# Patient Record
Sex: MALE | Race: ASIAN | NOT HISPANIC OR LATINO
[De-identification: names, ages, dates, MRNs, and addresses within clinical notes are randomized per-mention and may not be internally consistent; named-entity substitution may affect disease eponyms.]

---

## 2017-01-04 ENCOUNTER — APPOINTMENT (OUTPATIENT)
Dept: OTOLARYNGOLOGY | Facility: CLINIC | Age: 72
End: 2017-01-04
Payer: MEDICARE

## 2017-01-04 VITALS
HEART RATE: 64 BPM | WEIGHT: 184 LBS | BODY MASS INDEX: 29.57 KG/M2 | HEIGHT: 66 IN | DIASTOLIC BLOOD PRESSURE: 70 MMHG | SYSTOLIC BLOOD PRESSURE: 119 MMHG

## 2017-01-04 PROCEDURE — 69220 CLEAN OUT MASTOID CAVITY: CPT | Mod: LT

## 2017-01-04 PROCEDURE — 99213 OFFICE O/P EST LOW 20 MIN: CPT | Mod: 25

## 2017-01-13 LAB — BACTERIA FLD CULT: ABNORMAL

## 2017-01-19 ENCOUNTER — RESULT REVIEW (OUTPATIENT)
Age: 72
End: 2017-01-19

## 2017-01-19 RX ORDER — CIPROFLOXACIN AND DEXAMETHASONE 3; 1 MG/ML; MG/ML
0.3-0.1 SUSPENSION/ DROPS AURICULAR (OTIC)
Qty: 10 | Refills: 2 | Status: COMPLETED | COMMUNITY
Start: 2017-01-19 | End: 2017-02-18

## 2017-02-15 ENCOUNTER — APPOINTMENT (OUTPATIENT)
Dept: OTOLARYNGOLOGY | Facility: CLINIC | Age: 72
End: 2017-02-15
Payer: MEDICARE

## 2017-02-15 VITALS
BODY MASS INDEX: 29.57 KG/M2 | DIASTOLIC BLOOD PRESSURE: 76 MMHG | WEIGHT: 184 LBS | HEIGHT: 66 IN | SYSTOLIC BLOOD PRESSURE: 123 MMHG | HEART RATE: 62 BPM

## 2017-02-15 PROCEDURE — 99213 OFFICE O/P EST LOW 20 MIN: CPT | Mod: 25

## 2017-02-15 PROCEDURE — 69220 CLEAN OUT MASTOID CAVITY: CPT | Mod: LT

## 2017-05-22 ENCOUNTER — APPOINTMENT (OUTPATIENT)
Dept: OTOLARYNGOLOGY | Facility: CLINIC | Age: 72
End: 2017-05-22
Payer: MEDICARE

## 2017-05-22 VITALS
HEIGHT: 66 IN | WEIGHT: 184 LBS | SYSTOLIC BLOOD PRESSURE: 132 MMHG | DIASTOLIC BLOOD PRESSURE: 76 MMHG | BODY MASS INDEX: 29.57 KG/M2 | HEART RATE: 62 BPM

## 2017-05-22 DIAGNOSIS — H60.392 OTHER INFECTIVE OTITIS EXTERNA, LEFT EAR: ICD-10-CM

## 2017-05-22 PROCEDURE — 99213 OFFICE O/P EST LOW 20 MIN: CPT | Mod: 25

## 2017-05-22 PROCEDURE — 69220 CLEAN OUT MASTOID CAVITY: CPT | Mod: LT

## 2017-06-13 ENCOUNTER — OUTPATIENT (OUTPATIENT)
Dept: OUTPATIENT SERVICES | Facility: HOSPITAL | Age: 72
LOS: 1 days | End: 2017-06-13
Payer: COMMERCIAL

## 2017-06-13 DIAGNOSIS — R06.09 OTHER FORMS OF DYSPNEA: ICD-10-CM

## 2017-06-13 PROCEDURE — 78452 HT MUSCLE IMAGE SPECT MULT: CPT

## 2017-06-13 PROCEDURE — 93017 CV STRESS TEST TRACING ONLY: CPT

## 2017-06-13 PROCEDURE — A9502: CPT

## 2017-07-12 ENCOUNTER — APPOINTMENT (OUTPATIENT)
Dept: CV DIAGNOSITCS | Facility: HOSPITAL | Age: 72
End: 2017-07-12

## 2017-07-12 ENCOUNTER — OUTPATIENT (OUTPATIENT)
Dept: OUTPATIENT SERVICES | Facility: HOSPITAL | Age: 72
LOS: 1 days | End: 2017-07-12
Payer: COMMERCIAL

## 2017-07-12 VITALS
WEIGHT: 184.97 LBS | HEIGHT: 66 IN | HEART RATE: 59 BPM | DIASTOLIC BLOOD PRESSURE: 66 MMHG | RESPIRATION RATE: 18 BRPM | OXYGEN SATURATION: 98 % | TEMPERATURE: 98 F | SYSTOLIC BLOOD PRESSURE: 123 MMHG

## 2017-07-12 DIAGNOSIS — R93.1 ABNORMAL FINDINGS ON DIAGNOSTIC IMAGING OF HEART AND CORONARY CIRCULATION: ICD-10-CM

## 2017-07-12 LAB
ALBUMIN SERPL ELPH-MCNC: 4.1 G/DL — SIGNIFICANT CHANGE UP (ref 3.3–5)
ALP SERPL-CCNC: 77 U/L — SIGNIFICANT CHANGE UP (ref 40–120)
ALT FLD-CCNC: 26 U/L RC — SIGNIFICANT CHANGE UP (ref 10–45)
ANION GAP SERPL CALC-SCNC: 12 MMOL/L — SIGNIFICANT CHANGE UP (ref 5–17)
AST SERPL-CCNC: 40 U/L — SIGNIFICANT CHANGE UP (ref 10–40)
BILIRUB SERPL-MCNC: 0.7 MG/DL — SIGNIFICANT CHANGE UP (ref 0.2–1.2)
BUN SERPL-MCNC: 29 MG/DL — HIGH (ref 7–23)
CALCIUM SERPL-MCNC: 8.9 MG/DL — SIGNIFICANT CHANGE UP (ref 8.4–10.5)
CHLORIDE SERPL-SCNC: 106 MMOL/L — SIGNIFICANT CHANGE UP (ref 96–108)
CO2 SERPL-SCNC: 24 MMOL/L — SIGNIFICANT CHANGE UP (ref 22–31)
CREAT SERPL-MCNC: 1.45 MG/DL — HIGH (ref 0.5–1.3)
GLUCOSE SERPL-MCNC: 123 MG/DL — HIGH (ref 70–99)
HCT VFR BLD CALC: 38.1 % — LOW (ref 39–50)
HGB BLD-MCNC: 13 G/DL — SIGNIFICANT CHANGE UP (ref 13–17)
MCHC RBC-ENTMCNC: 30.2 PG — SIGNIFICANT CHANGE UP (ref 27–34)
MCHC RBC-ENTMCNC: 34.1 GM/DL — SIGNIFICANT CHANGE UP (ref 32–36)
MCV RBC AUTO: 88.4 FL — SIGNIFICANT CHANGE UP (ref 80–100)
PLATELET # BLD AUTO: 202 K/UL — SIGNIFICANT CHANGE UP (ref 150–400)
POTASSIUM SERPL-MCNC: 3.8 MMOL/L — SIGNIFICANT CHANGE UP (ref 3.5–5.3)
POTASSIUM SERPL-SCNC: 3.8 MMOL/L — SIGNIFICANT CHANGE UP (ref 3.5–5.3)
PROT SERPL-MCNC: 7.4 G/DL — SIGNIFICANT CHANGE UP (ref 6–8.3)
RBC # BLD: 4.31 M/UL — SIGNIFICANT CHANGE UP (ref 4.2–5.8)
RBC # FLD: 12 % — SIGNIFICANT CHANGE UP (ref 10.3–14.5)
SODIUM SERPL-SCNC: 142 MMOL/L — SIGNIFICANT CHANGE UP (ref 135–145)
WBC # BLD: 7.9 K/UL — SIGNIFICANT CHANGE UP (ref 3.8–10.5)
WBC # FLD AUTO: 7.9 K/UL — SIGNIFICANT CHANGE UP (ref 3.8–10.5)

## 2017-07-12 PROCEDURE — 93005 ELECTROCARDIOGRAM TRACING: CPT

## 2017-07-12 PROCEDURE — 93010 ELECTROCARDIOGRAM REPORT: CPT | Mod: 59

## 2017-07-12 PROCEDURE — 93312 ECHO TRANSESOPHAGEAL: CPT | Mod: 26

## 2017-07-12 PROCEDURE — 80053 COMPREHEN METABOLIC PANEL: CPT

## 2017-07-12 PROCEDURE — 76376 3D RENDER W/INTRP POSTPROCES: CPT | Mod: 26

## 2017-07-12 PROCEDURE — 85027 COMPLETE CBC AUTOMATED: CPT

## 2017-07-12 PROCEDURE — C1769: CPT

## 2017-07-12 PROCEDURE — C8929: CPT

## 2017-07-12 PROCEDURE — 76376 3D RENDER W/INTRP POSTPROCES: CPT

## 2017-07-12 PROCEDURE — C1894: CPT

## 2017-07-12 PROCEDURE — 93454 CORONARY ARTERY ANGIO S&I: CPT

## 2017-07-12 PROCEDURE — C8925: CPT

## 2017-07-12 PROCEDURE — 93454 CORONARY ARTERY ANGIO S&I: CPT | Mod: 26,GC

## 2017-07-12 PROCEDURE — 93306 TTE W/DOPPLER COMPLETE: CPT | Mod: 26

## 2017-07-12 PROCEDURE — C1887: CPT

## 2017-07-12 RX ORDER — SODIUM CHLORIDE 9 MG/ML
1000 INJECTION INTRAMUSCULAR; INTRAVENOUS; SUBCUTANEOUS
Qty: 0 | Refills: 0 | Status: DISCONTINUED | OUTPATIENT
Start: 2017-07-12 | End: 2017-07-27

## 2017-07-12 NOTE — H&P CARDIOLOGY - PMH
Aortic valve disease    BPH (benign prostatic hypertrophy)    Chronic mastoiditis of left side    Diabetes mellitus  Type II  Gall stones    GERD (gastroesophageal reflux disease)    HTN (hypertension)    Hyperlipidemia

## 2017-07-12 NOTE — H&P CARDIOLOGY - HISTORY OF PRESENT ILLNESS
This is a 72 yo male PMH DM type 2 A1C 7.8 with neuropathy managed by PMD Dr. Garay, HTN, HLD, AS, s/p bioprosthetic AVR at [facility] 2009, presented to cardiology, Dr. Strange for followup examination with complaints of progressing fatigue and dyspnea with minimal exertion, mild dizziness, and lower extremity LE edema, 2 pillow orthopnea over the past 2 months. Pt. denies chest pain, pressure, lightheadedness, palpitations, diaphoresis, nausea, vomiting, weight gain, or syncope.  Pts. Metoprolol dose has been changed from 200mg to 100mg now pt is back on 200mg to manage his HTN. Pt. presents today asymptomatic for further evaluation BALJINDER/cardiac cath.    Echocardiogram 6/7/17 Normal LV size and grossly normal systolic function.  Evidence of diastolic dysfunction.  LA enlargement, bioprosthetic valve with mild to moderate regurgitation, possibly of paravalvular origin, mild DC, unable to estimate PA systolic pressure secondary to insufficient tricuspid regurgitant jet. PAEDP 12.    NST 6/13/17 small size mild ischemia during Regadenoson stress test.   CXR 6/7/17 cardiomegaly without decompensation.

## 2017-09-18 ENCOUNTER — APPOINTMENT (OUTPATIENT)
Dept: OTOLARYNGOLOGY | Facility: CLINIC | Age: 72
End: 2017-09-18
Payer: MEDICARE

## 2017-09-18 VITALS
HEIGHT: 66 IN | BODY MASS INDEX: 29.57 KG/M2 | SYSTOLIC BLOOD PRESSURE: 125 MMHG | HEART RATE: 71 BPM | WEIGHT: 184 LBS | DIASTOLIC BLOOD PRESSURE: 66 MMHG

## 2017-09-18 DIAGNOSIS — R42 DIZZINESS AND GIDDINESS: ICD-10-CM

## 2017-09-18 PROCEDURE — 92557 COMPREHENSIVE HEARING TEST: CPT

## 2017-09-18 PROCEDURE — 99213 OFFICE O/P EST LOW 20 MIN: CPT | Mod: 25

## 2017-09-18 PROCEDURE — 92567 TYMPANOMETRY: CPT

## 2017-10-09 ENCOUNTER — APPOINTMENT (OUTPATIENT)
Dept: PHARMACY | Facility: CLINIC | Age: 72
End: 2017-10-09

## 2017-10-12 ENCOUNTER — APPOINTMENT (OUTPATIENT)
Dept: OTOLARYNGOLOGY | Facility: CLINIC | Age: 72
End: 2017-10-12
Payer: MEDICARE

## 2017-10-12 PROCEDURE — 92537 CALORIC VSTBLR TEST W/REC: CPT

## 2017-10-12 PROCEDURE — 92547 SUPPLEMENTAL ELECTRICAL TEST: CPT

## 2017-10-12 PROCEDURE — 92567 TYMPANOMETRY: CPT

## 2017-10-12 PROCEDURE — 92584 ELECTROCOCHLEOGRAPHY: CPT

## 2017-10-12 PROCEDURE — 92540 BASIC VESTIBULAR EVALUATION: CPT

## 2017-12-20 ENCOUNTER — APPOINTMENT (OUTPATIENT)
Dept: OTOLARYNGOLOGY | Facility: CLINIC | Age: 72
End: 2017-12-20

## 2018-03-12 ENCOUNTER — APPOINTMENT (OUTPATIENT)
Dept: OTOLARYNGOLOGY | Facility: CLINIC | Age: 73
End: 2018-03-12
Payer: MEDICARE

## 2018-03-12 PROCEDURE — 31575 DIAGNOSTIC LARYNGOSCOPY: CPT

## 2018-03-12 PROCEDURE — 99213 OFFICE O/P EST LOW 20 MIN: CPT | Mod: 25

## 2018-03-19 ENCOUNTER — MESSAGE (OUTPATIENT)
Age: 73
End: 2018-03-19

## 2018-03-23 ENCOUNTER — MESSAGE (OUTPATIENT)
Age: 73
End: 2018-03-23

## 2018-03-23 ENCOUNTER — EMERGENCY (EMERGENCY)
Facility: HOSPITAL | Age: 73
LOS: 1 days | Discharge: HOME | End: 2018-03-23
Attending: EMERGENCY MEDICINE | Admitting: EMERGENCY MEDICINE
Payer: MEDICARE

## 2018-03-23 VITALS
DIASTOLIC BLOOD PRESSURE: 64 MMHG | TEMPERATURE: 99 F | RESPIRATION RATE: 20 BRPM | SYSTOLIC BLOOD PRESSURE: 125 MMHG | HEART RATE: 75 BPM | OXYGEN SATURATION: 100 %

## 2018-03-23 VITALS
DIASTOLIC BLOOD PRESSURE: 65 MMHG | OXYGEN SATURATION: 99 % | SYSTOLIC BLOOD PRESSURE: 132 MMHG | HEART RATE: 79 BPM | RESPIRATION RATE: 18 BRPM | TEMPERATURE: 98 F

## 2018-03-23 LAB
ALBUMIN SERPL ELPH-MCNC: 4.4 G/DL — SIGNIFICANT CHANGE UP (ref 3.3–5)
ALP SERPL-CCNC: 89 U/L — SIGNIFICANT CHANGE UP (ref 40–120)
ALT FLD-CCNC: 27 U/L — SIGNIFICANT CHANGE UP (ref 4–41)
AST SERPL-CCNC: 40 U/L — SIGNIFICANT CHANGE UP (ref 4–40)
BASOPHILS # BLD AUTO: 0.02 K/UL — SIGNIFICANT CHANGE UP (ref 0–0.2)
BASOPHILS NFR BLD AUTO: 0.2 % — SIGNIFICANT CHANGE UP (ref 0–2)
BILIRUB SERPL-MCNC: 0.9 MG/DL — SIGNIFICANT CHANGE UP (ref 0.2–1.2)
BUN SERPL-MCNC: 20 MG/DL — SIGNIFICANT CHANGE UP (ref 7–23)
CALCIUM SERPL-MCNC: 9.1 MG/DL — SIGNIFICANT CHANGE UP (ref 8.4–10.5)
CHLORIDE SERPL-SCNC: 96 MMOL/L — LOW (ref 98–107)
CO2 SERPL-SCNC: 27 MMOL/L — SIGNIFICANT CHANGE UP (ref 22–31)
CREAT SERPL-MCNC: 1.58 MG/DL — HIGH (ref 0.5–1.3)
EOSINOPHIL # BLD AUTO: 0.05 K/UL — SIGNIFICANT CHANGE UP (ref 0–0.5)
EOSINOPHIL NFR BLD AUTO: 0.5 % — SIGNIFICANT CHANGE UP (ref 0–6)
GLUCOSE SERPL-MCNC: 92 MG/DL — SIGNIFICANT CHANGE UP (ref 70–99)
HCT VFR BLD CALC: 41.1 % — SIGNIFICANT CHANGE UP (ref 39–50)
HGB BLD-MCNC: 13.5 G/DL — SIGNIFICANT CHANGE UP (ref 13–17)
IMM GRANULOCYTES # BLD AUTO: 0.03 # — SIGNIFICANT CHANGE UP
IMM GRANULOCYTES NFR BLD AUTO: 0.3 % — SIGNIFICANT CHANGE UP (ref 0–1.5)
LYMPHOCYTES # BLD AUTO: 1.53 K/UL — SIGNIFICANT CHANGE UP (ref 1–3.3)
LYMPHOCYTES # BLD AUTO: 15.1 % — SIGNIFICANT CHANGE UP (ref 13–44)
MCHC RBC-ENTMCNC: 28.2 PG — SIGNIFICANT CHANGE UP (ref 27–34)
MCHC RBC-ENTMCNC: 32.8 % — SIGNIFICANT CHANGE UP (ref 32–36)
MCV RBC AUTO: 86 FL — SIGNIFICANT CHANGE UP (ref 80–100)
MONOCYTES # BLD AUTO: 1.17 K/UL — HIGH (ref 0–0.9)
MONOCYTES NFR BLD AUTO: 11.5 % — SIGNIFICANT CHANGE UP (ref 2–14)
NEUTROPHILS # BLD AUTO: 7.34 K/UL — SIGNIFICANT CHANGE UP (ref 1.8–7.4)
NEUTROPHILS NFR BLD AUTO: 72.4 % — SIGNIFICANT CHANGE UP (ref 43–77)
NRBC # FLD: 0 — SIGNIFICANT CHANGE UP
PLATELET # BLD AUTO: 201 K/UL — SIGNIFICANT CHANGE UP (ref 150–400)
PMV BLD: 8.8 FL — SIGNIFICANT CHANGE UP (ref 7–13)
POTASSIUM SERPL-MCNC: 3.8 MMOL/L — SIGNIFICANT CHANGE UP (ref 3.5–5.3)
POTASSIUM SERPL-SCNC: 3.8 MMOL/L — SIGNIFICANT CHANGE UP (ref 3.5–5.3)
PROT SERPL-MCNC: 8.3 G/DL — SIGNIFICANT CHANGE UP (ref 6–8.3)
RBC # BLD: 4.78 M/UL — SIGNIFICANT CHANGE UP (ref 4.2–5.8)
RBC # FLD: 12.6 % — SIGNIFICANT CHANGE UP (ref 10.3–14.5)
SODIUM SERPL-SCNC: 138 MMOL/L — SIGNIFICANT CHANGE UP (ref 135–145)
WBC # BLD: 10.14 K/UL — SIGNIFICANT CHANGE UP (ref 3.8–10.5)
WBC # FLD AUTO: 10.14 K/UL — SIGNIFICANT CHANGE UP (ref 3.8–10.5)

## 2018-03-23 PROCEDURE — 99285 EMERGENCY DEPT VISIT HI MDM: CPT

## 2018-03-23 PROCEDURE — 70487 CT MAXILLOFACIAL W/DYE: CPT | Mod: 26

## 2018-03-23 RX ORDER — OXYCODONE HYDROCHLORIDE 5 MG/1
1 TABLET ORAL
Qty: 4 | Refills: 0
Start: 2018-03-23 | End: 2018-03-26

## 2018-03-23 RX ORDER — CIPROFLOXACIN LACTATE 400MG/40ML
1 VIAL (ML) INTRAVENOUS
Qty: 20 | Refills: 0
Start: 2018-03-23 | End: 2018-04-01

## 2018-03-23 RX ORDER — ACETAMINOPHEN 500 MG
1000 TABLET ORAL ONCE
Qty: 0 | Refills: 0 | Status: COMPLETED | OUTPATIENT
Start: 2018-03-23 | End: 2018-03-23

## 2018-03-23 RX ORDER — MUPIROCIN 20 MG/G
1 OINTMENT TOPICAL
Qty: 1 | Refills: 0
Start: 2018-03-23 | End: 2018-03-27

## 2018-03-23 RX ORDER — PIPERACILLIN AND TAZOBACTAM 4; .5 G/20ML; G/20ML
3.38 INJECTION, POWDER, LYOPHILIZED, FOR SOLUTION INTRAVENOUS ONCE
Qty: 0 | Refills: 0 | Status: COMPLETED | OUTPATIENT
Start: 2018-03-23 | End: 2018-03-23

## 2018-03-23 RX ORDER — SODIUM CHLORIDE 9 MG/ML
500 INJECTION INTRAMUSCULAR; INTRAVENOUS; SUBCUTANEOUS ONCE
Qty: 0 | Refills: 0 | Status: COMPLETED | OUTPATIENT
Start: 2018-03-23 | End: 2018-03-23

## 2018-03-23 RX ADMIN — Medication 400 MILLIGRAM(S): at 19:10

## 2018-03-23 RX ADMIN — SODIUM CHLORIDE 500 MILLILITER(S): 9 INJECTION INTRAMUSCULAR; INTRAVENOUS; SUBCUTANEOUS at 19:10

## 2018-03-23 RX ADMIN — PIPERACILLIN AND TAZOBACTAM 200 GRAM(S): 4; .5 INJECTION, POWDER, LYOPHILIZED, FOR SOLUTION INTRAVENOUS at 21:22

## 2018-03-23 NOTE — ED ADULT NURSE NOTE - OBJECTIVE STATEMENT
72M, PMH of AVR, chronic ear infections requiring sx of left ear, presents with recurrent discharge from both ears and subjective fever. Pt started on Tobramycin by ENT 3 days ago and then started on Augmentin yesterday. Pt denies improvement, reports pain down left side of jaw. Pt denies tinnitus, dizziness, n/v.

## 2018-03-23 NOTE — ED PROVIDER NOTE - CARE PLAN
Principal Discharge DX:	Otitis media of both ears  Assessment and plan of treatment:	- continue tobradex drops, use bilaterally (3 drops TID for 7 more days)  - Discontinue amoxicillin  - START ciprofloxacin 500mg twice daily x 10 days for ear infection  - START clindamycin 300mg every 6 hours x 7 days for cellulitis of the chin  - mupirocin ointment twice daily for cellulitis of the chin  - Return to ED for worsening pain/facial swelling/difficulty swallowing  - Follow up with Dr. Mckeon in 1 week.  [phone]  Secondary Diagnosis:	Cellulitis

## 2018-03-23 NOTE — ED ADULT TRIAGE NOTE - CHIEF COMPLAINT QUOTE
Patient c/o left ear pain that radiating downwards to tooth, jaw, face and chin. He states he has some discomfort to right side ear also but not as much as other side.

## 2018-03-23 NOTE — CONSULT NOTE ADULT - SUBJECTIVE AND OBJECTIVE BOX
72M DM, Htn, followed as outpatient by neuro-otology present to ED for ear pain.  PT reports seeing Dr. Mckeon last week where she examined ears and sent culture which grew pseudomonas.  Pt was written for tobradex drops.  He reports starting those drops 1-2 days ago.  He also saw PMH who prescribed amox/vs augmentin?  He presents to ED today with ear pain which he reports as unchanged in the past few days.  He also has new onset tenderness over the chin.  No history of chin trauma.  He denies acute change in hearing, no dizziness.  He reports L sided otorrhea.  He has a history of mastoidectomy on the left as well.    PMH/PSH above  All: NKDA      Vital Signs Last 24 Hrs  T(C): 37.2 (23 Mar 2018 19:15), Max: 37.2 (23 Mar 2018 19:15)  T(F): 99 (23 Mar 2018 19:15), Max: 99 (23 Mar 2018 19:15)  HR: 75 (23 Mar 2018 19:15) (75 - 79)  BP: 125/64 (23 Mar 2018 19:15) (125/64 - 132/65)  BP(mean): --  RR: 20 (23 Mar 2018 19:15) (18 - 20)  SpO2: 100% (23 Mar 2018 19:15) (99% - 100%)    PHYSICAL EXAM:  Constitutional Normal: awake, alert,  well developed, no acute distress  Face symmetric, CNs grossly intact, small 2x2cm area of erythema and induration over left chin, no fluctuance, no drainage  AS: No mastoid tenderness, Normal external ear, EAC with some debris overlying TM, minimal canal edema  ADS: Normal external ear, EAC with cerumen and debris, unable to see TM	  External Nose:  Normal, no structural deformities	  Anterior Nasal Cavity: patent bilaterally, pink moist mucosa			  Oral Cavity:  pink moist mucosa, tongue midline  Neck: Soft, flat, No palpable masses                          13.5   10.14 )-----------( 201      ( 23 Mar 2018 16:45 )             41.1     03-23    138  |  96<L>  |  20  ----------------------------<  92  3.8   |  27  |  1.58<H>    Ca    9.1      23 Mar 2018 16:45    TPro  8.3  /  Alb  4.4  /  TBili  0.9  /  DBili  x   /  AST  40  /  ALT  27  /  AlkPhos  89  03-23      CT: Left tympanic membrane   retraction, ossicular erosion and opacification of the mastoid air cells is   seen. Please correlate with clinical exam findings for the possibility of a   cholesteatoma. No abscess

## 2018-03-23 NOTE — ED PROVIDER NOTE - PHYSICAL EXAMINATION
Attending/Adriel: NAD, PERRL/EOMI, TM-+pus, no mastoid PT, O/P-clear, supple, RRR, +SM, CTAB, ABds-fot, NT/ND, no LE edema, +2 DP/PT, A&Ox3, nonfocal; Left facial swelling

## 2018-03-23 NOTE — CONSULT NOTE ADULT - ASSESSMENT
72M with otitis externa, Culture confirmed to be pseudomonas.  No extending to surrounding soft tissue.  No concerning finding on CT.  Unrelated minor cellulitis overlying skin of chin likely from folliculitis, no abscess.  No WBC elevation, no good glucose control  - no acute ENT needs  - continue tobradex drops, use bilaterally (3 drops TID for 7 more days)  - Discontinue amox vs augmentin  - consider bactrim vs clinda for chin cellulitis  - mupirocin ointment BID for chin cellulitis BID  - follow up with Dr. Mckeon in 1 week.  [phone] 72M with otitis externa, Culture confirmed to be pseudomonas sensitive to tobradex.  No extending to surrounding soft tissue.  No concerning finding on CT.  Unrelated minor cellulitis overlying skin of chin likely from folliculitis, no abscess.  No WBC elevation, no good glucose control  - no acute ENT needs  - continue tobradex drops, use bilaterally (3 drops TID for 7 more days)  - Discontinue amox vs augmentin  - oral ciprofloxacin for improved psuedomonas coverage  - consider bactrim vs clinda for chin cellulitis  - mupirocin ointment BID for chin cellulitis BID  - follow up with Dr. Mckeon in 1 week.  [phone]

## 2018-03-23 NOTE — ED PROVIDER NOTE - PLAN OF CARE
- continue tobradex drops, use bilaterally (3 drops TID for 7 more days)  - Discontinue amoxicillin  - START ciprofloxacin 500mg twice daily x 10 days for ear infection  - START clindamycin 300mg every 6 hours x 7 days for cellulitis of the chin  - mupirocin ointment twice daily for cellulitis of the chin  - Return to ED for worsening pain/facial swelling/difficulty swallowing  - Follow up with Dr. Mckeon in 1 week.  [phone]

## 2018-03-23 NOTE — ED PROVIDER NOTE - OBJECTIVE STATEMENT
71 y/o M PMH DM, HTN, HLD, h/o AVR on ASA c/o, h/o recurrent ear infections c/o worsening left facial pain and swelling x 5 days. 71 y/o M PMH DM, HTN, HLD, h/o AVR on ASA c/o, h/o recurrent ear infections c/o worsening left facial pain and swelling x 5 days.    Attending/Adriel: 73 yo M as described above including h/o AVR (tissue), h/o chronic ear infections including requiring surgery of left ear (?type) 2 years ago p/w recurrent discharge from both ears, associated with subjective fver. Seen and eval by her ENT this past Tuesday, cultured taken and started on Tobramycin. Patient not improving, see and eval by PMD yesterdya and started on oral Augmentin. Pt with intermittent ear pain somewhat improve with Acetaminophen now radiating down left side of jaw, with painful mouth opening. Denies visual changes, dysphagioa or odyophagia. 73 y/o M PMH DM, HTN, HLD, h/o AVR on ASA c/o, h/o recurrent ear infections c/o worsening left facial pain and swelling x 5 days. Pt was evaluated by ENT 4 days ago, started on tobracycin drops for b/l ear infection. Pt notes worsening pain and swelling to left face, saw on call doc at PMD office yesterday and was started on augmentin. Since being on abx, pt states ear pain mildly improved and most of his pain now localized to chin with radiation from left ear. Pt presents to ED d/t worsening pain and difficulty opening jaw, notes only being able to tolerate liquid diet x 5 days. Endorses subjective fever for past few days. Denies CP, SOB, dysphagia, abdominal pain, N/V, change in vision/pain with eye movement.    Attending/Adriel: 73 yo M as described above including h/o AVR (tissue), h/o chronic ear infections including requiring surgery of left ear (?type) 2 years ago p/w recurrent discharge from both ears, associated with subjective fver. Seen and eval by her ENT this past Tuesday, cultured taken and started on Tobramycin. Patient not improving, see and eval by PMD yesterdya and started on oral Augmentin. Pt with intermittent ear pain somewhat improve with Acetaminophen now radiating down left side of jaw, with painful mouth opening. Denies visual changes, dysphagioa or odyophagia.

## 2018-03-23 NOTE — ED PROVIDER NOTE - HEAD SHAPE
left facial swelling over left lower jaw extending towards chin. Chin erythematous and indurated, non fluctuance, no crepitus noted, no dental ttp of signs of dental abscess/ATRAUMATIC

## 2018-03-23 NOTE — ED PROVIDER NOTE - MEDICAL DECISION MAKING DETAILS
A/P 73 yo M with mult med problmes with acute on chrnic ear infection with possible extension  -labs, CT, Abx, analgesia, ENT, reassess

## 2018-03-23 NOTE — ED PROVIDER NOTE - PROGRESS NOTE DETAILS
ENT consulted, pt to switch abx to cover pseudomonas for aom, chin cellulitis to be tx'd with clinda and mupirocin and f/u with his ent in 1 week. Will send home with pain med regimen

## 2018-03-26 ENCOUNTER — APPOINTMENT (OUTPATIENT)
Dept: PULMONOLOGY | Facility: CLINIC | Age: 73
End: 2018-03-26
Payer: MEDICARE

## 2018-03-26 VITALS
RESPIRATION RATE: 15 BRPM | DIASTOLIC BLOOD PRESSURE: 72 MMHG | BODY MASS INDEX: 30.7 KG/M2 | TEMPERATURE: 98.1 F | WEIGHT: 191 LBS | HEIGHT: 66 IN | SYSTOLIC BLOOD PRESSURE: 118 MMHG | HEART RATE: 69 BPM | OXYGEN SATURATION: 98 %

## 2018-03-26 PROCEDURE — 94060 EVALUATION OF WHEEZING: CPT

## 2018-03-26 PROCEDURE — 94729 DIFFUSING CAPACITY: CPT

## 2018-03-26 PROCEDURE — 99204 OFFICE O/P NEW MOD 45 MIN: CPT | Mod: 25

## 2018-03-26 PROCEDURE — 94726 PLETHYSMOGRAPHY LUNG VOLUMES: CPT

## 2018-03-26 PROCEDURE — ZZZZZ: CPT

## 2018-03-28 LAB
ANION GAP SERPL CALC-SCNC: 14 MMOL/L
BASOPHILS # BLD AUTO: 0.02 K/UL
BASOPHILS NFR BLD AUTO: 0.3 %
BUN SERPL-MCNC: 23 MG/DL
CALCIUM SERPL-MCNC: 9.2 MG/DL
CHLORIDE SERPL-SCNC: 98 MMOL/L
CO2 SERPL-SCNC: 24 MMOL/L
CREAT SERPL-MCNC: 1.73 MG/DL
EOSINOPHIL # BLD AUTO: 0.18 K/UL
EOSINOPHIL NFR BLD AUTO: 2.7 %
GLUCOSE SERPL-MCNC: 170 MG/DL
HCT VFR BLD CALC: 38.4 %
HGB BLD-MCNC: 12.3 G/DL
IMM GRANULOCYTES NFR BLD AUTO: 0.1 %
LYMPHOCYTES # BLD AUTO: 1.83 K/UL
LYMPHOCYTES NFR BLD AUTO: 27.2 %
MAN DIFF?: NORMAL
MCHC RBC-ENTMCNC: 28.7 PG
MCHC RBC-ENTMCNC: 32 GM/DL
MCV RBC AUTO: 89.5 FL
MONOCYTES # BLD AUTO: 0.64 K/UL
MONOCYTES NFR BLD AUTO: 9.5 %
NEUTROPHILS # BLD AUTO: 4.04 K/UL
NEUTROPHILS NFR BLD AUTO: 60.2 %
PLATELET # BLD AUTO: 229 K/UL
POTASSIUM SERPL-SCNC: 4 MMOL/L
RBC # BLD: 4.29 M/UL
RBC # FLD: 13.1 %
SODIUM SERPL-SCNC: 136 MMOL/L
WBC # FLD AUTO: 6.72 K/UL

## 2018-03-30 ENCOUNTER — APPOINTMENT (OUTPATIENT)
Dept: OTOLARYNGOLOGY | Facility: CLINIC | Age: 73
End: 2018-03-30
Payer: MEDICARE

## 2018-03-30 VITALS
WEIGHT: 190 LBS | HEART RATE: 67 BPM | DIASTOLIC BLOOD PRESSURE: 66 MMHG | HEIGHT: 67 IN | SYSTOLIC BLOOD PRESSURE: 111 MMHG | BODY MASS INDEX: 29.82 KG/M2

## 2018-03-30 PROCEDURE — 99213 OFFICE O/P EST LOW 20 MIN: CPT

## 2018-03-30 RX ORDER — MUPIROCIN 20 MG/G
2 OINTMENT TOPICAL
Qty: 22 | Refills: 0 | Status: DISCONTINUED | COMMUNITY
Start: 2018-03-23

## 2018-05-01 ENCOUNTER — OUTPATIENT (OUTPATIENT)
Dept: OUTPATIENT SERVICES | Facility: HOSPITAL | Age: 73
LOS: 1 days | End: 2018-05-01
Payer: MEDICAID

## 2018-05-01 PROCEDURE — G9001: CPT

## 2018-05-02 ENCOUNTER — INPATIENT (INPATIENT)
Facility: HOSPITAL | Age: 73
LOS: 1 days | Discharge: HOME | End: 2018-05-04
Attending: INTERNAL MEDICINE | Admitting: INTERNAL MEDICINE
Payer: MEDICARE

## 2018-05-02 VITALS
SYSTOLIC BLOOD PRESSURE: 119 MMHG | HEART RATE: 66 BPM | DIASTOLIC BLOOD PRESSURE: 60 MMHG | TEMPERATURE: 98 F | RESPIRATION RATE: 116 BRPM | OXYGEN SATURATION: 98 %

## 2018-05-02 DIAGNOSIS — Z29.9 ENCOUNTER FOR PROPHYLACTIC MEASURES, UNSPECIFIED: ICD-10-CM

## 2018-05-02 DIAGNOSIS — E11.9 TYPE 2 DIABETES MELLITUS WITHOUT COMPLICATIONS: ICD-10-CM

## 2018-05-02 DIAGNOSIS — Z90.49 ACQUIRED ABSENCE OF OTHER SPECIFIED PARTS OF DIGESTIVE TRACT: Chronic | ICD-10-CM

## 2018-05-02 DIAGNOSIS — R07.9 CHEST PAIN, UNSPECIFIED: ICD-10-CM

## 2018-05-02 DIAGNOSIS — Z98.890 OTHER SPECIFIED POSTPROCEDURAL STATES: Chronic | ICD-10-CM

## 2018-05-02 DIAGNOSIS — I10 ESSENTIAL (PRIMARY) HYPERTENSION: ICD-10-CM

## 2018-05-02 DIAGNOSIS — Z95.2 PRESENCE OF PROSTHETIC HEART VALVE: Chronic | ICD-10-CM

## 2018-05-02 DIAGNOSIS — N17.9 ACUTE KIDNEY FAILURE, UNSPECIFIED: ICD-10-CM

## 2018-05-02 DIAGNOSIS — E78.00 PURE HYPERCHOLESTEROLEMIA, UNSPECIFIED: ICD-10-CM

## 2018-05-02 LAB
ALBUMIN SERPL ELPH-MCNC: 4.3 G/DL — SIGNIFICANT CHANGE UP (ref 3.3–5)
ALP SERPL-CCNC: 71 U/L — SIGNIFICANT CHANGE UP (ref 40–120)
ALT FLD-CCNC: 27 U/L — SIGNIFICANT CHANGE UP (ref 4–41)
APTT BLD: 30.2 SEC — SIGNIFICANT CHANGE UP (ref 27.5–37.4)
AST SERPL-CCNC: 41 U/L — HIGH (ref 4–40)
BASE EXCESS BLDV CALC-SCNC: 2.3 MMOL/L — SIGNIFICANT CHANGE UP
BASOPHILS # BLD AUTO: 0.04 K/UL — SIGNIFICANT CHANGE UP (ref 0–0.2)
BASOPHILS NFR BLD AUTO: 0.5 % — SIGNIFICANT CHANGE UP (ref 0–2)
BILIRUB SERPL-MCNC: 0.6 MG/DL — SIGNIFICANT CHANGE UP (ref 0.2–1.2)
BLOOD GAS VENOUS - CREATININE: 1.92 MG/DL — HIGH (ref 0.5–1.3)
BUN SERPL-MCNC: 39 MG/DL — HIGH (ref 7–23)
CALCIUM SERPL-MCNC: 9.3 MG/DL — SIGNIFICANT CHANGE UP (ref 8.4–10.5)
CHLORIDE BLDV-SCNC: 109 MMOL/L — HIGH (ref 96–108)
CHLORIDE SERPL-SCNC: 101 MMOL/L — SIGNIFICANT CHANGE UP (ref 98–107)
CK MB BLD-MCNC: 2.5 — SIGNIFICANT CHANGE UP (ref 0–2.5)
CK MB BLD-MCNC: 4.78 NG/ML — SIGNIFICANT CHANGE UP (ref 1–6.6)
CK SERPL-CCNC: 190 U/L — SIGNIFICANT CHANGE UP (ref 30–200)
CO2 SERPL-SCNC: 26 MMOL/L — SIGNIFICANT CHANGE UP (ref 22–31)
CREAT SERPL-MCNC: 1.93 MG/DL — HIGH (ref 0.5–1.3)
EOSINOPHIL # BLD AUTO: 0.17 K/UL — SIGNIFICANT CHANGE UP (ref 0–0.5)
EOSINOPHIL NFR BLD AUTO: 2.3 % — SIGNIFICANT CHANGE UP (ref 0–6)
GAS PNL BLDV: 135 MMOL/L — LOW (ref 136–146)
GLUCOSE BLDC GLUCOMTR-MCNC: 130 MG/DL — HIGH (ref 70–99)
GLUCOSE BLDC GLUCOMTR-MCNC: 220 MG/DL — HIGH (ref 70–99)
GLUCOSE BLDV-MCNC: 120 — HIGH (ref 70–99)
GLUCOSE SERPL-MCNC: 128 MG/DL — HIGH (ref 70–99)
HBA1C BLD-MCNC: 7 % — HIGH (ref 4–5.6)
HCO3 BLDV-SCNC: 25 MMOL/L — SIGNIFICANT CHANGE UP (ref 20–27)
HCT VFR BLD CALC: 34.4 % — LOW (ref 39–50)
HCT VFR BLDV CALC: 36.4 % — LOW (ref 39–51)
HGB BLD-MCNC: 11.4 G/DL — LOW (ref 13–17)
HGB BLDV-MCNC: 11.8 G/DL — LOW (ref 13–17)
IMM GRANULOCYTES # BLD AUTO: 0.02 # — SIGNIFICANT CHANGE UP
IMM GRANULOCYTES NFR BLD AUTO: 0.3 % — SIGNIFICANT CHANGE UP (ref 0–1.5)
INR BLD: 1.06 — SIGNIFICANT CHANGE UP (ref 0.88–1.17)
LACTATE BLDV-MCNC: 1.3 MMOL/L — SIGNIFICANT CHANGE UP (ref 0.5–2)
LYMPHOCYTES # BLD AUTO: 1.96 K/UL — SIGNIFICANT CHANGE UP (ref 1–3.3)
LYMPHOCYTES # BLD AUTO: 26.8 % — SIGNIFICANT CHANGE UP (ref 13–44)
MAGNESIUM SERPL-MCNC: 2.1 MG/DL — SIGNIFICANT CHANGE UP (ref 1.6–2.6)
MCHC RBC-ENTMCNC: 28.8 PG — SIGNIFICANT CHANGE UP (ref 27–34)
MCHC RBC-ENTMCNC: 33.1 % — SIGNIFICANT CHANGE UP (ref 32–36)
MCV RBC AUTO: 86.9 FL — SIGNIFICANT CHANGE UP (ref 80–100)
MONOCYTES # BLD AUTO: 0.86 K/UL — SIGNIFICANT CHANGE UP (ref 0–0.9)
MONOCYTES NFR BLD AUTO: 11.7 % — SIGNIFICANT CHANGE UP (ref 2–14)
NEUTROPHILS # BLD AUTO: 4.27 K/UL — SIGNIFICANT CHANGE UP (ref 1.8–7.4)
NEUTROPHILS NFR BLD AUTO: 58.4 % — SIGNIFICANT CHANGE UP (ref 43–77)
NRBC # FLD: 0 — SIGNIFICANT CHANGE UP
PCO2 BLDV: 48 MMHG — SIGNIFICANT CHANGE UP (ref 41–51)
PH BLDV: 7.37 PH — SIGNIFICANT CHANGE UP (ref 7.32–7.43)
PHOSPHATE SERPL-MCNC: 3.8 MG/DL — SIGNIFICANT CHANGE UP (ref 2.5–4.5)
PLATELET # BLD AUTO: 203 K/UL — SIGNIFICANT CHANGE UP (ref 150–400)
PMV BLD: 8.9 FL — SIGNIFICANT CHANGE UP (ref 7–13)
PO2 BLDV: 33 MMHG — LOW (ref 35–40)
POTASSIUM BLDV-SCNC: 3.6 MMOL/L — SIGNIFICANT CHANGE UP (ref 3.4–4.5)
POTASSIUM SERPL-MCNC: 3.8 MMOL/L — SIGNIFICANT CHANGE UP (ref 3.5–5.3)
POTASSIUM SERPL-SCNC: 3.8 MMOL/L — SIGNIFICANT CHANGE UP (ref 3.5–5.3)
PROT SERPL-MCNC: 7.3 G/DL — SIGNIFICANT CHANGE UP (ref 6–8.3)
PROTHROM AB SERPL-ACNC: 12.2 SEC — SIGNIFICANT CHANGE UP (ref 9.8–13.1)
RBC # BLD: 3.96 M/UL — LOW (ref 4.2–5.8)
RBC # FLD: 13.2 % — SIGNIFICANT CHANGE UP (ref 10.3–14.5)
SAO2 % BLDV: 57.1 % — LOW (ref 60–85)
SODIUM SERPL-SCNC: 140 MMOL/L — SIGNIFICANT CHANGE UP (ref 135–145)
TROPONIN T SERPL-MCNC: < 0.06 NG/ML — SIGNIFICANT CHANGE UP (ref 0–0.06)
TROPONIN T SERPL-MCNC: < 0.06 NG/ML — SIGNIFICANT CHANGE UP (ref 0–0.06)
TSH SERPL-MCNC: 1.62 UIU/ML — SIGNIFICANT CHANGE UP (ref 0.27–4.2)
WBC # BLD: 7.32 K/UL — SIGNIFICANT CHANGE UP (ref 3.8–10.5)
WBC # FLD AUTO: 7.32 K/UL — SIGNIFICANT CHANGE UP (ref 3.8–10.5)

## 2018-05-02 PROCEDURE — 71046 X-RAY EXAM CHEST 2 VIEWS: CPT | Mod: 26

## 2018-05-02 RX ORDER — ASPIRIN/CALCIUM CARB/MAGNESIUM 324 MG
81 TABLET ORAL DAILY
Qty: 0 | Refills: 0 | Status: DISCONTINUED | OUTPATIENT
Start: 2018-05-02 | End: 2018-05-04

## 2018-05-02 RX ORDER — ACETAMINOPHEN 500 MG
650 TABLET ORAL EVERY 6 HOURS
Qty: 0 | Refills: 0 | Status: DISCONTINUED | OUTPATIENT
Start: 2018-05-02 | End: 2018-05-04

## 2018-05-02 RX ORDER — GLUCAGON INJECTION, SOLUTION 0.5 MG/.1ML
1 INJECTION, SOLUTION SUBCUTANEOUS ONCE
Qty: 0 | Refills: 0 | Status: DISCONTINUED | OUTPATIENT
Start: 2018-05-02 | End: 2018-05-04

## 2018-05-02 RX ORDER — DEXTROSE 50 % IN WATER 50 %
25 SYRINGE (ML) INTRAVENOUS ONCE
Qty: 0 | Refills: 0 | Status: DISCONTINUED | OUTPATIENT
Start: 2018-05-02 | End: 2018-05-04

## 2018-05-02 RX ORDER — AMLODIPINE BESYLATE 2.5 MG/1
10 TABLET ORAL DAILY
Qty: 0 | Refills: 0 | Status: DISCONTINUED | OUTPATIENT
Start: 2018-05-02 | End: 2018-05-04

## 2018-05-02 RX ORDER — INSULIN LISPRO 100/ML
VIAL (ML) SUBCUTANEOUS AT BEDTIME
Qty: 0 | Refills: 0 | Status: DISCONTINUED | OUTPATIENT
Start: 2018-05-02 | End: 2018-05-04

## 2018-05-02 RX ORDER — PANTOPRAZOLE SODIUM 20 MG/1
40 TABLET, DELAYED RELEASE ORAL
Qty: 0 | Refills: 0 | Status: DISCONTINUED | OUTPATIENT
Start: 2018-05-02 | End: 2018-05-04

## 2018-05-02 RX ORDER — TRIAMTERENE/HYDROCHLOROTHIAZID 75 MG-50MG
1 TABLET ORAL DAILY
Qty: 0 | Refills: 0 | Status: DISCONTINUED | OUTPATIENT
Start: 2018-05-02 | End: 2018-05-04

## 2018-05-02 RX ORDER — RANOLAZINE 500 MG/1
500 TABLET, FILM COATED, EXTENDED RELEASE ORAL
Qty: 0 | Refills: 0 | Status: DISCONTINUED | OUTPATIENT
Start: 2018-05-02 | End: 2018-05-04

## 2018-05-02 RX ORDER — METOPROLOL TARTRATE 50 MG
200 TABLET ORAL DAILY
Qty: 0 | Refills: 0 | Status: DISCONTINUED | OUTPATIENT
Start: 2018-05-02 | End: 2018-05-04

## 2018-05-02 RX ORDER — SODIUM CHLORIDE 9 MG/ML
1000 INJECTION, SOLUTION INTRAVENOUS
Qty: 0 | Refills: 0 | Status: DISCONTINUED | OUTPATIENT
Start: 2018-05-02 | End: 2018-05-04

## 2018-05-02 RX ORDER — INSULIN LISPRO 100/ML
VIAL (ML) SUBCUTANEOUS
Qty: 0 | Refills: 0 | Status: DISCONTINUED | OUTPATIENT
Start: 2018-05-02 | End: 2018-05-04

## 2018-05-02 RX ORDER — POLYETHYLENE GLYCOL 3350 17 G/17G
17 POWDER, FOR SOLUTION ORAL DAILY
Qty: 0 | Refills: 0 | Status: DISCONTINUED | OUTPATIENT
Start: 2018-05-02 | End: 2018-05-04

## 2018-05-02 RX ORDER — ATORVASTATIN CALCIUM 80 MG/1
40 TABLET, FILM COATED ORAL AT BEDTIME
Qty: 0 | Refills: 0 | Status: DISCONTINUED | OUTPATIENT
Start: 2018-05-02 | End: 2018-05-04

## 2018-05-02 RX ORDER — ISOSORBIDE DINITRATE 5 MG/1
30 TABLET ORAL
Qty: 0 | Refills: 0 | Status: DISCONTINUED | OUTPATIENT
Start: 2018-05-02 | End: 2018-05-04

## 2018-05-02 RX ORDER — HEPARIN SODIUM 5000 [USP'U]/ML
5000 INJECTION INTRAVENOUS; SUBCUTANEOUS EVERY 8 HOURS
Qty: 0 | Refills: 0 | Status: DISCONTINUED | OUTPATIENT
Start: 2018-05-02 | End: 2018-05-04

## 2018-05-02 RX ORDER — ISOSORBIDE MONONITRATE 60 MG/1
30 TABLET, EXTENDED RELEASE ORAL
Qty: 0 | Refills: 0 | Status: DISCONTINUED | OUTPATIENT
Start: 2018-05-02 | End: 2018-05-02

## 2018-05-02 RX ORDER — LORATADINE 10 MG/1
10 TABLET ORAL DAILY
Qty: 0 | Refills: 0 | Status: DISCONTINUED | OUTPATIENT
Start: 2018-05-02 | End: 2018-05-04

## 2018-05-02 RX ORDER — DEXTROSE 50 % IN WATER 50 %
1 SYRINGE (ML) INTRAVENOUS ONCE
Qty: 0 | Refills: 0 | Status: DISCONTINUED | OUTPATIENT
Start: 2018-05-02 | End: 2018-05-04

## 2018-05-02 RX ORDER — GABAPENTIN 400 MG/1
100 CAPSULE ORAL
Qty: 0 | Refills: 0 | Status: DISCONTINUED | OUTPATIENT
Start: 2018-05-02 | End: 2018-05-04

## 2018-05-02 RX ORDER — DEXTROSE 50 % IN WATER 50 %
12.5 SYRINGE (ML) INTRAVENOUS ONCE
Qty: 0 | Refills: 0 | Status: DISCONTINUED | OUTPATIENT
Start: 2018-05-02 | End: 2018-05-04

## 2018-05-02 RX ADMIN — LORATADINE 10 MILLIGRAM(S): 10 TABLET ORAL at 18:39

## 2018-05-02 RX ADMIN — RANOLAZINE 500 MILLIGRAM(S): 500 TABLET, FILM COATED, EXTENDED RELEASE ORAL at 18:40

## 2018-05-02 RX ADMIN — ISOSORBIDE DINITRATE 30 MILLIGRAM(S): 5 TABLET ORAL at 18:39

## 2018-05-02 RX ADMIN — Medication 200 MILLIGRAM(S): at 18:40

## 2018-05-02 RX ADMIN — ATORVASTATIN CALCIUM 40 MILLIGRAM(S): 80 TABLET, FILM COATED ORAL at 22:35

## 2018-05-02 RX ADMIN — HEPARIN SODIUM 5000 UNIT(S): 5000 INJECTION INTRAVENOUS; SUBCUTANEOUS at 22:33

## 2018-05-02 NOTE — CONSULT NOTE ADULT - SUBJECTIVE AND OBJECTIVE BOX
REASON FOR ADMISSION: Patient is a 72y old  Male who presents with a chief complaint of Chest Pain (02 May 2018 14:45)    HISTORY OF PRESENT ILLNESS: 71 y/o male, with a PmHx of HTN, HLD, DM, AVR (bovine - 2009 @ [place]), presented to [facility] ED c/o intermittent chest pain for the past week; intermittent, left sided, non-radiating, 2-3/10, aching chest discomfort with associated symptoms of dizziness, sob, HA and blurred vision. He states the chest pain wasn't getting any better over this past week so he had gone to his PCP's office today, had an EKG done and was told he needed to go to the hospital because he had an abnormal ekg. He denies any fever, chills, n/v, abd pain, recent travel, sick contacts. He states he feels much better now. He appears comfortable at this time. He is being admitted to telemetry for r/o acs.   patient with abnormal kidney function hence nephrology was consulted. Patient denies being told he has kidney issues. Outpatient records show a Cr of 1.7 recently so seems patient has some baseline CKD. Mild increase in creatinine is likely prerenal.       REVIEW OF SYSTEMS: 12 review of systems negative except what is stated in HPI    PAST MEDICAL & SURGICAL HISTORY:  High cholesterol  DM (diabetes mellitus)  HTN (hypertension)  History of ear surgery: ? surgery on left ear  History of cholecystectomy  S/P AVR (aortic valve replacement): 2009 @ [place] (bovine)      SOCIAL HISTORY: no smoking, drinking or drugs    FAMILY HISTORY: FAMILY HISTORY:  No pertinent family history in first degree relatives      ALLERGIES: Allergies    No Known Allergies    Intolerances        Home Medications:  amLODIPine 10 mg oral tablet: 1 tab(s) orally once a day (02 May 2018 17:12)  Aspirin Enteric Coated 325 mg oral delayed release tablet: 1 tab(s) orally once a day (02 May 2018 17:12)  atorvastatin 40 mg oral tablet: 1 tab(s) orally once a day (02 May 2018 17:12)  gabapentin 100 mg oral tablet: 1 tab(s) orally 2 times a day (02 May 2018 17:12)  glimepiride 4 mg oral tablet: 1 tab(s) orally once a day (02 May 2018 17:12)  isosorbide dinitrate 30 mg oral tablet: 1 tab(s) orally 2 times a day (02 May 2018 17:12)  Januvia 50 mg oral tablet: 1 tab(s) orally once a day (02 May 2018 17:12)  loratadine 10 mg oral tablet: 1 tab(s) orally once a day (02 May 2018 17:12)  metFORMIN 500 mg oral tablet: 1 tab(s) orally 2 times a day (02 May 2018 17:12)  Metoprolol Succinate  mg oral tablet, extended release: 1 tab(s) orally once a day (02 May 2018 17:12)  omeprazole 20 mg oral delayed release capsule: 1 cap(s) orally once a day (02 May 2018 17:12)  polyethylene glycol 3350 oral powder for reconstitution: 17 gram(s) orally once a day (02 May 2018 17:12)  Ranexa 500 mg oral tablet, extended release: 1 tab(s) orally 2 times a day (02 May 2018 17:12)  tobramycin-dexamethasone 0.3%-0.1% ophthalmic suspension: 2 drop(s) to each affected eye 4 times a day (02 May 2018 17:12)  triamterene-hydrochlorothiazide 37.5 mg-25 mg oral tablet: 1 tab(s) orally once a day (02 May 2018 17:12)      MEDICATIONS  (STANDING):  amLODIPine   Tablet 10 milliGRAM(s) Oral daily  aspirin enteric coated 81 milliGRAM(s) Oral daily  atorvastatin 40 milliGRAM(s) Oral at bedtime  dextrose 5%. 1000 milliLiter(s) (50 mL/Hr) IV Continuous <Continuous>  dextrose 50% Injectable 12.5 Gram(s) IV Push once  dextrose 50% Injectable 25 Gram(s) IV Push once  dextrose 50% Injectable 25 Gram(s) IV Push once  gabapentin 100 milliGRAM(s) Oral two times a day  heparin  Injectable 5000 Unit(s) SubCutaneous every 8 hours  insulin lispro (HumaLOG) corrective regimen sliding scale   SubCutaneous three times a day before meals  insulin lispro (HumaLOG) corrective regimen sliding scale   SubCutaneous at bedtime  isosorbide   dinitrate Tablet (ISORDIL) 30 milliGRAM(s) Oral two times a day  loratadine 10 milliGRAM(s) Oral daily  metoprolol succinate  milliGRAM(s) Oral daily  pantoprazole    Tablet 40 milliGRAM(s) Oral before breakfast  polyethylene glycol 3350 17 Gram(s) Oral daily  ranolazine 500 milliGRAM(s) Oral two times a day  triamterene 37.5 mG/hydrochlorothiazide 25 mG Tablet 1 Tablet(s) Oral daily    MEDICATIONS  (PRN):  acetaminophen   Tablet. 650 milliGRAM(s) Oral every 6 hours PRN mild, moderate pain  dextrose Gel 1 Dose(s) Oral once PRN Blood Glucose LESS THAN 70 milliGRAM(s)/deciliter  glucagon  Injectable 1 milliGRAM(s) IntraMuscular once PRN Glucose LESS THAN 70 milligrams/deciliter      PHYSICAL EXAMINATION  VITAL SIGNS:  Vital Signs Last 24 Hrs  T(C): 36.4 (02 May 2018 16:00), Max: 36.6 (02 May 2018 11:45)  T(F): 97.6 (02 May 2018 16:00), Max: 97.8 (02 May 2018 11:45)  HR: 67 (02 May 2018 16:00) (65 - 70)  BP: 125/63 (02 May 2018 16:00) (119/60 - 134/65)  BP(mean): --  RR: 16 (02 May 2018 16:00) (16 - 116)  SpO2: 99% (02 May 2018 16:00) (98% - 100%)  I&O's Summary    GA: awake and alert, no distress  EYES: EOMI, clear conj, anicteric sclera  ENT: MMM, clear OP, neck supple  LUNGS: CTABL, no wrc, normal effort  HEART; RS1S2 normal, no mrg, no peripheral edema  ABD; Soft, NT/ND/BS+, no peritoneal signs  EXT; well perfused, no edema or cyanosis  ; no velasco  NEURO: AAO x 3, sensation and motor intact  SKIN: warm and dry, no rash on visible skin    LABORATORY DATA:                        11.4   7.32  )-----------( 203      ( 02 May 2018 12:30 )             34.4     05-02    140  |  101  |  39<H>  ----------------------------<  128<H>  3.8   |  26  |  1.93<H>    Ca    9.3      02 May 2018 12:30    TPro  7.3  /  Alb  4.3  /  TBili  0.6  /  DBili  x   /  AST  41<H>  /  ALT  27  /  AlkPhos  71  05-02    LIVER FUNCTIONS - ( 02 May 2018 12:30 )  Alb: 4.3 g/dL / Pro: 7.3 g/dL / ALK PHOS: 71 u/L / ALT: 27 u/L / AST: 41 u/L / GGT: x             IMAGING: Reviewed and as per records: pertinent positives:       ASSESSMENT: This is a 71 y/o male, with a PmHx of HTN, HLD, DM, AVR (bovine - 2009 @ [place]), admitted for workup of chest pain. patient with abnormal kidney function for which nephrology was consulted.   1. CKD 3 B with mild increase in creatinine likely prerenal; CKD of unknown cause and patient is not following with a nephrologist as outpatient.    2. Anemia likely multifactorial  3. Chest pain- management per primary team      RECOMMENDATIONS:  - will send urine electrolytes, urine creatinine, urine prot/creat ratio,   - check anemia panel, BMD indices  - check renal US for a baseline  - further workup based on above results  - monitor and support hemodynamics, keep MAP>70  - avoid nephrotoxins including NSAIDs, ACEIs/ARBs, Iodinated contrast, Aminoglycoside etc as much as possible,   - renally dose all medications for a GFR ~ 35 mL/min/1.73 square meters       Thank you for allowing me to participate on this patient's care. Please do not hesitate to call with questions.    I will follow with you.    Daya Pérez MD   [facility]  [phone]

## 2018-05-02 NOTE — CONSULT NOTE ADULT - SUBJECTIVE AND OBJECTIVE BOX
Patient is a 72y old  Male who presents with a chief complaint of Chest Pain       HPI:  71 y/o male, with a PmHx of HTn, HLD, DM, AVR (bovine - 2009 @ [place]), presented to [facility] ED c/o intermittent chest pain for the past week. Pt states for the past week he has been having intermittent, left sided, non-radiating, 2-3/10, aching chest discomfort with associated symptoms of dizziness, sob, HA and blurred vision. He states the chest pain wasn't getting any better over this past week so he had gone to his PCP's office today, had an EKG done and was told he needed to go to the hospital because he had an abnormal ekg. He denies any fever, chills, n/v, abd pain, recent travel, sick contacts. He states he feels much better now. He appears comfortable at this time. He is being admitted to telemetry for r/o acs.    PAST MEDICAL & SURGICAL HISTORY:  High cholesterol  DM (diabetes mellitus)  HTN (hypertension)  History of ear surgery: ? surgery on left ear  History of cholecystectomy  S/P AVR (aortic valve replacement): 2009 @ [place] (bovine)      Social History: Denies smoking ,Alcohol or drug abuse .     FAMILY HISTORY:  No pertinent family history in first degree relatives      Allergies    No Known Allergies    Intolerances        REVIEW OF SYSTEMS:    CONSTITUTIONAL: No fever, weight loss, or fatigue  EYES: No eye pain, visual disturbances, or discharge  RESPIRATORY: No cough, wheezing, chills or hemoptysis; No shortness of breath  CARDIOVASCULAR:  chest pain, but no palpitations, dizziness, or leg swelling  GASTROINTESTINAL: No abdominal or epigastric pain. No nausea, vomiting, or hematemesis; No diarrhea or constipation. No melena or hematochezia.  GENITOURINARY: No dysuria, frequency, hematuria, or incontinence  NEUROLOGICAL: No headaches, memory loss, loss of strength, numbness, or tremors  SKIN: No itching, burning, rashes, or lesions   ENDOCRINE: No heat or cold intolerance; No hair loss  MUSCULOSKELETAL: No joint pain or swelling; No muscle, back, or extremity pain  PSYCHIATRIC: No depression, anxiety, mood swings, or difficulty sleeping      MEDICATIONS  (STANDING):  amLODIPine   Tablet 10 milliGRAM(s) Oral daily  aspirin enteric coated 81 milliGRAM(s) Oral daily  atorvastatin 40 milliGRAM(s) Oral at bedtime  dextrose 5%. 1000 milliLiter(s) (50 mL/Hr) IV Continuous <Continuous>  dextrose 50% Injectable 12.5 Gram(s) IV Push once  dextrose 50% Injectable 25 Gram(s) IV Push once  dextrose 50% Injectable 25 Gram(s) IV Push once  gabapentin 100 milliGRAM(s) Oral two times a day  heparin  Injectable 5000 Unit(s) SubCutaneous every 8 hours  insulin lispro (HumaLOG) corrective regimen sliding scale   SubCutaneous three times a day before meals  insulin lispro (HumaLOG) corrective regimen sliding scale   SubCutaneous at bedtime  isosorbide   dinitrate Tablet (ISORDIL) 30 milliGRAM(s) Oral two times a day  loratadine 10 milliGRAM(s) Oral daily  metoprolol succinate  milliGRAM(s) Oral daily  pantoprazole    Tablet 40 milliGRAM(s) Oral before breakfast  polyethylene glycol 3350 17 Gram(s) Oral daily  ranolazine 500 milliGRAM(s) Oral two times a day  triamterene 37.5 mG/hydrochlorothiazide 25 mG Tablet 1 Tablet(s) Oral daily    MEDICATIONS  (PRN):  acetaminophen   Tablet. 650 milliGRAM(s) Oral every 6 hours PRN mild, moderate pain  dextrose Gel 1 Dose(s) Oral once PRN Blood Glucose LESS THAN 70 milliGRAM(s)/deciliter  glucagon  Injectable 1 milliGRAM(s) IntraMuscular once PRN Glucose LESS THAN 70 milligrams/deciliter      Vital Signs Last 24 Hrs  T(C): 36.6 (02 May 2018 18:36), Max: 36.6 (02 May 2018 11:45)  T(F): 97.8 (02 May 2018 18:36), Max: 97.8 (02 May 2018 11:45)  HR: 65 (02 May 2018 18:36) (65 - 70)  BP: 139/56 (02 May 2018 18:36) (119/60 - 139/56)  BP(mean): --  RR: 17 (02 May 2018 18:36) (16 - 116)  SpO2: 99% (02 May 2018 18:36) (98% - 100%)    PHYSICAL EXAM:    GENERAL: NAD, well-groomed, well-developed  HEAD:  Atraumatic, Normocephalic  EYES: EOMI, PERRLA, conjunctiva and sclera clear  ENMT: No tonsillar erythema, exudates, or enlargement; Moist mucous membranes, Good dentition, No lesions  NECK: Supple, No JVD, Normal thyroid  NERVOUS SYSTEM:  Alert & Oriented X3, Good concentration; Motor Strength 5/5 B/L upper and lower extremities; DTRs 2+ intact and symmetric  CHEST/LUNG: Clear to percussion bilaterally; No rales, rhonchi, wheezing, or rubs  HEART: Regular rate and rhythm; No murmurs, rubs, or gallops  ABDOMEN: Soft, Nontender, Nondistended; Bowel sounds present  EXTREMITIES:  2+ Peripheral Pulses, No clubbing, cyanosis, or edema  LYMPH: No lymphadenopathy noted  SKIN: No rashes or lesions    LABS:                        11.4   7.32  )-----------( 203      ( 02 May 2018 12:30 )             34.4     05-02    140  |  101  |  39<H>  ----------------------------<  128<H>  3.8   |  26  |  1.93<H>    Ca    9.3      02 May 2018 12:30  Phos  3.8     05-02  Mg     2.1     05-02    TPro  7.3  /  Alb  4.3  /  TBili  0.6  /  DBili  x   /  AST  41<H>  /  ALT  27  /  AlkPhos  71  05-02    PT/INR - ( 02 May 2018 12:30 )   PT: 12.2 SEC;   INR: 1.06          PTT - ( 02 May 2018 12:30 )  PTT:30.2 SEC        RADIOLOGY & ADDITIONAL STUDIES:

## 2018-05-02 NOTE — ED PROVIDER NOTE - PROGRESS NOTE DETAILS
Signed out to Dr Townsend, Paged tele doc, Dr Evans currently seeing pt, can admit to Dr Evans now.    Labs and cxr pending  at current time . Notified Resident Dr Townsend to let cardio know if trop positive .  Att Mary aware of admission/plain

## 2018-05-02 NOTE — H&P ADULT - HISTORY OF PRESENT ILLNESS
71 y/o male, with a PmHx of HTn, HLD, DM, AVR (bovine - 2009 @ [place]), presented to [facility] ED c/o intermittent chest pain for the past week. Pt states for the past week he has been having intermittent, left sided, non-radiating, 2-3/10, aching chest discomfort with associated symptoms of dizziness, sob, HA and blurred vision. He states the chest pain wasn't getting any better over this past week so he had gone to his PCP's office today, had an EKG done and was told he needed to go to the hospital because he had an abnormal ekg. He denies any fever, chills, n/v, abd pain, recent travel, sick contacts. He states he feels much better now. He appears comfortable at this time. He is being admitted to telemetry for r/o acs.

## 2018-05-02 NOTE — ED ADULT NURSE NOTE - OBJECTIVE STATEMENT
Pt received A&ox3, c/o left sided intermittent non radiating left sided chest pain x 1 week with associated dyspnea on exertion, pt denies cp/sob at present time, sating 100% on room air, breathing even and unlabored ,pt on Lasix, denies recent swelling, no swelling noted to BLE, pt appears comfortable, vss as reported, PA at bedside, pt placed on monitor, 20 gauge IV placed in right ac, labs drawn and sent, will continue to monitor.

## 2018-05-02 NOTE — ED PROVIDER NOTE - CARE PLAN
Principal Discharge DX:	Chest pain Principal Discharge DX:	Chest pain  Secondary Diagnosis:	Abnormal EKG

## 2018-05-02 NOTE — ED PROVIDER NOTE - OBJECTIVE STATEMENT
73 yo male, pmh htn, diabetes, high cholesterol, AVR 9 years ago presents to the ed with abn ekg from PCP dr morejon. office. Pt went for routine check up when mentioned he has been having intermittent chest pain for 1 week no. Episodes are random lasting about 1/2 hour and resolve on their own. No aggravating or relieving factors. Denies any change in his sob, currently seeing a pulmonologist who is working that up. Denies any HA, dizziness (contrary to triage note), fevers, chills, recent travel, leg pains, diaphoresis, nausea, vomiting or any other complaints. At current time c pain is not present.  Last episode this morning while at pcp office.  Cant recall last stress and echo. Sees cardiologist [facility] Dr Hernandez.  (Took 325 asa today)

## 2018-05-02 NOTE — CONSULT NOTE ADULT - ASSESSMENT
73 y/o male, with a PmHx of HTn, HLD, DM, AVR (bovine - 2009 @ [place]), presented to [facility] ED c/o intermittent chest pain for the past week. He is being admitted to telemetry for r/o acs and SAMARIA.       Problem/Plan - 1:  ·  Problem: Chest pain.  Plan:No more CP .Tropx 2 neg  . Ischemic Work up per Cardiology.     Problem/Plan - 2:  ·  Problem: SAMARIA (acute kidney injury) with CKD stage 3 .  Plan: Renal consulted. Avoid Nephrotoxic drugs .      Problem/Plan - 3:  ·  Problem: DM (diabetes mellitus).  Plan: HgbAic7  monitor fs, insulin ssc, hgba1-c, hold po meds for now.      Problem/Plan - 4:  ·  Problem: High cholesterol.  Plan: fasting lipid profile, con't statin.      Problem/Plan - 5:  ·  Problem: HTN (hypertension).  Plan: monitor bp, con't meds, adjust as needed.      Problem/Plan - 6:  Problem: Need for prophylactic measure. Plan: heparin 5,000u sc tid.

## 2018-05-02 NOTE — SBIRT NOTE. - NSSBIRTSERVICES_GEN_A_ED_FT
Provided SBIRT services: Full screen Negative. Positive reinforcement provided given patient currently within healthy guidelines. Education materials reviewed and given to patient.  Audit Score: 3  DAST Score: 0  Duration = 5 Minutes

## 2018-05-02 NOTE — H&P ADULT - ATTENDING COMMENTS
CARDIOLOGY ATTENDING    Patient seen and examined. Agree with above. 71 y/o male PMH HTN, diabetes, high cholesterol, AVR 9 years ago presents to the ed with chest pain. EKG and enzymes unremarkable.    -admit to tele  -medicine consult Dr. Villanueva  -renal consult Dr. Drummond  -check CPK, troponin q8h  -get echo and NST

## 2018-05-02 NOTE — ED PROVIDER NOTE - ATTENDING CONTRIBUTION TO CARE
Mary: 71 yo male with a h/o htn, diabetes, high cholesterol, AVR 9 years ago sent in by his PMD Mary: 71 yo male with a h/o htn, diabetes, high cholesterol, AVR 9 years ago sent in by his PMD for evaluation of chest pain in the setting of an abnormal EKG (new changes. Pt endorses 1 week of intermittent chest pain with associated ROMERO that has been occurring for a month. + intermittent b/l LE edema which is now gone. No recent illness, fevers or chills. Exam: well appearing, NAD, MMM +S1/S2, lungs CTA b/l, abdomen is soft and nontender, No LE edema or calf TTP. 2+ distal pulses x 4 extremities. A/P- 71 yo with multiple comorbidities with chest pain and ekg changes, will obtain labs, cxr, and admit for cards workup.

## 2018-05-02 NOTE — ED ADULT TRIAGE NOTE - CHIEF COMPLAINT QUOTE
Pt with hx of valve replacement 9 yrs ago ,sent by PCP with abnormal EKG. Pt c/o intermittent chest pain, son and dizziness  x 1 week.  Pt took asp 325mg today

## 2018-05-02 NOTE — H&P ADULT - NSHPSOCIALHISTORY_GEN_ALL_CORE
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

## 2018-05-02 NOTE — H&P ADULT - ASSESSMENT
73 y/o male, with a PmHx of HTn, HLD, DM, AVR (bovine - 2009 @ [place]), presented to [facility] ED c/o intermittent chest pain for the past week. He is being admitted to telemetry for r/o acs and SAMARIA.

## 2018-05-02 NOTE — H&P ADULT - RS GEN PE MLT RESP DETAILS PC
respirations non-labored/breath sounds equal/good air movement/no chest wall tenderness/clear to auscultation bilaterally/airway patent

## 2018-05-02 NOTE — H&P ADULT - NSHPPHYSICALEXAM_GEN_ALL_CORE
Vital Signs Last 24 Hrs  T(C): 36.4 (02 May 2018 16:00), Max: 36.6 (02 May 2018 11:45)  T(F): 97.6 (02 May 2018 16:00), Max: 97.8 (02 May 2018 11:45)  HR: 67 (02 May 2018 16:00) (65 - 70)  BP: 125/63 (02 May 2018 16:00) (119/60 - 134/65)  BP(mean): --  RR: 16 (02 May 2018 16:00) (16 - 116)  SpO2: 99% (02 May 2018 16:00) (98% - 100%)    EKG: Sinus gloria @ 67, 1st deg avb, T inv AVl

## 2018-05-02 NOTE — H&P ADULT - PSH
History of cholecystectomy    History of ear surgery  ? surgery on left ear  S/P AVR (aortic valve replacement)  2009 @ [place] (bovine)

## 2018-05-03 DIAGNOSIS — R69 ILLNESS, UNSPECIFIED: ICD-10-CM

## 2018-05-03 LAB
24R-OH-CALCIDIOL SERPL-MCNC: 42.5 NG/ML — SIGNIFICANT CHANGE UP (ref 30–80)
APPEARANCE UR: CLEAR — SIGNIFICANT CHANGE UP
BILIRUB UR-MCNC: NEGATIVE — SIGNIFICANT CHANGE UP
BLOOD UR QL VISUAL: NEGATIVE — SIGNIFICANT CHANGE UP
BUN SERPL-MCNC: 39 MG/DL — HIGH (ref 7–23)
CALCIUM SERPL-MCNC: 9.4 MG/DL — SIGNIFICANT CHANGE UP (ref 8.4–10.5)
CHLORIDE SERPL-SCNC: 99 MMOL/L — SIGNIFICANT CHANGE UP (ref 98–107)
CHLORIDE UR-SCNC: 105 MMOL/L — SIGNIFICANT CHANGE UP
CHOLEST SERPL-MCNC: 128 MG/DL — SIGNIFICANT CHANGE UP (ref 120–199)
CO2 SERPL-SCNC: 24 MMOL/L — SIGNIFICANT CHANGE UP (ref 22–31)
COLOR SPEC: YELLOW — SIGNIFICANT CHANGE UP
CREAT ?TM UR-MCNC: 108.08 MG/DL — SIGNIFICANT CHANGE UP
CREAT SERPL-MCNC: 1.64 MG/DL — HIGH (ref 0.5–1.3)
FERRITIN SERPL-MCNC: 137.2 NG/ML — SIGNIFICANT CHANGE UP (ref 30–400)
FOLATE SERPL-MCNC: 17.5 NG/ML — SIGNIFICANT CHANGE UP (ref 4.7–20)
GLUCOSE BLDC GLUCOMTR-MCNC: 139 MG/DL — HIGH (ref 70–99)
GLUCOSE BLDC GLUCOMTR-MCNC: 216 MG/DL — HIGH (ref 70–99)
GLUCOSE BLDC GLUCOMTR-MCNC: 236 MG/DL — HIGH (ref 70–99)
GLUCOSE SERPL-MCNC: 76 MG/DL — SIGNIFICANT CHANGE UP (ref 70–99)
GLUCOSE UR-MCNC: NEGATIVE — SIGNIFICANT CHANGE UP
HBA1C BLD-MCNC: 7.1 % — HIGH (ref 4–5.6)
HCT VFR BLD CALC: 36.9 % — LOW (ref 39–50)
HDLC SERPL-MCNC: 33 MG/DL — LOW (ref 35–55)
HGB BLD-MCNC: 12.1 G/DL — LOW (ref 13–17)
HYALINE CASTS # UR AUTO: SIGNIFICANT CHANGE UP (ref 0–?)
IRON SATN MFR SERPL: 274 UG/DL — SIGNIFICANT CHANGE UP (ref 155–535)
IRON SATN MFR SERPL: 86 UG/DL — SIGNIFICANT CHANGE UP (ref 45–165)
KETONES UR-MCNC: NEGATIVE — SIGNIFICANT CHANGE UP
LEUKOCYTE ESTERASE UR-ACNC: NEGATIVE — SIGNIFICANT CHANGE UP
LIPID PNL WITH DIRECT LDL SERPL: 76 MG/DL — SIGNIFICANT CHANGE UP
MAGNESIUM SERPL-MCNC: 2 MG/DL — SIGNIFICANT CHANGE UP (ref 1.6–2.6)
MCHC RBC-ENTMCNC: 28 PG — SIGNIFICANT CHANGE UP (ref 27–34)
MCHC RBC-ENTMCNC: 32.8 % — SIGNIFICANT CHANGE UP (ref 32–36)
MCV RBC AUTO: 85.4 FL — SIGNIFICANT CHANGE UP (ref 80–100)
MUCOUS THREADS # UR AUTO: SIGNIFICANT CHANGE UP
NITRITE UR-MCNC: NEGATIVE — SIGNIFICANT CHANGE UP
NRBC # FLD: 0 — SIGNIFICANT CHANGE UP
PH UR: 6.5 — SIGNIFICANT CHANGE UP (ref 4.6–8)
PHOSPHATE SERPL-MCNC: 3.9 MG/DL — SIGNIFICANT CHANGE UP (ref 2.5–4.5)
PLATELET # BLD AUTO: 201 K/UL — SIGNIFICANT CHANGE UP (ref 150–400)
PMV BLD: 9 FL — SIGNIFICANT CHANGE UP (ref 7–13)
POTASSIUM SERPL-MCNC: 3.8 MMOL/L — SIGNIFICANT CHANGE UP (ref 3.5–5.3)
POTASSIUM SERPL-SCNC: 3.8 MMOL/L — SIGNIFICANT CHANGE UP (ref 3.5–5.3)
POTASSIUM UR-SCNC: 49.9 MMOL/L — SIGNIFICANT CHANGE UP
PROT UR-MCNC: 10 MG/DL — SIGNIFICANT CHANGE UP
PROT UR-MCNC: 16.8 MG/DL — SIGNIFICANT CHANGE UP
PTH-INTACT SERPL-MCNC: 29.45 PG/ML — SIGNIFICANT CHANGE UP (ref 15–65)
RBC # BLD: 4.32 M/UL — SIGNIFICANT CHANGE UP (ref 4.2–5.8)
RBC # FLD: 13.1 % — SIGNIFICANT CHANGE UP (ref 10.3–14.5)
RBC CASTS # UR COMP ASSIST: SIGNIFICANT CHANGE UP (ref 0–?)
SODIUM SERPL-SCNC: 140 MMOL/L — SIGNIFICANT CHANGE UP (ref 135–145)
SODIUM UR-SCNC: 123 MMOL/L — SIGNIFICANT CHANGE UP
SP GR SPEC: 1.02 — SIGNIFICANT CHANGE UP (ref 1–1.04)
SQUAMOUS # UR AUTO: SIGNIFICANT CHANGE UP
TRANSFERRIN SERPL-MCNC: 232 MG/DL — SIGNIFICANT CHANGE UP (ref 200–360)
TRIGL SERPL-MCNC: 160 MG/DL — HIGH (ref 10–149)
UIBC SERPL-MCNC: 187.5 UG/DL — SIGNIFICANT CHANGE UP (ref 110–370)
UROBILINOGEN FLD QL: NORMAL MG/DL — SIGNIFICANT CHANGE UP
UUN UR-MCNC: 705.8 MG/DL — SIGNIFICANT CHANGE UP
VIT B12 SERPL-MCNC: 778 PG/ML — SIGNIFICANT CHANGE UP (ref 200–900)
WBC # BLD: 6.17 K/UL — SIGNIFICANT CHANGE UP (ref 3.8–10.5)
WBC # FLD AUTO: 6.17 K/UL — SIGNIFICANT CHANGE UP (ref 3.8–10.5)
WBC UR QL: SIGNIFICANT CHANGE UP (ref 0–?)

## 2018-05-03 PROCEDURE — 76775 US EXAM ABDO BACK WALL LIM: CPT | Mod: 26

## 2018-05-03 PROCEDURE — 93306 TTE W/DOPPLER COMPLETE: CPT | Mod: 26

## 2018-05-03 RX ORDER — INSULIN GLARGINE 100 [IU]/ML
5 INJECTION, SOLUTION SUBCUTANEOUS AT BEDTIME
Qty: 0 | Refills: 0 | Status: DISCONTINUED | OUTPATIENT
Start: 2018-05-03 | End: 2018-05-04

## 2018-05-03 RX ADMIN — Medication 4: at 13:29

## 2018-05-03 RX ADMIN — PANTOPRAZOLE SODIUM 40 MILLIGRAM(S): 20 TABLET, DELAYED RELEASE ORAL at 06:37

## 2018-05-03 RX ADMIN — ATORVASTATIN CALCIUM 40 MILLIGRAM(S): 80 TABLET, FILM COATED ORAL at 21:36

## 2018-05-03 RX ADMIN — LORATADINE 10 MILLIGRAM(S): 10 TABLET ORAL at 13:33

## 2018-05-03 RX ADMIN — ISOSORBIDE DINITRATE 30 MILLIGRAM(S): 5 TABLET ORAL at 06:39

## 2018-05-03 RX ADMIN — POLYETHYLENE GLYCOL 3350 17 GRAM(S): 17 POWDER, FOR SOLUTION ORAL at 13:29

## 2018-05-03 RX ADMIN — HEPARIN SODIUM 5000 UNIT(S): 5000 INJECTION INTRAVENOUS; SUBCUTANEOUS at 13:30

## 2018-05-03 RX ADMIN — Medication 1 TABLET(S): at 07:21

## 2018-05-03 RX ADMIN — HEPARIN SODIUM 5000 UNIT(S): 5000 INJECTION INTRAVENOUS; SUBCUTANEOUS at 06:36

## 2018-05-03 RX ADMIN — RANOLAZINE 500 MILLIGRAM(S): 500 TABLET, FILM COATED, EXTENDED RELEASE ORAL at 18:13

## 2018-05-03 RX ADMIN — GABAPENTIN 100 MILLIGRAM(S): 400 CAPSULE ORAL at 06:39

## 2018-05-03 RX ADMIN — GABAPENTIN 100 MILLIGRAM(S): 400 CAPSULE ORAL at 17:23

## 2018-05-03 RX ADMIN — HEPARIN SODIUM 5000 UNIT(S): 5000 INJECTION INTRAVENOUS; SUBCUTANEOUS at 21:36

## 2018-05-03 RX ADMIN — Medication 200 MILLIGRAM(S): at 07:20

## 2018-05-03 RX ADMIN — Medication 81 MILLIGRAM(S): at 13:29

## 2018-05-03 RX ADMIN — ISOSORBIDE DINITRATE 30 MILLIGRAM(S): 5 TABLET ORAL at 17:23

## 2018-05-03 RX ADMIN — RANOLAZINE 500 MILLIGRAM(S): 500 TABLET, FILM COATED, EXTENDED RELEASE ORAL at 06:39

## 2018-05-03 RX ADMIN — AMLODIPINE BESYLATE 10 MILLIGRAM(S): 2.5 TABLET ORAL at 06:37

## 2018-05-03 NOTE — PROGRESS NOTE ADULT - ASSESSMENT
71 y/o male, with a PmHx of HTn, HLD, DM, AVR (bovine - 2009 @ [place]), presented to [facility] ED c/o intermittent chest pain for the past week. He is being admitted to telemetry for r/o acs and SAMARIA.       Problem/Plan - 1:  ·  Problem: Chest pain.  Plan:No more CP .Tropx 2 neg  . TTE noted. Ischemic Work up per Cardiology.     Problem/Plan - 2:  ·  Problem: SAMARIA (acute kidney injury) with CKD stage 3 .  Plan: Renal consulted. Avoid Nephrotoxic drugs .      Problem/Plan - 3:  ·  Problem: DM (diabetes mellitus).  Plan: HgbAic7  . Sugars little high so add Lantus 5 Units bed time and continue SSI.      Problem/Plan - 4:  ·  Problem: High cholesterol.  Plan: fasting lipid profile, con't statin.      Problem/Plan - 5:  ·  Problem: HTN (hypertension).  Plan: monitor bp, con't meds, adjust as needed.      Problem/Plan - 6:  Problem: Need for prophylactic measure. Plan: heparin 5,000u sc tid.

## 2018-05-04 ENCOUNTER — TRANSCRIPTION ENCOUNTER (OUTPATIENT)
Age: 73
End: 2018-05-04

## 2018-05-04 VITALS
OXYGEN SATURATION: 99 % | HEART RATE: 61 BPM | DIASTOLIC BLOOD PRESSURE: 64 MMHG | RESPIRATION RATE: 18 BRPM | TEMPERATURE: 98 F | SYSTOLIC BLOOD PRESSURE: 120 MMHG

## 2018-05-04 LAB
BASOPHILS # BLD AUTO: 0.02 K/UL — SIGNIFICANT CHANGE UP (ref 0–0.2)
BASOPHILS NFR BLD AUTO: 0.3 % — SIGNIFICANT CHANGE UP (ref 0–2)
BUN SERPL-MCNC: 29 MG/DL — HIGH (ref 7–23)
CALCIUM SERPL-MCNC: 9.1 MG/DL — SIGNIFICANT CHANGE UP (ref 8.4–10.5)
CHLORIDE SERPL-SCNC: 99 MMOL/L — SIGNIFICANT CHANGE UP (ref 98–107)
CO2 SERPL-SCNC: 26 MMOL/L — SIGNIFICANT CHANGE UP (ref 22–31)
CREAT SERPL-MCNC: 1.64 MG/DL — HIGH (ref 0.5–1.3)
EOSINOPHIL # BLD AUTO: 0.15 K/UL — SIGNIFICANT CHANGE UP (ref 0–0.5)
EOSINOPHIL NFR BLD AUTO: 2.5 % — SIGNIFICANT CHANGE UP (ref 0–6)
GLUCOSE BLDC GLUCOMTR-MCNC: 128 MG/DL — HIGH (ref 70–99)
GLUCOSE BLDC GLUCOMTR-MCNC: 193 MG/DL — HIGH (ref 70–99)
GLUCOSE BLDC GLUCOMTR-MCNC: 298 MG/DL — HIGH (ref 70–99)
GLUCOSE SERPL-MCNC: 123 MG/DL — HIGH (ref 70–99)
HCT VFR BLD CALC: 35.7 % — LOW (ref 39–50)
HGB BLD-MCNC: 11.9 G/DL — LOW (ref 13–17)
IMM GRANULOCYTES # BLD AUTO: 0.01 # — SIGNIFICANT CHANGE UP
IMM GRANULOCYTES NFR BLD AUTO: 0.2 % — SIGNIFICANT CHANGE UP (ref 0–1.5)
LYMPHOCYTES # BLD AUTO: 1.74 K/UL — SIGNIFICANT CHANGE UP (ref 1–3.3)
LYMPHOCYTES # BLD AUTO: 29.3 % — SIGNIFICANT CHANGE UP (ref 13–44)
MAGNESIUM SERPL-MCNC: 2 MG/DL — SIGNIFICANT CHANGE UP (ref 1.6–2.6)
MCHC RBC-ENTMCNC: 28.4 PG — SIGNIFICANT CHANGE UP (ref 27–34)
MCHC RBC-ENTMCNC: 33.3 % — SIGNIFICANT CHANGE UP (ref 32–36)
MCV RBC AUTO: 85.2 FL — SIGNIFICANT CHANGE UP (ref 80–100)
MONOCYTES # BLD AUTO: 0.67 K/UL — SIGNIFICANT CHANGE UP (ref 0–0.9)
MONOCYTES NFR BLD AUTO: 11.3 % — SIGNIFICANT CHANGE UP (ref 2–14)
NEUTROPHILS # BLD AUTO: 3.35 K/UL — SIGNIFICANT CHANGE UP (ref 1.8–7.4)
NEUTROPHILS NFR BLD AUTO: 56.4 % — SIGNIFICANT CHANGE UP (ref 43–77)
NRBC # FLD: 0 — SIGNIFICANT CHANGE UP
PLATELET # BLD AUTO: 215 K/UL — SIGNIFICANT CHANGE UP (ref 150–400)
PMV BLD: 9.2 FL — SIGNIFICANT CHANGE UP (ref 7–13)
POTASSIUM SERPL-MCNC: 4.1 MMOL/L — SIGNIFICANT CHANGE UP (ref 3.5–5.3)
POTASSIUM SERPL-SCNC: 4.1 MMOL/L — SIGNIFICANT CHANGE UP (ref 3.5–5.3)
RBC # BLD: 4.19 M/UL — LOW (ref 4.2–5.8)
RBC # FLD: 13.2 % — SIGNIFICANT CHANGE UP (ref 10.3–14.5)
SODIUM SERPL-SCNC: 137 MMOL/L — SIGNIFICANT CHANGE UP (ref 135–145)
WBC # BLD: 5.94 K/UL — SIGNIFICANT CHANGE UP (ref 3.8–10.5)
WBC # FLD AUTO: 5.94 K/UL — SIGNIFICANT CHANGE UP (ref 3.8–10.5)

## 2018-05-04 PROCEDURE — 78452 HT MUSCLE IMAGE SPECT MULT: CPT | Mod: 26

## 2018-05-04 PROCEDURE — 93018 CV STRESS TEST I&R ONLY: CPT | Mod: GC

## 2018-05-04 PROCEDURE — 93016 CV STRESS TEST SUPVJ ONLY: CPT | Mod: GC

## 2018-05-04 RX ORDER — ACETAMINOPHEN 500 MG
2 TABLET ORAL
Qty: 0 | Refills: 0 | DISCHARGE
Start: 2018-05-04

## 2018-05-04 RX ORDER — ASPIRIN/CALCIUM CARB/MAGNESIUM 324 MG
1 TABLET ORAL
Qty: 0 | Refills: 0 | DISCHARGE
Start: 2018-05-04

## 2018-05-04 RX ADMIN — Medication 2: at 09:44

## 2018-05-04 RX ADMIN — HEPARIN SODIUM 5000 UNIT(S): 5000 INJECTION INTRAVENOUS; SUBCUTANEOUS at 13:11

## 2018-05-04 RX ADMIN — GABAPENTIN 100 MILLIGRAM(S): 400 CAPSULE ORAL at 05:58

## 2018-05-04 RX ADMIN — LORATADINE 10 MILLIGRAM(S): 10 TABLET ORAL at 13:11

## 2018-05-04 RX ADMIN — AMLODIPINE BESYLATE 10 MILLIGRAM(S): 2.5 TABLET ORAL at 05:58

## 2018-05-04 RX ADMIN — INSULIN GLARGINE 5 UNIT(S): 100 INJECTION, SOLUTION SUBCUTANEOUS at 00:11

## 2018-05-04 RX ADMIN — ISOSORBIDE DINITRATE 30 MILLIGRAM(S): 5 TABLET ORAL at 06:00

## 2018-05-04 RX ADMIN — Medication 81 MILLIGRAM(S): at 13:10

## 2018-05-04 RX ADMIN — Medication 1 TABLET(S): at 06:00

## 2018-05-04 RX ADMIN — Medication 200 MILLIGRAM(S): at 13:10

## 2018-05-04 RX ADMIN — Medication 6: at 13:11

## 2018-05-04 RX ADMIN — HEPARIN SODIUM 5000 UNIT(S): 5000 INJECTION INTRAVENOUS; SUBCUTANEOUS at 05:58

## 2018-05-04 RX ADMIN — RANOLAZINE 500 MILLIGRAM(S): 500 TABLET, FILM COATED, EXTENDED RELEASE ORAL at 06:00

## 2018-05-04 RX ADMIN — PANTOPRAZOLE SODIUM 40 MILLIGRAM(S): 20 TABLET, DELAYED RELEASE ORAL at 05:59

## 2018-05-04 NOTE — DISCHARGE NOTE ADULT - CARE PROVIDERS DIRECT ADDRESSES
,DirectAddress_Unknown,[email or web address],DirectAddress_Unknown,DirectAddress_Unknown ,[email or web address],DirectAddress_Unknown,DirectAddress_Unknown,DirectAddress_Unknown

## 2018-05-04 NOTE — DISCHARGE NOTE ADULT - HOSPITAL COURSE
HPI:  73 y/o male, with a PmHx of HTn, HLD, DM, AVR (bovine - 2009 @ [place]), presented to [facility] ED c/o intermittent chest pain for the past week. Pt states for the past week he has been having intermittent, left sided, non-radiating, 2-3/10, aching chest discomfort with associated symptoms of dizziness, sob, HA and blurred vision. He states the chest pain wasn't getting any better over this past week so he had gone to his PCP's office today, had an EKG done and was told he needed to go to the hospital because he had an abnormal ekg. He denies any fever, chills, n/v, abd pain, recent travel, sick contacts. He states he feels much better now. He appears comfortable at this time. He is being admitted to telemetry for r/o acs. (02 May 2018 14:45)    On admission:  EKG: Sinus gloria @ 67, 1st deg avb, T inv AVl  CE x 2 neg               5/3 Renal US: No hydronephrosis. Evidence of chronic intrinsic renal disease.  5/3 TTE: EF 65% 1. Mitral annular calcification, otherwise normal mitral valve. Minimal mitral regurgitation. 2. Bioprosthetic aortic valve replacement. Peak transaortic valve gradient equals 33 mm Hg, mean transaortic valve gradient equals 16 mm Hg, which is probably normal in the presence of a prosthetic valve. Grossly moderate aortic regurgitation, which may have been underestimated. 3. Normal aortic root.  Some views are suggestive of a synthetic graft in the proximal ascending aorta. 4. Moderately dilated left atrium.  LA volume index = 48 cc/m2.  5. Normal left ventricular internal dimensions and wall thicknesses. 6. Endocardium not well visualized; grossly normal left ventricular systolic function.  Endocardial visualization enhanced with intravenous injection of echo contrast (Definity). 7. The right ventricle is not well visualized; grossly normal right ventricular systolic function. Consider BALJINDER for further evaluation of the prosthetic aortic valve, if clinically indicated.    CXR - clear lungs, cardiomegaly.    NST _____    Evaluated by renal:  This is a 73 y/o male, with a PmHx of HTN, HLD, DM, AVR (bovine - 2009 @ [place]), admitted for workup of chest pain. Patient with abnormal kidney function for which nephrology was consulted.  1. CKD 3 B with mild increase in creatinine likely prerenal; CKD of unknown cause and patient is not following with a nephrologist as outpatient.  UA bland, FeNa>1, renal US unremarkable 2. Mild Anemia of unknown cause; iron stores/B12/folate level adequate;  - monitor and support hemodynamics, keep MAP>70 - avoid nephrotoxins including NSAIDs, ACEIs/ARBs, Iodinated contrast, Aminoglycoside etc as much as possible,  - renally dose all medications for a GFR ~ 35 mL/min/1.73 square meters     INCOMPLETE HPI:  73 y/o male, with a PmHx of HTn, HLD, DM, AVR (bovine - 2009 @ [place]), presented to [facility] ED c/o intermittent chest pain for the past week. Pt states for the past week he has been having intermittent, left sided, non-radiating, 2-3/10, aching chest discomfort with associated symptoms of dizziness, sob, HA and blurred vision. He states the chest pain wasn't getting any better over this past week so he had gone to his PCP's office today, had an EKG done and was told he needed to go to the hospital because he had an abnormal ekg. He denies any fever, chills, n/v, abd pain, recent travel, sick contacts. He states he feels much better now. He appears comfortable at this time. He is being admitted to telemetry for r/o acs. (02 May 2018 14:45)    On admission:  EKG: Sinus gloria @ 67, 1st deg avb, T inv AVl  CE x 2 neg               5/3 Renal US: No hydronephrosis. Evidence of chronic intrinsic renal disease.  5/3 TTE: EF 65% 1. Mitral annular calcification, otherwise normal mitral valve. Minimal mitral regurgitation. 2. Bioprosthetic aortic valve replacement. Peak transaortic valve gradient equals 33 mm Hg, mean transaortic valve gradient equals 16 mm Hg, which is probably normal in the presence of a prosthetic valve. Grossly moderate aortic regurgitation, which may have been underestimated. 3. Normal aortic root.  Some views are suggestive of a synthetic graft in the proximal ascending aorta. 4. Moderately dilated left atrium.  LA volume index = 48 cc/m2.  5. Normal left ventricular internal dimensions and wall thicknesses. 6. Endocardium not well visualized; grossly normal left ventricular systolic function.  Endocardial visualization enhanced with intravenous injection of echo contrast (Definity). 7. The right ventricle is not well visualized; grossly normal right ventricular systolic function. Consider BALJINDER for further evaluation of the prosthetic aortic valve, if clinically indicated.    CXR - clear lungs, cardiomegaly.    5/4 NST: IMPRESSIONS:Normal Study  * Myocardial Perfusion SPECT results are normal at 56 % of  MPHR.  * Review of raw data shows: The study is of good technical  quality.  * The left ventricle was normal in size. Normal myocardial  perfusion scan,with no evidence of infarction or inducible  ischemia.  * Post-stress gated wall motion analysis was performed  (LVEF = 58 %;LVEDV = 106 ml.), revealing normal LV  function. RV function appeared normal    Evaluated by renal:  This is a 73 y/o male, with a PmHx of HTN, HLD, DM, AVR (bovine - 2009 @ [place]), admitted for workup of chest pain. Patient with abnormal kidney function for which nephrology was consulted.  1. CKD 3 B with mild increase in creatinine likely prerenal; CKD of unknown cause and patient is not following with a nephrologist as outpatient.  UA bland, FeNa>1, renal US unremarkable 2. Mild Anemia of unknown cause; iron stores/B12/folate level adequate;  - monitor and support hemodynamics, keep MAP>70 - avoid nephrotoxins including NSAIDs, ACEIs/ARBs, Iodinated contrast, Aminoglycoside etc as much as possible,  - renally dose all medications for a GFR ~ 35 mL/min/1.73 square meters     Evaluated by cardiology: Admitted with atypical chest pain and NST without ischemia nor infarction. However TTE showed his bio-AVR may have mod-severe AI. He has no clinical heart failure, his LVEF is 65%, and his LV is not dilated. Therefore even IF he has severe AI I would not recommend reop at this time. Therefore risks of BALJINDER outweigh the non-clinical benefit. D/C home, and f/u with his cardiologist Dr. Strange after discharge. Dr. Evans will convey these reccs to Dr. Strange.      Patient cleared for discharge by cardiology and renal.  Outpatient follow up with PCP, cardiology and renal. HPI:  71 y/o male, with a PmHx of HTn, HLD, DM, AVR (bovine - 2009 @ [place]), presented to [facility] ED c/o intermittent chest pain for the past week. Pt states for the past week he has been having intermittent, left sided, non-radiating, 2-3/10, aching chest discomfort with associated symptoms of dizziness, sob, HA and blurred vision. He states the chest pain wasn't getting any better over this past week so he had gone to his PCP's office today, had an EKG done and was told he needed to go to the hospital because he had an abnormal ekg. He denies any fever, chills, n/v, abd pain, recent travel, sick contacts. He states he feels much better now. He appears comfortable at this time. He is being admitted to telemetry for r/o acs. (02 May 2018 14:45)    On admission:  EKG: Sinus gloria @ 67, 1st deg avb, T inv AVl  CE x 2 neg               5/3 Renal US: No hydronephrosis. Evidence of chronic intrinsic renal disease.  5/3 TTE: EF 65% 1. Mitral annular calcification, otherwise normal mitral valve. Minimal mitral regurgitation. 2. Bioprosthetic aortic valve replacement. Peak transaortic valve gradient equals 33 mm Hg, mean transaortic valve gradient equals 16 mm Hg, which is probably normal in the presence of a prosthetic valve. Grossly moderate aortic regurgitation, which may have been underestimated. 3. Normal aortic root.  Some views are suggestive of a synthetic graft in the proximal ascending aorta. 4. Moderately dilated left atrium.  LA volume index = 48 cc/m2.  5. Normal left ventricular internal dimensions and wall thicknesses. 6. Endocardium not well visualized; grossly normal left ventricular systolic function.  Endocardial visualization enhanced with intravenous injection of echo contrast (Definity). 7. The right ventricle is not well visualized; grossly normal right ventricular systolic function. Consider BALJINDER for further evaluation of the prosthetic aortic valve, if clinically indicated.    CXR - clear lungs, cardiomegaly.    5/4 NST: IMPRESSIONS:Normal Study  * Myocardial Perfusion SPECT results are normal at 56 % of  MPHR.  * Review of raw data shows: The study is of good technical  quality.  * The left ventricle was normal in size. Normal myocardial  perfusion scan,with no evidence of infarction or inducible  ischemia.  * Post-stress gated wall motion analysis was performed  (LVEF = 58 %;LVEDV = 106 ml.), revealing normal LV  function. RV function appeared normal    Evaluated by renal:  This is a 71 y/o male, with a PmHx of HTN, HLD, DM, AVR (bovine - 2009 @ [place]), admitted for workup of chest pain. Patient with abnormal kidney function for which nephrology was consulted.  1. CKD 3 B with mild increase in creatinine likely prerenal; CKD of unknown cause and patient is not following with a nephrologist as outpatient.  UA bland, FeNa>1, renal US unremarkable 2. Mild Anemia of unknown cause; iron stores/B12/folate level adequate;  - monitor and support hemodynamics, keep MAP>70 - avoid nephrotoxins including NSAIDs, ACEIs/ARBs, Iodinated contrast, Aminoglycoside etc as much as possible,  - renally dose all medications for a GFR ~ 35 mL/min/1.73 square meters     Evaluated by cardiology: Admitted with atypical chest pain and NST without ischemia nor infarction. However TTE showed his bio-AVR may have mod-severe AI. He has no clinical heart failure, his LVEF is 65%, and his LV is not dilated. Therefore even IF he has severe AI I would not recommend reop at this time. Therefore risks of BALJINDER outweigh the non-clinical benefit. D/C home, and f/u with his cardiologist Dr. Strange after discharge. Dr. Evans will convey these reccs to Dr. Strange.      Patient on metformin 500mg po bid at home for DM2.  Also on januvia and glimepiride.  D/W Dr. Pérez (renal): stop metformin on discharge given CKD, but continue januvia and glimepiride.  Patient to follow up with endocrine within 1-2 weeks.     Patient cleared for discharge by cardiology and renal.  Outpatient follow up with endo, PCP, cardiology and renal.

## 2018-05-04 NOTE — DISCHARGE NOTE ADULT - CARE PLAN
Principal Discharge DX:	Atypical chest pain  Goal:	Resolution of symptoms.  Assessment and plan of treatment:	During your hospitalization, your stress test was normal.  Continue medications as prescribed.  Continue diet modification. Avoid complex carbohydrates such as bread, pasta, cereal, white rice, white potatoes, etc. Avoid concentrated sugar as found in desserts, candy, soda, juice, etc. Consume a diet based on lean protein (chicken, fish) and vegetables. Low salt, low fat diet.  Renal diet: no concentrated phosphorus or potassium; no protein restriction.  Exercise regularly as tolerated.  Follow up with your cardiologist within 1-2 weeks; call for appointment.  Secondary Diagnosis:	HTN (hypertension)  Goal:	To maintain a normal blood pressure to prevent heart attack, stroke and renal failure.  Assessment and plan of treatment:	Low sodium and fat diet, continue anti-hypertensive medications, and follow up with primary care physician.  Secondary Diagnosis:	High cholesterol  Goal:	To maintain normal cholesterol levels to prevent stroke, coronary artery disease, peripheral vascular disease and heart attacks.  Assessment and plan of treatment:	Low fat diet, exercise daily and continue current medications. Follow up with primary care physician and cardiologist for management.  Secondary Diagnosis:	DM (diabetes mellitus)  Goal:	To maintain a normal blood glucose level and HgA1C level < 5.7 and to prevent diabetic complications such as uncontrolled diabetes, diabetic coma, blindness, renal failure, and amputations.  Assessment and plan of treatment:	Monitor finger sticks pre-meal and bedtime, low salt, fat and carbohydrate diet, minimize glucose intake.  Exercise daily for at least 30 minutes and weight loss.  Follow up with primary care physician and endocrinologist for routine Hemoglobin A1C checks and management.  Follow up with your ophthalmologist for routine yearly vision exams.  Secondary Diagnosis:	CKD (chronic kidney disease)  Goal:	To prevent shortness of breath, fluid overload, and electrolytes imbalance, and to slow down worsening kidney disease.  Assessment and plan of treatment:	Continue blood pressure, cholesterol and diabetic medications. Goal of hemoglobin A1C (HgbA1C) < 7%.  Avoid nephrotoxic drugs such as nonsteroidal anti-inflammatory agents (NSAIDs).   Please follow up with your nephrologist to monitor your kidney function, continue with low protein and potassium diet.   Follow up with nephrology within 1-2 weeks for repeat blood work to monitor your kidney function and electrolytes; call for appointment. Principal Discharge DX:	Atypical chest pain  Goal:	Resolution of symptoms.  Assessment and plan of treatment:	During your hospitalization, your stress test was normal.  Continue medications as prescribed.  Continue diet modification. Avoid complex carbohydrates such as bread, pasta, cereal, white rice, white potatoes, etc. Avoid concentrated sugar as found in desserts, candy, soda, juice, etc. Consume a diet based on lean protein (chicken, fish) and vegetables. Low salt, low fat diet.  Renal diet: no concentrated phosphorus or potassium; no protein restriction.  Exercise regularly as tolerated.  Follow up with your cardiologist within 1-2 weeks; call for appointment.  Secondary Diagnosis:	HTN (hypertension)  Goal:	To maintain a normal blood pressure to prevent heart attack, stroke and renal failure.  Assessment and plan of treatment:	Low sodium and fat diet, continue anti-hypertensive medications, and follow up with primary care physician.  Secondary Diagnosis:	High cholesterol  Goal:	To maintain normal cholesterol levels to prevent stroke, coronary artery disease, peripheral vascular disease and heart attacks.  Assessment and plan of treatment:	Low fat diet, exercise daily and continue current medications. Follow up with primary care physician and cardiologist for management.  Secondary Diagnosis:	DM (diabetes mellitus)  Goal:	To maintain a normal blood glucose level and HgA1C level < 5.7 and to prevent diabetic complications such as uncontrolled diabetes, diabetic coma, blindness, renal failure, and amputations.  Assessment and plan of treatment:	Monitor finger sticks pre-meal and bedtime, low salt, fat and carbohydrate diet, minimize glucose intake.  Exercise daily for at least 30 minutes and weight loss.  Follow up with primary care physician and endocrinologist for routine Hemoglobin A1C checks and management.  Follow up with your ophthalmologist for routine yearly vision exams.  Stop taking metformin given your kidney disease.  Continue taking your glimepiride and januvia as directed.  Follow up with endocrinology within 1-2 weeks; call for appointment.  If you do not have an endocrinologist, you may follow up in the endocrinology clinic at [facility] by calling for an appointment.  Secondary Diagnosis:	CKD (chronic kidney disease)  Goal:	To prevent shortness of breath, fluid overload, and electrolytes imbalance, and to slow down worsening kidney disease.  Assessment and plan of treatment:	Continue blood pressure, cholesterol and diabetic medications. Goal of hemoglobin A1C (HgbA1C) < 7%.  Avoid nephrotoxic drugs such as nonsteroidal anti-inflammatory agents (NSAIDs).   Please follow up with your nephrologist to monitor your kidney function, continue with low protein and potassium diet.   Follow up with nephrology within 1-2 weeks for repeat blood work to monitor your kidney function and electrolytes; call for appointment. Principal Discharge DX:	Atypical chest pain  Goal:	Resolution of symptoms.  Assessment and plan of treatment:	During your hospitalization, your stress test was normal.  Continue medications as prescribed.  Continue diet modification. Avoid complex carbohydrates such as bread, pasta, cereal, white rice, white potatoes, etc. Avoid concentrated sugar as found in desserts, candy, soda, juice, etc. Consume a diet based on lean protein (chicken, fish) and vegetables. Low salt, low fat diet.  Renal diet: no concentrated phosphorus or potassium; no protein restriction.  Exercise regularly as tolerated.  Follow up with your cardiologist within 1-2 weeks; call for appointment.  Secondary Diagnosis:	HTN (hypertension)  Goal:	To maintain a normal blood pressure to prevent heart attack, stroke and renal failure.  Assessment and plan of treatment:	Low sodium and fat diet, continue anti-hypertensive medications, and follow up with primary care physician.  Secondary Diagnosis:	High cholesterol  Goal:	To maintain normal cholesterol levels to prevent stroke, coronary artery disease, peripheral vascular disease and heart attacks.  Assessment and plan of treatment:	Low fat diet, exercise daily and continue current medications. Follow up with primary care physician and cardiologist for management.  Secondary Diagnosis:	DM (diabetes mellitus)  Goal:	To maintain a normal blood glucose level and HgA1C level < 5.7 and to prevent diabetic complications such as uncontrolled diabetes, diabetic coma, blindness, renal failure, and amputations.  Assessment and plan of treatment:	Monitor finger sticks pre-meal and bedtime, low salt, fat and carbohydrate diet, minimize glucose intake.  Exercise daily for at least 30 minutes and weight loss.  Follow up with primary care physician and endocrinologist for routine Hemoglobin A1C checks and management.  Follow up with your ophthalmologist for routine yearly vision exams.  Stop taking metformin given your kidney disease.  Continue taking your glimepiride and januvia as directed.  Follow up with endocrinology within 1-2 weeks; call for appointment.  If you do not have an endocrinologist, you may follow up in the endocrinology clinic at [facility] by calling [phone] for an appointment.  Secondary Diagnosis:	CKD (chronic kidney disease)  Goal:	To prevent shortness of breath, fluid overload, and electrolytes imbalance, and to slow down worsening kidney disease.  Assessment and plan of treatment:	Continue blood pressure, cholesterol and diabetic medications. Goal of hemoglobin A1C (HgbA1C) < 7%.  Avoid nephrotoxic drugs such as nonsteroidal anti-inflammatory agents (NSAIDs).   Please follow up with your nephrologist to monitor your kidney function, continue with low protein and potassium diet.   Follow up with nephrology within 1-2 weeks for repeat blood work to monitor your kidney function and electrolytes; call for appointment.

## 2018-05-04 NOTE — DISCHARGE NOTE ADULT - ADDITIONAL INSTRUCTIONS
Follow up with your PCP and cardiology within 1-2 weeks; call for appointments. Follow up with your PCP, Nephrology and cardiology within 1-2 weeks; call for appointments.  You will need repeat blood work with either nephrology or your PCP within 1-2 weeks to monitor your kidney function. Follow up with your PCP, Nephrology and cardiology within 1-2 weeks; call for appointments.  You will need repeat blood work with either nephrology or your PCP within 1-2 weeks to monitor your kidney function.  Follow up with endocrinology within 1-2 weeks; call for appointment.  If you do not have an endocrinologist, you may follow up in the endocrinology clinic at [facility] by calling for an appointment. Follow up with your PCP, Nephrology and cardiology within 1-2 weeks; call for appointments.  You will need repeat blood work with either nephrology or your PCP within 1-2 weeks to monitor your kidney function.  Follow up with endocrinology within 1-2 weeks; call for appointment.  If you do not have an endocrinologist, you may follow up in the endocrinology clinic at [facility] by calling [phone] for an appointment.

## 2018-05-04 NOTE — DISCHARGE NOTE ADULT - PATIENT PORTAL LINK FT
You can access the [facility] Patient Portal, offered by [facility], by registering with the following website: [email or web address]

## 2018-05-04 NOTE — PROGRESS NOTE ADULT - SUBJECTIVE AND OBJECTIVE BOX
SUBJECTIVE: No CP or SOB      MEDICATIONS  (STANDING):  amLODIPine   Tablet 10 milliGRAM(s) Oral daily  aspirin enteric coated 81 milliGRAM(s) Oral daily  atorvastatin 40 milliGRAM(s) Oral at bedtime  dextrose 5%. 1000 milliLiter(s) (50 mL/Hr) IV Continuous <Continuous>  dextrose 50% Injectable 12.5 Gram(s) IV Push once  dextrose 50% Injectable 25 Gram(s) IV Push once  dextrose 50% Injectable 25 Gram(s) IV Push once  gabapentin 100 milliGRAM(s) Oral two times a day  heparin  Injectable 5000 Unit(s) SubCutaneous every 8 hours  insulin glargine Injectable (LANTUS) 5 Unit(s) SubCutaneous at bedtime  insulin lispro (HumaLOG) corrective regimen sliding scale   SubCutaneous three times a day before meals  insulin lispro (HumaLOG) corrective regimen sliding scale   SubCutaneous at bedtime  isosorbide   dinitrate Tablet (ISORDIL) 30 milliGRAM(s) Oral two times a day  loratadine 10 milliGRAM(s) Oral daily  metoprolol succinate  milliGRAM(s) Oral daily  pantoprazole    Tablet 40 milliGRAM(s) Oral before breakfast  polyethylene glycol 3350 17 Gram(s) Oral daily  ranolazine 500 milliGRAM(s) Oral two times a day  triamterene 37.5 mG/hydrochlorothiazide 25 mG Tablet 1 Tablet(s) Oral daily    MEDICATIONS  (PRN):  acetaminophen   Tablet. 650 milliGRAM(s) Oral every 6 hours PRN mild, moderate pain  dextrose Gel 1 Dose(s) Oral once PRN Blood Glucose LESS THAN 70 milliGRAM(s)/deciliter  glucagon  Injectable 1 milliGRAM(s) IntraMuscular once PRN Glucose LESS THAN 70 milligrams/deciliter      LABS:                        11.9   5.94  )-----------( 215      ( 04 May 2018 06:30 )             35.7     Hemoglobin: 11.9 g/dL (05-04 @ 06:30)  Hemoglobin: 12.1 g/dL (05-03 @ 06:05)  Hemoglobin: 11.4 g/dL (05-02 @ 12:30)    05-04    137  |  99  |  29<H>  ----------------------------<  123<H>  4.1   |  26  |  1.64<H>    Ca    9.1      04 May 2018 06:30  Phos  3.9     05-03  Mg     2.0     05-04      Creatinine Trend: 1.64<--, 1.64<--, 1.93<--     CARDIAC MARKERS ( 02 May 2018 19:19 )  x     / < 0.06 ng/mL / 190 u/L / 4.78 ng/mL / x      CARDIAC MARKERS ( 02 May 2018 12:30 )  x     / < 0.06 ng/mL / x     / x     / x            PHYSICAL EXAM  Vital Signs Last 24 Hrs  T(C): 36.7 (04 May 2018 10:31), Max: 36.9 (03 May 2018 19:46)  T(F): 98 (04 May 2018 10:31), Max: 98.4 (03 May 2018 19:46)  HR: 64 (04 May 2018 10:31) (55 - 73)  BP: 127/65 (04 May 2018 10:31) (113/67 - 148/74)  BP(mean): 88 (03 May 2018 17:21) (88 - 89)  RR: 16 (04 May 2018 10:31) (12 - 16)  SpO2: 99% (04 May 2018 10:31) (96% - 100%)    Cardiovascular:  S1S2 RRR, No JVD  Respiratory: Lungs clear to auscultation, normal effort  Gastrointestinal: Abdomen soft, ND, NT, +BS  Ext: No C/C/E B/L LE    DIAGNOSTIC DATA  TELEMETRY: SR  < from: TTE with Doppler (w/Cont) (05.03.18 @ 13:29) >  CONCLUSIONS:  1. Mitral annular calcification, otherwise normal mitral  valve. Minimal mitral regurgitation.  2. Bioprosthetic aortic valve replacement. Peak transaortic  valve gradient equals 33 mm Hg, mean transaortic valve  gradient equals 16 mm Hg, which is probably normal in the  presence of a prosthetic valve. Grossly moderate aortic  regurgitation, which may have been underestimated.  3. Normal aortic root.  Some views are suggestive of a  synthetic graft in the proximal ascending aorta.  4. Moderately dilated left atrium.  LA volume index =48  cc/m2.  5. Normal left ventricular internal dimensions and wall  thicknesses.  6. Endocardium not well visualized; grossly normal left  ventricular systolic function.  Endocardial visualization  enhanced with intravenous injection of echo contrast  (Definity).  7. The right ventricle is not well visualized; grossly  normal right ventricular systolic function.  Consider BALJINDER for further evaluation of the prosthetic  aortic valve, if clinically indicated.    < end of copied text >      ASSESSMENT AND PLAN:    73 y/o male, with a PmHx of HTn, HLD, DM, AVR (bovine - 2009 @ [place]), presented to [facility] ED c/o intermittent chest pain for the past week.     --ACS ruled out with serial CE  --TTE as noted above   --NST pending  --CKD stage 3, never eval by a Nephrologist, work-up in progress
INTERVAL HPI/OVERNIGHT EVENTS:  Feeling okay.   Vital Signs Last 24 Hrs  T(C): 36.8 (04 May 2018 13:09), Max: 36.9 (03 May 2018 19:46)  T(F): 98.3 (04 May 2018 13:09), Max: 98.4 (03 May 2018 19:46)  HR: 61 (04 May 2018 13:09) (55 - 73)  BP: 120/64 (04 May 2018 13:09) (113/67 - 137/73)  BP(mean): 88 (03 May 2018 17:21) (88 - 88)  RR: 18 (04 May 2018 13:09) (12 - 18)  SpO2: 99% (04 May 2018 13:09) (96% - 100%)  I&O's Summary    03 May 2018 07:01  -  04 May 2018 07:00  --------------------------------------------------------  IN: 900 mL / OUT: 350 mL / NET: 550 mL    04 May 2018 07:01  -  04 May 2018 15:55  --------------------------------------------------------  IN: 240 mL / OUT: 350 mL / NET: -110 mL      MEDICATIONS  (STANDING):  amLODIPine   Tablet 10 milliGRAM(s) Oral daily  aspirin enteric coated 81 milliGRAM(s) Oral daily  atorvastatin 40 milliGRAM(s) Oral at bedtime  dextrose 5%. 1000 milliLiter(s) (50 mL/Hr) IV Continuous <Continuous>  dextrose 50% Injectable 12.5 Gram(s) IV Push once  dextrose 50% Injectable 25 Gram(s) IV Push once  dextrose 50% Injectable 25 Gram(s) IV Push once  gabapentin 100 milliGRAM(s) Oral two times a day  heparin  Injectable 5000 Unit(s) SubCutaneous every 8 hours  insulin glargine Injectable (LANTUS) 5 Unit(s) SubCutaneous at bedtime  insulin lispro (HumaLOG) corrective regimen sliding scale   SubCutaneous three times a day before meals  insulin lispro (HumaLOG) corrective regimen sliding scale   SubCutaneous at bedtime  isosorbide   dinitrate Tablet (ISORDIL) 30 milliGRAM(s) Oral two times a day  loratadine 10 milliGRAM(s) Oral daily  metoprolol succinate  milliGRAM(s) Oral daily  pantoprazole    Tablet 40 milliGRAM(s) Oral before breakfast  polyethylene glycol 3350 17 Gram(s) Oral daily  ranolazine 500 milliGRAM(s) Oral two times a day  triamterene 37.5 mG/hydrochlorothiazide 25 mG Tablet 1 Tablet(s) Oral daily    MEDICATIONS  (PRN):  acetaminophen   Tablet. 650 milliGRAM(s) Oral every 6 hours PRN mild, moderate pain  dextrose Gel 1 Dose(s) Oral once PRN Blood Glucose LESS THAN 70 milliGRAM(s)/deciliter  glucagon  Injectable 1 milliGRAM(s) IntraMuscular once PRN Glucose LESS THAN 70 milligrams/deciliter    LABS:                        11.9   5.94  )-----------( 215      ( 04 May 2018 06:30 )             35.7     05-04    137  |  99  |  29<H>  ----------------------------<  123<H>  4.1   |  26  |  1.64<H>    Ca    9.1      04 May 2018 06:30  Phos  3.9     05-03  Mg     2.0     05-04        Urinalysis Basic - ( 03 May 2018 00:53 )    Color: YELLOW / Appearance: CLEAR / S.018 / pH: 6.5  Gluc: NEGATIVE / Ketone: NEGATIVE  / Bili: NEGATIVE / Urobili: NORMAL mg/dL   Blood: NEGATIVE / Protein: 10 mg/dL / Nitrite: NEGATIVE   Leuk Esterase: NEGATIVE / RBC: 0-2 / WBC 0-2   Sq Epi: RARE / Non Sq Epi: x / Bacteria: x      CAPILLARY BLOOD GLUCOSE      POCT Blood Glucose.: 298 mg/dL (04 May 2018 12:35)  POCT Blood Glucose.: 193 mg/dL (04 May 2018 08:46)  POCT Blood Glucose.: 236 mg/dL (03 May 2018 22:07)  POCT Blood Glucose.: 139 mg/dL (03 May 2018 17:32)        Urinalysis Basic - ( 03 May 2018 00:53 )    Color: YELLOW / Appearance: CLEAR / S.018 / pH: 6.5  Gluc: NEGATIVE / Ketone: NEGATIVE  / Bili: NEGATIVE / Urobili: NORMAL mg/dL   Blood: NEGATIVE / Protein: 10 mg/dL / Nitrite: NEGATIVE   Leuk Esterase: NEGATIVE / RBC: 0-2 / WBC 0-2   Sq Epi: RARE / Non Sq Epi: x / Bacteria: x      REVIEW OF SYSTEMS:  CONSTITUTIONAL: No fever, weight loss, or fatigue  EYES: No eye pain, visual disturbances, or discharge  ENMT:  No difficulty hearing, tinnitus, vertigo; No sinus or throat pain  NECK: No pain or stiffness  BREASTS: No pain, masses, or nipple discharge  RESPIRATORY: No cough, wheezing, chills or hemoptysis; No shortness of breath  CARDIOVASCULAR: No chest pain, palpitations, dizziness, or leg swelling  GASTROINTESTINAL: No abdominal or epigastric pain. No nausea, vomiting, or hematemesis; No diarrhea or constipation. No melena or hematochezia.  GENITOURINARY: No dysuria, frequency, hematuria, or incontinence  NEUROLOGICAL: No headaches, memory loss, loss of strength, numbness, or tremors  SKIN: No itching, burning, rashes, or lesions   LYMPH NODES: No enlarged glands  ENDOCRINE: No heat or cold intolerance; No hair loss  MUSCULOSKELETAL: No joint pain or swelling; No muscle, back, or extremity pain    Consultant(s) Notes Reviewed:  [x ] YES  [ ] NO    PHYSICAL EXAM:  GENERAL: NAD, well-groomed, well-developed, not in any distress ,  HEAD:  Atraumatic, Normocephalic  EYES: EOMI, PERRLA, conjunctiva and sclera clear  ENMT: No tonsillar erythema, exudates, or enlargement; Moist mucous membranes, Good dentition, No lesions  NECK: Supple, No JVD, Normal thyroid  NERVOUS SYSTEM:  Alert & Oriented X3, No focal deficit   CHEST/LUNG: Good air entry bilateral with no  rales, rhonchi, wheezing, or rubs  HEART: Regular rate and rhythm; No murmurs, rubs, or gallops  ABDOMEN: Soft, Nontender, Nondistended; Bowel sounds present  EXTREMITIES:  2+ Peripheral Pulses, No clubbing, cyanosis, or edema  SKIN: No rashes or lesions    Care Discussed with Consultants/Other Providers [ x] YES  [ ] NO
INTERVAL HPI/OVERNIGHT EVENTS: No new concerns.   Vital Signs Last 24 Hrs  T(C): 36.9 (03 May 2018 19:46), Max: 37.1 (03 May 2018 06:34)  T(F): 98.4 (03 May 2018 19:46), Max: 98.8 (03 May 2018 06:34)  HR: 62 (03 May 2018 19:46) (55 - 69)  BP: 126/72 (03 May 2018 19:46) (126/72 - 148/74)  BP(mean): 88 (03 May 2018 17:21) (88 - 89)  RR: 14 (03 May 2018 19:46) (14 - 17)  SpO2: 100% (03 May 2018 19:46) (98% - 100%)  I&O's Summary    03 May 2018 07:01  -  03 May 2018 22:50  --------------------------------------------------------  IN: 240 mL / OUT: 0 mL / NET: 240 mL      MEDICATIONS  (STANDING):  amLODIPine   Tablet 10 milliGRAM(s) Oral daily  aspirin enteric coated 81 milliGRAM(s) Oral daily  atorvastatin 40 milliGRAM(s) Oral at bedtime  dextrose 5%. 1000 milliLiter(s) (50 mL/Hr) IV Continuous <Continuous>  dextrose 50% Injectable 12.5 Gram(s) IV Push once  dextrose 50% Injectable 25 Gram(s) IV Push once  dextrose 50% Injectable 25 Gram(s) IV Push once  gabapentin 100 milliGRAM(s) Oral two times a day  heparin  Injectable 5000 Unit(s) SubCutaneous every 8 hours  insulin lispro (HumaLOG) corrective regimen sliding scale   SubCutaneous three times a day before meals  insulin lispro (HumaLOG) corrective regimen sliding scale   SubCutaneous at bedtime  isosorbide   dinitrate Tablet (ISORDIL) 30 milliGRAM(s) Oral two times a day  loratadine 10 milliGRAM(s) Oral daily  metoprolol succinate  milliGRAM(s) Oral daily  pantoprazole    Tablet 40 milliGRAM(s) Oral before breakfast  polyethylene glycol 3350 17 Gram(s) Oral daily  ranolazine 500 milliGRAM(s) Oral two times a day  triamterene 37.5 mG/hydrochlorothiazide 25 mG Tablet 1 Tablet(s) Oral daily    MEDICATIONS  (PRN):  acetaminophen   Tablet. 650 milliGRAM(s) Oral every 6 hours PRN mild, moderate pain  dextrose Gel 1 Dose(s) Oral once PRN Blood Glucose LESS THAN 70 milliGRAM(s)/deciliter  glucagon  Injectable 1 milliGRAM(s) IntraMuscular once PRN Glucose LESS THAN 70 milligrams/deciliter    LABS:                        12.1   6.17  )-----------( 201      ( 03 May 2018 06:05 )             36.9     05-03    140  |  99  |  39<H>  ----------------------------<  76  3.8   |  24  |  1.64<H>    Ca    9.4      03 May 2018 06:05  Phos  3.9     05-03  Mg     2.0     05-03    TPro  7.3  /  Alb  4.3  /  TBili  0.6  /  DBili  x   /  AST  41<H>  /  ALT  27  /  AlkPhos  71  05-02    PT/INR - ( 02 May 2018 12:30 )   PT: 12.2 SEC;   INR: 1.06          PTT - ( 02 May 2018 12:30 )  PTT:30.2 SEC  Urinalysis Basic - ( 03 May 2018 00:53 )    Color: YELLOW / Appearance: CLEAR / S.018 / pH: 6.5  Gluc: NEGATIVE / Ketone: NEGATIVE  / Bili: NEGATIVE / Urobili: NORMAL mg/dL   Blood: NEGATIVE / Protein: 10 mg/dL / Nitrite: NEGATIVE   Leuk Esterase: NEGATIVE / RBC: 0-2 / WBC 0-2   Sq Epi: RARE / Non Sq Epi: x / Bacteria: x      CAPILLARY BLOOD GLUCOSE      POCT Blood Glucose.: 236 mg/dL (03 May 2018 22:07)  POCT Blood Glucose.: 139 mg/dL (03 May 2018 17:32)  POCT Blood Glucose.: 216 mg/dL (03 May 2018 13:24)        Urinalysis Basic - ( 03 May 2018 00:53 )    Color: YELLOW / Appearance: CLEAR / S.018 / pH: 6.5  Gluc: NEGATIVE / Ketone: NEGATIVE  / Bili: NEGATIVE / Urobili: NORMAL mg/dL   Blood: NEGATIVE / Protein: 10 mg/dL / Nitrite: NEGATIVE   Leuk Esterase: NEGATIVE / RBC: 0-2 / WBC 0-2   Sq Epi: RARE / Non Sq Epi: x / Bacteria: x      REVIEW OF SYSTEMS:  CONSTITUTIONAL: No fever, weight loss, or fatigue  EYES: No eye pain, visual disturbances, or discharge  ENMT:  No difficulty hearing, tinnitus, vertigo; No sinus or throat pain  NECK: No pain or stiffness  BREASTS: No pain, masses, or nipple discharge  RESPIRATORY: No cough, wheezing, chills or hemoptysis; No shortness of breath  CARDIOVASCULAR: No chest pain, palpitations, dizziness, or leg swelling  GASTROINTESTINAL: No abdominal or epigastric pain. No nausea, vomiting, or hematemesis; No diarrhea or constipation. No melena or hematochezia.  GENITOURINARY: No dysuria, frequency, hematuria, or incontinence  NEUROLOGICAL: No headaches, memory loss, loss of strength, numbness, or tremors  SKIN: No itching, burning, rashes, or lesions     Consultant(s) Notes Reviewed:  [x ] YES  [ ] NO    PHYSICAL EXAM:  GENERAL: NAD, well-groomed, well-developed, not in any distress ,  HEAD:  Atraumatic, Normocephalic  EYES: EOMI, PERRLA, conjunctiva and sclera clear  ENMT: No tonsillar erythema, exudates, or enlargement; Moist mucous membranes, Good dentition, No lesions  NECK: Supple, No JVD, Normal thyroid  NERVOUS SYSTEM:  Alert & Oriented X3, No focal deficit   CHEST/LUNG: Good air entry bilateral with no  rales, rhonchi, wheezing, or rubs  HEART: Regular rate and rhythm; No murmurs, rubs, or gallops  ABDOMEN: Soft, Nontender, Nondistended; Bowel sounds present  EXTREMITIES:  2+ Peripheral Pulses, No clubbing, cyanosis, or edema  SKIN: No rashes or lesions    Care Discussed with Consultants/Other Providers [ x] YES  [ ] NO
Nephrology Progress Note    No acute events overnight  Patient seen and evaluated this afternoon; no new complaints      PHYSICAL EXAM    Vital Signs Last 24 Hrs  T(C): 36.3 (03 May 2018 13:16), Max: 37.1 (03 May 2018 06:34)  T(F): 97.4 (03 May 2018 13:16), Max: 98.8 (03 May 2018 06:34)  HR: 59 (03 May 2018 13:16) (57 - 69)  BP: 148/74 (03 May 2018 13:16) (125/63 - 148/74)  BP(mean): 89 (03 May 2018 13:16) (89 - 89)  RR: 15 (03 May 2018 13:16) (14 - 17)  SpO2: 100% (03 May 2018 13:16) (98% - 100%)  I&O's Summary    GA: awake and alert, no distress  EYES: EOMI, clear conj, anicteric sclera  ENT: MMM, clear OP, neck supple  LUNGS: CTABL, no wrc, normal effort  HEART; RS1S2 normal, no mrg, no peripheral edema  ABD; Soft, NT/ND/BS+, no peritoneal signs  EXT; well perfused, no edema or cyanosis  ; no velasco  NEURO: AAO x 3, sensation and motor intact  SKIN: warm and dry, no rash on visible skin    LABORATORY DATA:                        12.1   6.17  )-----------( 201      ( 03 May 2018 06:05 )             36.9     05-03    140  |  99  |  39<H>  ----------------------------<  76  3.8   |  24  |  1.64<H>    Ca    9.4      03 May 2018 06:05  Phos  3.9     05-03  Mg     2.0     05-03    TPro  7.3  /  Alb  4.3  /  TBili  0.6  /  DBili  x   /  AST  41<H>  /  ALT  27  /  AlkPhos  71  05-02    LIVER FUNCTIONS - ( 02 May 2018 12:30 )  Alb: 4.3 g/dL / Pro: 7.3 g/dL / ALK PHOS: 71 u/L / ALT: 27 u/L / AST: 41 u/L / GGT: x           Urinalysis Basic - ( 03 May 2018 00:53 )    Color: YELLOW / Appearance: CLEAR / S.018 / pH: 6.5  Gluc: NEGATIVE / Ketone: NEGATIVE  / Bili: NEGATIVE / Urobili: NORMAL mg/dL   Blood: NEGATIVE / Protein: 10 mg/dL / Nitrite: NEGATIVE   Leuk Esterase: NEGATIVE / RBC: 0-2 / WBC 0-2   Sq Epi: RARE / Non Sq Epi: x / Bacteria: x      IMAGING:  < from: US Renal (18 @ 10:38) >  IMPRESSION: No hydronephrosis. Evidence of chronic intrinsic renal   disease.    < end of copied text >    ASSESSMENT: This is a 73 y/o male, with a PmHx of HTN, HLD, DM, AVR (bovine - 2009 @ [place]), admitted for workup of chest pain. Patient with abnormal kidney function for which nephrology was consulted.   1. CKD 3 B with mild increase in creatinine likely prerenal; CKD of unknown cause and patient is not following with a nephrologist as outpatient.  UA bland, FeNa>1, renal US unremarkable  2. Mild Anemia of unknown cause; iron stores/B12/folate level adequate;   3. Chest pain- management per primary team      RECOMMENDATIONS:  - BMP daily  - monitor and support hemodynamics, keep MAP>70  - avoid nephrotoxins including NSAIDs, ACEIs/ARBs, Iodinated contrast, Aminoglycoside etc as much as possible,   - renally dose all medications for a GFR ~ 35 mL/min/1.73 square meters   Rest of management per primary team    Daya Pérez MD  [facility]  [phone]
Nephrology Progress Note    No acute events overnight  Patient seen and evaluated this afternoon; no new complaints  Had stress test- which was normal    PHYSICAL EXAM    Vital Signs Last 24 Hrs  T(C): 36.8 (04 May 2018 13:09), Max: 36.9 (03 May 2018 19:46)  T(F): 98.3 (04 May 2018 13:09), Max: 98.4 (03 May 2018 19:46)  HR: 61 (04 May 2018 13:09) (55 - 73)  BP: 120/64 (04 May 2018 13:09) (113/67 - 137/73)  BP(mean): 88 (03 May 2018 17:21) (88 - 88)  RR: 18 (04 May 2018 13:09) (12 - 18)  SpO2: 99% (04 May 2018 13:09) (96% - 100%)  I&O's Summary    03 May 2018 07:01  -  04 May 2018 07:00  --------------------------------------------------------  IN: 900 mL / OUT: 350 mL / NET: 550 mL    04 May 2018 07:01  -  04 May 2018 14:01  --------------------------------------------------------  IN: 240 mL / OUT: 350 mL / NET: -110 mL    GA: awake and alert, no distress  EYES: EOMI, clear conj, anicteric sclera  ENT: MMM, clear OP, neck supple  LUNGS: CTABL, no wrc, normal effort  HEART; RS1S2 normal, no mrg, +peripheral edema  ABD; Soft, NT/ND/BS+, no peritoneal signs  EXT; well perfused, + LE edema  NEURO: AAO x 3, sensation and motor intact  SKIN: warm and dry, no rash on visible skin    LABORATORY DATA:                        11.9   5.94  )-----------( 215      ( 04 May 2018 06:30 )             35.7     05-04    137  |  99  |  29<H>  ----------------------------<  123<H>  4.1   |  26  |  1.64<H>    Ca    9.1      04 May 2018 06:30  Phos  3.9     05-03  Mg     2.0     05-04    IMAGING:  < from: US Renal (05.03.18 @ 10:38) >  IMPRESSION: No hydronephrosis. Evidence of chronic intrinsic renal   disease.    < end of copied text >    ASSESSMENT: This is a 71 y/o male, with a PmHx of HTN, HLD, DM, AVR (bovine - 2009 @ [place]), admitted for workup of chest pain. Patient with abnormal kidney function for which nephrology was consulted.     1. CKD 3 B with mild increase in creatinine likely prerenal; CKD of unknown cause and patient is not following with a nephrologist as outpatient.  UA bland, FeNa>1, renal US unremarkable; baseline cr ~ 1.6 mg/dl  2. Mild Anemia of unknown cause; iron stores/B12/folate level adequate;   3. Chest pain- management per primary team    RECOMMENDATIONS:  - BMP daily  - monitor and support hemodynamics, keep MAP>70  - avoid nephrotoxins including NSAIDs, ACEIs/ARBs, Iodinated contrast, Aminoglycoside etc as much as possible,   - renally dose all medications for a GFR ~ 35 mL/min/1.73 square meters   Rest of management per primary team  No objection to discharge from renal perspective, plan per primary team and cardiology    Daya Pérez MD  [facility]  [phone]
SUBJECTIVE: No CP or SOB    MEDICATIONS  (STANDING):  amLODIPine   Tablet 10 milliGRAM(s) Oral daily  aspirin enteric coated 81 milliGRAM(s) Oral daily  atorvastatin 40 milliGRAM(s) Oral at bedtime  gabapentin 100 milliGRAM(s) Oral two times a day  heparin  Injectable 5000 Unit(s) SubCutaneous every 8 hours  insulin lispro (HumaLOG) corrective regimen sliding scale   SubCutaneous three times a day before meals  insulin lispro (HumaLOG) corrective regimen sliding scale   SubCutaneous at bedtime  isosorbide   dinitrate Tablet (ISORDIL) 30 milliGRAM(s) Oral two times a day  loratadine 10 milliGRAM(s) Oral daily  metoprolol succinate  milliGRAM(s) Oral daily  pantoprazole    Tablet 40 milliGRAM(s) Oral before breakfast  polyethylene glycol 3350 17 Gram(s) Oral daily  ranolazine 500 milliGRAM(s) Oral two times a day  triamterene 37.5 mG/hydrochlorothiazide 25 mG Tablet 1 Tablet(s) Oral daily    MEDICATIONS  (PRN):  acetaminophen   Tablet. 650 milliGRAM(s) Oral every 6 hours PRN mild, moderate pain  dextrose Gel 1 Dose(s) Oral once PRN Blood Glucose LESS THAN 70 milliGRAM(s)/deciliter  glucagon  Injectable 1 milliGRAM(s) IntraMuscular once PRN Glucose LESS THAN 70 milligrams/deciliter      LABS:                        12.1   6.17  )-----------( 201      ( 03 May 2018 06:05 )             36.9     140  |  99  |  39<H>  ----------------------------<  76  3.8   |  24  |  1.64<H>    Ca    9.4      03 May 2018 06:05  Phos  3.9     05-03  Mg     2.0     05-03    TPro  7.3  /  Alb  4.3  /  TBili  0.6  /  DBili  x   /  AST  41<H>  /  ALT  27  /  AlkPhos  71  05-02    CARDIAC MARKERS ( 02 May 2018 19:19 )  x     / < 0.06 ng/mL / 190 u/L / 4.78 ng/mL / x      CARDIAC MARKERS ( 02 May 2018 12:30 )  x     / < 0.06 ng/mL / x     / x     / x        PHYSICAL EXAM:  Vital Signs Last 24 Hrs  T(C): 36.3 (03 May 2018 13:16), Max: 37.1 (03 May 2018 06:34)  T(F): 97.4 (03 May 2018 13:16), Max: 98.8 (03 May 2018 06:34)  HR: 59 (03 May 2018 13:16) (57 - 69)  BP: 148/74 (03 May 2018 13:16) (125/63 - 148/74)  BP(mean): 89 (03 May 2018 13:16) (89 - 89)  RR: 15 (03 May 2018 13:16) (14 - 17)  SpO2: 100% (03 May 2018 13:16) (98% - 100%)    Cardiovascular:  S1S2 RRR, No JVD  Respiratory: Lungs clear to auscultation, normal effort  Gastrointestinal: Abdomen soft, ND, NT, +BS  Skin: Warm, dry, intact. No rash.  Musculoskeletal: Normal ROM, normal strength  Ext: No C/C/E B/L LE    DIAGNOSTIC DATA  TELEMETRY: SR      ASSESSMENT AND PLAN:    71 y/o male, with a PmHx of HTn, HLD, DM, AVR (bovine - 2009 @ [place]), presented to [facility] ED c/o intermittent chest pain for the past week.     --ACS ruled out with serial CE  --TTE pending  --NST pending  --CKD stage 3, never eval by a Nephrologist, work-up in progress    Richelle Jarquin PA-C

## 2018-05-04 NOTE — DISCHARGE NOTE ADULT - PROVIDER TOKENS
FREE:[LAST:[Mateyev],PHONE:[(   )    -],FAX:[(   )    -],ADDRESS:[Cardiologist at [place]]],TOKEN:'[id number]' TOKEN:'[id number]',FREE:[LAST:[Matelaura],PHONE:[(   )    -],FAX:[(   )    -],ADDRESS:[Cardiologist at [place]]],TOKEN:'[id number]' TOKEN:'[id number]:'[id number]',FREE:[LAST:[Mateyev],PHONE:[(   )    -],FAX:[(   )    -],ADDRESS:[Cardiologist at [place]]],FREE:[LAST:[Endocrinology at [facility]],PHONE:[phone]],FAX:[(   )    -]]

## 2018-05-04 NOTE — DISCHARGE NOTE ADULT - MEDICATION SUMMARY - MEDICATIONS TO TAKE
I will START or STAY ON the medications listed below when I get home from the hospital:    acetaminophen 325 mg oral tablet  -- 2 tab(s) by mouth every 6 hours, As needed, mild, moderate pain  -- Indication: For As needed for pain    aspirin 81 mg oral delayed release tablet  -- 1 tab(s) by mouth once a day  -- Indication: For Cardioprotective    isosorbide dinitrate 30 mg oral tablet  -- 1 tab(s) by mouth 2 times a day  -- Indication: For High blood pressure    Ranexa 500 mg oral tablet, extended release  -- 1 tab(s) by mouth 2 times a day  -- Indication: For Anti-anginal    gabapentin 100 mg oral tablet  -- 1 tab(s) by mouth 2 times a day  -- Indication: For Neuropathy    Januvia 50 mg oral tablet  -- 1 tab(s) by mouth once a day  -- Indication: For DM (diabetes mellitus)    glimepiride 4 mg oral tablet  -- 1 tab(s) by mouth once a day  -- Indication: For DM (diabetes mellitus)    loratadine 10 mg oral tablet  -- 1 tab(s) by mouth once a day  -- Indication: For Allergies    atorvastatin 40 mg oral tablet  -- 1 tab(s) by mouth once a day  -- Indication: For High cholesterol    triamterene-hydrochlorothiazide 37.5 mg-25 mg oral tablet  -- 1 tab(s) by mouth once a day  -- Indication: For High blood pressure    Metoprolol Succinate  mg oral tablet, extended release  -- 1 tab(s) by mouth once a day  -- Indication: For High blood pressure    amLODIPine 10 mg oral tablet  -- 1 tab(s) by mouth once a day  -- Indication: For High blood pressure    polyethylene glycol 3350 oral powder for reconstitution  -- 17 gram(s) by mouth once a day  -- Indication: For As needed for constipation    omeprazole 20 mg oral delayed release capsule  -- 1 cap(s) by mouth once a day  -- Indication: For GERD

## 2018-05-04 NOTE — DISCHARGE NOTE ADULT - PLAN OF CARE
Resolution of symptoms. During your hospitalization, your stress test was normal.  Continue medications as prescribed.  Continue diet modification. Avoid complex carbohydrates such as bread, pasta, cereal, white rice, white potatoes, etc. Avoid concentrated sugar as found in desserts, candy, soda, juice, etc. Consume a diet based on lean protein (chicken, fish) and vegetables. Low salt, low fat diet.  Renal diet: no concentrated phosphorus or potassium; no protein restriction.  Exercise regularly as tolerated.  Follow up with your cardiologist within 1-2 weeks; call for appointment. To maintain a normal blood pressure to prevent heart attack, stroke and renal failure. Low sodium and fat diet, continue anti-hypertensive medications, and follow up with primary care physician. To maintain normal cholesterol levels to prevent stroke, coronary artery disease, peripheral vascular disease and heart attacks. Low fat diet, exercise daily and continue current medications. Follow up with primary care physician and cardiologist for management. To maintain a normal blood glucose level and HgA1C level < 5.7 and to prevent diabetic complications such as uncontrolled diabetes, diabetic coma, blindness, renal failure, and amputations. Monitor finger sticks pre-meal and bedtime, low salt, fat and carbohydrate diet, minimize glucose intake.  Exercise daily for at least 30 minutes and weight loss.  Follow up with primary care physician and endocrinologist for routine Hemoglobin A1C checks and management.  Follow up with your ophthalmologist for routine yearly vision exams. To prevent shortness of breath, fluid overload, and electrolytes imbalance, and to slow down worsening kidney disease. Continue blood pressure, cholesterol and diabetic medications. Goal of hemoglobin A1C (HgbA1C) < 7%.  Avoid nephrotoxic drugs such as nonsteroidal anti-inflammatory agents (NSAIDs).   Please follow up with your nephrologist to monitor your kidney function, continue with low protein and potassium diet.   Follow up with nephrology within 1-2 weeks for repeat blood work to monitor your kidney function and electrolytes; call for appointment. Monitor finger sticks pre-meal and bedtime, low salt, fat and carbohydrate diet, minimize glucose intake.  Exercise daily for at least 30 minutes and weight loss.  Follow up with primary care physician and endocrinologist for routine Hemoglobin A1C checks and management.  Follow up with your ophthalmologist for routine yearly vision exams.  Stop taking metformin given your kidney disease.  Continue taking your glimepiride and januvia as directed.  Follow up with endocrinology within 1-2 weeks; call for appointment.  If you do not have an endocrinologist, you may follow up in the endocrinology clinic at [facility] by calling for an appointment. Monitor finger sticks pre-meal and bedtime, low salt, fat and carbohydrate diet, minimize glucose intake.  Exercise daily for at least 30 minutes and weight loss.  Follow up with primary care physician and endocrinologist for routine Hemoglobin A1C checks and management.  Follow up with your ophthalmologist for routine yearly vision exams.  Stop taking metformin given your kidney disease.  Continue taking your glimepiride and januvia as directed.  Follow up with endocrinology within 1-2 weeks; call for appointment.  If you do not have an endocrinologist, you may follow up in the endocrinology clinic at [facility] by calling [phone] for an appointment.

## 2018-05-04 NOTE — PROGRESS NOTE ADULT - ASSESSMENT
71 y/o male, with a PmHx of HTn, HLD, DM, AVR (bovine - 2009 @ [facility]), presented to [facility] ED c/o intermittent chest pain for the past week. He is being admitted to telemetry for r/o acs and SAMARIA.       Problem/Plan - 1:  ·  Problem: Chest pain.  Plan:No more CP .Tropx 2 neg  . TTE noted. NST noted ...< from: Nuclear Stress Test-Pharmacologic (05.04.18 @ 08:10) >  IMPRESSIONS:Normal Study  * Myocardial Perfusion SPECT resultsare normal at 56 % of  MPHR.  * Review of raw data shows: The study is of good technical  quality.  * The left ventricle was normal in size. Normal myocardial  perfusion scan,with no evidence of infarction or inducible  ischemia.  * Post-stress gatedwall motion analysis was performed  (LVEF = 58 %;LVEDV = 106 ml.), revealing normal LV  function. RV function appeared normal    < end of copied text >       Problem/Plan - 2:  ·  Problem: SAMARIA (acute kidney injury) with CKD stage 3 .  Plan: Creatinine stable at 1.6 .Renal consulted. Avoid Nephrotoxic drugs .      Problem/Plan - 3:  ·  Problem: DM (diabetes mellitus).  Plan: HgbAic7  . Sugars little high so add Lantus 5 Units bed time and continue SSI.      Problem/Plan - 4:  ·  Problem: High cholesterol.  Plan: fasting lipid profile, con't statin.      Problem/Plan - 5:  ·  Problem: HTN (hypertension).  Plan: monitor bp, con't meds, adjust as needed.      Problem/Plan - 6:  Problem: Need for prophylactic measure. Plan: heparin 5,000u sc tid.

## 2018-05-07 ENCOUNTER — APPOINTMENT (OUTPATIENT)
Dept: OTOLARYNGOLOGY | Facility: CLINIC | Age: 73
End: 2018-05-07

## 2018-05-07 RX ORDER — TOBRAMYCIN AND DEXAMETHASONE 1; 3 MG/ML; MG/ML
2 SUSPENSION/ DROPS OPHTHALMIC
Qty: 0 | Refills: 0 | COMMUNITY

## 2018-05-07 RX ORDER — ASPIRIN/CALCIUM CARB/MAGNESIUM 324 MG
1 TABLET ORAL
Qty: 0 | Refills: 0 | COMMUNITY

## 2018-05-07 RX ORDER — METFORMIN HYDROCHLORIDE 850 MG/1
1 TABLET ORAL
Qty: 0 | Refills: 0 | COMMUNITY

## 2018-06-04 ENCOUNTER — APPOINTMENT (OUTPATIENT)
Dept: OTOLARYNGOLOGY | Facility: CLINIC | Age: 73
End: 2018-06-04
Payer: MEDICARE

## 2018-06-04 VITALS
SYSTOLIC BLOOD PRESSURE: 114 MMHG | WEIGHT: 190 LBS | HEIGHT: 66 IN | BODY MASS INDEX: 30.53 KG/M2 | DIASTOLIC BLOOD PRESSURE: 66 MMHG | HEART RATE: 65 BPM

## 2018-06-04 PROBLEM — E78.00 PURE HYPERCHOLESTEROLEMIA, UNSPECIFIED: Chronic | Status: ACTIVE | Noted: 2018-05-02

## 2018-06-04 PROBLEM — E11.9 TYPE 2 DIABETES MELLITUS WITHOUT COMPLICATIONS: Chronic | Status: ACTIVE | Noted: 2018-05-02

## 2018-06-04 PROBLEM — I10 ESSENTIAL (PRIMARY) HYPERTENSION: Chronic | Status: ACTIVE | Noted: 2018-05-02

## 2018-06-04 PROCEDURE — 99213 OFFICE O/P EST LOW 20 MIN: CPT | Mod: 25

## 2018-06-04 PROCEDURE — 69220 CLEAN OUT MASTOID CAVITY: CPT | Mod: RT

## 2018-06-04 RX ORDER — TRIAMTERENE AND HYDROCHLOROTHIAZIDE 25; 37.5 MG/1; MG/1
37.5-25 TABLET ORAL
Qty: 30 | Refills: 2 | Status: DISCONTINUED | COMMUNITY
Start: 2017-10-27 | End: 2018-06-04

## 2018-06-04 RX ORDER — CLINDAMYCIN HYDROCHLORIDE 300 MG/1
300 CAPSULE ORAL
Qty: 28 | Refills: 0 | Status: DISCONTINUED | COMMUNITY
Start: 2018-03-23 | End: 2018-06-04

## 2018-06-27 ENCOUNTER — APPOINTMENT (OUTPATIENT)
Dept: OTOLARYNGOLOGY | Facility: CLINIC | Age: 73
End: 2018-06-27
Payer: MEDICARE

## 2018-06-27 VITALS
SYSTOLIC BLOOD PRESSURE: 118 MMHG | BODY MASS INDEX: 30.53 KG/M2 | WEIGHT: 190 LBS | HEIGHT: 66 IN | DIASTOLIC BLOOD PRESSURE: 70 MMHG

## 2018-06-27 PROCEDURE — 99213 OFFICE O/P EST LOW 20 MIN: CPT

## 2018-07-02 ENCOUNTER — FORM ENCOUNTER (OUTPATIENT)
Age: 73
End: 2018-07-02

## 2018-07-03 ENCOUNTER — OUTPATIENT (OUTPATIENT)
Dept: OUTPATIENT SERVICES | Facility: HOSPITAL | Age: 73
LOS: 1 days | End: 2018-07-03
Payer: COMMERCIAL

## 2018-07-03 ENCOUNTER — APPOINTMENT (OUTPATIENT)
Dept: CT IMAGING | Facility: IMAGING CENTER | Age: 73
End: 2018-07-03
Payer: MEDICARE

## 2018-07-03 DIAGNOSIS — Z95.2 PRESENCE OF PROSTHETIC HEART VALVE: Chronic | ICD-10-CM

## 2018-07-03 DIAGNOSIS — Z90.49 ACQUIRED ABSENCE OF OTHER SPECIFIED PARTS OF DIGESTIVE TRACT: Chronic | ICD-10-CM

## 2018-07-03 DIAGNOSIS — H92.01 OTALGIA, RIGHT EAR: ICD-10-CM

## 2018-07-03 DIAGNOSIS — Z98.890 OTHER SPECIFIED POSTPROCEDURAL STATES: Chronic | ICD-10-CM

## 2018-07-03 PROCEDURE — 70480 CT ORBIT/EAR/FOSSA W/O DYE: CPT

## 2018-07-03 PROCEDURE — 70480 CT ORBIT/EAR/FOSSA W/O DYE: CPT | Mod: 26

## 2018-07-05 ENCOUNTER — MESSAGE (OUTPATIENT)
Age: 73
End: 2018-07-05

## 2018-07-09 RX ORDER — FLUCONAZOLE 150 MG/1
150 TABLET ORAL DAILY
Qty: 7 | Refills: 0 | Status: COMPLETED | COMMUNITY
Start: 2018-06-04 | End: 2018-07-09

## 2018-07-09 RX ORDER — BLOOD SUGAR DIAGNOSTIC
STRIP MISCELLANEOUS
Qty: 100 | Refills: 0 | Status: ACTIVE | COMMUNITY
Start: 2018-02-03

## 2018-07-09 RX ORDER — ISOPROPYL ALCOHOL 70 ML/100ML
70 SWAB TOPICAL
Qty: 100 | Refills: 0 | Status: ACTIVE | COMMUNITY
Start: 2018-04-20

## 2018-07-09 RX ORDER — BLOOD-GLUCOSE METER
KIT MISCELLANEOUS
Qty: 1 | Refills: 0 | Status: ACTIVE | COMMUNITY
Start: 2018-04-20

## 2018-07-09 RX ORDER — LANCETS 32 GAUGE
EACH MISCELLANEOUS
Qty: 100 | Refills: 0 | Status: ACTIVE | COMMUNITY
Start: 2018-04-20

## 2018-07-16 ENCOUNTER — APPOINTMENT (OUTPATIENT)
Dept: OTOLARYNGOLOGY | Facility: CLINIC | Age: 73
End: 2018-07-16
Payer: MEDICARE

## 2018-07-16 VITALS
SYSTOLIC BLOOD PRESSURE: 152 MMHG | HEIGHT: 66 IN | DIASTOLIC BLOOD PRESSURE: 74 MMHG | BODY MASS INDEX: 30.53 KG/M2 | WEIGHT: 190 LBS

## 2018-07-16 PROCEDURE — 99213 OFFICE O/P EST LOW 20 MIN: CPT

## 2018-08-27 ENCOUNTER — APPOINTMENT (OUTPATIENT)
Dept: OTOLARYNGOLOGY | Facility: CLINIC | Age: 73
End: 2018-08-27
Payer: MEDICARE

## 2018-08-27 VITALS
WEIGHT: 185 LBS | SYSTOLIC BLOOD PRESSURE: 131 MMHG | BODY MASS INDEX: 29.73 KG/M2 | HEIGHT: 66 IN | HEART RATE: 73 BPM | DIASTOLIC BLOOD PRESSURE: 67 MMHG

## 2018-08-27 DIAGNOSIS — H92.01 OTALGIA, RIGHT EAR: ICD-10-CM

## 2018-08-27 PROCEDURE — 92567 TYMPANOMETRY: CPT

## 2018-08-27 PROCEDURE — 92557 COMPREHENSIVE HEARING TEST: CPT

## 2018-08-27 PROCEDURE — 99214 OFFICE O/P EST MOD 30 MIN: CPT | Mod: 25

## 2018-08-27 PROCEDURE — 92584 ELECTROCOCHLEOGRAPHY: CPT

## 2018-08-29 PROBLEM — H92.01 OTALGIA OF RIGHT EAR: Status: ACTIVE | Noted: 2018-06-27

## 2018-10-02 ENCOUNTER — APPOINTMENT (OUTPATIENT)
Dept: OPHTHALMOLOGY | Facility: CLINIC | Age: 73
End: 2018-10-02

## 2018-10-10 ENCOUNTER — APPOINTMENT (OUTPATIENT)
Dept: OPHTHALMOLOGY | Facility: CLINIC | Age: 73
End: 2018-10-10
Payer: MEDICARE

## 2018-10-10 DIAGNOSIS — H53.2 DIPLOPIA: ICD-10-CM

## 2018-10-10 PROCEDURE — 92060 SENSORIMOTOR EXAMINATION: CPT

## 2018-10-10 PROCEDURE — 99204 OFFICE O/P NEW MOD 45 MIN: CPT

## 2018-11-05 ENCOUNTER — APPOINTMENT (OUTPATIENT)
Dept: OTOLARYNGOLOGY | Facility: CLINIC | Age: 73
End: 2018-11-05
Payer: MEDICARE

## 2018-11-05 VITALS
HEIGHT: 66 IN | DIASTOLIC BLOOD PRESSURE: 64 MMHG | WEIGHT: 185 LBS | HEART RATE: 69 BPM | SYSTOLIC BLOOD PRESSURE: 150 MMHG | BODY MASS INDEX: 29.73 KG/M2

## 2018-11-05 PROCEDURE — 92567 TYMPANOMETRY: CPT

## 2018-11-05 PROCEDURE — 92557 COMPREHENSIVE HEARING TEST: CPT

## 2018-11-05 PROCEDURE — 17250 CHEM CAUT OF GRANLTJ TISSUE: CPT

## 2018-11-05 PROCEDURE — 99213 OFFICE O/P EST LOW 20 MIN: CPT | Mod: 25

## 2018-11-05 RX ORDER — HYDROCHLOROTHIAZIDE 12.5 MG/1
12.5 TABLET ORAL
Qty: 30 | Refills: 0 | Status: DISCONTINUED | COMMUNITY
Start: 2018-06-06 | End: 2018-11-05

## 2018-11-05 RX ORDER — TRIAMTERENE AND HYDROCHLOROTHIAZIDE 37.5; 25 MG/1; MG/1
37.5-25 CAPSULE ORAL
Qty: 30 | Refills: 0 | Status: DISCONTINUED | COMMUNITY
Start: 2017-10-27 | End: 2018-11-05

## 2018-11-05 RX ORDER — AMOXICILLIN 875 MG/1
875 TABLET, FILM COATED ORAL
Qty: 20 | Refills: 0 | Status: DISCONTINUED | COMMUNITY
Start: 2018-03-22 | End: 2018-11-05

## 2018-11-05 RX ORDER — OXYCODONE 5 MG/1
5 TABLET ORAL
Qty: 4 | Refills: 0 | Status: DISCONTINUED | COMMUNITY
Start: 2018-03-23 | End: 2018-11-05

## 2018-11-05 RX ORDER — POLYETHYLENE GLYCOL 3350 17 G/17G
17 POWDER, FOR SOLUTION ORAL
Qty: 527 | Refills: 0 | Status: DISCONTINUED | COMMUNITY
Start: 2017-10-19 | End: 2018-11-05

## 2018-11-05 RX ORDER — METFORMIN HYDROCHLORIDE 500 MG/1
500 TABLET, COATED ORAL
Qty: 90 | Refills: 0 | Status: DISCONTINUED | COMMUNITY
Start: 2017-11-03 | End: 2018-11-05

## 2018-11-05 RX ORDER — ISOSORBIDE MONONITRATE 30 MG/1
30 TABLET, EXTENDED RELEASE ORAL
Qty: 90 | Refills: 0 | Status: DISCONTINUED | COMMUNITY
Start: 2018-06-07 | End: 2018-11-05

## 2018-11-05 RX ORDER — AZITHROMYCIN 250 MG/1
250 TABLET, FILM COATED ORAL
Qty: 6 | Refills: 0 | Status: DISCONTINUED | COMMUNITY
Start: 2018-02-05 | End: 2018-11-05

## 2018-11-19 ENCOUNTER — APPOINTMENT (OUTPATIENT)
Dept: OPHTHALMOLOGY | Facility: CLINIC | Age: 73
End: 2018-11-19
Payer: MEDICARE

## 2018-11-19 PROCEDURE — 92012 INTRM OPH EXAM EST PATIENT: CPT

## 2018-11-19 PROCEDURE — 92060 SENSORIMOTOR EXAMINATION: CPT

## 2019-01-28 ENCOUNTER — APPOINTMENT (OUTPATIENT)
Dept: OTOLARYNGOLOGY | Facility: CLINIC | Age: 74
End: 2019-01-28
Payer: MEDICARE

## 2019-01-28 VITALS
DIASTOLIC BLOOD PRESSURE: 71 MMHG | SYSTOLIC BLOOD PRESSURE: 145 MMHG | HEIGHT: 66 IN | WEIGHT: 190 LBS | BODY MASS INDEX: 30.53 KG/M2 | HEART RATE: 67 BPM

## 2019-01-28 PROCEDURE — 99213 OFFICE O/P EST LOW 20 MIN: CPT

## 2019-01-28 RX ORDER — LISINOPRIL 10 MG/1
10 TABLET ORAL
Qty: 30 | Refills: 0 | Status: COMPLETED | COMMUNITY
Start: 2018-09-26

## 2019-01-28 RX ORDER — FLUTICASONE PROPIONATE 50 UG/1
50 SPRAY, METERED NASAL
Qty: 16 | Refills: 0 | Status: COMPLETED | COMMUNITY
Start: 2018-10-02

## 2019-01-28 RX ORDER — LISINOPRIL 5 MG/1
5 TABLET ORAL
Qty: 30 | Refills: 0 | Status: COMPLETED | COMMUNITY
Start: 2018-09-12

## 2019-01-28 RX ORDER — LISINOPRIL 2.5 MG/1
2.5 TABLET ORAL
Qty: 30 | Refills: 0 | Status: DISCONTINUED | COMMUNITY
Start: 2018-07-18 | End: 2019-01-28

## 2019-01-28 RX ORDER — HYDRALAZINE HYDROCHLORIDE 100 MG/1
100 TABLET ORAL
Qty: 270 | Refills: 0 | Status: COMPLETED | COMMUNITY
Start: 2018-11-14

## 2019-02-05 NOTE — HISTORY OF PRESENT ILLNESS
[de-identified] : 73 year old male follow up for left ear otorrhea.  Patient states yellow/lemon-colored drainage noted from left ear every day.  Patient also reports hearing loss, wears hearing aid in right ear.  States intermittent dizziness and vertigo, takes Tylenol at times with some relief.  Patient denies otalgia, ear infections and tinnitus. Pain resolved - recently got right Ha

## 2019-02-05 NOTE — PHYSICAL EXAM
[Binocular Microscopic Exam] : Binocular microscopic exam was performed [Midline] : trachea located in midline position [Normal] : no nystagmus [FreeTextEntry8] : CWD dry - has HA in [FreeTextEntry9] : CWD has some flat granulation - filled with tobradex

## 2019-02-05 NOTE — REASON FOR VISIT
[Subsequent Evaluation] : a subsequent evaluation for [Family Member] : family member [FreeTextEntry2] : follow up for left ear otorrhea

## 2019-02-14 ENCOUNTER — APPOINTMENT (OUTPATIENT)
Dept: PULMONOLOGY | Facility: CLINIC | Age: 74
End: 2019-02-14
Payer: MEDICARE

## 2019-02-14 VITALS
TEMPERATURE: 97.7 F | HEIGHT: 65.5 IN | HEART RATE: 69 BPM | SYSTOLIC BLOOD PRESSURE: 110 MMHG | WEIGHT: 188 LBS | DIASTOLIC BLOOD PRESSURE: 67 MMHG | BODY MASS INDEX: 30.95 KG/M2 | RESPIRATION RATE: 16 BRPM

## 2019-02-14 VITALS — HEIGHT: 65.5 IN | BODY MASS INDEX: 30.78 KG/M2 | WEIGHT: 187 LBS

## 2019-02-14 PROCEDURE — 94060 EVALUATION OF WHEEZING: CPT

## 2019-02-14 PROCEDURE — 94726 PLETHYSMOGRAPHY LUNG VOLUMES: CPT

## 2019-02-14 PROCEDURE — 99214 OFFICE O/P EST MOD 30 MIN: CPT | Mod: 25

## 2019-02-14 PROCEDURE — ZZZZZ: CPT

## 2019-02-14 PROCEDURE — 94729 DIFFUSING CAPACITY: CPT

## 2019-02-14 NOTE — REASON FOR VISIT
[Follow-Up] : a follow-up visit [Cough] : cough [Shortness of Breath] : shortness of Breath [Sleep Apnea] : sleep apnea

## 2019-02-26 ENCOUNTER — OUTPATIENT (OUTPATIENT)
Dept: OUTPATIENT SERVICES | Facility: HOSPITAL | Age: 74
LOS: 1 days | End: 2019-02-26
Payer: COMMERCIAL

## 2019-02-26 DIAGNOSIS — Z98.890 OTHER SPECIFIED POSTPROCEDURAL STATES: Chronic | ICD-10-CM

## 2019-02-26 DIAGNOSIS — R07.9 CHEST PAIN, UNSPECIFIED: ICD-10-CM

## 2019-02-26 DIAGNOSIS — Z90.49 ACQUIRED ABSENCE OF OTHER SPECIFIED PARTS OF DIGESTIVE TRACT: Chronic | ICD-10-CM

## 2019-02-26 DIAGNOSIS — Z95.2 PRESENCE OF PROSTHETIC HEART VALVE: Chronic | ICD-10-CM

## 2019-02-26 PROCEDURE — 93320 DOPPLER ECHO COMPLETE: CPT

## 2019-02-26 PROCEDURE — 93312 ECHO TRANSESOPHAGEAL: CPT

## 2019-02-26 PROCEDURE — 93325 DOPPLER ECHO COLOR FLOW MAPG: CPT

## 2019-03-15 LAB
BACTERIA FLD CULT: ABNORMAL
BACTERIA FLD CULT: ABNORMAL

## 2019-03-16 ENCOUNTER — FORM ENCOUNTER (OUTPATIENT)
Age: 74
End: 2019-03-16

## 2019-03-17 ENCOUNTER — APPOINTMENT (OUTPATIENT)
Dept: CT IMAGING | Facility: IMAGING CENTER | Age: 74
End: 2019-03-17
Payer: MEDICARE

## 2019-03-17 ENCOUNTER — OUTPATIENT (OUTPATIENT)
Dept: OUTPATIENT SERVICES | Facility: HOSPITAL | Age: 74
LOS: 1 days | End: 2019-03-17
Payer: COMMERCIAL

## 2019-03-17 DIAGNOSIS — Z98.890 OTHER SPECIFIED POSTPROCEDURAL STATES: Chronic | ICD-10-CM

## 2019-03-17 DIAGNOSIS — Z90.49 ACQUIRED ABSENCE OF OTHER SPECIFIED PARTS OF DIGESTIVE TRACT: Chronic | ICD-10-CM

## 2019-03-17 DIAGNOSIS — Z95.2 PRESENCE OF PROSTHETIC HEART VALVE: Chronic | ICD-10-CM

## 2019-03-17 DIAGNOSIS — R06.09 OTHER FORMS OF DYSPNEA: ICD-10-CM

## 2019-03-17 PROCEDURE — 71250 CT THORAX DX C-: CPT

## 2019-03-17 PROCEDURE — 71250 CT THORAX DX C-: CPT | Mod: 26

## 2019-04-05 NOTE — CONSULT LETTER
[Dear  ___] : Dear  [unfilled], [Consult Letter:] : I had the pleasure of evaluating your patient, [unfilled]. [( Thank you for referring [unfilled] for consultation for _____ )] : Thank you for referring [unfilled] for consultation for [unfilled] [Please see my note below.] : Please see my note below. [Consult Closing:] : Thank you very much for allowing me to participate in the care of this patient.  If you have any questions, please do not hesitate to contact me. [Sincerely,] : Sincerely, [FreeTextEntry2] : Dr. Hooks [FreeTextEntry3] : Kristine Medina MD\par  \par Division of Pulmonary, Critical Care & Sleep Medicine\par [facility] [employer] [facility]\par \par \par

## 2019-04-05 NOTE — HISTORY OF PRESENT ILLNESS
[FreeTextEntry1] : 74 yo M presents for follow up ROMERO, chronic cough and sleep disordered breathing. Referred by his PMD Dr. Hooks.\min Was initially seen by me in March 2018 with similar complaints and with PFTs significant for moderately reduced DLCO. Was sent for CXR at that time - 5/3/18- clear lungs, cardiomegaly. \par Was also ordered for CT chest however never underwent. Also did not forward the results of his outside sleep study.\par \min Had undergone extensive cardiac work up over the past 2 years which has been unrevealing:\par TTE 7/2017 reviewed -  WNL\par Cardiac cath 7/12/17: minor luminal irregularities. Rec medical management. \par \par PMH significant for bioprosthetic AVR, DM, HTN, HLD, obesity\par \par PFTs today: normal spirometry and lung volumes, moderately reduced DLCO - unchanged from 3/2018\par Lifelong nonsmoker. Retired - occupation ink mixer/paints\par \par Dyspnea hx: Progressive for the past 2 years. Exercise tolerance approx 1 block and 1 flight of stairs. Cough for >1 yr, prior trial of antibiotics without improvement. Occasionally expectorates, no hemoptysis, states it is coming from his chest and denies postnasal drip.\par \par Sleep hx: Bed at 8 pm, no issues falling asleep, +snoring, +gasping/choking episodes with multiple wake ups, 2-3x a night, +nocturia, out of bed at 7 am. Unrefreshed upon awakening. Denies morning headache, or dry mouth. +EDS, tired "all day" and takes naps in the afternoon. Also reports weight gain of approx 7 lbs over past 1 year. \par States had recent PSG performed at an outside center. Does not have results for review.\min Was started on CPAP therapy with a nasal mask the past 6 months - states he sleeps with it most nights however often wakes up feeling as if he is suffocating and pulling it off his face.\par

## 2019-04-05 NOTE — PHYSICAL EXAM
[General Appearance - Well Developed] : well developed [General Appearance - In No Acute Distress] : no acute distress [Normal Conjunctiva] : the conjunctiva exhibited no abnormalities [Eyelids - No Xanthelasma] : the eyelids demonstrated no xanthelasmas [Low Lying Soft Palate] : low lying soft palate [Elongated Uvula] : elongated uvula [Enlarged Base of the Tongue] : enlargement of the base of the tongue [III] : III [Neck Appearance] : the appearance of the neck was normal [] : no respiratory distress [Respiration, Rhythm And Depth] : normal respiratory rhythm and effort [Auscultation Breath Sounds / Voice Sounds] : lungs were clear to auscultation bilaterally [Bowel Sounds] : normal bowel sounds [Abnormal Walk] : normal gait [Nail Clubbing] : no clubbing of the fingernails [Cyanosis, Localized] : no localized cyanosis [Skin Color & Pigmentation] : normal skin color and pigmentation [Cranial Nerves] : cranial nerves 2-12 were intact [Motor Exam] : the motor exam was normal [Oriented To Time, Place, And Person] : oriented to person, place, and time [Impaired Insight] : insight and judgment were intact

## 2019-04-05 NOTE — REVIEW OF SYSTEMS
[Cough] : cough [Sputum] : not coughing up ~M sputum [Hemoptysis] : no hemoptysis [Dyspnea] : dyspnea [As Noted in HPI] : as noted in HPI [Hypertension] : ~T hypertension [Diabetes] : diabetes mellitus [Difficulty Initiating Sleep] : no difficulty falling asleep [Difficulty Maintaining Sleep] : no difficulty maintaining sleep [Snoring] : snoring [Witnessed Apneas] : demonstrated ~M apnea [Nonrestorative Sleep] : nonrestorative sleep [Awakes With Dry Mouth] : awakes with dry mouth [Hypersomnolence] : sleeping much more than usual [Negative] : Neurologic

## 2019-04-05 NOTE — ASSESSMENT
[FreeTextEntry1] : 72 yo M presents for chronic cough, ROMERO and sleep disordered breathing. \min Was initially seen by me in March 2018 with similar complaints and with PFTs significant for moderately reduced DLCO. Was sent for CXR at that time - 5/3/18- clear lungs, cardiomegaly. \par Was also ordered for CT chest however never underwent. Also did not forward the results of his outside sleep study.\par \min Had undergone extensive cardiac work up over the past 2 years which has been unrevealing:\par TTE 7/2017 reviewed -  WNL\par Cardiac cath 7/12/17: minor luminal irregularities. Rec medical management. \par \par PMH significant for bioprosthetic AVR, DM, HTN, HLD, obesity\par \par PFTs today: normal spirometry and lung volumes, moderately reduced DLCO - unchanged from 3/2018\par Lifelong nonsmoker. Retired - occupation ink mixer/paints\par \par States had recent PSG performed at an outside center. Does not have results for review.\min Was started on CPAP therapy with a nasal mask the past 6 months - states he sleeps with it most nights however often wakes up feeling as if he is suffocating and pulling it off his face.\par \par A/P: \par 1. Chronic Cough- Do not have access to his recent echo, CXR or sleep study that he had performed in [place]. \par Will have those results sent to me.  \min Will complete a CT chest for further eval given chronic cough and low DLCO on PFTs.\par \par 2. DANIELA-  c/w CPAP and I have requested a data download from his [facility], to evaluate his therapy and compliance data. \par \par He will follow up after.

## 2019-04-22 ENCOUNTER — APPOINTMENT (OUTPATIENT)
Dept: OTOLARYNGOLOGY | Facility: CLINIC | Age: 74
End: 2019-04-22
Payer: MEDICARE

## 2019-04-22 VITALS
BODY MASS INDEX: 30.45 KG/M2 | HEART RATE: 68 BPM | DIASTOLIC BLOOD PRESSURE: 86 MMHG | HEIGHT: 65.5 IN | SYSTOLIC BLOOD PRESSURE: 131 MMHG | WEIGHT: 185 LBS

## 2019-04-22 PROCEDURE — 99213 OFFICE O/P EST LOW 20 MIN: CPT

## 2019-04-23 ENCOUNTER — APPOINTMENT (OUTPATIENT)
Dept: PULMONOLOGY | Facility: CLINIC | Age: 74
End: 2019-04-23
Payer: MEDICARE

## 2019-04-23 VITALS
SYSTOLIC BLOOD PRESSURE: 131 MMHG | RESPIRATION RATE: 18 BRPM | HEIGHT: 65.5 IN | DIASTOLIC BLOOD PRESSURE: 69 MMHG | HEART RATE: 63 BPM | OXYGEN SATURATION: 97 % | TEMPERATURE: 97.7 F

## 2019-04-23 PROCEDURE — 99214 OFFICE O/P EST MOD 30 MIN: CPT

## 2019-04-24 NOTE — ASSESSMENT
[FreeTextEntry1] : A/P: Unclear etiology of dyspnea - normal coronaries, dilated LA, PFTs sig for moderately reduced DLCO - ddx HFpEF. Cough 2/2 postnasal drip or ACE-i\par \par \par 1.Severe DANIELA - APAP titration at [facility] sleep disorders center\par \par 2.Chronic cough - speak with renal Dr. Beltran re: Lisinopril, treat for postnasal drip - flonase and Zyrtec\par \par 3.  Chronic SOB - likely cardiac with dilated LA, moderately reduced DLCO. Normal lungs and airways. Will send for CPET. ?claudication symptoms as well - states legs are "fatigued" with walking - no cramping or pain. Will hold off on vascular eval for now. \par \par follow up in 6 weeks. \par d/w patient and Atif at length

## 2019-04-24 NOTE — HISTORY OF PRESENT ILLNESS
[Stable] : are stable [Difficulty Breathing During Exertion] : stable dyspnea on exertion [Feelings Of Weakness On Exertion] : stable exercise intolerance [Cough] : stable coughing [Wheezing] : denies wheezing [Regional Soft Tissue Swelling Both Lower Extremities] : denies lower extremity edema [Chest Pain Or Discomfort] : denies chest pain [Fever] : denies fever [Oxygen] : the patient uses no supplemental oxygen [2  -  Slight] : 2, slight [Class III - Marked Limitation in Activity] : III [Date: ___] : was performed [unfilled] [de-identified] : essentially normal lungs,  linear right basilar atelectasis vs. scar [de-identified] : moderately reduced DLCO, normal spirometry and lung volumes [FreeTextEntry1] : 72 yo M presents for follow up ROMERO, chronic cough and sleep disordered breathing. Is accompanied by his granddaughter.\par sara Was initially seen by me in March 2018 with similar complaints and with PFTs significant for moderately reduced DLCO. Was sent for CXR at that time - 5/3/18- clear lungs, cardiomegaly. \par Was also ordered for CT chest however never underwent at that time. Also did not forward the results of his outside sleep study.\par sara Had undergone extensive cardiac work up over the past 2 years which has been unrevealing:\par TTE 7/2017 reviewed -  WNL\par Cardiac cath 7/12/17: minor luminal irregularities. Rec medical management. \par \par \par PFTs 2/14/19: normal spirometry and lung volumes, moderately reduced DLCO - unchanged from 3/2018\par Lifelong nonsmoker. Retired - occupation ink mixer/paints\par \par Dyspnea hx: Progressive for the past 2 years. Exercise tolerance approx 1 block and 1 flight of stairs. Associated with leg "fatigue", denies cramping, denies known h/o vascular issues.\par Cough for >1 yr, prior trial of antibiotics without improvement. Occasionally expectorates, no hemoptysis, states it is "coming from his chest", also reports postnasal drip. \par \par Sleep hx: Bed at 8 pm, no issues falling asleep, +snoring, +gasping/choking episodes with multiple wake ups, 2-3x a night, +nocturia, out of bed at 7 am. Unrefreshed upon awakening. Denies morning headache, or dry mouth. +EDS, tired "all day" and takes naps in the afternoon. Also reports weight gain of approx 7 lbs over past 1 year. \par \par Reviewd PSG performed at an outside center 6/29/18 - severe DANIELA, AHI 53/hr. On CPAP of 8 cmH2O - using the majority of nights. Data download shows residual mild DANIELA, minial mask leak. Continues to complain of nonrestorative sleep, EDS with ESS of 24. \par \par PMH significant for bioprosthetic AVR, DM, HTN, HLD, obesity, CKD\par Meds reviewed - no changes, include an ACEi for >1 year\par

## 2019-04-24 NOTE — REVIEW OF SYSTEMS
[Cough] : cough [Sputum] : not coughing up ~M sputum [Hemoptysis] : no hemoptysis [Dyspnea] : dyspnea [As Noted in HPI] : as noted in HPI [Hypertension] : ~T hypertension [Diabetes] : diabetes mellitus [Difficulty Initiating Sleep] : no difficulty falling asleep [Difficulty Maintaining Sleep] : no difficulty maintaining sleep [Witnessed Apneas] : demonstrated ~M apnea [Snoring] : snoring [Nonrestorative Sleep] : nonrestorative sleep [Awakes With Dry Mouth] : awakes with dry mouth [Hypersomnolence] : sleeping much more than usual [Negative] : Neurologic

## 2019-04-24 NOTE — PHYSICAL EXAM
[General Appearance - Well Developed] : well developed [General Appearance - In No Acute Distress] : no acute distress [Normal Appearance] : normal appearance [Normal Conjunctiva] : the conjunctiva exhibited no abnormalities [Eyelids - No Xanthelasma] : the eyelids demonstrated no xanthelasmas [Low Lying Soft Palate] : low lying soft palate [Enlarged Base of the Tongue] : enlargement of the base of the tongue [III] : III [Elongated Uvula] : elongated uvula [Neck Appearance] : the appearance of the neck was normal [] : no respiratory distress [Respiration, Rhythm And Depth] : normal respiratory rhythm and effort [Auscultation Breath Sounds / Voice Sounds] : lungs were clear to auscultation bilaterally [Bowel Sounds] : normal bowel sounds [Nail Clubbing] : no clubbing of the fingernails [Cyanosis, Localized] : no localized cyanosis [Abnormal Walk] : normal gait [Cranial Nerves] : cranial nerves 2-12 were intact [Skin Color & Pigmentation] : normal skin color and pigmentation [Motor Exam] : the motor exam was normal [Oriented To Time, Place, And Person] : oriented to person, place, and time [Impaired Insight] : insight and judgment were intact

## 2019-04-24 NOTE — CONSULT LETTER
[Dear  ___] : Dear  [unfilled], [Courtesy Letter:] : I had the pleasure of seeing your patient, [unfilled], in my office today. [Please see my note below.] : Please see my note below. [Consult Closing:] : Thank you very much for allowing me to participate in the care of this patient.  If you have any questions, please do not hesitate to contact me. [Sincerely,] : Sincerely, [FreeTextEntry2] : Dr. Hooks [FreeTextEntry3] : Kristine Medina MD\par  \par Division of Pulmonary, Critical Care & Sleep Medicine\par [facility]\par \par \par

## 2019-04-29 ENCOUNTER — APPOINTMENT (OUTPATIENT)
Dept: PULMONOLOGY | Facility: CLINIC | Age: 74
End: 2019-04-29

## 2019-04-29 ENCOUNTER — APPOINTMENT (OUTPATIENT)
Dept: PULMONOLOGY | Facility: CLINIC | Age: 74
End: 2019-04-29
Payer: MEDICARE

## 2019-04-29 DIAGNOSIS — R80.9 PROTEINURIA, UNSPECIFIED: ICD-10-CM

## 2019-04-29 PROCEDURE — 94621 CARDIOPULM EXERCISE TESTING: CPT

## 2019-04-29 PROCEDURE — 94375 RESPIRATORY FLOW VOLUME LOOP: CPT

## 2019-04-29 RX ORDER — LISINOPRIL 20 MG/1
20 TABLET ORAL
Qty: 30 | Refills: 0 | Status: DISCONTINUED | COMMUNITY
Start: 2018-10-24 | End: 2019-04-29

## 2019-05-28 NOTE — REASON FOR VISIT
[Subsequent Evaluation] : a subsequent evaluation for [Other: _____] : [unfilled] [Ear Drainage] : ear drainage [FreeTextEntry2] : f/u for left otorrhea

## 2019-05-28 NOTE — HISTORY OF PRESENT ILLNESS
[de-identified] : 72 y/o male her for f/u for left ear otorrhea- unchanged from last visit- is yellow/lemon-colored drainage noted from left ear every day. Patient reports no improvement in his hearing- wears hearing aid in right ear (lost it about 3 weeks ago- in the process of getting another now). \par \par Pt denies otalgia, tinnitus, dizziness, vertigo or headaches related to hearing.

## 2019-05-28 NOTE — PHYSICAL EXAM
[de-identified] : left debbrided and filled with H202 then boric acid [Binocular Microscopic Exam] : Binocular microscopic exam was performed [Midline] : trachea located in midline position [Normal] : no nystagmus [FreeTextEntry8] : CWD dry [FreeTextEntry9] : CWD-improved

## 2019-06-20 ENCOUNTER — APPOINTMENT (OUTPATIENT)
Dept: PULMONOLOGY | Facility: CLINIC | Age: 74
End: 2019-06-20
Payer: MEDICARE

## 2019-06-20 VITALS
DIASTOLIC BLOOD PRESSURE: 70 MMHG | WEIGHT: 187 LBS | RESPIRATION RATE: 15 BRPM | OXYGEN SATURATION: 95 % | BODY MASS INDEX: 30.65 KG/M2 | TEMPERATURE: 97.3 F | SYSTOLIC BLOOD PRESSURE: 137 MMHG | HEART RATE: 63 BPM

## 2019-06-20 PROCEDURE — 99214 OFFICE O/P EST MOD 30 MIN: CPT

## 2019-06-26 ENCOUNTER — APPOINTMENT (OUTPATIENT)
Dept: OTOLARYNGOLOGY | Facility: CLINIC | Age: 74
End: 2019-06-26
Payer: MEDICARE

## 2019-06-26 DIAGNOSIS — R26.89 OTHER ABNORMALITIES OF GAIT AND MOBILITY: ICD-10-CM

## 2019-06-26 PROCEDURE — 69220 CLEAN OUT MASTOID CAVITY: CPT | Mod: RT

## 2019-06-26 PROCEDURE — 99214 OFFICE O/P EST MOD 30 MIN: CPT | Mod: 25

## 2019-06-27 PROBLEM — R26.89 IMBALANCE: Status: ACTIVE | Noted: 2019-06-27

## 2019-06-27 NOTE — PHYSICAL EXAM
[Midline] : trachea located in midline position [Binocular Microscopic Exam] : Binocular microscopic exam was performed [Normal] : the left external ear was normal [FreeTextEntry8] : CWD filled with thick debris - cx done debridement done [FreeTextEntry9] : collapsed TM with pus - cx done [de-identified] : b/l atlectasis

## 2019-06-27 NOTE — HISTORY OF PRESENT ILLNESS
[de-identified] : 73M here for eval. for left ear pain and bloody drainage. Pt with no bloody since 2 days ago- slight pain currently, was severe initially. Pt with right HA- wears it during the day time. Pt feels "off balance"- has gotten worse since last visit. bleeding in ear since last visit stopped yesterday but then right ear developed otorrhea- \par \par

## 2019-06-27 NOTE — PROCEDURE
[Same] : same as the Pre Op Dx. [] : Debridement of Mastoid [FreeTextEntry1] : right CWD [FreeTextEntry4] : none [FreeTextEntry6] : Canal wall down mastoid debrided under microscope using suction, alligator and curette - culture done AU.  Patient tolerated procedure well. Filled with boric acid AU

## 2019-07-03 LAB
BACTERIA FLD CULT: ABNORMAL
BACTERIA FLD CULT: ABNORMAL

## 2019-07-22 ENCOUNTER — APPOINTMENT (OUTPATIENT)
Dept: OTOLARYNGOLOGY | Facility: CLINIC | Age: 74
End: 2019-07-22
Payer: MEDICARE

## 2019-07-22 VITALS
HEIGHT: 66 IN | SYSTOLIC BLOOD PRESSURE: 100 MMHG | WEIGHT: 185 LBS | DIASTOLIC BLOOD PRESSURE: 55 MMHG | HEART RATE: 68 BPM | BODY MASS INDEX: 29.73 KG/M2

## 2019-07-22 PROCEDURE — 99213 OFFICE O/P EST LOW 20 MIN: CPT | Mod: 25

## 2019-07-22 PROCEDURE — 92567 TYMPANOMETRY: CPT

## 2019-07-22 PROCEDURE — 69220 CLEAN OUT MASTOID CAVITY: CPT | Mod: RT

## 2019-07-22 PROCEDURE — 92557 COMPREHENSIVE HEARING TEST: CPT

## 2019-07-23 RX ORDER — FLUCONAZOLE 150 MG/1
150 TABLET ORAL DAILY
Qty: 7 | Refills: 0 | Status: COMPLETED | COMMUNITY
Start: 2019-07-03 | End: 2019-07-23

## 2019-07-23 NOTE — HISTORY OF PRESENT ILLNESS
[de-identified] : 73 year old male follow up left otalgia, left bloody otorrhea.   States used ear drops as prescribed.  otalgia and otorrhea has resolved.  Still having off balance feeling.

## 2019-07-23 NOTE — PROCEDURE
[Same] : same as the Pre Op Dx. [FreeTextEntry1] : right CWD [] : Debridement of Mastoid [FreeTextEntry4] : none [FreeTextEntry6] : Canal wall down mastoid debrided under microscope using alligator and curette.  Extensive dried drops and wax removed - mucosa healthy - no infection  - TMs collapsed AU.  Patient tolerated procedure well.\par

## 2019-07-23 NOTE — PHYSICAL EXAM
[Binocular Microscopic Exam] : Binocular microscopic exam was performed [FreeTextEntry8] : CWD - filled with dried drops removed under scope with alligaotr -see notes [de-identified] : b/l atlectasis [FreeTextEntry9] : collapsed TM [Midline] : trachea located in midline position [Normal] : ocular motility and gaze are normal

## 2019-07-23 NOTE — REASON FOR VISIT
[Subsequent Evaluation] : a subsequent evaluation for [Spouse] : spouse [FreeTextEntry2] : follow up left otalgia, left bloody otorrhea

## 2019-07-24 ENCOUNTER — APPOINTMENT (OUTPATIENT)
Dept: OTOLARYNGOLOGY | Facility: CLINIC | Age: 74
End: 2019-07-24

## 2019-07-26 ENCOUNTER — APPOINTMENT (OUTPATIENT)
Dept: SLEEP CENTER | Facility: CLINIC | Age: 74
End: 2019-07-26
Payer: MEDICARE

## 2019-07-26 ENCOUNTER — OUTPATIENT (OUTPATIENT)
Dept: OUTPATIENT SERVICES | Facility: HOSPITAL | Age: 74
LOS: 1 days | End: 2019-07-26
Payer: COMMERCIAL

## 2019-07-26 DIAGNOSIS — Z95.2 PRESENCE OF PROSTHETIC HEART VALVE: Chronic | ICD-10-CM

## 2019-07-26 DIAGNOSIS — Z90.49 ACQUIRED ABSENCE OF OTHER SPECIFIED PARTS OF DIGESTIVE TRACT: Chronic | ICD-10-CM

## 2019-07-26 DIAGNOSIS — Z98.890 OTHER SPECIFIED POSTPROCEDURAL STATES: Chronic | ICD-10-CM

## 2019-07-26 PROCEDURE — G0400: CPT

## 2019-07-26 PROCEDURE — G0400: CPT | Mod: 26

## 2019-07-31 DIAGNOSIS — G47.33 OBSTRUCTIVE SLEEP APNEA (ADULT) (PEDIATRIC): ICD-10-CM

## 2019-08-01 ENCOUNTER — RESULT REVIEW (OUTPATIENT)
Age: 74
End: 2019-08-01

## 2019-08-20 ENCOUNTER — RX RENEWAL (OUTPATIENT)
Age: 74
End: 2019-08-20

## 2019-08-23 ENCOUNTER — APPOINTMENT (OUTPATIENT)
Dept: OTOLARYNGOLOGY | Facility: CLINIC | Age: 74
End: 2019-08-23
Payer: MEDICARE

## 2019-08-23 VITALS
HEART RATE: 54 BPM | DIASTOLIC BLOOD PRESSURE: 73 MMHG | SYSTOLIC BLOOD PRESSURE: 161 MMHG | WEIGHT: 185 LBS | BODY MASS INDEX: 29.73 KG/M2 | HEIGHT: 66 IN

## 2019-08-23 DIAGNOSIS — J38.7 OTHER DISEASES OF LARYNX: ICD-10-CM

## 2019-08-23 PROCEDURE — 31575 DIAGNOSTIC LARYNGOSCOPY: CPT

## 2019-08-23 PROCEDURE — 99215 OFFICE O/P EST HI 40 MIN: CPT | Mod: 25

## 2019-08-23 NOTE — HISTORY OF PRESENT ILLNESS
[de-identified] : 74 year old male referred by Dr. Mckeon for throat tickle and rhinorrhea.  States has had an intermittent tickle in the back of the right side of the throat for a couple of months.  States for the past couple of months he has had a clear runny nose in the morning, occurs almost every morning.  Denies nasal congestion, sinus pain/pressure, post nasal drip.  Denies sinus infections in the past year.  States has been using Flonase daily for the past couple of months with a little relief.

## 2019-08-23 NOTE — PROCEDURE
[Flexible Endoscope] : examined with the flexible endoscope [Serial Number: ___] : Serial Number: [unfilled] [Image(s) Captured] : image(s) captured and filed [Unable to Cooperate with Mirror] : patient unable to cooperate with mirror [Globus] : globus [Topical Lidocaine] : topical lidocaine [Oxymetazoline HCl] : oxymetazoline HCl [Velopharynx ___ %] : the velopharynx was [unfilled]U% collapsed [Normal] : the false vocal folds were pink and regular, the ventricular sulcus was open, the true vocal folds were glistening white, tense and of equal length, mobility, and height [True Vocal Cords Paralysis] : no true vocal cord paralysis [True Vocal Cords Erythematous] : no true vocal cord edema [True Vocal Cords Delgado's Nodules] : no true vocal cord nodules [Glottis Arytenoid Cartilages] : no arytenoid granulomas [Glottis Arytenoid Cartilages Erythema] : no arytenoid erythema [FreeTextEntry4] : dry secretions [de-identified] : Patient was placed in the examination chair in a sitting position. The nose was decongested with oxymetazoline nasal solution. The airway was anesthetized with 4% Xylocaine.  The back of the throat was anesthetized with Cetacaine. Direct flexible/rigid video endoscopy was performed. Findings revealed:\par Patient is slightly deviated septum but more significantly large turbinates with an allergic. No mass lesion or polyp. Nasopharynx was read and allergic thick secretions in the hypopharynx larynx vocal cords are mobile no lesions seen no other abnormality. Epiglottis normal. [de-identified] : cough

## 2019-08-23 NOTE — REASON FOR VISIT
[Subsequent Evaluation] : a subsequent evaluation for [Spouse] : spouse [FreeTextEntry2] : referred by Dr. Mckeon for throat tickle and rhinorrhea

## 2019-10-01 ENCOUNTER — APPOINTMENT (OUTPATIENT)
Dept: PULMONOLOGY | Facility: CLINIC | Age: 74
End: 2019-10-01
Payer: MEDICARE

## 2019-10-01 VITALS
DIASTOLIC BLOOD PRESSURE: 67 MMHG | TEMPERATURE: 97.5 F | BODY MASS INDEX: 30.05 KG/M2 | OXYGEN SATURATION: 99 % | WEIGHT: 187 LBS | HEART RATE: 56 BPM | HEIGHT: 66 IN | RESPIRATION RATE: 17 BRPM | SYSTOLIC BLOOD PRESSURE: 151 MMHG

## 2019-10-01 PROCEDURE — 99214 OFFICE O/P EST MOD 30 MIN: CPT

## 2019-10-25 ENCOUNTER — APPOINTMENT (OUTPATIENT)
Dept: OTOLARYNGOLOGY | Facility: CLINIC | Age: 74
End: 2019-10-25
Payer: MEDICARE

## 2019-10-25 VITALS
BODY MASS INDEX: 29.73 KG/M2 | SYSTOLIC BLOOD PRESSURE: 162 MMHG | WEIGHT: 185 LBS | DIASTOLIC BLOOD PRESSURE: 68 MMHG | HEIGHT: 66 IN | HEART RATE: 58 BPM

## 2019-10-25 DIAGNOSIS — G47.33 OBSTRUCTIVE SLEEP APNEA (ADULT) (PEDIATRIC): ICD-10-CM

## 2019-10-25 DIAGNOSIS — R05 COUGH: ICD-10-CM

## 2019-10-25 DIAGNOSIS — R06.09 OTHER FORMS OF DYSPNEA: ICD-10-CM

## 2019-10-25 PROCEDURE — 99214 OFFICE O/P EST MOD 30 MIN: CPT | Mod: 25

## 2019-10-25 PROCEDURE — 31575 DIAGNOSTIC LARYNGOSCOPY: CPT

## 2019-10-25 RX ORDER — ALBUTEROL SULFATE 90 UG/1
108 (90 BASE) AEROSOL, METERED RESPIRATORY (INHALATION)
Qty: 1 | Refills: 3 | Status: DISCONTINUED | COMMUNITY
Start: 2019-02-14 | End: 2019-10-25

## 2019-10-25 RX ORDER — GUAIFENESIN 600 MG/1
TABLET, EXTENDED RELEASE ORAL
Refills: 0 | Status: DISCONTINUED | COMMUNITY
End: 2019-10-25

## 2019-10-25 RX ORDER — FLUTICASONE PROPIONATE 50 UG/1
50 SPRAY, METERED NASAL TWICE DAILY
Qty: 1 | Refills: 3 | Status: DISCONTINUED | COMMUNITY
Start: 2019-04-24 | End: 2019-10-25

## 2019-10-25 RX ORDER — GUAIFENESIN 600 MG/1
600 TABLET, EXTENDED RELEASE ORAL
Qty: 120 | Refills: 4 | Status: DISCONTINUED | COMMUNITY
Start: 2019-08-23 | End: 2019-10-25

## 2019-10-25 NOTE — HISTORY OF PRESENT ILLNESS
[de-identified] : 74 year old male follow up for throat tickle and clear rhinorrhea.  States using Azelastine nasal spray daily as prescribed.  States not taking Mucinex.  States no change in throat tickle or rhinorrhea.

## 2019-10-25 NOTE — PROCEDURE
[Flexible Endoscope] : examined with the flexible endoscope [Serial Number: ___] : Serial Number: [unfilled] [Image(s) Captured] : image(s) captured and filed [Complicated Symptoms] : complicated symptoms requiring more thorough examination than provided by mirror [Unable to Cooperate with Mirror] : patient unable to cooperate with mirror [Globus] : globus [Topical Lidocaine] : topical lidocaine [Oxymetazoline HCl] : oxymetazoline HCl [Normal] : the false vocal folds were pink and regular, the ventricular sulcus was open, the true vocal folds were glistening white, tense and of equal length, mobility, and height [de-identified] : Patient was placed in the examination chair in a sitting position. The nose was decongested with oxymetazoline nasal solution. The airway was anesthetized with 4% Xylocaine.  The back of the throat was anesthetized with Cetacaine. Direct flexible/rigid video endoscopy was performed. Findings revealed:\par Placenta and slightly deviated septum but passage open no purulent discharge no significant rhinorrhea mucosal bed and allergic in appearance nasopharynx also allergic no mass or other lesion. Larynx normal [de-identified] : allergy

## 2019-10-25 NOTE — REASON FOR VISIT
[Subsequent Evaluation] : a subsequent evaluation for [Spouse] : spouse [FreeTextEntry2] : follow up for throat tickle and clear rhinorrhea

## 2020-01-27 ENCOUNTER — APPOINTMENT (OUTPATIENT)
Dept: OTOLARYNGOLOGY | Facility: CLINIC | Age: 75
End: 2020-01-27
Payer: MEDICARE

## 2020-01-27 PROCEDURE — 92557 COMPREHENSIVE HEARING TEST: CPT

## 2020-01-27 PROCEDURE — 92567 TYMPANOMETRY: CPT

## 2020-01-27 PROCEDURE — G0268 REMOVAL OF IMPACTED WAX MD: CPT

## 2020-01-27 PROCEDURE — 99213 OFFICE O/P EST LOW 20 MIN: CPT | Mod: 25

## 2020-01-27 NOTE — REASON FOR VISIT
[Subsequent Evaluation] : a subsequent evaluation for [Spouse] : spouse [Hearing Loss] : hearing loss

## 2020-01-27 NOTE — PHYSICAL EXAM
[Normal] : mucosa is normal [Midline] : trachea located in midline position [de-identified] : right CWD wax removed dry - left total perf wet

## 2020-01-27 NOTE — HISTORY OF PRESENT ILLNESS
[de-identified] : 74M here for f/u for hearing. Pt notes his hearing is worse on the left. For past 6 months "cant hear anything AS"

## 2020-04-27 ENCOUNTER — APPOINTMENT (OUTPATIENT)
Dept: OTOLARYNGOLOGY | Facility: CLINIC | Age: 75
End: 2020-04-27

## 2020-08-18 ENCOUNTER — APPOINTMENT (OUTPATIENT)
Dept: PULMONOLOGY | Facility: CLINIC | Age: 75
End: 2020-08-18
Payer: MEDICARE

## 2020-08-18 VITALS
DIASTOLIC BLOOD PRESSURE: 65 MMHG | HEART RATE: 63 BPM | SYSTOLIC BLOOD PRESSURE: 119 MMHG | RESPIRATION RATE: 18 BRPM | OXYGEN SATURATION: 96 % | BODY MASS INDEX: 31.66 KG/M2 | TEMPERATURE: 97.9 F | WEIGHT: 197 LBS | HEIGHT: 66 IN

## 2020-08-18 PROCEDURE — 99214 OFFICE O/P EST MOD 30 MIN: CPT

## 2020-08-18 RX ORDER — RANITIDINE 75 MG/1
TABLET ORAL
Refills: 0 | Status: DISCONTINUED | COMMUNITY
End: 2020-08-18

## 2020-08-21 RX ORDER — MEMANTINE HYDROCHLORIDE 5 MG/1
5 TABLET, FILM COATED ORAL
Refills: 0 | Status: ACTIVE | COMMUNITY

## 2020-08-21 RX ORDER — LORATADINE 10 MG/1
10 TABLET ORAL DAILY
Qty: 30 | Refills: 2 | Status: ACTIVE | COMMUNITY

## 2020-08-21 RX ORDER — FLUTICASONE PROPIONATE 50 UG/1
50 SPRAY, METERED NASAL
Refills: 0 | Status: ACTIVE | COMMUNITY
Start: 2020-08-21

## 2020-08-26 ENCOUNTER — APPOINTMENT (OUTPATIENT)
Dept: OTOLARYNGOLOGY | Facility: CLINIC | Age: 75
End: 2020-08-26
Payer: MEDICARE

## 2020-08-26 DIAGNOSIS — H92.11 OTORRHEA, RIGHT EAR: ICD-10-CM

## 2020-08-26 PROCEDURE — 99213 OFFICE O/P EST LOW 20 MIN: CPT

## 2020-08-26 NOTE — REASON FOR VISIT
[Hearing Loss] : hearing loss [Subsequent Evaluation] : a subsequent evaluation for [Spouse] : spouse

## 2020-09-06 DIAGNOSIS — Z01.818 ENCOUNTER FOR OTHER PREPROCEDURAL EXAMINATION: ICD-10-CM

## 2020-09-08 ENCOUNTER — APPOINTMENT (OUTPATIENT)
Dept: DISASTER EMERGENCY | Facility: CLINIC | Age: 75
End: 2020-09-08

## 2020-09-08 PROBLEM — H92.11 OTORRHEA OF RIGHT EAR: Status: ACTIVE | Noted: 2019-06-27

## 2020-09-08 LAB — SARS-COV-2 N GENE NPH QL NAA+PROBE: NOT DETECTED

## 2020-09-08 NOTE — HISTORY OF PRESENT ILLNESS
[de-identified] : 75M here for f/u for Right CWD- noted 3 weeks ago had bleeding in the right ear- still bleeding. Pt notes headache, tinnitus and dizziness. Pt notes no new changes in medical history.

## 2020-09-08 NOTE — PHYSICAL EXAM
[Midline] : trachea located in midline position [Normal] : no nystagmus [de-identified] : right CWD wax  - flat granulation with some blood - mastoid powder applied - left total perf Consultant

## 2020-09-11 ENCOUNTER — APPOINTMENT (OUTPATIENT)
Dept: PULMONOLOGY | Facility: CLINIC | Age: 75
End: 2020-09-11
Payer: MEDICARE

## 2020-09-11 VITALS
OXYGEN SATURATION: 96 % | WEIGHT: 192 LBS | BODY MASS INDEX: 31.6 KG/M2 | HEART RATE: 65 BPM | HEIGHT: 65.5 IN | TEMPERATURE: 97.5 F

## 2020-09-11 PROCEDURE — 94729 DIFFUSING CAPACITY: CPT

## 2020-09-11 PROCEDURE — 94726 PLETHYSMOGRAPHY LUNG VOLUMES: CPT

## 2020-09-11 PROCEDURE — 94010 BREATHING CAPACITY TEST: CPT

## 2020-10-07 ENCOUNTER — APPOINTMENT (OUTPATIENT)
Dept: PULMONOLOGY | Facility: CLINIC | Age: 75
End: 2020-10-07
Payer: MEDICARE

## 2020-10-07 VITALS
HEART RATE: 68 BPM | BODY MASS INDEX: 32.92 KG/M2 | SYSTOLIC BLOOD PRESSURE: 133 MMHG | OXYGEN SATURATION: 96 % | RESPIRATION RATE: 16 BRPM | HEIGHT: 65.5 IN | WEIGHT: 200 LBS | TEMPERATURE: 97.4 F | DIASTOLIC BLOOD PRESSURE: 64 MMHG

## 2020-10-07 PROCEDURE — 99214 OFFICE O/P EST MOD 30 MIN: CPT

## 2020-10-07 NOTE — REVIEW OF SYSTEMS
[Dry Eyes] : no dryness of the eyes [Eye Irritation] : no ~T irritation of the eyes [Ear Disturbance] : no ear disturbance [Nasal Congestion] : nasal congestion [Epistaxis] : no nosebleeds [Postnasal Drip] : postnasal drip [Sinus Problems] : no sinus problems [Sore Throat] : no sore throat [Dry Mouth] : no dry mouth [Mouth Ulcers] : no mouth ulcers [Cough] : cough [Sputum] : not coughing up ~M sputum [Hemoptysis] : no hemoptysis [Dyspnea] : dyspnea [Chest Tightness] : no chest tightness [Pleuritic Pain] : no pleuritic pain [Frequent URIs] : no frequent upper respiratory infections [Wheezing] : no wheezing [As Noted in HPI] : as noted in HPI [Hypertension] : ~T hypertension [Heartburn] : no heartburn [Reflux] : reflux [Indigestion] : no indigestion [Dysphagia] : no dysphagia [Nausea] : no nausea [Vomiting] : no vomiting [Diabetes] : diabetes mellitus [Difficulty Initiating Sleep] : no difficulty falling asleep [Difficulty Maintaining Sleep] : no difficulty maintaining sleep [Snoring] : snoring [Witnessed Apneas] : demonstrated ~M apnea [Nonrestorative Sleep] : nonrestorative sleep [Awakes With Dry Mouth] : awakes with dry mouth [Hypersomnolence] : sleeping much more than usual [Negative] : Neurologic

## 2020-10-07 NOTE — PHYSICAL EXAM
[General Appearance - Well Developed] : well developed [Normal Appearance] : normal appearance [General Appearance - In No Acute Distress] : no acute distress [Normal Conjunctiva] : the conjunctiva exhibited no abnormalities [Eyelids - No Xanthelasma] : the eyelids demonstrated no xanthelasmas [Nail Clubbing] : no clubbing of the fingernails [Cyanosis, Localized] : no localized cyanosis [FreeTextEntry2] : trace bipedal edema [Skin Turgor] : normal skin turgor [Cranial Nerves] : cranial nerves 2-12 were intact [Motor Exam] : the motor exam was normal [No Focal Deficits] : no focal deficits [Oriented To Time, Place, And Person] : oriented to person, place, and time [Impaired Insight] : insight and judgment were intact [Low Lying Soft Palate] : low lying soft palate [Elongated Uvula] : elongated uvula [Enlarged Base of the Tongue] : enlargement of the base of the tongue [III] : III [Neck Appearance] : the appearance of the neck was normal [Jugular Venous Distention Increased] : there was no jugular-venous distention [Heart Rate And Rhythm] : heart rate and rhythm were normal [Edema] : no peripheral edema present [] : no respiratory distress [Respiration, Rhythm And Depth] : normal respiratory rhythm and effort [Auscultation Breath Sounds / Voice Sounds] : lungs were clear to auscultation bilaterally [Bowel Sounds] : normal bowel sounds [Abnormal Walk] : normal gait [Skin Color & Pigmentation] : normal skin color and pigmentation [No Venous Stasis] : no venous stasis [FreeTextEntry1] : no edema

## 2020-10-07 NOTE — HISTORY OF PRESENT ILLNESS
[FreeTextEntry1] : 74 yo M presents for follow up ROMERO, chronic cough and sleep disordered breathing. \par \par Initially seen by me in March 2018 with similar complaints and with PFTs significant for moderately reduced DLCO. Was sent for CXR at that time - 5/3/18- clear lungs, cardiomegaly. \par Was also ordered for CT chest however never underwent at that time. Also did not forward the results of his outside sleep study.\par \par Had undergone extensive cardiac work up over the past 2 years which has been unrevealing:\par TTE 7/2017 reviewed -  WNL\par Cardiac cath 7/12/17: minor luminal irregularities. Rec medical management. \par \par PFTs 2/14/19: normal spirometry and lung volumes, moderately reduced DLCO - unchanged from 3/2018\par Lifelong nonsmoker. Retired - occupation ink mixer/paints\par \par Dyspnea hx: Progressive for the past 2 years. Exercise tolerance approx 1 block and 1 flight of stairs. Associated with leg "fatigue", denies cramping, denies known h/o vascular issues.\par Cough for >1 yr, prior trial of antibiotics without improvement. Occasionally expectorates, no hemoptysis, states it is "coming from his chest", also reports postnasal drip. \par \par Sleep hx: Bed at 8 pm, no issues falling asleep, +snoring, +gasping/choking episodes with multiple wake ups, 2-3x a night, +nocturia, out of bed at 7 am. Unrefreshed upon awakening. Denies morning headache, or dry mouth. +EDS, tired "all day" and takes naps in the afternoon. Also reports weight gain of approx 7 lbs over past 1 year. \par \par Reviewd PSG performed at an outside center 6/29/18 - severe DANIELA, AHI 53/hr. On CPAP of 8 cmH2O - using the majority of nights. Data download shows residual mild DANIELA, minimal mask leak. Continues to complain of nonrestorative sleep, EDS with ESS of 24. \par \par PMH significant for bioprosthetic AVR, DM, HTN, HLD, obesity, CKD\par \par At April 2019 visit - switched from ACEi to ARB to evaluate if helps with cough symptoms - no change. \par CPET performed 4/29/19 - chronotropic insufficiency, deconditioning.\par CT chest 3/17/19: cardiomegaly, no pleural effusions, normal lung parenchyma and pleura\par BALJINDER 2/26/19: moderate AR\par \par Visit 6/2019 - States that his symptoms are unchanged- continues to have nonproductive cough, ROMERO. As per patient, no prior laryngostopy - follows with Dr. Mckeon for hearing loss. \par Denies chest pain\par Scheduled for APAP study in July. \par Also states he is having his AV re-evaluated by cardiology. \par \par Follow up 10/1/19: \par No wireless data available from his APAP- states that he has stopped using it recently as it gives him a "cold" after - essentially tickling in his throat and coughing with sputum production the next morning, denies fevers/chills/prolonged illness\par Very symptomatic - falling asleep in the exam room, ESS of 20\par Was recently evaluated by Dr. Her - started Astelin last month, reports no change\par SOB is much improved however - able to ambulate multiple blocks before needing to stop. Recently saw his cardiologist Dr. Strauss in [facility] and was told his AV function was "not that bad" \par \par Follow up 8/18/20:\par Continues to complain of ROMERO, can ambulate 1-2 blocks before becoming dyspneic. Develops LE edema. Has not seen cards, Dr. Strauss recently.\par Nephrologist has changed to Dr. Celestine Peck\par Chronic cough, nonproductive. +Postnasal drip - using nasal sprays. GERD controlled - remains on Zantac\par Has not been using CPAP since the winter, was having difficulty breathing with it and water would leak back into his face. \par Reviewed meds with patient - states he is compliant, though has memory difficulties. \par \par OV 10/7/20\par Restarting using his CPAP nightly, tolerating better, sleeping better and more rested, however continues to wake up with occasional episodes feeling as is he is "suffocating". \par Uses from 9-10 pm until 6 am, with 2 wakes up to urinate.\par Requesting script for new supplies. \par Unable to acces most recent data - has a Dreasmstation APAP, DME [facility]. Set at CPAP of 10. Last data available from 5/2019. \par ROMERO unchanged - approx 2 blocks, assocaited with leg fatigue. LE edema is much improved\par Has an appointment with his cardiologist Dr Strauss on 11/10\par PFTs from 9/11/20 show normal spirometry, improved DLCO from 50 to 66%.  [Stable] : are stable [Difficulty Breathing During Exertion] : stable dyspnea on exertion [Feelings Of Weakness On Exertion] : stable exercise intolerance [Cough] : stable coughing [Wheezing] : denies wheezing [Regional Soft Tissue Swelling Both Lower Extremities] : denies lower extremity edema [Chest Pain Or Discomfort] : denies chest pain [Fever] : denies fever [Oxygen] : the patient uses no supplemental oxygen [2  -  Slight] : 2, slight [Class III - Marked Limitation in Activity] : III [Date: ___] : was performed [unfilled] [de-identified] : essentially normal lungs,  linear right basilar atelectasis vs. scar [de-identified] : moderately reduced DLCO, normal spirometry and lung volumes

## 2020-10-07 NOTE — CONSULT LETTER
[Dear  ___] : Dear  [unfilled], [Courtesy Letter:] : I had the pleasure of seeing your patient, [unfilled], in my office today. [Please see my note below.] : Please see my note below. [Consult Closing:] : Thank you very much for allowing me to participate in the care of this patient.  If you have any questions, please do not hesitate to contact me. [Sincerely,] : Sincerely, [FreeTextEntry2] : Dr. Yue Ash\par  [FreeTextEntry3] : Kristine Medina MD\par  \par Division of Pulmonary, Critical Care & Sleep Medicine\par [facility]\par \par \par

## 2020-10-07 NOTE — ASSESSMENT
[FreeTextEntry1] : 74 yo M with multiple medical problems including DANIELA, bioprosthetic AVR, DM, HTN, HLD, obesity, CKD\par Restarting using his CPAP nightly, tolerating better, sleeping better and more rested, however continues to wake up with occasional episodes feeling as is he is "suffocating". \par Uses from 9-10 pm until 6 am, with 2 wakes up to urinate.\par Requesting script for new supplies. \par Unable to acces most recent data - has a Dreasmstation APAP, DME [facility]. Set at CPAP of 10. Last data available from 5/2019. \par ROMERO unchanged - approx 2 blocks, assocaited with leg fatigue. LE edema is much improved\par Has an appointment with his cardiologist Dr Strauss on 11/10\par PFTs from 9/11/20 show normal spirometry, improved DLCO from 50 to 66%. \par \par A/P:\par 1. Dyspnea on exertion - more likely related to his AV disease. \par Echo from 2/2019 reviewed. Has follow up with cardiologist and echo in Nov\par \par 2. Chronic cough - related to PND and GERD\par He will c/w nasal sprays and PPI\par \par 3. Severe DANIELA- c/w CPAP use, will an order for new supplies and request recent data\par \par Flu vaccine offered - patient already received 2 weeks ago. \par \par Above provided in writing for the patient as he has memory issues, will also reach out to d/w his granddaughter. \par Instructed to maintain precautions to prevent COVID spread
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
## 2020-11-30 ENCOUNTER — APPOINTMENT (OUTPATIENT)
Dept: OTOLARYNGOLOGY | Facility: CLINIC | Age: 75
End: 2020-11-30

## 2020-12-10 ENCOUNTER — APPOINTMENT (OUTPATIENT)
Dept: OTOLARYNGOLOGY | Facility: CLINIC | Age: 75
End: 2020-12-10

## 2020-12-21 ENCOUNTER — APPOINTMENT (OUTPATIENT)
Dept: OTOLARYNGOLOGY | Facility: CLINIC | Age: 75
End: 2020-12-21
Payer: MEDICARE

## 2020-12-21 VITALS
BODY MASS INDEX: 31.65 KG/M2 | HEART RATE: 66 BPM | HEIGHT: 65 IN | SYSTOLIC BLOOD PRESSURE: 111 MMHG | WEIGHT: 190 LBS | DIASTOLIC BLOOD PRESSURE: 64 MMHG

## 2020-12-21 PROCEDURE — 99213 OFFICE O/P EST LOW 20 MIN: CPT | Mod: 25

## 2020-12-21 PROCEDURE — 92550 TYMPANOMETRY & REFLEX THRESH: CPT

## 2020-12-21 PROCEDURE — 92557 COMPREHENSIVE HEARING TEST: CPT

## 2020-12-21 PROCEDURE — 99072 ADDL SUPL MATRL&STAF TM PHE: CPT

## 2020-12-21 RX ORDER — AZELASTINE HYDROCHLORIDE 137 UG/1
0.1 SPRAY, METERED NASAL TWICE DAILY
Qty: 1 | Refills: 6 | Status: DISCONTINUED | COMMUNITY
Start: 2019-08-23 | End: 2020-12-21

## 2020-12-28 NOTE — PHYSICAL EXAM
[Midline] : trachea located in midline position [Normal] : no nystagmus [de-identified] : right CWD- left total perf - mastoid powder applied

## 2020-12-28 NOTE — HISTORY OF PRESENT ILLNESS
[de-identified] : No otorrhea or pain - nose congested - wearing right hearing aid. Does not have left one.

## 2021-01-01 NOTE — PATIENT PROFILE ADULT. - COMFORT LEVEL, ACCEPTABLE
Breasts contour/Breast size/Breast color/Breast symmetry/Breasts without milk/Signs of inflammation or tenderness/Nipple size/Nipple shape/Axillary exam normal 3

## 2021-01-04 ENCOUNTER — APPOINTMENT (OUTPATIENT)
Dept: OTOLARYNGOLOGY | Facility: CLINIC | Age: 76
End: 2021-01-04
Payer: MEDICARE

## 2021-01-04 VITALS
HEART RATE: 64 BPM | BODY MASS INDEX: 31.65 KG/M2 | SYSTOLIC BLOOD PRESSURE: 111 MMHG | HEIGHT: 65 IN | WEIGHT: 190 LBS | DIASTOLIC BLOOD PRESSURE: 60 MMHG

## 2021-01-04 DIAGNOSIS — H61.21 IMPACTED CERUMEN, RIGHT EAR: ICD-10-CM

## 2021-01-04 PROCEDURE — 99072 ADDL SUPL MATRL&STAF TM PHE: CPT

## 2021-01-04 PROCEDURE — 69210 REMOVE IMPACTED EAR WAX UNI: CPT | Mod: RT

## 2021-01-04 PROCEDURE — 99212 OFFICE O/P EST SF 10 MIN: CPT | Mod: 25

## 2021-01-04 RX ORDER — ACETIC ACID 20 MG/ML
2 SOLUTION AURICULAR (OTIC)
Qty: 1 | Refills: 1 | Status: DISCONTINUED | COMMUNITY
Start: 2019-07-03 | End: 2021-01-04

## 2021-01-04 NOTE — PHYSICAL EXAM
[Midline] : trachea located in midline position [Normal] : no nystagmus [de-identified] : right CWD cerumen removed- left total perf - mastoid powder applied

## 2021-01-06 ENCOUNTER — APPOINTMENT (OUTPATIENT)
Dept: PULMONOLOGY | Facility: CLINIC | Age: 76
End: 2021-01-06
Payer: MEDICARE

## 2021-01-06 VITALS
BODY MASS INDEX: 32.65 KG/M2 | DIASTOLIC BLOOD PRESSURE: 68 MMHG | HEART RATE: 67 BPM | WEIGHT: 196 LBS | RESPIRATION RATE: 16 BRPM | OXYGEN SATURATION: 97 % | TEMPERATURE: 96.2 F | SYSTOLIC BLOOD PRESSURE: 112 MMHG | HEIGHT: 65 IN

## 2021-01-06 PROCEDURE — 99214 OFFICE O/P EST MOD 30 MIN: CPT

## 2021-01-06 PROCEDURE — 99072 ADDL SUPL MATRL&STAF TM PHE: CPT

## 2021-01-06 RX ORDER — LANCETS 33 GAUGE
31G X 5 MM EACH MISCELLANEOUS
Qty: 100 | Refills: 0 | Status: ACTIVE | COMMUNITY
Start: 2020-10-16

## 2021-01-06 RX ORDER — ALCOHOL ANTISEPTIC PADS
32G X 5 MM PADS, MEDICATED (EA) TOPICAL
Qty: 100 | Refills: 0 | Status: ACTIVE | COMMUNITY
Start: 2020-06-23

## 2021-01-06 RX ORDER — AMOXICILLIN 500 MG/1
500 CAPSULE ORAL
Qty: 21 | Refills: 0 | Status: COMPLETED | COMMUNITY
Start: 2020-10-26

## 2021-01-06 NOTE — ASSESSMENT
[FreeTextEntry1] : 76 yo M with multiple medical problems including DANIELA, bioprosthetic AVR, DM, HTN, HLD, obesity, CKD\par Restarting using his CPAP nightly, tolerating better, sleeping better and more rested, however continues to wake up with occasional episodes feeling as is he is "suffocating". \par Uses from 9-10 pm until 6 am, with 2 wakes up to urinate.\par Requesting script for new supplies. \par Unable to acces most recent data - has a Dreasmstation APAP, [facility]. Set at CPAP of 10. Last data available from 5/2019. \par ROMERO unchanged - approx 2 blocks, assocaited with leg fatigue. LE edema is much improved\par Has an appointment with his cardiologist Dr Strauss on 11/10\par PFTs from 9/11/20 show normal spirometry, improved DLCO from 50 to 66%. \par \par OV 1/6/21\par Doing well overall. \par Using CPAP nightly however never received new supplies from [facility] and patient did not know who to call\par Continues to have memory issues - forgetting to take medications and do things. His daughter is his home aid and his granddaughter manages his medical appointments however he is more concerned and requesting referral for evaluation. \par Dyspnea on exertion is unchanged - approx 2 blocks ET. Minimal LE edema at the end of the day. Denies cough or chest pain. Was seen by Dr. Strauss in November and had an Echo performed.\par \par A/P:\par 1. Dyspnea on exertion - unclear. etiology. \par Echo from 11/2020 and office note reviewed - minimal MR, bioprosthetic valve in food position and well functioning.\par \par 2. Chronic cough - related to PND and GERD\par He will c/w nasal sprays and PPI\par \par 3. Severe DANIELA- c/w CPAP use, I fitted the patient with a new ESON large mask and have requested the sleep team to follow up with [facility] re: new supplies (order was placed months ago) \par \par 4. Memory impairment - provided with list of providers and referral for Dr. Johnnie Garcia and Dr. Denys Enamorado at [facility]. He will make an appointment.\par \par I wrote these directions out for the patient to review with his grand daughter. \par Instructed to maintain precautions to prevent COVID infection,

## 2021-01-06 NOTE — REASON FOR VISIT
No pertinent past medical history [Follow-Up] : a follow-up visit [Shortness of Breath] : shortness of Breath [Sleep Apnea] : sleep apnea

## 2021-01-06 NOTE — REVIEW OF SYSTEMS
[Nasal Congestion] : nasal congestion [Postnasal Drip] : postnasal drip [Cough] : cough [Dyspnea] : dyspnea [As Noted in HPI] : as noted in HPI [Hypertension] : ~T hypertension [Reflux] : reflux [Diabetes] : diabetes mellitus [Snoring] : snoring [Witnessed Apneas] : demonstrated ~M apnea [Nonrestorative Sleep] : nonrestorative sleep [Awakes With Dry Mouth] : awakes with dry mouth [Hypersomnolence] : sleeping much more than usual [Negative] : Neurologic [Dry Eyes] : no dryness of the eyes [Eye Irritation] : no ~T irritation of the eyes [Ear Disturbance] : no ear disturbance [Epistaxis] : no nosebleeds [Sinus Problems] : no sinus problems [Sore Throat] : no sore throat [Dry Mouth] : no dry mouth [Mouth Ulcers] : no mouth ulcers [Sputum] : not coughing up ~M sputum [Hemoptysis] : no hemoptysis [Chest Tightness] : no chest tightness [Pleuritic Pain] : no pleuritic pain [Frequent URIs] : no frequent upper respiratory infections [Wheezing] : no wheezing [Heartburn] : no heartburn [Indigestion] : no indigestion [Dysphagia] : no dysphagia [Nausea] : no nausea [Vomiting] : no vomiting [Difficulty Initiating Sleep] : no difficulty falling asleep [Difficulty Maintaining Sleep] : no difficulty maintaining sleep

## 2021-01-06 NOTE — HISTORY OF PRESENT ILLNESS
[Stable] : are stable [Difficulty Breathing During Exertion] : stable dyspnea on exertion [Feelings Of Weakness On Exertion] : stable exercise intolerance [Cough] : stable coughing [Wheezing] : denies wheezing [Regional Soft Tissue Swelling Both Lower Extremities] : denies lower extremity edema [Chest Pain Or Discomfort] : denies chest pain [Fever] : denies fever [2  -  Slight] : 2, slight [Class III - Marked Limitation in Activity] : III [Date: ___] : was performed [unfilled] [FreeTextEntry1] : 76 yo M presents for follow up ROMERO, chronic cough and sleep disordered breathing. \par \par Initially seen by me in March 2018 with similar complaints and with PFTs significant for moderately reduced DLCO. Was sent for CXR at that time - 5/3/18- clear lungs, cardiomegaly. \par Was also ordered for CT chest however never underwent at that time. Also did not forward the results of his outside sleep study.\par \par Had undergone extensive cardiac work up over the past 2 years which has been unrevealing:\par TTE 7/2017 reviewed -  WNL\par Cardiac cath 7/12/17: minor luminal irregularities. Rec medical management. \par \par PFTs 2/14/19: normal spirometry and lung volumes, moderately reduced DLCO - unchanged from 3/2018\par Lifelong nonsmoker. Retired - occupation ink mixer/paints\par \par Dyspnea hx: Progressive for the past 2 years. Exercise tolerance approx 1 block and 1 flight of stairs. Associated with leg "fatigue", denies cramping, denies known h/o vascular issues.\par Cough for >1 yr, prior trial of antibiotics without improvement. Occasionally expectorates, no hemoptysis, states it is "coming from his chest", also reports postnasal drip. \par \par Sleep hx: Bed at 8 pm, no issues falling asleep, +snoring, +gasping/choking episodes with multiple wake ups, 2-3x a night, +nocturia, out of bed at 7 am. Unrefreshed upon awakening. Denies morning headache, or dry mouth. +EDS, tired "all day" and takes naps in the afternoon. Also reports weight gain of approx 7 lbs over past 1 year. \par \par Reviewd PSG performed at an outside center 6/29/18 - severe DANIELA, AHI 53/hr. On CPAP of 8 cmH2O - using the majority of nights. Data download shows residual mild DANIELA, minimal mask leak. Continues to complain of nonrestorative sleep, EDS with ESS of 24. \par \par PMH significant for bioprosthetic AVR, DM, HTN, HLD, obesity, CKD\par \par At April 2019 visit - switched from ACEi to ARB to evaluate if helps with cough symptoms - no change. \par CPET performed 4/29/19 - chronotropic insufficiency, deconditioning.\par CT chest 3/17/19: cardiomegaly, no pleural effusions, normal lung parenchyma and pleura\par BALJINDER 2/26/19: moderate AR\par \par Visit 6/2019 - States that his symptoms are unchanged- continues to have nonproductive cough, ROMERO. As per patient, no prior laryngostopy - follows with Dr. Mckeon for hearing loss. \par Denies chest pain\par Scheduled for APAP study in July. \par Also states he is having his AV re-evaluated by cardiology. \par \par Follow up 10/1/19: \par No wireless data available from his APAP- states that he has stopped using it recently as it gives him a "cold" after - essentially tickling in his throat and coughing with sputum production the next morning, denies fevers/chills/prolonged illness\par Very symptomatic - falling asleep in the exam room, ESS of 20\par Was recently evaluated by Dr. Her - started Astelin last month, reports no change\par SOB is much improved however - able to ambulate multiple blocks before needing to stop. Recently saw his cardiologist Dr. Strauss in [facility] and was told his AV function was "not that bad" \par \par Follow up 8/18/20:\par Continues to complain of ROMERO, can ambulate 1-2 blocks before becoming dyspneic. Develops LE edema. Has not seen cards, Dr. Strauss recently.\par Nephrologist has changed to Dr. Celestine Peck\par Chronic cough, nonproductive. +Postnasal drip - using nasal sprays. GERD controlled - remains on Zantac\par Has not been using CPAP since the winter, was having difficulty breathing with it and water would leak back into his face. \par Reviewed meds with patient - states he is compliant, though has memory difficulties. \par \par OV 10/7/20\par Restarting using his CPAP nightly, tolerating better, sleeping better and more rested, however continues to wake up with occasional episodes feeling as is he is "suffocating". \par Uses from 9-10 pm until 6 am, with 2 wakes up to urinate.\par Requesting script for new supplies. \par Unable to acces most recent data - has a Dreasmstation APAP, [facility]. Set at CPAP of 10. Last data available from 5/2019. \par ROMERO unchanged - approx 2 blocks, assocaited with leg fatigue. LE edema is much improved\par Has an appointment with his cardiologist Dr Strauss on 11/10\par PFTs from 9/11/20 show normal spirometry, improved DLCO from 50 to 66%. \par \par OV 1/6/21\par Doing well overall. \par Using CPAP nightly however never received new supplies from [facility] and patient did not know who to call\par Continues to have memory issues - forgetting to take medications and do things. His daughter is his home aid and his granddaughter manages his medical appointments however he is more concerned and requesting referral for evaluation. \par Dyspnea on exertion is unchanged - approx 2 blocks ET. Minimal LE edema at the end of the day. Denies cough or chest pain. Was seen by Dr. Strauss in November and had an Echo performed which was essentially normal, minimal MR and bioprosthetic valve in place with normal function.  [Oxygen] : the patient uses no supplemental oxygen [de-identified] : essentially normal lungs,  linear right basilar atelectasis vs. scar [de-identified] : moderately reduced DLCO, normal spirometry and lung volumes

## 2021-01-06 NOTE — CONSULT LETTER
[Dear  ___] : Dear  [unfilled], [Courtesy Letter:] : I had the pleasure of seeing your patient, [unfilled], in my office today. [Please see my note below.] : Please see my note below. [Consult Closing:] : Thank you very much for allowing me to participate in the care of this patient.  If you have any questions, please do not hesitate to contact me. [Sincerely,] : Sincerely, [FreeTextEntry2] : Dr. Yue Ash\par  [FreeTextEntry3] : Kristine Medina MD\par  \par Division of Pulmonary, Critical Care & Sleep Medicine\par [facility]\par \par \par

## 2021-01-06 NOTE — PHYSICAL EXAM
[General Appearance - Well Developed] : well developed [Normal Appearance] : normal appearance [General Appearance - In No Acute Distress] : no acute distress [Normal Conjunctiva] : the conjunctiva exhibited no abnormalities [Eyelids - No Xanthelasma] : the eyelids demonstrated no xanthelasmas [Nail Clubbing] : no clubbing of the fingernails [Cyanosis, Localized] : no localized cyanosis [Skin Turgor] : normal skin turgor [Cranial Nerves] : cranial nerves 2-12 were intact [Motor Exam] : the motor exam was normal [No Focal Deficits] : no focal deficits [Oriented To Time, Place, And Person] : oriented to person, place, and time [Impaired Insight] : insight and judgment were intact [Low Lying Soft Palate] : low lying soft palate [Elongated Uvula] : elongated uvula [Enlarged Base of the Tongue] : enlargement of the base of the tongue [III] : III [Neck Appearance] : the appearance of the neck was normal [Jugular Venous Distention Increased] : there was no jugular-venous distention [Heart Rate And Rhythm] : heart rate and rhythm were normal [Edema] : no peripheral edema present [] : no respiratory distress [Respiration, Rhythm And Depth] : normal respiratory rhythm and effort [Auscultation Breath Sounds / Voice Sounds] : lungs were clear to auscultation bilaterally [Bowel Sounds] : normal bowel sounds [Abnormal Walk] : normal gait [Skin Color & Pigmentation] : normal skin color and pigmentation [No Venous Stasis] : no venous stasis [FreeTextEntry2] : trace bipedal edema [FreeTextEntry1] : no edema

## 2021-04-07 ENCOUNTER — APPOINTMENT (OUTPATIENT)
Dept: OTOLARYNGOLOGY | Facility: CLINIC | Age: 76
End: 2021-04-07
Payer: MEDICARE

## 2021-04-07 PROCEDURE — 99072 ADDL SUPL MATRL&STAF TM PHE: CPT

## 2021-04-07 PROCEDURE — 69210 REMOVE IMPACTED EAR WAX UNI: CPT

## 2021-04-07 PROCEDURE — 99213 OFFICE O/P EST LOW 20 MIN: CPT | Mod: 25

## 2021-04-07 NOTE — PHYSICAL EXAM
[Midline] : trachea located in midline position [Normal] : no nystagmus [de-identified] : right CWD cerumen removed- left perforation - cerumen removed - ears dry

## 2021-04-07 NOTE — HISTORY OF PRESENT ILLNESS
[de-identified] : 75M f/u for right CWD and left TM perforation- using b/l HAs consistently- denies otalgia, otorrhea, ear infections- hearing feels stable.

## 2021-04-13 ENCOUNTER — APPOINTMENT (OUTPATIENT)
Dept: NEUROLOGY | Facility: CLINIC | Age: 76
End: 2021-04-13
Payer: MEDICARE

## 2021-04-13 VITALS
WEIGHT: 202 LBS | SYSTOLIC BLOOD PRESSURE: 141 MMHG | BODY MASS INDEX: 33.66 KG/M2 | DIASTOLIC BLOOD PRESSURE: 66 MMHG | HEART RATE: 63 BPM | HEIGHT: 65 IN

## 2021-04-13 VITALS — TEMPERATURE: 97.1 F

## 2021-04-13 DIAGNOSIS — R41.3 OTHER AMNESIA: ICD-10-CM

## 2021-04-13 DIAGNOSIS — G62.9 POLYNEUROPATHY, UNSPECIFIED: ICD-10-CM

## 2021-04-13 PROCEDURE — 99072 ADDL SUPL MATRL&STAF TM PHE: CPT

## 2021-04-13 PROCEDURE — 99205 OFFICE O/P NEW HI 60 MIN: CPT

## 2021-04-13 RX ORDER — GUAIFENESIN 600 MG/1
600 TABLET, EXTENDED RELEASE ORAL
Qty: 120 | Refills: 4 | Status: DISCONTINUED | COMMUNITY
Start: 2019-10-25 | End: 2021-04-13

## 2021-04-13 RX ORDER — VALSARTAN 320 MG/1
320 TABLET, COATED ORAL DAILY
Qty: 30 | Refills: 0 | Status: DISCONTINUED | COMMUNITY
Start: 2019-04-29 | End: 2021-04-13

## 2021-04-13 RX ORDER — FAMOTIDINE 20 MG/1
20 TABLET, FILM COATED ORAL
Qty: 90 | Refills: 2 | Status: DISCONTINUED | COMMUNITY
Start: 2020-08-18 | End: 2021-04-13

## 2021-04-13 RX ORDER — ATORVASTATIN CALCIUM 40 MG/1
40 TABLET, FILM COATED ORAL
Refills: 0 | Status: DISCONTINUED | COMMUNITY
End: 2021-04-13

## 2021-04-13 NOTE — ASSESSMENT
[FreeTextEntry1] : Assessment:\par 76yo Citizen of [place] man, RH, with cardiovascular risk factors, DANIELA on CPAP (recently restarted), here with c/o poor memory and unstable gait.\par Evident slow processing, with no substantial cortical element. 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, likely from DM.\par \par Diagnostic Impression:\par -vascular MCI-DANIELA\par -peripheral neuropathy-DM?\par \par Plan:\par Rule out reversible and vascular causes:\par -B vitamins, TFT, RPR\par -UC carotids and TCD\par -MRI brain w/o vick\par -basic inflammatory labs\par -Lyme serology\par -EMG for possible neuropathy\par I recommended to pursue mental and physical activity and to adhere to Mediterranean type of diet.\par \par \par \par A thorough discussion was entertained with the patient/caregiver regarding the use of psychoactive medications, their possible benefits and AE profile, including the risk of cardiovascular complications, including but not limited to applicable black box warning and teratogenicity, where appropriate.\par We discussed the benefits of being active, physically and mentally, and the need to to establish a routine in this respect.\par Driving abilities and firearms possession and use were discussed, in relation to progression of the cognitive decline, and the need to assess them periodically.\par Patient/caregiver advised to bring previous records to this office.\par All questions were answered at the time of the visit. We are certainly available for further discussion as needed.\par Patient/caregiver fully understands and agree with the plan.\par

## 2021-04-13 NOTE — HISTORY OF PRESENT ILLNESS
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 \par \par PMH: \par since 2y, he has c/o of stm loss, e.g. forgetting where he puts things, medications, appointments, some times names. \par This seems to be gradually getting worse over time. \par \par -Memory: STM\par -Speech: ok\par -Orientation: ok\par -Praxis: ok\par -Decision making/Executive fx/Multitasking: ok, slow processing.\par \par -Sleep: DANIELA on CPAP, slightly better energy in the day, but still could sleep at any time in the day, feels often tired\par \par -Appetite: ok\par \par -Motor symptoms: poor balance, and feels tired often, progressing over the last 2 years\par \par -B/B: some hesitation\par \par -Psychiatric symptoms: overall ok, no feeling of sadness nor anxiety, feels accomplished\par \par -Functional status:\par ADL: ok\par IADL: ok, but stopped driving due to poor concentration and falling asleep often\par CDR: n.a.\par \par -Professional status: former printer; 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

## 2021-04-13 NOTE — PHYSICAL EXAM
[General Appearance - Alert] : alert [General Appearance - In No Acute Distress] : in no acute distress [Oriented To Time, Place, And Person] : oriented to person, place, and time [Impaired Insight] : insight and judgment were intact [Affect] : the affect was normal [Person] : oriented to person [Place] : oriented to place [Time] : oriented to time [Concentration Intact] : normal concentrating ability [Visual Intact] : visual attention was ~T not ~L decreased [Naming Objects] : no difficulty naming common objects [Repeating Phrases] : no difficulty repeating a phrase [Writing A Sentence] : no difficulty writing a sentence [Fluency] : fluency intact [Comprehension] : comprehension intact [Reading] : reading intact [Current Events] : adequate knowledge of current events [Past History] : adequate knowledge of personal past history [Total Score ___ / 30] : the patient achieved a score of [unfilled] /30 [Date / Time ___ / 5] : date / time [unfilled] / 5 [Place ___ / 5] : place [unfilled] / 5 [Registration ___ / 3] : registration [unfilled] / 3 [Serial Sevens ___/5] : serial sevens [unfilled] / 5 [Naming 2 Objects ___ / 2] : naming two objects [unfilled] / 2 [Repeating a Sentence ___ / 1] : repeating a sentence [unfilled] / 1 [Writing a Sentence ___ / 1] : write sentence [unfilled] / 1 [3-stage Verbal Command ___ / 3] : three-stage verbal command [unfilled] / 3 [Written Command ___ / 1] : written command [unfilled] / 1 [Copy a Design ___ / 1] : copy a design [unfilled] / 1 [Recall ___ / 3] : recall [unfilled] / 3 [Cranial Nerves Optic (II)] : visual acuity intact bilaterally,  visual fields full to confrontation, pupils equal round and reactive to light [Cranial Nerves Oculomotor (III)] : extraocular motion intact [Cranial Nerves Trigeminal (V)] : facial sensation intact symmetrically [Cranial Nerves Facial (VII)] : face symmetrical [Cranial Nerves Vestibulocochlear (VIII)] : hearing was intact bilaterally [Cranial Nerves Glossopharyngeal (IX)] : tongue and palate midline [Cranial Nerves Accessory (XI - Cranial And Spinal)] : head turning and shoulder shrug symmetric [Cranial Nerves Hypoglossal (XII)] : there was no tongue deviation with protrusion [Motor Strength] : muscle strength was normal in all four extremities [Involuntary Movements] : no involuntary movements were seen [No Muscle Atrophy] : normal bulk in all four extremities [Motor Handedness Right-Handed] : the patient is right hand dominant [Sensation Tactile Decrease] : light touch was intact [Sensation Pain / Temperature Decrease] : pain and temperature was intact [Balance] : balance was intact [2+] : Brachioradialis left 2+ [1+] : Ankle jerk left 1+ [Sclera] : the sclera and conjunctiva were normal [PERRL With Normal Accommodation] : pupils were equal in size, round, reactive to light, with normal accommodation [Extraocular Movements] : extraocular movements were intact [Optic Disc Abnormality] : the optic disc were normal in size and color [No APD] : no afferent pupillary defect [No YUMIKO] : no internuclear ophthalmoplegia [Full Visual Field] : full visual field [Outer Ear] : the ears and nose were normal in appearance [Oropharynx] : the oropharynx was normal [Neck Appearance] : the appearance of the neck was normal [Neck Cervical Mass (___cm)] : no neck mass was observed [Jugular Venous Distention Increased] : there was no jugular-venous distention [Thyroid Diffuse Enlargement] : the thyroid was not enlarged [Thyroid Nodule] : there were no palpable thyroid nodules [Auscultation Breath Sounds / Voice Sounds] : lungs were clear to auscultation bilaterally [Heart Rate And Rhythm] : heart rate was normal and rhythm regular [Heart Sounds] : normal S1 and S2 [Heart Sounds Gallop] : no gallops [Murmurs] : no murmurs [Heart Sounds Pericardial Friction Rub] : no pericardial rub [Arterial Pulses Carotid] : carotid pulses were normal with no bruits [Full Pulse] : the pedal pulses are present [Edema] : there was no peripheral edema [Bowel Sounds] : normal bowel sounds [Abdomen Soft] : soft [Abdomen Tenderness] : non-tender [Abdomen Mass (___ Cm)] : no abdominal mass palpated [No CVA Tenderness] : no ~M costovertebral angle tenderness [No Spinal Tenderness] : no spinal tenderness [Nail Clubbing] : no clubbing  or cyanosis of the fingernails [Musculoskeletal - Swelling] : no joint swelling seen [Motor Tone] : muscle strength and tone were normal [Skin Color & Pigmentation] : normal skin color and pigmentation [Skin Turgor] : normal skin turgor [] : no rash [Motor Strength Upper Extremities Bilaterally] : strength was normal in both upper extremities [Motor Strength Lower Extremities Bilaterally] : strength was normal in both lower extremities [Romberg's Sign] : Romberg's sign was negtive [Dysesthesia] : no dysesthesia [Hyperesthesia] : no hyperesthesia [Past-pointing] : there was no past-pointing [Tremor] : no tremor present [Plantar Reflex Right Only] : normal on the right [Plantar Reflex Left Only] : normal on the left [FreeTextEntry4] : Mental Status Exam\par Slow processing.\par Presidents: 4/5\par Alternating Pattern: ok\par Spiral: ok\par Clock: 3/3\par Repetition: ok\par \par R/L discrimination on self and examiner: \par Cross-line commands: \par Praxis:\par -Motor:\par -Dynamic/Luria:\par -Ideomotor/Imitation: \par -Ideational/writing/closing-in:\par -Dressing:\par  [FreeTextEntry6] : slight sateliting around LUE [FreeTextEntry7] : reduced vib and pos in UE and LE [FreeTextEntry8] : cautioned gait

## 2021-04-20 ENCOUNTER — LABORATORY RESULT (OUTPATIENT)
Age: 76
End: 2021-04-20

## 2021-04-20 ENCOUNTER — APPOINTMENT (OUTPATIENT)
Dept: UROLOGY | Facility: CLINIC | Age: 76
End: 2021-04-20
Payer: MEDICARE

## 2021-04-20 VITALS
BODY MASS INDEX: 33.32 KG/M2 | TEMPERATURE: 98.5 F | WEIGHT: 200 LBS | DIASTOLIC BLOOD PRESSURE: 78 MMHG | HEIGHT: 65 IN | SYSTOLIC BLOOD PRESSURE: 140 MMHG

## 2021-04-20 PROCEDURE — 99072 ADDL SUPL MATRL&STAF TM PHE: CPT

## 2021-04-20 PROCEDURE — 99204 OFFICE O/P NEW MOD 45 MIN: CPT

## 2021-04-20 NOTE — HISTORY OF PRESENT ILLNESS
[FreeTextEntry1] : cc blood in urine \par 74 yo male had 1 episode gross hematuria \par now resolved \par no dysuria good flow \par nonsmoker \par similar episode 2015 neg cysto thickened bladder on ct

## 2021-04-20 NOTE — REVIEW OF SYSTEMS
[Negative] : Heme/Lymph [FreeTextEntry2] : hs blood in urine that he can see says it happens at night

## 2021-04-20 NOTE — ASSESSMENT
[FreeTextEntry1] : We discussed hematuria today and the multiple potential reasons for its presence. I discussed both benign and malignant etiologies that may explain hematuria. I've also reviewed the workup it is generally recommended for evaluation of  hematuria with the purposes of ruling out malignancy or other urinary tract pathology that could warrant intervention.\par I've explained that cystoscopy would be recommended and the reasons for it as well as the risks of bleeding infection and damage to urethra\par A catscan without contrast (elevated gfr)was ordered and discussed with patient\par Pt will follow up to review the at scan and for the cystoscopy\par

## 2021-04-30 ENCOUNTER — APPOINTMENT (OUTPATIENT)
Dept: NEUROLOGY | Facility: CLINIC | Age: 76
End: 2021-04-30
Payer: MEDICARE

## 2021-04-30 VITALS — TEMPERATURE: 97.5 F | TEMPERATURE: 97.2 F

## 2021-04-30 LAB
BACTERIA UR CULT: ABNORMAL
URINE CYTOLOGY: NORMAL

## 2021-04-30 PROCEDURE — 99072 ADDL SUPL MATRL&STAF TM PHE: CPT

## 2021-04-30 PROCEDURE — 93886 INTRACRANIAL COMPLETE STUDY: CPT

## 2021-04-30 PROCEDURE — 93892 TCD EMBOLI DETECT W/O INJ: CPT

## 2021-04-30 PROCEDURE — 93880 EXTRACRANIAL BILAT STUDY: CPT

## 2021-05-03 ENCOUNTER — APPOINTMENT (OUTPATIENT)
Dept: NEUROLOGY | Facility: CLINIC | Age: 76
End: 2021-05-03

## 2021-05-03 ENCOUNTER — NON-APPOINTMENT (OUTPATIENT)
Age: 76
End: 2021-05-03

## 2021-05-04 ENCOUNTER — APPOINTMENT (OUTPATIENT)
Dept: NEUROLOGY | Facility: CLINIC | Age: 76
End: 2021-05-04
Payer: MEDICARE

## 2021-05-04 PROCEDURE — 95886 MUSC TEST DONE W/N TEST COMP: CPT

## 2021-05-04 PROCEDURE — 95910 NRV CNDJ TEST 7-8 STUDIES: CPT

## 2021-05-04 PROCEDURE — 99072 ADDL SUPL MATRL&STAF TM PHE: CPT

## 2021-05-19 ENCOUNTER — APPOINTMENT (OUTPATIENT)
Dept: UROLOGY | Facility: CLINIC | Age: 76
End: 2021-05-19
Payer: MEDICARE

## 2021-05-19 DIAGNOSIS — N28.1 CYST OF KIDNEY, ACQUIRED: ICD-10-CM

## 2021-05-19 DIAGNOSIS — R31.0 GROSS HEMATURIA: ICD-10-CM

## 2021-05-19 PROCEDURE — 51736 URINE FLOW MEASUREMENT: CPT

## 2021-05-19 PROCEDURE — 52000 CYSTOURETHROSCOPY: CPT

## 2021-05-26 LAB — BACTERIA UR CULT: NORMAL

## 2021-08-31 ENCOUNTER — APPOINTMENT (OUTPATIENT)
Dept: PULMONOLOGY | Facility: CLINIC | Age: 76
End: 2021-08-31
Payer: MEDICARE

## 2021-08-31 VITALS
RESPIRATION RATE: 17 BRPM | OXYGEN SATURATION: 97 % | DIASTOLIC BLOOD PRESSURE: 59 MMHG | BODY MASS INDEX: 32.65 KG/M2 | TEMPERATURE: 97.6 F | HEIGHT: 65 IN | SYSTOLIC BLOOD PRESSURE: 123 MMHG | HEART RATE: 63 BPM | WEIGHT: 195.99 LBS

## 2021-08-31 PROCEDURE — 99214 OFFICE O/P EST MOD 30 MIN: CPT

## 2021-08-31 NOTE — HISTORY OF PRESENT ILLNESS
[Stable] : are stable [Difficulty Breathing During Exertion] : stable dyspnea on exertion [Feelings Of Weakness On Exertion] : stable exercise intolerance [Cough] : stable coughing [Wheezing] : denies wheezing [Regional Soft Tissue Swelling Both Lower Extremities] : denies lower extremity edema [Chest Pain Or Discomfort] : denies chest pain [Fever] : denies fever [2  -  Slight] : 2, slight [Class III - Marked Limitation in Activity] : III [Date: ___] : was performed [unfilled] [FreeTextEntry1] : 76 yo M presents for follow up ROMERO, chronic cough and sleep disordered breathing. \par \par Initially seen by me in March 2018 with similar complaints and with PFTs significant for moderately reduced DLCO. Was sent for CXR at that time - 5/3/18- clear lungs, cardiomegaly. \par Was also ordered for CT chest however never underwent at that time. Also did not forward the results of his outside sleep study.\par \par Had undergone extensive cardiac work up over the past 2 years which has been unrevealing:\par TTE 7/2017 reviewed -  WNL\par Cardiac cath 7/12/17: minor luminal irregularities. Rec medical management. \par \par PFTs 2/14/19: normal spirometry and lung volumes, moderately reduced DLCO - unchanged from 3/2018\par Lifelong nonsmoker. Retired - occupation ink mixer/paints\par \par Dyspnea hx: Progressive for the past 2 years. Exercise tolerance approx 1 block and 1 flight of stairs. Associated with leg "fatigue", denies cramping, denies known h/o vascular issues.\par Cough for >1 yr, prior trial of antibiotics without improvement. Occasionally expectorates, no hemoptysis, states it is "coming from his chest", also reports postnasal drip. \par \par Sleep hx: Bed at 8 pm, no issues falling asleep, +snoring, +gasping/choking episodes with multiple wake ups, 2-3x a night, +nocturia, out of bed at 7 am. Unrefreshed upon awakening. Denies morning headache, or dry mouth. +EDS, tired "all day" and takes naps in the afternoon. Also reports weight gain of approx 7 lbs over past 1 year. \par \par Reviewd PSG performed at an outside center 6/29/18 - severe DANIELA, AHI 53/hr. On CPAP of 8 cmH2O - using the majority of nights. Data download shows residual mild DANIELA, minimal mask leak. Continues to complain of nonrestorative sleep, EDS with ESS of 24. \par \par PMH significant for bioprosthetic AVR, DM, HTN, HLD, obesity, CKD\par \par At April 2019 visit - switched from ACEi to ARB to evaluate if helps with cough symptoms - no change. \par CPET performed 4/29/19 - chronotropic insufficiency, deconditioning.\par CT chest 3/17/19: cardiomegaly, no pleural effusions, normal lung parenchyma and pleura\par BALJINDER 2/26/19: moderate AR\par \par Visit 6/2019 - States that his symptoms are unchanged- continues to have nonproductive cough, ROMERO. As per patient, no prior laryngostopy - follows with Dr. Mckeon for hearing loss. \par Denies chest pain\par Scheduled for APAP study in July. \par Also states he is having his AV re-evaluated by cardiology. \par \par Follow up 10/1/19: \par No wireless data available from his APAP- states that he has stopped using it recently as it gives him a "cold" after - essentially tickling in his throat and coughing with sputum production the next morning, denies fevers/chills/prolonged illness\par Very symptomatic - falling asleep in the exam room, ESS of 20\par Was recently evaluated by Dr. Her - started Astelin last month, reports no change\par SOB is much improved however - able to ambulate multiple blocks before needing to stop. Recently saw his cardiologist Dr. Strauss in [facility] and was told his AV function was "not that bad" \par \par Follow up 8/18/20:\par Continues to complain of ROMERO, can ambulate 1-2 blocks before becoming dyspneic. Develops LE edema. Has not seen cards, Dr. Strauss recently.\par Nephrologist has changed to Dr. Celestine Peck\par Chronic cough, nonproductive. +Postnasal drip - using nasal sprays. GERD controlled - remains on Zantac\par Has not been using CPAP since the winter, was having difficulty breathing with it and water would leak back into his face. \par Reviewed meds with patient - states he is compliant, though has memory difficulties. \par \par OV 10/7/20\par Restarting using his CPAP nightly, tolerating better, sleeping better and more rested, however continues to wake up with occasional episodes feeling as is he is "suffocating". \par Uses from 9-10 pm until 6 am, with 2 wakes up to urinate.\par Requesting script for new supplies. \par Unable to acces most recent data - has a Dreasmstation APAP, DME [facility]. Set at CPAP of 10. Last data available from 5/2019. \par ROMERO unchanged - approx 2 blocks, associated with leg fatigue. LE edema is much improved\par Has an appointment with his cardiologist Dr Strauss on 11/10\par PFTs from 9/11/20 show normal spirometry, improved DLCO from 50 to 66%. \par \par OV 1/6/21\par Doing well overall. \par Using CPAP nightly however never received new supplies from [facility] and patient did not know who to call\par Continues to have memory issues - forgetting to take medications and do things. His daughter is his home aid and his granddaughter manages his medical appointments however he is more concerned and requesting referral for evaluation. \par Dyspnea on exertion is unchanged - approx 2 blocks ET. Minimal LE edema at the end of the day. Denies cough or chest pain. Was seen by Dr. Strauss in November and had an Echo performed which was essentially normal, minimal MR and bioprosthetic valve in place with normal function. \par \par Follow up OV 8/31/21:\par Overall feels about the same. Using CPAP nightly.\par Feels tired with walking. Able to ambulate up t0 3-4 blocks. Minimal dyspnea. \par Data download for compliance and therapy:\par Machine: [employer]ock AutoCPAP\par DME: [facility]\par Settings: CPAP 10\par Mask: Resmed N20 nasal mask\par Percent days with usage: 97%\par Average hours: 7 hours 27 min\par Treatment AHI: 5.3\par Large leak: no present\par  [Oxygen] : the patient uses no supplemental oxygen [de-identified] : essentially normal lungs,  linear right basilar atelectasis vs. scar [de-identified] : moderately reduced DLCO, normal spirometry and lung volumes

## 2021-08-31 NOTE — CONSULT LETTER
[Dear  ___] : Dear  [unfilled], [Courtesy Letter:] : I had the pleasure of seeing your patient, [unfilled], in my office today. [Please see my note below.] : Please see my note below. [Consult Closing:] : Thank you very much for allowing me to participate in the care of this patient.  If you have any questions, please do not hesitate to contact me. [Sincerely,] : Sincerely, [FreeTextEntry2] : Dr. Yue Ash\par  [FreeTextEntry3] : Kristine Medina MD\par  \par Division of Pulmonary, Critical Care & Sleep Medicine\par [facility]\par \par \par

## 2021-08-31 NOTE — ASSESSMENT
[FreeTextEntry1] : 74 yo M with multiple medical problems including DANIELA, bioprosthetic AVR, DM, HTN, HLD, obesity, CKD who presents for follow up. Doing well overall. \par \par A/P:\par 1. Dyspnea on exertion -  improved, multifactorial- deconditioning, obesity, h/o bioprosthetic valve.\par Ambulating regularly \par Echo from 11/2020 and office note reviewed - minimal MR, bioprosthetic valve in good position and well functioning.\par PFTs WNL other than mildly reduced DLCO\par \par 2. Chronic cough -improvede, related to PND and GERD\par He will c/w nasal sprays and PPI\par \par 3. Severe DANIELA- c/w CPAP use\par Compliant with therapy and deriving benefit.\par Discussed the recent [employer] voluntary recall for Continuous and Non-Continuous Ventilators (certain CPAP, BiLevel PAP and Ventilator Devices) due to two issues related to the polyester-based polyurethane (PE-PUR) sound abatement foam used in these devices.  The potential harm of inhalation or ingestion of the foam particles was discussed in detail with the patient.  Symptoms such as headache, upper airway irritation, cough, chest pressure and sinus infection were discussed at length with the patient. He denies any of these symptoms  \par \par Discussed various options such as halting therapy until he can have his current device repaired or fully replaced or purchase a new machine - Dreamstation 2 or Resmed Airsense 10 or 11. \par \par Patient provided with information to register his machine on the [employer] website.\par \par Given that the patient has  severe, symptomatic sleep disordered breathing, the decision was made to continue therapy after a very thorough discussion of the risks and benefits of continued use until their repaired or fully replaced\par Patient was counseled to not use ozone-related cleaning products and adhere to their device Instructions for Use for approved cleaning methods.\par \par \par 4. Memory impairment - has been following with Dr. Garcia \par \par 5. Prevention - influenza vaccine this fall

## 2021-09-10 ENCOUNTER — APPOINTMENT (OUTPATIENT)
Dept: UROLOGY | Facility: CLINIC | Age: 76
End: 2021-09-10
Payer: MEDICARE

## 2021-09-10 DIAGNOSIS — N52.9 MALE ERECTILE DYSFUNCTION, UNSPECIFIED: ICD-10-CM

## 2021-09-10 DIAGNOSIS — N39.0 URINARY TRACT INFECTION, SITE NOT SPECIFIED: ICD-10-CM

## 2021-09-10 PROCEDURE — 99214 OFFICE O/P EST MOD 30 MIN: CPT

## 2021-09-14 LAB
APPEARANCE: CLEAR
BACTERIA UR CULT: NORMAL
BACTERIA: NEGATIVE
BILIRUBIN URINE: NEGATIVE
BLOOD URINE: NEGATIVE
COLOR: YELLOW
GLUCOSE QUALITATIVE U: NEGATIVE
HYALINE CASTS: 0 /LPF
KETONES URINE: NEGATIVE
LEUKOCYTE ESTERASE URINE: NEGATIVE
MICROSCOPIC-UA: NORMAL
NITRITE URINE: NEGATIVE
PH URINE: 6.5
PROTEIN URINE: NORMAL
RED BLOOD CELLS URINE: 1 /HPF
SPECIFIC GRAVITY URINE: 1.02
SQUAMOUS EPITHELIAL CELLS: 0 /HPF
UROBILINOGEN URINE: NORMAL
WHITE BLOOD CELLS URINE: 0 /HPF

## 2021-09-14 NOTE — HISTORY OF PRESENT ILLNESS
[FreeTextEntry1] : cc f/u blood in urine \par 89yo male had 1 episode gross hematuria \par now resolved \par no dysuria good flow \par nonsmoker \par similar episode 2015 neg cysto thickened bladder on ct \par neg cystp 7/21 \par \par c/o ed \par on imdur

## 2021-10-06 ENCOUNTER — APPOINTMENT (OUTPATIENT)
Dept: OTOLARYNGOLOGY | Facility: CLINIC | Age: 76
End: 2021-10-06
Payer: MEDICARE

## 2021-10-06 PROCEDURE — 99213 OFFICE O/P EST LOW 20 MIN: CPT

## 2021-10-06 NOTE — PHYSICAL EXAM
[Midline] : trachea located in midline position [Normal] : no nystagmus [de-identified] : right CWD - left perforation - wet with granulation and debris - suctioned and sprayed with mastoid powder

## 2021-10-06 NOTE — HISTORY OF PRESENT ILLNESS
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 Hearing feels about the same- denies otalgia. Pt notes dizziness- when getting up from bed- started a couple of months ago- saw cardio - wearing Holter monitor currently.

## 2021-10-19 ENCOUNTER — APPOINTMENT (OUTPATIENT)
Dept: NEUROLOGY | Facility: CLINIC | Age: 76
End: 2021-10-19

## 2021-11-10 ENCOUNTER — APPOINTMENT (OUTPATIENT)
Dept: OTOLARYNGOLOGY | Facility: CLINIC | Age: 76
End: 2021-11-10
Payer: MEDICARE

## 2021-11-10 VITALS — DIASTOLIC BLOOD PRESSURE: 72 MMHG | SYSTOLIC BLOOD PRESSURE: 144 MMHG

## 2021-11-10 PROCEDURE — 99213 OFFICE O/P EST LOW 20 MIN: CPT

## 2021-12-02 NOTE — REASON FOR VISIT
[Subsequent Evaluation] : a subsequent evaluation for [Hearing Loss] : hearing loss [FreeTextEntry2] : ear infection

## 2021-12-02 NOTE — HISTORY OF PRESENT ILLNESS
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

## 2021-12-02 NOTE — PHYSICAL EXAM
[Midline] : trachea located in midline position [Normal] : no nystagmus [de-identified] : right CWD -no sig disease -  left perforation - sprayed with mastoid powder

## 2022-01-10 ENCOUNTER — APPOINTMENT (OUTPATIENT)
Dept: PULMONOLOGY | Facility: CLINIC | Age: 77
End: 2022-01-10
Payer: MEDICARE

## 2022-01-10 VITALS
RESPIRATION RATE: 16 BRPM | TEMPERATURE: 98 F | HEART RATE: 65 BPM | DIASTOLIC BLOOD PRESSURE: 58 MMHG | HEIGHT: 65 IN | OXYGEN SATURATION: 94 % | SYSTOLIC BLOOD PRESSURE: 112 MMHG

## 2022-01-10 PROCEDURE — 99214 OFFICE O/P EST MOD 30 MIN: CPT

## 2022-01-13 NOTE — HISTORY OF PRESENT ILLNESS
[Stable] : are stable [Difficulty Breathing During Exertion] : stable dyspnea on exertion [Feelings Of Weakness On Exertion] : stable exercise intolerance [Cough] : stable coughing [Wheezing] : denies wheezing [Regional Soft Tissue Swelling Both Lower Extremities] : denies lower extremity edema [Chest Pain Or Discomfort] : denies chest pain [Fever] : denies fever [2  -  Slight] : 2, slight [Class III - Marked Limitation in Activity] : III [Date: ___] : was performed [unfilled] [FreeTextEntry1] : 74 yo M presents for follow up ROMERO, chronic cough and sleep disordered breathing. \par \par Initially seen by me in March 2018 with similar complaints and with PFTs significant for moderately reduced DLCO. Was sent for CXR at that time - 5/3/18- clear lungs, cardiomegaly. \par Was also ordered for CT chest however never underwent at that time. Also did not forward the results of his outside sleep study.\par \par Had undergone extensive cardiac work up over the past 2 years which has been unrevealing:\par TTE 7/2017 reviewed -  WNL\par Cardiac cath 7/12/17: minor luminal irregularities. Rec medical management. \par \par PFTs 2/14/19: normal spirometry and lung volumes, moderately reduced DLCO - unchanged from 3/2018\par Lifelong nonsmoker. Retired - occupation ink mixer/paints\par \par Dyspnea hx: Progressive for the past 2 years. Exercise tolerance approx 1 block and 1 flight of stairs. Associated with leg "fatigue", denies cramping, denies known h/o vascular issues.\par Cough for >1 yr, prior trial of antibiotics without improvement. Occasionally expectorates, no hemoptysis, states it is "coming from his chest", also reports postnasal drip. \par \par Sleep hx: Bed at 8 pm, no issues falling asleep, +snoring, +gasping/choking episodes with multiple wake ups, 2-3x a night, +nocturia, out of bed at 7 am. Unrefreshed upon awakening. Denies morning headache, or dry mouth. +EDS, tired "all day" and takes naps in the afternoon. Also reports weight gain of approx 7 lbs over past 1 year. \par \par Reviewd PSG performed at an outside center 6/29/18 - severe DANIELA, AHI 53/hr. On CPAP of 8 cmH2O - using the majority of nights. Data download shows residual mild DANIELA, minimal mask leak. Continues to complain of nonrestorative sleep, EDS with ESS of 24. \par \par PMH significant for bioprosthetic AVR, DM, HTN, HLD, obesity, CKD\par \par At April 2019 visit - switched from ACEi to ARB to evaluate if helps with cough symptoms - no change. \par CPET performed 4/29/19 - chronotropic insufficiency, deconditioning.\par CT chest 3/17/19: cardiomegaly, no pleural effusions, normal lung parenchyma and pleura\par BALJINDER 2/26/19: moderate AR\par \par Visit 6/2019 - States that his symptoms are unchanged- continues to have nonproductive cough, ROMERO. As per patient, no prior laryngostopy - follows with Dr. Mckeon for hearing loss. \par Denies chest pain\par Scheduled for APAP study in July. \par Also states he is having his AV re-evaluated by cardiology. \par \par Follow up 10/1/19: \par No wireless data available from his APAP- states that he has stopped using it recently as it gives him a "cold" after - essentially tickling in his throat and coughing with sputum production the next morning, denies fevers/chills/prolonged illness\par Very symptomatic - falling asleep in the exam room, ESS of 20\par Was recently evaluated by Dr. Her - started Astelin last month, reports no change\par SOB is much improved however - able to ambulate multiple blocks before needing to stop. Recently saw his cardiologist Dr. Strauss in [facility] and was told his AV function was "not that bad" \par \par Follow up 8/18/20:\par Continues to complain of ROMERO, can ambulate 1-2 blocks before becoming dyspneic. Develops LE edema. Has not seen cards, Dr. Strauss recently.\par Nephrologist has changed to Dr. Celestine Peck\par Chronic cough, nonproductive. +Postnasal drip - using nasal sprays. GERD controlled - remains on Zantac\par Has not been using CPAP since the winter, was having difficulty breathing with it and water would leak back into his face. \par Reviewed meds with patient - states he is compliant, though has memory difficulties. \par \par OV 10/7/20\par Restarting using his CPAP nightly, tolerating better, sleeping better and more rested, however continues to wake up with occasional episodes feeling as is he is "suffocating". \par Uses from 9-10 pm until 6 am, with 2 wakes up to urinate.\par Requesting script for new supplies. \par Unable to acces most recent data - has a Dreasmstation APAP, DME [facility]. Set at CPAP of 10. Last data available from 5/2019. \par ROMERO unchanged - approx 2 blocks, associated with leg fatigue. LE edema is much improved\par Has an appointment with his cardiologist Dr Strauss on 11/10\par PFTs from 9/11/20 show normal spirometry, improved DLCO from 50 to 66%. \par \par OV 1/6/21\par Doing well overall. \par Using CPAP nightly however never received new supplies from [facility] and patient did not know who to call\par Continues to have memory issues - forgetting to take medications and do things. His daughter is his home aid and his granddaughter manages his medical appointments however he is more concerned and requesting referral for evaluation. \par Dyspnea on exertion is unchanged - approx 2 blocks ET. Minimal LE edema at the end of the day. Denies cough or chest pain. Was seen by Dr. Strauss in November and had an Echo performed which was essentially normal, minimal MR and bioprosthetic valve in place with normal function. \par \par Follow up OV 8/31/21:\par Overall feels about the same. Using CPAP nightly.\par Feels tired with walking. Able to ambulate up t0 3-4 blocks. Minimal dyspnea. \par Data download for compliance and therapy:\par Machine: Dreamstation AutoCPAP\par DME: [facility]\par Settings: CPAP 10\par Mask: Resmed N20 nasal mask\par Percent days with usage: 97%\par Average hours: 7 hours 27 min\par Treatment AHI: 5.3\par Large leak: no present\par \par Follow up OV 1/10/22:\par Received new APAP machine- Dreamstation 2. Has not started to use yet as was unable to connect hose and brings machine/supplies to today's visit. \par Continues to use Dreamstation1 and noted to have significant mask leak on data and complaining of waking with dry eyes. \par Otherwise no changes in medical history or meds - compliant with Symbicort and ALbuterol. \par \par Quality metrics:\par Tobacco use- never smoker\par ESS- 8\par \par Vaccines: \par COVID: completed booster 12/20/21\par Influenza: received 11/21\par Pneumococcal: has received in the past - unsure when\par    [Obstructive Sleep Apnea] : obstructive sleep apnea [Oxygen] : the patient uses no supplemental oxygen [de-identified] : essentially normal lungs,  linear right basilar atelectasis vs. scar [de-identified] : moderately reduced DLCO, normal spirometry and lung volumes

## 2022-01-13 NOTE — ASSESSMENT
[FreeTextEntry1] : 76 yo M with multiple medical problems including DANIELA, bioprosthetic AVR, DM, HTN, HLD, obesity, CKD who presents for follow up. Doing well overall.\par \par A/P:\par 1. Dyspnea on exertion -  improved, multifactorial- deconditioning, obesity, h/o bioprosthetic valve.\par Ambulating regularly \par Echo from 11/2020 and office note reviewed - minimal MR, bioprosthetic valve in good position and well functioning.\par PFTs WNL other than mildly reduced DLCO\par c/w Symbicort BID and albuterol prn\par \par 2. Chronic cough -improved, related to PND and GERD\par He will c/w nasal sprays and PPI\par \par 3. Severe DANIELA- c/w CPAP use\par Reviewed Dreamstation 2 machine use and connected for patient\par Mask fitting also performed today with Resmed Airfit F 30 size medium which the patient was comfortable with.\par Will initiate therapy with Dreamstation 2\par \par 4. Memory impairment - has been following with Dr. Garcia \par \par 5. HCM - up to date with vaccines\par \par Follow up in 2 months

## 2022-01-13 NOTE — CONSULT LETTER
[Dear  ___] : Dear  [unfilled], [Courtesy Letter:] : I had the pleasure of seeing your patient, [unfilled], in my office today. [Please see my note below.] : Please see my note below. [Consult Closing:] : Thank you very much for allowing me to participate in the care of this patient.  If you have any questions, please do not hesitate to contact me. [Sincerely,] : Sincerely, [FreeTextEntry2] : Dr. Yue Ash\par  [FreeTextEntry3] : Kristine Medina MD\par  \par Division of Pulmonary, Critical Care & Sleep Medicine\par [facility]\par \par \par

## 2022-01-20 NOTE — ED PROVIDER NOTE - CHIEF COMPLAINT
The patient is a 72y Male complaining of Bactrim Counseling:  I discussed with the patient the risks of sulfa antibiotics including but not limited to GI upset, allergic reaction, drug rash, diarrhea, dizziness, photosensitivity, and yeast infections.  Rarely, more serious reactions can occur including but not limited to aplastic anemia, agranulocytosis, methemoglobinemia, blood dyscrasias, liver or kidney failure, lung infiltrates or desquamative/blistering drug rashes.

## 2022-02-09 ENCOUNTER — APPOINTMENT (OUTPATIENT)
Dept: OTOLARYNGOLOGY | Facility: CLINIC | Age: 77
End: 2022-02-09
Payer: MEDICARE

## 2022-02-09 VITALS
BODY MASS INDEX: 31.82 KG/M2 | HEIGHT: 66 IN | SYSTOLIC BLOOD PRESSURE: 116 MMHG | DIASTOLIC BLOOD PRESSURE: 66 MMHG | HEART RATE: 67 BPM | WEIGHT: 198 LBS

## 2022-02-09 DIAGNOSIS — J34.89 OTHER SPECIFIED DISORDERS OF NOSE AND NASAL SINUSES: ICD-10-CM

## 2022-02-09 PROCEDURE — 99213 OFFICE O/P EST LOW 20 MIN: CPT

## 2022-02-09 RX ORDER — AMOXICILLIN 500 MG/1
500 TABLET, FILM COATED ORAL
Qty: 14 | Refills: 0 | Status: DISCONTINUED | COMMUNITY
Start: 2021-04-26 | End: 2022-02-09

## 2022-02-09 RX ORDER — AMOXICILLIN 500 MG/1
500 TABLET, FILM COATED ORAL
Qty: 14 | Refills: 0 | Status: DISCONTINUED | COMMUNITY
Start: 2021-04-30 | End: 2022-02-09

## 2022-02-19 NOTE — PHYSICAL EXAM
[Midline] : trachea located in midline position [Normal] : no nystagmus [de-identified] : right CWD -no sig disease -  left perforation - sprayed with mastoid powder

## 2022-02-19 NOTE — HISTORY OF PRESENT ILLNESS
[de-identified] : 77 yo M with history of right CWD and left TM perforation- using b/l HAs - According to patient left ear infection resolved. Completed drops. No otalgia, otorrhea, or headaches. Has intermittent dizziness - long standing hx - no change in severity or frequency. Complains of nasal congestion since end of Dec and he feels "suffocation" when lying down some nights.

## 2022-03-07 ENCOUNTER — APPOINTMENT (OUTPATIENT)
Dept: PULMONOLOGY | Facility: CLINIC | Age: 77
End: 2022-03-07
Payer: MEDICARE

## 2022-03-07 VITALS
HEART RATE: 61 BPM | DIASTOLIC BLOOD PRESSURE: 62 MMHG | RESPIRATION RATE: 16 BRPM | WEIGHT: 196 LBS | OXYGEN SATURATION: 96 % | TEMPERATURE: 97.7 F | HEIGHT: 66 IN | SYSTOLIC BLOOD PRESSURE: 121 MMHG | BODY MASS INDEX: 31.5 KG/M2

## 2022-03-07 PROCEDURE — 99214 OFFICE O/P EST MOD 30 MIN: CPT

## 2022-03-07 RX ORDER — MONTELUKAST 10 MG/1
10 TABLET, FILM COATED ORAL
Qty: 90 | Refills: 3 | Status: ACTIVE | COMMUNITY
Start: 2019-04-24 | End: 1900-01-01

## 2022-03-07 NOTE — PHYSICAL EXAM
[General Appearance - Well Developed] : well developed [Normal Appearance] : normal appearance [General Appearance - In No Acute Distress] : no acute distress [Normal Conjunctiva] : the conjunctiva exhibited no abnormalities [Eyelids - No Xanthelasma] : the eyelids demonstrated no xanthelasmas [Nail Clubbing] : no clubbing of the fingernails [Cyanosis, Localized] : no localized cyanosis [Skin Turgor] : normal skin turgor [Cranial Nerves] : cranial nerves 2-12 were intact [No Focal Deficits] : no focal deficits [Motor Exam] : the motor exam was normal [Oriented To Time, Place, And Person] : oriented to person, place, and time [Impaired Insight] : insight and judgment were intact [Low Lying Soft Palate] : low lying soft palate [Elongated Uvula] : elongated uvula [Enlarged Base of the Tongue] : enlargement of the base of the tongue [III] : III [Neck Appearance] : the appearance of the neck was normal [Jugular Venous Distention Increased] : there was no jugular-venous distention [Heart Rate And Rhythm] : heart rate and rhythm were normal [Edema] : no peripheral edema present [] : no respiratory distress [Respiration, Rhythm And Depth] : normal respiratory rhythm and effort [Auscultation Breath Sounds / Voice Sounds] : lungs were clear to auscultation bilaterally [Bowel Sounds] : normal bowel sounds [Abnormal Walk] : normal gait [Skin Color & Pigmentation] : normal skin color and pigmentation [No Venous Stasis] : no venous stasis [FreeTextEntry2] : trace bipedal edema [FreeTextEntry1] : no edema

## 2022-03-07 NOTE — HISTORY OF PRESENT ILLNESS
[Obstructive Sleep Apnea] : obstructive sleep apnea [Stable] : are stable [Difficulty Breathing During Exertion] : stable dyspnea on exertion [Feelings Of Weakness On Exertion] : stable exercise intolerance [Cough] : stable coughing [Wheezing] : denies wheezing [Regional Soft Tissue Swelling Both Lower Extremities] : denies lower extremity edema [Chest Pain Or Discomfort] : denies chest pain [Fever] : denies fever [2  -  Slight] : 2, slight [Class III - Marked Limitation in Activity] : III [Date: ___] : was performed [unfilled] [FreeTextEntry1] : 74 yo M presents for follow up ROMERO, chronic cough and sleep disordered breathing. \par \par Initially seen by me in March 2018 with similar complaints and with PFTs significant for moderately reduced DLCO. Was sent for CXR at that time - 5/3/18- clear lungs, cardiomegaly. \par Was also ordered for CT chest however never underwent at that time. Also did not forward the results of his outside sleep study.\par \par Had undergone extensive cardiac work up over the past 2 years which has been unrevealing:\par TTE 7/2017 reviewed -  WNL\par Cardiac cath 7/12/17: minor luminal irregularities. Rec medical management. \par \par PFTs 2/14/19: normal spirometry and lung volumes, moderately reduced DLCO - unchanged from 3/2018\par Lifelong nonsmoker. Retired - occupation ink mixer/paints\par \par Dyspnea hx: Progressive for the past 2 years. Exercise tolerance approx 1 block and 1 flight of stairs. Associated with leg "fatigue", denies cramping, denies known h/o vascular issues.\par Cough for >1 yr, prior trial of antibiotics without improvement. Occasionally expectorates, no hemoptysis, states it is "coming from his chest", also reports postnasal drip. \par \par Sleep hx: Bed at 8 pm, no issues falling asleep, +snoring, +gasping/choking episodes with multiple wake ups, 2-3x a night, +nocturia, out of bed at 7 am. Unrefreshed upon awakening. Denies morning headache, or dry mouth. +EDS, tired "all day" and takes naps in the afternoon. Also reports weight gain of approx 7 lbs over past 1 year. \par \par Reviewd PSG performed at an outside center 6/29/18 - severe DANIELA, AHI 53/hr. On CPAP of 8 cmH2O - using the majority of nights. Data download shows residual mild DANIELA, minimal mask leak. Continues to complain of nonrestorative sleep, EDS with ESS of 24. \par \par PMH significant for bioprosthetic AVR, DM, HTN, HLD, obesity, CKD\par \par At April 2019 visit - switched from ACEi to ARB to evaluate if helps with cough symptoms - no change. \par CPET performed 4/29/19 - chronotropic insufficiency, deconditioning.\par CT chest 3/17/19: cardiomegaly, no pleural effusions, normal lung parenchyma and pleura\par BALJINEDR 2/26/19: moderate AR\par \par Visit 6/2019 - States that his symptoms are unchanged- continues to have nonproductive cough, ROMERO. As per patient, no prior laryngostopy - follows with Dr. Mckeon for hearing loss. \par Denies chest pain\par Scheduled for APAP study in July. \par Also states he is having his AV re-evaluated by cardiology. \par \par Follow up 10/1/19: \par No wireless data available from his APAP- states that he has stopped using it recently as it gives him a "cold" after - essentially tickling in his throat and coughing with sputum production the next morning, denies fevers/chills/prolonged illness\par Very symptomatic - falling asleep in the exam room, ESS of 20\par Was recently evaluated by Dr. Her - started Astelin last month, reports no change\par SOB is much improved however - able to ambulate multiple blocks before needing to stop. Recently saw his cardiologist Dr. Strauss in [facility] and was told his AV function was "not that bad" \par \par Follow up 8/18/20:\par Continues to complain of ROMERO, can ambulate 1-2 blocks before becoming dyspneic. Develops LE edema. Has not seen cards, Dr. Strauss recently.\par Nephrologist has changed to Dr. Celestine Peck\par Chronic cough, nonproductive. +Postnasal drip - using nasal sprays. GERD controlled - remains on Zantac\par Has not been using CPAP since the winter, was having difficulty breathing with it and water would leak back into his face. \par Reviewed meds with patient - states he is compliant, though has memory difficulties. \par \par OV 10/7/20\par Restarting using his CPAP nightly, tolerating better, sleeping better and more rested, however continues to wake up with occasional episodes feeling as is he is "suffocating". \par Uses from 9-10 pm until 6 am, with 2 wakes up to urinate.\par Requesting script for new supplies. \par Unable to acces most recent data - has a Dreasmstation APAP, DME [facility]. Set at CPAP of 10. Last data available from 5/2019. \par ROMERO unchanged - approx 2 blocks, associated with leg fatigue. LE edema is much improved\par Has an appointment with his cardiologist Dr Strauss on 11/10\par PFTs from 9/11/20 show normal spirometry, improved DLCO from 50 to 66%. \par \par OV 1/6/21\par Doing well overall. \par Using CPAP nightly however never received new supplies from [facility] and patient did not know who to call\par Continues to have memory issues - forgetting to take medications and do things. His daughter is his home aid and his granddaughter manages his medical appointments however he is more concerned and requesting referral for evaluation. \par Dyspnea on exertion is unchanged - approx 2 blocks ET. Minimal LE edema at the end of the day. Denies cough or chest pain. Was seen by Dr. Strauss in November and had an Echo performed which was essentially normal, minimal MR and bioprosthetic valve in place with normal function. \par \par Follow up OV 8/31/21:\par Overall feels about the same. Using CPAP nightly.\par Feels tired with walking. Able to ambulate up t0 3-4 blocks. Minimal dyspnea. \par Data download for compliance and therapy:\par Machine: Dreamstation AutoCPAP\par DME: [facility]\par Settings: CPAP 10\par Mask: Resmed N20 nasal mask\par Percent days with usage: 97%\par Average hours: 7 hours 27 min\par Treatment AHI: 5.3\par Large leak: no present\par \par Follow up OV 1/10/22:\par Received new APAP machine- Dreamstation 2. Has not started to use yet as was unable to connect hose and brings machine/supplies to today's visit. \par Continues to use Dreamstation1 and noted to have significant mask leak on data and complaining of waking with dry eyes. \par Otherwise no changes in medical history or meds - compliant with Symbicort and ALbuterol. \par \par Quality metrics:\par Tobacco use- never smoker\par ESS- 8\par \par Vaccines: \par COVID: completed booster 12/20/21\par Influenza: received 11/21\par Pneumococcal: has received in the past - unsure when\par \par Follow up OV 3/7/22\par Using Dreamstation 2 and tolerating well. \par Remains on Symbicort and Albuterol- going walking 4-5 days a week, denies SOB, but is tired all time. \par \par Data download for compliance and therapy:\par Machine: Dreamstation 2 apap\par DME: [facility]\par Settings: CPAP of 10\par Mask: Resmed nasal\par Percent days with usage: 93%\par Average hours: 6 hours 38 min\par Treatment AHI: 3.4\par Large leak: none\par \par No changes in meds   [Oxygen] : the patient uses no supplemental oxygen [de-identified] : essentially normal lungs,  linear right basilar atelectasis vs. scar [de-identified] : moderately reduced DLCO, normal spirometry and lung volumes

## 2022-03-07 NOTE — CONSULT LETTER
[Dear  ___] : Dear  [unfilled], [Courtesy Letter:] : I had the pleasure of seeing your patient, [unfilled], in my office today. [Please see my note below.] : Please see my note below. [Consult Closing:] : Thank you very much for allowing me to participate in the care of this patient.  If you have any questions, please do not hesitate to contact me. [Sincerely,] : Sincerely, [FreeTextEntry2] : Dr. Yue Ash\par  [FreeTextEntry3] : Kristine Medina MD\par  \par Division of Pulmonary, Critical Care & Sleep Medicine\par [facility] [employer] [facility] [employer] [facility]\par \par \par

## 2022-03-07 NOTE — ASSESSMENT
[FreeTextEntry1] : 76 yo M with multiple medical problems including DANIELA, bioprosthetic AVR, DM, HTN, HLD, obesity, CKD who presents for follow up. \par \par A/P:\par 1. Dyspnea on exertion -  improved, multifactorial- deconditioning, obesity, h/o bioprosthetic valve.\par Ambulating regularly \par Echo from 11/2020 and office note reviewed - minimal MR, bioprosthetic valve in good position and well functioning.\par PFTs WNL other than mildly reduced DLCO\par c/w Symbicort BID and albuterol prn\par \par 2. Chronic cough -improved, related to PND and GERD\par He will c/w nasal sprays and PPI\par \par 3. Severe DANIELA- c/w CPAP use- now has Dreamstation 2 with nasal mask and doing well\par \par 4. Memory impairment - has been following with Dr. Garcia \par \par 5. HCM - up to date with vaccines\par \par Follow up in 6 months

## 2022-03-11 ENCOUNTER — APPOINTMENT (OUTPATIENT)
Dept: OTOLARYNGOLOGY | Facility: CLINIC | Age: 77
End: 2022-03-11
Payer: MEDICARE

## 2022-03-11 VITALS
WEIGHT: 196 LBS | DIASTOLIC BLOOD PRESSURE: 67 MMHG | BODY MASS INDEX: 32.65 KG/M2 | SYSTOLIC BLOOD PRESSURE: 133 MMHG | HEIGHT: 65 IN | HEART RATE: 61 BPM

## 2022-03-11 DIAGNOSIS — Z86.79 PERSONAL HISTORY OF OTHER DISEASES OF THE CIRCULATORY SYSTEM: ICD-10-CM

## 2022-03-11 DIAGNOSIS — R09.89 OTHER SPECIFIED SYMPTOMS AND SIGNS INVOLVING THE CIRCULATORY AND RESPIRATORY SYSTEMS: ICD-10-CM

## 2022-03-11 DIAGNOSIS — Z86.69 PERSONAL HISTORY OF OTHER DISEASES OF THE NERVOUS SYSTEM AND SENSE ORGANS: ICD-10-CM

## 2022-03-11 DIAGNOSIS — R09.81 NASAL CONGESTION: ICD-10-CM

## 2022-03-11 DIAGNOSIS — E66.9 OBESITY, UNSPECIFIED: ICD-10-CM

## 2022-03-11 DIAGNOSIS — I25.10 ATHEROSCLEROTIC HEART DISEASE OF NATIVE CORONARY ARTERY W/OUT ANGINA PECTORIS: ICD-10-CM

## 2022-03-11 DIAGNOSIS — Z82.49 FAMILY HISTORY OF ISCHEMIC HEART DISEASE AND OTHER DISEASES OF THE CIRCULATORY SYSTEM: ICD-10-CM

## 2022-03-11 DIAGNOSIS — R09.82 POSTNASAL DRIP: ICD-10-CM

## 2022-03-11 PROCEDURE — 99214 OFFICE O/P EST MOD 30 MIN: CPT | Mod: 25

## 2022-03-11 PROCEDURE — 31231 NASAL ENDOSCOPY DX: CPT

## 2022-03-11 RX ORDER — AZELASTINE HYDROCHLORIDE 137 UG/1
0.1 SPRAY, METERED NASAL TWICE DAILY
Qty: 1 | Refills: 6 | Status: COMPLETED | COMMUNITY
Start: 2019-10-25 | End: 2022-03-11

## 2022-03-11 RX ORDER — MOMETASONE 50 UG/1
50 SPRAY, METERED NASAL DAILY
Qty: 1 | Refills: 5 | Status: ACTIVE | COMMUNITY
Start: 2022-03-11 | End: 1900-01-01

## 2022-03-11 NOTE — HISTORY OF PRESENT ILLNESS
[Facial Pain] : facial pain [Facial Pressure] : facial pressure [Nasal Congestion] : nasal congestion [de-identified] : 76 year old male referred by Dr Mckeon for nasal congestion. States for the last couple of months, has had nasal congestion,difficulty breathing through his nose, and  anterior rhinorrhea. Denies sinuses infections, fevers, poor sense of smell,  sinus pressure or pain.  Currently using Flonase 2x daily. No relief

## 2022-03-11 NOTE — REASON FOR VISIT
[Subsequent Evaluation] : a subsequent evaluation for [FreeTextEntry2] : referred by Dr Mckeon for  nasal congestion

## 2022-03-11 NOTE — CONSULT LETTER
[Dear  ___] : Dear  [unfilled], [Consult Letter:] : I had the pleasure of evaluating your patient, [unfilled]. [Please see my note below.] : Please see my note below. [Consult Closing:] : Thank you very much for allowing me to participate in the care of this patient.  If you have any questions, please do not hesitate to contact me. [Sincerely,] : Sincerely, [FreeTextEntry2] : Dr Mckeon [FreeTextEntry3] : Kash Her MD, DARLING, FACS\par  Department Otolaryngology\par Director of [facility]\par Professor of Otolaryngology,\par [employer] [facility] [employer]

## 2022-03-11 NOTE — PROCEDURE
[Image(s) Captured] : image(s) captured and filed [Video Captured] : video captured and filed [Topical Lidocaine] : topical lidocaine [Oxymetazoline HCl] : oxymetazoline HCl [Flexible Endoscope] : examined with the flexible endoscope [Serial Number: ___] : Serial Number: [unfilled] [FreeTextEntry6] : Pre-op indication(s): Nasal congestion\par Post-op indication(s): Deviated nasal septum, Turbinate hypertrophy\par Verbal consent obtained from patient.\par “Anterior rhinoscopy insufficient to account for symptoms” \par Details for procedure: \par Scope #: [id number]\par Type of scope:    flexible fiber optic telescope   X  Rigid glass telescope \par Anesthesia and/or vasoconstriction was achieved topically by using: \par 4% Lidocaine spray   0.05% Oxymetazoline     Other ______ \par The following anatomic sites were directly examined in a sequential fashion: \par The scope was introduced in the nasal passage between the middle and inferior turbinates to exam the inferior portion of the middle meatus and the fontanelle, as well as the maxillary ostia. Next, the scope was passed medially and posteriorly to the middle turbinates to examine the sphenoethmoid recess and the superior turbinate region. \par Upon visualization the finders are as follows: \par Nasal Septum:     Deviated to   left    Spur   right  \par Bleeding site cauterized:    Anterior   left   right   Posterior   left   right \par Method:   Silver Nitrate   YAG Laser    Electrocautery ______ \par Right Side: \par * Mucosa: Normal\par * Mucous: Normal\par * Polyp: Normal\par * Inferior Turbinate: Hypertrophy\par * Middle Turbinate: Hypertrophy\par * Superior Turbinate: Normal\par * Inferior Meatus: Normal\par * Middle Meatus: Normal\par * Super Meatus: Normal\par * Sphenoethmoidal Recess: Normal\par Left Side: \par * Mucosa: Normal\par * Mucous: Normal\par * Polyp: Normal\par * Inferior Turbinate: Hypertrophy\par * Middle Turbinate: Normal\par * Superior Turbinate: Normal\par * Inferior Meatus: Normal\par * Middle Meatus: Normal\par * Super Meatus: Normal\par * Sphenoethmoidal Recess: Normal\par The patient tolerated the procedure well without any complications.\par \par \par

## 2022-03-11 NOTE — PHYSICAL EXAM
[Nasal Endoscopy Performed] : nasal endoscopy was performed, see procedure section for findings [] : septum deviated to the left [Midline] : trachea located in midline position [Normal] : no rashes [FreeTextEntry1] : Nasal congestion [de-identified] : hypertrophy

## 2022-05-13 ENCOUNTER — APPOINTMENT (OUTPATIENT)
Dept: OTOLARYNGOLOGY | Facility: CLINIC | Age: 77
End: 2022-05-13
Payer: MEDICARE

## 2022-05-13 VITALS
SYSTOLIC BLOOD PRESSURE: 145 MMHG | DIASTOLIC BLOOD PRESSURE: 67 MMHG | HEIGHT: 65 IN | BODY MASS INDEX: 33.32 KG/M2 | WEIGHT: 200 LBS | HEART RATE: 79 BPM

## 2022-05-13 DIAGNOSIS — J34.2 DEVIATED NASAL SEPTUM: ICD-10-CM

## 2022-05-13 DIAGNOSIS — Z72.89 OTHER PROBLEMS RELATED TO LIFESTYLE: ICD-10-CM

## 2022-05-13 DIAGNOSIS — H92.12 OTORRHEA, LEFT EAR: ICD-10-CM

## 2022-05-13 DIAGNOSIS — I10 ESSENTIAL (PRIMARY) HYPERTENSION: ICD-10-CM

## 2022-05-13 PROCEDURE — 31231 NASAL ENDOSCOPY DX: CPT

## 2022-05-13 PROCEDURE — 99214 OFFICE O/P EST MOD 30 MIN: CPT | Mod: 25

## 2022-05-13 NOTE — HISTORY OF PRESENT ILLNESS
[de-identified] : 76 year old male follow up for nasal congestion.  States using both Flonase and Nasonex with good results. \par

## 2022-05-13 NOTE — REASON FOR VISIT
[Subsequent Evaluation] : a subsequent evaluation for [FreeTextEntry2] : follow up for nasal congestion

## 2022-05-13 NOTE — CONSULT LETTER
[Dear  ___] : Dear  [unfilled], [Courtesy Letter:] : I had the pleasure of seeing your patient, [unfilled], in my office today. [Please see my note below.] : Please see my note below. [Sincerely,] : Sincerely, [FreeTextEntry2] : Yue Ash [FreeTextEntry3] : Kash Her MD, DARLING, FACS\par  Department Otolaryngology\par Director of [facility]\par Professor of Otolaryngology, \par [employer] [employer]\par

## 2022-05-13 NOTE — REVIEW OF SYSTEMS
[As Noted in HPI] : as noted in HPI [Negative] : Heme/Lymph [Recurrent Ear Infections] : recurrent ear infections

## 2022-05-13 NOTE — PHYSICAL EXAM
[Nasal Endoscopy Performed] : nasal endoscopy was performed, see procedure section for findings [] : septum deviated to the left [Midline] : trachea located in midline position [Normal] : no rashes [FreeTextEntry1] : Nasal congestion improved, left otitis [de-identified] : Left eardrum is red inflamed possible lesion posteriorly and superiorly as well as fluid in the middle ear space [de-identified] : hypertrophy, color improved

## 2022-06-21 NOTE — PROGRESS NOTE ADULT - ATTENDING COMMENTS
CARDIOLOGY ATTENDING    Patient seen and examined. Agree with above. Admitted with atypical chest pain and NST without ischemia nor infarction. However TTE showed his bio-AVR may have mod-severe AI. He has no clinical heart failure, his LVEF is 65%, and his LV is not dilated. Therefore even IF he has severe AI I would not recommend reop at this time. Therefore risks of BALJINDER outweigh the non-clinical benefit. D/C home, and f/u with his cardiologist Dr. Strange after discharge. Dr. Evans will convey these reccs to Dr. Strange.
Patient seen and examined, agree with above assessment and plan as transcribed above.    - f/u Echo  - F/u stress  - Further plan pending above    Chalino Evans MD, FACC
chest pain

## 2022-06-24 ENCOUNTER — APPOINTMENT (OUTPATIENT)
Dept: OTOLARYNGOLOGY | Facility: CLINIC | Age: 77
End: 2022-06-24
Payer: MEDICARE

## 2022-06-24 VITALS
HEART RATE: 66 BPM | BODY MASS INDEX: 32.65 KG/M2 | DIASTOLIC BLOOD PRESSURE: 62 MMHG | HEIGHT: 65 IN | WEIGHT: 196 LBS | SYSTOLIC BLOOD PRESSURE: 151 MMHG

## 2022-06-24 DIAGNOSIS — E11.9 TYPE 2 DIABETES MELLITUS W/OUT COMPLICATIONS: ICD-10-CM

## 2022-06-24 PROCEDURE — 99214 OFFICE O/P EST MOD 30 MIN: CPT

## 2022-06-24 RX ORDER — FLUTICASONE PROPIONATE 50 UG/1
50 SPRAY, METERED NASAL DAILY
Qty: 1 | Refills: 5 | Status: ACTIVE | COMMUNITY
Start: 2022-06-24 | End: 1900-01-01

## 2022-06-24 NOTE — PHYSICAL EXAM
[] : septum deviated to the left [Midline] : trachea located in midline position [Normal] : no rashes [de-identified] : Infection is improved. still has white mass superiorly. [de-identified] : hypertrophy, color improved

## 2022-06-24 NOTE — REASON FOR VISIT
[Subsequent Evaluation] : a subsequent evaluation for [FreeTextEntry2] : follow up for left ear infection

## 2022-06-24 NOTE — CONSULT LETTER
[Dear  ___] : Dear  [unfilled], [Courtesy Letter:] : I had the pleasure of seeing your patient, [unfilled], in my office today. [Please see my note below.] : Please see my note below. [Sincerely,] : Sincerely, [FreeTextEntry2] : Yue Ash  [FreeTextEntry3] : Kash Her MD, DARLING, FACS\par  Department Otolaryngology\par Director of [facility]\par Professor of Otolaryngology, \par [employer]\par

## 2022-06-24 NOTE — HISTORY OF PRESENT ILLNESS
[de-identified] : 76 year old male follow up for left ear infection, prior history of fungal otitis.  States took the antibiotics and used the ear drops as prescribed.  Here for a recheck of the ear.  \par \par

## 2022-07-27 ENCOUNTER — RX RENEWAL (OUTPATIENT)
Age: 77
End: 2022-07-27

## 2022-07-27 RX ORDER — BUDESONIDE AND FORMOTEROL FUMARATE DIHYDRATE 80; 4.5 UG/1; UG/1
80-4.5 AEROSOL RESPIRATORY (INHALATION) TWICE DAILY
Qty: 1 | Refills: 3 | Status: ACTIVE | COMMUNITY
Start: 2020-08-21 | End: 1900-01-01

## 2022-07-27 RX ORDER — ALBUTEROL SULFATE 90 UG/1
108 (90 BASE) AEROSOL, METERED RESPIRATORY (INHALATION)
Qty: 1 | Refills: 2 | Status: ACTIVE | COMMUNITY
Start: 2019-08-20 | End: 1900-01-01

## 2022-08-08 ENCOUNTER — APPOINTMENT (OUTPATIENT)
Dept: OTOLARYNGOLOGY | Facility: CLINIC | Age: 77
End: 2022-08-08

## 2022-08-08 VITALS
HEIGHT: 65 IN | HEART RATE: 71 BPM | DIASTOLIC BLOOD PRESSURE: 70 MMHG | BODY MASS INDEX: 32.65 KG/M2 | WEIGHT: 196 LBS | SYSTOLIC BLOOD PRESSURE: 150 MMHG

## 2022-08-08 DIAGNOSIS — B36.9 SUPERFICIAL MYCOSIS, UNSPECIFIED: ICD-10-CM

## 2022-08-08 DIAGNOSIS — H90.A32 MIXED CONDUCTIVE AND SENSORINEURAL HEARING, UNILATERAL, LEFT EAR WITH RESTRICTED HEARING ON THE  CONTRALATERAL SIDE: ICD-10-CM

## 2022-08-08 DIAGNOSIS — H62.40 SUPERFICIAL MYCOSIS, UNSPECIFIED: ICD-10-CM

## 2022-08-08 DIAGNOSIS — H92.22 OTORRHAGIA, LEFT EAR: ICD-10-CM

## 2022-08-08 PROCEDURE — 69220 CLEAN OUT MASTOID CAVITY: CPT | Mod: RT

## 2022-08-08 PROCEDURE — 92567 TYMPANOMETRY: CPT

## 2022-08-08 PROCEDURE — 99213 OFFICE O/P EST LOW 20 MIN: CPT | Mod: 25

## 2022-08-08 PROCEDURE — 92557 COMPREHENSIVE HEARING TEST: CPT

## 2022-08-08 RX ORDER — MECLIZINE HYDROCHLORIDE 25 MG/1
25 TABLET ORAL
Qty: 30 | Refills: 0 | Status: ACTIVE | COMMUNITY
Start: 2022-05-11

## 2022-08-08 RX ORDER — AZITHROMYCIN 500 MG/1
500 TABLET, FILM COATED ORAL
Qty: 3 | Refills: 0 | Status: DISCONTINUED | COMMUNITY
Start: 2022-07-10

## 2022-08-08 RX ORDER — AMOXICILLIN AND CLAVULANATE POTASSIUM 875; 125 MG/1; MG/1
875-125 TABLET, COATED ORAL
Qty: 20 | Refills: 1 | Status: COMPLETED | COMMUNITY
Start: 2022-05-13 | End: 2022-08-08

## 2022-08-09 PROBLEM — H90.A32 MIXED CONDUCTIVE AND SENSORINEURAL HEARING LOSS OF LEFT EAR WITH RESTRICTED HEARING OF RIGHT EAR: Status: ACTIVE | Noted: 2020-12-21

## 2022-08-09 PROBLEM — H92.22 OTORRHAGIA OF LEFT EAR: Status: ACTIVE | Noted: 2022-08-09

## 2022-08-09 NOTE — REASON FOR VISIT
[Subsequent Evaluation] : a subsequent evaluation for [FreeTextEntry2] : history of right CWD and left TM perforation

## 2022-08-09 NOTE — PROCEDURE
[Same] : same as the Pre Op Dx. [] : Debridement of Mastoid [FreeTextEntry1] : right CWD with debris and fungus [FreeTextEntry6] : Canal wall down mastoid debrided under microscope using suction, alligator and curette. Wax and fungal elements removed - sprayed with mastoid powder. Patient tolerated procedure well. Left otorrhea suctioned, CWD, + blood in bowl in spots - no active bleeding - sprayed with mastoid powder

## 2022-08-09 NOTE — HISTORY OF PRESENT ILLNESS
[de-identified] : 75 yo M with history of right CWD and left TM perforation- using b/l HAs.  States bilateral otorrhea.   Denies otalgia. Apparently had a "bleed in the brain" saw Neuro - has MMSE- 26/30 (mild cog ) - EMG legs - periph neuropathy (reports reviewed) - MRI done in [place]  - pt does not have report -

## 2022-08-09 NOTE — PHYSICAL EXAM
[Midline] : trachea located in midline position [Normal] : no nystagmus [de-identified] : right CWD -see note, left otorrhea

## 2022-09-10 NOTE — CONSULT LETTER
Immediate care evaluation noted. [Dear  ___] : Dear  [unfilled], [Consult Letter:] : I had the pleasure of evaluating your patient, [unfilled]. [Please see my note below.] : Please see my note below. [Sincerely,] : Sincerely, [Consult Closing:] : Thank you very much for allowing me to participate in the care of this patient.  If you have any questions, please do not hesitate to contact me. [FreeTextEntry3] : Kash Her MD, DARLING, FACS\par  Department Otolaryngology\par Director of [facility]\par Professor of Otolaryngology, \par [employer] [employer]\par

## 2023-01-05 ENCOUNTER — APPOINTMENT (OUTPATIENT)
Dept: PULMONOLOGY | Facility: CLINIC | Age: 78
End: 2023-01-05
Payer: MEDICARE

## 2023-01-05 VITALS
BODY MASS INDEX: 32.32 KG/M2 | WEIGHT: 194 LBS | RESPIRATION RATE: 17 BRPM | DIASTOLIC BLOOD PRESSURE: 65 MMHG | SYSTOLIC BLOOD PRESSURE: 133 MMHG | HEART RATE: 65 BPM | OXYGEN SATURATION: 97 % | HEIGHT: 65 IN | TEMPERATURE: 98.1 F

## 2023-01-05 PROCEDURE — 99213 OFFICE O/P EST LOW 20 MIN: CPT

## 2023-01-10 NOTE — CONSULT LETTER
[Dear  ___] : Dear  [unfilled], [Courtesy Letter:] : I had the pleasure of seeing your patient, [unfilled], in my office today. [Please see my note below.] : Please see my note below. [Consult Closing:] : Thank you very much for allowing me to participate in the care of this patient.  If you have any questions, please do not hesitate to contact me. [Sincerely,] : Sincerely, [FreeTextEntry2] : Dr. Yue Ash\par  [FreeTextEntry3] : Kristine Medina MD\par  \par Division of Pulmonary, Critical Care & Sleep Medicine\par [facility] [employer] at [facility]\par \par \par

## 2023-01-10 NOTE — HISTORY OF PRESENT ILLNESS
[Obstructive Sleep Apnea] : obstructive sleep apnea [Stable] : are stable [Difficulty Breathing During Exertion] : stable dyspnea on exertion [Feelings Of Weakness On Exertion] : stable exercise intolerance [Cough] : stable coughing [Wheezing] : denies wheezing [Regional Soft Tissue Swelling Both Lower Extremities] : denies lower extremity edema [Chest Pain Or Discomfort] : denies chest pain [Fever] : denies fever [2  -  Slight] : 2, slight [Class III - Marked Limitation in Activity] : III [Date: ___] : was performed [unfilled] [FreeTextEntry1] : 76 yo M presents for follow up ROMERO, chronic cough and sleep disordered breathing. \par \par Initially seen by me in March 2018 with similar complaints and with PFTs significant for moderately reduced DLCO. Was sent for CXR at that time - 5/3/18- clear lungs, cardiomegaly. \par Was also ordered for CT chest however never underwent at that time. Also did not forward the results of his outside sleep study.\par \par Had undergone extensive cardiac work up over the past 2 years which has been unrevealing:\par TTE 7/2017 reviewed -  WNL\par Cardiac cath 7/12/17: minor luminal irregularities. Rec medical management. \par \par PFTs 2/14/19: normal spirometry and lung volumes, moderately reduced DLCO - unchanged from 3/2018\par Lifelong nonsmoker. Retired - occupation ink mixer/paints\par \par Dyspnea hx: Progressive for the past 2 years. Exercise tolerance approx 1 block and 1 flight of stairs. Associated with leg "fatigue", denies cramping, denies known h/o vascular issues.\par Cough for >1 yr, prior trial of antibiotics without improvement. Occasionally expectorates, no hemoptysis, states it is "coming from his chest", also reports postnasal drip. \par \par Sleep hx: Bed at 8 pm, no issues falling asleep, +snoring, +gasping/choking episodes with multiple wake ups, 2-3x a night, +nocturia, out of bed at 7 am. Unrefreshed upon awakening. Denies morning headache, or dry mouth. +EDS, tired "all day" and takes naps in the afternoon. Also reports weight gain of approx 7 lbs over past 1 year. \par \par Reviewd PSG performed at an outside center 6/29/18 - severe DANIELA, AHI 53/hr. On CPAP of 8 cmH2O - using the majority of nights. Data download shows residual mild DANIELA, minimal mask leak. Continues to complain of nonrestorative sleep, EDS with ESS of 24. \par \par PMH significant for bioprosthetic AVR, DM, HTN, HLD, obesity, CKD\par \par At April 2019 visit - switched from ACEi to ARB to evaluate if helps with cough symptoms - no change. \par CPET performed 4/29/19 - chronotropic insufficiency, deconditioning.\par CT chest 3/17/19: cardiomegaly, no pleural effusions, normal lung parenchyma and pleura\par BALJINDER 2/26/19: moderate AR\par \par Visit 6/2019 - States that his symptoms are unchanged- continues to have nonproductive cough, ROMERO. As per patient, no prior laryngostopy - follows with Dr. Mckeon for hearing loss. \par Denies chest pain\par Scheduled for APAP study in July. \par Also states he is having his AV re-evaluated by cardiology. \par \par Follow up 10/1/19: \par No wireless data available from his APAP- states that he has stopped using it recently as it gives him a "cold" after - essentially tickling in his throat and coughing with sputum production the next morning, denies fevers/chills/prolonged illness\par Very symptomatic - falling asleep in the exam room, ESS of 20\par Was recently evaluated by Dr. Her - started Astelin last month, reports no change\par SOB is much improved however - able to ambulate multiple blocks before needing to stop. Recently saw his cardiologist Dr. Strauss in [facility] and was told his AV function was "not that bad" \par \par Follow up 8/18/20:\par Continues to complain of ROMERO, can ambulate 1-2 blocks before becoming dyspneic. Develops LE edema. Has not seen cards, Dr. Strauss recently.\par Nephrologist has changed to Dr. Celestine Peck\par Chronic cough, nonproductive. +Postnasal drip - using nasal sprays. GERD controlled - remains on Zantac\par Has not been using CPAP since the winter, was having difficulty breathing with it and water would leak back into his face. \par Reviewed meds with patient - states he is compliant, though has memory difficulties. \par \par OV 10/7/20\par Restarting using his CPAP nightly, tolerating better, sleeping better and more rested, however continues to wake up with occasional episodes feeling as is he is "suffocating". \par Uses from 9-10 pm until 6 am, with 2 wakes up to urinate.\par Requesting script for new supplies. \par Unable to acces most recent data - has a Dreasmstation APAP, DME [facility]. Set at CPAP of 10. Last data available from 5/2019. \par ROMERO unchanged - approx 2 blocks, associated with leg fatigue. LE edema is much improved\par Has an appointment with his cardiologist Dr Strauss on 11/10\par PFTs from 9/11/20 show normal spirometry, improved DLCO from 50 to 66%. \par \par OV 1/6/21\par Doing well overall. \par Using CPAP nightly however never received new supplies from [facility] and patient did not know who to call\par Continues to have memory issues - forgetting to take medications and do things. His daughter is his home aid and his granddaughter manages his medical appointments however he is more concerned and requesting referral for evaluation. \par Dyspnea on exertion is unchanged - approx 2 blocks ET. Minimal LE edema at the end of the day. Denies cough or chest pain. Was seen by Dr. Strauss in November and had an Echo performed which was essentially normal, minimal MR and bioprosthetic valve in place with normal function. \par \par Follow up OV 8/31/21:\par Overall feels about the same. Using CPAP nightly.\par Feels tired with walking. Able to ambulate up t0 3-4 blocks. Minimal dyspnea. \par Data download for compliance and therapy:\par Machine: Dreamstation AutoCPAP\par DME: [facility]\par Settings: CPAP 10\par Mask: Resmed N20 nasal mask\par Percent days with usage: 97%\par Average hours: 7 hours 27 min\par Treatment AHI: 5.3\par Large leak: no present\par \par Follow up OV 1/10/22:\par Received new APAP machine- Dreamstation 2. Has not started to use yet as was unable to connect hose and brings machine/supplies to today's visit. \par Continues to use Dreamstation1 and noted to have significant mask leak on data and complaining of waking with dry eyes. \par Otherwise no changes in medical history or meds - compliant with Symbicort and ALbuterol. \par \par Quality metrics:\par Tobacco use- never smoker\par ESS- 8\par \par Vaccines: \par COVID: completed booster 12/20/21\par Influenza: received 11/21\par Pneumococcal: has received in the past - unsure when\par \par Follow up OV 3/7/22\par Using Dreamstation 2 and tolerating well. \par Remains on Symbicort and Albuterol- going walking 4-5 days a week, denies SOB, but is tired all time. \par \par Data download for compliance and therapy:\par Machine: Dreamstation 2 apap\par DME: [facility]\par Settings: CPAP of 10\par Mask: Resmed nasal\par Percent days with usage: 93%\par Average hours: 6 hours 38 min\par Treatment AHI: 3.4\par Large leak: none\par \par No change in meds  \par \par Follow up OV 1/5/23:\par Using CPAP - data reviewed, use 63% of nights, AHI 2.8/hr, average 6 hours 31 min. However, having difficulty using CPAP over the past 2 months- wasn't sent new filters or correct mask (Was sent FFM instead of nasal mask).  \par Ambulating >1 mile without difficulty, occasional cough. \par Taking Symbicort and Albuterol- states he has refills (his PMD sends)\par Complaining of possible trigger finger - right 3rd digit [Oxygen] : the patient uses no supplemental oxygen [de-identified] : essentially normal lungs,  linear right basilar atelectasis vs. scar [de-identified] : moderately reduced DLCO, normal spirometry and lung volumes

## 2023-01-10 NOTE — ASSESSMENT
[FreeTextEntry1] : 76 yo M with multiple medical problems including DANIELA, bioprosthetic AVR, DM, HTN, HLD, obesity, CKD who presents for follow up. \par \par A/P:\par 1. Dyspnea on exertion -  improved, multifactorial- deconditioning, obesity, h/o bioprosthetic valve.\par Ambulating regularly \par Echo from 11/2020 and office note reviewed - minimal MR, bioprosthetic valve in good position and well functioning.\par PFTs WNL other than mildly reduced DLCO\par c/w Symbicort BID and albuterol prn\par \par 2. Chronic cough -improved, related to PND and GERD\par He will c/w nasal sprays and PPI\par \par 3. Severe DANIELA- c/w CPAP use- now has Dreamstation 2 with nasal mask - will send Rx for new supplies to [facility] including filters and nasal mask\par \par 4. Memory impairment - has been following with Dr. Garcia \par \par 5. HCM - up to date with vaccines\par \par Follow up in 6 months

## 2023-02-06 ENCOUNTER — APPOINTMENT (OUTPATIENT)
Dept: OTOLARYNGOLOGY | Facility: CLINIC | Age: 78
End: 2023-02-06
Payer: MEDICARE

## 2023-02-06 VITALS
DIASTOLIC BLOOD PRESSURE: 62 MMHG | HEART RATE: 67 BPM | HEIGHT: 65 IN | BODY MASS INDEX: 32.32 KG/M2 | SYSTOLIC BLOOD PRESSURE: 117 MMHG | WEIGHT: 194 LBS

## 2023-02-06 PROCEDURE — 99213 OFFICE O/P EST LOW 20 MIN: CPT

## 2023-02-24 LAB — EAR NOSE AND THROAT CULTURE: ABNORMAL

## 2023-03-14 NOTE — HISTORY OF PRESENT ILLNESS
[de-identified] : 77 year old male, following up history of right CWD and left TM perforation- not currently using b/l HAs- HAs are broken-pending new pair.

## 2023-03-14 NOTE — PHYSICAL EXAM
[Midline] : trachea located in midline position [Normal] : no nystagmus [de-identified] : right CWD -wax removed, left otorrhea suctioned - sprayed with mastoid powder

## 2023-03-27 ENCOUNTER — APPOINTMENT (OUTPATIENT)
Dept: OTOLARYNGOLOGY | Facility: CLINIC | Age: 78
End: 2023-03-27
Payer: MEDICARE

## 2023-03-27 VITALS
DIASTOLIC BLOOD PRESSURE: 66 MMHG | HEIGHT: 65 IN | BODY MASS INDEX: 32.32 KG/M2 | SYSTOLIC BLOOD PRESSURE: 133 MMHG | WEIGHT: 194 LBS | HEART RATE: 72 BPM

## 2023-03-27 DIAGNOSIS — H92.11 OTORRHEA, RIGHT EAR: ICD-10-CM

## 2023-03-27 PROCEDURE — 92557 COMPREHENSIVE HEARING TEST: CPT

## 2023-03-27 PROCEDURE — 92567 TYMPANOMETRY: CPT

## 2023-03-27 PROCEDURE — 99213 OFFICE O/P EST LOW 20 MIN: CPT

## 2023-03-28 NOTE — PHYSICAL EXAM
[Normal] : mucosa is normal [Midline] : trachea located in midline position [de-identified] : right CWD - cx done - wet, sprayed with mastoid powder  -  left CWD clean

## 2023-03-28 NOTE — HISTORY OF PRESENT ILLNESS
[de-identified] : 77 year old male, following up for left otorrhea. History of right CWD and left TM perforation. Reports intermittent left otorrhea and occasional otorrhea from right-no odor. Continues to use Cipro drops PRN with relief. Hearing aid is broken-Patient would like to discuss getting new hearing aids. Denies otalgia, dizziness or vertigo.

## 2023-04-05 LAB — EAR NOSE AND THROAT CULTURE: NORMAL

## 2023-06-09 ENCOUNTER — INPATIENT (INPATIENT)
Facility: HOSPITAL | Age: 78
LOS: 15 days | Discharge: HOME HEALTH | DRG: 287 | End: 2023-06-25
Attending: INTERNAL MEDICINE | Admitting: INTERNAL MEDICINE
Payer: COMMERCIAL

## 2023-06-09 VITALS
TEMPERATURE: 98 F | DIASTOLIC BLOOD PRESSURE: 54 MMHG | HEART RATE: 61 BPM | WEIGHT: 214.95 LBS | RESPIRATION RATE: 16 BRPM | HEIGHT: 69 IN | OXYGEN SATURATION: 98 % | SYSTOLIC BLOOD PRESSURE: 148 MMHG

## 2023-06-09 DIAGNOSIS — Z98.890 OTHER SPECIFIED POSTPROCEDURAL STATES: Chronic | ICD-10-CM

## 2023-06-09 DIAGNOSIS — R42 DIZZINESS AND GIDDINESS: ICD-10-CM

## 2023-06-09 DIAGNOSIS — Z95.2 PRESENCE OF PROSTHETIC HEART VALVE: Chronic | ICD-10-CM

## 2023-06-09 DIAGNOSIS — Z90.49 ACQUIRED ABSENCE OF OTHER SPECIFIED PARTS OF DIGESTIVE TRACT: Chronic | ICD-10-CM

## 2023-06-09 LAB
ALBUMIN SERPL ELPH-MCNC: 4.1 G/DL — SIGNIFICANT CHANGE UP (ref 3.3–5)
ALP SERPL-CCNC: 66 U/L — SIGNIFICANT CHANGE UP (ref 40–120)
ALT FLD-CCNC: 24 U/L — SIGNIFICANT CHANGE UP (ref 10–45)
ANION GAP SERPL CALC-SCNC: 12 MMOL/L — SIGNIFICANT CHANGE UP (ref 5–17)
APTT BLD: 22.8 SEC — LOW (ref 27.5–35.5)
AST SERPL-CCNC: 62 U/L — HIGH (ref 10–40)
BASOPHILS # BLD AUTO: 0.04 K/UL — SIGNIFICANT CHANGE UP (ref 0–0.2)
BASOPHILS NFR BLD AUTO: 0.5 % — SIGNIFICANT CHANGE UP (ref 0–2)
BILIRUB SERPL-MCNC: 0.6 MG/DL — SIGNIFICANT CHANGE UP (ref 0.2–1.2)
BUN SERPL-MCNC: 31 MG/DL — HIGH (ref 7–23)
CALCIUM SERPL-MCNC: 9.3 MG/DL — SIGNIFICANT CHANGE UP (ref 8.4–10.5)
CHLORIDE SERPL-SCNC: 102 MMOL/L — SIGNIFICANT CHANGE UP (ref 96–108)
CO2 SERPL-SCNC: 25 MMOL/L — SIGNIFICANT CHANGE UP (ref 22–31)
CREAT SERPL-MCNC: 1.45 MG/DL — HIGH (ref 0.5–1.3)
EGFR: 49 ML/MIN/1.73M2 — LOW
EOSINOPHIL # BLD AUTO: 0.26 K/UL — SIGNIFICANT CHANGE UP (ref 0–0.5)
EOSINOPHIL NFR BLD AUTO: 3.6 % — SIGNIFICANT CHANGE UP (ref 0–6)
GLUCOSE BLDC GLUCOMTR-MCNC: 163 MG/DL — HIGH (ref 70–99)
GLUCOSE BLDC GLUCOMTR-MCNC: 173 MG/DL — HIGH (ref 70–99)
GLUCOSE SERPL-MCNC: 117 MG/DL — HIGH (ref 70–99)
HCT VFR BLD CALC: 37.6 % — LOW (ref 39–50)
HGB BLD-MCNC: 12.1 G/DL — LOW (ref 13–17)
IMM GRANULOCYTES NFR BLD AUTO: 0.1 % — SIGNIFICANT CHANGE UP (ref 0–0.9)
INR BLD: 1.08 RATIO — SIGNIFICANT CHANGE UP (ref 0.88–1.16)
LYMPHOCYTES # BLD AUTO: 1.78 K/UL — SIGNIFICANT CHANGE UP (ref 1–3.3)
LYMPHOCYTES # BLD AUTO: 24.5 % — SIGNIFICANT CHANGE UP (ref 13–44)
MAGNESIUM SERPL-MCNC: 2.2 MG/DL — SIGNIFICANT CHANGE UP (ref 1.6–2.6)
MCHC RBC-ENTMCNC: 28.4 PG — SIGNIFICANT CHANGE UP (ref 27–34)
MCHC RBC-ENTMCNC: 32.2 GM/DL — SIGNIFICANT CHANGE UP (ref 32–36)
MCV RBC AUTO: 88.3 FL — SIGNIFICANT CHANGE UP (ref 80–100)
MONOCYTES # BLD AUTO: 1.11 K/UL — HIGH (ref 0–0.9)
MONOCYTES NFR BLD AUTO: 15.2 % — HIGH (ref 2–14)
NEUTROPHILS # BLD AUTO: 4.08 K/UL — SIGNIFICANT CHANGE UP (ref 1.8–7.4)
NEUTROPHILS NFR BLD AUTO: 56.1 % — SIGNIFICANT CHANGE UP (ref 43–77)
NRBC # BLD: 0 /100 WBCS — SIGNIFICANT CHANGE UP (ref 0–0)
PHOSPHATE SERPL-MCNC: 3.7 MG/DL — SIGNIFICANT CHANGE UP (ref 2.5–4.5)
PLATELET # BLD AUTO: 217 K/UL — SIGNIFICANT CHANGE UP (ref 150–400)
POTASSIUM SERPL-MCNC: 4.6 MMOL/L — SIGNIFICANT CHANGE UP (ref 3.5–5.3)
POTASSIUM SERPL-SCNC: 4.6 MMOL/L — SIGNIFICANT CHANGE UP (ref 3.5–5.3)
PROT SERPL-MCNC: 7.3 G/DL — SIGNIFICANT CHANGE UP (ref 6–8.3)
PROTHROM AB SERPL-ACNC: 12.5 SEC — SIGNIFICANT CHANGE UP (ref 10.5–13.4)
RBC # BLD: 4.26 M/UL — SIGNIFICANT CHANGE UP (ref 4.2–5.8)
RBC # FLD: 14.1 % — SIGNIFICANT CHANGE UP (ref 10.3–14.5)
SODIUM SERPL-SCNC: 139 MMOL/L — SIGNIFICANT CHANGE UP (ref 135–145)
WBC # BLD: 7.28 K/UL — SIGNIFICANT CHANGE UP (ref 3.8–10.5)
WBC # FLD AUTO: 7.28 K/UL — SIGNIFICANT CHANGE UP (ref 3.8–10.5)

## 2023-06-09 PROCEDURE — 70498 CT ANGIOGRAPHY NECK: CPT | Mod: 26,MA

## 2023-06-09 PROCEDURE — 99285 EMERGENCY DEPT VISIT HI MDM: CPT

## 2023-06-09 PROCEDURE — 71045 X-RAY EXAM CHEST 1 VIEW: CPT | Mod: 26

## 2023-06-09 PROCEDURE — 0042T: CPT

## 2023-06-09 PROCEDURE — 70496 CT ANGIOGRAPHY HEAD: CPT | Mod: 26,MA

## 2023-06-09 RX ORDER — METOPROLOL TARTRATE 50 MG
1 TABLET ORAL
Qty: 0 | Refills: 0 | DISCHARGE

## 2023-06-09 RX ORDER — GLIMEPIRIDE 1 MG
1 TABLET ORAL
Qty: 0 | Refills: 0 | DISCHARGE

## 2023-06-09 RX ORDER — LORATADINE 10 MG/1
1 TABLET ORAL
Qty: 0 | Refills: 0 | DISCHARGE

## 2023-06-09 RX ORDER — GABAPENTIN 400 MG/1
1 CAPSULE ORAL
Qty: 0 | Refills: 0 | DISCHARGE

## 2023-06-09 RX ORDER — SITAGLIPTIN 50 MG/1
1 TABLET, FILM COATED ORAL
Qty: 0 | Refills: 0 | DISCHARGE

## 2023-06-09 RX ORDER — AMLODIPINE BESYLATE 2.5 MG/1
10 TABLET ORAL ONCE
Refills: 0 | Status: COMPLETED | OUTPATIENT
Start: 2023-06-09 | End: 2023-06-09

## 2023-06-09 RX ORDER — POLYETHYLENE GLYCOL 3350 17 G/17G
17 POWDER, FOR SOLUTION ORAL
Qty: 0 | Refills: 0 | DISCHARGE

## 2023-06-09 RX ORDER — SODIUM CHLORIDE 9 MG/ML
1000 INJECTION, SOLUTION INTRAVENOUS
Refills: 0 | Status: DISCONTINUED | OUTPATIENT
Start: 2023-06-09 | End: 2023-06-25

## 2023-06-09 RX ORDER — MEMANTINE HYDROCHLORIDE 10 MG/1
5 TABLET ORAL
Refills: 0 | Status: DISCONTINUED | OUTPATIENT
Start: 2023-06-09 | End: 2023-06-25

## 2023-06-09 RX ORDER — ASPIRIN/CALCIUM CARB/MAGNESIUM 324 MG
81 TABLET ORAL DAILY
Refills: 0 | Status: DISCONTINUED | OUTPATIENT
Start: 2023-06-09 | End: 2023-06-25

## 2023-06-09 RX ORDER — DEXTROSE 50 % IN WATER 50 %
25 SYRINGE (ML) INTRAVENOUS ONCE
Refills: 0 | Status: DISCONTINUED | OUTPATIENT
Start: 2023-06-09 | End: 2023-06-25

## 2023-06-09 RX ORDER — DEXTROSE 50 % IN WATER 50 %
15 SYRINGE (ML) INTRAVENOUS ONCE
Refills: 0 | Status: DISCONTINUED | OUTPATIENT
Start: 2023-06-09 | End: 2023-06-25

## 2023-06-09 RX ORDER — RANOLAZINE 500 MG/1
1 TABLET, FILM COATED, EXTENDED RELEASE ORAL
Qty: 0 | Refills: 0 | DISCHARGE

## 2023-06-09 RX ORDER — INSULIN LISPRO 100/ML
VIAL (ML) SUBCUTANEOUS
Refills: 0 | Status: DISCONTINUED | OUTPATIENT
Start: 2023-06-09 | End: 2023-06-25

## 2023-06-09 RX ORDER — ISOSORBIDE DINITRATE 5 MG/1
2 TABLET ORAL
Qty: 0 | Refills: 0 | DISCHARGE

## 2023-06-09 RX ORDER — TRIAMTERENE/HYDROCHLOROTHIAZID 75 MG-50MG
1 TABLET ORAL
Qty: 0 | Refills: 0 | DISCHARGE

## 2023-06-09 RX ORDER — DEXTROSE 50 % IN WATER 50 %
12.5 SYRINGE (ML) INTRAVENOUS ONCE
Refills: 0 | Status: DISCONTINUED | OUTPATIENT
Start: 2023-06-09 | End: 2023-06-25

## 2023-06-09 RX ORDER — VALSARTAN 80 MG/1
160 TABLET ORAL DAILY
Refills: 0 | Status: DISCONTINUED | OUTPATIENT
Start: 2023-06-09 | End: 2023-06-13

## 2023-06-09 RX ORDER — GABAPENTIN 400 MG/1
100 CAPSULE ORAL THREE TIMES A DAY
Refills: 0 | Status: DISCONTINUED | OUTPATIENT
Start: 2023-06-09 | End: 2023-06-25

## 2023-06-09 RX ORDER — ISOSORBIDE DINITRATE 5 MG/1
1 TABLET ORAL
Qty: 0 | Refills: 0 | DISCHARGE

## 2023-06-09 RX ORDER — GLUCAGON INJECTION, SOLUTION 0.5 MG/.1ML
1 INJECTION, SOLUTION SUBCUTANEOUS ONCE
Refills: 0 | Status: DISCONTINUED | OUTPATIENT
Start: 2023-06-09 | End: 2023-06-25

## 2023-06-09 RX ORDER — PANTOPRAZOLE SODIUM 20 MG/1
40 TABLET, DELAYED RELEASE ORAL
Refills: 0 | Status: DISCONTINUED | OUTPATIENT
Start: 2023-06-09 | End: 2023-06-25

## 2023-06-09 RX ORDER — INSULIN LISPRO 100/ML
VIAL (ML) SUBCUTANEOUS AT BEDTIME
Refills: 0 | Status: DISCONTINUED | OUTPATIENT
Start: 2023-06-09 | End: 2023-06-25

## 2023-06-09 RX ORDER — ATORVASTATIN CALCIUM 80 MG/1
1 TABLET, FILM COATED ORAL
Qty: 0 | Refills: 0 | DISCHARGE

## 2023-06-09 RX ORDER — OMEPRAZOLE 10 MG/1
1 CAPSULE, DELAYED RELEASE ORAL
Qty: 0 | Refills: 0 | DISCHARGE

## 2023-06-09 RX ADMIN — AMLODIPINE BESYLATE 10 MILLIGRAM(S): 2.5 TABLET ORAL at 22:40

## 2023-06-09 RX ADMIN — VALSARTAN 160 MILLIGRAM(S): 80 TABLET ORAL at 22:40

## 2023-06-09 NOTE — ED ADULT NURSE REASSESSMENT NOTE - NSFALLRISKINTERV_ED_ALL_ED
Assistance OOB with selected safe patient handling equipment if applicable/Assistance with ambulation/Communicate fall risk and risk factors to all staff, patient, and family/Monitor gait and stability/Provide visual cue: yellow wristband, yellow gown, etc/Reinforce activity limits and safety measures with patient and family/Call bell, personal items and telephone in reach/Instruct patient to call for assistance before getting out of bed/chair/stretcher/Non-slip footwear applied when patient is off stretcher/[place] to call system/Physically safe environment - no spills, clutter or unnecessary equipment/Purposeful Proactive Rounding/Room/bathroom lighting operational, light cord in reach

## 2023-06-09 NOTE — H&P ADULT - HISTORY OF PRESENT ILLNESS
77 y (M with a pmhx significant for aortic valve replacement on aspirin, hx of vertigo, HLD, BPH, DM, HTN presenting with room spinning sensation up standing.  Patient states that he had spinning sensation  on standing up and was unable to walk properly. Patient states this sensation is significantly more intense than his usually vertigo that he sometimes experiences.   Denies lightheadedness, nausea, vomiting, unilateral weakness, numbness, pins and needles, vision changes.

## 2023-06-09 NOTE — ED ADULT NURSE NOTE - OBJECTIVE STATEMENT
Pt came in via ambulance from the cardiologist office for dizziness that started today Code stroke called. Pt to cat scan. Pt unable to stand and very unsteady on his feet. Breathing easy and non labored. Pt alert and orientedX4. Pt's wife at bedside. Pt's speech clear.

## 2023-06-09 NOTE — ED PROVIDER NOTE - OBJECTIVE STATEMENT
Medically cleared by Dr. Frederick and GI . Continue current medications, Protonix added as per Dr. Frederick. Discharge reconciled with Dr. Frederick. Pt to follow up with GI and Oncology. Discharge home 77-year-old male past medical history of aortic valve replacement, bioprosthetic in 2009, hypertension, diabetes, cholecystectomy presented with acute onset of vertigo and now inability to walk at 9:30 AM this morning.  Patient reports he has some chronic mild vertigo with intermittent head pressure/headaches, however this morning he woke up and felt reasonably normal other than a very mild headache, while he was waiting for his taxi to go to his cardiology appointment he stood up and when he stood up he could not keep his balance, had severe vertigo and like anything is ever experienced, and increased head pressure.  His symptoms resolve when he is sitting down or lying back, he cannot provoke the symptoms with any movement of his head other than standing up.  He denies double or blurry vision, weakness in either of his arms or legs, nausea, vomiting, fever, chills, shortness of breath, chest pain, abdominal pain, diarrhea.  He does take daily aspirin.  Reports he was at his cardiology office because they are concerned that his aortic valve replacement is becoming more "leaky "and was scheduled to get a repeat echo and EKG today.

## 2023-06-09 NOTE — ED PROVIDER NOTE - ATTENDING CONTRIBUTION TO CARE
Attending MD Jiang:  I personally have seen and examined this patient. I have performed a substantive portion of the visit including all aspects of the medical decision making.  Resident note reviewed and agree on plan of care and except where noted.      69-year-old gent with a history of hypertension, hyperlipidemia, diabetes, aortic valve replacement presenting for evaluation of dizziness for 1 day as well as reportedly abnormal echocardiogram from outpatient cardiologist office.  The patient describes the dizziness as a spinning sensation he also has difficulty with balance.  He has not fallen though.    Vital signs nonactionable.  Patient sitting in the stretcher in no apparent distress.  Neuro exam with at 1 point what appeared to be fast beating nystagmus to the left with left end gaze however was not always present.  No obvious nystagmus with primary and right end gaze.  With ambulation patient was very unsteady falling in multiple directions.  He could not ambulate independently without assistance.  Normal finger-nose normal heel-to-shin bilaterally.  5/5 strength in bilateral upper and lower extremities.  Tongue midline symmetric smile.    77-year-old gentleman presenting for evaluation of dizziness and gait instability since 6am this morning.  Given the degree of gait instability in the setting of vertiginous symptoms, possible central etiology of  vertigo is a consideration.  Plan for code stroke activation given symptoms within 24 hours, CT CT angiogram neurology evaluation.  Patient also reportedly had an abnormal echocardiogram, I am not certain of the details of this as of yet however patient will be evaluated by cardiology       *The above represents an initial assessment/impression. Please refer to progress notes for potential changes in patient clinical course*

## 2023-06-09 NOTE — CONSULT NOTE ADULT - SUBJECTIVE AND OBJECTIVE BOX
Neurology - Consult Note    -  Spectra: [phone] ([facility]), [phone] ([facility])  -    HPI: Patient MARCELA FARAH is a 77y (1945) man with a PMHx significant for aortic valve replacement on aspirin, hx of vertigo, HLD, BPH, DM, HTN presenting with room spinning sensation up standing. LKW 9:30am (6/9)    LKW 9:30am (6/9)  NIHSS 0      Review of Systems:     Allergies:  No Known Allergies      PMHx/PSHx/Family Hx: As above, otherwise see below   HTN (hypertension)  Aortic valve disease  Diabetes mellitus  Hyperlipidemia  BPH (benign prostatic hypertrophy)  GERD (gastroesophageal reflux disease)  Chronic mastoiditis of left side  Gall stones    Social Hx:  No current use of tobacco, alcohol, or illicit drugs    Medications:  MEDICATIONS  (STANDING):  MEDICATIONS  (PRN):    Vitals:  T(C): 36.7 (06-09-23 @ 12:35), Max: 36.7 (06-09-23 @ 12:35)  HR: 61 (06-09-23 @ 12:35) (61 - 61)  BP: 148/54 (06-09-23 @ 12:35) (148/54 - 148/54)  RR: 16 (06-09-23 @ 12:35) (16 - 16)  SpO2: 98% (06-09-23 @ 12:35) (98% - 98%)    Physical Examination: INCOMPLETE  General - NAD    Neurologic Exam:  Mental status - Awake, Alert, Oriented to person, place, and time. Speech fluent, repetition and naming intact. Follows simple commands.     Cranial nerves - PERRLA, VFF, EOMI, face sensation (V1-V3) intact b/l, facial strength intact without asymmetry b/l, hearing intact b/l, palate with symmetric elevation, trapezius  5/5 strength b/l, tongue midline on protrusion with full lateral movement    Motor - Normal bulk and tone throughout. No pronator drift.  Strength testing            Deltoid      Biceps      Triceps           R            5                 5               5                     5                              5                        5                 5  L             5                 5               5                     5                              5                        5                 5              Hip Flexion      Knee Flexion    Knee Extension    Dorsiflexion    Plantar Flexion  R              5                           5                       5                           5                            5              L              5                           5                        5                           5                            5                  Sensation - Light touchintact throughout    DTR's -             Biceps      Triceps     Brachioradialis      Patellar    Ankle    Toes/plantar response  R             2+             2+                  2+                       2+            2+                 Down  L              2+             2+                 2+                        2+           2+                 Down    Coordination - Finger to Nose intact b/l. No tremors appreciated    Gait and station - Normal casual gait. Romberg (-)    Labs:                        12.1   7.28  )-----------( 217      ( 09 Jun 2023 14:21 )             37.6   Radiology:     Neurology - Consult Note    -  Spectra: [phone] ([facility]), [phone] ([facility])  -    HPI: Patient MARCELA FARAH is a 77y (1945) man with a PMHx significant for aortic valve replacement on aspirin, hx of vertigo, HLD, BPH, DM, HTN presenting with room spinning sensation up standing. LKW 9:30am (6/9). Patient reports he was watching a movie while sitting on the couch. He was waiting for a ride to take him to his cardiology appointment. Upon standing up he experienced room spinning sensation and was unable to walk properly. Patient states this sensation is significantly more intense than his usually vertigo that he sometimes experiences. The vertigo is only triggered by standing up and lasts until he sits down. Denies lightheadedness. Vertigo is not triggered by head movements or eye movements. He denies nausea, vomiting, unilateral weakness, numbness, pins and needles, vision changes. He is also experiencing head pressure he describes as tightness. This sensation does not change with position. Patient usually has good response to tylenol for headaches accompanied by vertigo. Patient told his cardiologist about these symptoms and he told him to come the ED. At the cardiology visit, it was noted that he had worsening aortic stenosis. Patient is asking for food.     LKW 9:30am (6/9)  NIHSS 0  mRS 0       Review of Systems:     Allergies:  No Known Allergies      PMHx/PSHx/Family Hx: As above, otherwise see below   HTN (hypertension)  Aortic valve disease  Diabetes mellitus  Hyperlipidemia  BPH (benign prostatic hypertrophy)  GERD (gastroesophageal reflux disease)  Chronic mastoiditis of left side  Gall stones    Social Hx:  No current use of tobacco, alcohol, or illicit drugs    Medications:  MEDICATIONS  (STANDING):  MEDICATIONS  (PRN):    Vitals:  T(C): 36.7 (06-09-23 @ 12:35), Max: 36.7 (06-09-23 @ 12:35)  HR: 61 (06-09-23 @ 12:35) (61 - 61)  BP: 148/54 (06-09-23 @ 12:35) (148/54 - 148/54)  RR: 16 (06-09-23 @ 12:35) (16 - 16)  SpO2: 98% (06-09-23 @ 12:35) (98% - 98%)    Physical Examination:  General - NAD    Neurologic Exam:  Mental status - Awake, Alert, Oriented to person, place, and time. Speech fluent, repetition and naming intact. Follows simple commands.     Cranial nerves - PERRLA, VFF, EOMI (minimal 2 beats of right beating nystagmus, not sustained)  face sensation (V1-V3) intact b/l, facial strength intact without asymmetry b/l, hearing intact b/l, palate with symmetric elevation, trapezius  5/5 strength b/l, tongue midline on protrusion with full lateral movement    Motor - Normal bulk and tone throughout. No pronator drift.  Strength testing            Deltoid      Biceps      Triceps           R            5                 5               5                     5                       L             5                 5               5                     5                                      Hip Flexion      Knee Flexion    Knee Extension    Dorsiflexion    Plantar Flexion  R              5                           5                       5                           5                            5              L              5                           5                        5                           5                            5                  Sensation - Light touch intact throughout    DTR's -             Biceps      Triceps     Brachioradialis      Patellar    Ankle    Toes/plantar response  R             2+             2+                  2+                       2+            2+                 Down  L              2+             2+                 2+                        2+           2+                 Down    Coordination - Finger to Nose intact b/l. No tremors appreciated    Gait and station - Normal narrow slow caution gait with assistance    HINTS  Head impulse exam: horizontal corrective saccades   H-test: normal. minimal 2 beats of right beating nystagmus, not sustained  Vertical skew: negative.     [place] (-)    Labs:                        12.1   7.28  )-----------( 217      ( 09 Jun 2023 14:21 )             37.6   Radiology:

## 2023-06-09 NOTE — ED PROVIDER NOTE - NSICDXPASTSURGICALHX_GEN_ALL_CORE_FT
PAST SURGICAL HISTORY:  Aortic valve replaced 2009 with bovine tissue valve    History of cholecystectomy     History of ear surgery ? surgery on left ear    Hx of cholecystectomy     S/P AVR (aortic valve replacement) 2009 @ [place] (bovine)

## 2023-06-09 NOTE — ED ADULT NURSE NOTE - NSICDXPASTSURGICALHX_GEN_ALL_CORE_FT
PAST SURGICAL HISTORY:  Aortic valve replaced 2009 with bovine tissue valve    History of cholecystectomy     History of ear surgery ? surgery on left ear    Hx of cholecystectomy     S/P AVR (aortic valve replacement) 2009 @ [place] (bovine)

## 2023-06-09 NOTE — ED PROVIDER NOTE - PHYSICAL EXAMINATION
GENERAL: Sitting comfortably in bed in no acute distress  NEURO: Pupils symmetric at 3mm with direct and consensual constriction intact bilaterally. No ptosis. Extraocular movements intact. Smile, eyebrow raise and eye close intact and symmetric bilaterally. No tongue deviation. Facial sensation intact and symmetric.  5/5 strength neck, and all 4 limbs. No pronator drift, tremor, clonus, or cogwheeling. Finger to nose and heel to shin test slow but reasonably accurate. slight fatiguable nystagmus when looking to the left and upward but only intermittently. Upon standing immediately is unable to balance, falls to the side and requires assistance.  HEENT: No conjunctival injection or scleral icterus.   CARD: Regular rate and rhythm, 2/6 systolic murmur and 3/6 decrescendo diastolic murmur.  RESP: Clear to auscultation bilaterally, No wheezes, rales or rhonchi. Good respiratory effort.  ABD: Bowel sounds active. Nondistended, Soft and nontender to palpation in all quadrants, no guarding, no rigidity. No masses appreciated.  EXT: No pedal edema. 2+DP pulses bilaterally.  SKIN: No rashes, bruising or acute skin injuries on face, limbs, abdomen, chest or back

## 2023-06-09 NOTE — H&P ADULT - ASSESSMENT
77 y (M with a pmhx significant for aortic valve replacement on aspirin, hx of vertigo, HLD, BPH, DM, HTN p      #Dizziness   Hx Vertigo   Check Orthostatics   Follow up Echo   Cards eval appreciated   Neuro eval appreciated     #HTN  Continue with Metorpolol, Norvasc   Hold Hydralazine, HCTZ           DM hiss   Follow up A1C

## 2023-06-09 NOTE — ED PROVIDER NOTE - NS ED MD DISPO DIVISION
Diagnosis: Liver Cancer  Regimen: Tecentriq/Avastin  Cycle/Day: Cycle Two Day One  Is this a C1D1 appt?  No    Dr. Sahu  is supervising clinician today.    ECOG:   ECOG [22 0946]   ECOG Performance Status 1       Review and verified Advanced Directives: No, patient has no interest in completing Advanced Directives at this time    Verified if patient has state DNR bracelet on: No; Full Code    Nursing Assessment:   A focused nursing assessment addressing the toxicity of chemotherapy was performed and the patient reports the following:  Nausea: NO  Vomiting: NO  Fever: NO  Chills: NO  Other signs of infection: NO  Bleeding: NO  Mucositis: NO  Diarrhea: NO  Constipation: NO  Anorexia: NO  Dysuria: NO  Urinary Bleeding: NO  Cough: NO  Shortness of Breath: NO  Fatigue/Weakness: YES manages fatigue with frequent rest periods  Numbness/Tingling: NO  Other Neuropathies: NO  Edema: NO  Rash: NO  Hand/Foot Syndrome: NO  Anxiety/Depression/Insomnia: NO  Pain: YES, Pain Ratin, Location: abdomen, Intervention:     Pre-Treatment: Treatment consent signed  Patient has valid pre-authorization  VS completed  Height and weight verified  BSA independently double checked & verified by two practitioners  Premed orders, including hydration, are verified prior to administration  Treatment parameters verified in patient protocol  Chemotherapy doses independently doubled checked & verified by two practitioners    Treatment: Refer to [facility] and MAR for line assessment and medication administration  Chemotherapy has not ; double checked & verified by two practitioners  Appearance and physical integrity of drugs meets standard of drug monograph; double checked & verified by two practitioners  Rate set on infusion pump is in alignment with ordered rate; double checked & verified by two practitioners  Blood return confirmed before, during and after treatment administered  Infusion pump used for non-vesicant drugs    Post  Treatment: Treatment tolerated well; no adverse reaction    Does the Patient have a central line?  yes:   Device: Port  Central Line Site: Right   Central Line Site Appearance: clear  Is this a port? Yes: Implanted port accessed using a 20 gauge non-coring needle primed with 0.9% sodium chloride. Good blood return obtained.  Blood obtained and sent to lab.  Site Care: Aseptic site care per protocol  Comments:   Central line flushed with: 20 ml 0.9 normal saline and 100 un/ml- 500 units heparin  Central line removed: no  Wagner Needle: Wagner needle de-accessed    Supervising Provider is:  Dr. Sahu       Transfusion: Not needed    Integrative Medicine: No    Oral Chemotherapy: No    Education: No new instructions needed    Next appointment scheduled: 3/16/2022  Patient instructed to call the office with any questions or concerns.    Patient Discharged: patient discharged to home per self, ambulatory, with family member        Pain 2 Abdomen  (0-10 scale)    Fall risk 20  (Carrizales Fall Risk)    ECOG 1  (Performance Status)   0 - Fully active, able to carry on all predisease activites without restrictions.1 - No physically strenuous activity, but ambulatory and able to carry out light or sedentary work.2 - Ambulatory/capable of self-care, unable to perform work activities. Up & about more than 50% of the day.3 - Capable of only limited self-care, confined to bed or chair more than 50% of waking hours.4 - Completely disabled, totally confined to bed or chair. Cannot carry on any self-care.    Pysch/Social No issues identified    Abuse/Neglect - No abuse/neglect concerns identified    Dr. Sahu is supervising physician today.   [facility]

## 2023-06-09 NOTE — CONSULT NOTE ADULT - SUBJECTIVE AND OBJECTIVE BOX
C A R D I O L O G Y  *********************    DATE OF SERVICE: 06-09-23    HISTORY OF PRESENT ILLNESS: HPI: Pt is a 77-year-old male with a past medical history of hypertension, diabetes, hyperlipidemia, bioprosthetic aortic valvereplacement in 2009 by Dr. Ted Piña status post recent lower extremity angiogram status post angioplasty of right anterior tibial artery who presents from the office due to unsteadiness of his feet and room spinning sensation.    He presented to our office today and was unable to stand up. He reported dizziness in the form of room spinning sensation. Denied any palpaitions, chest pain, orthopnea, PND , LE edema Presented to the ED and was found to have nystagmus hence a stroke alert was called.        PMHX: CAD (coronary artery disease); Diabetes; Hypercholesteremia; Hypertension; Hypothyroidism; Neuropathy; PVD (peripheral vascular disease)    PSHX: REPLACEMENT VALVE, 2009    Social HX: Alcohol - NON-DRINKER; Drugs - NO HISTORY OF DRUG ABUSE; Smoking - NEVER SMOKED    Fam HX: No Known Family Hx    Allergies: No Known Drug Allergies    Home Medications:   Januvia 50 mg tablet DAILY  Namenda 5 mg tablet 1 TABLET DAILY  Ventolin HFA 90 mcg/actuation aerosol inhaler  amlodipine 10 mg tablet 1 TABLET DAILY  atorvastatin 40 mg tablet  clotrimazole-betamethasone 1 %-0.05 % topical cream 1 APPLICATION AS DIRECTED  gabapentin 100 mg capsule 1 CAPSULE TID  glimepiride 4 mg tablet  hydralazine 100 mg tablet BID  hydrochlorothiazide 25 mg tablet DAILY  isosorbide mononitrate ER 60 mg tablet,extended release 24 hr  loratadine 10 mg tablet DAILY  losartan 160 mg 1 DAILY  memantine 5 mg tablet 1 TABLET DAILY  metoprolol succinate  mg tablet,extended release 24 hr DAILY  montelukast 10 mg tablet 1 TABLET DAILY  nitroglycerin 0.4 mg sublingual tablet 1 TABLET AS DIRECTED TAKE 1 TAB EVERY 5 MIN PRN CHEST PAIN. MAX DOSE3 A DAY  omeprazole 20 mg capsule,delayed release 1 CAPSULE DAILY    REVIEW OF SYSTEMS:  [ ]chest pain  [  ]shortness of breath  [  ]palpitations  [  ]syncope  [ ]near syncope [ ]upper extremity weakness   [ ] lower extremity weakness  [  ]diplopia  [  ]altered mental status   [  ]fevers  [ ]chills [ ]nausea  [ ]vomiting  [  ]dysphagia    [ ]abdominal pain  [ ]melena  [ ]BRBPR    [  ]epistaxis  [  ]rash    [ ]lower extremity edema    [X] All others negative	  [ ] Unable to obtain      LABS:	 	    CARDIAC MARKERS:                              12.1   7.28  )-----------( 217      ( 09 Jun 2023 14:21 )             37.6     Hb Trend: 12.1<--    06-09    139  |  102  |  31<H>  ----------------------------<  117<H>  4.6   |  25  |  1.45<H>    Ca    9.3      09 Jun 2023 14:21  Phos  3.7     06-09  Mg     2.2     06-09    TPro  7.3  /  Alb  4.1  /  TBili  0.6  /  DBili  x   /  AST  62<H>  /  ALT  24  /  AlkPhos  66  06-09    Creatinine Trend: 1.45<--    Coags:  PT/INR - ( 09 Jun 2023 14:21 )   PT: 12.5 sec;   INR: 1.08 ratio         PTT - ( 09 Jun 2023 14:21 )  PTT:22.8 sec      PHYSICAL EXAM:  T(C): 36.8 (06-09-23 @ 15:15), Max: 36.8 (06-09-23 @ 15:15)  HR: 66 (06-09-23 @ 15:15) (61 - 66)  BP: 145/55 (06-09-23 @ 15:15) (145/55 - 148/54)  RR: 22 (06-09-23 @ 15:15) (16 - 22)  SpO2: 96% (06-09-23 @ 15:15) (96% - 98%)  Wt(kg): --   BMI (kg/m2): 31.7 (06-09-23 @ 12:35)  I&O's Summary    General:  Alert and Oriented * 3.   Head: Normocephalic and atraumatic.   Neck: No JVD. No bruits. Supple. Does not appear to be enlarged.   Cardiovascular: + S1,S2 ; RRR Soft systolic murmur at the left lower sternal border. No rubs noted.    Lungs: CTA b/l. No rhonchi, rales or wheezes.   Abdomen: + BS, soft. Non tender. Non distended. No rebound. No guarding.   Extremities: No clubbing/cyanosis/edema.   Neurologic: Moves all four extremities. Full range of motion.   Skin: Warm and moist. The patient's skin has normal elasticity and good skin turgor.   Psychiatric: Appropriate mood and affect.  Musculoskeletal: Normal range of motion, normal strength       ECG:  	Pending    RADIOLOGY:         CXR:     Pending      TTE 05/2023  Left ventricle cavity is normal in size.  Normal global wall motion.  Doppler evidence of grade I (impaired) diastolic  dysfunction.  Calculated EF 62%.  Left atrial cavity is severely dilated.  Bioprosthetic aortic valve with no regurgitation.  Structurally normal mitral valve with mild (Grade I)  regurgitation.  Elevated gradient noted across the bioprosthetic aortic  valve.    ASSESSMENT/PLAN: Pt is a 77-year-old male with a past medical history of hypertension, diabetes, hyperlipidemia, bioprosthetic aortic valvereplacement in 2009 by Dr. Ted Piña status post recent lower extremity angiogram status post angioplasty of right anterior tibial artery who presents from the office due to unsteadiness of his feet and room spinning sensation.    1. Dizziness  - suspect vertigo  - f/u NEuro stroke work-up  - check orthostats  - recent office echo with elevated gradients across bio valve, check TTE and once neuro issues resolved check BALJINDER  - maintain tele  - c/w statin  - if no permissive HTN warranted c/w amlodipine, metoprolol, losartan, also on HCTZ, hydralazine and Isosorbide at home would hold these for now      .sug

## 2023-06-09 NOTE — PATIENT PROFILE ADULT - INTERNATIONAL TRAVEL
Subjective:     Chief Complaint/Reason for Admission:  Infant is a 0 days Girl Neris Cruran born at 38w4d  Infant female was born on 2023 at 8:34 AM via Vaginal, Spontaneous.    No data found    Maternal History:  The mother is a 27 y.o.   . She  has a past medical history of Asthma.     Prenatal Labs Review:  ABO/Rh:   Lab Results   Component Value Date/Time    GROUPTRH O POS 2022 02:43 AM    GROUPTRH O POS 2021 12:00 AM      Group B Beta Strep: Negative  HIV: 2021: HIV 1/2 Ag/Ab negative (Ref range: )  RPR:   Lab Results   Component Value Date/Time    RPR non reactive 2021 12:00 AM      Hepatitis B Surface Antigen:   Lab Results   Component Value Date/Time    HEPBSAG Negative 2021 12:00 AM      Rubella Immune Status:   Lab Results   Component Value Date/Time    RUBELLAIMMUN immune 2021 12:00 AM        Pregnancy/Delivery Course:  The pregnancy was uncomplicated. Prenatal ultrasound revealed normal anatomy. Prenatal care was good. Mother received no medications. Membrane rupture: <1 hour  Membrane Rupture Date 1:  (t)   Membrane Rupture Time 1: 0816 .  The delivery was uncomplicated and loose nuchal cord. Apgar scores: )   Assessment:     1 Minute:  Skin color:    Muscle tone:    Heart rate:    Breathing:    Grimace:    Total: 8          5 Minute:  Skin color:    Muscle tone:    Heart rate:    Breathing:    Grimace:    Total: 9          10 Minute:  Skin color:    Muscle tone:    Heart rate:    Breathing:    Grimace:    Total:          Living Status:      .  Review of Systems   Unable to perform ROS: Age       Objective:     Vital Signs (Most Recent)  Temp: 98.8 °F (37.1 °C) (22)  Pulse: 150 (22)  Resp: 47 (22)  SpO2: (!) 100 % (22)    Most Recent Weight: 3135 g (6 lb 14.6 oz) (Filed from Delivery Summary) (22 08)  Admission Weight: 3135 g (6 lb 14.6 oz) (Filed from Delivery Summary) (22  "5673)  Admission  Head Circumference: 34 cm   Admission Length: Height: 48.3 cm (19") (Filed from Delivery Summary)    Physical Exam  Vitals and nursing note reviewed.   Constitutional:       General: She is active.      Appearance: She is well-developed.   HENT:      Head: Anterior fontanelle is flat.      Nose: Nose normal.      Mouth/Throat:      Mouth: Mucous membranes are moist.      Pharynx: Oropharynx is clear. No cleft palate.   Eyes:      General: Red reflex is present bilaterally.      Conjunctiva/sclera: Conjunctivae normal.   Cardiovascular:      Rate and Rhythm: Normal rate and regular rhythm.      Heart sounds: No murmur heard.      Pulmonary:      Effort: Pulmonary effort is normal.      Breath sounds: Normal breath sounds.   Abdominal:      General: The umbilical stump is clean. Bowel sounds are normal.      Palpations: Abdomen is soft.   Genitourinary:     General: Normal vulva.      Rectum: Normal.   Musculoskeletal:         General: Normal range of motion.      Cervical back: Normal range of motion and neck supple.      Right hip: Negative right Ortolani and negative right Man.      Left hip: Negative left Ortolani and negative left Man.   Skin:     General: Skin is warm.      Capillary Refill: Capillary refill takes less than 2 seconds.      Turgor: Normal.      Coloration: Skin is not jaundiced.      Findings: No rash.   Neurological:      Mental Status: She is alert.      Motor: No abnormal muscle tone.      Primitive Reflexes: Suck normal. Symmetric [place].         No results found for this or any previous visit (from the past 168 hour(s)).  " No

## 2023-06-09 NOTE — PATIENT PROFILE ADULT - FALL HARM RISK - HARM RISK INTERVENTIONS
Assistance with ambulation/Assistance OOB with selected safe patient handling equipment/Communicate Risk of Fall with Harm to all staff/Discuss with provider need for PT consult/Monitor gait and stability/Provide patient with walking aids - walker, cane, crutches/Reinforce activity limits and safety measures with patient and family/Sit up slowly, dangle for a short time, stand at bedside before walking/Tailored Fall Risk Interventions/Visual Cue: Yellow wristband and red socks/Bed in lowest position, wheels locked, appropriate side rails in place/Call bell, personal items and telephone in reach/Instruct patient to call for assistance before getting out of bed or chair/Non-slip footwear when patient is out of bed/[place] to call system/Physically safe environment - no spills, clutter or unnecessary equipment/Purposeful Proactive Rounding/Room/bathroom lighting operational, light cord in reach

## 2023-06-09 NOTE — PROVIDER CONTACT NOTE (OTHER) - SITUATION
Patient has active order for q1 neuro checks. Patient admitted to telemetry for vertigo. SP Code stroke.

## 2023-06-09 NOTE — ED ADULT NURSE REASSESSMENT NOTE - NS ED NURSE REASSESS COMMENT FT1
Report received on patient by RN Braulio, patient resting in bed. Code stroke called earlier - see flow sheet for neuro assessment. VSS. Complaints of a headache, denies dizziness while laying in stretcher.

## 2023-06-09 NOTE — ED PROVIDER NOTE - NSICDXPASTMEDICALHX_GEN_ALL_CORE_FT
PAST MEDICAL HISTORY:  Aortic valve disease     BPH (benign prostatic hypertrophy)     Chronic mastoiditis of left side     Diabetes mellitus Type II    DM (diabetes mellitus)     Gall stones     GERD (gastroesophageal reflux disease)     High cholesterol     HTN (hypertension)     HTN (hypertension)     Hyperlipidemia

## 2023-06-09 NOTE — CONSULT NOTE ADULT - ATTENDING COMMENTS
Date of service: 6/10/2023    78 yo male w/ hx of aortic valve replacement on ASA, HLD, HTN, DM, HTN, p/w dizziness/vertigo. Of note, patient has a long standing hx of dizziness/vertigo, now more frequent in the last 1 year, occurs at least once a week and is accompanied by pressure sensation in the head, no visual disturbances or photophobia/phonophobia. He was at his cardiologist's office yesterday when he experienced an episode of dizziness while getting out of the taxi. He was recommend to present to ED for further evaluation. Code stroke activated in ED- CT head showed age determined atrohy and CTa H/N w/o significant stenosis/occlusion.     The dizziness is episodic, mostly when standing up, describes as "body spinning" sensation.     This morning he reports he feels a lot better, no dizziness, but still has mild pressure in his head.   His neurological exam is non focal. He is AO x 4. No dysarthria or aphasia. No facial asymmetry. PERRL. EOMI w/o nystagmus. No tremor or drift of UE. No motor weakness in UE or LE.     Imp:- Episodic dizziness accompanied by head pressure- unclear of exact etiology. Differentials are broad and include; BPPV, Orthostatic lightheadedness, cardiac arrythmia/pre-syncope vs other peripheral vestibular disorder. Lower suspicion for a cerebrovascular ischemic event. Less likely vestibular migraines, as headaches are not consistent w/ migraines.    Given recurring symptoms, would recommend MRI brain and IAC w/w/o contrast  Vestibular therapy as outpatient.   ENT follow up as outpatient.

## 2023-06-09 NOTE — ED PROVIDER NOTE - CLINICAL SUMMARY MEDICAL DECISION MAKING FREE TEXT BOX
77-year-old male past medical history aortic valve replacement presenting with acute onset inability to walk due to balance difficulties and vertigo that began at 9:30 AM this morning.  Vital signs on arrival are nonactionable, on exam patient is well-appearing, alert and oriented x4, neurologic exam significant for inability to stand up falling to the bed, very slow heel-to-shin test.  Slight fatigable nystagmus when looking to the left, on 1 instance when looking upwards, only intermittent finding.  Will call code stroke due to concern for posterior stroke.  We will get CBC, CMP, coags, mag, Phos, code stroke protocol head CTs with CTA and perfusion.

## 2023-06-09 NOTE — ED ADULT NURSE REASSESSMENT NOTE - NEURO BEHAVIOR
Physical Therapy Daily Treatment    Visit Count: 14  Plan of Care: 3/13/2019 Through: 6/5/2019  The following skilled interventions to be implemented to achieve above:  Dry Needling - Unlisted physical medicine/rehabilitation service or procedure (93363)  Manual Therapy (82511)  Neuromuscular Re-Education (80333)  Therapeutic Activity (58909)  Therapeutic Exercise (44576)  Electrical Stimulation (16556//66090)   Heat/Cold (06707)  Frequency/Duration: 2 times per week for 12 weeks with tapering as the patient progresses    Insurance Information: medicare  Precautions: no resisted internal rotation, osteopenia  Date of Surgery: 2/18/19; Surgery performed: right total shoulder replacement   Physician Guidelines yes- Motion:  PROM/stretch (AAROM), forward elevation to goal of 140 degrees, external rotation at the side to goal of 40 degrees, cross body adduction (gently), internal rotation behind the back (gently in 2-3 weeks)  Strength: isometrics (deltoid, external rotation), theraband (external rotation at the side), two hand supine-incline presses--Modalities: as indicated    Progress note on visit 10 on 4/10/19    SUBJECTIVE   Saw pain management for cervical pain: will get MRIs, imaging, discussed injections. Stopped PEMF treatments for neck, didn't help her. Doing exercises. No pain. Some days exercises are easier than others.   Current Pain (0-10 scale): 0/10 - right shoulder  Functional Change: easire time washing hair    OBJECTIVE     Range of Motion (degrees)    Left Right Right   Date Initial Initial (3/13/19) 4/24/19   Shoulder Flexion (170-180) 160 50 85 (pre) / 83 (post)   Shoulder Extension (50-60) 55 20    Shoulder Abduction (170-180) 145 35    Shoulder Internal Rotation () at L4 NT    Shoulder External Rotation (80-90) 75 30         Treatment   Therapeutic exercise: 25 minutes  Sidelying shoulder abduction x 10 reps  Sidelying shoulder flexion x 10 reps  Supine shoulder circles x 10 reps in  each direction  Supine scapular retraction x 10 reps   Supine bench press (3 lbs) 3 x 5 reps  One arm row (1 lb) x 10 reps   Standing shoulder flexion - yellow theraband x 10 reps   Standing bicep curl (3 lbs) x 10 reps   Standing shoulder adduction - yellow theraband 2 x 10 reps    Manual Therapy: 15 minutes  Right scapular passive range of motion with overpressure - all motions  Right shoulder passive range of motion   Soft tissue mobilization at right upper trapezius, levator scapulae, rhomboid, lower trapezius    Skilled input: inhibition of right upper trapezius activation (compensation).    Home Program: "[employer] access code: [email or web address]   * above=instructed home program    Home exercise program from home physical therapy: bicep theraband (standing), shoulder walk-outs(theraband), shoulder rows-theraband, standing shoulder flexion with cane, wall slides, table stretch, shoulder extensions - theraband, wall push-ups, standing shoulder flexion  (Patient instructed by this therapist to hold on home PT home exercises and focus on the ones she is instructed in today)  Supine shoulder flexion stretch (10 sec. hold)    Supine shoulder external rotation stretch with cane (10 sec. hold)   Incline bench press with cane   Standing shoulder flexion isometric against doorframe (5 sec. hold)  Standing shoulder flexion up the wall with towel   Standing shoulder internal rotation up the back stretch   Standing bilateral shoulder external rotation  Across body shoulder adduction stretch  (20 sec. hold)   Shoulder internal rotation isometric (5 sec. hold)      Writer verbally educated the patient and received verbal consent from the patient on hand placement, positioning of patient, and techniques to be performed today including clothing adjustments for techniques, therapist position for techniques, hand placement and palpation for techniques as described above and how they are pertinent to the patient's plan of care.       Suggestions for next session as indicated: progress per plan of care, manual therapy, neuromuscular re-duction - focus on proper shoulder and scapular mechanics.    ASSESSMENT:   Right shoulder passive range of motion WNL with the exception of abduction limited to about 110 degrees. Right shoulder flexion active range of motion in csukht=208 degrees, standing=85 degrees.  Result of above outlined education: Verbalizes understanding, Demonstrates understanding and Needs reinforcement    THERAPY DAILY BILLING   Insurance: MEDICARE 2. WPS    Evaluation Procedures:  No evaluation codes were used on this date of service    Timed Procedures:  Manual Therapy, 15 minutes  Therapeutic Exercise, 25 minutes    Untimed Procedures:  No untimed codes were used on this date of service    Total Treatment Time: 40 minutes   calm

## 2023-06-09 NOTE — ED PROVIDER NOTE - NS ED ROS FT
CONSTITUTIONAL - No fever, No weight change, No lightheadedness  SKIN - No rash  HEMATOLOGIC - No abnormal bleeding or bruising  EYES - No eye pain, No blurred vision  ENT - No change in hearing, No sore throat, No neck pain, No rhinorrhea, No ear pain  RESPIRATORY - No shortness of breath, No cough  CARDIAC -No chest pain, No palpitations  GI - No abdominal pain, No nausea, No vomiting, No diarrhea, No constipation  - No dysuria, no frequency, no hematuria.   MUSCULOSKELETAL - No joint pain, No swelling, No back pain  NEUROLOGIC - No numbness, No focal weakness, +headache, +vertigo, +balance and gait abnormality

## 2023-06-10 LAB
A1C WITH ESTIMATED AVERAGE GLUCOSE RESULT: 5.6 % — SIGNIFICANT CHANGE UP (ref 4–5.6)
ESTIMATED AVERAGE GLUCOSE: 114 MG/DL — SIGNIFICANT CHANGE UP (ref 68–114)
GLUCOSE BLDC GLUCOMTR-MCNC: 131 MG/DL — HIGH (ref 70–99)
GLUCOSE BLDC GLUCOMTR-MCNC: 141 MG/DL — HIGH (ref 70–99)
GLUCOSE BLDC GLUCOMTR-MCNC: 143 MG/DL — HIGH (ref 70–99)
GLUCOSE BLDC GLUCOMTR-MCNC: 178 MG/DL — HIGH (ref 70–99)

## 2023-06-10 PROCEDURE — 99223 1ST HOSP IP/OBS HIGH 75: CPT

## 2023-06-10 PROCEDURE — 70553 MRI BRAIN STEM W/O & W/DYE: CPT | Mod: 26

## 2023-06-10 RX ORDER — ATORVASTATIN CALCIUM 80 MG/1
40 TABLET, FILM COATED ORAL AT BEDTIME
Refills: 0 | Status: DISCONTINUED | OUTPATIENT
Start: 2023-06-10 | End: 2023-06-25

## 2023-06-10 RX ORDER — AMLODIPINE BESYLATE 2.5 MG/1
10 TABLET ORAL DAILY
Refills: 0 | Status: DISCONTINUED | OUTPATIENT
Start: 2023-06-10 | End: 2023-06-25

## 2023-06-10 RX ORDER — CHLORHEXIDINE GLUCONATE 213 G/1000ML
1 SOLUTION TOPICAL DAILY
Refills: 0 | Status: DISCONTINUED | OUTPATIENT
Start: 2023-06-10 | End: 2023-06-25

## 2023-06-10 RX ORDER — CLOPIDOGREL BISULFATE 75 MG/1
75 TABLET, FILM COATED ORAL DAILY
Refills: 0 | Status: DISCONTINUED | OUTPATIENT
Start: 2023-06-10 | End: 2023-06-25

## 2023-06-10 RX ORDER — ACETAMINOPHEN 500 MG
650 TABLET ORAL EVERY 6 HOURS
Refills: 0 | Status: DISCONTINUED | OUTPATIENT
Start: 2023-06-10 | End: 2023-06-25

## 2023-06-10 RX ADMIN — MEMANTINE HYDROCHLORIDE 5 MILLIGRAM(S): 10 TABLET ORAL at 05:09

## 2023-06-10 RX ADMIN — GABAPENTIN 100 MILLIGRAM(S): 400 CAPSULE ORAL at 21:06

## 2023-06-10 RX ADMIN — Medication 650 MILLIGRAM(S): at 18:22

## 2023-06-10 RX ADMIN — GABAPENTIN 100 MILLIGRAM(S): 400 CAPSULE ORAL at 13:42

## 2023-06-10 RX ADMIN — PANTOPRAZOLE SODIUM 40 MILLIGRAM(S): 20 TABLET, DELAYED RELEASE ORAL at 05:09

## 2023-06-10 RX ADMIN — MEMANTINE HYDROCHLORIDE 5 MILLIGRAM(S): 10 TABLET ORAL at 18:24

## 2023-06-10 RX ADMIN — Medication 81 MILLIGRAM(S): at 11:29

## 2023-06-10 RX ADMIN — GABAPENTIN 100 MILLIGRAM(S): 400 CAPSULE ORAL at 05:09

## 2023-06-10 RX ADMIN — CHLORHEXIDINE GLUCONATE 1 APPLICATION(S): 213 SOLUTION TOPICAL at 13:42

## 2023-06-10 RX ADMIN — Medication 650 MILLIGRAM(S): at 19:22

## 2023-06-10 RX ADMIN — ATORVASTATIN CALCIUM 40 MILLIGRAM(S): 80 TABLET, FILM COATED ORAL at 21:07

## 2023-06-10 NOTE — PROGRESS NOTE ADULT - NS ATTEND AMEND GEN_ALL_CORE FT
2 pair taking clear liquids w/ assistance of bedside PCA. minimally verbal / doesn't pay attention to conversation, very fatigued.  will follow along with your neuro workup.

## 2023-06-10 NOTE — PROGRESS NOTE ADULT - ASSESSMENT
77 y (M with a pmhx significant for aortic valve replacement on aspirin, hx of vertigo, HLD, BPH, DM, HTN p      #Dizziness   Hx Vertigo   Check Orthostatics   Follow up Echo   Cards eval appreciated   Neuro eval appreciated     #HTN  Continue with Metorpolol, Norvasc   Hold Hydralazine, HCTZ     # DM hiss   Follow up A1C     #PAD s/p Angioplasty anterior tibial artery   Plavix

## 2023-06-10 NOTE — PROVIDER CONTACT NOTE (OTHER) - SITUATION
Patient has bilateral unstageable to his great big toes. Right big toe 7x5 Left big toe 5x5. Patient has bilateral diabetic ulcers to his great big toes. Right big toe 7x5 Left big toe 5x5.

## 2023-06-10 NOTE — PROGRESS NOTE ADULT - SUBJECTIVE AND OBJECTIVE BOX
pt seen and examined, no complaints, ROS - .        aspirin  chewable 81 milliGRAM(s) Oral daily  chlorhexidine 2% Cloths 1 Application(s) Topical daily  dextrose 5%. 1000 milliLiter(s) IV Continuous <Continuous>  dextrose 5%. 1000 milliLiter(s) IV Continuous <Continuous>  dextrose 50% Injectable 12.5 Gram(s) IV Push once  dextrose 50% Injectable 25 Gram(s) IV Push once  dextrose 50% Injectable 25 Gram(s) IV Push once  dextrose Oral Gel 15 Gram(s) Oral once PRN  gabapentin 100 milliGRAM(s) Oral three times a day  glucagon  Injectable 1 milliGRAM(s) IntraMuscular once  insulin lispro (ADMELOG) corrective regimen sliding scale   SubCutaneous three times a day before meals  insulin lispro (ADMELOG) corrective regimen sliding scale   SubCutaneous at bedtime  memantine 5 milliGRAM(s) Oral two times a day  pantoprazole    Tablet 40 milliGRAM(s) Oral before breakfast  valsartan 160 milliGRAM(s) Oral daily                            12.1   7.28  )-----------( 217      ( 09 Jun 2023 14:21 )             37.6       Hemoglobin: 12.1 g/dL (06-09 @ 14:21)      06-09    139  |  102  |  31<H>  ----------------------------<  117<H>  4.6   |  25  |  1.45<H>    Ca    9.3      09 Jun 2023 14:21  Phos  3.7     06-09  Mg     2.2     06-09    TPro  7.3  /  Alb  4.1  /  TBili  0.6  /  DBili  x   /  AST  62<H>  /  ALT  24  /  AlkPhos  66  06-09    Creatinine Trend: 1.45<--    COAGS: PT/INR - ( 09 Jun 2023 14:21 )   PT: 12.5 sec;   INR: 1.08 ratio         PTT - ( 09 Jun 2023 14:21 )  PTT:22.8 sec          T(C): 36.9 (06-10-23 @ 07:40), Max: 36.9 (06-10-23 @ 07:40)  HR: 62 (06-10-23 @ 07:40) (56 - 73)  BP: 137/69 (06-10-23 @ 07:40) (122/63 - 165/73)  RR: 18 (06-10-23 @ 07:40) (15 - 22)  SpO2: 98% (06-10-23 @ 07:40) (95% - 98%)  Wt(kg): --    I&O's Summary    09 Jun 2023 07:01  -  10 Gelacio 2023 07:00  --------------------------------------------------------  IN: 0 mL / OUT: 550 mL / NET: -550 mL      General:  Alert and Oriented * 3.   Head: Normocephalic and atraumatic.   Neck: No JVD. No bruits. Supple. Does not appear to be enlarged.   Cardiovascular: + S1,S2 ; RRR Soft systolic murmur at the left lower sternal border. No rubs noted.    Lungs: CTA b/l. No rhonchi, rales or wheezes.   Abdomen: + BS, soft. Non tender. Non distended. No rebound. No guarding.   Extremities: No clubbing/cyanosis/edema.   Neurologic: Moves all four extremities. Full range of motion.   Skin: Warm and moist. The patient's skin has normal elasticity and good skin turgor.   Psychiatric: Appropriate mood and affect.  Musculoskeletal: Normal range of motion, normal strength         TTE 05/2023  Left ventricle cavity is normal in size.  Normal global wall motion.  Doppler evidence of grade I (impaired) diastolic  dysfunction.  Calculated EF 62%.  Left atrial cavity is severely dilated.  Bioprosthetic aortic valve with no regurgitation.  Structurally normal mitral valve with mild (Grade I)  regurgitation.  Elevated gradient noted across the bioprosthetic aortic  valve.    CT head: c< from: CT Angio Brain Stroke Protocol  w/ IV Cont (06.09.23 @ 14:49) >    Comparison is made with the prior CT of 7/3/2018.    No significant interval change is identified.      The ventricles and sulci are prominent consistent age appropriate   involutional changes. Small vessel white matter ischemic changes are   noted. There is no hemorrhage, mass, or shift of midline structures. Bone   window examination is unremarkable.    CT perfusion:    After the intravenous administration of 50 cc of Omnipaque 300 serial   thin sections were obtained through the brain for the purposes of   evaluating CT perfusion, 50 cc were discarded. Raw data was sent to the   rapid ischemia view software for postprocessing.    No core infarct or delayed mean transit time is identified.    < end of copied text >    ASSESSMENT/PLAN: Pt is a 77-year-old male with a past medical history of hypertension, diabetes, hyperlipidemia, bioprosthetic aortic valvereplacement in 2009 by Dr. Ted Piña status post recent lower extremity angiogram status post angioplasty of right anterior tibial artery who presents from the office due to unsteadiness of his feet and room spinning sensation.    1. Dizziness  - suspect vertigo  - f/u NEuro stroke work-up  - ct head with perfusion noted with no sig changes or occ.   - check orthostats  - echo pending.   - recent office echo with elevated gradients across bio valve, check TTE and once neuro issues resolved check BALJINDER  - tele stable   - c/w statin  - if no permissive HTN warranted c/w amlodipine, metoprolol, losartan, also on HCTZ, hydralazine and Isosorbide at home would hold these for now     Cardiology    pt seen and examined, no complaints, ROS - .        aspirin  chewable 81 milliGRAM(s) Oral daily  chlorhexidine 2% Cloths 1 Application(s) Topical daily  dextrose 5%. 1000 milliLiter(s) IV Continuous <Continuous>  dextrose 5%. 1000 milliLiter(s) IV Continuous <Continuous>  dextrose 50% Injectable 12.5 Gram(s) IV Push once  dextrose 50% Injectable 25 Gram(s) IV Push once  dextrose 50% Injectable 25 Gram(s) IV Push once  dextrose Oral Gel 15 Gram(s) Oral once PRN  gabapentin 100 milliGRAM(s) Oral three times a day  glucagon  Injectable 1 milliGRAM(s) IntraMuscular once  insulin lispro (ADMELOG) corrective regimen sliding scale   SubCutaneous three times a day before meals  insulin lispro (ADMELOG) corrective regimen sliding scale   SubCutaneous at bedtime  memantine 5 milliGRAM(s) Oral two times a day  pantoprazole    Tablet 40 milliGRAM(s) Oral before breakfast  valsartan 160 milliGRAM(s) Oral daily                            12.1   7.28  )-----------( 217      ( 09 Jun 2023 14:21 )             37.6       Hemoglobin: 12.1 g/dL (06-09 @ 14:21)      06-09    139  |  102  |  31<H>  ----------------------------<  117<H>  4.6   |  25  |  1.45<H>    Ca    9.3      09 Jun 2023 14:21  Phos  3.7     06-09  Mg     2.2     06-09    TPro  7.3  /  Alb  4.1  /  TBili  0.6  /  DBili  x   /  AST  62<H>  /  ALT  24  /  AlkPhos  66  06-09    Creatinine Trend: 1.45<--    COAGS: PT/INR - ( 09 Jun 2023 14:21 )   PT: 12.5 sec;   INR: 1.08 ratio         PTT - ( 09 Jun 2023 14:21 )  PTT:22.8 sec          T(C): 36.9 (06-10-23 @ 07:40), Max: 36.9 (06-10-23 @ 07:40)  HR: 62 (06-10-23 @ 07:40) (56 - 73)  BP: 137/69 (06-10-23 @ 07:40) (122/63 - 165/73)  RR: 18 (06-10-23 @ 07:40) (15 - 22)  SpO2: 98% (06-10-23 @ 07:40) (95% - 98%)  Wt(kg): --    I&O's Summary    09 Jun 2023 07:01  -  10 Gelacio 2023 07:00  --------------------------------------------------------  IN: 0 mL / OUT: 550 mL / NET: -550 mL      General:  Alert and Oriented * 3.   Head: Normocephalic and atraumatic.   Neck: No JVD. No bruits. Supple. Does not appear to be enlarged.   Cardiovascular: + S1,S2 ; RRR Soft systolic murmur at the left lower sternal border. No rubs noted.    Lungs: CTA b/l. No rhonchi, rales or wheezes.   Abdomen: + BS, soft. Non tender. Non distended. No rebound. No guarding.   Extremities: No clubbing/cyanosis/edema.   Neurologic: Moves all four extremities. Full range of motion.   Skin: Warm and moist. The patient's skin has normal elasticity and good skin turgor.   Psychiatric: Appropriate mood and affect.  Musculoskeletal: Normal range of motion, normal strength         TTE 05/2023  Left ventricle cavity is normal in size.  Normal global wall motion.  Doppler evidence of grade I (impaired) diastolic  dysfunction.  Calculated EF 62%.  Left atrial cavity is severely dilated.  Bioprosthetic aortic valve with no regurgitation.  Structurally normal mitral valve with mild (Grade I)  regurgitation.  Elevated gradient noted across the bioprosthetic aortic  valve.    CT head: c< from: CT Angio Brain Stroke Protocol  w/ IV Cont (06.09.23 @ 14:49) >    Comparison is made with the prior CT of 7/3/2018.    No significant interval change is identified.      The ventricles and sulci are prominent consistent age appropriate   involutional changes. Small vessel white matter ischemic changes are   noted. There is no hemorrhage, mass, or shift of midline structures. Bone   window examination is unremarkable.    CT perfusion:    After the intravenous administration of 50 cc of Omnipaque 300 serial   thin sections were obtained through the brain for the purposes of   evaluating CT perfusion, 50 cc were discarded. Raw data was sent to the   rapid ischemia view software for postprocessing.    No core infarct or delayed mean transit time is identified.    < end of copied text >    ASSESSMENT/PLAN: Pt is a 77-year-old male with a past medical history of hypertension, diabetes, hyperlipidemia, bioprosthetic aortic valvereplacement in 2009 by Dr. Ted Piña status post recent lower extremity angiogram status post angioplasty of right anterior tibial artery who presents from the office due to unsteadiness of his feet and room spinning sensation.    1. Dizziness  - suspect vertigo  - f/u NEuro stroke work-up  - ct head with perfusion noted with no sig changes or occ.   - check orthostats  - echo pending.   - recent office echo with elevated gradients across bio valve, check TTE and once neuro issues resolved check BALJINDER  - tele stable   - c/w statin  - if no permissive HTN warranted c/w amlodipine, metoprolol, losartan, also on HCTZ, hydralazine and Isosorbide at home would hold these for now

## 2023-06-10 NOTE — PROVIDER CONTACT NOTE (OTHER) - BACKGROUND
Dx: vertigo  Hx: DM, HTN, BPH, GERD
77 Y male presents with acute onset of vertigo and inability to walk. Sp code stroke. Pt pending MR head

## 2023-06-10 NOTE — PROVIDER CONTACT NOTE (OTHER) - REASON
Patient has bilateral unstageable to his great big toes Patient has bilateral diabetic ulcers to his great big toes

## 2023-06-10 NOTE — PROGRESS NOTE ADULT - SUBJECTIVE AND OBJECTIVE BOX
Patient is a 77y old  Male who presents with a chief complaint of Vertigo today (10 Gelacio 2023 09:57)      SUBJECTIVE / OVERNIGHT EVENTS: no events    MEDICATIONS  (STANDING):  aspirin  chewable 81 milliGRAM(s) Oral daily  chlorhexidine 2% Cloths 1 Application(s) Topical daily  dextrose 5%. 1000 milliLiter(s) (50 mL/Hr) IV Continuous <Continuous>  dextrose 5%. 1000 milliLiter(s) (100 mL/Hr) IV Continuous <Continuous>  dextrose 50% Injectable 25 Gram(s) IV Push once  dextrose 50% Injectable 12.5 Gram(s) IV Push once  dextrose 50% Injectable 25 Gram(s) IV Push once  gabapentin 100 milliGRAM(s) Oral three times a day  glucagon  Injectable 1 milliGRAM(s) IntraMuscular once  insulin lispro (ADMELOG) corrective regimen sliding scale   SubCutaneous at bedtime  insulin lispro (ADMELOG) corrective regimen sliding scale   SubCutaneous three times a day before meals  memantine 5 milliGRAM(s) Oral two times a day  pantoprazole    Tablet 40 milliGRAM(s) Oral before breakfast  valsartan 160 milliGRAM(s) Oral daily    MEDICATIONS  (PRN):  acetaminophen     Tablet .. 650 milliGRAM(s) Oral every 6 hours PRN Moderate Pain (4 - 6)  dextrose Oral Gel 15 Gram(s) Oral once PRN Blood Glucose LESS THAN 70 milliGRAM(s)/deciliter      CAPILLARY BLOOD GLUCOSE      POCT Blood Glucose.: 141 mg/dL (10 Gelacio 2023 11:33)  POCT Blood Glucose.: 131 mg/dL (10 Gelacio 2023 07:55)  POCT Blood Glucose.: 173 mg/dL (09 Jun 2023 22:38)  POCT Blood Glucose.: 163 mg/dL (09 Jun 2023 17:58)    I&O's Summary    09 Jun 2023 07:01  -  10 Gelacio 2023 07:00  --------------------------------------------------------  IN: 0 mL / OUT: 550 mL / NET: -550 mL    10 Gelacio 2023 07:01  -  10 Gelacio 2023 16:52  --------------------------------------------------------  IN: 400 mL / OUT: 2 mL / NET: 398 mL      T(C): 36.3 (06-10-23 @ 16:40), Max: 36.9 (06-10-23 @ 07:40)  HR: 60 (06-10-23 @ 16:40) (60 - 64)  BP: 157/67 (06-10-23 @ 16:40) (137/69 - 165/61)  RR: 18 (06-10-23 @ 16:40) (18 - 18)  SpO2: 97% (06-10-23 @ 16:40) (97% - 98%)    PHYSICAL EXAM:  GENERAL: NAD, well-developed  HEAD:  Atraumatic, Normocephalic  EYES: EOMI, PERRLA, conjunctiva and sclera clear  NECK: Supple, No JVD  CHEST/LUNG: Clear to auscultation bilaterally; No wheeze  HEART: Regular rate and rhythm; No murmurs, rubs, or gallops  ABDOMEN: Soft, Nontender, Nondistended; Bowel sounds present  EXTREMITIES:  2+ Peripheral Pulses, No clubbing, cyanosis, or edema  PSYCH: AAOx3  NEUROLOGY: non-focal  SKIN: No rashes or lesions    LABS:                        12.1   7.28  )-----------( 217      ( 09 Jun 2023 14:21 )             37.6     06-09    139  |  102  |  31<H>  ----------------------------<  117<H>  4.6   |  25  |  1.45<H>    Ca    9.3      09 Jun 2023 14:21  Phos  3.7     06-09  Mg     2.2     06-09    TPro  7.3  /  Alb  4.1  /  TBili  0.6  /  DBili  x   /  AST  62<H>  /  ALT  24  /  AlkPhos  66  06-09    PT/INR - ( 09 Jun 2023 14:21 )   PT: 12.5 sec;   INR: 1.08 ratio         PTT - ( 09 Jun 2023 14:21 )  PTT:22.8 sec          RADIOLOGY & ADDITIONAL TESTS:    Imaging Personally Reviewed:    Consultant(s) Notes Reviewed:      Care Discussed with Consultants/Other Providers:

## 2023-06-10 NOTE — PROVIDER CONTACT NOTE (OTHER) - ACTION/TREATMENT ORDERED:
Dietician and wound care ordered Patient follows up with outpatient doctor for debridement, patient stated that they are diabetic ulcers.

## 2023-06-11 LAB
GLUCOSE BLDC GLUCOMTR-MCNC: 114 MG/DL — HIGH (ref 70–99)
GLUCOSE BLDC GLUCOMTR-MCNC: 116 MG/DL — HIGH (ref 70–99)
GLUCOSE BLDC GLUCOMTR-MCNC: 197 MG/DL — HIGH (ref 70–99)
GLUCOSE BLDC GLUCOMTR-MCNC: 231 MG/DL — HIGH (ref 70–99)
MRSA PCR RESULT.: SIGNIFICANT CHANGE UP
S AUREUS DNA NOSE QL NAA+PROBE: SIGNIFICANT CHANGE UP

## 2023-06-11 RX ADMIN — GABAPENTIN 100 MILLIGRAM(S): 400 CAPSULE ORAL at 21:04

## 2023-06-11 RX ADMIN — MEMANTINE HYDROCHLORIDE 5 MILLIGRAM(S): 10 TABLET ORAL at 05:07

## 2023-06-11 RX ADMIN — VALSARTAN 160 MILLIGRAM(S): 80 TABLET ORAL at 05:07

## 2023-06-11 RX ADMIN — CLOPIDOGREL BISULFATE 75 MILLIGRAM(S): 75 TABLET, FILM COATED ORAL at 13:24

## 2023-06-11 RX ADMIN — GABAPENTIN 100 MILLIGRAM(S): 400 CAPSULE ORAL at 13:24

## 2023-06-11 RX ADMIN — AMLODIPINE BESYLATE 10 MILLIGRAM(S): 2.5 TABLET ORAL at 05:06

## 2023-06-11 RX ADMIN — Medication 2: at 08:52

## 2023-06-11 RX ADMIN — CHLORHEXIDINE GLUCONATE 1 APPLICATION(S): 213 SOLUTION TOPICAL at 13:24

## 2023-06-11 RX ADMIN — GABAPENTIN 100 MILLIGRAM(S): 400 CAPSULE ORAL at 05:06

## 2023-06-11 RX ADMIN — MEMANTINE HYDROCHLORIDE 5 MILLIGRAM(S): 10 TABLET ORAL at 18:13

## 2023-06-11 RX ADMIN — PANTOPRAZOLE SODIUM 40 MILLIGRAM(S): 20 TABLET, DELAYED RELEASE ORAL at 05:07

## 2023-06-11 RX ADMIN — Medication 81 MILLIGRAM(S): at 13:24

## 2023-06-11 RX ADMIN — ATORVASTATIN CALCIUM 40 MILLIGRAM(S): 80 TABLET, FILM COATED ORAL at 21:04

## 2023-06-11 NOTE — CHART NOTE - NSCHARTNOTEFT_GEN_A_CORE
NEUROLOGY FOLLOW-UP:    MRI brain reviewed during morning rounds with neurology attending. MRI results as below:    < from: MR Head w/wo IV Cont (06.10.23 @ 19:03) >  IMPRESSION:  1. Ischemic white matter disease and atrophy typical for age  2. Remote petechial hemorrhages  3. No evidence of infarction  < end of copied text >      -No further inpatient work-up is indicated at this time. Pt can obtain vestibular therapy and ENT follow-up outpatient. Please call [phone] for any questions.    Rosie Camp   PGY-2  Department of Neurology  [facility] [employer] [facility]

## 2023-06-11 NOTE — PROGRESS NOTE ADULT - SUBJECTIVE AND OBJECTIVE BOX
Patient is a 77y old  Male who presents with a chief complaint of Vertigo today (10 Gelacio 2023 09:57)      SUBJECTIVE / OVERNIGHT EVENTS: no events      T(C): 36.7 (06-11-23 @ 20:56), Max: 36.7 (06-11-23 @ 20:56)  HR: 72 (06-11-23 @ 20:56) (69 - 72)  BP: 140/61 (06-11-23 @ 20:56) (125/70 - 140/61)  RR: 18 (06-11-23 @ 20:56) (18 - 18)  SpO2: 95% (06-11-23 @ 20:56) (95% - 96%)      MEDICATIONS  (STANDING):  amLODIPine   Tablet 10 milliGRAM(s) Oral daily  aspirin  chewable 81 milliGRAM(s) Oral daily  atorvastatin 40 milliGRAM(s) Oral at bedtime  chlorhexidine 2% Cloths 1 Application(s) Topical daily  clopidogrel Tablet 75 milliGRAM(s) Oral daily  dextrose 5%. 1000 milliLiter(s) (50 mL/Hr) IV Continuous <Continuous>  dextrose 5%. 1000 milliLiter(s) (100 mL/Hr) IV Continuous <Continuous>  dextrose 50% Injectable 12.5 Gram(s) IV Push once  dextrose 50% Injectable 25 Gram(s) IV Push once  dextrose 50% Injectable 25 Gram(s) IV Push once  gabapentin 100 milliGRAM(s) Oral three times a day  glucagon  Injectable 1 milliGRAM(s) IntraMuscular once  insulin lispro (ADMELOG) corrective regimen sliding scale   SubCutaneous at bedtime  insulin lispro (ADMELOG) corrective regimen sliding scale   SubCutaneous three times a day before meals  memantine 5 milliGRAM(s) Oral two times a day  pantoprazole    Tablet 40 milliGRAM(s) Oral before breakfast  valsartan 160 milliGRAM(s) Oral daily    MEDICATIONS  (PRN):  acetaminophen     Tablet .. 650 milliGRAM(s) Oral every 6 hours PRN Moderate Pain (4 - 6)  dextrose Oral Gel 15 Gram(s) Oral once PRN Blood Glucose LESS THAN 70 milliGRAM(s)/deciliter    T(C): 36.3 (06-10-23 @ 16:40), Max: 36.9 (06-10-23 @ 07:40)  HR: 60 (06-10-23 @ 16:40) (60 - 64)  BP: 157/67 (06-10-23 @ 16:40) (137/69 - 165/61)  RR: 18 (06-10-23 @ 16:40) (18 - 18)  SpO2: 97% (06-10-23 @ 16:40) (97% - 98%)    PHYSICAL EXAM:  GENERAL: NAD, well-developed  HEAD:  Atraumatic, Normocephalic  EYES: EOMI, PERRLA, conjunctiva and sclera clear  NECK: Supple, No JVD  CHEST/LUNG: Clear to auscultation bilaterally; No wheeze  HEART: Regular rate and rhythm; No murmurs, rubs, or gallops  ABDOMEN: Soft, Nontender, Nondistended; Bowel sounds present  EXTREMITIES:  2+ Peripheral Pulses, No clubbing, cyanosis, or edema  PSYCH: AAOx3  NEUROLOGY: non-focal  SKIN: No rashes or lesions    CAPILLARY BLOOD GLUCOSE      POCT Blood Glucose.: 231 mg/dL (11 Jun 2023 21:12)  POCT Blood Glucose.: 116 mg/dL (11 Jun 2023 16:54)  POCT Blood Glucose.: 114 mg/dL (11 Jun 2023 11:24)  POCT Blood Glucose.: 197 mg/dL (11 Jun 2023 07:56)  RADIOLOGY & ADDITIONAL TESTS:    Imaging Personally Reviewed:    Consultant(s) Notes Reviewed:      Care Discussed with Consultants/Other Providers:

## 2023-06-11 NOTE — PROGRESS NOTE ADULT - ASSESSMENT
77 y (M with a pmhx significant for aortic valve replacement on aspirin, hx of vertigo, HLD, BPH, DM, HTN p      #Dizziness   Hx Vertigo   Check Orthostatics NEGATIVE   Follow up Echo   BALJINDER   Cards eval appreciated   Neuro eval appreciated     #HTN  Continue with Metorpolol, Norvasc   Hold Hydralazine, HCTZ     # DM hiss   Follow up A1C     #PAD s/p Angioplasty anterior tibial artery   Plavix

## 2023-06-11 NOTE — PROGRESS NOTE ADULT - SUBJECTIVE AND OBJECTIVE BOX
Cardiology     no chest pain        acetaminophen     Tablet .. 650 milliGRAM(s) Oral every 6 hours PRN  amLODIPine   Tablet 10 milliGRAM(s) Oral daily  aspirin  chewable 81 milliGRAM(s) Oral daily  atorvastatin 40 milliGRAM(s) Oral at bedtime  chlorhexidine 2% Cloths 1 Application(s) Topical daily  clopidogrel Tablet 75 milliGRAM(s) Oral daily  dextrose 5%. 1000 milliLiter(s) IV Continuous <Continuous>  dextrose 5%. 1000 milliLiter(s) IV Continuous <Continuous>  dextrose 50% Injectable 12.5 Gram(s) IV Push once  dextrose 50% Injectable 25 Gram(s) IV Push once  dextrose 50% Injectable 25 Gram(s) IV Push once  dextrose Oral Gel 15 Gram(s) Oral once PRN  gabapentin 100 milliGRAM(s) Oral three times a day  glucagon  Injectable 1 milliGRAM(s) IntraMuscular once  insulin lispro (ADMELOG) corrective regimen sliding scale   SubCutaneous at bedtime  insulin lispro (ADMELOG) corrective regimen sliding scale   SubCutaneous three times a day before meals  memantine 5 milliGRAM(s) Oral two times a day  pantoprazole    Tablet 40 milliGRAM(s) Oral before breakfast  valsartan 160 milliGRAM(s) Oral daily                            12.1   7.28  )-----------( 217      ( 09 Jun 2023 14:21 )             37.6       Hemoglobin: 12.1 g/dL (06-09 @ 14:21)      06-09    139  |  102  |  31<H>  ----------------------------<  117<H>  4.6   |  25  |  1.45<H>    Ca    9.3      09 Jun 2023 14:21  Phos  3.7     06-09  Mg     2.2     06-09    TPro  7.3  /  Alb  4.1  /  TBili  0.6  /  DBili  x   /  AST  62<H>  /  ALT  24  /  AlkPhos  66  06-09    Creatinine Trend: 1.45<--    COAGS:           T(C): 36.8 (06-11-23 @ 04:54), Max: 36.8 (06-10-23 @ 11:18)  HR: 69 (06-11-23 @ 04:54) (60 - 75)  BP: 150/65 (06-11-23 @ 04:54) (150/65 - 165/66)  RR: 18 (06-11-23 @ 04:54) (18 - 18)  SpO2: 97% (06-11-23 @ 04:54) (96% - 98%)  Wt(kg): --    I&O's Summary    10 Gelacio 2023 07:01  -  11 Jun 2023 07:00  --------------------------------------------------------  IN: 400 mL / OUT: 902 mL / NET: -502 mL        General:  Alert and Oriented * 3.   Head: Normocephalic and atraumatic.   Neck: No JVD. No bruits. Supple. Does not appear to be enlarged.   Cardiovascular: + S1,S2 ; RRR Soft systolic murmur at the left lower sternal border. No rubs noted.    Lungs: CTA b/l. No rhonchi, rales or wheezes.   Abdomen: + BS, soft. Non tender. Non distended. No rebound. No guarding.   Extremities: No clubbing/cyanosis/edema.   Neurologic: Moves all four extremities. Full range of motion.   Skin: Warm and moist. The patient's skin has normal elasticity and good skin turgor.   Psychiatric: Appropriate mood and affect.  Musculoskeletal: Normal range of motion, normal strength         TTE 05/2023  Left ventricle cavity is normal in size.  Normal global wall motion.  Doppler evidence of grade I (impaired) diastolic  dysfunction.  Calculated EF 62%.  Left atrial cavity is severely dilated.  Bioprosthetic aortic valve with no regurgitation.  Structurally normal mitral valve with mild (Grade I)  regurgitation.  Elevated gradient noted across the bioprosthetic aortic  valve.    CT head: c< from: CT Angio Brain Stroke Protocol  w/ IV Cont (06.09.23 @ 14:49) >    Comparison is made with the prior CT of 7/3/2018.    No significant interval change is identified.      The ventricles and sulci are prominent consistent age appropriate   involutional changes. Small vessel white matter ischemic changes are   noted. There is no hemorrhage, mass, or shift of midline structures. Bone   window examination is unremarkable.    CT perfusion:    After the intravenous administration of 50 cc of Omnipaque 300 serial   thin sections were obtained through the brain for the purposes of   evaluating CT perfusion, 50 cc were discarded. Raw data was sent to the   rapid ischemia view software for postprocessing.    No core infarct or delayed mean transit time is identified.    < end of copied text >    ASSESSMENT/PLAN: Pt is a 77-year-old male with a past medical history of hypertension, diabetes, hyperlipidemia, bioprosthetic aortic valvereplacement in 2009 by Dr. Ted Piña status post recent lower extremity angiogram status post angioplasty of right anterior tibial artery who presents from the office due to unsteadiness of his feet and room spinning sensation.    1. Dizziness  - suspect vertigo  - f/u NEuro stroke work-up  - ct head with perfusion noted with no sig changes or occ.   - check orthostats  - echo pending.   - recent office echo with elevated gradients across bio valve, check TTE and once neuro issues resolved check BALJINDER  - tele stable   - c/w statin  - if no permissive HTN warranted c/w amlodipine, metoprolol, losartan, also on HCTZ, hydralazine and Isosorbide at home would hold these for now     Cardiology     no chest pain        acetaminophen     Tablet .. 650 milliGRAM(s) Oral every 6 hours PRN  amLODIPine   Tablet 10 milliGRAM(s) Oral daily  aspirin  chewable 81 milliGRAM(s) Oral daily  atorvastatin 40 milliGRAM(s) Oral at bedtime  chlorhexidine 2% Cloths 1 Application(s) Topical daily  clopidogrel Tablet 75 milliGRAM(s) Oral daily  dextrose 5%. 1000 milliLiter(s) IV Continuous <Continuous>  dextrose 5%. 1000 milliLiter(s) IV Continuous <Continuous>  dextrose 50% Injectable 12.5 Gram(s) IV Push once  dextrose 50% Injectable 25 Gram(s) IV Push once  dextrose 50% Injectable 25 Gram(s) IV Push once  dextrose Oral Gel 15 Gram(s) Oral once PRN  gabapentin 100 milliGRAM(s) Oral three times a day  glucagon  Injectable 1 milliGRAM(s) IntraMuscular once  insulin lispro (ADMELOG) corrective regimen sliding scale   SubCutaneous at bedtime  insulin lispro (ADMELOG) corrective regimen sliding scale   SubCutaneous three times a day before meals  memantine 5 milliGRAM(s) Oral two times a day  pantoprazole    Tablet 40 milliGRAM(s) Oral before breakfast  valsartan 160 milliGRAM(s) Oral daily                            12.1   7.28  )-----------( 217      ( 09 Jun 2023 14:21 )             37.6       Hemoglobin: 12.1 g/dL (06-09 @ 14:21)      06-09    139  |  102  |  31<H>  ----------------------------<  117<H>  4.6   |  25  |  1.45<H>    Ca    9.3      09 Jun 2023 14:21  Phos  3.7     06-09  Mg     2.2     06-09    TPro  7.3  /  Alb  4.1  /  TBili  0.6  /  DBili  x   /  AST  62<H>  /  ALT  24  /  AlkPhos  66  06-09    Creatinine Trend: 1.45<--    COAGS:           T(C): 36.8 (06-11-23 @ 04:54), Max: 36.8 (06-10-23 @ 11:18)  HR: 69 (06-11-23 @ 04:54) (60 - 75)  BP: 150/65 (06-11-23 @ 04:54) (150/65 - 165/66)  RR: 18 (06-11-23 @ 04:54) (18 - 18)  SpO2: 97% (06-11-23 @ 04:54) (96% - 98%)  Wt(kg): --    I&O's Summary    10 Gelacio 2023 07:01  -  11 Jun 2023 07:00  --------------------------------------------------------  IN: 400 mL / OUT: 902 mL / NET: -502 mL        General:  Alert and Oriented * 3.   Head: Normocephalic and atraumatic.   Neck: No JVD. No bruits. Supple. Does not appear to be enlarged.   Cardiovascular: + S1,S2 ; RRR Soft systolic murmur at the left lower sternal border. No rubs noted.    Lungs: CTA b/l. No rhonchi, rales or wheezes.   Abdomen: + BS, soft. Non tender. Non distended. No rebound. No guarding.   Extremities: No clubbing/cyanosis/edema.   Neurologic: Moves all four extremities. Full range of motion.   Skin: Warm and moist. The patient's skin has normal elasticity and good skin turgor.   Psychiatric: Appropriate mood and affect.  Musculoskeletal: Normal range of motion, normal strength         TTE 05/2023  Left ventricle cavity is normal in size.  Normal global wall motion.  Doppler evidence of grade I (impaired) diastolic  dysfunction.  Calculated EF 62%.  Left atrial cavity is severely dilated.  Bioprosthetic aortic valve with no regurgitation.  Structurally normal mitral valve with mild (Grade I)  regurgitation.  Elevated gradient noted across the bioprosthetic aortic  valve.    CT head: c< from: CT Angio Brain Stroke Protocol  w/ IV Cont (06.09.23 @ 14:49) >    Comparison is made with the prior CT of 7/3/2018.    No significant interval change is identified.      The ventricles and sulci are prominent consistent age appropriate   involutional changes. Small vessel white matter ischemic changes are   noted. There is no hemorrhage, mass, or shift of midline structures. Bone   window examination is unremarkable.    CT perfusion:    After the intravenous administration of 50 cc of Omnipaque 300 serial   thin sections were obtained through the brain for the purposes of   evaluating CT perfusion, 50 cc were discarded. Raw data was sent to the   rapid ischemia view software for postprocessing.    No core infarct or delayed mean transit time is identified.    < end of copied text >    ASSESSMENT/PLAN: Pt is a 77-year-old male with a past medical history of hypertension, diabetes, hyperlipidemia, bioprosthetic aortic valvereplacement in 2009 by Dr. Ted Piña status post recent lower extremity angiogram status post angioplasty of right anterior tibial artery who presents from the office due to unsteadiness of his feet and room spinning sensation.    1. Dizziness  - suspect vertigo  - f/u NEuro stroke work-up  - ct head with perfusion noted with no sig changes or occ.   - check orthostats  - echo pending.   - recent office echo with elevated gradients across bio valve, check TTE and once neuro issues resolved check BALJINDER  - tele stable   - c/w statin  - if no permissive HTN warranted c/w amlodipine, metoprolol, losartan, also on HCTZ, hydralazine and Isosorbide at home would hold these for now  -MRI consistent with vestibular, no ischemic cause of dizziness.

## 2023-06-12 LAB
ANION GAP SERPL CALC-SCNC: 14 MMOL/L — SIGNIFICANT CHANGE UP (ref 5–17)
BUN SERPL-MCNC: 31 MG/DL — HIGH (ref 7–23)
CALCIUM SERPL-MCNC: 9.1 MG/DL — SIGNIFICANT CHANGE UP (ref 8.4–10.5)
CHLORIDE SERPL-SCNC: 101 MMOL/L — SIGNIFICANT CHANGE UP (ref 96–108)
CO2 SERPL-SCNC: 25 MMOL/L — SIGNIFICANT CHANGE UP (ref 22–31)
CREAT SERPL-MCNC: 1.55 MG/DL — HIGH (ref 0.5–1.3)
EGFR: 46 ML/MIN/1.73M2 — LOW
GLUCOSE BLDC GLUCOMTR-MCNC: 101 MG/DL — HIGH (ref 70–99)
GLUCOSE BLDC GLUCOMTR-MCNC: 110 MG/DL — HIGH (ref 70–99)
GLUCOSE BLDC GLUCOMTR-MCNC: 159 MG/DL — HIGH (ref 70–99)
GLUCOSE BLDC GLUCOMTR-MCNC: 180 MG/DL — HIGH (ref 70–99)
GLUCOSE BLDC GLUCOMTR-MCNC: 190 MG/DL — HIGH (ref 70–99)
GLUCOSE SERPL-MCNC: 180 MG/DL — HIGH (ref 70–99)
HCT VFR BLD CALC: 37.8 % — LOW (ref 39–50)
HGB BLD-MCNC: 12.6 G/DL — LOW (ref 13–17)
MCHC RBC-ENTMCNC: 28.4 PG — SIGNIFICANT CHANGE UP (ref 27–34)
MCHC RBC-ENTMCNC: 33.3 GM/DL — SIGNIFICANT CHANGE UP (ref 32–36)
MCV RBC AUTO: 85.1 FL — SIGNIFICANT CHANGE UP (ref 80–100)
NRBC # BLD: 0 /100 WBCS — SIGNIFICANT CHANGE UP (ref 0–0)
PLATELET # BLD AUTO: 181 K/UL — SIGNIFICANT CHANGE UP (ref 150–400)
POTASSIUM SERPL-MCNC: 3.5 MMOL/L — SIGNIFICANT CHANGE UP (ref 3.5–5.3)
POTASSIUM SERPL-SCNC: 3.5 MMOL/L — SIGNIFICANT CHANGE UP (ref 3.5–5.3)
RBC # BLD: 4.44 M/UL — SIGNIFICANT CHANGE UP (ref 4.2–5.8)
RBC # FLD: 13.3 % — SIGNIFICANT CHANGE UP (ref 10.3–14.5)
SODIUM SERPL-SCNC: 140 MMOL/L — SIGNIFICANT CHANGE UP (ref 135–145)
WBC # BLD: 6.55 K/UL — SIGNIFICANT CHANGE UP (ref 3.8–10.5)
WBC # FLD AUTO: 6.55 K/UL — SIGNIFICANT CHANGE UP (ref 3.8–10.5)

## 2023-06-12 PROCEDURE — 93306 TTE W/DOPPLER COMPLETE: CPT | Mod: 26

## 2023-06-12 RX ORDER — HYDRALAZINE HCL 50 MG
100 TABLET ORAL ONCE
Refills: 0 | Status: DISCONTINUED | OUTPATIENT
Start: 2023-06-12 | End: 2023-06-12

## 2023-06-12 RX ORDER — MECLIZINE HCL 12.5 MG
12.5 TABLET ORAL EVERY 8 HOURS
Refills: 0 | Status: DISCONTINUED | OUTPATIENT
Start: 2023-06-12 | End: 2023-06-25

## 2023-06-12 RX ORDER — ISOSORBIDE MONONITRATE 60 MG/1
60 TABLET, EXTENDED RELEASE ORAL DAILY
Refills: 0 | Status: DISCONTINUED | OUTPATIENT
Start: 2023-06-12 | End: 2023-06-25

## 2023-06-12 RX ORDER — SENNA PLUS 8.6 MG/1
1 TABLET ORAL ONCE
Refills: 0 | Status: COMPLETED | OUTPATIENT
Start: 2023-06-12 | End: 2023-06-12

## 2023-06-12 RX ORDER — ONDANSETRON 8 MG/1
4 TABLET, FILM COATED ORAL ONCE
Refills: 0 | Status: COMPLETED | OUTPATIENT
Start: 2023-06-12 | End: 2023-06-12

## 2023-06-12 RX ADMIN — MEMANTINE HYDROCHLORIDE 5 MILLIGRAM(S): 10 TABLET ORAL at 17:34

## 2023-06-12 RX ADMIN — AMLODIPINE BESYLATE 10 MILLIGRAM(S): 2.5 TABLET ORAL at 05:08

## 2023-06-12 RX ADMIN — MEMANTINE HYDROCHLORIDE 5 MILLIGRAM(S): 10 TABLET ORAL at 05:08

## 2023-06-12 RX ADMIN — ATORVASTATIN CALCIUM 40 MILLIGRAM(S): 80 TABLET, FILM COATED ORAL at 21:33

## 2023-06-12 RX ADMIN — ISOSORBIDE MONONITRATE 60 MILLIGRAM(S): 60 TABLET, EXTENDED RELEASE ORAL at 17:30

## 2023-06-12 RX ADMIN — GABAPENTIN 100 MILLIGRAM(S): 400 CAPSULE ORAL at 21:33

## 2023-06-12 RX ADMIN — Medication 1 TABLET(S): at 21:33

## 2023-06-12 RX ADMIN — SENNA PLUS 1 TABLET(S): 8.6 TABLET ORAL at 21:33

## 2023-06-12 RX ADMIN — Medication 2: at 07:47

## 2023-06-12 RX ADMIN — Medication 81 MILLIGRAM(S): at 12:06

## 2023-06-12 RX ADMIN — VALSARTAN 160 MILLIGRAM(S): 80 TABLET ORAL at 05:08

## 2023-06-12 RX ADMIN — CHLORHEXIDINE GLUCONATE 1 APPLICATION(S): 213 SOLUTION TOPICAL at 13:38

## 2023-06-12 RX ADMIN — Medication 2: at 12:06

## 2023-06-12 RX ADMIN — GABAPENTIN 100 MILLIGRAM(S): 400 CAPSULE ORAL at 05:08

## 2023-06-12 RX ADMIN — CLOPIDOGREL BISULFATE 75 MILLIGRAM(S): 75 TABLET, FILM COATED ORAL at 12:06

## 2023-06-12 RX ADMIN — Medication 12.5 MILLIGRAM(S): at 20:10

## 2023-06-12 RX ADMIN — ONDANSETRON 4 MILLIGRAM(S): 8 TABLET, FILM COATED ORAL at 14:24

## 2023-06-12 RX ADMIN — PANTOPRAZOLE SODIUM 40 MILLIGRAM(S): 20 TABLET, DELAYED RELEASE ORAL at 05:08

## 2023-06-12 NOTE — DIETITIAN INITIAL EVALUATION ADULT - ENERGY INTAKE
Adequate (%) Pt reports good PO intake/appetite. Consumed 100% of breakfast today (tray observed at bedside). Pt also receiving outside foods from family - noted chicken soup at bedside. RN flowsheets confirm Pt with adequate PO intake, consuming % of most meals. Encouraged Pt avoid foods high in sugar/fat/salt. Brief verbal diet education provided.

## 2023-06-12 NOTE — PROGRESS NOTE ADULT - SUBJECTIVE AND OBJECTIVE BOX
Patient is a 77y old  Male who presents with a chief complaint of Vertigo today (10 Gelacio 2023 09:57)      SUBJECTIVE / OVERNIGHT EVENTS: no events    T(C): 36.7 (06-12-23 @ 13:56), Max: 36.8 (06-12-23 @ 08:42)  HR: 104 (06-12-23 @ 17:35) (72 - 104)  BP: 131/67 (06-12-23 @ 17:35) (131/67 - 188/80)  RR: 18 (06-12-23 @ 17:35) (16 - 18)  SpO2: 97% (06-12-23 @ 17:35) (95% - 98%)      MEDICATIONS  (STANDING):  amLODIPine   Tablet 10 milliGRAM(s) Oral daily  aspirin  chewable 81 milliGRAM(s) Oral daily  atorvastatin 40 milliGRAM(s) Oral at bedtime  chlorhexidine 2% Cloths 1 Application(s) Topical daily  clopidogrel Tablet 75 milliGRAM(s) Oral daily  dextrose 5%. 1000 milliLiter(s) (50 mL/Hr) IV Continuous <Continuous>  dextrose 5%. 1000 milliLiter(s) (100 mL/Hr) IV Continuous <Continuous>  dextrose 50% Injectable 25 Gram(s) IV Push once  dextrose 50% Injectable 12.5 Gram(s) IV Push once  dextrose 50% Injectable 25 Gram(s) IV Push once  gabapentin 100 milliGRAM(s) Oral three times a day  glucagon  Injectable 1 milliGRAM(s) IntraMuscular once  hydrochlorothiazide 25 milliGRAM(s) Oral daily  insulin lispro (ADMELOG) corrective regimen sliding scale   SubCutaneous at bedtime  insulin lispro (ADMELOG) corrective regimen sliding scale   SubCutaneous three times a day before meals  isosorbide   mononitrate ER Tablet (IMDUR) 60 milliGRAM(s) Oral daily  memantine 5 milliGRAM(s) Oral two times a day  multivitamin 1 Tablet(s) Oral daily  pantoprazole    Tablet 40 milliGRAM(s) Oral before breakfast  valsartan 160 milliGRAM(s) Oral daily    MEDICATIONS  (PRN):  acetaminophen     Tablet .. 650 milliGRAM(s) Oral every 6 hours PRN Moderate Pain (4 - 6)  dextrose Oral Gel 15 Gram(s) Oral once PRN Blood Glucose LESS THAN 70 milliGRAM(s)/deciliter  meclizine 12.5 milliGRAM(s) Oral every 8 hours PRN Dizziness    PHYSICAL EXAM:  GENERAL: NAD, well-developed  HEAD:  Atraumatic, Normocephalic  EYES: EOMI, PERRLA, conjunctiva and sclera clear  NECK: Supple, No JVD  CHEST/LUNG: Clear to auscultation bilaterally; No wheeze  HEART: Regular rate and rhythm; No murmurs, rubs, or gallops  ABDOMEN: Soft, Nontender, Nondistended; Bowel sounds present  EXTREMITIES:  2+ Peripheral Pulses, No clubbing, cyanosis, or edema  PSYCH: AAOx3  NEUROLOGY: non-focal  SKIN: No rashes or lesions                            12.6   6.55  )-----------( 181      ( 12 Jun 2023 06:32 )             37.8               140|101|31<180  3.5|25|1.55  9.1,--,--  06-12 @ 06:32      CAPILLARY BLOOD GLUCOSE      POCT Blood Glucose.: 110 mg/dL (12 Jun 2023 17:29)  POCT Blood Glucose.: 101 mg/dL (12 Jun 2023 14:10)  POCT Blood Glucose.: 180 mg/dL (12 Jun 2023 12:01)  POCT Blood Glucose.: 159 mg/dL (12 Jun 2023 07:31)  POCT Blood Glucose.: 231 mg/dL (11 Jun 2023 21:12)      Imaging Personally Reviewed:    Consultant(s) Notes Reviewed:      Care Discussed with Consultants/Other Providers:

## 2023-06-12 NOTE — DIETITIAN INITIAL EVALUATION ADULT - NSFNSADHERENCEPTAFT_GEN_A_CORE
Pt denies following a therapeutic diet PTA but does report compliance with diabetes medications/insulin and states blood glucose has been controlled. Pt's home regimen includes: januvia, novolog, and trujeo solo star. HbA1c 5.6% (6/10), adequate glycemic control and fingersticks currently controlled.

## 2023-06-12 NOTE — DIETITIAN INITIAL EVALUATION ADULT - EDUCATION DIETARY MODIFICATIONS
consistent carbohydrate diet principles, low sodium/low fat diet; adequate protein intake/(1) partially meets; needs review/practice/verbalization

## 2023-06-12 NOTE — PROGRESS NOTE ADULT - SUBJECTIVE AND OBJECTIVE BOX
C A R D I O L O G Y  **********************************     DATE OF SERVICE: 06-12-23    Patient complaining of dizziness and nausea. denies chest pain or shortness of breath.   Review of systems otherwise negative.  	  MEDICATIONS:  MEDICATIONS  (STANDING):  amLODIPine   Tablet 10 milliGRAM(s) Oral daily  aspirin  chewable 81 milliGRAM(s) Oral daily  atorvastatin 40 milliGRAM(s) Oral at bedtime  chlorhexidine 2% Cloths 1 Application(s) Topical daily  clopidogrel Tablet 75 milliGRAM(s) Oral daily  dextrose 5%. 1000 milliLiter(s) (50 mL/Hr) IV Continuous <Continuous>  dextrose 5%. 1000 milliLiter(s) (100 mL/Hr) IV Continuous <Continuous>  dextrose 50% Injectable 12.5 Gram(s) IV Push once  dextrose 50% Injectable 25 Gram(s) IV Push once  dextrose 50% Injectable 25 Gram(s) IV Push once  gabapentin 100 milliGRAM(s) Oral three times a day  glucagon  Injectable 1 milliGRAM(s) IntraMuscular once  hydrochlorothiazide 25 milliGRAM(s) Oral daily  insulin lispro (ADMELOG) corrective regimen sliding scale   SubCutaneous at bedtime  insulin lispro (ADMELOG) corrective regimen sliding scale   SubCutaneous three times a day before meals  isosorbide   mononitrate ER Tablet (IMDUR) 60 milliGRAM(s) Oral daily  memantine 5 milliGRAM(s) Oral two times a day  multivitamin 1 Tablet(s) Oral daily  pantoprazole    Tablet 40 milliGRAM(s) Oral before breakfast  valsartan 160 milliGRAM(s) Oral daily      LABS:	 	    CARDIAC MARKERS:                                12.6   6.55  )-----------( 181      ( 12 Jun 2023 06:32 )             37.8     Hemoglobin: 12.6 g/dL (06-12 @ 06:32)  Hemoglobin: 12.1 g/dL (06-09 @ 14:21)      06-12    140  |  101  |  31<H>  ----------------------------<  180<H>  3.5   |  25  |  1.55<H>    Ca    9.1      12 Jun 2023 06:32      Creatinine Trend: 1.55<--, 1.45<--    COAGS:       proBNP:   Lipid Profile:   HgA1c:   TSH:       PHYSICAL EXAM:  T(C): 36.7 (06-12-23 @ 13:56), Max: 36.8 (06-12-23 @ 04:45)  HR: 100 (06-12-23 @ 13:56) (64 - 100)  BP: 173/83 (06-12-23 @ 13:56) (125/70 - 188/80)  RR: 16 (06-12-23 @ 12:10) (16 - 18)  SpO2: 98% (06-12-23 @ 12:45) (95% - 98%)  Wt(kg): --  I&O's Summary    11 Jun 2023 07:01  -  12 Jun 2023 07:00  --------------------------------------------------------  IN: 400 mL / OUT: 2 mL / NET: 398 mL    12 Jun 2023 07:01  -  12 Jun 2023 15:54  --------------------------------------------------------  IN: 440 mL / OUT: 0 mL / NET: 440 mL      Gen: NAD  HEENT:  (-)icterus (-)pallor  CV: N S1 S2 1/6 MORENA (+)2 Pulses B/l  Resp:  Clear to auscultation B/L, normal effort  GI: (+) BS Soft, NT, ND  Lymph:  (-)Edema, (-)obvious lymphadenopathy  Skin: Warm to touch, Normal turgor  Psych: Appropriate mood and affect      TELEMETRY: SB/SR 50-60s, currently ST 120s	      < from: TTE Limited W or WO Ultrasound Enhancing Agent (06.12.23 @ 10:51) >  CONCLUSIONS:      1. Technically difficult image quality.   2. There is increasedLV mass and concentric hypertrophy.   3. Mild left ventricular hypertrophy.   4. The left ventricular systolic function is normal with an ejection fraction of 68 % by Roach's method of disks. There are no regional wall motion abnormalities seen.   5. Normal right ventricular cavity size, normal wall thickness and normal systolic function. The tricuspid annular plane systolic excursion (TAPSE) is 1.9 cm (normal >=1.7 cm).   6. A bioprosthetic valve replacement present in the aortic position. Aortic valve is not well visualized. Calcualted EOA and dimensionless index are consistent with moderate prosthetic valve stenosis. Consider additional imaging of the aortic valve if clinically indicated.   7. Mild aortic regurgitation.   8. Aortic regurgitation appears intravalvular.   9. Trace mitral regurgitation.  10. Compared to the transesophageal echocardiogram performed on 7/12/2017 aortic valve gradients are higher on the current study. Peak/mean aortic valve gradients were 21/13 mmHg on theprior study.    < end of copied text >    < from: MR Head w/wo IV Cont (06.10.23 @ 19:03) >  IMPRESSION:    1. Ischemic white matter disease and atrophy typical for age    2. Remote petechial hemorrhages    3. No evidence of infarction    --- End of Report ---    < end of copied text >      ASSESSMENT/PLAN: Pt is a 77-year-old male with a past medical history of hypertension, diabetes, hyperlipidemia, bioprosthetic aortic valve replacement in 2009 by Dr. Ted Piña status post recent lower extremity angiogram status post angioplasty of right anterior tibial artery who presents from the office due to unsteadiness of his feet and room spinning sensation.    #Dizziness  - suspect vertigo  - neuro eval appreciated - MRI with no evidence of infarction  - orthostatics negative  - tele stable with no events    #Bio-AVR prosthetic valve stenosis  - recent office echo with elevated gradients across bioprosthetic aortic valve, inpatient TTE confirmed elevated gradients, moderate prosthetic valve stenosis  - Keep NPO after midnight for BALJINDER to further evaluate bio-AVR prosthetic valve stenosis    #HTN  - Continue Amlodipine and Valsartan. Restart HCTZ and Imdur. Will slowly reintroduce remaining Metoprolol and Hydralazine    #HLD  - Continue Lipitor    Howrad Victor PA-C  Pager: [phone]   C A R D I O L O G Y  **********************************     DATE OF SERVICE: 06-12-23    Patient complaining of dizziness and nausea. denies chest pain or shortness of breath.   Review of systems otherwise negative.  	  MEDICATIONS:  MEDICATIONS  (STANDING):  amLODIPine   Tablet 10 milliGRAM(s) Oral daily  aspirin  chewable 81 milliGRAM(s) Oral daily  atorvastatin 40 milliGRAM(s) Oral at bedtime  chlorhexidine 2% Cloths 1 Application(s) Topical daily  clopidogrel Tablet 75 milliGRAM(s) Oral daily  dextrose 5%. 1000 milliLiter(s) (50 mL/Hr) IV Continuous <Continuous>  dextrose 5%. 1000 milliLiter(s) (100 mL/Hr) IV Continuous <Continuous>  dextrose 50% Injectable 12.5 Gram(s) IV Push once  dextrose 50% Injectable 25 Gram(s) IV Push once  dextrose 50% Injectable 25 Gram(s) IV Push once  gabapentin 100 milliGRAM(s) Oral three times a day  glucagon  Injectable 1 milliGRAM(s) IntraMuscular once  hydrochlorothiazide 25 milliGRAM(s) Oral daily  insulin lispro (ADMELOG) corrective regimen sliding scale   SubCutaneous at bedtime  insulin lispro (ADMELOG) corrective regimen sliding scale   SubCutaneous three times a day before meals  isosorbide   mononitrate ER Tablet (IMDUR) 60 milliGRAM(s) Oral daily  memantine 5 milliGRAM(s) Oral two times a day  multivitamin 1 Tablet(s) Oral daily  pantoprazole    Tablet 40 milliGRAM(s) Oral before breakfast  valsartan 160 milliGRAM(s) Oral daily      LABS:	 	    CARDIAC MARKERS:                                12.6   6.55  )-----------( 181      ( 12 Jun 2023 06:32 )             37.8     Hemoglobin: 12.6 g/dL (06-12 @ 06:32)  Hemoglobin: 12.1 g/dL (06-09 @ 14:21)      06-12    140  |  101  |  31<H>  ----------------------------<  180<H>  3.5   |  25  |  1.55<H>    Ca    9.1      12 Jun 2023 06:32      Creatinine Trend: 1.55<--, 1.45<--    COAGS:       proBNP:   Lipid Profile:   HgA1c:   TSH:       PHYSICAL EXAM:  T(C): 36.7 (06-12-23 @ 13:56), Max: 36.8 (06-12-23 @ 04:45)  HR: 100 (06-12-23 @ 13:56) (64 - 100)  BP: 173/83 (06-12-23 @ 13:56) (125/70 - 188/80)  RR: 16 (06-12-23 @ 12:10) (16 - 18)  SpO2: 98% (06-12-23 @ 12:45) (95% - 98%)  Wt(kg): --  I&O's Summary    11 Jun 2023 07:01  -  12 Jun 2023 07:00  --------------------------------------------------------  IN: 400 mL / OUT: 2 mL / NET: 398 mL    12 Jun 2023 07:01  -  12 Jun 2023 15:54  --------------------------------------------------------  IN: 440 mL / OUT: 0 mL / NET: 440 mL      Gen: NAD  HEENT:  (-)icterus (-)pallor  CV: N S1 S2 1/6 MORENA (+)2 Pulses B/l  Resp:  Clear to auscultation B/L, normal effort  GI: (+) BS Soft, NT, ND  Lymph:  (-)Edema, (-)obvious lymphadenopathy  Skin: Warm to touch, Normal turgor  Psych: Appropriate mood and affect      TELEMETRY: SB/SR 50-60s, currently ST 120s	      < from: TTE Limited W or WO Ultrasound Enhancing Agent (06.12.23 @ 10:51) >  CONCLUSIONS:      1. Technically difficult image quality.   2. There is increasedLV mass and concentric hypertrophy.   3. Mild left ventricular hypertrophy.   4. The left ventricular systolic function is normal with an ejection fraction of 68 % by Roach's method of disks. There are no regional wall motion abnormalities seen.   5. Normal right ventricular cavity size, normal wall thickness and normal systolic function. The tricuspid annular plane systolic excursion (TAPSE) is 1.9 cm (normal >=1.7 cm).   6. A bioprosthetic valve replacement present in the aortic position. Aortic valve is not well visualized. Calcualted EOA and dimensionless index are consistent with moderate prosthetic valve stenosis. Consider additional imaging of the aortic valve if clinically indicated.   7. Mild aortic regurgitation.   8. Aortic regurgitation appears intravalvular.   9. Trace mitral regurgitation.  10. Compared to the transesophageal echocardiogram performed on 7/12/2017 aortic valve gradients are higher on the current study. Peak/mean aortic valve gradients were 21/13 mmHg on theprior study.    < end of copied text >    < from: MR Head w/wo IV Cont (06.10.23 @ 19:03) >  IMPRESSION:    1. Ischemic white matter disease and atrophy typical for age    2. Remote petechial hemorrhages    3. No evidence of infarction    --- End of Report ---    < end of copied text >      ASSESSMENT/PLAN: Pt is a 77-year-old male with a past medical history of hypertension, diabetes, hyperlipidemia, bioprosthetic aortic valve replacement in 2009 by Dr. Ted Piña status post recent lower extremity angiogram status post angioplasty of right anterior tibial artery who presents from the office due to unsteadiness of his feet and room spinning sensation.    #Dizziness  - suspect vertigo  - neuro eval appreciated - MRI with no evidence of infarction  - orthostatics negative  - tele stable with no events    #Bio-AVR prosthetic valve stenosis  - recent office echo with elevated gradients across bioprosthetic aortic valve, inpatient TTE confirmed elevated gradients, moderate prosthetic valve stenosis  - Keep NPO after midnight for BALJINDER to further evaluate bio-AVR prosthetic valve stenosis    #HTN  - Continue Amlodipine and Valsartan. Restart HCTZ and Imdur. Will slowly reintroduce remaining Metoprolol and Hydralazine    #HLD  - Continue Lipitor    #PAD  - Continue ASA/Plavix and Lipitor    Howard Victor PA-C  Pager: [phone]

## 2023-06-12 NOTE — PROGRESS NOTE ADULT - ASSESSMENT
77 y (M with a pmhx significant for aortic valve replacement on aspirin, hx of vertigo, HLD, BPH, DM, HTN p      #Dizziness   Hx Vertigo   Check Orthostatics NEGATIVE   Follow up Echo   BALJINDER   Cards eval appreciated   Neuro eval appreciated     # Bio AVR Prosthetic valve stenosis   - TTE confirmed elevated gradients, moderate prosthetic valve stenosis  - Keep NPO after midnight for BALJINDER to further evaluate bio-AVR prosthetic valve stenosis        #HTN  Continue with Metorpolol, Norvasc   Hold Hydralazine, HCTZ     # DM hiss   Follow up A1C     #PAD s/p Angioplasty anterior tibial artery   Plavix

## 2023-06-12 NOTE — DIETITIAN INITIAL EVALUATION ADULT - NSICDXPASTSURGICALHX_GEN_ALL_CORE_FT
PAST SURGICAL HISTORY:  Aortic valve replaced 2009 with bovine tissue valve    History of cholecystectomy     History of ear surgery ? surgery on left ear    Hx of cholecystectomy     S/P AVR (aortic valve replacement) 2009 @ [facility] (bovine)

## 2023-06-12 NOTE — DIETITIAN INITIAL EVALUATION ADULT - ORAL INTAKE PTA/DIET HISTORY
Chart reviewed, events noted. Pt reports good appetite PTA, eating everything/no restrictions. Pt does not eat beef or pork. NKFA. No issues chewing/swallowing.

## 2023-06-12 NOTE — DIETITIAN INITIAL EVALUATION ADULT - ADD RECOMMEND
1) continue current diet  2) RD to update no pork/beef preference  3) reinforce diet education at f/u and PRN  4) recommend MVI daily  5) adjust ISS PRN  6) Monitor PO intake, weight, labs, skin, GI status, diet

## 2023-06-12 NOTE — DIETITIAN INITIAL EVALUATION ADULT - PERTINENT MEDS FT
MEDICATIONS  (STANDING):  amLODIPine   Tablet 10 milliGRAM(s) Oral daily  aspirin  chewable 81 milliGRAM(s) Oral daily  atorvastatin 40 milliGRAM(s) Oral at bedtime  chlorhexidine 2% Cloths 1 Application(s) Topical daily  clopidogrel Tablet 75 milliGRAM(s) Oral daily  dextrose 5%. 1000 milliLiter(s) (50 mL/Hr) IV Continuous <Continuous>  dextrose 5%. 1000 milliLiter(s) (100 mL/Hr) IV Continuous <Continuous>  dextrose 50% Injectable 12.5 Gram(s) IV Push once  dextrose 50% Injectable 25 Gram(s) IV Push once  dextrose 50% Injectable 25 Gram(s) IV Push once  gabapentin 100 milliGRAM(s) Oral three times a day  glucagon  Injectable 1 milliGRAM(s) IntraMuscular once  insulin lispro (ADMELOG) corrective regimen sliding scale   SubCutaneous at bedtime  insulin lispro (ADMELOG) corrective regimen sliding scale   SubCutaneous three times a day before meals  memantine 5 milliGRAM(s) Oral two times a day  pantoprazole    Tablet 40 milliGRAM(s) Oral before breakfast  valsartan 160 milliGRAM(s) Oral daily    MEDICATIONS  (PRN):  acetaminophen     Tablet .. 650 milliGRAM(s) Oral every 6 hours PRN Moderate Pain (4 - 6)  dextrose Oral Gel 15 Gram(s) Oral once PRN Blood Glucose LESS THAN 70 milliGRAM(s)/deciliter

## 2023-06-12 NOTE — DIETITIAN INITIAL EVALUATION ADULT - REASON FOR ADMISSION
Per chart, Pt is a 76 y/o M with PMH: "hypertension, diabetes, hyperlipidemia, vertigo, bioprosthetic aortic valve replacement in 2009 on ASA by Dr. Ted Piña status post recent lower extremity angiogram status post angioplasty of right anterior tibial artery who presents from the office due to unsteadiness of his feet and room spinning sensation. Dizziness  most  likely  orthostatic vertigo - s/p code stroke imaging unremarkable."

## 2023-06-12 NOTE — DIETITIAN INITIAL EVALUATION ADULT - PERTINENT LABORATORY DATA
06-12    140  |  101  |  31<H>  ----------------------------<  180<H>  3.5   |  25  |  1.55<H>    Ca    9.1      12 Jun 2023 06:32    POCT Blood Glucose.: 159 mg/dL (06-12-23 @ 07:31)  A1C with Estimated Average Glucose Result: 5.6 % (06-10-23 @ 07:01)

## 2023-06-12 NOTE — PHYSICAL THERAPY INITIAL EVALUATION ADULT - ADDITIONAL COMMENTS
Patient lives with spouse in house with 5 steps to enter. Ambulates with a cane. Has HHA [id number] from "[employer] Living. HHA assists with ADLs. Patient lives with spouse in house with 5 steps to enter with rail. Ambulates with a cane, [id number] [facility]. HHA assists with ADLs.

## 2023-06-12 NOTE — DIETITIAN INITIAL EVALUATION ADULT - OTHER INFO
current dosing wt: 97.5 kg (6/9)  reported UBW: 88.6 kg  wt hx per [facility] HIE: 88 kg (3/27), 88 kg (1/5), 88.9 kg (8/8/22), 90.7 kg (5/13/22)  *no significant wt loss per chart review

## 2023-06-12 NOTE — PHYSICAL THERAPY INITIAL EVALUATION ADULT - PERTINENT HX OF CURRENT PROBLEM, REHAB EVAL
Pt is 77 y.o. M with a pmhx significant for aortic valve replacement on aspirin, hx of vertigo, HLD, BPH, DM, HTN presenting with room spinning sensation up standing. Patient states that he had spinning sensation on standing up and was unable to walk properly. Patient states this sensation is significantly more intense than his usually vertigo that he sometimes experiences. Denies lightheadedness, nausea, vomiting, unilateral weakness, numbness, pins and needles, vision changes.

## 2023-06-12 NOTE — PROGRESS NOTE ADULT - NS ATTEND AMEND GEN_ALL_CORE FT
Patient seen and examined. Agree with plan as detailed in PA/NP Note.   BALJINDER tomorrow    Albert Serna MD  Pager: [phone]

## 2023-06-12 NOTE — DIETITIAN INITIAL EVALUATION ADULT - NS FNS DIET ORDER
Diet, DASH/TLC:   Sodium & Cholesterol Restricted  Consistent Carbohydrate {Evening Snack} (CSTCHOSN) (06-09-23 @ 17:04) [Active]

## 2023-06-13 LAB
ANION GAP SERPL CALC-SCNC: 17 MMOL/L — SIGNIFICANT CHANGE UP (ref 5–17)
APPEARANCE UR: CLEAR — SIGNIFICANT CHANGE UP
BACTERIA # UR AUTO: NEGATIVE — SIGNIFICANT CHANGE UP
BILIRUB UR-MCNC: NEGATIVE — SIGNIFICANT CHANGE UP
BUN SERPL-MCNC: 37 MG/DL — HIGH (ref 7–23)
CALCIUM SERPL-MCNC: 8.6 MG/DL — SIGNIFICANT CHANGE UP (ref 8.4–10.5)
CHLORIDE SERPL-SCNC: 98 MMOL/L — SIGNIFICANT CHANGE UP (ref 96–108)
CHLORIDE UR-SCNC: <20 MMOL/L — SIGNIFICANT CHANGE UP
CO2 SERPL-SCNC: 21 MMOL/L — LOW (ref 22–31)
COLOR SPEC: YELLOW — SIGNIFICANT CHANGE UP
CREAT ?TM UR-MCNC: 148 MG/DL — SIGNIFICANT CHANGE UP
CREAT SERPL-MCNC: 2.11 MG/DL — HIGH (ref 0.5–1.3)
DIFF PNL FLD: NEGATIVE — SIGNIFICANT CHANGE UP
EGFR: 31 ML/MIN/1.73M2 — LOW
EPI CELLS # UR: 1 /HPF — SIGNIFICANT CHANGE UP
GLUCOSE BLDC GLUCOMTR-MCNC: 135 MG/DL — HIGH (ref 70–99)
GLUCOSE BLDC GLUCOMTR-MCNC: 158 MG/DL — HIGH (ref 70–99)
GLUCOSE BLDC GLUCOMTR-MCNC: 165 MG/DL — HIGH (ref 70–99)
GLUCOSE BLDC GLUCOMTR-MCNC: 199 MG/DL — HIGH (ref 70–99)
GLUCOSE SERPL-MCNC: 158 MG/DL — HIGH (ref 70–99)
GLUCOSE UR QL: NEGATIVE — SIGNIFICANT CHANGE UP
HYALINE CASTS # UR AUTO: 7 /LPF — HIGH (ref 0–2)
KETONES UR-MCNC: NEGATIVE — SIGNIFICANT CHANGE UP
LEUKOCYTE ESTERASE UR-ACNC: NEGATIVE — SIGNIFICANT CHANGE UP
NITRITE UR-MCNC: NEGATIVE — SIGNIFICANT CHANGE UP
PH UR: 5.5 — SIGNIFICANT CHANGE UP (ref 5–8)
POTASSIUM SERPL-MCNC: 3.6 MMOL/L — SIGNIFICANT CHANGE UP (ref 3.5–5.3)
POTASSIUM SERPL-SCNC: 3.6 MMOL/L — SIGNIFICANT CHANGE UP (ref 3.5–5.3)
POTASSIUM UR-SCNC: 36 MMOL/L — SIGNIFICANT CHANGE UP
PROT ?TM UR-MCNC: 31 MG/DL — HIGH (ref 0–12)
PROT UR-MCNC: ABNORMAL
PROT/CREAT UR-RTO: 0.2 RATIO — SIGNIFICANT CHANGE UP (ref 0–0.2)
RBC CASTS # UR COMP ASSIST: 1 /HPF — SIGNIFICANT CHANGE UP (ref 0–4)
SODIUM SERPL-SCNC: 136 MMOL/L — SIGNIFICANT CHANGE UP (ref 135–145)
SODIUM UR-SCNC: 21 MMOL/L — SIGNIFICANT CHANGE UP
SP GR SPEC: 1.01 — SIGNIFICANT CHANGE UP (ref 1.01–1.02)
UROBILINOGEN FLD QL: NEGATIVE — SIGNIFICANT CHANGE UP
WBC UR QL: 1 /HPF — SIGNIFICANT CHANGE UP (ref 0–5)

## 2023-06-13 PROCEDURE — 76770 US EXAM ABDO BACK WALL COMP: CPT | Mod: 26

## 2023-06-13 RX ORDER — SODIUM CHLORIDE 9 MG/ML
500 INJECTION INTRAMUSCULAR; INTRAVENOUS; SUBCUTANEOUS
Refills: 0 | Status: DISCONTINUED | OUTPATIENT
Start: 2023-06-13 | End: 2023-06-18

## 2023-06-13 RX ORDER — METOPROLOL TARTRATE 50 MG
100 TABLET ORAL DAILY
Refills: 0 | Status: DISCONTINUED | OUTPATIENT
Start: 2023-06-13 | End: 2023-06-25

## 2023-06-13 RX ORDER — POLYETHYLENE GLYCOL 3350 17 G/17G
17 POWDER, FOR SOLUTION ORAL AT BEDTIME
Refills: 0 | Status: DISCONTINUED | OUTPATIENT
Start: 2023-06-13 | End: 2023-06-25

## 2023-06-13 RX ORDER — SENNA PLUS 8.6 MG/1
2 TABLET ORAL AT BEDTIME
Refills: 0 | Status: DISCONTINUED | OUTPATIENT
Start: 2023-06-13 | End: 2023-06-25

## 2023-06-13 RX ADMIN — Medication 81 MILLIGRAM(S): at 13:51

## 2023-06-13 RX ADMIN — SODIUM CHLORIDE 75 MILLILITER(S): 9 INJECTION INTRAMUSCULAR; INTRAVENOUS; SUBCUTANEOUS at 13:50

## 2023-06-13 RX ADMIN — GABAPENTIN 100 MILLIGRAM(S): 400 CAPSULE ORAL at 13:51

## 2023-06-13 RX ADMIN — Medication 2: at 18:21

## 2023-06-13 RX ADMIN — Medication 2: at 07:38

## 2023-06-13 RX ADMIN — VALSARTAN 160 MILLIGRAM(S): 80 TABLET ORAL at 05:16

## 2023-06-13 RX ADMIN — GABAPENTIN 100 MILLIGRAM(S): 400 CAPSULE ORAL at 05:15

## 2023-06-13 RX ADMIN — MEMANTINE HYDROCHLORIDE 5 MILLIGRAM(S): 10 TABLET ORAL at 18:21

## 2023-06-13 RX ADMIN — GABAPENTIN 100 MILLIGRAM(S): 400 CAPSULE ORAL at 21:38

## 2023-06-13 RX ADMIN — ISOSORBIDE MONONITRATE 60 MILLIGRAM(S): 60 TABLET, EXTENDED RELEASE ORAL at 13:51

## 2023-06-13 RX ADMIN — MEMANTINE HYDROCHLORIDE 5 MILLIGRAM(S): 10 TABLET ORAL at 05:15

## 2023-06-13 RX ADMIN — SENNA PLUS 2 TABLET(S): 8.6 TABLET ORAL at 21:38

## 2023-06-13 RX ADMIN — AMLODIPINE BESYLATE 10 MILLIGRAM(S): 2.5 TABLET ORAL at 05:15

## 2023-06-13 RX ADMIN — PANTOPRAZOLE SODIUM 40 MILLIGRAM(S): 20 TABLET, DELAYED RELEASE ORAL at 05:16

## 2023-06-13 RX ADMIN — Medication 650 MILLIGRAM(S): at 13:52

## 2023-06-13 RX ADMIN — POLYETHYLENE GLYCOL 3350 17 GRAM(S): 17 POWDER, FOR SOLUTION ORAL at 18:23

## 2023-06-13 RX ADMIN — Medication 12.5 MILLIGRAM(S): at 13:50

## 2023-06-13 RX ADMIN — Medication 2: at 11:55

## 2023-06-13 RX ADMIN — CHLORHEXIDINE GLUCONATE 1 APPLICATION(S): 213 SOLUTION TOPICAL at 13:52

## 2023-06-13 RX ADMIN — Medication 12.5 MILLIGRAM(S): at 21:41

## 2023-06-13 RX ADMIN — ATORVASTATIN CALCIUM 40 MILLIGRAM(S): 80 TABLET, FILM COATED ORAL at 21:38

## 2023-06-13 RX ADMIN — CLOPIDOGREL BISULFATE 75 MILLIGRAM(S): 75 TABLET, FILM COATED ORAL at 13:51

## 2023-06-13 RX ADMIN — Medication 12.5 MILLIGRAM(S): at 05:15

## 2023-06-13 RX ADMIN — Medication 1 TABLET(S): at 13:51

## 2023-06-13 NOTE — PROGRESS NOTE ADULT - SUBJECTIVE AND OBJECTIVE BOX
Patient is a 77y old  Male who presents with a chief complaint of Vertigo today (10 Gelacio 2023 09:57)      SUBJECTIVE / OVERNIGHT EVENTS: no events        T(C): 37 (06-13-23 @ 12:16), Max: 37.4 (06-13-23 @ 04:47)  HR: 88 (06-13-23 @ 12:16) (84 - 89)  BP: 155/67 (06-13-23 @ 12:16) (125/61 - 155/67)  RR: 18 (06-13-23 @ 12:16) (16 - 18)  SpO2: 93% (06-13-23 @ 12:16) (93% - 94%)      MEDICATIONS  (STANDING):  amLODIPine   Tablet 10 milliGRAM(s) Oral daily  aspirin  chewable 81 milliGRAM(s) Oral daily  atorvastatin 40 milliGRAM(s) Oral at bedtime  chlorhexidine 2% Cloths 1 Application(s) Topical daily  clopidogrel Tablet 75 milliGRAM(s) Oral daily  dextrose 5%. 1000 milliLiter(s) (50 mL/Hr) IV Continuous <Continuous>  dextrose 5%. 1000 milliLiter(s) (100 mL/Hr) IV Continuous <Continuous>  dextrose 50% Injectable 12.5 Gram(s) IV Push once  dextrose 50% Injectable 25 Gram(s) IV Push once  dextrose 50% Injectable 25 Gram(s) IV Push once  gabapentin 100 milliGRAM(s) Oral three times a day  glucagon  Injectable 1 milliGRAM(s) IntraMuscular once  insulin lispro (ADMELOG) corrective regimen sliding scale   SubCutaneous three times a day before meals  insulin lispro (ADMELOG) corrective regimen sliding scale   SubCutaneous at bedtime  isosorbide   mononitrate ER Tablet (IMDUR) 60 milliGRAM(s) Oral daily  memantine 5 milliGRAM(s) Oral two times a day  metoprolol succinate  milliGRAM(s) Oral daily  multivitamin 1 Tablet(s) Oral daily  pantoprazole    Tablet 40 milliGRAM(s) Oral before breakfast  polyethylene glycol 3350 17 Gram(s) Oral at bedtime  senna 2 Tablet(s) Oral at bedtime  sodium chloride 0.9%. 500 milliLiter(s) (75 mL/Hr) IV Continuous <Continuous>    MEDICATIONS  (PRN):  acetaminophen     Tablet .. 650 milliGRAM(s) Oral every 6 hours PRN Moderate Pain (4 - 6)  dextrose Oral Gel 15 Gram(s) Oral once PRN Blood Glucose LESS THAN 70 milliGRAM(s)/deciliter  meclizine 12.5 milliGRAM(s) Oral every 8 hours PRN Dizziness    PHYSICAL EXAM:  GENERAL: NAD, well-developed  HEAD:  Atraumatic, Normocephalic  EYES: EOMI, PERRLA, conjunctiva and sclera clear  NECK: Supple, No JVD  CHEST/LUNG: Clear to auscultation bilaterally; No wheeze  HEART: Regular rate and rhythm; No murmurs, rubs, or gallops  ABDOMEN: Soft, Nontender, Nondistended; Bowel sounds present  EXTREMITIES:  2+ Peripheral Pulses, No clubbing, cyanosis, or edema  PSYCH: AAOx3  NEUROLOGY: non-focal  SKIN: No rashes or lesions                                     12.6   6.55  )-----------( 181      ( 12 Jun 2023 06:32 )             37.8               136|98|37<158  3.6|21|2.11  8.6,--,--  06-13 @ 07:13    CAPILLARY BLOOD GLUCOSE      POCT Blood Glucose.: 165 mg/dL (13 Jun 2023 11:27)  POCT Blood Glucose.: 158 mg/dL (13 Jun 2023 07:32)  POCT Blood Glucose.: 190 mg/dL (12 Jun 2023 21:18)  POCT Blood Glucose.: 110 mg/dL (12 Jun 2023 17:29)

## 2023-06-13 NOTE — PROGRESS NOTE ADULT - ASSESSMENT
77 y (M with a pmhx significant for aortic valve replacement on aspirin, hx of vertigo, HLD, BPH, DM, HTN p      #Dizziness   Hx Vertigo   Check Orthostatics NEGATIVE   Echo reviewed   Cards eval appreciated   Neuro eval appreciated     # Bio AVR Prosthetic valve stenosis   - TTE confirmed elevated gradients, moderate prosthetic valve stenosis  -  BALJINDER to further evaluate bio-AVR prosthetic valve stenosis        #HTN  Continue with Metorpolol, Norvasc   Continue Amlodipine and Imdur.   Hold HCTZ and Valsartan given SAMARIA. Restart Toprol XL at lower dose 100mg daily.    # DM hiss   Follow up A1C     #PAD s/p Angioplasty anterior tibial artery   Plavix

## 2023-06-13 NOTE — PROGRESS NOTE ADULT - NS ATTEND AMEND GEN_ALL_CORE FT
Patient seen and examined. Agree with plan as detailed in PA/NP Note.      Consult nephro for SAMARIA, hold HCTZ and Losartan, start Hydralazine if BP elevated tomorrow. PAYAL?        Albert Serna MD  Pager: [phone]

## 2023-06-13 NOTE — CONSULT NOTE ADULT - SUBJECTIVE AND OBJECTIVE BOX
[facility] Consult Note Nephrology - CONSULTATION NOTE    a 77-year-old male with a past medical history of hypertension, diabetes, hyperlipidemia, bioprosthetic aortic valvereplacement in 2009 by Dr. Ted Piña status post recent lower extremity angiogram status post angioplasty of right anterior tibial artery who presents from the office due to unsteadiness of his feet and room spinning sensation.    He presented to our office today and was unable to stand up. He reported dizziness in the form of room spinning sensation. Denied any palpaitions, chest pain, orthopnea, PND , LE edema Presented to the ED and was found to have nystagmus hence a stroke alert was called.    Renal consult for Yfn on ckd stage 3  cr appears to be around 1.5mg/dl, now with acute rise  denies any cp/sob/n/v/d/c      PAST MEDICAL & SURGICAL HISTORY:  HTN (hypertension)      Aortic valve disease      Diabetes mellitus  Type II      Hyperlipidemia      BPH (benign prostatic hypertrophy)      GERD (gastroesophageal reflux disease)      Chronic mastoiditis of left side      Gall stones      HTN (hypertension)      DM (diabetes mellitus)      High cholesterol      Aortic valve replaced  2009 with bovine tissue valve      Hx of cholecystectomy      S/P AVR (aortic valve replacement)  2009 @ [place] (bovine)      History of cholecystectomy      History of ear surgery  ? surgery on left ear        No Known Allergies    Home Medications Reviewed  Hospital Medications:   MEDICATIONS  (STANDING):  amLODIPine   Tablet 10 milliGRAM(s) Oral daily  aspirin  chewable 81 milliGRAM(s) Oral daily  atorvastatin 40 milliGRAM(s) Oral at bedtime  chlorhexidine 2% Cloths 1 Application(s) Topical daily  clopidogrel Tablet 75 milliGRAM(s) Oral daily  dextrose 5%. 1000 milliLiter(s) (50 mL/Hr) IV Continuous <Continuous>  dextrose 5%. 1000 milliLiter(s) (100 mL/Hr) IV Continuous <Continuous>  dextrose 50% Injectable 12.5 Gram(s) IV Push once  dextrose 50% Injectable 25 Gram(s) IV Push once  dextrose 50% Injectable 25 Gram(s) IV Push once  gabapentin 100 milliGRAM(s) Oral three times a day  glucagon  Injectable 1 milliGRAM(s) IntraMuscular once  insulin lispro (ADMELOG) corrective regimen sliding scale   SubCutaneous three times a day before meals  insulin lispro (ADMELOG) corrective regimen sliding scale   SubCutaneous at bedtime  isosorbide   mononitrate ER Tablet (IMDUR) 60 milliGRAM(s) Oral daily  memantine 5 milliGRAM(s) Oral two times a day  metoprolol succinate  milliGRAM(s) Oral daily  multivitamin 1 Tablet(s) Oral daily  pantoprazole    Tablet 40 milliGRAM(s) Oral before breakfast  polyethylene glycol 3350 17 Gram(s) Oral at bedtime  senna 2 Tablet(s) Oral at bedtime  sodium chloride 0.9%. 500 milliLiter(s) (75 mL/Hr) IV Continuous <Continuous>    SOCIAL HISTORY:  Denies ETOh,Smoking,   FAMILY HISTORY:  No pertinent family history in first degree relatives    No pertinent family history in first degree relatives      REVIEW OF SYSTEMS:  CONSTITUTIONAL: +weakness  EYES/ENT: No visual changes;  No vertigo or throat pain   NECK: No pain or stiffness  RESPIRATORY: No cough, wheezing, hemoptysis; No shortness of breath  CARDIOVASCULAR: No chest pain or palpitations.  GASTROINTESTINAL: No abdominal or epigastric pain. No nausea, vomiting, or hematemesis; No diarrhea or constipation. No melena or hematochezia.  GENITOURINARY: No dysuria, frequency, foamy urine, urinary urgency, incontinence or hematuria  NEUROLOGICAL: No numbness or weakness  SKIN: No itching, burning, rashes, or lesions   VASCULAR: No bilateral lower extremity edema.   All other review of systems is negative unless indicated above.    VITALS:  T(F): 98.6 (06-13-23 @ 12:16), Max: 100.1 (06-12-23 @ 20:49)  HR: 88 (06-13-23 @ 12:16)  BP: 155/67 (06-13-23 @ 12:16)  RR: 18 (06-13-23 @ 12:16)  SpO2: 93% (06-13-23 @ 12:16)  Wt(kg): --    06-12 @ 07:01  -  06-13 @ 07:00  --------------------------------------------------------  IN: 440 mL / OUT: 800 mL / NET: -360 mL    06-13 @ 07:01  -  06-13 @ 13:48  --------------------------------------------------------  IN: 0 mL / OUT: 0 mL / NET: 0 mL      Height (cm): 175.3 (06-13 @ 08:21)  Weight (kg): 97.5 (06-13 @ 08:21)  BMI (kg/m2): 31.7 (06-13 @ 08:21)  BSA (m2): 2.13 (06-13 @ 08:21)  PHYSICAL EXAM:  Constitutional: NAD  HEENT: anicteric sclera, oropharynx clear, MMM  Neck: No JVD  Respiratory: CTAB, no wheezes, rales or rhonchi  Cardiovascular: S1, S2, RRR  Gastrointestinal: BS+, soft, NT/ND  Extremities: No cyanosis or clubbing. No peripheral edema  Neurological: A/O x 3, no focal deficits  Psychiatric: Normal mood, normal affect  : No CVA tenderness. No velasco.   Skin: No rashes      LABS:  06-13    136  |  98  |  37<H>  ----------------------------<  158<H>  3.6   |  21<L>  |  2.11<H>    Ca    8.6      13 Jun 2023 07:13      Creatinine Trend: 2.11 <--, 1.55 <--, 1.45 <--                        12.6   6.55  )-----------( 181      ( 12 Jun 2023 06:32 )             37.8     Urine Studies:      RADIOLOGY & ADDITIONAL STUDIES:

## 2023-06-13 NOTE — PROGRESS NOTE ADULT - SUBJECTIVE AND OBJECTIVE BOX
C A R D I O [facility] O G [facility]  **********************************     DATE OF SERVICE: 06-13-23    Patient reports less dizziness today. denies chest pain or shortness of breath.   Review of symptoms otherwise negative.    MEDICATIONS:  acetaminophen     Tablet .. 650 milliGRAM(s) Oral every 6 hours PRN  amLODIPine   Tablet 10 milliGRAM(s) Oral daily  aspirin  chewable 81 milliGRAM(s) Oral daily  atorvastatin 40 milliGRAM(s) Oral at bedtime  chlorhexidine 2% Cloths 1 Application(s) Topical daily  clopidogrel Tablet 75 milliGRAM(s) Oral daily  dextrose 5%. 1000 milliLiter(s) IV Continuous <Continuous>  dextrose 5%. 1000 milliLiter(s) IV Continuous <Continuous>  dextrose 50% Injectable 12.5 Gram(s) IV Push once  dextrose 50% Injectable 25 Gram(s) IV Push once  dextrose 50% Injectable 25 Gram(s) IV Push once  dextrose Oral Gel 15 Gram(s) Oral once PRN  gabapentin 100 milliGRAM(s) Oral three times a day  glucagon  Injectable 1 milliGRAM(s) IntraMuscular once  insulin lispro (ADMELOG) corrective regimen sliding scale   SubCutaneous at bedtime  insulin lispro (ADMELOG) corrective regimen sliding scale   SubCutaneous three times a day before meals  isosorbide   mononitrate ER Tablet (IMDUR) 60 milliGRAM(s) Oral daily  meclizine 12.5 milliGRAM(s) Oral every 8 hours PRN  memantine 5 milliGRAM(s) Oral two times a day  metoprolol succinate  milliGRAM(s) Oral daily  multivitamin 1 Tablet(s) Oral daily  pantoprazole    Tablet 40 milliGRAM(s) Oral before breakfast  polyethylene glycol 3350 17 Gram(s) Oral at bedtime  senna 2 Tablet(s) Oral at bedtime  sodium chloride 0.9%. 500 milliLiter(s) IV Continuous <Continuous>      LABS:                        12.6   6.55  )-----------( 181      ( 12 Jun 2023 06:32 )             37.8       Hemoglobin: 12.6 g/dL (06-12 @ 06:32)  Hemoglobin: 12.1 g/dL (06-09 @ 14:21)      06-13    136  |  98  |  37<H>  ----------------------------<  158<H>  3.6   |  21<L>  |  2.11<H>    Ca    8.6      13 Jun 2023 07:13      Creatinine Trend: 2.11<--, 1.55<--, 1.45<--    COAGS:           PHYSICAL EXAM:  T(C): 37 (06-13-23 @ 12:16), Max: 37.8 (06-12-23 @ 20:49)  HR: 88 (06-13-23 @ 12:16) (84 - 106)  BP: 155/67 (06-13-23 @ 12:16) (97/57 - 155/67)  RR: 18 (06-13-23 @ 12:16) (16 - 18)  SpO2: 93% (06-13-23 @ 12:16) (91% - 97%)  Wt(kg): --    I&O's Summary    12 Jun 2023 07:01  -  13 Jun 2023 07:00  --------------------------------------------------------  IN: 440 mL / OUT: 800 mL / NET: -360 mL    13 Jun 2023 07:01  -  13 Jun 2023 13:56  --------------------------------------------------------  IN: 0 mL / OUT: 0 mL / NET: 0 mL        Gen: NAD  HEENT:  (-)icterus (-)pallor  CV: N S1 S2 1/6 MORENA (+)2 Pulses B/l  Resp:  Clear to auscultation B/L, normal effort  GI: (+) BS Soft, NT, ND  Lymph:  (-)Edema, (-)obvious lymphadenopathy  Skin: Warm to touch, Normal turgor  Psych: Appropriate mood and affect      TELEMETRY: SR       < from: TTE Limited W or WO Ultrasound Enhancing Agent (06.12.23 @ 10:51) >  CONCLUSIONS:      1. Technically difficult image quality.   2. There is increasedLV mass and concentric hypertrophy.   3. Mild left ventricular hypertrophy.   4. The left ventricular systolic function is normal with an ejection fraction of 68 % by Roach's method of disks. There are no regional wall motion abnormalities seen.   5. Normal right ventricular cavity size, normal wall thickness and normal systolic function. The tricuspid annular plane systolic excursion (TAPSE) is 1.9 cm (normal >=1.7 cm).   6. A bioprosthetic valve replacement present in the aortic position. Aortic valve is not well visualized. Calcualted EOA and dimensionless index are consistent with moderate prosthetic valve stenosis. Consider additional imaging of the aortic valve if clinically indicated.   7. Mild aortic regurgitation.   8. Aortic regurgitation appears intravalvular.   9. Trace mitral regurgitation.  10. Compared to the transesophageal echocardiogram performed on 7/12/2017 aortic valve gradients are higher on the current study. Peak/mean aortic valve gradients were 21/13 mmHg on theprior study.    < end of copied text >    < from: MR Head w/wo IV Cont (06.10.23 @ 19:03) >  IMPRESSION:    1. Ischemic white matter disease and atrophy typical for age    2. Remote petechial hemorrhages    3. No evidence of infarction    --- End of Report ---    < end of copied text >      ASSESSMENT/PLAN: Pt is a 77-year-old male with a past medical history of hypertension, diabetes, hyperlipidemia, bioprosthetic aortic valve replacement in 2009 by Dr. Ted Piña status post recent lower extremity angiogram status post angioplasty of right anterior tibial artery who presents from the office due to unsteadiness of his feet and room spinning sensation.    #Dizziness  - suspect vertigo  - neuro eval appreciated - MRI with no evidence of infarction  - orthostatics negative  - tele stable with no events    #Bio-AVR prosthetic valve stenosis  - recent office echo with elevated gradients across bioprosthetic aortic valve, inpatient TTE confirmed elevated gradients, moderate prosthetic valve stenosis  - Plan for eventual BALJINDER to further evaluate bio-AVR prosthetic valve stenosis - currently on hold until medically optimized/SAMARIA improving    #HTN  - Continue Amlodipine and Imdur. Hold HCTZ and Valsartan given SAMARIA. Restart Toprol XL at lower dose 100mg daily. Continue to hold home hydralazine for now - can reintroduce as needed    #HLD  - Continue Lipitor    #PAD  - Continue ASA/Plavix and Lipitor    #SAMARIA  - Follow up renal recs  - Will give 500ml IVF at 75 ml/hr  - Hold HCTZ and Valsartan    - Patient has f/u with Dr. Plummer on 7/7 at 11:30 AM    Howard Victor PA-C  Pager: [phone]   C A R D I O L O G Y  **********************************     DATE OF SERVICE: 06-13-23    Patient reports less dizziness today. denies chest pain or shortness of breath.   Review of symptoms otherwise negative.    MEDICATIONS:  acetaminophen     Tablet .. 650 milliGRAM(s) Oral every 6 hours PRN  amLODIPine   Tablet 10 milliGRAM(s) Oral daily  aspirin  chewable 81 milliGRAM(s) Oral daily  atorvastatin 40 milliGRAM(s) Oral at bedtime  chlorhexidine 2% Cloths 1 Application(s) Topical daily  clopidogrel Tablet 75 milliGRAM(s) Oral daily  dextrose 5%. 1000 milliLiter(s) IV Continuous <Continuous>  dextrose 5%. 1000 milliLiter(s) IV Continuous <Continuous>  dextrose 50% Injectable 12.5 Gram(s) IV Push once  dextrose 50% Injectable 25 Gram(s) IV Push once  dextrose 50% Injectable 25 Gram(s) IV Push once  dextrose Oral Gel 15 Gram(s) Oral once PRN  gabapentin 100 milliGRAM(s) Oral three times a day  glucagon  Injectable 1 milliGRAM(s) IntraMuscular once  insulin lispro (ADMELOG) corrective regimen sliding scale   SubCutaneous at bedtime  insulin lispro (ADMELOG) corrective regimen sliding scale   SubCutaneous three times a day before meals  isosorbide   mononitrate ER Tablet (IMDUR) 60 milliGRAM(s) Oral daily  meclizine 12.5 milliGRAM(s) Oral every 8 hours PRN  memantine 5 milliGRAM(s) Oral two times a day  metoprolol succinate  milliGRAM(s) Oral daily  multivitamin 1 Tablet(s) Oral daily  pantoprazole    Tablet 40 milliGRAM(s) Oral before breakfast  polyethylene glycol 3350 17 Gram(s) Oral at bedtime  senna 2 Tablet(s) Oral at bedtime  sodium chloride 0.9%. 500 milliLiter(s) IV Continuous <Continuous>      LABS:                        12.6   6.55  )-----------( 181      ( 12 Jun 2023 06:32 )             37.8       Hemoglobin: 12.6 g/dL (06-12 @ 06:32)  Hemoglobin: 12.1 g/dL (06-09 @ 14:21)      06-13    136  |  98  |  37<H>  ----------------------------<  158<H>  3.6   |  21<L>  |  2.11<H>    Ca    8.6      13 Jun 2023 07:13      Creatinine Trend: 2.11<--, 1.55<--, 1.45<--    COAGS:           PHYSICAL EXAM:  T(C): 37 (06-13-23 @ 12:16), Max: 37.8 (06-12-23 @ 20:49)  HR: 88 (06-13-23 @ 12:16) (84 - 106)  BP: 155/67 (06-13-23 @ 12:16) (97/57 - 155/67)  RR: 18 (06-13-23 @ 12:16) (16 - 18)  SpO2: 93% (06-13-23 @ 12:16) (91% - 97%)  Wt(kg): --    I&O's Summary    12 Jun 2023 07:01  -  13 Jun 2023 07:00  --------------------------------------------------------  IN: 440 mL / OUT: 800 mL / NET: -360 mL    13 Jun 2023 07:01  -  13 Jun 2023 13:56  --------------------------------------------------------  IN: 0 mL / OUT: 0 mL / NET: 0 mL        Gen: NAD  HEENT:  (-)icterus (-)pallor  CV: N S1 S2 1/6 MORENA (+)2 Pulses B/l  Resp:  Clear to auscultation B/L, normal effort  GI: (+) BS Soft, NT, ND  Lymph:  (-)Edema, (-)obvious lymphadenopathy  Skin: Warm to touch, Normal turgor  Psych: Appropriate mood and affect      TELEMETRY: SR       < from: TTE Limited W or WO Ultrasound Enhancing Agent (06.12.23 @ 10:51) >  CONCLUSIONS:      1. Technically difficult image quality.   2. There is increasedLV mass and concentric hypertrophy.   3. Mild left ventricular hypertrophy.   4. The left ventricular systolic function is normal with an ejection fraction of 68 % by Roach's method of disks. There are no regional wall motion abnormalities seen.   5. Normal right ventricular cavity size, normal wall thickness and normal systolic function. The tricuspid annular plane systolic excursion (TAPSE) is 1.9 cm (normal >=1.7 cm).   6. A bioprosthetic valve replacement present in the aortic position. Aortic valve is not well visualized. Calcualted EOA and dimensionless index are consistent with moderate prosthetic valve stenosis. Consider additional imaging of the aortic valve if clinically indicated.   7. Mild aortic regurgitation.   8. Aortic regurgitation appears intravalvular.   9. Trace mitral regurgitation.  10. Compared to the transesophageal echocardiogram performed on 7/12/2017 aortic valve gradients are higher on the current study. Peak/mean aortic valve gradients were 21/13 mmHg on theprior study.    < end of copied text >    < from: MR Head w/wo IV Cont (06.10.23 @ 19:03) >  IMPRESSION:    1. Ischemic white matter disease and atrophy typical for age    2. Remote petechial hemorrhages    3. No evidence of infarction    --- End of Report ---    < end of copied text >      ASSESSMENT/PLAN: Pt is a 77-year-old male with a past medical history of hypertension, diabetes, hyperlipidemia, bioprosthetic aortic valve replacement in 2009 by Dr. Ted Piña status post recent lower extremity angiogram status post angioplasty of right anterior tibial artery who presents from the office due to unsteadiness of his feet and room spinning sensation.    #Dizziness  - suspect vertigo  - neuro eval appreciated - MRI with no evidence of infarction  - orthostatics negative  - tele stable with no events    #Bio-AVR prosthetic valve stenosis  - recent office echo with elevated gradients across bioprosthetic aortic valve, inpatient TTE confirmed elevated gradients, moderate prosthetic valve stenosis  - Plan for eventual BALJINDER to further evaluate bio-AVR prosthetic valve stenosis - currently on hold until medically optimized/SAMARIA improving    #HTN  - Continue Amlodipine and Imdur. Hold HCTZ and Valsartan given SAMARIA. Restart Toprol XL at lower dose 100mg daily. Continue to hold home hydralazine for now - can reintroduce as needed    #HLD  - Continue Lipitor    #PAD  - Continue ASA/Plavix and Lipitor    #SAMARIA  - Suspect contrast induced  - Follow up renal recs  - Will give 500ml IVF at 75 ml/hr  - Hold HCTZ and Valsartan    - Patient has f/u with Dr. Plummer on 7/7 at 11:30 AM    Howard Victor PA-C  Pager: [phone]

## 2023-06-14 LAB
ANION GAP SERPL CALC-SCNC: 15 MMOL/L — SIGNIFICANT CHANGE UP (ref 5–17)
BUN SERPL-MCNC: 46 MG/DL — HIGH (ref 7–23)
CALCIUM SERPL-MCNC: 8.7 MG/DL — SIGNIFICANT CHANGE UP (ref 8.4–10.5)
CHLORIDE SERPL-SCNC: 99 MMOL/L — SIGNIFICANT CHANGE UP (ref 96–108)
CO2 SERPL-SCNC: 23 MMOL/L — SIGNIFICANT CHANGE UP (ref 22–31)
CREAT SERPL-MCNC: 2.28 MG/DL — HIGH (ref 0.5–1.3)
EGFR: 29 ML/MIN/1.73M2 — LOW
GLUCOSE BLDC GLUCOMTR-MCNC: 115 MG/DL — HIGH (ref 70–99)
GLUCOSE BLDC GLUCOMTR-MCNC: 120 MG/DL — HIGH (ref 70–99)
GLUCOSE BLDC GLUCOMTR-MCNC: 175 MG/DL — HIGH (ref 70–99)
GLUCOSE BLDC GLUCOMTR-MCNC: 176 MG/DL — HIGH (ref 70–99)
GLUCOSE SERPL-MCNC: 195 MG/DL — HIGH (ref 70–99)
OSMOLALITY UR: 373 MOS/KG — SIGNIFICANT CHANGE UP (ref 300–900)
POTASSIUM SERPL-MCNC: 3.6 MMOL/L — SIGNIFICANT CHANGE UP (ref 3.5–5.3)
POTASSIUM SERPL-SCNC: 3.6 MMOL/L — SIGNIFICANT CHANGE UP (ref 3.5–5.3)
SODIUM SERPL-SCNC: 137 MMOL/L — SIGNIFICANT CHANGE UP (ref 135–145)

## 2023-06-14 PROCEDURE — 93320 DOPPLER ECHO COMPLETE: CPT | Mod: 26

## 2023-06-14 PROCEDURE — 93325 DOPPLER ECHO COLOR FLOW MAPG: CPT | Mod: 26

## 2023-06-14 PROCEDURE — 93312 ECHO TRANSESOPHAGEAL: CPT | Mod: 26

## 2023-06-14 PROCEDURE — 76377 3D RENDER W/INTRP POSTPROCES: CPT | Mod: 26

## 2023-06-14 RX ORDER — SODIUM CHLORIDE 9 MG/ML
1000 INJECTION INTRAMUSCULAR; INTRAVENOUS; SUBCUTANEOUS
Refills: 0 | Status: DISCONTINUED | OUTPATIENT
Start: 2023-06-14 | End: 2023-06-18

## 2023-06-14 RX ADMIN — Medication 100 MILLIGRAM(S): at 05:13

## 2023-06-14 RX ADMIN — ISOSORBIDE MONONITRATE 60 MILLIGRAM(S): 60 TABLET, EXTENDED RELEASE ORAL at 11:47

## 2023-06-14 RX ADMIN — ATORVASTATIN CALCIUM 40 MILLIGRAM(S): 80 TABLET, FILM COATED ORAL at 22:07

## 2023-06-14 RX ADMIN — SODIUM CHLORIDE 75 MILLILITER(S): 9 INJECTION INTRAMUSCULAR; INTRAVENOUS; SUBCUTANEOUS at 19:10

## 2023-06-14 RX ADMIN — Medication 2: at 07:48

## 2023-06-14 RX ADMIN — GABAPENTIN 100 MILLIGRAM(S): 400 CAPSULE ORAL at 22:07

## 2023-06-14 RX ADMIN — CHLORHEXIDINE GLUCONATE 1 APPLICATION(S): 213 SOLUTION TOPICAL at 11:46

## 2023-06-14 RX ADMIN — Medication 12.5 MILLIGRAM(S): at 11:47

## 2023-06-14 RX ADMIN — Medication 12.5 MILLIGRAM(S): at 22:06

## 2023-06-14 RX ADMIN — PANTOPRAZOLE SODIUM 40 MILLIGRAM(S): 20 TABLET, DELAYED RELEASE ORAL at 05:13

## 2023-06-14 RX ADMIN — AMLODIPINE BESYLATE 10 MILLIGRAM(S): 2.5 TABLET ORAL at 05:13

## 2023-06-14 RX ADMIN — MEMANTINE HYDROCHLORIDE 5 MILLIGRAM(S): 10 TABLET ORAL at 05:12

## 2023-06-14 RX ADMIN — MEMANTINE HYDROCHLORIDE 5 MILLIGRAM(S): 10 TABLET ORAL at 17:10

## 2023-06-14 RX ADMIN — Medication 81 MILLIGRAM(S): at 11:47

## 2023-06-14 RX ADMIN — CLOPIDOGREL BISULFATE 75 MILLIGRAM(S): 75 TABLET, FILM COATED ORAL at 11:47

## 2023-06-14 RX ADMIN — SODIUM CHLORIDE 75 MILLILITER(S): 9 INJECTION INTRAMUSCULAR; INTRAVENOUS; SUBCUTANEOUS at 17:09

## 2023-06-14 RX ADMIN — Medication 1 TABLET(S): at 11:47

## 2023-06-14 RX ADMIN — GABAPENTIN 100 MILLIGRAM(S): 400 CAPSULE ORAL at 05:13

## 2023-06-14 RX ADMIN — SENNA PLUS 2 TABLET(S): 8.6 TABLET ORAL at 22:09

## 2023-06-14 NOTE — PRE PROCEDURE NOTE - PRE PROCEDURE EVALUATION
Pre Procedure Note:    Procedure Service:  Cardiology   Procedure Name: Transesophageal Echo  Pre Procedure Evaluation:    HPI:   77 y (M with a pmhx significant for aortic valve replacement on aspirin, hx of vertigo, HLD, BPH, DM, HTN presenting with room spinning sensation up standing.  Patient states that he had spinning sensation on standing up and was unable to walk properly. Patient states this sensation is significantly more intense than his usually vertigo that he sometimes experiences.   Denies lightheadedness, nausea, vomiting, unilateral weakness, numbness, pins and needles, vision changes.  BALJINDER for bAVR assessment.    RELEVANT PMH/PSH:  As above.    Any respiratory problems: Asthma, COPD, DANIELA, recent respiratory illness? NO    Any history of Atlantoaxial disease and severe generalized cervical arthritis? NO    Oral/Dental history: Any dentures, removable, loose, broken tooth/teeth, mouth sores? NO    Significant dysphagia and odynophagia? NO    Any GI history of: Esophageal surgeries, strictures, diverticula, masses, varices, diaphragmatic hernia, stomach ulcers, stomach surgeries, bariatric surgery, GI bleed? NO    SOCIAL HISTORY:   [ ] Non-smoker [ ] Smoker [ ] Alcohol  ALLERGIES: No Known Allergies  MEDICATION: REVIEWED  REVIEW OF SYSTEMS:  CONSTITUTIONAL: No fever, weight loss, or fatigue  HEENT: No eye issues, No sinus or throat pain; No pain or stiffness at neck  RESPIRATORY: No cough, wheezing, chills or hemoptysis; Shortness of Breath  CARDIOVASCULAR: No chest pain, palpitations, passing out, dizziness, or leg swelling  GASTROINTESTINAL: No abd pain, nausea, vomiting diarrhea constipation. No melena or hematochezia.  NEUROLOGICAL: No headaches, memory loss, acute change in mental status  MUSCULOSKELETAL: No significant muscle, back, or extremity pain  HEME/LYMPH: No easy bruising, or bleeding gums    PHYSICAL EXAM:  Vital Signs:  Vital Signs Last 24 Hrs  T(C): 36.7 (14 Jun 2023 11:08), Max: 37.1 (14 Jun 2023 05:09)  T(F): 98.1 (14 Jun 2023 11:08), Max: 98.8 (14 Jun 2023 05:09)  HR: 67 (14 Jun 2023 11:08) (67 - 87)  BP: 113/67 (14 Jun 2023 11:08) (111/55 - 143/67)  BP(mean): --  RR: 18 (14 Jun 2023 11:08) (17 - 19)  SpO2: 97% (14 Jun 2023 11:08) (93% - 97%)    Parameters below as of 14 Jun 2023 11:08  Patient On (Oxygen Delivery Method): room air      Gen: NAD  HEENT:  (-)icterus (-)pallor  CV: N S1 S2 1/6 MORENA (+)2 Pulses B/l  Resp:  Clear to auscultation B/L, normal effort  GI: (+) BS Soft, NT, ND  Lymph:  (-)Edema, (-)obvious lymphadenopathy  Skin: Warm to touch, Normal turgor  Psych: Appropriate mood and affect    LABS: reviewed  CARDIAC TESTING/STUDIES:  reviewed    Plan: 	  BALJINDER Procedure Candidate	YES

## 2023-06-14 NOTE — PROGRESS NOTE ADULT - ASSESSMENT
a 77-year-old male with a past medical history of hypertension, diabetes, hyperlipidemia, bioprosthetic aortic valvereplacement in 2009 by Dr. Ted Piña status post recent lower extremity angiogram status post angioplasty of right anterior tibial artery who presents from the office due to unsteadiness of his feet and room spinning sensation      1) Yfn on CKD stage 3  likely has underlying diabetic and hypertensive nephropathy  cr now inc in setting of contrast exposure, and fluctuations in hemodynamics  ddx includes pre renal vs atn vs ruele obstruction  Renal US- no obstruction; renal calculi- no hydro  Urine lytes--> intravascular depletion--> start Iv nacl @ 75cc for 24 hours  keep off ace/arb for now  avoid contrast  encourage po intake  will monitor with you    Dr Hess  [phone]

## 2023-06-14 NOTE — PROGRESS NOTE ADULT - SUBJECTIVE AND OBJECTIVE BOX
C A R D I O L O G Y  **********************************     DATE OF SERVICE: 06-14-23    Patient reports dizziness improved. denies chest pain or shortness of breath.   Review of symptoms otherwise negative.    MEDICATIONS:  acetaminophen     Tablet .. 650 milliGRAM(s) Oral every 6 hours PRN  amLODIPine   Tablet 10 milliGRAM(s) Oral daily  aspirin  chewable 81 milliGRAM(s) Oral daily  atorvastatin 40 milliGRAM(s) Oral at bedtime  chlorhexidine 2% Cloths 1 Application(s) Topical daily  clopidogrel Tablet 75 milliGRAM(s) Oral daily  dextrose 5%. 1000 milliLiter(s) IV Continuous <Continuous>  dextrose 5%. 1000 milliLiter(s) IV Continuous <Continuous>  dextrose 50% Injectable 12.5 Gram(s) IV Push once  dextrose 50% Injectable 25 Gram(s) IV Push once  dextrose 50% Injectable 25 Gram(s) IV Push once  dextrose Oral Gel 15 Gram(s) Oral once PRN  gabapentin 100 milliGRAM(s) Oral three times a day  glucagon  Injectable 1 milliGRAM(s) IntraMuscular once  insulin lispro (ADMELOG) corrective regimen sliding scale   SubCutaneous three times a day before meals  insulin lispro (ADMELOG) corrective regimen sliding scale   SubCutaneous at bedtime  isosorbide   mononitrate ER Tablet (IMDUR) 60 milliGRAM(s) Oral daily  meclizine 12.5 milliGRAM(s) Oral every 8 hours PRN  memantine 5 milliGRAM(s) Oral two times a day  metoprolol succinate  milliGRAM(s) Oral daily  multivitamin 1 Tablet(s) Oral daily  pantoprazole    Tablet 40 milliGRAM(s) Oral before breakfast  polyethylene glycol 3350 17 Gram(s) Oral at bedtime  senna 2 Tablet(s) Oral at bedtime  sodium chloride 0.9%. 500 milliLiter(s) IV Continuous <Continuous>      LABS:      Hemoglobin: 12.6 g/dL (06-12 @ 06:32)  Hemoglobin: 12.1 g/dL (06-09 @ 14:21)      06-14    137  |  99  |  46<H>  ----------------------------<  195<H>  3.6   |  23  |  2.28<H>    Ca    8.7      14 Jun 2023 06:52      Creatinine Trend: 2.28<--, 2.11<--, 1.55<--, 1.45<--    COAGS:           PHYSICAL EXAM:  T(C): 36.7 (06-14-23 @ 11:08), Max: 37.1 (06-14-23 @ 05:09)  HR: 67 (06-14-23 @ 11:08) (67 - 87)  BP: 113/67 (06-14-23 @ 11:08) (111/55 - 143/67)  RR: 18 (06-14-23 @ 11:08) (17 - 19)  SpO2: 97% (06-14-23 @ 11:08) (93% - 97%)  Wt(kg): --    I&O's Summary    13 Jun 2023 07:01  -  14 Jun 2023 07:00  --------------------------------------------------------  IN: 480 mL / OUT: 1275 mL / NET: -795 mL    14 Jun 2023 07:01  -  14 Jun 2023 13:07  --------------------------------------------------------  IN: 0 mL / OUT: 0 mL / NET: 0 mL          Gen: NAD  HEENT:  (-)icterus (-)pallor  CV: N S1 S2 1/6 MORENA (+)2 Pulses B/l  Resp:  Clear to auscultation B/L, normal effort  GI: (+) BS Soft, NT, ND  Lymph:  (-)Edema, (-)obvious lymphadenopathy  Skin: Warm to touch, Normal turgor  Psych: Appropriate mood and affect      TELEMETRY: SR 60-70    < from: TTE Limited W or WO Ultrasound Enhancing Agent (06.12.23 @ 10:51) >  CONCLUSIONS:      1. Technically difficult image quality.   2. There is increasedLV mass and concentric hypertrophy.   3. Mild left ventricular hypertrophy.   4. The left ventricular systolic function is normal with an ejection fraction of 68 % by Roach's method of disks. There are no regional wall motion abnormalities seen.   5. Normal right ventricular cavity size, normal wall thickness and normal systolic function. The tricuspid annular plane systolic excursion (TAPSE) is 1.9 cm (normal >=1.7 cm).   6. A bioprosthetic valve replacement present in the aortic position. Aortic valve is not well visualized. Calcualted EOA and dimensionless index are consistent with moderate prosthetic valve stenosis. Consider additional imaging of the aortic valve if clinically indicated.   7. Mild aortic regurgitation.   8. Aortic regurgitation appears intravalvular.   9. Trace mitral regurgitation.  10. Compared to the transesophageal echocardiogram performed on 7/12/2017 aortic valve gradients are higher on the current study. Peak/mean aortic valve gradients were 21/13 mmHg on theprior study.    < end of copied text >    < from: MR Head w/wo IV Cont (06.10.23 @ 19:03) >  IMPRESSION:    1. Ischemic white matter disease and atrophy typical for age    2. Remote petechial hemorrhages    3. No evidence of infarction    --- End of Report ---    < end of copied text >      ASSESSMENT/PLAN: Pt is a 77-year-old male with a past medical history of hypertension, diabetes, hyperlipidemia, bioprosthetic aortic valve replacement in 2009 by Dr. Ted Piña status post recent lower extremity angiogram status post angioplasty of right anterior tibial artery who presents from the office due to unsteadiness of his feet and room spinning sensation.    #Dizziness  - suspect vertigo  - neuro eval appreciated - MRI with no evidence of infarction  - orthostatics negative  - tele stable with no events    #Bio-AVR prosthetic valve stenosis  - recent office echo with elevated gradients across bioprosthetic aortic valve, inpatient TTE confirmed elevated gradients, moderate prosthetic valve stenosis  - Keep NPO for BALJINDER today to further evaluate bio-AVR prosthetic valve stenosis    #HTN  - Continue Amlodipine 10mg daily, Imdur 60mg daily, and Toprol XL 100mg daily. Hold HCTZ and Valsartan given SAMARIA. Can restart Hydralazine if becomes hypertensive    #HLD  - Continue Lipitor    #PAD  - Continue ASA/Plavix and Lipitor    #SAMARIA  - Suspect contrast induced  - Renal consult appreciated  - s/p IVF  - Hold HCTZ and Valsartan  - Trend cr    - Patient has f/u with Dr. Plummer on 7/7 at 11:30 AM    Howard Victor PA-C  Pager: [phone]

## 2023-06-14 NOTE — PROGRESS NOTE ADULT - SUBJECTIVE AND OBJECTIVE BOX
Patient seen and examined  no complaints    No Known Allergies    Hospital Medications:   MEDICATIONS  (STANDING):  amLODIPine   Tablet 10 milliGRAM(s) Oral daily  aspirin  chewable 81 milliGRAM(s) Oral daily  atorvastatin 40 milliGRAM(s) Oral at bedtime  chlorhexidine 2% Cloths 1 Application(s) Topical daily  clopidogrel Tablet 75 milliGRAM(s) Oral daily  dextrose 5%. 1000 milliLiter(s) (50 mL/Hr) IV Continuous <Continuous>  dextrose 5%. 1000 milliLiter(s) (100 mL/Hr) IV Continuous <Continuous>  dextrose 50% Injectable 12.5 Gram(s) IV Push once  dextrose 50% Injectable 25 Gram(s) IV Push once  dextrose 50% Injectable 25 Gram(s) IV Push once  gabapentin 100 milliGRAM(s) Oral three times a day  glucagon  Injectable 1 milliGRAM(s) IntraMuscular once  insulin lispro (ADMELOG) corrective regimen sliding scale   SubCutaneous at bedtime  insulin lispro (ADMELOG) corrective regimen sliding scale   SubCutaneous three times a day before meals  isosorbide   mononitrate ER Tablet (IMDUR) 60 milliGRAM(s) Oral daily  memantine 5 milliGRAM(s) Oral two times a day  metoprolol succinate  milliGRAM(s) Oral daily  multivitamin 1 Tablet(s) Oral daily  pantoprazole    Tablet 40 milliGRAM(s) Oral before breakfast  polyethylene glycol 3350 17 Gram(s) Oral at bedtime  senna 2 Tablet(s) Oral at bedtime  sodium chloride 0.9%. 500 milliLiter(s) (75 mL/Hr) IV Continuous <Continuous>        VITALS:  T(F): 98.1 (23 @ 11:08), Max: 98.8 (23 @ 05:09)  HR: 67 (23 @ 11:08)  BP: 113/67 (23 @ 11:08)  RR: 18 (23 @ 11:08)  SpO2: 97% (23 @ 11:08)  Wt(kg): --     @ 07:01  -   @ 07:00  --------------------------------------------------------  IN: 480 mL / OUT: 1275 mL / NET: -795 mL     @ 07:01  -   @ 14:40  --------------------------------------------------------  IN: 0 mL / OUT: 0 mL / NET: 0 mL        PHYSICAL EXAM:  Constitutional: NAD  HEENT: anicteric sclera, oropharynx clear, MMM  Neck: No JVD  Respiratory: CTAB, no wheezes, rales or rhonchi  Cardiovascular: S1, S2, RRR  Gastrointestinal: BS+, soft, NT/ND  Extremities: No cyanosis or clubbing. No peripheral edema  Neurological: A/O x 3, no focal deficits  Psychiatric: Normal mood, normal affect  : No CVA tenderness. No velasco.   Skin: No rashes    LABS:      137  |  99  |  46<H>  ----------------------------<  195<H>  3.6   |  23  |  2.28<H>    Ca    8.7      2023 06:52      Creatinine Trend: 2.28 <--, 2.11 <--, 1.55 <--, 1.45 <--    Urine Studies:  Urinalysis Basic - ( 2023 23:17 )    Color: Yellow / Appearance: Clear / S.014 / pH:   Gluc:  / Ketone: Negative  / Bili: Negative / Urobili: Negative   Blood:  / Protein: 30 mg/dL / Nitrite: Negative   Leuk Esterase: Negative / RBC: 1 /hpf / WBC 1 /HPF   Sq Epi:  / Non Sq Epi:  / Bacteria: Negative      Potassium, Random Urine: 36 mmol/L ( @ 23:17)  Osmolality, Random Urine: 373 mos/kg ( @ 23:17)  Sodium, Random Urine: 21 mmol/L ( @ 23:17)  Chloride, Random Urine: <20 mmol/L ( @ 23:17)  Creatinine, Random Urine: 148 mg/dL ( @ 23:17)  Protein/Creatinine Ratio Calculation: 0.2 Ratio ( @ 23:17)    RADIOLOGY & ADDITIONAL STUDIES:

## 2023-06-14 NOTE — PROGRESS NOTE ADULT - SUBJECTIVE AND OBJECTIVE BOX
Patient is a 77y old  Male who presents with a chief complaint of Vertigo today (10 Gelacio 2023 09:57)      SUBJECTIVE / OVERNIGHT EVENTS: no events    T(C): 35.6 (06-14-23 @ 16:00), Max: 36.7 (06-14-23 @ 11:08)  HR: 60 (06-14-23 @ 17:00) (57 - 67)  BP: 123/62 (06-14-23 @ 17:00) (94/55 - 124/58)  RR: 14 (06-14-23 @ 17:00) (14 - 18)  SpO2: 95% (06-14-23 @ 17:00) (92% - 97%)      MEDICATIONS  (STANDING):  amLODIPine   Tablet 10 milliGRAM(s) Oral daily  aspirin  chewable 81 milliGRAM(s) Oral daily  atorvastatin 40 milliGRAM(s) Oral at bedtime  chlorhexidine 2% Cloths 1 Application(s) Topical daily  clopidogrel Tablet 75 milliGRAM(s) Oral daily  dextrose 5%. 1000 milliLiter(s) (100 mL/Hr) IV Continuous <Continuous>  dextrose 5%. 1000 milliLiter(s) (50 mL/Hr) IV Continuous <Continuous>  dextrose 50% Injectable 12.5 Gram(s) IV Push once  dextrose 50% Injectable 25 Gram(s) IV Push once  dextrose 50% Injectable 25 Gram(s) IV Push once  gabapentin 100 milliGRAM(s) Oral three times a day  glucagon  Injectable 1 milliGRAM(s) IntraMuscular once  insulin lispro (ADMELOG) corrective regimen sliding scale   SubCutaneous at bedtime  insulin lispro (ADMELOG) corrective regimen sliding scale   SubCutaneous three times a day before meals  isosorbide   mononitrate ER Tablet (IMDUR) 60 milliGRAM(s) Oral daily  memantine 5 milliGRAM(s) Oral two times a day  metoprolol succinate  milliGRAM(s) Oral daily  multivitamin 1 Tablet(s) Oral daily  pantoprazole    Tablet 40 milliGRAM(s) Oral before breakfast  polyethylene glycol 3350 17 Gram(s) Oral at bedtime  senna 2 Tablet(s) Oral at bedtime  sodium chloride 0.9%. 1000 milliLiter(s) (75 mL/Hr) IV Continuous <Continuous>  sodium chloride 0.9%. 500 milliLiter(s) (75 mL/Hr) IV Continuous <Continuous>    MEDICATIONS  (PRN):  acetaminophen     Tablet .. 650 milliGRAM(s) Oral every 6 hours PRN Moderate Pain (4 - 6)  dextrose Oral Gel 15 Gram(s) Oral once PRN Blood Glucose LESS THAN 70 milliGRAM(s)/deciliter  meclizine 12.5 milliGRAM(s) Oral every 8 hours PRN Dizziness    PHYSICAL EXAM:  GENERAL: NAD, well-developed  HEAD:  Atraumatic, Normocephalic  EYES: EOMI, PERRLA, conjunctiva and sclera clear  NECK: Supple, No JVD  CHEST/LUNG: Clear to auscultation bilaterally; No wheeze  HEART: Regular rate and rhythm; No murmurs, rubs, or gallops  ABDOMEN: Soft, Nontender, Nondistended; Bowel sounds present  EXTREMITIES:  2+ Peripheral Pulses, No clubbing, cyanosis, or edema  PSYCH: AAOx3  NEUROLOGY: non-focal  SKIN: No rashes or lesions                                                137|99|46<195  3.6|23|2.28  8.7,--,--  06-14 @ 06:52  CAPILLARY BLOOD GLUCOSE      POCT Blood Glucose.: 120 mg/dL (14 Jun 2023 16:06)  POCT Blood Glucose.: 115 mg/dL (14 Jun 2023 11:38)  POCT Blood Glucose.: 176 mg/dL (14 Jun 2023 07:24)  POCT Blood Glucose.: 135 mg/dL (13 Jun 2023 21:18)

## 2023-06-14 NOTE — PRE-ANESTHESIA EVALUATION ADULT - NSANTHPMHFT_GEN_ALL_CORE
78 yo M h/o HTN, HLD, DM, h/o AVR with TTE concerning for prosthetic valve stenosis here for BALJINDER  New SAMARIA, thought to be from contrast, normal K  No CP, no SOB, on RA, no CHF symptoms  ROS neg

## 2023-06-15 DIAGNOSIS — T82.857A STENOSIS OF OTHER CARDIAC PROSTHETIC DEVICES, IMPLANTS AND GRAFTS, INITIAL ENCOUNTER: ICD-10-CM

## 2023-06-15 DIAGNOSIS — T82.01XA BREAKDOWN (MECHANICAL) OF HEART VALVE PROSTHESIS, INITIAL ENCOUNTER: ICD-10-CM

## 2023-06-15 LAB
ANION GAP SERPL CALC-SCNC: 14 MMOL/L — SIGNIFICANT CHANGE UP (ref 5–17)
BUN SERPL-MCNC: 40 MG/DL — HIGH (ref 7–23)
CALCIUM SERPL-MCNC: 8.9 MG/DL — SIGNIFICANT CHANGE UP (ref 8.4–10.5)
CHLORIDE SERPL-SCNC: 102 MMOL/L — SIGNIFICANT CHANGE UP (ref 96–108)
CO2 SERPL-SCNC: 23 MMOL/L — SIGNIFICANT CHANGE UP (ref 22–31)
CREAT SERPL-MCNC: 1.77 MG/DL — HIGH (ref 0.5–1.3)
EGFR: 39 ML/MIN/1.73M2 — LOW
GLUCOSE BLDC GLUCOMTR-MCNC: 169 MG/DL — HIGH (ref 70–99)
GLUCOSE BLDC GLUCOMTR-MCNC: 209 MG/DL — HIGH (ref 70–99)
GLUCOSE BLDC GLUCOMTR-MCNC: 224 MG/DL — HIGH (ref 70–99)
GLUCOSE BLDC GLUCOMTR-MCNC: 247 MG/DL — HIGH (ref 70–99)
GLUCOSE SERPL-MCNC: 189 MG/DL — HIGH (ref 70–99)
PHOSPHATE 24H UR-MCNC: 29.2 MG/DL — SIGNIFICANT CHANGE UP
POTASSIUM SERPL-MCNC: 3.5 MMOL/L — SIGNIFICANT CHANGE UP (ref 3.5–5.3)
POTASSIUM SERPL-SCNC: 3.5 MMOL/L — SIGNIFICANT CHANGE UP (ref 3.5–5.3)
SODIUM SERPL-SCNC: 139 MMOL/L — SIGNIFICANT CHANGE UP (ref 135–145)
UUN UR-MCNC: 615 MG/DL — SIGNIFICANT CHANGE UP

## 2023-06-15 PROCEDURE — 99223 1ST HOSP IP/OBS HIGH 75: CPT

## 2023-06-15 RX ORDER — BENZOCAINE AND MENTHOL 5; 1 G/100ML; G/100ML
1 LIQUID ORAL
Refills: 0 | Status: DISCONTINUED | OUTPATIENT
Start: 2023-06-15 | End: 2023-06-25

## 2023-06-15 RX ADMIN — SODIUM CHLORIDE 75 MILLILITER(S): 9 INJECTION INTRAMUSCULAR; INTRAVENOUS; SUBCUTANEOUS at 06:39

## 2023-06-15 RX ADMIN — MEMANTINE HYDROCHLORIDE 5 MILLIGRAM(S): 10 TABLET ORAL at 05:58

## 2023-06-15 RX ADMIN — BENZOCAINE AND MENTHOL 1 LOZENGE: 5; 1 LIQUID ORAL at 07:43

## 2023-06-15 RX ADMIN — SENNA PLUS 2 TABLET(S): 8.6 TABLET ORAL at 22:04

## 2023-06-15 RX ADMIN — GABAPENTIN 100 MILLIGRAM(S): 400 CAPSULE ORAL at 14:55

## 2023-06-15 RX ADMIN — Medication 650 MILLIGRAM(S): at 07:43

## 2023-06-15 RX ADMIN — MEMANTINE HYDROCHLORIDE 5 MILLIGRAM(S): 10 TABLET ORAL at 18:12

## 2023-06-15 RX ADMIN — Medication 650 MILLIGRAM(S): at 09:26

## 2023-06-15 RX ADMIN — ATORVASTATIN CALCIUM 40 MILLIGRAM(S): 80 TABLET, FILM COATED ORAL at 22:05

## 2023-06-15 RX ADMIN — Medication 81 MILLIGRAM(S): at 12:16

## 2023-06-15 RX ADMIN — Medication 4: at 07:40

## 2023-06-15 RX ADMIN — GABAPENTIN 100 MILLIGRAM(S): 400 CAPSULE ORAL at 05:58

## 2023-06-15 RX ADMIN — Medication 100 MILLIGRAM(S): at 05:58

## 2023-06-15 RX ADMIN — GABAPENTIN 100 MILLIGRAM(S): 400 CAPSULE ORAL at 22:04

## 2023-06-15 RX ADMIN — Medication 4: at 18:13

## 2023-06-15 RX ADMIN — ISOSORBIDE MONONITRATE 60 MILLIGRAM(S): 60 TABLET, EXTENDED RELEASE ORAL at 12:17

## 2023-06-15 RX ADMIN — CLOPIDOGREL BISULFATE 75 MILLIGRAM(S): 75 TABLET, FILM COATED ORAL at 12:17

## 2023-06-15 RX ADMIN — Medication 1 TABLET(S): at 12:17

## 2023-06-15 RX ADMIN — Medication 4: at 12:17

## 2023-06-15 RX ADMIN — Medication 12.5 MILLIGRAM(S): at 22:34

## 2023-06-15 RX ADMIN — AMLODIPINE BESYLATE 10 MILLIGRAM(S): 2.5 TABLET ORAL at 05:58

## 2023-06-15 RX ADMIN — PANTOPRAZOLE SODIUM 40 MILLIGRAM(S): 20 TABLET, DELAYED RELEASE ORAL at 05:58

## 2023-06-15 NOTE — PROGRESS NOTE ADULT - SUBJECTIVE AND OBJECTIVE BOX
Patient seen and examined  no complaints    No Known Allergies    Hospital Medications:   MEDICATIONS  (STANDING):  amLODIPine   Tablet 10 milliGRAM(s) Oral daily  aspirin  chewable 81 milliGRAM(s) Oral daily  atorvastatin 40 milliGRAM(s) Oral at bedtime  chlorhexidine 2% Cloths 1 Application(s) Topical daily  clopidogrel Tablet 75 milliGRAM(s) Oral daily  dextrose 5%. 1000 milliLiter(s) (50 mL/Hr) IV Continuous <Continuous>  dextrose 5%. 1000 milliLiter(s) (100 mL/Hr) IV Continuous <Continuous>  dextrose 50% Injectable 12.5 Gram(s) IV Push once  dextrose 50% Injectable 25 Gram(s) IV Push once  dextrose 50% Injectable 25 Gram(s) IV Push once  gabapentin 100 milliGRAM(s) Oral three times a day  glucagon  Injectable 1 milliGRAM(s) IntraMuscular once  insulin lispro (ADMELOG) corrective regimen sliding scale   SubCutaneous three times a day before meals  insulin lispro (ADMELOG) corrective regimen sliding scale   SubCutaneous at bedtime  isosorbide   mononitrate ER Tablet (IMDUR) 60 milliGRAM(s) Oral daily  memantine 5 milliGRAM(s) Oral two times a day  metoprolol succinate  milliGRAM(s) Oral daily  multivitamin 1 Tablet(s) Oral daily  pantoprazole    Tablet 40 milliGRAM(s) Oral before breakfast  polyethylene glycol 3350 17 Gram(s) Oral at bedtime  senna 2 Tablet(s) Oral at bedtime  sodium chloride 0.9%. 1000 milliLiter(s) (75 mL/Hr) IV Continuous <Continuous>  sodium chloride 0.9%. 500 milliLiter(s) (75 mL/Hr) IV Continuous <Continuous>        VITALS:  T(F): 98 (06-15-23 @ 11:36), Max: 98.4 (23 @ 20:39)  HR: 66 (06-15-23 @ 11:36)  BP: 147/67 (06-15-23 @ 11:36)  RR: 18 (06-15-23 @ 11:36)  SpO2: 95% (06-15-23 @ 11:36)  Wt(kg): --     @ 07:01  -  06-15 @ 07:00  --------------------------------------------------------  IN: 1380 mL / OUT: 1875 mL / NET: -495 mL    06-15 @ 07:01  -  06-15 @ 13:34  --------------------------------------------------------  IN: 630 mL / OUT: 0 mL / NET: 630 mL      Height (cm): 175.3 ( @ 14:55)  Weight (kg): 97.5 ( @ 14:55)  BMI (kg/m2): 31.7 ( @ 14:55)  BSA (m2): 2.13 ( @ 14:55)  PHYSICAL EXAM:  Constitutional: NAD  HEENT: anicteric sclera, oropharynx clear, MMM  Neck: No JVD  Respiratory: CTAB, no wheezes, rales or rhonchi  Cardiovascular: S1, S2, RRR  Gastrointestinal: BS+, soft, NT/ND  Extremities: No cyanosis or clubbing. No peripheral edema  Neurological: A/O x 3, no focal deficits  Psychiatric: Normal mood, normal affect  : No CVA tenderness. No velasco.   Skin: No rashes  LABS:  06-15    139  |  102  |  40<H>  ----------------------------<  189<H>  3.5   |  23  |  1.77<H>    Ca    8.9      15 Gelacio 2023 06:55      Creatinine Trend: 1.77 <--, 2.28 <--, 2.11 <--, 1.55 <--, 1.45 <--    Urine Studies:  Urinalysis Basic - ( 2023 23:17 )    Color: Yellow / Appearance: Clear / S.014 / pH:   Gluc:  / Ketone: Negative  / Bili: Negative / Urobili: Negative   Blood:  / Protein: 30 mg/dL / Nitrite: Negative   Leuk Esterase: Negative / RBC: 1 /hpf / WBC 1 /HPF   Sq Epi:  / Non Sq Epi:  / Bacteria: Negative      Potassium, Random Urine: 36 mmol/L ( @ 23:17)  Osmolality, Random Urine: 373 mos/kg ( @ 23:17)  Sodium, Random Urine: 21 mmol/L ( @ :17)  Chloride, Random Urine: <20 mmol/L ( @ :17)  Creatinine, Random Urine: 148 mg/dL ( @ 23:17)  Protein/Creatinine Ratio Calculation: 0.2 Ratio ( @ :17)    RADIOLOGY & ADDITIONAL STUDIES:

## 2023-06-15 NOTE — PROGRESS NOTE ADULT - ASSESSMENT
Patient seen and examined, agree with above assessment and plan as transcribed above.    - structural heart eval    Chalino Evans MD, [facility]  BEEPER [phone]

## 2023-06-15 NOTE — CONSULT NOTE ADULT - PROBLEM SELECTOR RECOMMENDATION 9
- AVR reportedly done at [facility] in 2009 now degenerated with severe AI  -- we will need the valve details from the AVR procedure  - Ria TAVR evaluation in progress  - diagnostic testing will include carotid dopplers, PFTs, LHC and cardiac CTA  -- LHC and cardiac CTA will require contrast, as will the Ria procedure. Given his SAMARIA on CKD, we will work with renal to proceed in the safest way to protect his kidneys  - I will defer testing pending recs from renal  - I discussed the procedure and testing with the patient and his granddaughter, Bethanie, and answered all their questions
English

## 2023-06-15 NOTE — PROGRESS NOTE ADULT - SUBJECTIVE AND OBJECTIVE BOX
C A R D I O L O G Y  **********************************     DATE OF SERVICE: 06-15-23    Patient denies chest pain or shortness of breath at rest. Dizziness improved.   Review of symptoms otherwise negative.    MEDICATIONS:  acetaminophen     Tablet .. 650 milliGRAM(s) Oral every 6 hours PRN  amLODIPine   Tablet 10 milliGRAM(s) Oral daily  aspirin  chewable 81 milliGRAM(s) Oral daily  atorvastatin 40 milliGRAM(s) Oral at bedtime  benzocaine/menthol Lozenge 1 Lozenge Oral four times a day PRN  chlorhexidine 2% Cloths 1 Application(s) Topical daily  clopidogrel Tablet 75 milliGRAM(s) Oral daily  dextrose 5%. 1000 milliLiter(s) IV Continuous <Continuous>  dextrose 5%. 1000 milliLiter(s) IV Continuous <Continuous>  dextrose 50% Injectable 12.5 Gram(s) IV Push once  dextrose 50% Injectable 25 Gram(s) IV Push once  dextrose 50% Injectable 25 Gram(s) IV Push once  dextrose Oral Gel 15 Gram(s) Oral once PRN  gabapentin 100 milliGRAM(s) Oral three times a day  glucagon  Injectable 1 milliGRAM(s) IntraMuscular once  insulin lispro (ADMELOG) corrective regimen sliding scale   SubCutaneous at bedtime  insulin lispro (ADMELOG) corrective regimen sliding scale   SubCutaneous three times a day before meals  isosorbide   mononitrate ER Tablet (IMDUR) 60 milliGRAM(s) Oral daily  meclizine 12.5 milliGRAM(s) Oral every 8 hours PRN  memantine 5 milliGRAM(s) Oral two times a day  metoprolol succinate  milliGRAM(s) Oral daily  multivitamin 1 Tablet(s) Oral daily  pantoprazole    Tablet 40 milliGRAM(s) Oral before breakfast  polyethylene glycol 3350 17 Gram(s) Oral at bedtime  senna 2 Tablet(s) Oral at bedtime  sodium chloride 0.9%. 1000 milliLiter(s) IV Continuous <Continuous>  sodium chloride 0.9%. 500 milliLiter(s) IV Continuous <Continuous>      LABS:      Hemoglobin: 12.6 g/dL (06-12 @ 06:32)      06-15    139  |  102  |  40<H>  ----------------------------<  189<H>  3.5   |  23  |  1.77<H>    Ca    8.9      15 Gelacio 2023 06:55      Creatinine Trend: 1.77<--, 2.28<--, 2.11<--, 1.55<--, 1.45<--    COAGS:           PHYSICAL EXAM:  T(C): 36.7 (06-15-23 @ 11:36), Max: 36.9 (06-14-23 @ 20:39)  HR: 66 (06-15-23 @ 11:36) (57 - 80)  BP: 147/67 (06-15-23 @ 11:36) (94/55 - 150/68)  RR: 18 (06-15-23 @ 11:36) (14 - 18)  SpO2: 95% (06-15-23 @ 11:36) (92% - 97%)  Wt(kg): --    I&O's Summary    14 Jun 2023 07:01  -  15 Gelacio 2023 07:00  --------------------------------------------------------  IN: 1380 mL / OUT: 1875 mL / NET: -495 mL    15 Gelacio 2023 07:01  -  15 Gelacio 2023 12:41  --------------------------------------------------------  IN: 630 mL / OUT: 0 mL / NET: 630 mL        Gen: NAD  HEENT:  (-)icterus (-)pallor  CV: N S1 S2 1/6 MORENA (+)2 Pulses B/l  Resp:  Clear to auscultation B/L, normal effort  GI: (+) BS Soft, NT, ND  Lymph:  (-)Edema, (-)obvious lymphadenopathy  Skin: Warm to touch, Normal turgor  Psych: Appropriate mood and affect      TELEMETRY: SR 60-80    < from: TTE Limited W or WO Ultrasound Enhancing Agent (06.12.23 @ 10:51) >  CONCLUSIONS:      1. Technically difficult image quality.   2. There is increasedLV mass and concentric hypertrophy.   3. Mild left ventricular hypertrophy.   4. The left ventricular systolic function is normal with an ejection fraction of 68 % by Roach's method of disks. There are no regional wall motion abnormalities seen.   5. Normal right ventricular cavity size, normal wall thickness and normal systolic function. The tricuspid annular plane systolic excursion (TAPSE) is 1.9 cm (normal >=1.7 cm).   6. A bioprosthetic valve replacement present in the aortic position. Aortic valve is not well visualized. Calcualted EOA and dimensionless index are consistent with moderate prosthetic valve stenosis. Consider additional imaging of the aortic valve if clinically indicated.   7. Mild aortic regurgitation.   8. Aortic regurgitation appears intravalvular.   9. Trace mitral regurgitation.  10. Compared to the transesophageal echocardiogram performed on 7/12/2017 aortic valve gradients are higher on the current study. Peak/mean aortic valve gradients were 21/13 mmHg on theprior study.    < end of copied text >    < from: MR Head w/wo IV Cont (06.10.23 @ 19:03) >  IMPRESSION:    1. Ischemic white matter disease and atrophy typical for age    2. Remote petechial hemorrhages    3. No evidence of infarction    --- End of Report ---    < end of copied text >    < from: BALJINDER W or WO Ultrasound Enhancing Agent (06.14.23 @ 15:06) >  CONCLUSIONS:      1. The left ventricular systolic function is normal with an ejection fraction visually estimated at 55 to 60 %.   2. Normal right ventricular cavity size, normal wall thickness and normal systolic function.   3. A bioprosthetic valve replacement present in the aortic position. Severe intravalvular regurgitation originating centrally and is eccentrically directed anteriorly. Bioprosthetic leaflets are suboptimally visualized.   4. Mild mitral regurgitation.   5. 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   6. No evidence of left atrial or left atrial appendage thrombus. The left atrial appendageemptying velocity is normal at 61 cm/s.   7. Mildly dilated proximal ascending aorta, measuring 4.10 cm (indexed 1.93 cm/m²).   8. Compared to the transthoracic echocardiogram performed on 6/12/2023 more significant aortic regurgitation is noted on today's study.    < end of copied text >      ASSESSMENT/PLAN: Pt is a 77-year-old male with a past medical history of hypertension, diabetes, hyperlipidemia, bioprosthetic aortic valve replacement in 2009 by Dr. Ted Piña status post recent lower extremity angiogram status post angioplasty of right anterior tibial artery who presents from the office due to unsteadiness of his feet and room spinning sensation.    #Dizziness  - suspect vertigo  - neuro eval appreciated - MRI with no evidence of infarction  - orthostatics negative  - tele stable with no events    #Bio-AVR prosthetic valve stenosis  - recent office echo with elevated gradients across bioprosthetic aortic valve, inpatient TTE confirmed elevated gradients, moderate prosthetic valve stenosis  - BALJINDER now showing severe intravalvular regurgitation of his bio-AVR  - Structural heart consult called - f/u recs    #HTN  - Continue Amlodipine 10mg daily, Imdur 60mg daily, and Toprol XL 100mg daily. Hold HCTZ and Valsartan given SAMARIA. Can restart Hydralazine if becomes hypertensive    #HLD  - Continue Lipitor    #PAD  - Continue ASA/Plavix and Lipitor    #SAMARIA  - Suspect contrast induced  - Renal consult appreciated  - s/p IVF  - Hold HCTZ and Valsartan  - Trend cr - improving    - Patient has f/u with Dr. Plummer on 7/7 at 11:30 AM    Howard Victor PA-C  Pager: [phone]

## 2023-06-15 NOTE — CONSULT NOTE ADULT - NS ATTEND AMEND GEN_ALL_CORE FT
Events that transpired leading to the patient current hospitalization reviewed along with the patient's hospital course.  The patient's past medical history/surgical history/family history/social history was gone over.  Agree with 14 point review of systems and physical examination as noted above.    Discussion was had with the patient, his wife and granddaughter/Bethanie concerning his clinical presentation and findings on imaging studies.  The patient had undergone surgical aortic valve replacement at [facility] in 2009 during which time a 25 mm bioprosthetic valve was placed.  At that time the procedure was performed for severe aortic valve insufficiency.  The patient is now found to have similar symptoms of dyspnea and dizziness which she experienced in 2009.  BALJINDER on 6/14/2023 demonstrated EF 55 to 60% with normal right ventricular cavity size/thickness/systolic function.  Severe intravalvular regurgitation seen centrally and eccentrically in bioprosthetic aortic valve.  There is mild mitral valve regurgitation.  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  Mildly dilated proximal ascending aorta measuring 4.1 cm.    Explained to the patient and family what is severe aortic valve insufficiency of a degenerative aortic valve.  The associated signs and symptoms were reviewed.  The natural pathophysiology of left untreated were gone over.  The various treatment options were gone over including redo surgery, medical management and TAVR valve in valve.  The pros/cons and the risk/benefits of the various treatment options were gone over.  Due to the patient age and comorbidities he is felt to be appropriate candidate to undergo work-up for TAVR valve in valve.    Plan for patient to undergo heart CTA once cleared by nephrology team.  Would recommend the patient be given prehydration to minimize risk of contrast-induced nephropathy.    As part of the patient's work-up carotid Dopplers and PFTs will also be performed.    All questions concerns of the patient and his family were addressed.    Findings and plan were discussed with the medicine team.

## 2023-06-15 NOTE — CONSULT NOTE ADULT - ASSESSMENT
a 77-year-old male with a past medical history of hypertension, diabetes, hyperlipidemia, bioprosthetic aortic valvereplacement in 2009 by Dr. Ted Piña status post recent lower extremity angiogram status post angioplasty of right anterior tibial artery who presents from the office due to unsteadiness of his feet and room spinning sensation      1) Yfn on CKD stage 3  likely has underlying diabetic and hypertensive nephropathy  cr now inc in setting of contrast exposure, and fluctuations in hemodynamics  ddx includes pre renal vs atn vs ruele obstruction  check ua  chck urine na/cr/cl/osm  check renal US  keep off ace/arb for now  avoid contrast  encourage po intake  will monitor with you    Dr Hess  [phone]
Mr Madison is a 76 y/o male with PMHx significant for aortic valve replacement, vertigo, HLD, BPH, DM, HTN presenting with dyspnea and dizziness.    
Assessment: 77y (1945) man with a PMHx significant for aortic valve replacement on aspirin, hx of vertigo, HLD, BPH, DM, HTN presenting with room spinning sensation up standing. LKW 9:30am (6/9). Patient reports he was watching a movie while sitting on the couch. He was waiting for a ride to take him to his cardiology appointment. Upon standing up he experienced room spinning sensation and was unable to walk properly. Patient states this sensation is significantly more intense than his usually vertigo that he sometimes experiences. The vertigo is only triggered by standing up and lasts until he sits down. Denies lightheadedness. Vertigo is not triggered by head movements or eye movements. He denies nausea, vomiting, unilateral weakness, numbness, pins and needles, vision changes. He is also experiencing head pressure he describes as tightness. This sensation does not change with position. Patient usually has good response to tylenol for headaches accompanied by vertigo. Patient told his cardiologist about these symptoms and he told him to come the ED. At the cardiology visit, it was noted that he had worsening aortic stenosis. Code stroke triggered around 2:15pm. NIHSS 0. CT head, CTA H/N, CTP unremarkable. Exam unremarkable.     LKW 9:30am (6/9)  NIHSS 0  mRS 0     Impression: orthostatic vertigo 2/2 likely peripheral process (BPPV vs vestibular neuritis) vs very unlikely CNS process such as acute infarct     Plan  [] continue aspirin  [] cardiology consult for worsening aortic stenosis, follow up recs  [] If gets admitted can pursue MRI brain without contrast, can be done outpatient  [] Meclizine 12.5mg BID PRN (give one now)  [] Refer for Vestibular Rehab on discharge  [] Neurology f/u outpatient at [address] [place] [address] [phone]    Case discussed with stroke fellow

## 2023-06-15 NOTE — CONSULT NOTE ADULT - SUBJECTIVE AND OBJECTIVE BOX
HPI:  77 y (M with a pmhx significant for aortic valve replacement on aspirin, hx of vertigo, HLD, BPH, DM, HTN presenting with room spinning sensation up standing.  Patient states that he had spinning sensation  on standing up and was unable to walk properly. Patient states this sensation is significantly more intense than his usually vertigo that he sometimes experiences.   Denies lightheadedness, nausea, vomiting, unilateral weakness, numbness, pins and needles, vision changes.  (09 Jun 2023 20:07)      Allergies    No Known Allergies    Intolerances      PAST MEDICAL & SURGICAL HISTORY:  HTN (hypertension)    Aortic valve disease    Diabetes mellitus  Type II    Hyperlipidemia    BPH (benign prostatic hypertrophy)    GERD (gastroesophageal reflux disease)    Chronic mastoiditis of left side    Gall stones    HTN (hypertension)    DM (diabetes mellitus)    High cholesterol    Aortic valve replaced  2009 with bovine tissue valve    Hx of cholecystectomy    S/P AVR (aortic valve replacement)  2009 @ [facility] (bovine)    History of cholecystectomy    History of ear surgery  ? surgery on left ear      MEDICATIONS  (STANDING):  amLODIPine   Tablet 10 milliGRAM(s) Oral daily  aspirin  chewable 81 milliGRAM(s) Oral daily  atorvastatin 40 milliGRAM(s) Oral at bedtime  chlorhexidine 2% Cloths 1 Application(s) Topical daily  clopidogrel Tablet 75 milliGRAM(s) Oral daily  dextrose 5%. 1000 milliLiter(s) (50 mL/Hr) IV Continuous <Continuous>  dextrose 5%. 1000 milliLiter(s) (100 mL/Hr) IV Continuous <Continuous>  dextrose 50% Injectable 12.5 Gram(s) IV Push once  dextrose 50% Injectable 25 Gram(s) IV Push once  dextrose 50% Injectable 25 Gram(s) IV Push once  gabapentin 100 milliGRAM(s) Oral three times a day  glucagon  Injectable 1 milliGRAM(s) IntraMuscular once  insulin lispro (ADMELOG) corrective regimen sliding scale   SubCutaneous at bedtime  insulin lispro (ADMELOG) corrective regimen sliding scale   SubCutaneous three times a day before meals  isosorbide   mononitrate ER Tablet (IMDUR) 60 milliGRAM(s) Oral daily  memantine 5 milliGRAM(s) Oral two times a day  metoprolol succinate  milliGRAM(s) Oral daily  multivitamin 1 Tablet(s) Oral daily  pantoprazole    Tablet 40 milliGRAM(s) Oral before breakfast  polyethylene glycol 3350 17 Gram(s) Oral at bedtime  senna 2 Tablet(s) Oral at bedtime  sodium chloride 0.9%. 1000 milliLiter(s) (75 mL/Hr) IV Continuous <Continuous>  sodium chloride 0.9%. 500 milliLiter(s) (75 mL/Hr) IV Continuous <Continuous>      REVIEW OF SYSTEMS:    CONSTITUTIONAL: No weakness, fevers or chills  EYES/ENT: No visual changes;  No vertigo or throat pain   NECK: No pain or stiffness  RESPIRATORY: No cough, wheezing, hemoptysis; No shortness of breath  CARDIOVASCULAR: No chest pain or palpitations  GASTROINTESTINAL: No abdominal or epigastric pain. No nausea, vomiting, or hematemesis; No diarrhea or constipation. No melena or hematochezia.  GENITOURINARY: No dysuria, frequency or hematuria  NEUROLOGICAL: No numbness or weakness  SKIN: No itching, rashes      Vital Signs Last 24 Hrs  T(C): 36.7 (15 Gelacio 2023 11:36), Max: 36.9 (14 Jun 2023 20:39)  T(F): 98 (15 Gelacio 2023 11:36), Max: 98.4 (14 Jun 2023 20:39)  HR: 66 (15 Gelacio 2023 11:36) (57 - 80)  BP: 147/67 (15 Gelacio 2023 11:36) (94/55 - 150/68)  BP(mean): --  RR: 18 (15 Gelacio 2023 11:36) (14 - 18)  SpO2: 95% (15 Gelacio 2023 11:36) (92% - 97%)    Parameters below as of 15 Gelacio 2023 11:36  Patient On (Oxygen Delivery Method): room air        06-15    139  |  102  |  40<H>  ----------------------------<  189<H>  3.5   |  23  |  1.77<H>    Ca    8.9      15 Gelacio 2023 06:55          I&O's Summary    14 Jun 2023 07:01  -  15 Gelacio 2023 07:00  --------------------------------------------------------  IN: 1380 mL / OUT: 1875 mL / NET: -495 mL    15 Gelacio 2023 07:01  -  15 Gelacio 2023 15:01  --------------------------------------------------------  IN: 630 mL / OUT: 600 mL / NET: 30 mL          Physical Exam  General: NAD, WDWN, appropriate affect  Cardiac: s1s2, RRR, III/VI diastolic murmur LUSB  Pulmonary: CTA b/l, no w/r/r  Gastrointestinal: soft abdomen, nontender, nondistended, + bowel sounds throughout  Extremities: no edema, 2+ DP pulses  Neuro: A&Ox3, nonfocal  EKG: SR 1'AVB    < from: BALJINDER W or WO Ultrasound Enhancing Agent (06.14.23 @ 15:06) >  CONCLUSIONS:      1. The left ventricular systolic function is normal with an ejection fraction visually estimated at 55 to 60 %.   2. Normal right ventricular cavity size, normal wall thickness and normal systolic function.   3. A bioprosthetic valve replacement present in the aortic position. Severe intravalvular regurgitation originating centrally and is eccentrically directed anteriorly. Bioprosthetic leaflets are suboptimally visualized.   4. Mild mitral regurgitation.   5. 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   6. No evidence of left atrial or left atrial appendage thrombus. The left atrial appendageemptying velocity is normal at 61 cm/s.   7. Mildly dilated proximal ascending aorta, measuring 4.10 cm (indexed 1.93 cm/m²).   8. Compared to the transthoracic echocardiogram performed on 6/12/2023 more significant aortic regurgitation is noted on today's study.    ____________________________________________________________________  BALJINDER Procedure:  After discussion of the risks and benefits of the BALJINDER, an informed consent was obtained. Study was performed under anesthesia. Images were obtained with the patient in a left lateral decubitus position. Image quality was good. The patient's vital signs; including heart rate, blood pressure, and oxygen saturation; remained stable throughout the procedure. The patient tolerated the procedure well and without complications.  ________________________________________________________________________________________  FINDINGS:     Left Ventricle:  The left ventricular systolic function is normal with an ejection fraction visually estimated at 55 to 60%.     Right Ventricle:  Normal right ventricular cavity size, normal wall thickness and normal systolic function.     Left Atrium:  There is no evidence of left atrial or left atrial appendage thrombus. The left atrial appendage emptying velocity is normal at 61 cm/s (normal >40cm/s). Emptying velocities of the left atrial appendage are normal.     Right Atrium:  The right atrium is normal.     Interatrial Septum:  The interatrial septum appears midline. There is no evidence of a patent foramenovale by color flow Doppler. Agitated saline injection was negative for intracardiac shunt.     Aortic Valve:  A bioprosthetic valve replacement present in the aortic position. There is severe intravalvular regurgitation originating centrally and is eccentrically directed anteriorly. Bioprosthetic leaflets are suboptimally visualized. AI VTI is 2.495 m AI VMax is 4.29 m/s. AI pressure half time is 544 msec. AI slope is 2.13 m/s². Vena contracta width is 0.62 cm.     Mitral Valve:  Structurally normal mitral valve with normal leaflet excursion. 3D reconstruction of the mitral valve was performed. There is mild mitral regurgitation.     Tricuspid Valve:  The tricuspid valve is normal in structure and function, with good leaflet excursion, and without any evidence of stenosis or significant regurgitation. Structurally normal tricuspid valve with normal leaflet excursion. There is trace tricuspid regurgitation.     Pulmonic Valve:  The pulmonic valve was not well visualized.     Aorta:  Mildly dilated proximal ascending aorta, measuring 4.10 cm (indexed 1.93 cm/m²). There is echogenic graft material noted in the proximal ascending aorta consistent with known Bentall procedure. There is moderate atheroma non-mobile in the visualized portions of the transverse Aortic arch. There is severe atheroma non-mobile focal which measures up to 5.00 mm in the visualized portions of the descending Aorta.     Pericardium:  No pericardial effusion seen.  ____________________________________________________________________  QUANTITATIVE DATA  Left Ventricle Measurements     Visualized LV EF%: 55 to 60%    Aorta Measurements     Ao Asc prox: 4.10 cm       LVOT / RVOT/ Qp/Qs Data:  LVOT Diameter:   2.60 cm  LVOT Vmax:       1.17 m/s  LVOT VTI:  24.70 cm  LVOT SV:         131.1 ml  LVOT SV Indexed: 61.70 ml/m²    Aortic Valve Measurements     AV Vmax:           266.0 cm/s  AV Peak Gradient:  28.3 mmHg  AV Mean Gradient:  16.0 mmHg  AV VTI:            62.2 cm  AV VTI Ratio:      0.40  AoV EOA, Contin:   2.11 cm²  AoV EOA, Contin i: 0.99 cm²/m²    < end of copied text >

## 2023-06-15 NOTE — PROGRESS NOTE ADULT - ASSESSMENT
a 77-year-old male with a past medical history of hypertension, diabetes, hyperlipidemia, bioprosthetic aortic valvereplacement in 2009 by Dr. Ted Piña status post recent lower extremity angiogram status post angioplasty of right anterior tibial artery who presents from the office due to unsteadiness of his feet and room spinning sensation      1) Yfn on CKD stage 3  likely has underlying diabetic and hypertensive nephropathy  likely pre renal superimposed  Renal US- no obstruction; renal calculi- no hydro  Urine lytes--> intravascular depletion--> s/p Iv nacl @ 75cc for 24 hours  Cr now improving  keep off ace/arb for now  avoid contrast  encourage po intake  will monitor with you    Dr Hess  [phone] a 77-year-old male with a past medical history of hypertension, diabetes, hyperlipidemia, bioprosthetic aortic valvereplacement in 2009 by Dr. Ted Piña status post recent lower extremity angiogram status post angioplasty of right anterior tibial artery who presents from the office due to unsteadiness of his feet and room spinning sensation      1) Yfn on CKD stage 3  likely has underlying diabetic and hypertensive nephropathy  likely pre renal superimposed  Renal US- no obstruction; renal calculi- no hydro  Urine lytes--> intravascular depletion--> s/p Iv nacl @ 75cc for 24 hours  Cr now improving--->Pt is at inc risk for contrast induced nephropathy  Will need PPx if pt is getting contrast ---> would give mucomyst 1200mg po x 2 doses pre and post contrast q12h  Also would start Iv nacl @ 75cc , 8 hours pre and post contrast  keep off ace/arb for now  avoid contrast  encourage po intake  will monitor with you    Dr Hess  [phone]

## 2023-06-16 LAB
ANION GAP SERPL CALC-SCNC: 15 MMOL/L — SIGNIFICANT CHANGE UP (ref 5–17)
BUN SERPL-MCNC: 25 MG/DL — HIGH (ref 7–23)
CALCIUM SERPL-MCNC: 9.7 MG/DL — SIGNIFICANT CHANGE UP (ref 8.4–10.5)
CHLORIDE SERPL-SCNC: 101 MMOL/L — SIGNIFICANT CHANGE UP (ref 96–108)
CO2 SERPL-SCNC: 23 MMOL/L — SIGNIFICANT CHANGE UP (ref 22–31)
CREAT SERPL-MCNC: 1.41 MG/DL — HIGH (ref 0.5–1.3)
EGFR: 51 ML/MIN/1.73M2 — LOW
GLUCOSE BLDC GLUCOMTR-MCNC: 201 MG/DL — HIGH (ref 70–99)
GLUCOSE BLDC GLUCOMTR-MCNC: 265 MG/DL — HIGH (ref 70–99)
GLUCOSE BLDC GLUCOMTR-MCNC: 309 MG/DL — HIGH (ref 70–99)
GLUCOSE BLDC GLUCOMTR-MCNC: 313 MG/DL — HIGH (ref 70–99)
GLUCOSE SERPL-MCNC: 238 MG/DL — HIGH (ref 70–99)
POTASSIUM SERPL-MCNC: 3.7 MMOL/L — SIGNIFICANT CHANGE UP (ref 3.5–5.3)
POTASSIUM SERPL-SCNC: 3.7 MMOL/L — SIGNIFICANT CHANGE UP (ref 3.5–5.3)
SODIUM SERPL-SCNC: 139 MMOL/L — SIGNIFICANT CHANGE UP (ref 135–145)

## 2023-06-16 PROCEDURE — 99233 SBSQ HOSP IP/OBS HIGH 50: CPT

## 2023-06-16 RX ORDER — SODIUM CHLORIDE 9 MG/ML
1000 INJECTION INTRAMUSCULAR; INTRAVENOUS; SUBCUTANEOUS
Refills: 0 | Status: DISCONTINUED | OUTPATIENT
Start: 2023-06-17 | End: 2023-06-18

## 2023-06-16 RX ORDER — ACETYLCYSTEINE 200 MG/ML
1200 VIAL (ML) MISCELLANEOUS EVERY 12 HOURS
Refills: 0 | Status: DISCONTINUED | OUTPATIENT
Start: 2023-06-17 | End: 2023-06-17

## 2023-06-16 RX ADMIN — CHLORHEXIDINE GLUCONATE 1 APPLICATION(S): 213 SOLUTION TOPICAL at 11:33

## 2023-06-16 RX ADMIN — ISOSORBIDE MONONITRATE 60 MILLIGRAM(S): 60 TABLET, EXTENDED RELEASE ORAL at 11:25

## 2023-06-16 RX ADMIN — MEMANTINE HYDROCHLORIDE 5 MILLIGRAM(S): 10 TABLET ORAL at 17:04

## 2023-06-16 RX ADMIN — GABAPENTIN 100 MILLIGRAM(S): 400 CAPSULE ORAL at 13:27

## 2023-06-16 RX ADMIN — Medication 6: at 17:47

## 2023-06-16 RX ADMIN — GABAPENTIN 100 MILLIGRAM(S): 400 CAPSULE ORAL at 05:37

## 2023-06-16 RX ADMIN — CLOPIDOGREL BISULFATE 75 MILLIGRAM(S): 75 TABLET, FILM COATED ORAL at 11:25

## 2023-06-16 RX ADMIN — MEMANTINE HYDROCHLORIDE 5 MILLIGRAM(S): 10 TABLET ORAL at 05:37

## 2023-06-16 RX ADMIN — AMLODIPINE BESYLATE 10 MILLIGRAM(S): 2.5 TABLET ORAL at 05:37

## 2023-06-16 RX ADMIN — GABAPENTIN 100 MILLIGRAM(S): 400 CAPSULE ORAL at 21:51

## 2023-06-16 RX ADMIN — SENNA PLUS 2 TABLET(S): 8.6 TABLET ORAL at 21:50

## 2023-06-16 RX ADMIN — Medication 100 MILLIGRAM(S): at 05:37

## 2023-06-16 RX ADMIN — Medication 81 MILLIGRAM(S): at 11:25

## 2023-06-16 RX ADMIN — Medication 1 TABLET(S): at 11:25

## 2023-06-16 RX ADMIN — Medication 12.5 MILLIGRAM(S): at 21:51

## 2023-06-16 RX ADMIN — Medication 4: at 11:32

## 2023-06-16 RX ADMIN — PANTOPRAZOLE SODIUM 40 MILLIGRAM(S): 20 TABLET, DELAYED RELEASE ORAL at 05:38

## 2023-06-16 RX ADMIN — Medication 8: at 07:43

## 2023-06-16 RX ADMIN — Medication 2: at 21:51

## 2023-06-16 RX ADMIN — ATORVASTATIN CALCIUM 40 MILLIGRAM(S): 80 TABLET, FILM COATED ORAL at 21:51

## 2023-06-16 NOTE — PROGRESS NOTE ADULT - SUBJECTIVE AND OBJECTIVE BOX
C A R D I O L O G Y  **********************************     DATE OF SERVICE: 06-16-23    Patient denies chest pain, dizziness, or shortness of breath.   Review of symptoms otherwise negative.    MEDICATIONS:  acetaminophen     Tablet .. 650 milliGRAM(s) Oral every 6 hours PRN  amLODIPine   Tablet 10 milliGRAM(s) Oral daily  aspirin  chewable 81 milliGRAM(s) Oral daily  atorvastatin 40 milliGRAM(s) Oral at bedtime  benzocaine/menthol Lozenge 1 Lozenge Oral four times a day PRN  chlorhexidine 2% Cloths 1 Application(s) Topical daily  clopidogrel Tablet 75 milliGRAM(s) Oral daily  dextrose 5%. 1000 milliLiter(s) IV Continuous <Continuous>  dextrose 5%. 1000 milliLiter(s) IV Continuous <Continuous>  dextrose 50% Injectable 25 Gram(s) IV Push once  dextrose 50% Injectable 12.5 Gram(s) IV Push once  dextrose 50% Injectable 25 Gram(s) IV Push once  dextrose Oral Gel 15 Gram(s) Oral once PRN  gabapentin 100 milliGRAM(s) Oral three times a day  glucagon  Injectable 1 milliGRAM(s) IntraMuscular once  insulin lispro (ADMELOG) corrective regimen sliding scale   SubCutaneous three times a day before meals  insulin lispro (ADMELOG) corrective regimen sliding scale   SubCutaneous at bedtime  isosorbide   mononitrate ER Tablet (IMDUR) 60 milliGRAM(s) Oral daily  meclizine 12.5 milliGRAM(s) Oral every 8 hours PRN  memantine 5 milliGRAM(s) Oral two times a day  metoprolol succinate  milliGRAM(s) Oral daily  multivitamin 1 Tablet(s) Oral daily  pantoprazole    Tablet 40 milliGRAM(s) Oral before breakfast  polyethylene glycol 3350 17 Gram(s) Oral at bedtime  senna 2 Tablet(s) Oral at bedtime  sodium chloride 0.9%. 1000 milliLiter(s) IV Continuous <Continuous>  sodium chloride 0.9%. 500 milliLiter(s) IV Continuous <Continuous>      LABS:      Hemoglobin: 12.6 g/dL (06-12 @ 06:32)      06-16    139  |  101  |  25<H>  ----------------------------<  238<H>  3.7   |  23  |  1.41<H>    Ca    9.7      16 Jun 2023 07:03      Creatinine Trend: 1.41<--, 1.77<--, 2.28<--, 2.11<--, 1.55<--, 1.45<--    COAGS:           PHYSICAL EXAM:  T(C): 36.8 (06-16-23 @ 11:10), Max: 36.8 (06-16-23 @ 11:10)  HR: 64 (06-16-23 @ 11:10) (60 - 73)  BP: 134/75 (06-16-23 @ 11:10) (134/75 - 170/64)  RR: 18 (06-16-23 @ 11:10) (18 - 18)  SpO2: 96% (06-16-23 @ 11:10) (96% - 98%)  Wt(kg): --    I&O's Summary    15 Gelacio 2023 07:01  -  16 Jun 2023 07:00  --------------------------------------------------------  IN: 730 mL / OUT: 1525 mL / NET: -795 mL    16 Jun 2023 07:01  -  16 Jun 2023 14:03  --------------------------------------------------------  IN: 560 mL / OUT: 0 mL / NET: 560 mL        Gen: NAD  HEENT:  (-)icterus (-)pallor  CV: N S1 S2 1/6 MORENA (+)2 Pulses B/l  Resp:  Clear to auscultation B/L, normal effort  GI: (+) BS Soft, NT, ND  Lymph:  (-)Edema, (-)obvious lymphadenopathy  Skin: Warm to touch, Normal turgor  Psych: Appropriate mood and affect      TELEMETRY: SR 60-80    < from: TTE Limited W or WO Ultrasound Enhancing Agent (06.12.23 @ 10:51) >  CONCLUSIONS:      1. Technically difficult image quality.   2. There is increasedLV mass and concentric hypertrophy.   3. Mild left ventricular hypertrophy.   4. The left ventricular systolic function is normal with an ejection fraction of 68 % by Roach's method of disks. There are no regional wall motion abnormalities seen.   5. Normal right ventricular cavity size, normal wall thickness and normal systolic function. The tricuspid annular plane systolic excursion (TAPSE) is 1.9 cm (normal >=1.7 cm).   6. A bioprosthetic valve replacement present in the aortic position. Aortic valve is not well visualized. Calcualted EOA and dimensionless index are consistent with moderate prosthetic valve stenosis. Consider additional imaging of the aortic valve if clinically indicated.   7. Mild aortic regurgitation.   8. Aortic regurgitation appears intravalvular.   9. Trace mitral regurgitation.  10. Compared to the transesophageal echocardiogram performed on 7/12/2017 aortic valve gradients are higher on the current study. Peak/mean aortic valve gradients were 21/13 mmHg on theprior study.    < end of copied text >    < from: MR Head w/wo IV Cont (06.10.23 @ 19:03) >  IMPRESSION:    1. Ischemic white matter disease and atrophy typical for age    2. Remote petechial hemorrhages    3. No evidence of infarction    --- End of Report ---    < end of copied text >    < from: BALJINDER W or WO Ultrasound Enhancing Agent (06.14.23 @ 15:06) >  CONCLUSIONS:      1. The left ventricular systolic function is normal with an ejection fraction visually estimated at 55 to 60 %.   2. Normal right ventricular cavity size, normal wall thickness and normal systolic function.   3. A bioprosthetic valve replacement present in the aortic position. Severe intravalvular regurgitation originating centrally and is eccentrically directed anteriorly. Bioprosthetic leaflets are suboptimally visualized.   4. Mild mitral regurgitation.   5. 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   6. No evidence of left atrial or left atrial appendage thrombus. The left atrial appendageemptying velocity is normal at 61 cm/s.   7. Mildly dilated proximal ascending aorta, measuring 4.10 cm (indexed 1.93 cm/m²).   8. Compared to the transthoracic echocardiogram performed on 6/12/2023 more significant aortic regurgitation is noted on today's study.    < end of copied text >      ASSESSMENT/PLAN: Pt is a 77-year-old male with a past medical history of hypertension, diabetes, hyperlipidemia, bioprosthetic aortic valve replacement in 2009 by Dr. Ted Piña status post recent lower extremity angiogram status post angioplasty of right anterior tibial artery who presents from the office due to unsteadiness of his feet and room spinning sensation.    #Dizziness  - suspect vertigo  - neuro eval appreciated - MRI with no evidence of infarction  - orthostatics negative  - tele stable with no events    #Bio-AVR prosthetic valve stenosis  - recent office echo with elevated gradients across bioprosthetic aortic valve, inpatient TTE confirmed elevated gradients, moderate prosthetic valve stenosis  - BALJINDER now showing severe intravalvular regurgitation of his bio-AVR  - Structural heart consult appreciated - f/u recs regarding Ria TAVR eval. Plan for preop LHC and CTA when renal function allows. Cr improving, Will need to follow renal recs prior to contrast. Will determine timing with structural heart.    #HTN  - Continue Amlodipine 10mg daily, Imdur 60mg daily, and Toprol XL 100mg daily. Hold HCTZ and Valsartan given SAMARIA. Can restart Hydralazine if becomes hypertensive    #HLD  - Continue Lipitor    #PAD  - Continue ASA/Plavix and Lipitor    #SAMARIA  - Suspect contrast induced  - Renal consult appreciated  - s/p IVF  - Hold HCTZ and Valsartan  - Trend cr - improving    - Patient has f/u with Dr. Plummer on 7/7 at 11:30 AM    Howard Victor PA-C  Pager: [phone]

## 2023-06-16 NOTE — PROGRESS NOTE ADULT - PROBLEM SELECTOR PLAN 1
- Ria TAVR evaluation in progress  - Renal evaluation appreciated, spoke with cardiac CT at ext [phone]. Will plan for IV hydration beginning at 5am tomorrow per recommendations and plan for CT tomorrow.   - Cardiac catheterization pending review of CT and evaluation of renal function following CT  - Timing of intervention TBD pending completion of testing     WENDY Montoya NP  [phone]

## 2023-06-16 NOTE — PROGRESS NOTE ADULT - SUBJECTIVE AND OBJECTIVE BOX
Patient is a 77y old  Male who presents with a chief complaint of Vertigo today (10 Gelacio 2023 09:57)      SUBJECTIVE / OVERNIGHT EVENTS: no events      T(C): 36.8 (06-16-23 @ 20:48), Max: 36.8 (06-16-23 @ 20:48)  HR: 67 (06-16-23 @ 20:48) (66 - 67)  BP: 150/65 (06-16-23 @ 20:48) (132/66 - 150/65)  RR: 18 (06-16-23 @ 20:48) (18 - 18)  SpO2: 94% (06-16-23 @ 20:48) (94% - 94%)    MEDICATIONS  (STANDING):  amLODIPine   Tablet 10 milliGRAM(s) Oral daily  aspirin  chewable 81 milliGRAM(s) Oral daily  atorvastatin 40 milliGRAM(s) Oral at bedtime  chlorhexidine 2% Cloths 1 Application(s) Topical daily  clopidogrel Tablet 75 milliGRAM(s) Oral daily  dextrose 5%. 1000 milliLiter(s) (100 mL/Hr) IV Continuous <Continuous>  dextrose 5%. 1000 milliLiter(s) (50 mL/Hr) IV Continuous <Continuous>  dextrose 50% Injectable 25 Gram(s) IV Push once  dextrose 50% Injectable 12.5 Gram(s) IV Push once  dextrose 50% Injectable 25 Gram(s) IV Push once  gabapentin 100 milliGRAM(s) Oral three times a day  glucagon  Injectable 1 milliGRAM(s) IntraMuscular once  insulin lispro (ADMELOG) corrective regimen sliding scale   SubCutaneous three times a day before meals  insulin lispro (ADMELOG) corrective regimen sliding scale   SubCutaneous at bedtime  isosorbide   mononitrate ER Tablet (IMDUR) 60 milliGRAM(s) Oral daily  memantine 5 milliGRAM(s) Oral two times a day  metoprolol succinate  milliGRAM(s) Oral daily  multivitamin 1 Tablet(s) Oral daily  pantoprazole    Tablet 40 milliGRAM(s) Oral before breakfast  polyethylene glycol 3350 17 Gram(s) Oral at bedtime  senna 2 Tablet(s) Oral at bedtime  sodium chloride 0.9%. 1000 milliLiter(s) (75 mL/Hr) IV Continuous <Continuous>  sodium chloride 0.9%. 500 milliLiter(s) (75 mL/Hr) IV Continuous <Continuous>    MEDICATIONS  (PRN):  acetaminophen     Tablet .. 650 milliGRAM(s) Oral every 6 hours PRN Moderate Pain (4 - 6)  benzocaine/menthol Lozenge 1 Lozenge Oral four times a day PRN Sore Throat  dextrose Oral Gel 15 Gram(s) Oral once PRN Blood Glucose LESS THAN 70 milliGRAM(s)/deciliter  meclizine 12.5 milliGRAM(s) Oral every 8 hours PRN Dizziness        PHYSICAL EXAM:  GENERAL: NAD, well-developed  HEAD:  Atraumatic, Normocephalic  EYES: EOMI, PERRLA, conjunctiva and sclera clear  NECK: Supple, No JVD  CHEST/LUNG: Clear to auscultation bilaterally; No wheeze  HEART: Regular rate and rhythm; No murmurs, rubs, or gallops  ABDOMEN: Soft, Nontender, Nondistended; Bowel sounds present  EXTREMITIES:  2+ Peripheral Pulses, No clubbing, cyanosis, or edema  PSYCH: AAOx3  NEUROLOGY: non-focal  SKIN: No rashes or lesions                         139|101|25<238  3.7|23|1.41  9.7,--,--  06-16 @ 07:03                                     137|99|46<195  3.6|23|2.28  8.7,--,--  06-14 @ 06:52  CAPILLARY BLOOD GLUCOSE      POCT Blood Glucose.: 120 mg/dL (14 Jun 2023 16:06)  POCT Blood Glucose.: 115 mg/dL (14 Jun 2023 11:38)  POCT Blood Glucose.: 176 mg/dL (14 Jun 2023 07:24)  POCT Blood Glucose.: 135 mg/dL (13 Jun 2023 21:18)

## 2023-06-16 NOTE — PROGRESS NOTE ADULT - SUBJECTIVE AND OBJECTIVE BOX
HPI/Interval Hx    77 y (M with a pmhx significant for aortic valve replacement on aspirin, hx of vertigo, HLD, BPH, DM, HTN presenting with room spinning sensation up standing.  Patient states that he had spinning sensation  on standing up and was unable to walk properly. Patient states this sensation is significantly more intense than his usually vertigo that he sometimes experiences.   Denies lightheadedness, nausea, vomiting, unilateral weakness, numbness, pins and needles, vision changes.  (09 Jun 2023 20:07)    Structural team following for evaluation of bioprosthetic AI      MEDICATIONS  (STANDING):  amLODIPine   Tablet 10 milliGRAM(s) Oral daily  aspirin  chewable 81 milliGRAM(s) Oral daily  atorvastatin 40 milliGRAM(s) Oral at bedtime  chlorhexidine 2% Cloths 1 Application(s) Topical daily  clopidogrel Tablet 75 milliGRAM(s) Oral daily  dextrose 5%. 1000 milliLiter(s) (100 mL/Hr) IV Continuous <Continuous>  dextrose 5%. 1000 milliLiter(s) (50 mL/Hr) IV Continuous <Continuous>  dextrose 50% Injectable 25 Gram(s) IV Push once  dextrose 50% Injectable 12.5 Gram(s) IV Push once  dextrose 50% Injectable 25 Gram(s) IV Push once  gabapentin 100 milliGRAM(s) Oral three times a day  glucagon  Injectable 1 milliGRAM(s) IntraMuscular once  insulin lispro (ADMELOG) corrective regimen sliding scale   SubCutaneous three times a day before meals  insulin lispro (ADMELOG) corrective regimen sliding scale   SubCutaneous at bedtime  isosorbide   mononitrate ER Tablet (IMDUR) 60 milliGRAM(s) Oral daily  memantine 5 milliGRAM(s) Oral two times a day  metoprolol succinate  milliGRAM(s) Oral daily  multivitamin 1 Tablet(s) Oral daily  pantoprazole    Tablet 40 milliGRAM(s) Oral before breakfast  polyethylene glycol 3350 17 Gram(s) Oral at bedtime  senna 2 Tablet(s) Oral at bedtime  sodium chloride 0.9%. 1000 milliLiter(s) (75 mL/Hr) IV Continuous <Continuous>  sodium chloride 0.9%. 500 milliLiter(s) (75 mL/Hr) IV Continuous <Continuous>    MEDICATIONS  (PRN):  acetaminophen     Tablet .. 650 milliGRAM(s) Oral every 6 hours PRN Moderate Pain (4 - 6)  benzocaine/menthol Lozenge 1 Lozenge Oral four times a day PRN Sore Throat  dextrose Oral Gel 15 Gram(s) Oral once PRN Blood Glucose LESS THAN 70 milliGRAM(s)/deciliter  meclizine 12.5 milliGRAM(s) Oral every 8 hours PRN Dizziness      PAST MEDICAL & SURGICAL HISTORY:  HTN (hypertension)      Aortic valve disease      Diabetes mellitus  Type II      Hyperlipidemia      BPH (benign prostatic hypertrophy)      GERD (gastroesophageal reflux disease)      Chronic mastoiditis of left side      Gall stones      HTN (hypertension)      DM (diabetes mellitus)      High cholesterol      Aortic valve replaced  2009 with bovine tissue valve      Hx of cholecystectomy      S/P AVR (aortic valve replacement)  2009 @ [facility] (bovine)      History of cholecystectomy      History of ear surgery  ? surgery on left ear          Review of Systems  CONSTITUTIONAL: No weakness, fevers or chills  EYES/ENT: No visual changes;  No vertigo or throat pain   NECK: No pain or stiffness  RESPIRATORY: No cough, wheezing, hemoptysis; No shortness of breath  CARDIOVASCULAR: No chest pain or palpitations, PND, orthopnea, or edema  GASTROINTESTINAL: No abdominal or epigastric pain. No nausea, vomiting, or hematemesis; No diarrhea or constipation. No melena or hematochezia.  GENITOURINARY: No dysuria, frequency or hematuria  NEUROLOGICAL: No numbness or weakness  SKIN: No itching, burning, rashes, or lesions   All other review of systems is negative unless indicated above.    Physical Exam  General: A/ox 3, No acute Distress  Neck: Supple, NO JVD  Cardiac: S1 S2, II/VI RUSB murmur  Pulmonary: CTAB, Breathing unlabored, No Rhonchi/Rales/Wheezing, diminished at bases bilaterally   Abdomen: Soft, Non -tender, +BS x 4 quads  Extremities: No Rashes, No edema  Neuro: A/o x 3, No focal deficits    Vital Signs Last 24 Hrs  T(C): 36.8 (16 Jun 2023 11:10), Max: 36.8 (16 Jun 2023 11:10)  T(F): 98.2 (16 Jun 2023 11:10), Max: 98.2 (16 Jun 2023 11:10)  HR: 66 (16 Jun 2023 17:03) (60 - 73)  BP: 132/66 (16 Jun 2023 17:03) (132/66 - 170/64)  BP(mean): --  RR: 18 (16 Jun 2023 11:10) (18 - 18)  SpO2: 96% (16 Jun 2023 11:10) (96% - 98%)    Parameters below as of 16 Jun 2023 11:10  Patient On (Oxygen Delivery Method): room air        06-16    139  |  101  |  25<H>  ----------------------------<  238<H>  3.7   |  23  |  1.41<H>    Ca    9.7      16 Jun 2023 07:03        Telemetry NSR 60-80    EKG:   < from: 12 Lead ECG (06.09.23 @ 16:57) >  Diagnosis Line SINUS RHYTHM [id number] DEGREE A-V BLOCK  LEFT AXIS DEVIATION  MODERATE VOLTAGE CRITERIA FOR LVH, MAY BE NORMAL VARIANT ( R in aVL , Dean product )  SEPTAL INFARCT , AGE UNDETERMINED  ABNORMAL ECG  WHEN COMPARED WITH ECG OF 12-JUL-2017 11:58,  SIGNIFICANT CHANGES HAVE OCCURRED  Confirmed by MD JACQUELINE, ANITA ([id number]) on 6/10/2023 2:11:34 PM    < end of copied text >      Other

## 2023-06-16 NOTE — PROGRESS NOTE ADULT - ASSESSMENT
Patient seen and examined, agree with above assessment and plan as transcribed above.    - renal f/u  - Structural heart eval in progress    Chalino Evans MD, [facility]  BEEPER [phone]

## 2023-06-16 NOTE — PROGRESS NOTE ADULT - ASSESSMENT
77 y (M with a pmhx significant for aortic valve replacement on aspirin, hx of vertigo, HLD, BPH, DM, HTN p      #Dizziness   Hx Vertigo   Check Orthostatics NEGATIVE   Echo reviewed   Cards eval appreciated   Neuro eval appreciated     # Bio AVR Prosthetic valve stenosis   - TTE confirmed elevated gradients, moderate prosthetic valve stenosis  -  s/p  BALJINDER   TAVR work up in progress         #HTN  Continue with Metorpolol, Norvasc   Continue Amlodipine and Imdur.   Hold HCTZ and Valsartan given SAMARIA. Restart Toprol XL at lower dose 100mg daily.    # DM hiss   Follow up A1C     #PAD s/p Angioplasty anterior tibial artery   Plavix

## 2023-06-16 NOTE — PROGRESS NOTE ADULT - SUBJECTIVE AND OBJECTIVE BOX
Patient seen and examined  no complaints    No Known Allergies    Hospital Medications:   MEDICATIONS  (STANDING):  amLODIPine   Tablet 10 milliGRAM(s) Oral daily  aspirin  chewable 81 milliGRAM(s) Oral daily  atorvastatin 40 milliGRAM(s) Oral at bedtime  chlorhexidine 2% Cloths 1 Application(s) Topical daily  clopidogrel Tablet 75 milliGRAM(s) Oral daily  dextrose 5%. 1000 milliLiter(s) (100 mL/Hr) IV Continuous <Continuous>  dextrose 5%. 1000 milliLiter(s) (50 mL/Hr) IV Continuous <Continuous>  dextrose 50% Injectable 25 Gram(s) IV Push once  dextrose 50% Injectable 12.5 Gram(s) IV Push once  dextrose 50% Injectable 25 Gram(s) IV Push once  gabapentin 100 milliGRAM(s) Oral three times a day  glucagon  Injectable 1 milliGRAM(s) IntraMuscular once  insulin lispro (ADMELOG) corrective regimen sliding scale   SubCutaneous three times a day before meals  insulin lispro (ADMELOG) corrective regimen sliding scale   SubCutaneous at bedtime  isosorbide   mononitrate ER Tablet (IMDUR) 60 milliGRAM(s) Oral daily  memantine 5 milliGRAM(s) Oral two times a day  metoprolol succinate  milliGRAM(s) Oral daily  multivitamin 1 Tablet(s) Oral daily  pantoprazole    Tablet 40 milliGRAM(s) Oral before breakfast  polyethylene glycol 3350 17 Gram(s) Oral at bedtime  senna 2 Tablet(s) Oral at bedtime  sodium chloride 0.9%. 1000 milliLiter(s) (75 mL/Hr) IV Continuous <Continuous>  sodium chloride 0.9%. 500 milliLiter(s) (75 mL/Hr) IV Continuous <Continuous>        VITALS:  T(F): 98.2 (23 @ 11:10), Max: 98.2 (23 @ 11:10)  HR: 64 (23 @ 11:10)  BP: 134/75 (23 @ 11:10)  RR: 18 (23 @ 11:10)  SpO2: 96% (23 @ 11:10)  Wt(kg): --    06-15 @ 07:01  -   @ 07:00  --------------------------------------------------------  IN: 730 mL / OUT: 1525 mL / NET: -795 mL     @ 07:01  -   @ 12:34  --------------------------------------------------------  IN: 560 mL / OUT: 0 mL / NET: 560 mL        PHYSICAL EXAM:  Constitutional: NAD  HEENT: anicteric sclera, oropharynx clear, MMM  Neck: No JVD  Respiratory: CTAB, no wheezes, rales or rhonchi  Cardiovascular: S1, S2, RRR  Gastrointestinal: BS+, soft, NT/ND  Extremities: No cyanosis or clubbing. No peripheral edema  Neurological: A/O x 3, no focal deficits  Psychiatric: Normal mood, normal affect  : No CVA tenderness. No velasco.   Skin: No rashes    LABS:      139  |  101  |  25<H>  ----------------------------<  238<H>  3.7   |  23  |  1.41<H>    Ca    9.7      2023 07:03      Creatinine Trend: 1.41 <--, 1.77 <--, 2.28 <--, 2.11 <--, 1.55 <--, 1.45 <--    Urine Studies:  Urinalysis Basic - ( 2023 23:17 )    Color: Yellow / Appearance: Clear / S.014 / pH:   Gluc:  / Ketone: Negative  / Bili: Negative / Urobili: Negative   Blood:  / Protein: 30 mg/dL / Nitrite: Negative   Leuk Esterase: Negative / RBC: 1 /hpf / WBC 1 /HPF   Sq Epi:  / Non Sq Epi:  / Bacteria: Negative      Potassium, Random Urine: 36 mmol/L ( @ 23:17)  Osmolality, Random Urine: 373 mos/kg ( @ 23:17)  Sodium, Random Urine: 21 mmol/L ( @ 23:17)  Chloride, Random Urine: <20 mmol/L ( @ 23:17)  Creatinine, Random Urine: 148 mg/dL ( @ 23:17)  Protein/Creatinine Ratio Calculation: 0.2 Ratio ( @ :17)    RADIOLOGY & ADDITIONAL STUDIES:

## 2023-06-16 NOTE — PROGRESS NOTE ADULT - NS ATTEND AMEND GEN_ALL_CORE FT
Events that transpired over the last 24 hours were reviewed.  The patient continues to feel short of breath upon minimal exertion.  Denies any lightheaded sensation or dizziness.  Telemetry sinus rhythm with rates in the 60s to 80s.    Discussion was had with the patient, his wife and granddaughter/Bethanie concerning his clinical presentation and findings on imaging studies.  The patient had undergone surgical aortic valve replacement at [facility] in 2009 during which time a 25 mm bioprosthetic valve was placed.  At that time the procedure was performed for severe aortic valve insufficiency.  The patient is now found to have similar symptoms of dyspnea and dizziness which she experienced in 2009.  BALJINDER on 6/14/2023 demonstrated EF 55 to 60% with normal right ventricular cavity size/thickness/systolic function.  Severe intravalvular regurgitation seen centrally and eccentrically in bioprosthetic aortic valve.  There is mild mitral valve regurgitation.  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  Mildly dilated proximal ascending aorta measuring 4.1 cm.    Explained to the patient and family what is severe aortic valve insufficiency of a degenerative aortic valve.  The associated signs and symptoms were reviewed.  The natural pathophysiology of left untreated were gone over.  The various treatment options were gone over including redo surgery, medical management and TAVR valve in valve.  The pros/cons and the risk/benefits of the various treatment options were gone over.  Due to the patient age and comorbidities he is felt to be appropriate candidate to undergo work-up for TAVR valve in valve.  The necessary work-up prior to the TAVR valve and valve was gone over including carotid Dopplers, PFTs, coronary angiography and heart CTA.  Indications and details for the TAVR valve in valve were reviewed.  Benefits and risks were gone over.  Risks include but not limited to infection, bleeding, arrhythmia, TIA/stroke, hemodynamic instability, vascular injury, need for urgent surgery and death.    Plan for patient to undergo heart CTA on 6/19/2023.  Radiology was called and they informed team that heart CTAs are performed over the weekend.  Please follow recommendations from nephrology team including prehydration.    All questions concerns of the patient and his family were addressed.    Findings and plan were discussed with the medicine team.    Time-based billing (NON-critical care).   35 minutes spent on total encounter; more than 50% of the visit was spent counseling and / or coordinating care by the attending physician.  The necessity of the time spent during the encounter on this date of service was due to: education, assessment and coordination of care. Events that transpired over the last 24 hours were reviewed.  The patient continues to feel short of breath upon minimal exertion.  Denies any lightheaded sensation or dizziness.  Telemetry sinus rhythm with rates in the 60s to 80s.    Discussion was had with the patient, his wife and granddaughter/Bethanie concerning his clinical presentation and findings on imaging studies.  The patient had undergone surgical aortic valve replacement at [facility] in 2009 during which time a 25 mm bioprosthetic valve was placed.  At that time the procedure was performed for severe aortic valve insufficiency.  The patient is now found to have similar symptoms of dyspnea and dizziness which she experienced in 2009.  BALJINDER on 6/14/2023 demonstrated EF 55 to 60% with normal right ventricular cavity size/thickness/systolic function.  Severe intravalvular regurgitation seen centrally and eccentrically in bioprosthetic aortic valve.  There is mild mitral valve regurgitation.  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  Mildly dilated proximal ascending aorta measuring 4.1 cm.    Explained to the patient and family what is severe aortic valve insufficiency of a degenerative aortic valve.  The associated signs and symptoms were reviewed.  The natural pathophysiology of left untreated were gone over.  The various treatment options were gone over including redo surgery, medical management and TAVR valve in valve.  The pros/cons and the risk/benefits of the various treatment options were gone over.  Due to the patient age and comorbidities he is felt to be appropriate candidate to undergo work-up for TAVR valve in valve.  The necessary work-up prior to the TAVR valve and valve was gone over including carotid Dopplers, PFTs, coronary angiography and heart CTA.  Indications and details for the TAVR valve in valve were reviewed.  Benefits and risks were gone over.  Risks include but not limited to infection, bleeding, arrhythmia, TIA/stroke, hemodynamic instability, vascular injury, need for urgent surgery and death.    Plan for patient to undergo heart CTA on 6/17/2023.  Radiology was called and they informed team that heart CTAs are performed over the weekend.  Please follow recommendations from nephrology team including prehydration.    All questions concerns of the patient and his family were addressed.    Findings and plan were discussed with the medicine team.    Time-based billing (NON-critical care).   35 minutes spent on total encounter; more than 50% of the visit was spent counseling and / or coordinating care by the attending physician.  The necessity of the time spent during the encounter on this date of service was due to: education, assessment and coordination of care.

## 2023-06-16 NOTE — PROGRESS NOTE ADULT - ASSESSMENT
Mr Madison is a 78 y/o male with PMHx significant for aortic valve replacement, vertigo, HLD, BPH, DM, HTN presenting with dyspnea and dizziness.

## 2023-06-16 NOTE — PROGRESS NOTE ADULT - ASSESSMENT
a 77-year-old male with a past medical history of hypertension, diabetes, hyperlipidemia, bioprosthetic aortic valvereplacement in 2009 by Dr. Ted Piña status post recent lower extremity angiogram status post angioplasty of right anterior tibial artery who presents from the office due to unsteadiness of his feet and room spinning sensation      1) Yfn on CKD stage 3  likely has underlying diabetic and hypertensive nephropathy  likely pre renal superimposed  Renal US- no obstruction; renal calculi- no hydro  Urine lytes--> intravascular depletion--> s/p Iv nacl @ 75cc for 24 hours  Cr now improving--->Pt is at inc risk for contrast induced nephropathy  Will need PPx if pt is getting contrast ---> would give mucomyst 1200mg po x 2 doses pre and post contrast q12h  Also would start Iv nacl @ 50cc , 6 hours pre and post contrast  keep off ace/arb for now  avoid contrast  encourage po intake  will monitor with you    Dr Hess  [phone]

## 2023-06-17 LAB
ANION GAP SERPL CALC-SCNC: 18 MMOL/L — HIGH (ref 5–17)
BUN SERPL-MCNC: 26 MG/DL — HIGH (ref 7–23)
CALCIUM SERPL-MCNC: 9.3 MG/DL — SIGNIFICANT CHANGE UP (ref 8.4–10.5)
CHLORIDE SERPL-SCNC: 99 MMOL/L — SIGNIFICANT CHANGE UP (ref 96–108)
CO2 SERPL-SCNC: 21 MMOL/L — LOW (ref 22–31)
CREAT SERPL-MCNC: 1.56 MG/DL — HIGH (ref 0.5–1.3)
EGFR: 45 ML/MIN/1.73M2 — LOW
GLUCOSE BLDC GLUCOMTR-MCNC: 163 MG/DL — HIGH (ref 70–99)
GLUCOSE BLDC GLUCOMTR-MCNC: 188 MG/DL — HIGH (ref 70–99)
GLUCOSE BLDC GLUCOMTR-MCNC: 288 MG/DL — HIGH (ref 70–99)
GLUCOSE BLDC GLUCOMTR-MCNC: 348 MG/DL — HIGH (ref 70–99)
GLUCOSE SERPL-MCNC: 290 MG/DL — HIGH (ref 70–99)
HCT VFR BLD CALC: 37.1 % — LOW (ref 39–50)
HGB BLD-MCNC: 12.9 G/DL — LOW (ref 13–17)
MAGNESIUM SERPL-MCNC: 1.8 MG/DL — SIGNIFICANT CHANGE UP (ref 1.6–2.6)
MCHC RBC-ENTMCNC: 28.9 PG — SIGNIFICANT CHANGE UP (ref 27–34)
MCHC RBC-ENTMCNC: 34.8 GM/DL — SIGNIFICANT CHANGE UP (ref 32–36)
MCV RBC AUTO: 83 FL — SIGNIFICANT CHANGE UP (ref 80–100)
NRBC # BLD: 0 /100 WBCS — SIGNIFICANT CHANGE UP (ref 0–0)
PLATELET # BLD AUTO: 179 K/UL — SIGNIFICANT CHANGE UP (ref 150–400)
POTASSIUM SERPL-MCNC: 3.6 MMOL/L — SIGNIFICANT CHANGE UP (ref 3.5–5.3)
POTASSIUM SERPL-SCNC: 3.6 MMOL/L — SIGNIFICANT CHANGE UP (ref 3.5–5.3)
RBC # BLD: 4.47 M/UL — SIGNIFICANT CHANGE UP (ref 4.2–5.8)
RBC # FLD: 13 % — SIGNIFICANT CHANGE UP (ref 10.3–14.5)
SODIUM SERPL-SCNC: 138 MMOL/L — SIGNIFICANT CHANGE UP (ref 135–145)
WBC # BLD: 7.83 K/UL — SIGNIFICANT CHANGE UP (ref 3.8–10.5)
WBC # FLD AUTO: 7.83 K/UL — SIGNIFICANT CHANGE UP (ref 3.8–10.5)

## 2023-06-17 RX ORDER — ACETYLCYSTEINE 200 MG/ML
1200 VIAL (ML) MISCELLANEOUS EVERY 12 HOURS
Refills: 0 | Status: COMPLETED | OUTPATIENT
Start: 2023-06-17 | End: 2023-06-19

## 2023-06-17 RX ORDER — SODIUM CHLORIDE 9 MG/ML
1000 INJECTION INTRAMUSCULAR; INTRAVENOUS; SUBCUTANEOUS
Refills: 0 | Status: DISCONTINUED | OUTPATIENT
Start: 2023-06-17 | End: 2023-06-18

## 2023-06-17 RX ADMIN — Medication 1200 MILLIGRAM(S): at 05:15

## 2023-06-17 RX ADMIN — MEMANTINE HYDROCHLORIDE 5 MILLIGRAM(S): 10 TABLET ORAL at 05:15

## 2023-06-17 RX ADMIN — Medication 100 MILLIGRAM(S): at 05:14

## 2023-06-17 RX ADMIN — CHLORHEXIDINE GLUCONATE 1 APPLICATION(S): 213 SOLUTION TOPICAL at 11:24

## 2023-06-17 RX ADMIN — Medication 81 MILLIGRAM(S): at 11:06

## 2023-06-17 RX ADMIN — PANTOPRAZOLE SODIUM 40 MILLIGRAM(S): 20 TABLET, DELAYED RELEASE ORAL at 05:15

## 2023-06-17 RX ADMIN — Medication 650 MILLIGRAM(S): at 18:08

## 2023-06-17 RX ADMIN — Medication 12.5 MILLIGRAM(S): at 23:23

## 2023-06-17 RX ADMIN — CLOPIDOGREL BISULFATE 75 MILLIGRAM(S): 75 TABLET, FILM COATED ORAL at 11:06

## 2023-06-17 RX ADMIN — SODIUM CHLORIDE 50 MILLILITER(S): 9 INJECTION INTRAMUSCULAR; INTRAVENOUS; SUBCUTANEOUS at 05:15

## 2023-06-17 RX ADMIN — Medication 6: at 11:23

## 2023-06-17 RX ADMIN — Medication 2: at 17:12

## 2023-06-17 RX ADMIN — GABAPENTIN 100 MILLIGRAM(S): 400 CAPSULE ORAL at 05:15

## 2023-06-17 RX ADMIN — AMLODIPINE BESYLATE 10 MILLIGRAM(S): 2.5 TABLET ORAL at 05:15

## 2023-06-17 RX ADMIN — MEMANTINE HYDROCHLORIDE 5 MILLIGRAM(S): 10 TABLET ORAL at 17:09

## 2023-06-17 RX ADMIN — GABAPENTIN 100 MILLIGRAM(S): 400 CAPSULE ORAL at 13:49

## 2023-06-17 RX ADMIN — ATORVASTATIN CALCIUM 40 MILLIGRAM(S): 80 TABLET, FILM COATED ORAL at 22:07

## 2023-06-17 RX ADMIN — Medication 8: at 07:56

## 2023-06-17 RX ADMIN — Medication 650 MILLIGRAM(S): at 17:10

## 2023-06-17 RX ADMIN — ISOSORBIDE MONONITRATE 60 MILLIGRAM(S): 60 TABLET, EXTENDED RELEASE ORAL at 11:06

## 2023-06-17 RX ADMIN — GABAPENTIN 100 MILLIGRAM(S): 400 CAPSULE ORAL at 22:07

## 2023-06-17 RX ADMIN — Medication 1 TABLET(S): at 11:06

## 2023-06-17 NOTE — CHART NOTE - NSCHARTNOTEFT_GEN_A_CORE
76 y/o male with worsening AS, presently undergoing TAVR w/u; writer d/w DOMENIC matute from the CT scan department who stated TAVR w/u CT scans are not done in the weekends. CT can chest, a/p and coronaries scheduled Monday 6/19/23.

## 2023-06-17 NOTE — PROGRESS NOTE ADULT - ASSESSMENT
a 77-year-old male with a past medical history of hypertension, diabetes, hyperlipidemia, bioprosthetic aortic valvereplacement in 2009 by Dr. Ted Piña status post recent lower extremity angiogram status post angioplasty of right anterior tibial artery who presents from the office due to unsteadiness of his feet and room spinning sensation  Renal following for SAMARIA on CKD Mx.    Samaria on CKD stage 3- improved and stable  Creatinine Trend: 1.56 <--, 1.41 <--, 1.77 <--, 2.28 <--, 2.11 <--, 1.55 <--  likely has underlying diabetic and hypertensive nephropathy  likely pre renal superimposed  Renal US- no obstruction; renal calculi- no hydro  Urine lytes--> intravascular depletion--> s/p Iv nacl @ 75cc for 24 hours  Pt is at moderate risk for contrast induced nephropathy    pt will not be getting CT TAVR over weekend per primary team. can d/c PAYAL prophylaxis now and start sunday PM if CT scheduled for Monday  recommend prophylaxis w/ mucomyst 1200mg po x 2 doses pre and post contrast q12h  Also would give Iv nacl @ 75cc/hr for 6 hours shamar contrast if no s/o CHF  daily BMP  dose all meds for eGFR  avoid ACEi/ARB for now/NSAIDs/Nephrotoxics if able.  encourage po intake  will monitor with you    will closely follow up.   poc d/w pt, Dr Mace  labs, chart reviewed  For any question, pl call:  Nephrology  Cell -[phone]  Office [phone]  Ans Serv [phone]

## 2023-06-17 NOTE — PROGRESS NOTE ADULT - ASSESSMENT
77 y (M with a pmhx significant for aortic valve replacement on aspirin, hx of vertigo, HLD, BPH, DM, HTN p        # Bio AVR Prosthetic valve stenosis   - TTE confirmed elevated gradients, moderate prosthetic valve stenosis  -  s/p  BALJINDER   TAVR work up in progress   BALJINDER showing severe intravalvular regurgitation of his bio-AVR  - Structural heart consult appreciated -Plan for preop LHC and CTA when renal function allows    Plan of care discussed with Grand daughter     #HTN  Continue with Metorpolol, Norvasc   Continue Amlodipine and Imdur.   Hold HCTZ and Valsartan given SAMARIA. Restart Toprol XL at lower dose 100mg daily.    # DM hiss   Follow up A1C     #PAD s/p Angioplasty anterior tibial artery   Plavix

## 2023-06-17 NOTE — PROGRESS NOTE ADULT - SUBJECTIVE AND OBJECTIVE BOX
[facility] - S Devin / Aric S /D Mauricio/ S Jimena/ S Alin/ Celestine Peck / GIDEON Mtecalfu/ O Will  service -[phone], office [phone]  ---------------------------------------------------------------------------------------------------------------    Patient seen and examined bedside    Subjective and Objective: No overnight events, sob resolved. No complaints today. feeling better    Allergies: No Known Allergies      Hospital Medications:   MEDICATIONS  (STANDING):  acetylcysteine  Oral Solution 1200 milliGRAM(s) Oral every 12 hours  amLODIPine   Tablet 10 milliGRAM(s) Oral daily  aspirin  chewable 81 milliGRAM(s) Oral daily  atorvastatin 40 milliGRAM(s) Oral at bedtime  chlorhexidine 2% Cloths 1 Application(s) Topical daily  clopidogrel Tablet 75 milliGRAM(s) Oral daily  dextrose 5%. 1000 milliLiter(s) (100 mL/Hr) IV Continuous <Continuous>  dextrose 5%. 1000 milliLiter(s) (50 mL/Hr) IV Continuous <Continuous>  dextrose 50% Injectable 25 Gram(s) IV Push once  dextrose 50% Injectable 12.5 Gram(s) IV Push once  dextrose 50% Injectable 25 Gram(s) IV Push once  gabapentin 100 milliGRAM(s) Oral three times a day  glucagon  Injectable 1 milliGRAM(s) IntraMuscular once  insulin lispro (ADMELOG) corrective regimen sliding scale   SubCutaneous three times a day before meals  insulin lispro (ADMELOG) corrective regimen sliding scale   SubCutaneous at bedtime  isosorbide   mononitrate ER Tablet (IMDUR) 60 milliGRAM(s) Oral daily  memantine 5 milliGRAM(s) Oral two times a day  metoprolol succinate  milliGRAM(s) Oral daily  multivitamin 1 Tablet(s) Oral daily  pantoprazole    Tablet 40 milliGRAM(s) Oral before breakfast  polyethylene glycol 3350 17 Gram(s) Oral at bedtime  senna 2 Tablet(s) Oral at bedtime  sodium chloride 0.9%. 1000 milliLiter(s) (75 mL/Hr) IV Continuous <Continuous>  sodium chloride 0.9%. 500 milliLiter(s) (75 mL/Hr) IV Continuous <Continuous>  sodium chloride 0.9%. 1000 milliLiter(s) (50 mL/Hr) IV Continuous <Continuous>  sodium chloride 0.9%. 1000 milliLiter(s) (50 mL/Hr) IV Continuous <Continuous>      REVIEW OF SYSTEMS:  CONSTITUTIONAL: No weakness, fevers or chills  EYES/ENT: No visual changes;  No vertigo or throat pain   NECK: No pain or stiffness  RESPIRATORY: No cough, wheezing, hemoptysis; No shortness of breath  CARDIOVASCULAR: No chest pain or palpitations.  GASTROINTESTINAL: No abdominal or epigastric pain. No nausea, vomiting, or hematemesis; No diarrhea or constipation. No melena or hematochezia.  GENITOURINARY: No dysuria, frequency, foamy urine, urinary urgency, incontinence or hematuria  NEUROLOGICAL: No numbness or weakness  SKIN: No itching, burning, rashes, or lesions   VASCULAR: No bilateral lower extremity edema.   All other review of systems is negative unless indicated above.    VITALS:  T(F): 97.6 (23 @ 11:10), Max: 98.4 (23 @ 04:04)  HR: 69 (23 @ 11:10)  BP: 153/63 (23 @ 11:10)  RR: 18 (23 @ 11:10)  SpO2: 95% (23 @ 11:10)  Wt(kg): --     @ 07:01  -   @ 07:00  --------------------------------------------------------  IN: 860 mL / OUT: 0 mL / NET: 860 mL     @ 07:01  -   @ 13:37  --------------------------------------------------------  IN: 600 mL / OUT: 700 mL / NET: -100 mL          PHYSICAL EXAM:  Constitutional: NAD  HEENT: anicteric sclera, oropharynx clear  Neck: No JVD  Respiratory: CTAB, no wheezes, rales or rhonchi  Cardiovascular: S1, S2, RRR  Gastrointestinal: BS+, soft, NT/ND  Extremities: No cyanosis or clubbing. No peripheral edema  Neurological: A/O x 3, no focal deficits  Psychiatric: Normal mood, normal affect  : No CVA tenderness. No velasco.   Skin: No rashes  Vascular Access:    LABS:      138  |  99  |  26<H>  ----------------------------<  290<H>  3.6   |  21<L>  |  1.56<H>    Ca    9.3      2023 07:22  Mg     1.8           Creatinine Trend: 1.56 <--, 1.41 <--, 1.77 <--, 2.28 <--, 2.11 <--, 1.55 <--                        12.9   7.83  )-----------( 179      ( 2023 07:22 )             37.1     Urine Studies:  Urinalysis Basic - ( 2023 23:17 )    Color: Yellow / Appearance: Clear / S.014 / pH:   Gluc:  / Ketone: Negative  / Bili: Negative / Urobili: Negative   Blood:  / Protein: 30 mg/dL / Nitrite: Negative   Leuk Esterase: Negative / RBC: 1 /hpf / WBC 1 /HPF   Sq Epi:  / Non Sq Epi:  / Bacteria: Negative      Potassium, Random Urine: 36 mmol/L ( @ 23:17)  Osmolality, Random Urine: 373 mos/kg ( @ 23:17)  Sodium, Random Urine: 21 mmol/L ( @ 23:17)  Chloride, Random Urine: <20 mmol/L ( @ 23:17)  Creatinine, Random Urine: 148 mg/dL ( @ 23:17)  Protein/Creatinine Ratio Calculation: 0.2 Ratio ( @ 23:17)      RADIOLOGY & ADDITIONAL STUDIES:   [facility] - S Devin / Aric S /D Mauricio/ S Jimena/ S Alin/ Celestine Peck / GIDEON Metcalfu/ O Will  service -[phone], office [phone]  ---------------------------------------------------------------------------------------------------------------    Patient seen and examined bedside    Subjective and Objective: No overnight events, denied V/D/sob. No complaints today.     Allergies: No Known Allergies      Hospital Medications:   MEDICATIONS  (STANDING):  acetylcysteine  Oral Solution 1200 milliGRAM(s) Oral every 12 hours  amLODIPine   Tablet 10 milliGRAM(s) Oral daily  aspirin  chewable 81 milliGRAM(s) Oral daily  atorvastatin 40 milliGRAM(s) Oral at bedtime  chlorhexidine 2% Cloths 1 Application(s) Topical daily  clopidogrel Tablet 75 milliGRAM(s) Oral daily  dextrose 5%. 1000 milliLiter(s) (100 mL/Hr) IV Continuous <Continuous>  dextrose 5%. 1000 milliLiter(s) (50 mL/Hr) IV Continuous <Continuous>  dextrose 50% Injectable 25 Gram(s) IV Push once  dextrose 50% Injectable 12.5 Gram(s) IV Push once  dextrose 50% Injectable 25 Gram(s) IV Push once  gabapentin 100 milliGRAM(s) Oral three times a day  glucagon  Injectable 1 milliGRAM(s) IntraMuscular once  insulin lispro (ADMELOG) corrective regimen sliding scale   SubCutaneous three times a day before meals  insulin lispro (ADMELOG) corrective regimen sliding scale   SubCutaneous at bedtime  isosorbide   mononitrate ER Tablet (IMDUR) 60 milliGRAM(s) Oral daily  memantine 5 milliGRAM(s) Oral two times a day  metoprolol succinate  milliGRAM(s) Oral daily  multivitamin 1 Tablet(s) Oral daily  pantoprazole    Tablet 40 milliGRAM(s) Oral before breakfast  polyethylene glycol 3350 17 Gram(s) Oral at bedtime  senna 2 Tablet(s) Oral at bedtime  sodium chloride 0.9%. 1000 milliLiter(s) (75 mL/Hr) IV Continuous <Continuous>  sodium chloride 0.9%. 500 milliLiter(s) (75 mL/Hr) IV Continuous <Continuous>  sodium chloride 0.9%. 1000 milliLiter(s) (50 mL/Hr) IV Continuous <Continuous>  sodium chloride 0.9%. 1000 milliLiter(s) (50 mL/Hr) IV Continuous <Continuous>    VITALS:  T(F): 97.6 (23 @ 11:10), Max: 98.4 (23 @ 04:04)  HR: 69 (23 @ 11:10)  BP: 153/63 (23 @ 11:10)  RR: 18 (23 @ 11:10)  SpO2: 95% (23 @ 11:10)  Wt(kg): --     @ 07:01  -   @ 07:00  --------------------------------------------------------  IN: 860 mL / OUT: 0 mL / NET: 860 mL     @ 07:01  -   @ 13:37  --------------------------------------------------------  IN: 600 mL / OUT: 700 mL / NET: -100 mL      PHYSICAL EXAM:  Constitutional: NAD  HEENT: anicteric sclera  Neck: No JVD  Respiratory: CTAB, no wheezes, rales or rhonchi  Cardiovascular: S1, S2, RRR  Gastrointestinal: BS+, soft, NT/ND  Extremities: no pedal edema b/l   Neurological: A/O x 3  Psychiatric: Normal mood, normal affect  : No velasco.     LABS:      138  |  99  |  26<H>  ----------------------------<  290<H>  3.6   |  21<L>  |  1.56<H>    Ca    9.3      2023 07:22  Mg     1.8           Creatinine Trend: 1.56 <--, 1.41 <--, 1.77 <--, 2.28 <--, 2.11 <--, 1.55 <--                        12.9   7.83  )-----------( 179      ( 2023 07:22 )             37.1     Urine Studies:  Urinalysis Basic - ( 2023 23:17 )    Color: Yellow / Appearance: Clear / S.014 / pH:   Gluc:  / Ketone: Negative  / Bili: Negative / Urobili: Negative   Blood:  / Protein: 30 mg/dL / Nitrite: Negative   Leuk Esterase: Negative / RBC: 1 /hpf / WBC 1 /HPF   Sq Epi:  / Non Sq Epi:  / Bacteria: Negative      Potassium, Random Urine: 36 mmol/L ( @ 23:17)  Osmolality, Random Urine: 373 mos/kg ( @ 23:17)  Sodium, Random Urine: 21 mmol/L ( @ 23:17)  Chloride, Random Urine: <20 mmol/L ( @ 23:17)  Creatinine, Random Urine: 148 mg/dL ( @ 23:17)  Protein/Creatinine Ratio Calculation: 0.2 Ratio ( @ 23:17)      RADIOLOGY & ADDITIONAL STUDIES:

## 2023-06-17 NOTE — PROGRESS NOTE ADULT - SUBJECTIVE AND OBJECTIVE BOX
Patient is a 77y old  Male who presents with a chief complaint of Vertigo today (10 Gelacio 2023 09:57)      SUBJECTIVE / OVERNIGHT EVENTS: no events    T(C): 36.7 (06-17-23 @ 20:28), Max: 36.7 (06-17-23 @ 20:28)  HR: 73 (06-17-23 @ 20:28) (69 - 80)  BP: 144/64 (06-17-23 @ 20:28) (123/62 - 153/63)  RR: 18 (06-17-23 @ 20:28) (18 - 18)  SpO2: 94% (06-17-23 @ 20:28) (94% - 97%)      MEDICATIONS  (STANDING):  acetylcysteine  Oral Solution 1200 milliGRAM(s) Oral every 12 hours  amLODIPine   Tablet 10 milliGRAM(s) Oral daily  aspirin  chewable 81 milliGRAM(s) Oral daily  atorvastatin 40 milliGRAM(s) Oral at bedtime  chlorhexidine 2% Cloths 1 Application(s) Topical daily  clopidogrel Tablet 75 milliGRAM(s) Oral daily  dextrose 5%. 1000 milliLiter(s) (100 mL/Hr) IV Continuous <Continuous>  dextrose 5%. 1000 milliLiter(s) (50 mL/Hr) IV Continuous <Continuous>  dextrose 50% Injectable 25 Gram(s) IV Push once  dextrose 50% Injectable 12.5 Gram(s) IV Push once  dextrose 50% Injectable 25 Gram(s) IV Push once  gabapentin 100 milliGRAM(s) Oral three times a day  glucagon  Injectable 1 milliGRAM(s) IntraMuscular once  insulin lispro (ADMELOG) corrective regimen sliding scale   SubCutaneous three times a day before meals  insulin lispro (ADMELOG) corrective regimen sliding scale   SubCutaneous at bedtime  isosorbide   mononitrate ER Tablet (IMDUR) 60 milliGRAM(s) Oral daily  memantine 5 milliGRAM(s) Oral two times a day  metoprolol succinate  milliGRAM(s) Oral daily  multivitamin 1 Tablet(s) Oral daily  pantoprazole    Tablet 40 milliGRAM(s) Oral before breakfast  polyethylene glycol 3350 17 Gram(s) Oral at bedtime  senna 2 Tablet(s) Oral at bedtime  sodium chloride 0.9%. 500 milliLiter(s) (75 mL/Hr) IV Continuous <Continuous>  sodium chloride 0.9%. 1000 milliLiter(s) (75 mL/Hr) IV Continuous <Continuous>  sodium chloride 0.9%. 1000 milliLiter(s) (50 mL/Hr) IV Continuous <Continuous>  sodium chloride 0.9%. 1000 milliLiter(s) (50 mL/Hr) IV Continuous <Continuous>  sodium chloride 0.9%. 1000 milliLiter(s) (50 mL/Hr) IV Continuous <Continuous>    MEDICATIONS  (PRN):  acetaminophen     Tablet .. 650 milliGRAM(s) Oral every 6 hours PRN Moderate Pain (4 - 6)  benzocaine/menthol Lozenge 1 Lozenge Oral four times a day PRN Sore Throat  dextrose Oral Gel 15 Gram(s) Oral once PRN Blood Glucose LESS THAN 70 milliGRAM(s)/deciliter  meclizine 12.5 milliGRAM(s) Oral every 8 hours PRN Dizziness    PHYSICAL EXAM:  GENERAL: NAD, well-developed  HEAD:  Atraumatic, Normocephalic  EYES: EOMI, PERRLA, conjunctiva and sclera clear  NECK: Supple, No JVD  CHEST/LUNG: Clear to auscultation bilaterally; No wheeze  HEART: Regular rate and rhythm; No murmurs, rubs, or gallops  ABDOMEN: Soft, Nontender, Nondistended; Bowel sounds present  EXTREMITIES:  2+ Peripheral Pulses, No clubbing, cyanosis, or edema  PSYCH: AAOx3  NEUROLOGY: non-focal  SKIN: No rashes or lesions                                         12.9   7.83  )-----------( 179      ( 17 Jun 2023 07:22 )             37.1               138|99|26<290  3.6|21|1.56  9.3,1.8,--  06-17 @ 07:22      CAPILLARY BLOOD GLUCOSE      POCT Blood Glucose.: 188 mg/dL (17 Jun 2023 21:33)  POCT Blood Glucose.: 163 mg/dL (17 Jun 2023 17:03)  POCT Blood Glucose.: 288 mg/dL (17 Jun 2023 11:21)  POCT Blood Glucose.: 348 mg/dL (17 Jun 2023 07:49)

## 2023-06-17 NOTE — PROGRESS NOTE ADULT - SUBJECTIVE AND OBJECTIVE BOX
C A R D [facility]  **********************************     DATE OF SERVICE: 06-17-23  no events overnight       acetaminophen     Tablet .. 650 milliGRAM(s) Oral every 6 hours PRN  acetylcysteine  Oral Solution 1200 milliGRAM(s) Oral every 12 hours  amLODIPine   Tablet 10 milliGRAM(s) Oral daily  aspirin  chewable 81 milliGRAM(s) Oral daily  atorvastatin 40 milliGRAM(s) Oral at bedtime  benzocaine/menthol Lozenge 1 Lozenge Oral four times a day PRN  chlorhexidine 2% Cloths 1 Application(s) Topical daily  clopidogrel Tablet 75 milliGRAM(s) Oral daily  dextrose 5%. 1000 milliLiter(s) IV Continuous <Continuous>  dextrose 5%. 1000 milliLiter(s) IV Continuous <Continuous>  dextrose 50% Injectable 25 Gram(s) IV Push once  dextrose 50% Injectable 12.5 Gram(s) IV Push once  dextrose 50% Injectable 25 Gram(s) IV Push once  dextrose Oral Gel 15 Gram(s) Oral once PRN  gabapentin 100 milliGRAM(s) Oral three times a day  glucagon  Injectable 1 milliGRAM(s) IntraMuscular once  insulin lispro (ADMELOG) corrective regimen sliding scale   SubCutaneous three times a day before meals  insulin lispro (ADMELOG) corrective regimen sliding scale   SubCutaneous at bedtime  isosorbide   mononitrate ER Tablet (IMDUR) 60 milliGRAM(s) Oral daily  meclizine 12.5 milliGRAM(s) Oral every 8 hours PRN  memantine 5 milliGRAM(s) Oral two times a day  metoprolol succinate  milliGRAM(s) Oral daily  multivitamin 1 Tablet(s) Oral daily  pantoprazole    Tablet 40 milliGRAM(s) Oral before breakfast  polyethylene glycol 3350 17 Gram(s) Oral at bedtime  senna 2 Tablet(s) Oral at bedtime  sodium chloride 0.9%. 1000 milliLiter(s) IV Continuous <Continuous>  sodium chloride 0.9%. 500 milliLiter(s) IV Continuous <Continuous>  sodium chloride 0.9%. 1000 milliLiter(s) IV Continuous <Continuous>  sodium chloride 0.9%. 1000 milliLiter(s) IV Continuous <Continuous>                            12.9   7.83  )-----------( 179      ( 17 Jun 2023 07:22 )             37.1       Hemoglobin: 12.9 g/dL (06-17 @ 07:22)      06-17    138  |  99  |  26<H>  ----------------------------<  290<H>  3.6   |  21<L>  |  1.56<H>    Ca    9.3      17 Jun 2023 07:22  Mg     1.8     06-17      Creatinine Trend: 1.56<--, 1.41<--, 1.77<--, 2.28<--, 2.11<--, 1.55<--    COAGS:           T(C): 36.9 (06-17-23 @ 04:04), Max: 36.9 (06-17-23 @ 04:04)  HR: 78 (06-17-23 @ 04:04) (64 - 78)  BP: 142/65 (06-17-23 @ 04:04) (132/66 - 150/65)  RR: 18 (06-17-23 @ 04:04) (18 - 18)  SpO2: 94% (06-17-23 @ 04:04) (94% - 96%)  Wt(kg): --    I&O's Summary    16 Jun 2023 07:01  -  17 Jun 2023 07:00  --------------------------------------------------------  IN: 860 mL / OUT: 0 mL / NET: 860 mL      Gen: NAD  HEENT:  (-)icterus (-)pallor  CV: N S1 S2 1/6 MORENA (+)2 Pulses B/l  Resp:  Clear to auscultation B/L, normal effort  GI: (+) BS Soft, NT, ND  Lymph:  (-)Edema, (-)obvious lymphadenopathy  Skin: Warm to touch, Normal turgor  Psych: Appropriate mood and affect      TELEMETRY: SR 60-80    < from: TTE Limited W or WO Ultrasound Enhancing Agent (06.12.23 @ 10:51) >  CONCLUSIONS:      1. Technically difficult image quality.   2. There is increasedLV mass and concentric hypertrophy.   3. Mild left ventricular hypertrophy.   4. The left ventricular systolic function is normal with an ejection fraction of 68 % by Roach's method of disks. There are no regional wall motion abnormalities seen.   5. Normal right ventricular cavity size, normal wall thickness and normal systolic function. The tricuspid annular plane systolic excursion (TAPSE) is 1.9 cm (normal >=1.7 cm).   6. A bioprosthetic valve replacement present in the aortic position. Aortic valve is not well visualized. Calcualted EOA and dimensionless index are consistent with moderate prosthetic valve stenosis. Consider additional imaging of the aortic valve if clinically indicated.   7. Mild aortic regurgitation.   8. Aortic regurgitation appears intravalvular.   9. Trace mitral regurgitation.  10. Compared to the transesophageal echocardiogram performed on 7/12/2017 aortic valve gradients are higher on the current study. Peak/mean aortic valve gradients were 21/13 mmHg on theprior study.    < end of copied text >    < from: MR Head w/wo IV Cont (06.10.23 @ 19:03) >  IMPRESSION:    1. Ischemic white matter disease and atrophy typical for age    2. Remote petechial hemorrhages    3. No evidence of infarction    --- End of Report ---    < end of copied text >    < from: BALJINDER W or WO Ultrasound Enhancing Agent (06.14.23 @ 15:06) >  CONCLUSIONS:      1. The left ventricular systolic function is normal with an ejection fraction visually estimated at 55 to 60 %.   2. Normal right ventricular cavity size, normal wall thickness and normal systolic function.   3. A bioprosthetic valve replacement present in the aortic position. Severe intravalvular regurgitation originating centrally and is eccentrically directed anteriorly. Bioprosthetic leaflets are suboptimally visualized.   4. Mild mitral regurgitation.   5. 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   6. No evidence of left atrial or left atrial appendage thrombus. The left atrial appendageemptying velocity is normal at 61 cm/s.   7. Mildly dilated proximal ascending aorta, measuring 4.10 cm (indexed 1.93 cm/m²).   8. Compared to the transthoracic echocardiogram performed on 6/12/2023 more significant aortic regurgitation is noted on today's study.    < end of copied text >      ASSESSMENT/PLAN: Pt is a 77-year-old male with a past medical history of hypertension, diabetes, hyperlipidemia, bioprosthetic aortic valve replacement in 2009 by Dr. Ted Piña status post recent lower extremity angiogram status post angioplasty of right anterior tibial artery who presents from the office due to unsteadiness of his feet and room spinning sensation.    #Dizziness  - suspect vertigo  - neuro eval appreciated - MRI with no evidence of infarction  - orthostatics negative  - tele stable with no events    #Bio-AVR prosthetic valve stenosis  - recent office echo with elevated gradients across bioprosthetic aortic valve, inpatient TTE confirmed elevated gradients, moderate prosthetic valve stenosis  - BALJINDER now showing severe intravalvular regurgitation of his bio-AVR  - Structural heart consult appreciated - f/u recs regarding Ria TAVR eval. Plan for preop LHC and CTA when renal function allows. Cr improving, Will need to follow renal recs prior to contrast. Will determine timing with structural heart.    #HTN  - Continue Amlodipine 10mg daily, Imdur 60mg daily, and Toprol XL 100mg daily. Hold HCTZ and Valsartan given SAMARIA. Can restart Hydralazine if becomes hypertensive    #HLD  - Continue Lipitor    #PAD  - Continue ASA/Plavix and Lipitor    #SAMARIA  - Suspect contrast induced  - Renal consult appreciated  - s/p IVF  - Hold HCTZ and Valsartan  - Trend cr - improving    - Patient has f/u with Dr. Plummer on 7/7 at 11:30 AM

## 2023-06-18 LAB
ANION GAP SERPL CALC-SCNC: 17 MMOL/L — SIGNIFICANT CHANGE UP (ref 5–17)
BUN SERPL-MCNC: 26 MG/DL — HIGH (ref 7–23)
CALCIUM SERPL-MCNC: 9.2 MG/DL — SIGNIFICANT CHANGE UP (ref 8.4–10.5)
CHLORIDE SERPL-SCNC: 100 MMOL/L — SIGNIFICANT CHANGE UP (ref 96–108)
CO2 SERPL-SCNC: 22 MMOL/L — SIGNIFICANT CHANGE UP (ref 22–31)
CREAT SERPL-MCNC: 1.59 MG/DL — HIGH (ref 0.5–1.3)
EGFR: 44 ML/MIN/1.73M2 — LOW
GLUCOSE BLDC GLUCOMTR-MCNC: 206 MG/DL — HIGH (ref 70–99)
GLUCOSE BLDC GLUCOMTR-MCNC: 245 MG/DL — HIGH (ref 70–99)
GLUCOSE BLDC GLUCOMTR-MCNC: 263 MG/DL — HIGH (ref 70–99)
GLUCOSE BLDC GLUCOMTR-MCNC: 336 MG/DL — HIGH (ref 70–99)
GLUCOSE SERPL-MCNC: 233 MG/DL — HIGH (ref 70–99)
POTASSIUM SERPL-MCNC: 4.1 MMOL/L — SIGNIFICANT CHANGE UP (ref 3.5–5.3)
POTASSIUM SERPL-SCNC: 4.1 MMOL/L — SIGNIFICANT CHANGE UP (ref 3.5–5.3)
SODIUM SERPL-SCNC: 139 MMOL/L — SIGNIFICANT CHANGE UP (ref 135–145)

## 2023-06-18 PROCEDURE — 99233 SBSQ HOSP IP/OBS HIGH 50: CPT

## 2023-06-18 RX ORDER — SODIUM CHLORIDE 9 MG/ML
1000 INJECTION INTRAMUSCULAR; INTRAVENOUS; SUBCUTANEOUS
Refills: 0 | Status: COMPLETED | OUTPATIENT
Start: 2023-06-19 | End: 2023-06-19

## 2023-06-18 RX ORDER — SODIUM CHLORIDE 9 MG/ML
1000 INJECTION INTRAMUSCULAR; INTRAVENOUS; SUBCUTANEOUS
Refills: 0 | Status: DISCONTINUED | OUTPATIENT
Start: 2023-06-18 | End: 2023-06-18

## 2023-06-18 RX ORDER — ACETYLCYSTEINE 200 MG/ML
1200 VIAL (ML) MISCELLANEOUS EVERY 12 HOURS
Refills: 0 | Status: DISCONTINUED | OUTPATIENT
Start: 2023-06-19 | End: 2023-06-19

## 2023-06-18 RX ADMIN — Medication 100 MILLIGRAM(S): at 05:40

## 2023-06-18 RX ADMIN — GABAPENTIN 100 MILLIGRAM(S): 400 CAPSULE ORAL at 13:19

## 2023-06-18 RX ADMIN — CHLORHEXIDINE GLUCONATE 1 APPLICATION(S): 213 SOLUTION TOPICAL at 11:25

## 2023-06-18 RX ADMIN — GABAPENTIN 100 MILLIGRAM(S): 400 CAPSULE ORAL at 21:09

## 2023-06-18 RX ADMIN — CLOPIDOGREL BISULFATE 75 MILLIGRAM(S): 75 TABLET, FILM COATED ORAL at 11:24

## 2023-06-18 RX ADMIN — GABAPENTIN 100 MILLIGRAM(S): 400 CAPSULE ORAL at 05:39

## 2023-06-18 RX ADMIN — PANTOPRAZOLE SODIUM 40 MILLIGRAM(S): 20 TABLET, DELAYED RELEASE ORAL at 05:39

## 2023-06-18 RX ADMIN — Medication 8: at 08:04

## 2023-06-18 RX ADMIN — Medication 4: at 11:27

## 2023-06-18 RX ADMIN — ATORVASTATIN CALCIUM 40 MILLIGRAM(S): 80 TABLET, FILM COATED ORAL at 21:09

## 2023-06-18 RX ADMIN — Medication 1 TABLET(S): at 11:24

## 2023-06-18 RX ADMIN — Medication 12.5 MILLIGRAM(S): at 22:32

## 2023-06-18 RX ADMIN — Medication 1: at 21:37

## 2023-06-18 RX ADMIN — MEMANTINE HYDROCHLORIDE 5 MILLIGRAM(S): 10 TABLET ORAL at 05:39

## 2023-06-18 RX ADMIN — Medication 81 MILLIGRAM(S): at 11:24

## 2023-06-18 RX ADMIN — AMLODIPINE BESYLATE 10 MILLIGRAM(S): 2.5 TABLET ORAL at 05:39

## 2023-06-18 RX ADMIN — MEMANTINE HYDROCHLORIDE 5 MILLIGRAM(S): 10 TABLET ORAL at 17:16

## 2023-06-18 RX ADMIN — ISOSORBIDE MONONITRATE 60 MILLIGRAM(S): 60 TABLET, EXTENDED RELEASE ORAL at 11:24

## 2023-06-18 RX ADMIN — Medication 4: at 17:17

## 2023-06-18 RX ADMIN — SENNA PLUS 2 TABLET(S): 8.6 TABLET ORAL at 21:09

## 2023-06-18 NOTE — PROGRESS NOTE ADULT - SUBJECTIVE AND OBJECTIVE BOX
[facility] - S Devin / Aric S /D Mauricio/ S Jimena/ S Alin/ Celestine Peck / GIDEON Metcalfu/ O Will  service -[phone], office [phone]  ---------------------------------------------------------------------------------------------------------------    Patient seen and examined bedside    Subjective and Objective: No overnight events, sob resolved. No complaints today. feeling better    Allergies: No Known Allergies      Hospital Medications:   MEDICATIONS  (STANDING):  acetylcysteine  Oral Solution 1200 milliGRAM(s) Oral every 12 hours  amLODIPine   Tablet 10 milliGRAM(s) Oral daily  aspirin  chewable 81 milliGRAM(s) Oral daily  atorvastatin 40 milliGRAM(s) Oral at bedtime  chlorhexidine 2% Cloths 1 Application(s) Topical daily  clopidogrel Tablet 75 milliGRAM(s) Oral daily  dextrose 5%. 1000 milliLiter(s) (100 mL/Hr) IV Continuous <Continuous>  dextrose 5%. 1000 milliLiter(s) (50 mL/Hr) IV Continuous <Continuous>  dextrose 50% Injectable 25 Gram(s) IV Push once  dextrose 50% Injectable 12.5 Gram(s) IV Push once  dextrose 50% Injectable 25 Gram(s) IV Push once  gabapentin 100 milliGRAM(s) Oral three times a day  glucagon  Injectable 1 milliGRAM(s) IntraMuscular once  insulin lispro (ADMELOG) corrective regimen sliding scale   SubCutaneous three times a day before meals  insulin lispro (ADMELOG) corrective regimen sliding scale   SubCutaneous at bedtime  isosorbide   mononitrate ER Tablet (IMDUR) 60 milliGRAM(s) Oral daily  memantine 5 milliGRAM(s) Oral two times a day  metoprolol succinate  milliGRAM(s) Oral daily  multivitamin 1 Tablet(s) Oral daily  pantoprazole    Tablet 40 milliGRAM(s) Oral before breakfast  polyethylene glycol 3350 17 Gram(s) Oral at bedtime  senna 2 Tablet(s) Oral at bedtime  sodium chloride 0.9%. 500 milliLiter(s) (75 mL/Hr) IV Continuous <Continuous>  sodium chloride 0.9%. 1000 milliLiter(s) (75 mL/Hr) IV Continuous <Continuous>  sodium chloride 0.9%. 1000 milliLiter(s) (50 mL/Hr) IV Continuous <Continuous>  sodium chloride 0.9%. 1000 milliLiter(s) (50 mL/Hr) IV Continuous <Continuous>  sodium chloride 0.9%. 1000 milliLiter(s) (50 mL/Hr) IV Continuous <Continuous>  sodium chloride 0.9%. 1000 milliLiter(s) (50 mL/Hr) IV Continuous <Continuous>      REVIEW OF SYSTEMS:  CONSTITUTIONAL: No weakness, fevers or chills  EYES/ENT: No visual changes;  No vertigo or throat pain   NECK: No pain or stiffness  RESPIRATORY: No cough, wheezing, hemoptysis; No shortness of breath  CARDIOVASCULAR: No chest pain or palpitations.  GASTROINTESTINAL: No abdominal or epigastric pain. No nausea, vomiting, or hematemesis; No diarrhea or constipation. No melena or hematochezia.  GENITOURINARY: No dysuria, frequency, foamy urine, urinary urgency, incontinence or hematuria  NEUROLOGICAL: No numbness or weakness  SKIN: No itching, burning, rashes, or lesions   VASCULAR: No bilateral lower extremity edema.   All other review of systems is negative unless indicated above.    VITALS:  T(F): 98.1 (23 @ 11:20), Max: 98.3 (23 @ 04:51)  HR: 66 (23 @ 16:59)  BP: 151/71 (23 @ 16:59)  RR: 18 (23 @ 16:59)  SpO2: 95% (23 @ 16:59)  Wt(kg): --     @ 07:01  -   @ 07:00  --------------------------------------------------------  IN: 800 mL / OUT: 700 mL / NET: 100 mL     @ 07:01  -   @ 18:09  --------------------------------------------------------  IN: 400 mL / OUT: 500 mL / NET: -100 mL          PHYSICAL EXAM:  Constitutional: NAD  HEENT: anicteric sclera, oropharynx clear  Neck: No JVD  Respiratory: CTAB, no wheezes, rales or rhonchi  Cardiovascular: S1, S2, RRR  Gastrointestinal: BS+, soft, NT/ND  Extremities: No cyanosis or clubbing. No peripheral edema  Neurological: A/O x 3, no focal deficits  Psychiatric: Normal mood, normal affect  : No CVA tenderness. No velasco.   Skin: No rashes  Vascular Access:    LABS:      139  |  100  |  26<H>  ----------------------------<  233<H>  4.1   |  22  |  1.59<H>    Ca    9.2      2023 07:09  Mg     1.8           Creatinine Trend: 1.59 <--, 1.56 <--, 1.41 <--, 1.77 <--, 2.28 <--, 2.11 <--, 1.55 <--                        12.9   7.83  )-----------( 179      ( 2023 07:22 )             37.1     Urine Studies:  Urinalysis Basic - ( 2023 23:17 )    Color: Yellow / Appearance: Clear / S.014 / pH:   Gluc:  / Ketone: Negative  / Bili: Negative / Urobili: Negative   Blood:  / Protein: 30 mg/dL / Nitrite: Negative   Leuk Esterase: Negative / RBC: 1 /hpf / WBC 1 /HPF   Sq Epi:  / Non Sq Epi:  / Bacteria: Negative      Potassium, Random Urine: 36 mmol/L ( @ 23:17)  Osmolality, Random Urine: 373 mos/kg ( @ 23:17)  Sodium, Random Urine: 21 mmol/L ( @ 23:17)  Chloride, Random Urine: <20 mmol/L ( @ 23:17)  Creatinine, Random Urine: 148 mg/dL ( @ 23:17)  Protein/Creatinine Ratio Calculation: 0.2 Ratio ( @ :17)      RADIOLOGY & ADDITIONAL STUDIES:   [facility] - S Devin / Aric S /D Mauricio/ S Jimena/ S Alin/ Celestine Peck / GIDEON Metcalfu/ O Will  service [phone], office [phone]  ---------------------------------------------------------------------------------------------------------------    Patient seen and examined bedside    Subjective and Objective: No overnight events, denied V/D/urinary sxs/sob. No complaints today. stated not feeling dizzy anymore    Allergies: No Known Allergies      Hospital Medications:   MEDICATIONS  (STANDING):  acetylcysteine  Oral Solution 1200 milliGRAM(s) Oral every 12 hours  amLODIPine   Tablet 10 milliGRAM(s) Oral daily  aspirin  chewable 81 milliGRAM(s) Oral daily  atorvastatin 40 milliGRAM(s) Oral at bedtime  chlorhexidine 2% Cloths 1 Application(s) Topical daily  clopidogrel Tablet 75 milliGRAM(s) Oral daily  dextrose 5%. 1000 milliLiter(s) (100 mL/Hr) IV Continuous <Continuous>  dextrose 5%. 1000 milliLiter(s) (50 mL/Hr) IV Continuous <Continuous>  dextrose 50% Injectable 25 Gram(s) IV Push once  dextrose 50% Injectable 12.5 Gram(s) IV Push once  dextrose 50% Injectable 25 Gram(s) IV Push once  gabapentin 100 milliGRAM(s) Oral three times a day  glucagon  Injectable 1 milliGRAM(s) IntraMuscular once  insulin lispro (ADMELOG) corrective regimen sliding scale   SubCutaneous three times a day before meals  insulin lispro (ADMELOG) corrective regimen sliding scale   SubCutaneous at bedtime  isosorbide   mononitrate ER Tablet (IMDUR) 60 milliGRAM(s) Oral daily  memantine 5 milliGRAM(s) Oral two times a day  metoprolol succinate  milliGRAM(s) Oral daily  multivitamin 1 Tablet(s) Oral daily  pantoprazole    Tablet 40 milliGRAM(s) Oral before breakfast  polyethylene glycol 3350 17 Gram(s) Oral at bedtime  senna 2 Tablet(s) Oral at bedtime  sodium chloride 0.9%. 500 milliLiter(s) (75 mL/Hr) IV Continuous <Continuous>  sodium chloride 0.9%. 1000 milliLiter(s) (75 mL/Hr) IV Continuous <Continuous>  sodium chloride 0.9%. 1000 milliLiter(s) (50 mL/Hr) IV Continuous <Continuous>  sodium chloride 0.9%. 1000 milliLiter(s) (50 mL/Hr) IV Continuous <Continuous>  sodium chloride 0.9%. 1000 milliLiter(s) (50 mL/Hr) IV Continuous <Continuous>  sodium chloride 0.9%. 1000 milliLiter(s) (50 mL/Hr) IV Continuous <Continuous>    VITALS:  T(F): 98.1 (23 @ 11:20), Max: 98.3 (23 @ 04:51)  HR: 66 (23 @ 16:59)  BP: 151/71 (23 @ 16:59)  RR: 18 (23 @ 16:59)  SpO2: 95% (23 @ 16:59)  Wt(kg): --     @ 07:01  -   @ 07:00  --------------------------------------------------------  IN: 800 mL / OUT: 700 mL / NET: 100 mL     @ 07:01  -   @ 18:09  --------------------------------------------------------  IN: 400 mL / OUT: 500 mL / NET: -100 mL      PHYSICAL EXAM:  Constitutional: NAD  HEENT: anicteric sclera  Neck: No JVD  Respiratory: CTAB, no wheezes, rales or rhonchi  Cardiovascular: S1, S2, RRR  Gastrointestinal: BS+, soft, NT/ND  Extremities: no pedal edema b/l   Neurological: A/O x 3  Psychiatric: Normal mood, normal affect  : No velasco.     LABS:      139  |  100  |  26<H>  ----------------------------<  233<H>  4.1   |  22  |  1.59<H>    Ca    9.2      2023 07:09  Mg     1.8           Creatinine Trend: 1.59 <--, 1.56 <--, 1.41 <--, 1.77 <--, 2.28 <--, 2.11 <--, 1.55 <--                        12.9   7.83  )-----------( 179      ( 2023 07:22 )             37.1     Urine Studies:  Urinalysis Basic - ( 2023 23:17 )    Color: Yellow / Appearance: Clear / S.014 / pH:   Gluc:  / Ketone: Negative  / Bili: Negative / Urobili: Negative   Blood:  / Protein: 30 mg/dL / Nitrite: Negative   Leuk Esterase: Negative / RBC: 1 /hpf / WBC 1 /HPF   Sq Epi:  / Non Sq Epi:  / Bacteria: Negative      Potassium, Random Urine: 36 mmol/L ( @ 23:17)  Osmolality, Random Urine: 373 mos/kg ( @ 23:17)  Sodium, Random Urine: 21 mmol/L ( @ 23:17)  Chloride, Random Urine: <20 mmol/L ( @ 23:17)  Creatinine, Random Urine: 148 mg/dL ( @ 23:17)  Protein/Creatinine Ratio Calculation: 0.2 Ratio ( @ 23:17)      RADIOLOGY & ADDITIONAL STUDIES:

## 2023-06-18 NOTE — PROGRESS NOTE ADULT - ASSESSMENT
Patient seen and examined, agree with above assessment and plan as transcribed above.    - Strutural f/u    Chalino Evans MD, [facility]  BEEPER [phone]

## 2023-06-18 NOTE — PROGRESS NOTE ADULT - SUBJECTIVE AND OBJECTIVE BOX
Subjective  No acute events reported overnight.  The patient is currently laying in the bed with his wife.  He denies any chest pain/tightness/discomfort, fevers, chills, sweats, light sensation, dizziness or palpitations.    Telemetry in sinus rhythm with no VT/VF    Review of systems  14 point review of systems is otherwise unremarkable separate described above in history of present illness    Physical examination  No apparent distress, alert and oriented x3, appropriate affect  JVD is not elevated, supple, no lymphadenopathy  Clear to auscultation bilateral with no wheezing rhonchi or crackles  Regular rate and rhythm with 2 out of 6 diastolic murmur at the left sternal border  Positive bowel sound, soft, nontender, nondistended, no mass and guarding or rebound tenderness  No clubbing cyanosis or edema  Moving all extremities spontaneously  2+ DP/PT pulses    Assessment/plan  77-year-old male with a past medical history significant for    Hypertension  Severe aortic valve insufficiency status post aortic valve replacement in 2009  Diabetes mellitus type 2  Hyperlipidemia  Peripheral arterial disease status post angioplasty of right anterior tibial artery  Chronic kidney disease  Cholecystectomy  Surgery of left ear  Chronic mastoiditis  BPH    Who presents with shortness of breath, unsteadiness on his feet and room spinning sensation    Discussion was had with the patient, his wife and granddaughter/Bethanie concerning his clinical presentation and findings on imaging studies.  The patient had undergone surgical aortic valve replacement at [facility] in 2009 during which time a 25 mm bioprosthetic valve was placed.  At that time the procedure was performed for severe aortic valve insufficiency.  The patient is now found to have similar symptoms of dyspnea and dizziness which she experienced in 2009.  BALJINDER on 6/14/2023 demonstrated EF 55 to 60% with normal right ventricular cavity size/thickness/systolic function.  Severe intravalvular regurgitation seen centrally and eccentrically in bioprosthetic aortic valve.  There is mild mitral valve regurgitation.  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  Mildly dilated proximal ascending aorta measuring 4.1 cm.    Explained to the patient and family what is severe aortic valve insufficiency of a degenerative aortic valve.  The associated signs and symptoms were reviewed.  The natural pathophysiology of left untreated were gone over.  The various treatment options were gone over including redo surgery, medical management and TAVR valve in valve.  The pros/cons and the risk/benefits of the various treatment options were gone over.  Due to the patient age and comorbidities he is felt to be appropriate candidate to undergo work-up for TAVR valve in valve.  The necessary work-up prior to the TAVR valve and valve was gone over including carotid Dopplers (CTA of the neck was previously performed on 6/9/2023 that demonstrated no evidence of aneurysm, stenosis or vessel occlusion of the carotid or vertebral arteries), PFTs, coronary angiography and heart CTA.  Indications and details for the TAVR valve in valve were reviewed.  Benefits and risks were gone over.  Risks include but not limited to infection, bleeding, arrhythmia, TIA/stroke, hemodynamic instability, vascular injury, need for urgent surgery and death.    The primary team reached out to radiology and was informed that no heart CTA is performed over the weekend.  These findings were discussed with the patient, his wife and granddaughter/Bethanie.  The patient and family are frustrated because they feel that they have lost time getting studies done and moving his care forward.  Expressed apologies to the patient and family.  Plan for patient to undergo heart CTA tomorrow.  Asked primary/medicine team to please contact radiology first thing in the morning.  If it cannot be performed would recommend that patient undergo coronary angiography as part of his TAVR valve in valve work-up.    All questions concerns of the patient and his family were addressed.    Findings and plan were discussed with the medicine team.    Time-based billing (NON-critical care).   35 minutes spent on total encounter; more than 50% of the visit was spent counseling and / or coordinating care by the attending physician.  The necessity of the time spent during the encounter on this date of service was due to: education, assessment and coordination of care.

## 2023-06-18 NOTE — PROGRESS NOTE ADULT - SUBJECTIVE AND OBJECTIVE BOX
C A R D I O L O G Y  **********************************     DATE OF SERVICE: 06-18-23    no events          acetaminophen     Tablet .. 650 milliGRAM(s) Oral every 6 hours PRN  acetylcysteine  Oral Solution 1200 milliGRAM(s) Oral every 12 hours  amLODIPine   Tablet 10 milliGRAM(s) Oral daily  aspirin  chewable 81 milliGRAM(s) Oral daily  atorvastatin 40 milliGRAM(s) Oral at bedtime  benzocaine/menthol Lozenge 1 Lozenge Oral four times a day PRN  chlorhexidine 2% Cloths 1 Application(s) Topical daily  clopidogrel Tablet 75 milliGRAM(s) Oral daily  dextrose 5%. 1000 milliLiter(s) IV Continuous <Continuous>  dextrose 5%. 1000 milliLiter(s) IV Continuous <Continuous>  dextrose 50% Injectable 25 Gram(s) IV Push once  dextrose 50% Injectable 12.5 Gram(s) IV Push once  dextrose 50% Injectable 25 Gram(s) IV Push once  dextrose Oral Gel 15 Gram(s) Oral once PRN  gabapentin 100 milliGRAM(s) Oral three times a day  glucagon  Injectable 1 milliGRAM(s) IntraMuscular once  insulin lispro (ADMELOG) corrective regimen sliding scale   SubCutaneous three times a day before meals  insulin lispro (ADMELOG) corrective regimen sliding scale   SubCutaneous at bedtime  isosorbide   mononitrate ER Tablet (IMDUR) 60 milliGRAM(s) Oral daily  meclizine 12.5 milliGRAM(s) Oral every 8 hours PRN  memantine 5 milliGRAM(s) Oral two times a day  metoprolol succinate  milliGRAM(s) Oral daily  multivitamin 1 Tablet(s) Oral daily  pantoprazole    Tablet 40 milliGRAM(s) Oral before breakfast  polyethylene glycol 3350 17 Gram(s) Oral at bedtime  senna 2 Tablet(s) Oral at bedtime  sodium chloride 0.9%. 500 milliLiter(s) IV Continuous <Continuous>  sodium chloride 0.9%. 1000 milliLiter(s) IV Continuous <Continuous>  sodium chloride 0.9%. 1000 milliLiter(s) IV Continuous <Continuous>  sodium chloride 0.9%. 1000 milliLiter(s) IV Continuous <Continuous>  sodium chloride 0.9%. 1000 milliLiter(s) IV Continuous <Continuous>  sodium chloride 0.9%. 1000 milliLiter(s) IV Continuous <Continuous>                            12.9   7.83  )-----------( 179      ( 17 Jun 2023 07:22 )             37.1       Hemoglobin: 12.9 g/dL (06-17 @ 07:22)      06-18    139  |  100  |  26<H>  ----------------------------<  233<H>  4.1   |  22  |  1.59<H>    Ca    9.2      18 Jun 2023 07:09  Mg     1.8     06-17      Creatinine Trend: 1.59<--, 1.56<--, 1.41<--, 1.77<--, 2.28<--, 2.11<--    COAGS:           T(C): 36.8 (06-18-23 @ 04:51), Max: 36.8 (06-18-23 @ 04:51)  HR: 78 (06-18-23 @ 04:51) (69 - 80)  BP: 132/65 (06-18-23 @ 04:51) (123/62 - 153/63)  RR: 18 (06-18-23 @ 04:51) (18 - 18)  SpO2: 98% (06-18-23 @ 04:51) (94% - 98%)  Wt(kg): --    I&O's Summary    17 Jun 2023 07:01  -  18 Jun 2023 07:00  --------------------------------------------------------  IN: 800 mL / OUT: 700 mL / NET: 100 mL      Gen: NAD  HEENT:  (-)icterus (-)pallor  CV: N S1 S2 1/6 MORENA (+)2 Pulses B/l  Resp:  Clear to auscultation B/L, normal effort  GI: (+) BS Soft, NT, ND  Lymph:  (-)Edema, (-)obvious lymphadenopathy  Skin: Warm to touch, Normal turgor  Psych: Appropriate mood and affect      TELEMETRY: SR 60-80    < from: TTE Limited W or WO Ultrasound Enhancing Agent (06.12.23 @ 10:51) >  CONCLUSIONS:      1. Technically difficult image quality.   2. There is increasedLV mass and concentric hypertrophy.   3. Mild left ventricular hypertrophy.   4. The left ventricular systolic function is normal with an ejection fraction of 68 % by Roach's method of disks. There are no regional wall motion abnormalities seen.   5. Normal right ventricular cavity size, normal wall thickness and normal systolic function. The tricuspid annular plane systolic excursion (TAPSE) is 1.9 cm (normal >=1.7 cm).   6. A bioprosthetic valve replacement present in the aortic position. Aortic valve is not well visualized. Calcualted EOA and dimensionless index are consistent with moderate prosthetic valve stenosis. Consider additional imaging of the aortic valve if clinically indicated.   7. Mild aortic regurgitation.   8. Aortic regurgitation appears intravalvular.   9. Trace mitral regurgitation.  10. Compared to the transesophageal echocardiogram performed on 7/12/2017 aortic valve gradients are higher on the current study. Peak/mean aortic valve gradients were 21/13 mmHg on theprior study.    < end of copied text >    < from: MR Head w/wo IV Cont (06.10.23 @ 19:03) >  IMPRESSION:    1. Ischemic white matter disease and atrophy typical for age    2. Remote petechial hemorrhages    3. No evidence of infarction    --- End of Report ---    < end of copied text >    < from: BALJINDER W or WO Ultrasound Enhancing Agent (06.14.23 @ 15:06) >  CONCLUSIONS:      1. The left ventricular systolic function is normal with an ejection fraction visually estimated at 55 to 60 %.   2. Normal right ventricular cavity size, normal wall thickness and normal systolic function.   3. A bioprosthetic valve replacement present in the aortic position. Severe intravalvular regurgitation originating centrally and is eccentrically directed anteriorly. Bioprosthetic leaflets are suboptimally visualized.   4. Mild mitral regurgitation.   5. 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   6. No evidence of left atrial or left atrial appendage thrombus. The left atrial appendageemptying velocity is normal at 61 cm/s.   7. Mildly dilated proximal ascending aorta, measuring 4.10 cm (indexed 1.93 cm/m²).   8. Compared to the transthoracic echocardiogram performed on 6/12/2023 more significant aortic regurgitation is noted on today's study.    < end of copied text >      ASSESSMENT/PLAN: Pt is a 77-year-old male with a past medical history of hypertension, diabetes, hyperlipidemia, bioprosthetic aortic valve replacement in 2009 by Dr. Ted Piña status post recent lower extremity angiogram status post angioplasty of right anterior tibial artery who presents from the office due to unsteadiness of his feet and room spinning sensation.    #Dizziness  - suspect vertigo  - neuro eval appreciated - MRI with no evidence of infarction  - orthostatics negative  - tele stable with no events    #Bio-AVR prosthetic valve stenosis  - recent office echo with elevated gradients across bioprosthetic aortic valve, inpatient TTE confirmed elevated gradients, moderate prosthetic valve stenosis  - BALJINDER now showing severe intravalvular regurgitation of his bio-AVR  - Structural heart consult appreciated - f/u recs regarding Ria TAVR eval. Plan for preop LHC and CTA when renal function allows. Cr improving, Will need to follow renal recs prior to contrast. Will determine timing with structural heart.    #HTN  - Continue Amlodipine 10mg daily, Imdur 60mg daily, and Toprol XL 100mg daily. Hold HCTZ and Valsartan given SAMARIA. Can restart Hydralazine if becomes hypertensive    #HLD  - Continue Lipitor    #PAD  - Continue ASA/Plavix and Lipitor    #SAMARIA  - Suspect contrast induced  - Renal consult appreciated  - s/p IVF  - Hold HCTZ and Valsartan  - Trend cr - improving    - Patient has f/u with Dr. Plummer on 7/7 at 11:30 AM

## 2023-06-18 NOTE — PROGRESS NOTE ADULT - ASSESSMENT
a 77-year-old male with a past medical history of hypertension, diabetes, hyperlipidemia, bioprosthetic aortic valvereplacement in 2009 by Dr. Ted Piña status post recent lower extremity angiogram status post angioplasty of right anterior tibial artery who presents from the office due to unsteadiness of his feet and room spinning sensation  Renal following for SAMARIA on CKD Mx.    Samaria on CKD stage 3- improved and stable  Creatinine Trend: 1.59 <--, 1.56 <--, 1.41 <--, 1.77 <--, 2.28 <--, 2.11 <--, 1.55 <--  likely has underlying diabetic and hypertensive nephropathy  likely pre renal superimposed  Renal US- no obstruction; renal calculi- no hydro  Urine lytes--> intravascular depletion--> s/p Iv nacl @ 75cc for 24 hours  Pt is at moderate risk for contrast induced nephropathy. risk explained to pt including the possibility of worsening RFT post cath and if no recovery of RF, may need RRT/dialysis. Pt verbalized understanding.    pt not getting CT TAVR over weekend per primary team.  CT likely scheduled for Monday  recommend PAYAL prophylaxis w/ mucomyst 1200mg po x 2 doses pre and post contrast q12h  Also would give Iv nacl @ 75cc/hr for 6 hours shamar contrast if no s/o CHF  pt optimized renal stand point for CT w/iv contrast  daily BMP  dose all meds for eGFR  avoid ACEi/ARB for now/NSAIDs/Nephrotoxics if able.  encourage po intake  will monitor with you    will closely follow up.   poc d/w pt  labs, chart reviewed  For any question, pl call:  Nephrology  Cell [phone]  Office [phone]  Ans Serv [phone]

## 2023-06-18 NOTE — PROGRESS NOTE ADULT - SUBJECTIVE AND OBJECTIVE BOX
Patient is a 77y old  Male who presents with a chief complaint of Vertigo today (10 Gelacio 2023 09:57)      SUBJECTIVE / OVERNIGHT EVENTS: no events    T(C): 36.7 (06-18-23 @ 20:34), Max: 36.7 (06-18-23 @ 20:34)  HR: 68 (06-18-23 @ 20:34) (66 - 68)  BP: 139/62 (06-18-23 @ 20:34) (139/62 - 151/71)  RR: 18 (06-18-23 @ 20:34) (18 - 18)  SpO2: 97% (06-18-23 @ 20:34) (95% - 97%)      MEDICATIONS  (STANDING):  acetylcysteine  Oral Solution 1200 milliGRAM(s) Oral every 12 hours  amLODIPine   Tablet 10 milliGRAM(s) Oral daily  aspirin  chewable 81 milliGRAM(s) Oral daily  atorvastatin 40 milliGRAM(s) Oral at bedtime  chlorhexidine 2% Cloths 1 Application(s) Topical daily  clopidogrel Tablet 75 milliGRAM(s) Oral daily  dextrose 5%. 1000 milliLiter(s) (100 mL/Hr) IV Continuous <Continuous>  dextrose 5%. 1000 milliLiter(s) (50 mL/Hr) IV Continuous <Continuous>  dextrose 50% Injectable 25 Gram(s) IV Push once  dextrose 50% Injectable 12.5 Gram(s) IV Push once  dextrose 50% Injectable 25 Gram(s) IV Push once  gabapentin 100 milliGRAM(s) Oral three times a day  glucagon  Injectable 1 milliGRAM(s) IntraMuscular once  insulin lispro (ADMELOG) corrective regimen sliding scale   SubCutaneous three times a day before meals  insulin lispro (ADMELOG) corrective regimen sliding scale   SubCutaneous at bedtime  isosorbide   mononitrate ER Tablet (IMDUR) 60 milliGRAM(s) Oral daily  memantine 5 milliGRAM(s) Oral two times a day  metoprolol succinate  milliGRAM(s) Oral daily  multivitamin 1 Tablet(s) Oral daily  pantoprazole    Tablet 40 milliGRAM(s) Oral before breakfast  polyethylene glycol 3350 17 Gram(s) Oral at bedtime  senna 2 Tablet(s) Oral at bedtime    MEDICATIONS  (PRN):  acetaminophen     Tablet .. 650 milliGRAM(s) Oral every 6 hours PRN Moderate Pain (4 - 6)  benzocaine/menthol Lozenge 1 Lozenge Oral four times a day PRN Sore Throat  dextrose Oral Gel 15 Gram(s) Oral once PRN Blood Glucose LESS THAN 70 milliGRAM(s)/deciliter  meclizine 12.5 milliGRAM(s) Oral every 8 hours PRN Dizziness      PHYSICAL EXAM:  GENERAL: NAD, well-developed  HEAD:  Atraumatic, Normocephalic  EYES: EOMI, PERRLA, conjunctiva and sclera clear  NECK: Supple, No JVD  CHEST/LUNG: Clear to auscultation bilaterally; No wheeze  HEART: Regular rate and rhythm; No murmurs, rubs, or gallops  ABDOMEN: Soft, Nontender, Nondistended; Bowel sounds present  EXTREMITIES:  2+ Peripheral Pulses, No clubbing, cyanosis, or edema  PSYCH: AAOx3  NEUROLOGY: non-focal  SKIN: No rashes or lesions                                         12.9   7.83  )-----------( 179      ( 17 Jun 2023 07:22 )             37.1               139|100|26<233  4.1|22|1.59  9.2,--,--  06-18 @ 07:09      CAPILLARY BLOOD GLUCOSE      POCT Blood Glucose.: 188 mg/dL (17 Jun 2023 21:33)  POCT Blood Glucose.: 163 mg/dL (17 Jun 2023 17:03)  POCT Blood Glucose.: 288 mg/dL (17 Jun 2023 11:21)  POCT Blood Glucose.: 348 mg/dL (17 Jun 2023 07:49)

## 2023-06-19 LAB
ANION GAP SERPL CALC-SCNC: 13 MMOL/L — SIGNIFICANT CHANGE UP (ref 5–17)
BUN SERPL-MCNC: 27 MG/DL — HIGH (ref 7–23)
CALCIUM SERPL-MCNC: 9 MG/DL — SIGNIFICANT CHANGE UP (ref 8.4–10.5)
CHLORIDE SERPL-SCNC: 102 MMOL/L — SIGNIFICANT CHANGE UP (ref 96–108)
CO2 SERPL-SCNC: 23 MMOL/L — SIGNIFICANT CHANGE UP (ref 22–31)
CREAT SERPL-MCNC: 1.59 MG/DL — HIGH (ref 0.5–1.3)
EGFR: 44 ML/MIN/1.73M2 — LOW
GLUCOSE BLDC GLUCOMTR-MCNC: 181 MG/DL — HIGH (ref 70–99)
GLUCOSE BLDC GLUCOMTR-MCNC: 181 MG/DL — HIGH (ref 70–99)
GLUCOSE BLDC GLUCOMTR-MCNC: 262 MG/DL — HIGH (ref 70–99)
GLUCOSE BLDC GLUCOMTR-MCNC: 271 MG/DL — HIGH (ref 70–99)
GLUCOSE SERPL-MCNC: 242 MG/DL — HIGH (ref 70–99)
HCT VFR BLD CALC: 35.6 % — LOW (ref 39–50)
HGB BLD-MCNC: 11.9 G/DL — LOW (ref 13–17)
MCHC RBC-ENTMCNC: 28 PG — SIGNIFICANT CHANGE UP (ref 27–34)
MCHC RBC-ENTMCNC: 33.4 GM/DL — SIGNIFICANT CHANGE UP (ref 32–36)
MCV RBC AUTO: 83.8 FL — SIGNIFICANT CHANGE UP (ref 80–100)
NRBC # BLD: 0 /100 WBCS — SIGNIFICANT CHANGE UP (ref 0–0)
PLATELET # BLD AUTO: 182 K/UL — SIGNIFICANT CHANGE UP (ref 150–400)
POTASSIUM SERPL-MCNC: 3.6 MMOL/L — SIGNIFICANT CHANGE UP (ref 3.5–5.3)
POTASSIUM SERPL-SCNC: 3.6 MMOL/L — SIGNIFICANT CHANGE UP (ref 3.5–5.3)
RBC # BLD: 4.25 M/UL — SIGNIFICANT CHANGE UP (ref 4.2–5.8)
RBC # FLD: 13 % — SIGNIFICANT CHANGE UP (ref 10.3–14.5)
SODIUM SERPL-SCNC: 138 MMOL/L — SIGNIFICANT CHANGE UP (ref 135–145)
WBC # BLD: 8.24 K/UL — SIGNIFICANT CHANGE UP (ref 3.8–10.5)
WBC # FLD AUTO: 8.24 K/UL — SIGNIFICANT CHANGE UP (ref 3.8–10.5)

## 2023-06-19 PROCEDURE — 99233 SBSQ HOSP IP/OBS HIGH 50: CPT

## 2023-06-19 PROCEDURE — 74174 CTA ABD&PLVS W/CONTRAST: CPT | Mod: 26

## 2023-06-19 PROCEDURE — 71275 CT ANGIOGRAPHY CHEST: CPT | Mod: 26

## 2023-06-19 PROCEDURE — 93880 EXTRACRANIAL BILAT STUDY: CPT | Mod: 26

## 2023-06-19 PROCEDURE — 75574 CT ANGIO HRT W/3D IMAGE: CPT | Mod: 26

## 2023-06-19 RX ORDER — INSULIN GLARGINE 100 [IU]/ML
15 INJECTION, SOLUTION SUBCUTANEOUS AT BEDTIME
Refills: 0 | Status: DISCONTINUED | OUTPATIENT
Start: 2023-06-19 | End: 2023-06-25

## 2023-06-19 RX ADMIN — SODIUM CHLORIDE 50 MILLILITER(S): 9 INJECTION INTRAMUSCULAR; INTRAVENOUS; SUBCUTANEOUS at 12:38

## 2023-06-19 RX ADMIN — AMLODIPINE BESYLATE 10 MILLIGRAM(S): 2.5 TABLET ORAL at 05:01

## 2023-06-19 RX ADMIN — GABAPENTIN 100 MILLIGRAM(S): 400 CAPSULE ORAL at 05:01

## 2023-06-19 RX ADMIN — Medication 650 MILLIGRAM(S): at 21:35

## 2023-06-19 RX ADMIN — PANTOPRAZOLE SODIUM 40 MILLIGRAM(S): 20 TABLET, DELAYED RELEASE ORAL at 05:02

## 2023-06-19 RX ADMIN — SODIUM CHLORIDE 50 MILLILITER(S): 9 INJECTION INTRAMUSCULAR; INTRAVENOUS; SUBCUTANEOUS at 05:03

## 2023-06-19 RX ADMIN — GABAPENTIN 100 MILLIGRAM(S): 400 CAPSULE ORAL at 21:34

## 2023-06-19 RX ADMIN — ISOSORBIDE MONONITRATE 60 MILLIGRAM(S): 60 TABLET, EXTENDED RELEASE ORAL at 11:30

## 2023-06-19 RX ADMIN — INSULIN GLARGINE 15 UNIT(S): 100 INJECTION, SOLUTION SUBCUTANEOUS at 21:34

## 2023-06-19 RX ADMIN — Medication 1 TABLET(S): at 11:30

## 2023-06-19 RX ADMIN — SENNA PLUS 2 TABLET(S): 8.6 TABLET ORAL at 21:34

## 2023-06-19 RX ADMIN — Medication 100 MILLIGRAM(S): at 05:01

## 2023-06-19 RX ADMIN — Medication 650 MILLIGRAM(S): at 22:35

## 2023-06-19 RX ADMIN — Medication 2: at 17:18

## 2023-06-19 RX ADMIN — Medication 6: at 08:02

## 2023-06-19 RX ADMIN — CLOPIDOGREL BISULFATE 75 MILLIGRAM(S): 75 TABLET, FILM COATED ORAL at 11:29

## 2023-06-19 RX ADMIN — Medication 1200 MILLIGRAM(S): at 05:01

## 2023-06-19 RX ADMIN — MEMANTINE HYDROCHLORIDE 5 MILLIGRAM(S): 10 TABLET ORAL at 05:02

## 2023-06-19 RX ADMIN — Medication 6: at 11:28

## 2023-06-19 RX ADMIN — MEMANTINE HYDROCHLORIDE 5 MILLIGRAM(S): 10 TABLET ORAL at 17:19

## 2023-06-19 RX ADMIN — CHLORHEXIDINE GLUCONATE 1 APPLICATION(S): 213 SOLUTION TOPICAL at 11:33

## 2023-06-19 RX ADMIN — ATORVASTATIN CALCIUM 40 MILLIGRAM(S): 80 TABLET, FILM COATED ORAL at 21:34

## 2023-06-19 RX ADMIN — Medication 81 MILLIGRAM(S): at 11:29

## 2023-06-19 RX ADMIN — GABAPENTIN 100 MILLIGRAM(S): 400 CAPSULE ORAL at 14:50

## 2023-06-19 RX ADMIN — Medication 1200 MILLIGRAM(S): at 17:18

## 2023-06-19 NOTE — PROGRESS NOTE ADULT - SUBJECTIVE AND OBJECTIVE BOX
Patient is a 77y old  Male who presents with a chief complaint of Vertigo today (10 Gelacio 2023 09:57)      SUBJECTIVE / OVERNIGHT EVENTS: no events      T(C): 36.7 (06-19-23 @ 21:28), Max: 36.7 (06-19-23 @ 21:28)  HR: 71 (06-19-23 @ 21:28) (71 - 74)  BP: 159/71 (06-19-23 @ 21:28) (136/68 - 159/71)  RR: 18 (06-19-23 @ 21:28) (18 - 18)  SpO2: 96% (06-19-23 @ 21:28) (96% - 97%)      MEDICATIONS  (STANDING):  amLODIPine   Tablet 10 milliGRAM(s) Oral daily  aspirin  chewable 81 milliGRAM(s) Oral daily  atorvastatin 40 milliGRAM(s) Oral at bedtime  chlorhexidine 2% Cloths 1 Application(s) Topical daily  clopidogrel Tablet 75 milliGRAM(s) Oral daily  dextrose 5%. 1000 milliLiter(s) (100 mL/Hr) IV Continuous <Continuous>  dextrose 5%. 1000 milliLiter(s) (50 mL/Hr) IV Continuous <Continuous>  dextrose 50% Injectable 25 Gram(s) IV Push once  dextrose 50% Injectable 12.5 Gram(s) IV Push once  dextrose 50% Injectable 25 Gram(s) IV Push once  gabapentin 100 milliGRAM(s) Oral three times a day  glucagon  Injectable 1 milliGRAM(s) IntraMuscular once  insulin glargine Injectable (LANTUS) 15 Unit(s) SubCutaneous at bedtime  insulin lispro (ADMELOG) corrective regimen sliding scale   SubCutaneous three times a day before meals  insulin lispro (ADMELOG) corrective regimen sliding scale   SubCutaneous at bedtime  isosorbide   mononitrate ER Tablet (IMDUR) 60 milliGRAM(s) Oral daily  memantine 5 milliGRAM(s) Oral two times a day  metoprolol succinate  milliGRAM(s) Oral daily  multivitamin 1 Tablet(s) Oral daily  pantoprazole    Tablet 40 milliGRAM(s) Oral before breakfast  polyethylene glycol 3350 17 Gram(s) Oral at bedtime  senna 2 Tablet(s) Oral at bedtime  sodium chloride 0.9%. 1000 milliLiter(s) (50 mL/Hr) IV Continuous <Continuous>    MEDICATIONS  (PRN):  acetaminophen     Tablet .. 650 milliGRAM(s) Oral every 6 hours PRN Moderate Pain (4 - 6)  benzocaine/menthol Lozenge 1 Lozenge Oral four times a day PRN Sore Throat  dextrose Oral Gel 15 Gram(s) Oral once PRN Blood Glucose LESS THAN 70 milliGRAM(s)/deciliter  meclizine 12.5 milliGRAM(s) Oral every 8 hours PRN Dizziness    PHYSICAL EXAM:  GENERAL: NAD, well-developed  HEAD:  Atraumatic, Normocephalic  EYES: EOMI, PERRLA, conjunctiva and sclera clear  NECK: Supple, No JVD  CHEST/LUNG: Clear to auscultation bilaterally; No wheeze  HEART: Regular rate and rhythm; No murmurs, rubs, or gallops  ABDOMEN: Soft, Nontender, Nondistended; Bowel sounds present  EXTREMITIES:  2+ Peripheral Pulses, No clubbing, cyanosis, or edema  PSYCH: AAOx3  NEUROLOGY: non-focal  SKIN: No rashes or lesions                                       11.9   8.24  )-----------( 182      ( 19 Jun 2023 09:14 )             35.6               138|102|27<242  3.6|23|1.59  9.0,--,--  06-19 @ 06:30        CAPILLARY BLOOD GLUCOSE      POCT Blood Glucose.: 181 mg/dL (19 Jun 2023 21:22)  POCT Blood Glucose.: 181 mg/dL (19 Jun 2023 17:15)  POCT Blood Glucose.: 262 mg/dL (19 Jun 2023 11:24)  POCT Blood Glucose.: 271 mg/dL (19 Jun 2023 07:16)

## 2023-06-19 NOTE — PROGRESS NOTE ADULT - NS ATTEND AMEND GEN_ALL_CORE FT
No acute events reported overnight.  The patient denies any cardiopulmonary complaints at rest or upon minimal exertion.  Denies any swelling of the leg, change in orthopnea or PND.  Denies any lightheaded sensation, dizziness or palpitations.  Telemetry demonstrates sinus rhythm with first-degree AV block with rates in the 60s to 70s.  No VT/VF or SVT.  Agree with 14 point review of systems and physical examination as noted above.    Assessment/plan  77-year-old male with a past medical history significant for    Hypertension  Severe aortic valve insufficiency status post aortic valve replacement in 2009  Diabetes mellitus type 2  Hyperlipidemia  Peripheral arterial disease status post angioplasty of right anterior tibial artery  Chronic kidney disease  Cholecystectomy  Surgery of left ear  Chronic mastoiditis  BPH    Who presents with shortness of breath, unsteadiness on his feet and room spinning sensation    Discussion was had with the patient, his wife and granddaughter/Bethanie concerning his clinical presentation and findings on imaging studies.  The patient had undergone surgical aortic valve replacement at [facility] in 2009 during which time a 25 mm bioprosthetic valve was placed.  At that time the procedure was performed for severe aortic valve insufficiency.  The patient is now found to have similar symptoms of dyspnea and dizziness which she experienced in 2009.  BALJINDER on 6/14/2023 demonstrated EF 55 to 60% with normal right ventricular cavity size/thickness/systolic function.  Severe intravalvular regurgitation seen centrally and eccentrically in bioprosthetic aortic valve.  There is mild mitral valve regurgitation.  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  Mildly dilated proximal ascending aorta measuring 4.1 cm.    Explained to the patient and family what is severe aortic valve insufficiency of a degenerative aortic valve.  The associated signs and symptoms were reviewed.  The natural pathophysiology of left untreated were gone over.      The various treatment options were gone over including redo surgery, medical management and TAVR valve in valve.  The pros/cons and the risk/benefits of the various treatment options were gone over.  Due to the patient's age and comorbidities he is felt to be appropriate candidate to undergo work-up for TAVR valve in valve.      The necessary work-up prior to the TAVR valve and valve was gone over including carotid Dopplers (CTA of the neck was previously performed on 6/9/2023 that demonstrated no evidence of aneurysm, stenosis or vessel occlusion of the carotid or vertebral arteries), PFTs, coronary angiography and heart CTA.  Plan for heart CTA to be performed later today.  Would then recommend the patient kidney function be closely monitored.  If his creatinine is stable his nature plan for coronary angiography to be performed later this week.  Indications and details for the coronary angiography were reviewed.  Benefits and risk were gone over.    Indications and details for the TAVR valve in valve were reviewed.  Benefits and risks were gone over.  Risks include but not limited to infection, bleeding, arrhythmia, TIA/stroke, hemodynamic instability, vascular injury, need for urgent surgery and death.    Continue aspirin 81 mg daily and clopidogrel 75 mg daily.  Closely monitor for signs and symptoms of bleeding.    Continue metoprolol succinate 100 mg daily, isosorbide mononitrate 60 mg daily and amlodipine 10 mg daily.    Continue atorvastatin 40 mg daily.  All questions concerns of the patient and his family were addressed.    Findings and plan were discussed with the medicine team.    Time-based billing (NON-critical care).   35 minutes spent on total encounter; more than 50% of the visit was spent counseling and / or coordinating care by the attending physician.  The necessity of the time spent during the encounter on this date of service was due to: education, assessment and coordination of care.

## 2023-06-19 NOTE — PROGRESS NOTE ADULT - ASSESSMENT
77 y (M with a pmhx significant for aortic valve replacement on aspirin, hx of vertigo, HLD, BPH, DM, HTN p        # Bio AVR Prosthetic valve stenosis   - TTE confirmed elevated gradients, moderate prosthetic valve stenosis  -  s/p  BALJINDER   TAVR work up in progress   BALJINDER showing severe intravalvular regurgitation of his bio-AVR  - Structural heart consult appreciated -  CTA coronaries today   cath later this week     #HTN  Continue with Metorpolol, Norvasc   Continue Amlodipine and Imdur.   Hold HCTZ and Valsartan given SAMARIA. Restart Toprol XL at lower dose 100mg daily.    # DM hiss   Follow up A1C     #PAD s/p Angioplasty anterior tibial artery   Plavix

## 2023-06-19 NOTE — PROGRESS NOTE ADULT - ASSESSMENT
Patient seen and examined, agree with above assessment and plan as transcribed above.    - structural f/u     Chalino Evans MD, [facility]  BEEPER [phone]

## 2023-06-19 NOTE — PROGRESS NOTE ADULT - SUBJECTIVE AND OBJECTIVE BOX
"Pharmacy Chemotherapy Verification  Patient name: Gurmeet Jo  Dx: Metastatic colorectal cancer [KRAS/NRAS wild type, ERBB2 (HER2) amplification]  Regimen: Fam-trastuzumab deruxtecan  Cycle 1  Previous treatment: 7/26/21    Fam-trastuzumab deruxtecan 6.4 mg/kg IV over 90 minutes Day 1   21-day cycle until progression or toxicity  *Dosing Reference*  NCCN guidelines for Colon Cancer v2.2021  (cetuximab + chemo) [place] AP et al, OBEY 2017;317(23):6474-9879  (HER2 tx in HER2+ CRC) Kayleen RINALDI et al. Lancet Oncol. 2019 Apr;20(4):518-530.    Allergies: Patient has no known allergies.  /85   Pulse 85   Temp 36.7 °C (98 °F) (Temporal)   Resp 18   Ht 1.835 m (6' 0.24\")   Wt 104 kg (229 lb 11.5 oz)   SpO2 98%   BMI 30.95 kg/m²  Body surface area is 2.3 meters squared.     Labs 8/16/21  Meet treatment parameters    ECHO (OK for 6 months per Cody LEO order)  4/21/21 LVEF ~60 %    Fam-trastuzumab deruxtecan 6.4 mg/kg x 104 kg = 665.6 mg   OK to treat with final dose = 665.6 mg  " C A R D I O L O G Y  **********************************    DATE OF SERVICE: 06-19-23    Patient denies chest pain or shortness of breath.   Review of symptoms otherwise negative.    MEDICATIONS:  acetaminophen     Tablet .. 650 milliGRAM(s) Oral every 6 hours PRN  acetylcysteine  Oral Solution 1200 milliGRAM(s) Oral every 12 hours  acetylcysteine  Oral Solution 1200 milliGRAM(s) Oral every 12 hours  amLODIPine   Tablet 10 milliGRAM(s) Oral daily  aspirin  chewable 81 milliGRAM(s) Oral daily  atorvastatin 40 milliGRAM(s) Oral at bedtime  benzocaine/menthol Lozenge 1 Lozenge Oral four times a day PRN  chlorhexidine 2% Cloths 1 Application(s) Topical daily  clopidogrel Tablet 75 milliGRAM(s) Oral daily  dextrose 5%. 1000 milliLiter(s) IV Continuous <Continuous>  dextrose 5%. 1000 milliLiter(s) IV Continuous <Continuous>  dextrose 50% Injectable 25 Gram(s) IV Push once  dextrose 50% Injectable 12.5 Gram(s) IV Push once  dextrose 50% Injectable 25 Gram(s) IV Push once  dextrose Oral Gel 15 Gram(s) Oral once PRN  gabapentin 100 milliGRAM(s) Oral three times a day  glucagon  Injectable 1 milliGRAM(s) IntraMuscular once  insulin glargine Injectable (LANTUS) 15 Unit(s) SubCutaneous at bedtime  insulin lispro (ADMELOG) corrective regimen sliding scale   SubCutaneous three times a day before meals  insulin lispro (ADMELOG) corrective regimen sliding scale   SubCutaneous at bedtime  isosorbide   mononitrate ER Tablet (IMDUR) 60 milliGRAM(s) Oral daily  meclizine 12.5 milliGRAM(s) Oral every 8 hours PRN  memantine 5 milliGRAM(s) Oral two times a day  metoprolol succinate  milliGRAM(s) Oral daily  multivitamin 1 Tablet(s) Oral daily  pantoprazole    Tablet 40 milliGRAM(s) Oral before breakfast  polyethylene glycol 3350 17 Gram(s) Oral at bedtime  senna 2 Tablet(s) Oral at bedtime  sodium chloride 0.9%. 1000 milliLiter(s) IV Continuous <Continuous>      LABS:                        11.9   8.24  )-----------( 182      ( 19 Jun 2023 09:14 )             35.6       Hemoglobin: 11.9 g/dL (06-19 @ 09:14)  Hemoglobin: 12.9 g/dL (06-17 @ 07:22)      06-19    138  |  102  |  27<H>  ----------------------------<  242<H>  3.6   |  23  |  1.59<H>    Ca    9.0      19 Jun 2023 06:30      Creatinine Trend: 1.59<--, 1.59<--, 1.56<--, 1.41<--, 1.77<--, 2.28<--    COAGS:           PHYSICAL EXAM:  T(C): 36.6 (06-19-23 @ 11:12), Max: 36.9 (06-19-23 @ 04:09)  HR: 69 (06-19-23 @ 11:12) (63 - 69)  BP: 144/64 (06-19-23 @ 11:12) (139/62 - 159/68)  RR: 18 (06-19-23 @ 11:12) (18 - 18)  SpO2: 97% (06-19-23 @ 11:12) (95% - 97%)  Wt(kg): --    I&O's Summary    18 Jun 2023 07:01  -  19 Jun 2023 07:00  --------------------------------------------------------  IN: 400 mL / OUT: 850 mL / NET: -450 mL    19 Jun 2023 07:01  -  19 Jun 2023 13:58  --------------------------------------------------------  IN: 480 mL / OUT: 826 mL / NET: -346 mL        Gen: NAD  HEENT:  (-)icterus (-)pallor  CV: N S1 S2 1/6 MORENA (+)2 Pulses B/l  Resp:  Clear to auscultation B/L, normal effort  GI: (+) BS Soft, NT, ND  Lymph:  (-)Edema, (-)obvious lymphadenopathy  Skin: Warm to touch, Normal turgor  Psych: Appropriate mood and affect      TELEMETRY: SR 60-90    < from: TTE Limited W or WO Ultrasound Enhancing Agent (06.12.23 @ 10:51) >  CONCLUSIONS:      1. Technically difficult image quality.   2. There is increasedLV mass and concentric hypertrophy.   3. Mild left ventricular hypertrophy.   4. The left ventricular systolic function is normal with an ejection fraction of 68 % by Roach's method of disks. There are no regional wall motion abnormalities seen.   5. Normal right ventricular cavity size, normal wall thickness and normal systolic function. The tricuspid annular plane systolic excursion (TAPSE) is 1.9 cm (normal >=1.7 cm).   6. A bioprosthetic valve replacement present in the aortic position. Aortic valve is not well visualized. Calcualted EOA and dimensionless index are consistent with moderate prosthetic valve stenosis. Consider additional imaging of the aortic valve if clinically indicated.   7. Mild aortic regurgitation.   8. Aortic regurgitation appears intravalvular.   9. Trace mitral regurgitation.  10. Compared to the transesophageal echocardiogram performed on 7/12/2017 aortic valve gradients are higher on the current study. Peak/mean aortic valve gradients were 21/13 mmHg on theprior study.    < end of copied text >    < from: MR Head w/wo IV Cont (06.10.23 @ 19:03) >  IMPRESSION:    1. Ischemic white matter disease and atrophy typical for age    2. Remote petechial hemorrhages    3. No evidence of infarction    --- End of Report ---    < end of copied text >    < from: BALJINDER W or WO Ultrasound Enhancing Agent (06.14.23 @ 15:06) >  CONCLUSIONS:      1. The left ventricular systolic function is normal with an ejection fraction visually estimated at 55 to 60 %.   2. Normal right ventricular cavity size, normal wall thickness and normal systolic function.   3. A bioprosthetic valve replacement present in the aortic position. Severe intravalvular regurgitation originating centrally and is eccentrically directed anteriorly. Bioprosthetic leaflets are suboptimally visualized.   4. Mild mitral regurgitation.   5. 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   6. No evidence of left atrial or left atrial appendage thrombus. The left atrial appendageemptying velocity is normal at 61 cm/s.   7. Mildly dilated proximal ascending aorta, measuring 4.10 cm (indexed 1.93 cm/m²).   8. Compared to the transthoracic echocardiogram performed on 6/12/2023 more significant aortic regurgitation is noted on today's study.    < end of copied text >      ASSESSMENT/PLAN: Pt is a 77-year-old male with a past medical history of hypertension, diabetes, hyperlipidemia, bioprosthetic aortic valve replacement in 2009 by Dr. Ted Piña status post recent lower extremity angiogram status post angioplasty of right anterior tibial artery who presents from the office due to unsteadiness of his feet and room spinning sensation.    #Dizziness  - suspect vertigo  - neuro eval appreciated - MRI with no evidence of infarction  - orthostatics negative  - tele stable with no events    #Bio-AVR prosthetic valve stenosis  - recent office echo with elevated gradients across bioprosthetic aortic valve, inpatient TTE confirmed elevated gradients, moderate prosthetic valve stenosis  - BALJINDER now showing severe intravalvular regurgitation of his bio-AVR  - Structural heart consult appreciated - f/u recs regarding Ria TAVR eval. Follow up CTA results. Will need preop LHC if cr remains stable and when ok with renal    #HTN  - Continue Amlodipine 10mg daily, Imdur 60mg daily, and Toprol XL 100mg daily. Hold HCTZ and Valsartan given SAMARIA. Can restart Hydralazine if becomes hypertensive    #HLD  - Continue Lipitor    #PAD  - Continue ASA/Plavix and Lipitor    #SAMARIA  - Suspect contrast induced now improved s/p IVF  - Hold HCTZ and Valsartan  - Renal follow up    - Patient has f/u with Dr. Plummer on 7/7 at 11:30 AM     Howard Victor PA-C  Pager: [phone]

## 2023-06-19 NOTE — PROGRESS NOTE ADULT - SUBJECTIVE AND OBJECTIVE BOX
Structural Heart Team    No complaints this morning, denies chest pain/pressure, sob and dizziness. No acute events overnight.       REVIEW OF SYSTEMS:    CONSTITUTIONAL: No weakness, fevers or chills  EYES/ENT: No visual changes;  No vertigo or throat pain   NECK: No pain or stiffness  RESPIRATORY: No cough, wheezing, hemoptysis; No shortness of breath  CARDIOVASCULAR: No chest pain or palpitations  GASTROINTESTINAL: No abdominal or epigastric pain. No nausea, vomiting, or hematemesis; No diarrhea or constipation. No melena or hematochezia.  GENITOURINARY: No dysuria, frequency or hematuria  NEUROLOGICAL: No numbness or weakness  SKIN: No itching, rashes      Allergies    No Known Allergies    Intolerances      Vital Signs Last 24 Hrs  T(C): 36.6 (19 Jun 2023 11:12), Max: 36.9 (19 Jun 2023 04:09)  T(F): 97.9 (19 Jun 2023 11:12), Max: 98.5 (19 Jun 2023 04:09)  HR: 69 (19 Jun 2023 11:12) (63 - 69)  BP: 144/64 (19 Jun 2023 11:12) (139/62 - 159/68)  BP(mean): --  RR: 18 (19 Jun 2023 11:12) (18 - 18)  SpO2: 97% (19 Jun 2023 11:12) (95% - 97%)    Parameters below as of 19 Jun 2023 11:12  Patient On (Oxygen Delivery Method): room air        MEDICATIONS  (STANDING):  acetylcysteine  Oral Solution 1200 milliGRAM(s) Oral every 12 hours  acetylcysteine  Oral Solution 1200 milliGRAM(s) Oral every 12 hours  amLODIPine   Tablet 10 milliGRAM(s) Oral daily  aspirin  chewable 81 milliGRAM(s) Oral daily  atorvastatin 40 milliGRAM(s) Oral at bedtime  chlorhexidine 2% Cloths 1 Application(s) Topical daily  clopidogrel Tablet 75 milliGRAM(s) Oral daily  dextrose 5%. 1000 milliLiter(s) (100 mL/Hr) IV Continuous <Continuous>  dextrose 5%. 1000 milliLiter(s) (50 mL/Hr) IV Continuous <Continuous>  dextrose 50% Injectable 25 Gram(s) IV Push once  dextrose 50% Injectable 12.5 Gram(s) IV Push once  dextrose 50% Injectable 25 Gram(s) IV Push once  gabapentin 100 milliGRAM(s) Oral three times a day  glucagon  Injectable 1 milliGRAM(s) IntraMuscular once  insulin lispro (ADMELOG) corrective regimen sliding scale   SubCutaneous three times a day before meals  insulin lispro (ADMELOG) corrective regimen sliding scale   SubCutaneous at bedtime  isosorbide   mononitrate ER Tablet (IMDUR) 60 milliGRAM(s) Oral daily  memantine 5 milliGRAM(s) Oral two times a day  metoprolol succinate  milliGRAM(s) Oral daily  multivitamin 1 Tablet(s) Oral daily  pantoprazole    Tablet 40 milliGRAM(s) Oral before breakfast  polyethylene glycol 3350 17 Gram(s) Oral at bedtime  senna 2 Tablet(s) Oral at bedtime  sodium chloride 0.9%. 1000 milliLiter(s) (50 mL/Hr) IV Continuous <Continuous>  sodium chloride 0.9%. 1000 milliLiter(s) (50 mL/Hr) IV Continuous <Continuous>      Exam-  General: NAD, WDWN, appropriate affect  Cardiac: s1s2, RRR, III/VI diastolic murmur LUSB  Pulmonary: CTA b/l, no w/r/r  Gastrointestinal: soft abdomen, nontender, nondistended, + bowel sounds throughout  Extremities: no edema, 2+ DP pulses  Neuro: A&Ox3, nonfocal  Tele: SR, 1', 60-70                          11.9   8.24  )-----------( 182      ( 19 Jun 2023 09:14 )             35.6   06-19    138  |  102  |  27<H>  ----------------------------<  242<H>  3.6   |  23  |  1.59<H>    Ca    9.0      19 Jun 2023 06:30      I&O's Summary    18 Jun 2023 07:01  -  19 Jun 2023 07:00  --------------------------------------------------------  IN: 400 mL / OUT: 850 mL / NET: -450 mL              Assessment/Plan:  Mr Madison is a 78 y/o male with PMHx significant for aortic valve replacement, vertigo, HLD, BPH, DM, HTN presenting with dyspnea and dizziness.  - Ria TAVR evaluation in progress  - AVR is from 8/7/2009 and is a 25mm model 2700TFX  - plan for cardiac CTA today and [facility] possibly later this week  - appreciate renal input    FERMIN Samuels  [phone]

## 2023-06-20 LAB
ANION GAP SERPL CALC-SCNC: 14 MMOL/L — SIGNIFICANT CHANGE UP (ref 5–17)
BUN SERPL-MCNC: 32 MG/DL — HIGH (ref 7–23)
CALCIUM SERPL-MCNC: 9 MG/DL — SIGNIFICANT CHANGE UP (ref 8.4–10.5)
CHLORIDE SERPL-SCNC: 101 MMOL/L — SIGNIFICANT CHANGE UP (ref 96–108)
CO2 SERPL-SCNC: 22 MMOL/L — SIGNIFICANT CHANGE UP (ref 22–31)
CREAT SERPL-MCNC: 1.7 MG/DL — HIGH (ref 0.5–1.3)
EGFR: 41 ML/MIN/1.73M2 — LOW
GLUCOSE BLDC GLUCOMTR-MCNC: 237 MG/DL — HIGH (ref 70–99)
GLUCOSE BLDC GLUCOMTR-MCNC: 249 MG/DL — HIGH (ref 70–99)
GLUCOSE BLDC GLUCOMTR-MCNC: 257 MG/DL — HIGH (ref 70–99)
GLUCOSE BLDC GLUCOMTR-MCNC: 275 MG/DL — HIGH (ref 70–99)
GLUCOSE SERPL-MCNC: 294 MG/DL — HIGH (ref 70–99)
POTASSIUM SERPL-MCNC: 3.6 MMOL/L — SIGNIFICANT CHANGE UP (ref 3.5–5.3)
POTASSIUM SERPL-SCNC: 3.6 MMOL/L — SIGNIFICANT CHANGE UP (ref 3.5–5.3)
SODIUM SERPL-SCNC: 137 MMOL/L — SIGNIFICANT CHANGE UP (ref 135–145)

## 2023-06-20 PROCEDURE — 99233 SBSQ HOSP IP/OBS HIGH 50: CPT

## 2023-06-20 PROCEDURE — 94010 BREATHING CAPACITY TEST: CPT | Mod: 26

## 2023-06-20 RX ORDER — ACETAMINOPHEN 500 MG
1000 TABLET ORAL ONCE
Refills: 0 | Status: DISCONTINUED | OUTPATIENT
Start: 2023-06-20 | End: 2023-06-25

## 2023-06-20 RX ADMIN — Medication 100 MILLIGRAM(S): at 05:26

## 2023-06-20 RX ADMIN — Medication 1: at 21:42

## 2023-06-20 RX ADMIN — GABAPENTIN 100 MILLIGRAM(S): 400 CAPSULE ORAL at 21:41

## 2023-06-20 RX ADMIN — SENNA PLUS 2 TABLET(S): 8.6 TABLET ORAL at 21:41

## 2023-06-20 RX ADMIN — GABAPENTIN 100 MILLIGRAM(S): 400 CAPSULE ORAL at 16:10

## 2023-06-20 RX ADMIN — MEMANTINE HYDROCHLORIDE 5 MILLIGRAM(S): 10 TABLET ORAL at 17:15

## 2023-06-20 RX ADMIN — Medication 6: at 07:51

## 2023-06-20 RX ADMIN — Medication 12.5 MILLIGRAM(S): at 21:44

## 2023-06-20 RX ADMIN — ATORVASTATIN CALCIUM 40 MILLIGRAM(S): 80 TABLET, FILM COATED ORAL at 21:41

## 2023-06-20 RX ADMIN — MEMANTINE HYDROCHLORIDE 5 MILLIGRAM(S): 10 TABLET ORAL at 05:27

## 2023-06-20 RX ADMIN — INSULIN GLARGINE 15 UNIT(S): 100 INJECTION, SOLUTION SUBCUTANEOUS at 21:42

## 2023-06-20 RX ADMIN — Medication 4: at 11:50

## 2023-06-20 RX ADMIN — PANTOPRAZOLE SODIUM 40 MILLIGRAM(S): 20 TABLET, DELAYED RELEASE ORAL at 05:26

## 2023-06-20 RX ADMIN — AMLODIPINE BESYLATE 10 MILLIGRAM(S): 2.5 TABLET ORAL at 05:27

## 2023-06-20 RX ADMIN — ISOSORBIDE MONONITRATE 60 MILLIGRAM(S): 60 TABLET, EXTENDED RELEASE ORAL at 11:50

## 2023-06-20 RX ADMIN — Medication 1 TABLET(S): at 11:59

## 2023-06-20 RX ADMIN — Medication 650 MILLIGRAM(S): at 18:14

## 2023-06-20 RX ADMIN — CLOPIDOGREL BISULFATE 75 MILLIGRAM(S): 75 TABLET, FILM COATED ORAL at 11:50

## 2023-06-20 RX ADMIN — CHLORHEXIDINE GLUCONATE 1 APPLICATION(S): 213 SOLUTION TOPICAL at 11:50

## 2023-06-20 RX ADMIN — Medication 4: at 17:15

## 2023-06-20 RX ADMIN — GABAPENTIN 100 MILLIGRAM(S): 400 CAPSULE ORAL at 05:27

## 2023-06-20 RX ADMIN — Medication 81 MILLIGRAM(S): at 11:50

## 2023-06-20 RX ADMIN — Medication 650 MILLIGRAM(S): at 17:14

## 2023-06-20 NOTE — PROGRESS NOTE ADULT - SUBJECTIVE AND OBJECTIVE BOX
C A R D I O L O G Y  **********************************    DATE OF SERVICE: 06-20-23    Patient denies chest pain or shortness of breath.   Review of symptoms otherwise negative.    MEDICATIONS:  acetaminophen     Tablet .. 650 milliGRAM(s) Oral every 6 hours PRN  amLODIPine   Tablet 10 milliGRAM(s) Oral daily  aspirin  chewable 81 milliGRAM(s) Oral daily  atorvastatin 40 milliGRAM(s) Oral at bedtime  benzocaine/menthol Lozenge 1 Lozenge Oral four times a day PRN  chlorhexidine 2% Cloths 1 Application(s) Topical daily  clopidogrel Tablet 75 milliGRAM(s) Oral daily  dextrose 5%. 1000 milliLiter(s) IV Continuous <Continuous>  dextrose 5%. 1000 milliLiter(s) IV Continuous <Continuous>  dextrose 50% Injectable 25 Gram(s) IV Push once  dextrose 50% Injectable 12.5 Gram(s) IV Push once  dextrose 50% Injectable 25 Gram(s) IV Push once  dextrose Oral Gel 15 Gram(s) Oral once PRN  gabapentin 100 milliGRAM(s) Oral three times a day  glucagon  Injectable 1 milliGRAM(s) IntraMuscular once  insulin glargine Injectable (LANTUS) 15 Unit(s) SubCutaneous at bedtime  insulin lispro (ADMELOG) corrective regimen sliding scale   SubCutaneous three times a day before meals  insulin lispro (ADMELOG) corrective regimen sliding scale   SubCutaneous at bedtime  isosorbide   mononitrate ER Tablet (IMDUR) 60 milliGRAM(s) Oral daily  meclizine 12.5 milliGRAM(s) Oral every 8 hours PRN  memantine 5 milliGRAM(s) Oral two times a day  metoprolol succinate  milliGRAM(s) Oral daily  multivitamin 1 Tablet(s) Oral daily  pantoprazole    Tablet 40 milliGRAM(s) Oral before breakfast  polyethylene glycol 3350 17 Gram(s) Oral at bedtime  senna 2 Tablet(s) Oral at bedtime  sodium chloride 0.9%. 1000 milliLiter(s) IV Continuous <Continuous>      LABS:                        11.9   8.24  )-----------( 182      ( 19 Jun 2023 09:14 )             35.6       Hemoglobin: 11.9 g/dL (06-19 @ 09:14)  Hemoglobin: 12.9 g/dL (06-17 @ 07:22)      06-20    137  |  101  |  32<H>  ----------------------------<  294<H>  3.6   |  22  |  1.70<H>    Ca    9.0      20 Jun 2023 07:10      Creatinine Trend: 1.70<--, 1.59<--, 1.59<--, 1.56<--, 1.41<--, 1.77<--    COAGS:           PHYSICAL EXAM:  T(C): 36.8 (06-20-23 @ 11:26), Max: 36.9 (06-20-23 @ 04:26)  HR: 70 (06-20-23 @ 12:00) (59 - 74)  BP: 150/72 (06-20-23 @ 12:00) (122/70 - 159/71)  RR: 18 (06-20-23 @ 12:00) (18 - 18)  SpO2: 98% (06-20-23 @ 12:00) (96% - 98%)  Wt(kg): --    I&O's Summary    19 Jun 2023 07:01  -  20 Jun 2023 07:00  --------------------------------------------------------  IN: 1400 mL / OUT: 1076 mL / NET: 324 mL    20 Jun 2023 07:01  -  20 Jun 2023 14:15  --------------------------------------------------------  IN: 480 mL / OUT: 0 mL / NET: 480 mL          Gen: NAD  HEENT:  (-)icterus (-)pallor  CV: N S1 S2 1/6 MORENA (+)2 Pulses B/l  Resp:  Clear to auscultation B/L, normal effort  GI: (+) BS Soft, NT, ND  Lymph:  (-)Edema, (-)obvious lymphadenopathy  Skin: Warm to touch, Normal turgor  Psych: Appropriate mood and affect      TELEMETRY: SR 60-80    < from: TTE Limited W or WO Ultrasound Enhancing Agent (06.12.23 @ 10:51) >  CONCLUSIONS:      1. Technically difficult image quality.   2. There is increasedLV mass and concentric hypertrophy.   3. Mild left ventricular hypertrophy.   4. The left ventricular systolic function is normal with an ejection fraction of 68 % by Roach's method of disks. There are no regional wall motion abnormalities seen.   5. Normal right ventricular cavity size, normal wall thickness and normal systolic function. The tricuspid annular plane systolic excursion (TAPSE) is 1.9 cm (normal >=1.7 cm).   6. A bioprosthetic valve replacement present in the aortic position. Aortic valve is not well visualized. Calcualted EOA and dimensionless index are consistent with moderate prosthetic valve stenosis. Consider additional imaging of the aortic valve if clinically indicated.   7. Mild aortic regurgitation.   8. Aortic regurgitation appears intravalvular.   9. Trace mitral regurgitation.  10. Compared to the transesophageal echocardiogram performed on 7/12/2017 aortic valve gradients are higher on the current study. Peak/mean aortic valve gradients were 21/13 mmHg on theprior study.    < end of copied text >    < from: MR Head w/wo IV Cont (06.10.23 @ 19:03) >  IMPRESSION:    1. Ischemic white matter disease and atrophy typical for age    2. Remote petechial hemorrhages    3. No evidence of infarction    --- End of Report ---    < end of copied text >    < from: BALJINDER W or WO Ultrasound Enhancing Agent (06.14.23 @ 15:06) >  CONCLUSIONS:      1. The left ventricular systolic function is normal with an ejection fraction visually estimated at 55 to 60 %.   2. Normal right ventricular cavity size, normal wall thickness and normal systolic function.   3. A bioprosthetic valve replacement present in the aortic position. Severe intravalvular regurgitation originating centrally and is eccentrically directed anteriorly. Bioprosthetic leaflets are suboptimally visualized.   4. Mild mitral regurgitation.   5. 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   6. No evidence of left atrial or left atrial appendage thrombus. The left atrial appendageemptying velocity is normal at 61 cm/s.   7. Mildly dilated proximal ascending aorta, measuring 4.10 cm (indexed 1.93 cm/m²).   8. Compared to the transthoracic echocardiogram performed on 6/12/2023 more significant aortic regurgitation is noted on today's study.    < end of copied text >      ASSESSMENT/PLAN: Pt is a 77-year-old male with a past medical history of hypertension, diabetes, hyperlipidemia, bioprosthetic aortic valve replacement in 2009 by Dr. Ted Piña status post recent lower extremity angiogram status post angioplasty of right anterior tibial artery who presents from the office due to unsteadiness of his feet and room spinning sensation.    #Dizziness  - suspect vertigo  - neuro eval appreciated - MRI with no evidence of infarction  - orthostatics negative  - tele stable with no events    #Bio-AVR prosthetic valve stenosis  - recent office echo with elevated gradients across bioprosthetic aortic valve, inpatient TTE confirmed elevated gradients, moderate prosthetic valve stenosis  - BALJINDER now showing severe intravalvular regurgitation of his bio-AVR  - Structural heart consult appreciated - f/u recs regarding Ria TAVR eval. Follow up CTA results. Will need preop LHC this week when cr allows and cleared by renal    #HTN  - Continue Amlodipine 10mg daily, Imdur 60mg daily, and Toprol XL 100mg daily. Hold HCTZ and Valsartan given SAMARIA. Can restart Hydralazine if becomes hypertensive    #HLD  - Continue Lipitor    #PAD  - Continue ASA/Plavix and Lipitor    #SAMARIA  - Suspect contrast induced now improved s/p IVF  - Hold HCTZ and Valsartan  - Renal follow up    - Patient has f/u with Dr. Plummer on 7/7 at 11:30 AM     Howard Victor PA-C  Pager: [phone]

## 2023-06-20 NOTE — PROGRESS NOTE ADULT - SUBJECTIVE AND OBJECTIVE BOX
Subjective   No acute events reported overnight.  The patient denies any chest pain/tightness/discomfort, fevers, chills, sweats, light sensation, dizziness or palpitations.  He notes no significant heart or pulmonary complaints upon minimal exertion.    Telemetry   Telemetry sinus rhythm with rates in the 60s to 80s    Review of systems  14 point review of systems is otherwise unremarkable except what is described above in history of present illness    Physical examination  No apparent distress, alert and oriented x3  JVD is not elevated, supple, no lymphadenopathy  Clear to auscultation bilaterally with no wheezing rhonchi crackles  Regular rate and rhythm with 2 out of 6 diastolic murmur heard loudest at the left upper sternal border   Positive bowel sound, soft, nontender, nondistended, no masses guarding rebound signs  No clubbing cyanosis or edema  Moving all extremities spontaneously  Right groin site mild ecchymosis with no hematoma/bruit  1+ DP/PT pulses  No focal deficits    Assessment/plan  77-year-old male with a past medical history significant for    Hypertension  Severe aortic valve insufficiency status post aortic valve replacement in 2009  Diabetes mellitus type 2  Hyperlipidemia  Peripheral arterial disease status post angioplasty of right anterior tibial artery  Chronic kidney disease  Cholecystectomy  Surgery of left ear  Chronic mastoiditis  BPH    Who presents with shortness of breath, unsteadiness on his feet and room spinning sensation    Discussion was had with the patient, his wife and granddaughter/Bethanie concerning his clinical presentation and findings on imaging studies.  The patient had undergone surgical aortic valve replacement at [facility] in 2009 during which time a 25 mm bioprosthetic valve was placed.  At that time the procedure was performed for severe aortic valve insufficiency.  The patient is now found to have similar symptoms of dyspnea and dizziness which she experienced in 2009.  BALJINDER on 6/14/2023 demonstrated EF 55 to 60% with normal right ventricular cavity size/thickness/systolic function.  Severe intravalvular regurgitation seen centrally and eccentrically in bioprosthetic aortic valve.  There is mild mitral valve regurgitation.  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  Mildly dilated proximal ascending aorta measuring 4.1 cm.    Explained to the patient and family what is severe aortic valve insufficiency of a degenerative aortic valve.  The associated signs and symptoms were reviewed.  The natural pathophysiology of left untreated were gone over.      The various treatment options were gone over including redo surgery, medical management and TAVR valve in valve.  The pros/cons and the risk/benefits of the various treatment options were gone over.  Due to the patient's age and comorbidities he is felt to be appropriate candidate to undergo work-up for TAVR valve in valve.      The necessary work-up prior to the TAVR valve and valve was gone over including carotid Dopplers (CTA of the neck was previously performed on 6/9/2023 that demonstrated no evidence of aneurysm, stenosis or vessel occlusion of the carotid or vertebral arteries), PFTs, coronary angiography and heart CTA.  Carotid ultrasound on 6/19/2023 demonstrates no significant hemodynamic stenosis of either carotid artery.    Patient underwent heart CTA on 6/19/2023.  Results are pending.    Plan for patient to undergo coronary angiography later this week once his kidney function has stabilized.  Would appreciate nephrology team to assist in medical optimization.  The patient and his family would like the procedure to be done as soon as possible.    Indications and details for the TAVR valve in valve were reviewed.  Benefits and risks were gone over.  Risks include but are not limited to infection, bleeding, arrhythmia, TIA/stroke, hemodynamic instability, vascular injury, need for urgent surgery and death.    Continue aspirin 81 mg daily and clopidogrel 75 mg daily.  Closely monitor for signs and symptoms of bleeding.    Continue metoprolol succinate 100 mg daily, isosorbide mononitrate 60 mg daily and amlodipine 10 mg daily.    Continue atorvastatin 40 mg daily.    All questions concerns of the patient and his family/Bethanie who was called at the time of his examination were addressed.    Findings and plan were discussed with the cardiology and medicine teams.    Time-based billing (NON-critical care).   35 minutes spent on total encounter; more than 50% of the visit was spent counseling and / or coordinating care by the attending physician.  The necessity of the time spent during the encounter on this date of service was due to: education, assessment and coordination of care.

## 2023-06-20 NOTE — PROGRESS NOTE ADULT - SUBJECTIVE AND OBJECTIVE BOX
[facility] - MIRIAM Beltran / MIRIAM Corbett / MICHELLE Martínez/ MIRIAM Hess/ MIRIAM Ogden/ SHELIA Peck / BRIAN Ordonez / LAMAR Solis  ---------------------------------------------------------------------------------------------------------------  seen and examined today for CKD  Interval : NAD  VITALS:  T(F): 98.2 (06-20-23 @ 11:26), Max: 98.4 (06-20-23 @ 04:26)  HR: 70 (06-20-23 @ 12:00)  BP: 150/72 (06-20-23 @ 12:00)  RR: 18 (06-20-23 @ 12:00)  SpO2: 98% (06-20-23 @ 12:00)  Wt(kg): --    06-19 @ 07:01  -  06-20 @ 07:00  --------------------------------------------------------  IN: 1400 mL / OUT: 1076 mL / NET: 324 mL    06-20 @ 07:01  -  06-20 @ 14:17  --------------------------------------------------------  IN: 480 mL / OUT: 0 mL / NET: 480 mL      Physical Exam :-  Constitutional: NAD  Neck: Supple.  Respiratory: Bilateral equal breath sounds,  Cardiovascular: S1, S2 normal,  Gastrointestinal: Bowel Sounds present, soft, non tender.  Extremities: No edema  Neurological: Alert and Oriented x 3, no focal deficits  Psychiatric: Normal mood, normal affect  Data:-  Allergies :   No Known Allergies    Hospital Medications:   MEDICATIONS  (STANDING):  amLODIPine   Tablet 10 milliGRAM(s) Oral daily  aspirin  chewable 81 milliGRAM(s) Oral daily  atorvastatin 40 milliGRAM(s) Oral at bedtime  chlorhexidine 2% Cloths 1 Application(s) Topical daily  clopidogrel Tablet 75 milliGRAM(s) Oral daily  dextrose 5%. 1000 milliLiter(s) (50 mL/Hr) IV Continuous <Continuous>  dextrose 5%. 1000 milliLiter(s) (100 mL/Hr) IV Continuous <Continuous>  dextrose 50% Injectable 12.5 Gram(s) IV Push once  dextrose 50% Injectable 25 Gram(s) IV Push once  dextrose 50% Injectable 25 Gram(s) IV Push once  gabapentin 100 milliGRAM(s) Oral three times a day  glucagon  Injectable 1 milliGRAM(s) IntraMuscular once  insulin glargine Injectable (LANTUS) 15 Unit(s) SubCutaneous at bedtime  insulin lispro (ADMELOG) corrective regimen sliding scale   SubCutaneous three times a day before meals  insulin lispro (ADMELOG) corrective regimen sliding scale   SubCutaneous at bedtime  isosorbide   mononitrate ER Tablet (IMDUR) 60 milliGRAM(s) Oral daily  memantine 5 milliGRAM(s) Oral two times a day  metoprolol succinate  milliGRAM(s) Oral daily  multivitamin 1 Tablet(s) Oral daily  pantoprazole    Tablet 40 milliGRAM(s) Oral before breakfast  polyethylene glycol 3350 17 Gram(s) Oral at bedtime  senna 2 Tablet(s) Oral at bedtime  sodium chloride 0.9%. 1000 milliLiter(s) (50 mL/Hr) IV Continuous <Continuous>    06-20    137  |  101  |  32<H>  ----------------------------<  294<H>  3.6   |  22  |  1.70<H>    Ca    9.0      20 Jun 2023 07:10      Creatinine Trend: 1.70 <--, 1.59 <--, 1.59 <--, 1.56 <--, 1.41 <--, 1.77 <--, 2.28 <--                        11.9   8.24  )-----------( 182      ( 19 Jun 2023 09:14 )             35.6

## 2023-06-20 NOTE — PROGRESS NOTE ADULT - ASSESSMENT
a 77-year-old male with a past medical history of hypertension, diabetes, hyperlipidemia, bioprosthetic aortic valvereplacement in 2009 by Dr. Ted Piña status post recent lower extremity angiogram status post angioplasty of right anterior tibial artery who presents from the office due to unsteadiness of his feet and room spinning sensation  Renal following for SAMARIA on CKD Mx.    Samaria on CKD stage 3- improved and stable  Creatinine Trend: 1.59 <--, 1.56 <--, 1.41 <--, 1.77 <--, 2.28 <--, 2.11 <--, 1.55 <--  likely has underlying diabetic and hypertensive nephropathy  likely pre renal superimposed  Renal US- no obstruction; renal calculi- no hydro  Urine lytes--> intravascular depletion--> s/p Iv nacl @ 75cc for 24 hours  S/P CTA of heart with slight rise in serum creatinine noted today. Plan for further contrast this week with left heart cath  Pt is at moderate risk for contrast induced nephropathy. risk explained to pt including the possibility of worsening RFT post cath and if no recovery of RF, may need RRT/dialysis. Pt verbalized understanding.  Prior to left heart cath would recommend:  PAYAL prophylaxis w/ mucomyst 1200mg po x 2 doses pre and post contrast q12h  Also would give Iv nacl @ 75cc/hr for 6 hours shamar contrast if no s/o CHF  daily BMP  dose all meds for eGFR  avoid ACEi/ARB for now/NSAIDs/Nephrotoxics if able.  encourage po intake  will monitor with you      For any question, call:  Cell # [phone]  Pager # [phone]  Callback # [phone]

## 2023-06-20 NOTE — PROGRESS NOTE ADULT - SUBJECTIVE AND OBJECTIVE BOX
Patient is a 77y old  Male who presents with a chief complaint of Vertigo today (10 Gelacio 2023 09:57)      SUBJECTIVE / OVERNIGHT EVENTS: no events    T(C): 36.9 (06-20-23 @ 20:24), Max: 36.9 (06-20-23 @ 20:24)  HR: 75 (06-20-23 @ 20:24) (59 - 75)  BP: 138/67 (06-20-23 @ 20:24) (138/67 - 150/72)  RR: 18 (06-20-23 @ 20:24) (18 - 18)  SpO2: 99% (06-20-23 @ 20:24) (98% - 99%)      MEDICATIONS  (STANDING):  acetaminophen   IVPB .. 1000 milliGRAM(s) IV Intermittent once  amLODIPine   Tablet 10 milliGRAM(s) Oral daily  aspirin  chewable 81 milliGRAM(s) Oral daily  atorvastatin 40 milliGRAM(s) Oral at bedtime  chlorhexidine 2% Cloths 1 Application(s) Topical daily  clopidogrel Tablet 75 milliGRAM(s) Oral daily  dextrose 5%. 1000 milliLiter(s) (50 mL/Hr) IV Continuous <Continuous>  dextrose 5%. 1000 milliLiter(s) (100 mL/Hr) IV Continuous <Continuous>  dextrose 50% Injectable 12.5 Gram(s) IV Push once  dextrose 50% Injectable 25 Gram(s) IV Push once  dextrose 50% Injectable 25 Gram(s) IV Push once  gabapentin 100 milliGRAM(s) Oral three times a day  glucagon  Injectable 1 milliGRAM(s) IntraMuscular once  insulin glargine Injectable (LANTUS) 15 Unit(s) SubCutaneous at bedtime  insulin lispro (ADMELOG) corrective regimen sliding scale   SubCutaneous three times a day before meals  insulin lispro (ADMELOG) corrective regimen sliding scale   SubCutaneous at bedtime  isosorbide   mononitrate ER Tablet (IMDUR) 60 milliGRAM(s) Oral daily  memantine 5 milliGRAM(s) Oral two times a day  metoprolol succinate  milliGRAM(s) Oral daily  multivitamin 1 Tablet(s) Oral daily  pantoprazole    Tablet 40 milliGRAM(s) Oral before breakfast  polyethylene glycol 3350 17 Gram(s) Oral at bedtime  senna 2 Tablet(s) Oral at bedtime  sodium chloride 0.9%. 1000 milliLiter(s) (50 mL/Hr) IV Continuous <Continuous>    MEDICATIONS  (PRN):  acetaminophen     Tablet .. 650 milliGRAM(s) Oral every 6 hours PRN Moderate Pain (4 - 6)  benzocaine/menthol Lozenge 1 Lozenge Oral four times a day PRN Sore Throat  dextrose Oral Gel 15 Gram(s) Oral once PRN Blood Glucose LESS THAN 70 milliGRAM(s)/deciliter  meclizine 12.5 milliGRAM(s) Oral every 8 hours PRN Dizziness    PHYSICAL EXAM:  GENERAL: NAD, well-developed  HEAD:  Atraumatic, Normocephalic  EYES: EOMI, conjunctiva and sclera clear  NECK: Supple, No JVD  CHEST/LUNG: Clear to auscultation bilaterally; No wheeze  HEART: Regular rate and rhythm; No murmurs, rubs, or gallops  ABDOMEN: Soft, Nontender, Nondistended; Bowel sounds present  EXTREMITIES:  2+ Peripheral Pulses, No clubbing, cyanosis, or edema  PSYCH: AAOx3  NEUROLOGY: non-focal  SKIN: No rashes or lesions                                   11.9   8.24  )-----------( 182      ( 19 Jun 2023 09:14 )             35.6               137|101|32<294  3.6|22|1.70  9.0,--,--  06-20 @ 07:10

## 2023-06-20 NOTE — PROGRESS NOTE ADULT - ASSESSMENT
77 y (M with a pmhx significant for aortic valve replacement on aspirin, hx of vertigo, HLD, BPH, DM, HTN p        # Bio AVR Prosthetic valve stenosis   - TTE confirmed elevated gradients, moderate prosthetic valve stenosis  -  s/p  BALJINDER   TAVR work up in progress   BALJINDER showing severe intravalvular regurgitation of his bio-AVR  - Structural heart consult appreciated -    Plan for patient to undergo coronary angiography later this week once his kidney function has stabilized.      #HTN  Continue with Metorpolol, Norvasc   Continue Amlodipine and Imdur.   Hold HCTZ and Valsartan given SAMARIA. Restart Toprol XL at lower dose 100mg daily.    # DM hiss   Follow up A1C     #PAD s/p Angioplasty anterior tibial artery   Plavix

## 2023-06-21 LAB
ANION GAP SERPL CALC-SCNC: 14 MMOL/L — SIGNIFICANT CHANGE UP (ref 5–17)
BUN SERPL-MCNC: 32 MG/DL — HIGH (ref 7–23)
CALCIUM SERPL-MCNC: 9.4 MG/DL — SIGNIFICANT CHANGE UP (ref 8.4–10.5)
CHLORIDE SERPL-SCNC: 103 MMOL/L — SIGNIFICANT CHANGE UP (ref 96–108)
CO2 SERPL-SCNC: 23 MMOL/L — SIGNIFICANT CHANGE UP (ref 22–31)
CREAT SERPL-MCNC: 1.61 MG/DL — HIGH (ref 0.5–1.3)
EGFR: 44 ML/MIN/1.73M2 — LOW
GLUCOSE BLDC GLUCOMTR-MCNC: 163 MG/DL — HIGH (ref 70–99)
GLUCOSE BLDC GLUCOMTR-MCNC: 204 MG/DL — HIGH (ref 70–99)
GLUCOSE BLDC GLUCOMTR-MCNC: 205 MG/DL — HIGH (ref 70–99)
GLUCOSE BLDC GLUCOMTR-MCNC: 253 MG/DL — HIGH (ref 70–99)
GLUCOSE SERPL-MCNC: 172 MG/DL — HIGH (ref 70–99)
POTASSIUM SERPL-MCNC: 3.3 MMOL/L — LOW (ref 3.5–5.3)
POTASSIUM SERPL-SCNC: 3.3 MMOL/L — LOW (ref 3.5–5.3)
SODIUM SERPL-SCNC: 140 MMOL/L — SIGNIFICANT CHANGE UP (ref 135–145)

## 2023-06-21 RX ORDER — POTASSIUM CHLORIDE 20 MEQ
20 PACKET (EA) ORAL ONCE
Refills: 0 | Status: COMPLETED | OUTPATIENT
Start: 2023-06-21 | End: 2023-06-21

## 2023-06-21 RX ORDER — SODIUM CHLORIDE 9 MG/ML
1000 INJECTION INTRAMUSCULAR; INTRAVENOUS; SUBCUTANEOUS
Refills: 0 | Status: DISCONTINUED | OUTPATIENT
Start: 2023-06-22 | End: 2023-06-24

## 2023-06-21 RX ORDER — ACETYLCYSTEINE 200 MG/ML
1200 VIAL (ML) MISCELLANEOUS EVERY 12 HOURS
Refills: 0 | Status: COMPLETED | OUTPATIENT
Start: 2023-06-21 | End: 2023-06-23

## 2023-06-21 RX ADMIN — GABAPENTIN 100 MILLIGRAM(S): 400 CAPSULE ORAL at 13:59

## 2023-06-21 RX ADMIN — AMLODIPINE BESYLATE 10 MILLIGRAM(S): 2.5 TABLET ORAL at 05:38

## 2023-06-21 RX ADMIN — BENZOCAINE AND MENTHOL 1 LOZENGE: 5; 1 LIQUID ORAL at 05:46

## 2023-06-21 RX ADMIN — GABAPENTIN 100 MILLIGRAM(S): 400 CAPSULE ORAL at 05:38

## 2023-06-21 RX ADMIN — MEMANTINE HYDROCHLORIDE 5 MILLIGRAM(S): 10 TABLET ORAL at 17:20

## 2023-06-21 RX ADMIN — ATORVASTATIN CALCIUM 40 MILLIGRAM(S): 80 TABLET, FILM COATED ORAL at 21:13

## 2023-06-21 RX ADMIN — PANTOPRAZOLE SODIUM 40 MILLIGRAM(S): 20 TABLET, DELAYED RELEASE ORAL at 05:38

## 2023-06-21 RX ADMIN — CLOPIDOGREL BISULFATE 75 MILLIGRAM(S): 75 TABLET, FILM COATED ORAL at 11:51

## 2023-06-21 RX ADMIN — Medication 100 MILLIGRAM(S): at 05:38

## 2023-06-21 RX ADMIN — Medication 81 MILLIGRAM(S): at 11:48

## 2023-06-21 RX ADMIN — INSULIN GLARGINE 15 UNIT(S): 100 INJECTION, SOLUTION SUBCUTANEOUS at 21:13

## 2023-06-21 RX ADMIN — Medication 1 TABLET(S): at 11:48

## 2023-06-21 RX ADMIN — Medication 20 MILLIEQUIVALENT(S): at 09:16

## 2023-06-21 RX ADMIN — Medication 12.5 MILLIGRAM(S): at 22:40

## 2023-06-21 RX ADMIN — ISOSORBIDE MONONITRATE 60 MILLIGRAM(S): 60 TABLET, EXTENDED RELEASE ORAL at 11:51

## 2023-06-21 RX ADMIN — SENNA PLUS 2 TABLET(S): 8.6 TABLET ORAL at 21:13

## 2023-06-21 RX ADMIN — Medication 1200 MILLIGRAM(S): at 17:20

## 2023-06-21 RX ADMIN — MEMANTINE HYDROCHLORIDE 5 MILLIGRAM(S): 10 TABLET ORAL at 05:38

## 2023-06-21 RX ADMIN — Medication 6: at 07:50

## 2023-06-21 RX ADMIN — Medication 4: at 11:47

## 2023-06-21 RX ADMIN — CHLORHEXIDINE GLUCONATE 1 APPLICATION(S): 213 SOLUTION TOPICAL at 11:56

## 2023-06-21 RX ADMIN — GABAPENTIN 100 MILLIGRAM(S): 400 CAPSULE ORAL at 21:14

## 2023-06-21 RX ADMIN — Medication 12.5 MILLIGRAM(S): at 07:52

## 2023-06-21 RX ADMIN — Medication 2: at 17:20

## 2023-06-21 NOTE — PROGRESS NOTE ADULT - SUBJECTIVE AND OBJECTIVE BOX
Patient is a 77y old  Male who presents with a chief complaint of Vertigo today (10 Gelacio 2023 09:57)      SUBJECTIVE / OVERNIGHT EVENTS: no events    T(C): 36.6 (06-21-23 @ 20:15), Max: 36.6 (06-21-23 @ 11:24)  HR: 73 (06-21-23 @ 20:15) (68 - 73)  BP: 135/64 (06-21-23 @ 20:15) (116/61 - 135/64)  RR: 18 (06-21-23 @ 20:15) (18 - 18)  SpO2: 97% (06-21-23 @ 20:15) (97% - 97%)      MEDICATIONS  (STANDING):  acetaminophen   IVPB .. 1000 milliGRAM(s) IV Intermittent once  acetylcysteine  Oral Solution 1200 milliGRAM(s) Oral every 12 hours  amLODIPine   Tablet 10 milliGRAM(s) Oral daily  aspirin  chewable 81 milliGRAM(s) Oral daily  atorvastatin 40 milliGRAM(s) Oral at bedtime  chlorhexidine 2% Cloths 1 Application(s) Topical daily  clopidogrel Tablet 75 milliGRAM(s) Oral daily  dextrose 5%. 1000 milliLiter(s) (50 mL/Hr) IV Continuous <Continuous>  dextrose 5%. 1000 milliLiter(s) (100 mL/Hr) IV Continuous <Continuous>  dextrose 50% Injectable 12.5 Gram(s) IV Push once  dextrose 50% Injectable 25 Gram(s) IV Push once  dextrose 50% Injectable 25 Gram(s) IV Push once  gabapentin 100 milliGRAM(s) Oral three times a day  glucagon  Injectable 1 milliGRAM(s) IntraMuscular once  insulin glargine Injectable (LANTUS) 15 Unit(s) SubCutaneous at bedtime  insulin lispro (ADMELOG) corrective regimen sliding scale   SubCutaneous at bedtime  insulin lispro (ADMELOG) corrective regimen sliding scale   SubCutaneous three times a day before meals  isosorbide   mononitrate ER Tablet (IMDUR) 60 milliGRAM(s) Oral daily  memantine 5 milliGRAM(s) Oral two times a day  metoprolol succinate  milliGRAM(s) Oral daily  multivitamin 1 Tablet(s) Oral daily  pantoprazole    Tablet 40 milliGRAM(s) Oral before breakfast  polyethylene glycol 3350 17 Gram(s) Oral at bedtime  senna 2 Tablet(s) Oral at bedtime  sodium chloride 0.9%. 1000 milliLiter(s) (50 mL/Hr) IV Continuous <Continuous>    MEDICATIONS  (PRN):  acetaminophen     Tablet .. 650 milliGRAM(s) Oral every 6 hours PRN Moderate Pain (4 - 6)  benzocaine/menthol Lozenge 1 Lozenge Oral four times a day PRN Sore Throat  dextrose Oral Gel 15 Gram(s) Oral once PRN Blood Glucose LESS THAN 70 milliGRAM(s)/deciliter  meclizine 12.5 milliGRAM(s) Oral every 8 hours PRN Dizziness    PHYSICAL EXAM:  GENERAL: NAD, well-developed  HEAD:  Atraumatic, Normocephalic  EYES: EOMI, conjunctiva and sclera clear  NECK: Supple, No JVD  CHEST/LUNG: Clear to auscultation bilaterally; No wheeze  HEART: Regular rate and rhythm; No murmurs, rubs, or gallops  ABDOMEN: Soft, Nontender, Nondistended; Bowel sounds present  EXTREMITIES:  2+ Peripheral Pulses, No clubbing, cyanosis, or edema  PSYCH: AAOx3  NEUROLOGY: non-focal  SKIN: No rashes or lesions                                       140|103|32<172  3.3|23|1.61  9.4,--,--  06-21 @ 07:08        137|101|32<294  3.6|22|1.70  9.0,--,--  06-20 @ 07:10

## 2023-06-21 NOTE — PROGRESS NOTE ADULT - SUBJECTIVE AND OBJECTIVE BOX
C A R D I O L O G Y  **********************************    DATE OF SERVICE: 06-21-23    Patient denies chest pain or shortness of breath.   Review of symptoms otherwise negative.    MEDICATIONS:  acetaminophen     Tablet .. 650 milliGRAM(s) Oral every 6 hours PRN  acetaminophen   IVPB .. 1000 milliGRAM(s) IV Intermittent once  amLODIPine   Tablet 10 milliGRAM(s) Oral daily  aspirin  chewable 81 milliGRAM(s) Oral daily  atorvastatin 40 milliGRAM(s) Oral at bedtime  benzocaine/menthol Lozenge 1 Lozenge Oral four times a day PRN  chlorhexidine 2% Cloths 1 Application(s) Topical daily  clopidogrel Tablet 75 milliGRAM(s) Oral daily  dextrose 5%. 1000 milliLiter(s) IV Continuous <Continuous>  dextrose 5%. 1000 milliLiter(s) IV Continuous <Continuous>  dextrose 50% Injectable 12.5 Gram(s) IV Push once  dextrose 50% Injectable 25 Gram(s) IV Push once  dextrose 50% Injectable 25 Gram(s) IV Push once  dextrose Oral Gel 15 Gram(s) Oral once PRN  gabapentin 100 milliGRAM(s) Oral three times a day  glucagon  Injectable 1 milliGRAM(s) IntraMuscular once  insulin glargine Injectable (LANTUS) 15 Unit(s) SubCutaneous at bedtime  insulin lispro (ADMELOG) corrective regimen sliding scale   SubCutaneous at bedtime  insulin lispro (ADMELOG) corrective regimen sliding scale   SubCutaneous three times a day before meals  isosorbide   mononitrate ER Tablet (IMDUR) 60 milliGRAM(s) Oral daily  meclizine 12.5 milliGRAM(s) Oral every 8 hours PRN  memantine 5 milliGRAM(s) Oral two times a day  metoprolol succinate  milliGRAM(s) Oral daily  multivitamin 1 Tablet(s) Oral daily  pantoprazole    Tablet 40 milliGRAM(s) Oral before breakfast  polyethylene glycol 3350 17 Gram(s) Oral at bedtime  senna 2 Tablet(s) Oral at bedtime  sodium chloride 0.9%. 1000 milliLiter(s) IV Continuous <Continuous>      LABS:      Hemoglobin: 11.9 g/dL (06-19 @ 09:14)  Hemoglobin: 12.9 g/dL (06-17 @ 07:22)      06-21    140  |  103  |  32<H>  ----------------------------<  172<H>  3.3<L>   |  23  |  1.61<H>    Ca    9.4      21 Jun 2023 07:08      Creatinine Trend: 1.61<--, 1.70<--, 1.59<--, 1.59<--, 1.56<--, 1.41<--    COAGS:           PHYSICAL EXAM:  T(C): 36.6 (06-21-23 @ 11:24), Max: 36.9 (06-20-23 @ 20:24)  HR: 70 (06-21-23 @ 11:24) (70 - 80)  BP: 128/72 (06-21-23 @ 11:24) (128/72 - 138/69)  RR: 18 (06-21-23 @ 11:24) (18 - 18)  SpO2: 97% (06-21-23 @ 11:24) (97% - 99%)  Wt(kg): --    I&O's Summary    20 Jun 2023 07:01  -  21 Jun 2023 07:00  --------------------------------------------------------  IN: 480 mL / OUT: 0 mL / NET: 480 mL    21 Jun 2023 07:01  -  21 Jun 2023 14:13  --------------------------------------------------------  IN: 240 mL / OUT: 0 mL / NET: 240 mL        Gen: NAD  HEENT:  (-)icterus (-)pallor  CV: N S1 S2 1/6 MORENA (+)2 Pulses B/l  Resp:  Clear to auscultation B/L, normal effort  GI: (+) BS Soft, NT, ND  Lymph:  (-)Edema, (-)obvious lymphadenopathy  Skin: Warm to touch, Normal turgor  Psych: Appropriate mood and affect      TELEMETRY: SR 70-80    < from: TTE Limited W or WO Ultrasound Enhancing Agent (06.12.23 @ 10:51) >  CONCLUSIONS:      1. Technically difficult image quality.   2. There is increasedLV mass and concentric hypertrophy.   3. Mild left ventricular hypertrophy.   4. The left ventricular systolic function is normal with an ejection fraction of 68 % by Roach's method of disks. There are no regional wall motion abnormalities seen.   5. Normal right ventricular cavity size, normal wall thickness and normal systolic function. The tricuspid annular plane systolic excursion (TAPSE) is 1.9 cm (normal >=1.7 cm).   6. A bioprosthetic valve replacement present in the aortic position. Aortic valve is not well visualized. Calcualted EOA and dimensionless index are consistent with moderate prosthetic valve stenosis. Consider additional imaging of the aortic valve if clinically indicated.   7. Mild aortic regurgitation.   8. Aortic regurgitation appears intravalvular.   9. Trace mitral regurgitation.  10. Compared to the transesophageal echocardiogram performed on 7/12/2017 aortic valve gradients are higher on the current study. Peak/mean aortic valve gradients were 21/13 mmHg on theprior study.    < end of copied text >    < from: MR Head w/wo IV Cont (06.10.23 @ 19:03) >  IMPRESSION:    1. Ischemic white matter disease and atrophy typical for age    2. Remote petechial hemorrhages    3. No evidence of infarction    --- End of Report ---    < end of copied text >    < from: BALJINDER W or WO Ultrasound Enhancing Agent (06.14.23 @ 15:06) >  CONCLUSIONS:      1. The left ventricular systolic function is normal with an ejection fraction visually estimated at 55 to 60 %.   2. Normal right ventricular cavity size, normal wall thickness and normal systolic function.   3. A bioprosthetic valve replacement present in the aortic position. Severe intravalvular regurgitation originating centrally and is eccentrically directed anteriorly. Bioprosthetic leaflets are suboptimally visualized.   4. Mild mitral regurgitation.   5. 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   6. No evidence of left atrial or left atrial appendage thrombus. The left atrial appendageemptying velocity is normal at 61 cm/s.   7. Mildly dilated proximal ascending aorta, measuring 4.10 cm (indexed 1.93 cm/m²).   8. Compared to the transthoracic echocardiogram performed on 6/12/2023 more significant aortic regurgitation is noted on today's study.    < end of copied text >      ASSESSMENT/PLAN: Pt is a 77-year-old male with a past medical history of hypertension, diabetes, hyperlipidemia, bioprosthetic aortic valve replacement in 2009 by Dr. Ted Piña status post recent lower extremity angiogram status post angioplasty of right anterior tibial artery who presents from the office due to unsteadiness of his feet and room spinning sensation.    #Dizziness  - suspect vertigo  - neuro eval appreciated - MRI with no evidence of infarction  - orthostatics negative  - tele stable with no events    #Bio-AVR prosthetic valve stenosis  - recent office echo with elevated gradients across bioprosthetic aortic valve, inpatient TTE confirmed elevated gradients, moderate prosthetic valve stenosis  - BALJINDER now showing severe intravalvular regurgitation of his bio-AVR  - Structural heart consult appreciated - f/u recs regarding Ria TAVR eval. Follow up CTA results. Will need preop LHC - d/w renal, plan for tomorrow if cr remains stable, please follow renal pre cath recs    #HTN  - Continue Amlodipine 10mg daily, Imdur 60mg daily, and Toprol XL 100mg daily. Hold HCTZ and Valsartan given SAMARIA. Can restart Hydralazine if becomes hypertensive    #HLD  - Continue Lipitor    #PAD  - Continue ASA/Plavix and Lipitor    #SAMARIA  - Suspect contrast induced now improved s/p IVF  - Hold HCTZ and Valsartan  - Renal follow up    - Patient has f/u with Dr. Plummer on 7/7 at 11:30 AM     Howard Victor PA-C  Pager: [phone]

## 2023-06-21 NOTE — PROGRESS NOTE ADULT - SUBJECTIVE AND OBJECTIVE BOX
Patient  seen and examined  no complaints    No Known Allergies    Hospital Medications:   MEDICATIONS  (STANDING):  acetaminophen   IVPB .. 1000 milliGRAM(s) IV Intermittent once  amLODIPine   Tablet 10 milliGRAM(s) Oral daily  aspirin  chewable 81 milliGRAM(s) Oral daily  atorvastatin 40 milliGRAM(s) Oral at bedtime  chlorhexidine 2% Cloths 1 Application(s) Topical daily  clopidogrel Tablet 75 milliGRAM(s) Oral daily  dextrose 5%. 1000 milliLiter(s) (50 mL/Hr) IV Continuous <Continuous>  dextrose 5%. 1000 milliLiter(s) (100 mL/Hr) IV Continuous <Continuous>  dextrose 50% Injectable 12.5 Gram(s) IV Push once  dextrose 50% Injectable 25 Gram(s) IV Push once  dextrose 50% Injectable 25 Gram(s) IV Push once  gabapentin 100 milliGRAM(s) Oral three times a day  glucagon  Injectable 1 milliGRAM(s) IntraMuscular once  insulin glargine Injectable (LANTUS) 15 Unit(s) SubCutaneous at bedtime  insulin lispro (ADMELOG) corrective regimen sliding scale   SubCutaneous at bedtime  insulin lispro (ADMELOG) corrective regimen sliding scale   SubCutaneous three times a day before meals  isosorbide   mononitrate ER Tablet (IMDUR) 60 milliGRAM(s) Oral daily  memantine 5 milliGRAM(s) Oral two times a day  metoprolol succinate  milliGRAM(s) Oral daily  multivitamin 1 Tablet(s) Oral daily  pantoprazole    Tablet 40 milliGRAM(s) Oral before breakfast  polyethylene glycol 3350 17 Gram(s) Oral at bedtime  senna 2 Tablet(s) Oral at bedtime  sodium chloride 0.9%. 1000 milliLiter(s) (50 mL/Hr) IV Continuous <Continuous>        VITALS:  T(F): 97.8 (06-21-23 @ 11:24), Max: 98.5 (06-21-23 @ 04:31)  HR: 70 (06-21-23 @ 11:24)  BP: 128/72 (06-21-23 @ 11:24)  RR: 18 (06-21-23 @ 11:24)  SpO2: 97% (06-21-23 @ 11:24)  Wt(kg): --    06-20 @ 07:01  -  06-21 @ 07:00  --------------------------------------------------------  IN: 480 mL / OUT: 0 mL / NET: 480 mL    06-21 @ 07:01  -  06-21 @ 13:22  --------------------------------------------------------  IN: 240 mL / OUT: 0 mL / NET: 240 mL        Physical Exam :-  Constitutional: NAD  Neck: Supple.  Respiratory: Bilateral equal breath sounds,  Cardiovascular: S1, S2 normal,  Gastrointestinal: Bowel Sounds present, soft, non tender.  Extremities: No edema  Neurological: Alert and Oriented x 3, no focal deficits  Psychiatric: Normal mood, normal affect    LABS:  06-21    140  |  103  |  32<H>  ----------------------------<  172<H>  3.3<L>   |  23  |  1.61<H>    Ca    9.4      21 Jun 2023 07:08      Creatinine Trend: 1.61 <--, 1.70 <--, 1.59 <--, 1.59 <--, 1.56 <--, 1.41 <--, 1.77 <--    Urine Studies:  Urinalysis Basic - ( 21 Jun 2023 07:08 )    Color:  / Appearance:  / SG:  / pH:   Gluc: 172 mg/dL / Ketone:   / Bili:  / Urobili:    Blood:  / Protein:  / Nitrite:    Leuk Esterase:  / RBC:  / WBC    Sq Epi:  / Non Sq Epi:  / Bacteria:         RADIOLOGY & ADDITIONAL STUDIES:

## 2023-06-21 NOTE — PROGRESS NOTE ADULT - ASSESSMENT
77 y (M with a pmhx significant for aortic valve replacement on aspirin, hx of vertigo, HLD, BPH, DM, HTN p        # Bio AVR Prosthetic valve stenosis   - TTE confirmed elevated gradients, moderate prosthetic valve stenosis  -  s/p  BALJINDER   TAVR work up in progress   BALJINDER showing severe intravalvular regurgitation of his bio-AVR  - Structural heart consult appreciated -    Plan for patient to undergo coronary angiography am tomorrow   Pre and Post cath optimization             # DM hiss   Follow up A1C     #PAD s/p Angioplasty anterior tibial artery   Plavix

## 2023-06-21 NOTE — PROGRESS NOTE ADULT - ASSESSMENT
a 77-year-old male with a past medical history of hypertension, diabetes, hyperlipidemia, bioprosthetic aortic valvereplacement in 2009 by Dr. Ted Piña status post recent lower extremity angiogram status post angioplasty of right anterior tibial artery who presents from the office due to unsteadiness of his feet and room spinning sensation  Renal following for SAMARIA on CKD Mx.    Samaria on CKD stage 3- improved and stable  Creatinine Trend: 1.59 <--, 1.56 <--, 1.41 <--, 1.77 <--, 2.28 <--, 2.11 <--, 1.55 <--  likely has underlying diabetic and hypertensive nephropathy  likely pre renal superimposed  Renal US- no obstruction; renal calculi- no hydro  Urine lytes--> intravascular depletion--> s/p Iv nacl @ 75cc for 24 hours  S/P CTA of heart --> cr now stable  Pt is at moderate risk for contrast induced nephropathy. risk explained to pt including the possibility of worsening RFT post cath and if no recovery of RF, may need RRT/dialysis. Pt verbalized understanding.  Prior to left heart cath would recommend:  PAYAL prophylaxis w/ mucomyst 1200mg po x 2 doses pre and post contrast q12h  Also would give Iv nacl @ 50cc/hr for 6 hours shamar contrast if no s/o CHF  daily BMP  dose all meds for eGFR  avoid ACEi/ARB for now/NSAIDs/Nephrotoxics if able.  encourage po intake  will monitor with you      Dr Hess  [phone]

## 2023-06-22 LAB
ANION GAP SERPL CALC-SCNC: 14 MMOL/L — SIGNIFICANT CHANGE UP (ref 5–17)
BUN SERPL-MCNC: 35 MG/DL — HIGH (ref 7–23)
CALCIUM SERPL-MCNC: 9.2 MG/DL — SIGNIFICANT CHANGE UP (ref 8.4–10.5)
CHLORIDE SERPL-SCNC: 101 MMOL/L — SIGNIFICANT CHANGE UP (ref 96–108)
CO2 SERPL-SCNC: 22 MMOL/L — SIGNIFICANT CHANGE UP (ref 22–31)
CREAT SERPL-MCNC: 1.69 MG/DL — HIGH (ref 0.5–1.3)
EGFR: 41 ML/MIN/1.73M2 — LOW
GLUCOSE BLDC GLUCOMTR-MCNC: 114 MG/DL — HIGH (ref 70–99)
GLUCOSE BLDC GLUCOMTR-MCNC: 220 MG/DL — HIGH (ref 70–99)
GLUCOSE BLDC GLUCOMTR-MCNC: 228 MG/DL — HIGH (ref 70–99)
GLUCOSE BLDC GLUCOMTR-MCNC: 234 MG/DL — HIGH (ref 70–99)
GLUCOSE SERPL-MCNC: 261 MG/DL — HIGH (ref 70–99)
HCT VFR BLD CALC: 35.9 % — LOW (ref 39–50)
HGB BLD-MCNC: 12 G/DL — LOW (ref 13–17)
MAGNESIUM SERPL-MCNC: 1.6 MG/DL — SIGNIFICANT CHANGE UP (ref 1.6–2.6)
MCHC RBC-ENTMCNC: 28.4 PG — SIGNIFICANT CHANGE UP (ref 27–34)
MCHC RBC-ENTMCNC: 33.4 GM/DL — SIGNIFICANT CHANGE UP (ref 32–36)
MCV RBC AUTO: 84.9 FL — SIGNIFICANT CHANGE UP (ref 80–100)
NRBC # BLD: 0 /100 WBCS — SIGNIFICANT CHANGE UP (ref 0–0)
PLATELET # BLD AUTO: 193 K/UL — SIGNIFICANT CHANGE UP (ref 150–400)
POTASSIUM SERPL-MCNC: 3.5 MMOL/L — SIGNIFICANT CHANGE UP (ref 3.5–5.3)
POTASSIUM SERPL-SCNC: 3.5 MMOL/L — SIGNIFICANT CHANGE UP (ref 3.5–5.3)
RBC # BLD: 4.23 M/UL — SIGNIFICANT CHANGE UP (ref 4.2–5.8)
RBC # FLD: 13.1 % — SIGNIFICANT CHANGE UP (ref 10.3–14.5)
SODIUM SERPL-SCNC: 137 MMOL/L — SIGNIFICANT CHANGE UP (ref 135–145)
WBC # BLD: 6.98 K/UL — SIGNIFICANT CHANGE UP (ref 3.8–10.5)
WBC # FLD AUTO: 6.98 K/UL — SIGNIFICANT CHANGE UP (ref 3.8–10.5)

## 2023-06-22 PROCEDURE — 99233 SBSQ HOSP IP/OBS HIGH 50: CPT

## 2023-06-22 PROCEDURE — 99152 MOD SED SAME PHYS/QHP 5/>YRS: CPT

## 2023-06-22 PROCEDURE — 93454 CORONARY ARTERY ANGIO S&I: CPT | Mod: 26

## 2023-06-22 RX ADMIN — Medication 1 TABLET(S): at 11:45

## 2023-06-22 RX ADMIN — INSULIN GLARGINE 15 UNIT(S): 100 INJECTION, SOLUTION SUBCUTANEOUS at 22:02

## 2023-06-22 RX ADMIN — ISOSORBIDE MONONITRATE 60 MILLIGRAM(S): 60 TABLET, EXTENDED RELEASE ORAL at 11:45

## 2023-06-22 RX ADMIN — GABAPENTIN 100 MILLIGRAM(S): 400 CAPSULE ORAL at 22:02

## 2023-06-22 RX ADMIN — AMLODIPINE BESYLATE 10 MILLIGRAM(S): 2.5 TABLET ORAL at 06:27

## 2023-06-22 RX ADMIN — PANTOPRAZOLE SODIUM 40 MILLIGRAM(S): 20 TABLET, DELAYED RELEASE ORAL at 06:27

## 2023-06-22 RX ADMIN — SODIUM CHLORIDE 50 MILLILITER(S): 9 INJECTION INTRAMUSCULAR; INTRAVENOUS; SUBCUTANEOUS at 02:48

## 2023-06-22 RX ADMIN — Medication 4: at 08:02

## 2023-06-22 RX ADMIN — Medication 12.5 MILLIGRAM(S): at 11:45

## 2023-06-22 RX ADMIN — ATORVASTATIN CALCIUM 40 MILLIGRAM(S): 80 TABLET, FILM COATED ORAL at 22:02

## 2023-06-22 RX ADMIN — Medication 12.5 MILLIGRAM(S): at 22:01

## 2023-06-22 RX ADMIN — Medication 81 MILLIGRAM(S): at 11:45

## 2023-06-22 RX ADMIN — SENNA PLUS 2 TABLET(S): 8.6 TABLET ORAL at 22:01

## 2023-06-22 RX ADMIN — Medication 100 MILLIGRAM(S): at 06:27

## 2023-06-22 RX ADMIN — MEMANTINE HYDROCHLORIDE 5 MILLIGRAM(S): 10 TABLET ORAL at 06:27

## 2023-06-22 RX ADMIN — GABAPENTIN 100 MILLIGRAM(S): 400 CAPSULE ORAL at 06:27

## 2023-06-22 RX ADMIN — Medication 1200 MILLIGRAM(S): at 22:02

## 2023-06-22 RX ADMIN — CLOPIDOGREL BISULFATE 75 MILLIGRAM(S): 75 TABLET, FILM COATED ORAL at 11:45

## 2023-06-22 RX ADMIN — Medication 1200 MILLIGRAM(S): at 06:27

## 2023-06-22 RX ADMIN — SODIUM CHLORIDE 50 MILLILITER(S): 9 INJECTION INTRAMUSCULAR; INTRAVENOUS; SUBCUTANEOUS at 18:05

## 2023-06-22 NOTE — CHART NOTE - NSCHARTNOTEFT_GEN_A_CORE
Patient is s/p diagnostic LHC mild LAD via RFA today 6/22/23.   Patient is alert and oriented. Denies chest pain, SOB, lightheadedness, syncope, nausea, vomiting, abdominal pain, flank pain, R leg pain, numbness, or weakness.   RFA access site assessed with no evidence of hematoma, no swelling, and no signs of bleeding. Dressing is clean, dry, and intact.   Femoral pulses are palpable and symmetric bilaterally. Sensation is intact with full active ROM of the RLE. Extremity is appropriately warm to touch.   Bilateral flanks examined with absence of tenderness and ecchymosis.   Will continue to monitor.   Will endorse to AM team. Attending to follow.    Vital Signs Last 24 Hrs  T(C): 36.5 (22 Jun 2023 21:00), Max: 36.9 (22 Jun 2023 04:24)  T(F): 97.7 (22 Jun 2023 21:00), Max: 98.5 (22 Jun 2023 04:24)  HR: 72 (22 Jun 2023 21:00) (61 - 79)  BP: 152/67 (22 Jun 2023 21:00) (132/61 - 173/68)  BP(mean): --  RR: 18 (22 Jun 2023 21:00) (14 - 18)  SpO2: 96% (22 Jun 2023 21:00) (95% - 99%)    Parameters below as of 22 Jun 2023 21:00  Patient On (Oxygen Delivery Method): room air    Dennise Figueroa PA-C  Dept. of Medicine  Spectra [id number] Patient is s/p diagnostic C mild LAD via RFA today 6/22/23.   Patient is alert and oriented. Denies chest pain, SOB, lightheadedness, syncope, nausea, vomiting, abdominal pain, flank pain, R leg pain, numbness, or weakness.   RFA access site assessed with no evidence of hematoma, no swelling, and no signs of bleeding. Dressing is clean, dry, and intact.   Femoral pulses are palpable and symmetric bilaterally. Sensation is intact with full active ROM of the RLE. Extremity is appropriately warm to touch.   Bilateral flanks examined with absence of tenderness and ecchymosis.   Will continue to monitor.   Will endorse to AM team. Attending to follow.    Vital Signs Last 24 Hrs  T(C): 36.5 (22 Jun 2023 21:00), Max: 36.9 (22 Jun 2023 04:24)  T(F): 97.7 (22 Jun 2023 21:00), Max: 98.5 (22 Jun 2023 04:24)  HR: 72 (22 Jun 2023 21:00) (61 - 79)  BP: 152/67 (22 Jun 2023 21:00) (132/61 - 173/68)  BP(mean): --  RR: 18 (22 Jun 2023 21:00) (14 - 18)  SpO2: 96% (22 Jun 2023 21:00) (95% - 99%)    Parameters below as of 22 Jun 2023 21:00  Patient On (Oxygen Delivery Method): room air    Dennise Figueroa PA-C  Dept. of Medicine  Spectra [id number]    Addendum (6/23/23 07:03AM):  RFA access site remains without evidence of hematoma, swelling or active signs of bleeding. No changes from above. Continue to monitor.

## 2023-06-22 NOTE — PROGRESS NOTE ADULT - SUBJECTIVE AND OBJECTIVE BOX
Patient is a 77y old  Male who presents with a chief complaint of Vertigo today (10 Gelacio 2023 09:57)      SUBJECTIVE / OVERNIGHT EVENTS: no events    T(C): 36.5 (06-22-23 @ 21:00), Max: 36.8 (06-22-23 @ 19:15)  HR: 72 (06-22-23 @ 21:00) (61 - 79)  BP: 152/67 (06-22-23 @ 21:00) (140/68 - 173/68)  RR: 18 (06-22-23 @ 21:00) (14 - 18)  SpO2: 96% (06-22-23 @ 21:00) (95% - 99%)    MEDICATIONS  (STANDING):  acetaminophen   IVPB .. 1000 milliGRAM(s) IV Intermittent once  acetylcysteine  Oral Solution 1200 milliGRAM(s) Oral every 12 hours  amLODIPine   Tablet 10 milliGRAM(s) Oral daily  aspirin  chewable 81 milliGRAM(s) Oral daily  atorvastatin 40 milliGRAM(s) Oral at bedtime  chlorhexidine 2% Cloths 1 Application(s) Topical daily  clopidogrel Tablet 75 milliGRAM(s) Oral daily  dextrose 5%. 1000 milliLiter(s) (50 mL/Hr) IV Continuous <Continuous>  dextrose 5%. 1000 milliLiter(s) (100 mL/Hr) IV Continuous <Continuous>  dextrose 50% Injectable 12.5 Gram(s) IV Push once  dextrose 50% Injectable 25 Gram(s) IV Push once  dextrose 50% Injectable 25 Gram(s) IV Push once  gabapentin 100 milliGRAM(s) Oral three times a day  glucagon  Injectable 1 milliGRAM(s) IntraMuscular once  insulin glargine Injectable (LANTUS) 15 Unit(s) SubCutaneous at bedtime  insulin lispro (ADMELOG) corrective regimen sliding scale   SubCutaneous three times a day before meals  insulin lispro (ADMELOG) corrective regimen sliding scale   SubCutaneous at bedtime  isosorbide   mononitrate ER Tablet (IMDUR) 60 milliGRAM(s) Oral daily  memantine 5 milliGRAM(s) Oral two times a day  metoprolol succinate  milliGRAM(s) Oral daily  multivitamin 1 Tablet(s) Oral daily  pantoprazole    Tablet 40 milliGRAM(s) Oral before breakfast  polyethylene glycol 3350 17 Gram(s) Oral at bedtime  senna 2 Tablet(s) Oral at bedtime  sodium chloride 0.9%. 1000 milliLiter(s) (50 mL/Hr) IV Continuous <Continuous>    MEDICATIONS  (PRN):  acetaminophen     Tablet .. 650 milliGRAM(s) Oral every 6 hours PRN Moderate Pain (4 - 6)  benzocaine/menthol Lozenge 1 Lozenge Oral four times a day PRN Sore Throat  dextrose Oral Gel 15 Gram(s) Oral once PRN Blood Glucose LESS THAN 70 milliGRAM(s)/deciliter  meclizine 12.5 milliGRAM(s) Oral every 8 hours PRN Dizziness    PHYSICAL EXAM:  GENERAL: NAD, well-developed  HEAD:  Atraumatic, Normocephalic  EYES: EOMI, conjunctiva and sclera clear  NECK: Supple, No JVD  CHEST/LUNG: Clear to auscultation bilaterally; No wheeze  HEART: Regular rate and rhythm; No murmurs, rubs, or gallops  ABDOMEN: Soft, Nontender, Nondistended; Bowel sounds present  EXTREMITIES:  2+ Peripheral Pulses, No clubbing, cyanosis, or edema  PSYCH: AAOx3  NEUROLOGY: non-focal  SKIN: No rashes or lesions                                        12.0   6.98  )-----------( 193      ( 22 Jun 2023 07:18 )             35.9               137|101|35<261  3.5|22|1.69  9.2,1.6,--  06-22 @ 07:18

## 2023-06-22 NOTE — PROGRESS NOTE ADULT - ASSESSMENT
Agree with above assessment and plan as outlined above.      for cath today    Chalino Evans MD, [facility]  BEEPER [phone]

## 2023-06-22 NOTE — PROGRESS NOTE ADULT - SUBJECTIVE AND OBJECTIVE BOX
C A R D I O [facility] O [facility]  **********************************    DATE OF SERVICE: 06-22-23    Patient denies chest pain or shortness of breath.   Review of symptoms otherwise negative.    MEDICATIONS:  acetaminophen     Tablet .. 650 milliGRAM(s) Oral every 6 hours PRN  acetaminophen   IVPB .. 1000 milliGRAM(s) IV Intermittent once  acetylcysteine  Oral Solution 1200 milliGRAM(s) Oral every 12 hours  amLODIPine   Tablet 10 milliGRAM(s) Oral daily  aspirin  chewable 81 milliGRAM(s) Oral daily  atorvastatin 40 milliGRAM(s) Oral at bedtime  benzocaine/menthol Lozenge 1 Lozenge Oral four times a day PRN  chlorhexidine 2% Cloths 1 Application(s) Topical daily  clopidogrel Tablet 75 milliGRAM(s) Oral daily  dextrose 5%. 1000 milliLiter(s) IV Continuous <Continuous>  dextrose 5%. 1000 milliLiter(s) IV Continuous <Continuous>  dextrose 50% Injectable 12.5 Gram(s) IV Push once  dextrose 50% Injectable 25 Gram(s) IV Push once  dextrose 50% Injectable 25 Gram(s) IV Push once  dextrose Oral Gel 15 Gram(s) Oral once PRN  gabapentin 100 milliGRAM(s) Oral three times a day  glucagon  Injectable 1 milliGRAM(s) IntraMuscular once  insulin glargine Injectable (LANTUS) 15 Unit(s) SubCutaneous at bedtime  insulin lispro (ADMELOG) corrective regimen sliding scale   SubCutaneous three times a day before meals  insulin lispro (ADMELOG) corrective regimen sliding scale   SubCutaneous at bedtime  isosorbide   mononitrate ER Tablet (IMDUR) 60 milliGRAM(s) Oral daily  meclizine 12.5 milliGRAM(s) Oral every 8 hours PRN  memantine 5 milliGRAM(s) Oral two times a day  metoprolol succinate  milliGRAM(s) Oral daily  multivitamin 1 Tablet(s) Oral daily  pantoprazole    Tablet 40 milliGRAM(s) Oral before breakfast  polyethylene glycol 3350 17 Gram(s) Oral at bedtime  senna 2 Tablet(s) Oral at bedtime  sodium chloride 0.9%. 1000 milliLiter(s) IV Continuous <Continuous>  sodium chloride 0.9%. 1000 milliLiter(s) IV Continuous <Continuous>      LABS:                        12.0   6.98  )-----------( 193      ( 22 Jun 2023 07:18 )             35.9       Hemoglobin: 12.0 g/dL (06-22 @ 07:18)  Hemoglobin: 11.9 g/dL (06-19 @ 09:14)      06-22    137  |  101  |  35<H>  ----------------------------<  261<H>  3.5   |  22  |  1.69<H>    Ca    9.2      22 Jun 2023 07:18  Mg     1.6     06-22      Creatinine Trend: 1.69<--, 1.61<--, 1.70<--, 1.59<--, 1.59<--, 1.56<--    COAGS:           PHYSICAL EXAM:  T(C): 36.5 (06-22-23 @ 11:44), Max: 36.9 (06-22-23 @ 04:24)  HR: 68 (06-22-23 @ 11:44) (68 - 75)  BP: 147/68 (06-22-23 @ 11:44) (116/61 - 148/75)  RR: 18 (06-22-23 @ 11:44) (18 - 18)  SpO2: 97% (06-22-23 @ 11:44) (96% - 97%)  Wt(kg): --    I&O's Summary    21 Jun 2023 07:01  -  22 Jun 2023 07:00  --------------------------------------------------------  IN: 480 mL / OUT: 525 mL / NET: -45 mL          Gen: NAD  HEENT:  (-)icterus (-)pallor  CV: N S1 S2 1/6 MORENA (+)2 Pulses B/l  Resp:  Clear to auscultation B/L, normal effort  GI: (+) BS Soft, NT, ND  Lymph:  (-)Edema, (-)obvious lymphadenopathy  Skin: Warm to touch, Normal turgor  Psych: Appropriate mood and affect      TELEMETRY: SR 70-80    < from: TTE Limited W or WO Ultrasound Enhancing Agent (06.12.23 @ 10:51) >  CONCLUSIONS:      1. Technically difficult image quality.   2. There is increasedLV mass and concentric hypertrophy.   3. Mild left ventricular hypertrophy.   4. The left ventricular systolic function is normal with an ejection fraction of 68 % by Roach's method of disks. There are no regional wall motion abnormalities seen.   5. Normal right ventricular cavity size, normal wall thickness and normal systolic function. The tricuspid annular plane systolic excursion (TAPSE) is 1.9 cm (normal >=1.7 cm).   6. A bioprosthetic valve replacement present in the aortic position. Aortic valve is not well visualized. Calcualted EOA and dimensionless index are consistent with moderate prosthetic valve stenosis. Consider additional imaging of the aortic valve if clinically indicated.   7. Mild aortic regurgitation.   8. Aortic regurgitation appears intravalvular.   9. Trace mitral regurgitation.  10. Compared to the transesophageal echocardiogram performed on 7/12/2017 aortic valve gradients are higher on the current study. Peak/mean aortic valve gradients were 21/13 mmHg on theprior study.    < end of copied text >    < from: MR Head w/wo IV Cont (06.10.23 @ 19:03) >  IMPRESSION:    1. Ischemic white matter disease and atrophy typical for age    2. Remote petechial hemorrhages    3. No evidence of infarction    --- End of Report ---    < end of copied text >    < from: BALJINDER W or WO Ultrasound Enhancing Agent (06.14.23 @ 15:06) >  CONCLUSIONS:      1. The left ventricular systolic function is normal with an ejection fraction visually estimated at 55 to 60 %.   2. Normal right ventricular cavity size, normal wall thickness and normal systolic function.   3. A bioprosthetic valve replacement present in the aortic position. Severe intravalvular regurgitation originating centrally and is eccentrically directed anteriorly. Bioprosthetic leaflets are suboptimally visualized.   4. Mild mitral regurgitation.   5. 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   6. No evidence of left atrial or left atrial appendage thrombus. The left atrial appendageemptying velocity is normal at 61 cm/s.   7. Mildly dilated proximal ascending aorta, measuring 4.10 cm (indexed 1.93 cm/m²).   8. Compared to the transthoracic echocardiogram performed on 6/12/2023 more significant aortic regurgitation is noted on today's study.    < end of copied text >      ASSESSMENT/PLAN: Pt is a 77-year-old male with a past medical history of hypertension, diabetes, hyperlipidemia, bioprosthetic aortic valve replacement in 2009 by Dr. Ted Piña status post recent lower extremity angiogram status post angioplasty of right anterior tibial artery who presents from the office due to unsteadiness of his feet and room spinning sensation.    #Dizziness  - suspect vertigo  - neuro eval appreciated - MRI with no evidence of infarction  - orthostatics negative  - tele stable with no events    #Bio-AVR prosthetic valve stenosis  - recent office echo with elevated gradients across bioprosthetic aortic valve, inpatient TTE confirmed elevated gradients, moderate prosthetic valve stenosis  - BALJINDER now showing severe intravalvular regurgitation of his bio-AVR  - Structural heart consult appreciated - f/u recs regarding Ria TAVR eval. Follow up CTA results. Will need preop LHC - d/w renal, plan for cath today as cr stable, please follow renal pre cath recs - Mucomyst and IVF    #HTN  - Continue Amlodipine 10mg daily, Imdur 60mg daily, and Toprol XL 100mg daily. Hold HCTZ and Valsartan given SAMARIA. Can restart Hydralazine if becomes hypertensive    #HLD  - Continue Lipitor    #PAD  - Continue ASA/Plavix and Lipitor    #SAMARIA  - Suspect contrast induced now improved s/p IVF  - Hold HCTZ and Valsartan  - Renal follow up    - Patient has f/u with Dr. Plummer on 7/7 at 11:30 AM     Howard Victor PA-C  Pager: [phone]

## 2023-06-22 NOTE — PROGRESS NOTE ADULT - SUBJECTIVE AND OBJECTIVE BOX
Subjective   No acute events reported overnight.  The patient denies any chest pain/tightness/discomfort, fevers, chills, sweats, light sensation, dizziness or palpitations.  He notes no significant heart or pulmonary complaints upon minimal exertion.  He is currently sitting in a chair.  He is eager to undergo cardiac catheterization which scheduled to be performed later today.    Telemetry   Telemetry sinus rhythm with first-degree with PVCs and a few couplets  Review of systems  14 point review of systems is otherwise unremarkable except what is described above in history of present illness    Physical examination  No apparent distress, alert and oriented x3  JVD is not elevated, supple, no lymphadenopathy  Clear to auscultation bilaterally with no wheezing rhonchi crackles  Regular rate and rhythm with 2 out of 6 diastolic murmur heard loudest at the left upper sternal border   Positive bowel sound, soft, nontender, nondistended, no masses guarding rebound signs  No clubbing cyanosis or edema  Moving all extremities spontaneously  Right groin site mild ecchymosis with no hematoma/bruit  1+ DP/PT pulses  No focal deficits    Assessment/plan  77-year-old male with a past medical history significant for    Hypertension  Severe aortic valve insufficiency status post aortic valve replacement in 2009  Diabetes mellitus type 2  Hyperlipidemia  Peripheral arterial disease status post angioplasty of right anterior tibial artery  Chronic kidney disease  Cholecystectomy  Surgery of left ear  Chronic mastoiditis  BPH    Who presents with shortness of breath, unsteadiness on his feet and room spinning sensation    Discussion was had with the patient, his wife and granddaughter/Bethanie concerning his clinical presentation and findings on imaging studies.  The patient had undergone surgical aortic valve replacement at [facility] in 2009 during which time a 25 mm bioprosthetic valve was placed.  At that time the procedure was performed for severe aortic valve insufficiency.  The patient is now found to have similar symptoms of dyspnea and dizziness which she experienced in 2009.  BALJINDER on 6/14/2023 demonstrated EF 55 to 60% with normal right ventricular cavity size/thickness/systolic function.  Severe intravalvular regurgitation seen centrally and eccentrically in bioprosthetic aortic valve.  There is mild mitral valve regurgitation.  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  Mildly dilated proximal ascending aorta measuring 4.1 cm.    Explained to the patient and family what is severe aortic valve insufficiency of a degenerative aortic valve.  The associated signs and symptoms were reviewed.  The natural pathophysiology of left untreated were gone over.      The various treatment options were gone over including redo surgery, medical management and TAVR valve in valve.  The pros/cons and the risk/benefits of the various treatment options were gone over.  Due to the patient's age and comorbidities he is felt to be appropriate candidate to undergo work-up for TAVR valve in valve.      The necessary work-up prior to the TAVR valve and valve was gone over including carotid Dopplers (CTA of the neck was previously performed on 6/9/2023 that demonstrated no evidence of aneurysm, stenosis or vessel occlusion of the carotid or vertebral arteries), PFTs, coronary angiography and heart CTA.  Carotid ultrasound on 6/19/2023 demonstrates no significant hemodynamic stenosis of either carotid artery.    Patient underwent heart CTA on 6/19/2023.  Results are pending.    Plan for patient to undergo coronary angiography later today.  Indications for the cardiac catheterization reviewed.  Benefits and risk were gone over.  Risks include but not limited to infection, bleeding, arrhythmia, TIA/stroke, hemodynamic instability, worsening renal insufficiency/need for dialysis, vascular injury, need for urgent surgery and death.    Based upon patient's findings from cardiac catheterization, renal function and clinical status will determine timing for upcoming TAVR procedure.  Indications and details for the TAVR valve in valve were reviewed.  Benefits and risks were gone over.  Risks include but are not limited to infection, bleeding, arrhythmia, TIA/stroke, hemodynamic instability, vascular injury, need for urgent surgery and death.    Continue aspirin 81 mg daily and clopidogrel 75 mg daily.  Closely monitor for signs and symptoms of bleeding.    Continue metoprolol succinate 100 mg daily, isosorbide mononitrate 60 mg daily and amlodipine 10 mg daily.    Continue atorvastatin 40 mg daily.    All questions concerns of the patient were addressed.    Findings and plan were discussed with the cardiology and medicine teams.    Time-based billing (NON-critical care).   35 minutes spent on total encounter; more than 50% of the visit was spent counseling and / or coordinating care by the attending physician.  The necessity of the time spent during the encounter on this date of service was due to: education, assessment and coordination of care.

## 2023-06-23 LAB
ANION GAP SERPL CALC-SCNC: 16 MMOL/L — SIGNIFICANT CHANGE UP (ref 5–17)
BUN SERPL-MCNC: 24 MG/DL — HIGH (ref 7–23)
CALCIUM SERPL-MCNC: 8.5 MG/DL — SIGNIFICANT CHANGE UP (ref 8.4–10.5)
CHLORIDE SERPL-SCNC: 104 MMOL/L — SIGNIFICANT CHANGE UP (ref 96–108)
CO2 SERPL-SCNC: 22 MMOL/L — SIGNIFICANT CHANGE UP (ref 22–31)
CREAT SERPL-MCNC: 1.25 MG/DL — SIGNIFICANT CHANGE UP (ref 0.5–1.3)
EGFR: 59 ML/MIN/1.73M2 — LOW
GLUCOSE BLDC GLUCOMTR-MCNC: 155 MG/DL — HIGH (ref 70–99)
GLUCOSE BLDC GLUCOMTR-MCNC: 250 MG/DL — HIGH (ref 70–99)
GLUCOSE BLDC GLUCOMTR-MCNC: 274 MG/DL — HIGH (ref 70–99)
GLUCOSE BLDC GLUCOMTR-MCNC: 297 MG/DL — HIGH (ref 70–99)
GLUCOSE BLDC GLUCOMTR-MCNC: 520 MG/DL — CRITICAL HIGH (ref 70–99)
GLUCOSE SERPL-MCNC: 156 MG/DL — HIGH (ref 70–99)
HCT VFR BLD CALC: 35.9 % — LOW (ref 39–50)
HGB BLD-MCNC: 12.1 G/DL — LOW (ref 13–17)
MAGNESIUM SERPL-MCNC: 1.6 MG/DL — SIGNIFICANT CHANGE UP (ref 1.6–2.6)
MCHC RBC-ENTMCNC: 28.4 PG — SIGNIFICANT CHANGE UP (ref 27–34)
MCHC RBC-ENTMCNC: 33.7 GM/DL — SIGNIFICANT CHANGE UP (ref 32–36)
MCV RBC AUTO: 84.3 FL — SIGNIFICANT CHANGE UP (ref 80–100)
NRBC # BLD: 0 /100 WBCS — SIGNIFICANT CHANGE UP (ref 0–0)
PLATELET # BLD AUTO: 209 K/UL — SIGNIFICANT CHANGE UP (ref 150–400)
POTASSIUM SERPL-MCNC: 3.4 MMOL/L — LOW (ref 3.5–5.3)
POTASSIUM SERPL-SCNC: 3.4 MMOL/L — LOW (ref 3.5–5.3)
RBC # BLD: 4.26 M/UL — SIGNIFICANT CHANGE UP (ref 4.2–5.8)
RBC # FLD: 13.2 % — SIGNIFICANT CHANGE UP (ref 10.3–14.5)
SODIUM SERPL-SCNC: 142 MMOL/L — SIGNIFICANT CHANGE UP (ref 135–145)
WBC # BLD: 8.92 K/UL — SIGNIFICANT CHANGE UP (ref 3.8–10.5)
WBC # FLD AUTO: 8.92 K/UL — SIGNIFICANT CHANGE UP (ref 3.8–10.5)

## 2023-06-23 PROCEDURE — 99232 SBSQ HOSP IP/OBS MODERATE 35: CPT

## 2023-06-23 RX ORDER — POTASSIUM CHLORIDE 20 MEQ
40 PACKET (EA) ORAL ONCE
Refills: 0 | Status: COMPLETED | OUTPATIENT
Start: 2023-06-23 | End: 2023-06-23

## 2023-06-23 RX ADMIN — GABAPENTIN 100 MILLIGRAM(S): 400 CAPSULE ORAL at 05:08

## 2023-06-23 RX ADMIN — CHLORHEXIDINE GLUCONATE 1 APPLICATION(S): 213 SOLUTION TOPICAL at 11:59

## 2023-06-23 RX ADMIN — Medication 100 MILLIGRAM(S): at 05:10

## 2023-06-23 RX ADMIN — GABAPENTIN 100 MILLIGRAM(S): 400 CAPSULE ORAL at 13:48

## 2023-06-23 RX ADMIN — MEMANTINE HYDROCHLORIDE 5 MILLIGRAM(S): 10 TABLET ORAL at 17:33

## 2023-06-23 RX ADMIN — CLOPIDOGREL BISULFATE 75 MILLIGRAM(S): 75 TABLET, FILM COATED ORAL at 11:58

## 2023-06-23 RX ADMIN — Medication 6: at 08:07

## 2023-06-23 RX ADMIN — Medication 1 TABLET(S): at 11:58

## 2023-06-23 RX ADMIN — SENNA PLUS 2 TABLET(S): 8.6 TABLET ORAL at 21:19

## 2023-06-23 RX ADMIN — PANTOPRAZOLE SODIUM 40 MILLIGRAM(S): 20 TABLET, DELAYED RELEASE ORAL at 05:08

## 2023-06-23 RX ADMIN — ISOSORBIDE MONONITRATE 60 MILLIGRAM(S): 60 TABLET, EXTENDED RELEASE ORAL at 11:59

## 2023-06-23 RX ADMIN — Medication 81 MILLIGRAM(S): at 11:58

## 2023-06-23 RX ADMIN — MEMANTINE HYDROCHLORIDE 5 MILLIGRAM(S): 10 TABLET ORAL at 05:08

## 2023-06-23 RX ADMIN — SODIUM CHLORIDE 50 MILLILITER(S): 9 INJECTION INTRAMUSCULAR; INTRAVENOUS; SUBCUTANEOUS at 02:51

## 2023-06-23 RX ADMIN — Medication 40 MILLIEQUIVALENT(S): at 18:42

## 2023-06-23 RX ADMIN — Medication 1200 MILLIGRAM(S): at 05:09

## 2023-06-23 RX ADMIN — Medication 650 MILLIGRAM(S): at 18:41

## 2023-06-23 RX ADMIN — POLYETHYLENE GLYCOL 3350 17 GRAM(S): 17 POWDER, FOR SOLUTION ORAL at 21:19

## 2023-06-23 RX ADMIN — GABAPENTIN 100 MILLIGRAM(S): 400 CAPSULE ORAL at 21:17

## 2023-06-23 RX ADMIN — Medication 12.5 MILLIGRAM(S): at 21:17

## 2023-06-23 RX ADMIN — Medication 6: at 11:58

## 2023-06-23 RX ADMIN — INSULIN GLARGINE 15 UNIT(S): 100 INJECTION, SOLUTION SUBCUTANEOUS at 21:19

## 2023-06-23 RX ADMIN — Medication 4: at 17:32

## 2023-06-23 RX ADMIN — Medication 12.5 MILLIGRAM(S): at 11:58

## 2023-06-23 RX ADMIN — AMLODIPINE BESYLATE 10 MILLIGRAM(S): 2.5 TABLET ORAL at 05:08

## 2023-06-23 RX ADMIN — Medication 650 MILLIGRAM(S): at 17:36

## 2023-06-23 RX ADMIN — ATORVASTATIN CALCIUM 40 MILLIGRAM(S): 80 TABLET, FILM COATED ORAL at 21:17

## 2023-06-23 NOTE — PROGRESS NOTE ADULT - ASSESSMENT
77 y (M with a pmhx significant for aortic valve replacement on aspirin, hx of vertigo, HLD, BPH, DM, HTN p        # Bio AVR Prosthetic valve stenosis   - TTE confirmed elevated gradients, moderate prosthetic valve stenosis  -  s/p  BALJINDER   TAVR work up in progress   BALJINDER showing severe intravalvular regurgitation of his bio-AVR  - Structural heart consult appreciated -      BALJINDER now showing severe intravalvular regurgitation of his bio-AVR  - s/p preop cath with moderate prox LAD lesion 40% - recommend medical management  - Structural heart follow up appreciated - tentative Ria TAVR on 7/3    Dizziness Orthostatic negative     Start patient on Meclizine     # DM hiss   Follow up A1C     #PAD s/p Angioplasty anterior tibial artery   Plavix

## 2023-06-23 NOTE — PROGRESS NOTE ADULT - SUBJECTIVE AND OBJECTIVE BOX
C A R D I O L O G Y  **********************************    DATE OF SERVICE: 06-23-23    Patient reports mild dizziness with standing however repeat orthostatics negative. denies chest pain or shortness of breath.   Review of symptoms otherwise negative.    MEDICATIONS:  acetaminophen     Tablet .. 650 milliGRAM(s) Oral every 6 hours PRN  acetaminophen   IVPB .. 1000 milliGRAM(s) IV Intermittent once  amLODIPine   Tablet 10 milliGRAM(s) Oral daily  aspirin  chewable 81 milliGRAM(s) Oral daily  atorvastatin 40 milliGRAM(s) Oral at bedtime  benzocaine/menthol Lozenge 1 Lozenge Oral four times a day PRN  chlorhexidine 2% Cloths 1 Application(s) Topical daily  clopidogrel Tablet 75 milliGRAM(s) Oral daily  dextrose 5%. 1000 milliLiter(s) IV Continuous <Continuous>  dextrose 5%. 1000 milliLiter(s) IV Continuous <Continuous>  dextrose 50% Injectable 12.5 Gram(s) IV Push once  dextrose 50% Injectable 25 Gram(s) IV Push once  dextrose 50% Injectable 25 Gram(s) IV Push once  dextrose Oral Gel 15 Gram(s) Oral once PRN  gabapentin 100 milliGRAM(s) Oral three times a day  glucagon  Injectable 1 milliGRAM(s) IntraMuscular once  insulin glargine Injectable (LANTUS) 15 Unit(s) SubCutaneous at bedtime  insulin lispro (ADMELOG) corrective regimen sliding scale   SubCutaneous at bedtime  insulin lispro (ADMELOG) corrective regimen sliding scale   SubCutaneous three times a day before meals  isosorbide   mononitrate ER Tablet (IMDUR) 60 milliGRAM(s) Oral daily  meclizine 12.5 milliGRAM(s) Oral every 8 hours PRN  memantine 5 milliGRAM(s) Oral two times a day  metoprolol succinate  milliGRAM(s) Oral daily  multivitamin 1 Tablet(s) Oral daily  pantoprazole    Tablet 40 milliGRAM(s) Oral before breakfast  polyethylene glycol 3350 17 Gram(s) Oral at bedtime  senna 2 Tablet(s) Oral at bedtime  sodium chloride 0.9%. 1000 milliLiter(s) IV Continuous <Continuous>      LABS:                        12.1   8.92  )-----------( 209      ( 23 Jun 2023 15:00 )             35.9       Hemoglobin: 12.1 g/dL (06-23 @ 15:00)  Hemoglobin: 12.0 g/dL (06-22 @ 07:18)  Hemoglobin: 11.9 g/dL (06-19 @ 09:14)      06-23    142  |  104  |  24<H>  ----------------------------<  156<H>  3.4<L>   |  22  |  1.25    Ca    8.5      23 Jun 2023 07:02  Mg     1.6     06-23      Creatinine Trend: 1.25<--, 1.69<--, 1.61<--, 1.70<--, 1.59<--, 1.59<--    COAGS:           PHYSICAL EXAM:  T(C): 36.6 (06-23-23 @ 12:00), Max: 36.8 (06-22-23 @ 19:15)  HR: 66 (06-23-23 @ 12:00) (61 - 72)  BP: 129/66 (06-23-23 @ 12:00) (121/61 - 173/68)  RR: 18 (06-23-23 @ 12:00) (14 - 18)  SpO2: 98% (06-23-23 @ 12:00) (95% - 99%)  Wt(kg): --    I&O's Summary    22 Jun 2023 07:01  -  23 Jun 2023 07:00  --------------------------------------------------------  IN: 0 mL / OUT: 900 mL / NET: -900 mL    23 Jun 2023 07:01  -  23 Jun 2023 15:24  --------------------------------------------------------  IN: 600 mL / OUT: 750 mL / NET: -150 mL        Gen: NAD  HEENT:  (-)icterus (-)pallor  CV: N S1 S2 1/6 MORENA (+)2 Pulses B/l  Resp:  Clear to auscultation B/L, normal effort  GI: (+) BS Soft, NT, ND  Lymph:  (-)Edema, (-)obvious lymphadenopathy  Skin: Warm to touch, Normal turgor  Psych: Appropriate mood and affect      TELEMETRY: SR 60-80    < from: TTE Limited W or WO Ultrasound Enhancing Agent (06.12.23 @ 10:51) >  CONCLUSIONS:      1. Technically difficult image quality.   2. There is increasedLV mass and concentric hypertrophy.   3. Mild left ventricular hypertrophy.   4. The left ventricular systolic function is normal with an ejection fraction of 68 % by Roach's method of disks. There are no regional wall motion abnormalities seen.   5. Normal right ventricular cavity size, normal wall thickness and normal systolic function. The tricuspid annular plane systolic excursion (TAPSE) is 1.9 cm (normal >=1.7 cm).   6. A bioprosthetic valve replacement present in the aortic position. Aortic valve is not well visualized. Calcualted EOA and dimensionless index are consistent with moderate prosthetic valve stenosis. Consider additional imaging of the aortic valve if clinically indicated.   7. Mild aortic regurgitation.   8. Aortic regurgitation appears intravalvular.   9. Trace mitral regurgitation.  10. Compared to the transesophageal echocardiogram performed on 7/12/2017 aortic valve gradients are higher on the current study. Peak/mean aortic valve gradients were 21/13 mmHg on theprior study.    < end of copied text >    < from: MR Head w/wo IV Cont (06.10.23 @ 19:03) >  IMPRESSION:    1. Ischemic white matter disease and atrophy typical for age    2. Remote petechial hemorrhages    3. No evidence of infarction    --- End of Report ---    < end of copied text >    < from: BALJINDER W or WO Ultrasound Enhancing Agent (06.14.23 @ 15:06) >  CONCLUSIONS:      1. The left ventricular systolic function is normal with an ejection fraction visually estimated at 55 to 60 %.   2. Normal right ventricular cavity size, normal wall thickness and normal systolic function.   3. A bioprosthetic valve replacement present in the aortic position. Severe intravalvular regurgitation originating centrally and is eccentrically directed anteriorly. Bioprosthetic leaflets are suboptimally visualized.   4. Mild mitral regurgitation.   5. 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   6. No evidence of left atrial or left atrial appendage thrombus. The left atrial appendageemptying velocity is normal at 61 cm/s.   7. Mildly dilated proximal ascending aorta, measuring 4.10 cm (indexed 1.93 cm/m²).   8. Compared to the transthoracic echocardiogram performed on 6/12/2023 more significant aortic regurgitation is noted on today's study.    < end of copied text >      ASSESSMENT/PLAN: Pt is a 77-year-old male with a past medical history of hypertension, diabetes, hyperlipidemia, bioprosthetic aortic valve replacement in 2009 by Dr. Ted Piña status post recent lower extremity angiogram status post angioplasty of right anterior tibial artery who presents from the office due to unsteadiness of his feet and room spinning sensation.    #Dizziness  - suspect vertigo  - neuro eval appreciated - MRI with no evidence of infarction  - orthostatics negative  - tele stable with no events    #Bio-AVR prosthetic valve stenosis  - recent office echo with elevated gradients across bioprosthetic aortic valve, inpatient TTE confirmed elevated gradients, moderate prosthetic valve stenosis  - BALJINDER now showing severe intravalvular regurgitation of his bio-AVR  - s/p preop cath with moderate prox LAD lesion 40% - recommend medical management  - Structural heart follow up appreciated - tentative Ria TAVR on 7/3    #HTN  - Continue Amlodipine 10mg daily, Imdur 60mg daily, and Toprol XL 100mg daily. Hold HCTZ and Valsartan given SAMARIA. Can restart Hydralazine if becomes hypertensive    #HLD  - Continue Lipitor    #PAD  - Continue ASA/Plavix and Lipitor    #SAMARIA  - Suspect contrast induced now improved s/p IVF  - Hold HCTZ and Valsartan  - Renal follow up noted    - No further inpatient cardiac w/u planned  - Patient has f/u with Dr. Plummer on 7/7 at 11:30 AM     Howard Victor PA-C  Pager: [phone]

## 2023-06-23 NOTE — PROGRESS NOTE ADULT - SUBJECTIVE AND OBJECTIVE BOX
Patient seen and examined  s/p cardiac cath    No Known Allergies    Hospital Medications:   MEDICATIONS  (STANDING):  acetaminophen   IVPB .. 1000 milliGRAM(s) IV Intermittent once  amLODIPine   Tablet 10 milliGRAM(s) Oral daily  aspirin  chewable 81 milliGRAM(s) Oral daily  atorvastatin 40 milliGRAM(s) Oral at bedtime  chlorhexidine 2% Cloths 1 Application(s) Topical daily  clopidogrel Tablet 75 milliGRAM(s) Oral daily  dextrose 5%. 1000 milliLiter(s) (50 mL/Hr) IV Continuous <Continuous>  dextrose 5%. 1000 milliLiter(s) (100 mL/Hr) IV Continuous <Continuous>  dextrose 50% Injectable 12.5 Gram(s) IV Push once  dextrose 50% Injectable 25 Gram(s) IV Push once  dextrose 50% Injectable 25 Gram(s) IV Push once  gabapentin 100 milliGRAM(s) Oral three times a day  glucagon  Injectable 1 milliGRAM(s) IntraMuscular once  insulin glargine Injectable (LANTUS) 15 Unit(s) SubCutaneous at bedtime  insulin lispro (ADMELOG) corrective regimen sliding scale   SubCutaneous at bedtime  insulin lispro (ADMELOG) corrective regimen sliding scale   SubCutaneous three times a day before meals  isosorbide   mononitrate ER Tablet (IMDUR) 60 milliGRAM(s) Oral daily  memantine 5 milliGRAM(s) Oral two times a day  metoprolol succinate  milliGRAM(s) Oral daily  multivitamin 1 Tablet(s) Oral daily  pantoprazole    Tablet 40 milliGRAM(s) Oral before breakfast  polyethylene glycol 3350 17 Gram(s) Oral at bedtime  senna 2 Tablet(s) Oral at bedtime  sodium chloride 0.9%. 1000 milliLiter(s) (50 mL/Hr) IV Continuous <Continuous>        VITALS:  T(F): 97.9 (06-23-23 @ 12:00), Max: 98.3 (06-23-23 @ 04:32)  HR: 66 (06-23-23 @ 12:00)  BP: 129/66 (06-23-23 @ 12:00)  RR: 18 (06-23-23 @ 12:00)  SpO2: 98% (06-23-23 @ 12:00)  Wt(kg): --    06-22 @ 07:01  -  06-23 @ 07:00  --------------------------------------------------------  IN: 0 mL / OUT: 900 mL / NET: -900 mL    06-23 @ 07:01  -  06-23 @ 13:08  --------------------------------------------------------  IN: 600 mL / OUT: 550 mL / NET: 50 mL          Physical Exam :-  Constitutional: NAD  Neck: Supple.  Respiratory: Bilateral equal breath sounds,  Cardiovascular: S1, S2 normal,  Gastrointestinal: Bowel Sounds present, soft, non tender.  Extremities: No edema  Neurological: Alert and Oriented x 3, no focal deficits  Psychiatric: Normal mood, normal affect    LABS:  06-23    142  |  104  |  24<H>  ----------------------------<  156<H>  3.4<L>   |  22  |  1.25    Ca    8.5      23 Jun 2023 07:02  Mg     1.6     06-23      Creatinine Trend: 1.25 <--, 1.69 <--, 1.61 <--, 1.70 <--, 1.59 <--, 1.59 <--, 1.56 <--                        12.0   6.98  )-----------( 193      ( 22 Jun 2023 07:18 )             35.9     Urine Studies:  Urinalysis Basic - ( 23 Jun 2023 07:02 )    Color:  / Appearance:  / SG:  / pH:   Gluc: 156 mg/dL / Ketone:   / Bili:  / Urobili:    Blood:  / Protein:  / Nitrite:    Leuk Esterase:  / RBC:  / WBC    Sq Epi:  / Non Sq Epi:  / Bacteria:         RADIOLOGY & ADDITIONAL STUDIES:

## 2023-06-23 NOTE — PROGRESS NOTE ADULT - SUBJECTIVE AND OBJECTIVE BOX
Patient is a 77y old  Male who presents with a chief complaint of Vertigo today (10 Gelacio 2023 09:57)      SUBJECTIVE / OVERNIGHT EVENTS: no events      T(C): 36.4 (06-23-23 @ 20:21), Max: 36.6 (06-23-23 @ 12:00)  HR: 60 (06-23-23 @ 20:21) (60 - 70)  BP: 142/66 (06-23-23 @ 20:21) (129/66 - 156/65)  RR: 18 (06-23-23 @ 20:21) (18 - 18)  SpO2: 97% (06-23-23 @ 20:21) (94% - 98%)      MEDICATIONS  (STANDING):  acetaminophen   IVPB .. 1000 milliGRAM(s) IV Intermittent once  amLODIPine   Tablet 10 milliGRAM(s) Oral daily  aspirin  chewable 81 milliGRAM(s) Oral daily  atorvastatin 40 milliGRAM(s) Oral at bedtime  chlorhexidine 2% Cloths 1 Application(s) Topical daily  clopidogrel Tablet 75 milliGRAM(s) Oral daily  dextrose 5%. 1000 milliLiter(s) (50 mL/Hr) IV Continuous <Continuous>  dextrose 5%. 1000 milliLiter(s) (100 mL/Hr) IV Continuous <Continuous>  dextrose 50% Injectable 12.5 Gram(s) IV Push once  dextrose 50% Injectable 25 Gram(s) IV Push once  dextrose 50% Injectable 25 Gram(s) IV Push once  gabapentin 100 milliGRAM(s) Oral three times a day  glucagon  Injectable 1 milliGRAM(s) IntraMuscular once  insulin glargine Injectable (LANTUS) 15 Unit(s) SubCutaneous at bedtime  insulin lispro (ADMELOG) corrective regimen sliding scale   SubCutaneous three times a day before meals  insulin lispro (ADMELOG) corrective regimen sliding scale   SubCutaneous at bedtime  isosorbide   mononitrate ER Tablet (IMDUR) 60 milliGRAM(s) Oral daily  memantine 5 milliGRAM(s) Oral two times a day  metoprolol succinate  milliGRAM(s) Oral daily  multivitamin 1 Tablet(s) Oral daily  pantoprazole    Tablet 40 milliGRAM(s) Oral before breakfast  polyethylene glycol 3350 17 Gram(s) Oral at bedtime  senna 2 Tablet(s) Oral at bedtime  sodium chloride 0.9%. 1000 milliLiter(s) (50 mL/Hr) IV Continuous <Continuous>    MEDICATIONS  (PRN):  acetaminophen     Tablet .. 650 milliGRAM(s) Oral every 6 hours PRN Moderate Pain (4 - 6)  benzocaine/menthol Lozenge 1 Lozenge Oral four times a day PRN Sore Throat  dextrose Oral Gel 15 Gram(s) Oral once PRN Blood Glucose LESS THAN 70 milliGRAM(s)/deciliter  meclizine 12.5 milliGRAM(s) Oral every 8 hours PRN Dizziness  PHYSICAL EXAM:  GENERAL: NAD, well-developed  HEAD:  Atraumatic, Normocephalic  EYES: EOMI, conjunctiva and sclera clear  NECK: Supple, No JVD  CHEST/LUNG: Clear to auscultation bilaterally; No wheeze  HEART: Regular rate and rhythm; No murmurs, rubs, or gallops  ABDOMEN: Soft, Nontender, Nondistended; Bowel sounds present  EXTREMITIES:  2+ Peripheral Pulses, No clubbing, cyanosis, or edema  PSYCH: AAOx3  NEUROLOGY: non-focal  SKIN: No rashes or lesions                                        12.0   6.98  )-----------( 193      ( 22 Jun 2023 07:18 )             35.9               137|101|35<261  3.5|22|1.69  9.2,1.6,--  06-22 @ 07:18

## 2023-06-23 NOTE — PROGRESS NOTE ADULT - ASSESSMENT
a 77-year-old male with a past medical history of hypertension, diabetes, hyperlipidemia, bioprosthetic aortic valvereplacement in 2009 by Dr. Ted Piña status post recent lower extremity angiogram status post angioplasty of right anterior tibial artery who presents from the office due to unsteadiness of his feet and room spinning sensation  Renal following for YFN on CKD Mx.    Yfn on CKD stage 3- improved and stable  Creatinine Trend: 1.25 <--, 1.69 <--, 1.61 <--, 1.70 <--, 1.59 <--, 1.59 <--, 1.56 <--  likely has underlying diabetic and hypertensive nephropathy  likely pre renal superimposed  Renal US- no obstruction; renal calculi- no hydro  Urine lytes--> intravascular depletion--> s/p Iv nacl @ 75cc for 24 hours  S/P CTA of heart --> cr now stable  s/p cardiac cath- cr stable  daily BMP  dose all meds for eGFR  avoid ACEi/ARB for now/NSAIDs/Nephrotoxics if able.  encourage po intake  will monitor with you      Hypokalemia  s/p kcl 20meq po x 1  trend k    Dr Hess  [phone]

## 2023-06-23 NOTE — PROGRESS NOTE ADULT - SUBJECTIVE AND OBJECTIVE BOX
Structural Heart Team    Mr Madison complains only of some dizziness. He denies chest pain/pressure, sob and dizziness. There were no acute events overnight.       REVIEW OF SYSTEMS:    CONSTITUTIONAL: No weakness, fevers or chills  EYES/ENT: No visual changes;  No vertigo or throat pain   NECK: No pain or stiffness  RESPIRATORY: No cough, wheezing, hemoptysis; No shortness of breath  CARDIOVASCULAR: No chest pain or palpitations  GASTROINTESTINAL: No abdominal or epigastric pain. No nausea, vomiting, or hematemesis; No diarrhea or constipation. No melena or hematochezia.  GENITOURINARY: No dysuria, frequency or hematuria  NEUROLOGICAL: No numbness or weakness  SKIN: No itching, rashes      Allergies    No Known Allergies    Intolerances      Vital Signs Last 24 Hrs  T(C): 36.6 (23 Jun 2023 12:00), Max: 36.8 (22 Jun 2023 19:15)  T(F): 97.9 (23 Jun 2023 12:00), Max: 98.3 (23 Jun 2023 04:32)  HR: 66 (23 Jun 2023 12:00) (61 - 72)  BP: 129/66 (23 Jun 2023 12:00) (121/61 - 173/68)  BP(mean): --  RR: 18 (23 Jun 2023 12:00) (14 - 18)  SpO2: 98% (23 Jun 2023 12:00) (95% - 99%)    Parameters below as of 23 Jun 2023 12:00  Patient On (Oxygen Delivery Method): room air        MEDICATIONS  (STANDING):  acetaminophen   IVPB .. 1000 milliGRAM(s) IV Intermittent once  amLODIPine   Tablet 10 milliGRAM(s) Oral daily  aspirin  chewable 81 milliGRAM(s) Oral daily  atorvastatin 40 milliGRAM(s) Oral at bedtime  chlorhexidine 2% Cloths 1 Application(s) Topical daily  clopidogrel Tablet 75 milliGRAM(s) Oral daily  dextrose 5%. 1000 milliLiter(s) (50 mL/Hr) IV Continuous <Continuous>  dextrose 5%. 1000 milliLiter(s) (100 mL/Hr) IV Continuous <Continuous>  dextrose 50% Injectable 12.5 Gram(s) IV Push once  dextrose 50% Injectable 25 Gram(s) IV Push once  dextrose 50% Injectable 25 Gram(s) IV Push once  gabapentin 100 milliGRAM(s) Oral three times a day  glucagon  Injectable 1 milliGRAM(s) IntraMuscular once  insulin glargine Injectable (LANTUS) 15 Unit(s) SubCutaneous at bedtime  insulin lispro (ADMELOG) corrective regimen sliding scale   SubCutaneous at bedtime  insulin lispro (ADMELOG) corrective regimen sliding scale   SubCutaneous three times a day before meals  isosorbide   mononitrate ER Tablet (IMDUR) 60 milliGRAM(s) Oral daily  memantine 5 milliGRAM(s) Oral two times a day  metoprolol succinate  milliGRAM(s) Oral daily  multivitamin 1 Tablet(s) Oral daily  pantoprazole    Tablet 40 milliGRAM(s) Oral before breakfast  polyethylene glycol 3350 17 Gram(s) Oral at bedtime  senna 2 Tablet(s) Oral at bedtime  sodium chloride 0.9%. 1000 milliLiter(s) (50 mL/Hr) IV Continuous <Continuous>      Exam-  General: NAD, WDWN, appropriate affect  Cardiac: s1s2, RRR, III/VI diastolic murmur LUSB  Pulmonary: CTA b/l, no w/r/r  Gastrointestinal: soft abdomen, nontender, nondistended, + bowel sounds throughout  Extremities: no edema, 2+ DP pulses  Neuro: A&Ox3, nonfocal                          12.0   6.98  )-----------( 193      ( 22 Jun 2023 07:18 )             35.9   06-23    142  |  104  |  24<H>  ----------------------------<  156<H>  3.4<L>   |  22  |  1.25    Ca    8.5      23 Jun 2023 07:02  Mg     1.6     06-23      I&O's Summary    22 Jun 2023 07:01  -  23 Jun 2023 07:00  --------------------------------------------------------  IN: 0 mL / OUT: 900 mL / NET: -900 mL    23 Jun 2023 07:01  -  23 Jun 2023 15:05  --------------------------------------------------------  IN: 600 mL / OUT: 750 mL / NET: -150 mL              Assessment/Plan:  Mr Madison is a 76 y/o male with PMHx significant for aortic valve replacement, vertigo, HLD, BPH, DM, HTN presenting with dyspnea and dizziness.  - Ria TAVR testing competed ([facility] showed mod disease of the LAD with medical management recommended)  - cardiac CTA preliminarily appears suitable for Ria TAVR with transfemoral approach  - we will tentatively schedule his procedure for 7/3  - he will see a CT Surgeon as an outpatient on the day of his presurgical testing  - I discussed the above plan with the patient as well as his granddaughter Bethanie and they are in agreement  - Structural Heart will sign off at this time  - further management and d/c planning per primary team    FERMIN Samuels  [phone]

## 2023-06-23 NOTE — PROGRESS NOTE ADULT - ASSESSMENT
Patient seen and examined, agree with above assessment and plan as transcribed above.    - for outpt jamie    Chalino Evans MD, [facility]  BEEPER [phone]

## 2023-06-24 ENCOUNTER — TRANSCRIPTION ENCOUNTER (OUTPATIENT)
Age: 78
End: 2023-06-24

## 2023-06-24 LAB
ANION GAP SERPL CALC-SCNC: 14 MMOL/L — SIGNIFICANT CHANGE UP (ref 5–17)
BUN SERPL-MCNC: 22 MG/DL — SIGNIFICANT CHANGE UP (ref 7–23)
CALCIUM SERPL-MCNC: 9.1 MG/DL — SIGNIFICANT CHANGE UP (ref 8.4–10.5)
CHLORIDE SERPL-SCNC: 104 MMOL/L — SIGNIFICANT CHANGE UP (ref 96–108)
CO2 SERPL-SCNC: 22 MMOL/L — SIGNIFICANT CHANGE UP (ref 22–31)
CREAT SERPL-MCNC: 1.43 MG/DL — HIGH (ref 0.5–1.3)
EGFR: 50 ML/MIN/1.73M2 — LOW
GLUCOSE BLDC GLUCOMTR-MCNC: 170 MG/DL — HIGH (ref 70–99)
GLUCOSE BLDC GLUCOMTR-MCNC: 192 MG/DL — HIGH (ref 70–99)
GLUCOSE BLDC GLUCOMTR-MCNC: 221 MG/DL — HIGH (ref 70–99)
GLUCOSE BLDC GLUCOMTR-MCNC: 225 MG/DL — HIGH (ref 70–99)
GLUCOSE SERPL-MCNC: 149 MG/DL — HIGH (ref 70–99)
MAGNESIUM SERPL-MCNC: 1.8 MG/DL — SIGNIFICANT CHANGE UP (ref 1.6–2.6)
POTASSIUM SERPL-MCNC: 4 MMOL/L — SIGNIFICANT CHANGE UP (ref 3.5–5.3)
POTASSIUM SERPL-SCNC: 4 MMOL/L — SIGNIFICANT CHANGE UP (ref 3.5–5.3)
SODIUM SERPL-SCNC: 140 MMOL/L — SIGNIFICANT CHANGE UP (ref 135–145)

## 2023-06-24 RX ORDER — HYDROCHLOROTHIAZIDE 25 MG
1 TABLET ORAL
Refills: 0 | DISCHARGE

## 2023-06-24 RX ORDER — VALSARTAN 80 MG/1
1 TABLET ORAL
Refills: 0 | DISCHARGE

## 2023-06-24 RX ORDER — ACETAMINOPHEN 500 MG
2 TABLET ORAL
Qty: 0 | Refills: 0 | DISCHARGE
Start: 2023-06-24

## 2023-06-24 RX ADMIN — Medication 81 MILLIGRAM(S): at 11:30

## 2023-06-24 RX ADMIN — CHLORHEXIDINE GLUCONATE 1 APPLICATION(S): 213 SOLUTION TOPICAL at 11:32

## 2023-06-24 RX ADMIN — POLYETHYLENE GLYCOL 3350 17 GRAM(S): 17 POWDER, FOR SOLUTION ORAL at 22:05

## 2023-06-24 RX ADMIN — Medication 1 TABLET(S): at 11:30

## 2023-06-24 RX ADMIN — Medication 12.5 MILLIGRAM(S): at 22:04

## 2023-06-24 RX ADMIN — INSULIN GLARGINE 15 UNIT(S): 100 INJECTION, SOLUTION SUBCUTANEOUS at 22:04

## 2023-06-24 RX ADMIN — ISOSORBIDE MONONITRATE 60 MILLIGRAM(S): 60 TABLET, EXTENDED RELEASE ORAL at 11:30

## 2023-06-24 RX ADMIN — SENNA PLUS 2 TABLET(S): 8.6 TABLET ORAL at 22:04

## 2023-06-24 RX ADMIN — Medication 12.5 MILLIGRAM(S): at 05:53

## 2023-06-24 RX ADMIN — MEMANTINE HYDROCHLORIDE 5 MILLIGRAM(S): 10 TABLET ORAL at 05:54

## 2023-06-24 RX ADMIN — ATORVASTATIN CALCIUM 40 MILLIGRAM(S): 80 TABLET, FILM COATED ORAL at 22:05

## 2023-06-24 RX ADMIN — Medication 100 MILLIGRAM(S): at 05:53

## 2023-06-24 RX ADMIN — GABAPENTIN 100 MILLIGRAM(S): 400 CAPSULE ORAL at 13:15

## 2023-06-24 RX ADMIN — Medication 4: at 11:32

## 2023-06-24 RX ADMIN — GABAPENTIN 100 MILLIGRAM(S): 400 CAPSULE ORAL at 22:05

## 2023-06-24 RX ADMIN — AMLODIPINE BESYLATE 10 MILLIGRAM(S): 2.5 TABLET ORAL at 05:53

## 2023-06-24 RX ADMIN — MEMANTINE HYDROCHLORIDE 5 MILLIGRAM(S): 10 TABLET ORAL at 17:25

## 2023-06-24 RX ADMIN — Medication 2: at 17:25

## 2023-06-24 RX ADMIN — Medication 4: at 07:35

## 2023-06-24 RX ADMIN — PANTOPRAZOLE SODIUM 40 MILLIGRAM(S): 20 TABLET, DELAYED RELEASE ORAL at 05:53

## 2023-06-24 RX ADMIN — CLOPIDOGREL BISULFATE 75 MILLIGRAM(S): 75 TABLET, FILM COATED ORAL at 11:30

## 2023-06-24 RX ADMIN — GABAPENTIN 100 MILLIGRAM(S): 400 CAPSULE ORAL at 05:53

## 2023-06-24 NOTE — DISCHARGE NOTE PROVIDER - CARE PROVIDER_API CALL
Eda Plummer  Cardiovascular Disease  [address] [facility], [address] [place] [address]  Phone: [phone]  Fax: [phone]  Scheduled Appointment: 07/07/2023    Contreras Coffman  Interventional Cardiology  [address] [facility], [address] [place] [address]  Phone: [phone]  Fax: [phone]  Follow Up Time: 1 week   Eda Plummer  Cardiovascular Disease  [address] [facility], [address]  [place] [address]  Phone: [phone]  Fax: [phone]  Scheduled Appointment: 07/07/2023

## 2023-06-24 NOTE — PROGRESS NOTE ADULT - NS ATTEND AMEND GEN_ALL_CORE FT
Patient seen and examined. Agree with plan as detailed in PA/NP Note.     Continue to hold HCTZ and Valsartan in setting of increased Cr    Albert Serna MD  Pager: [phone]

## 2023-06-24 NOTE — PROGRESS NOTE ADULT - SUBJECTIVE AND OBJECTIVE BOX
C A R D I O L O G Y  **********************************    DATE OF SERVICE: 06-24-23    pt seen and examined, no complaints, ROS - .        acetaminophen     Tablet .. 650 milliGRAM(s) Oral every 6 hours PRN  acetaminophen   IVPB .. 1000 milliGRAM(s) IV Intermittent once  amLODIPine   Tablet 10 milliGRAM(s) Oral daily  aspirin  chewable 81 milliGRAM(s) Oral daily  atorvastatin 40 milliGRAM(s) Oral at bedtime  benzocaine/menthol Lozenge 1 Lozenge Oral four times a day PRN  chlorhexidine 2% Cloths 1 Application(s) Topical daily  clopidogrel Tablet 75 milliGRAM(s) Oral daily  dextrose 5%. 1000 milliLiter(s) IV Continuous <Continuous>  dextrose 5%. 1000 milliLiter(s) IV Continuous <Continuous>  dextrose 50% Injectable 12.5 Gram(s) IV Push once  dextrose 50% Injectable 25 Gram(s) IV Push once  dextrose 50% Injectable 25 Gram(s) IV Push once  dextrose Oral Gel 15 Gram(s) Oral once PRN  gabapentin 100 milliGRAM(s) Oral three times a day  glucagon  Injectable 1 milliGRAM(s) IntraMuscular once  insulin glargine Injectable (LANTUS) 15 Unit(s) SubCutaneous at bedtime  insulin lispro (ADMELOG) corrective regimen sliding scale   SubCutaneous three times a day before meals  insulin lispro (ADMELOG) corrective regimen sliding scale   SubCutaneous at bedtime  isosorbide   mononitrate ER Tablet (IMDUR) 60 milliGRAM(s) Oral daily  meclizine 12.5 milliGRAM(s) Oral every 8 hours PRN  memantine 5 milliGRAM(s) Oral two times a day  metoprolol succinate  milliGRAM(s) Oral daily  multivitamin 1 Tablet(s) Oral daily  pantoprazole    Tablet 40 milliGRAM(s) Oral before breakfast  polyethylene glycol 3350 17 Gram(s) Oral at bedtime  senna 2 Tablet(s) Oral at bedtime  sodium chloride 0.9%. 1000 milliLiter(s) IV Continuous <Continuous>                            12.1   8.92  )-----------( 209 ( 23 Jun 2023 15:00 )             35.9       Hemoglobin: 12.1 g/dL (06-23 @ 15:00)  Hemoglobin: 12.0 g/dL (06-22 @ 07:18)      06-24    140  |  104  |  22  ----------------------------<  149<H>  4.0   |  22  |  1.43<H>    Ca    9.1      24 Jun 2023 07:15  Mg     1.8     06-24      Creatinine Trend: 1.43<--, 1.25<--, 1.69<--, 1.61<--, 1.70<--, 1.59<--    COAGS:           T(C): 36.8 (06-24-23 @ 04:37), Max: 36.8 (06-24-23 @ 04:37)  HR: 75 (06-24-23 @ 04:37) (60 - 75)  BP: 123/62 (06-24-23 @ 04:37) (123/62 - 156/65)  RR: 18 (06-24-23 @ 04:37) (18 - 18)  SpO2: 98% (06-24-23 @ 04:37) (94% - 98%)  Wt(kg): --    I&O's Summary    23 Jun 2023 07:01  -  24 Jun 2023 07:00  --------------------------------------------------------  IN: 600 mL / OUT: 1950 mL / NET: -1350 mL      Gen: NAD  HEENT:  (-)icterus (-)pallor  CV: N S1 S2 1/6 MORENA (+)2 Pulses B/l  Resp:  Clear to auscultation B/L, normal effort  GI: (+) BS Soft, NT, ND  Lymph:  (-)Edema, (-)obvious lymphadenopathy  Skin: Warm to touch, Normal turgor  Psych: Appropriate mood and affect      TELEMETRY: SR 60-80    < from: TTE Limited W or WO Ultrasound Enhancing Agent (06.12.23 @ 10:51) >  CONCLUSIONS:      1. Technically difficult image quality.   2. There is increasedLV mass and concentric hypertrophy.   3. Mild left ventricular hypertrophy.   4. The left ventricular systolic function is normal with an ejection fraction of 68 % by Roach's method of disks. There are no regional wall motion abnormalities seen.   5. Normal right ventricular cavity size, normal wall thickness and normal systolic function. The tricuspid annular plane systolic excursion (TAPSE) is 1.9 cm (normal >=1.7 cm).   6. A bioprosthetic valve replacement present in the aortic position. Aortic valve is not well visualized. Calcualted EOA and dimensionless index are consistent with moderate prosthetic valve stenosis. Consider additional imaging of the aortic valve if clinically indicated.   7. Mild aortic regurgitation.   8. Aortic regurgitation appears intravalvular.   9. Trace mitral regurgitation.  10. Compared to the transesophageal echocardiogram performed on 7/12/2017 aortic valve gradients are higher on the current study. Peak/mean aortic valve gradients were 21/13 mmHg on theprior study.    < end of copied text >    < from: MR Head w/wo IV Cont (06.10.23 @ 19:03) >  IMPRESSION:    1. Ischemic white matter disease and atrophy typical for age    2. Remote petechial hemorrhages    3. No evidence of infarction    --- End of Report ---    < end of copied text >    < from: BALJINDER W or WO Ultrasound Enhancing Agent (06.14.23 @ 15:06) >  CONCLUSIONS:      1. The left ventricular systolic function is normal with an ejection fraction visually estimated at 55 to 60 %.   2. Normal right ventricular cavity size, normal wall thickness and normal systolic function.   3. A bioprosthetic valve replacement present in the aortic position. Severe intravalvular regurgitation originating centrally and is eccentrically directed anteriorly. Bioprosthetic leaflets are suboptimally visualized.   4. Mild mitral regurgitation.   5. 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   6. No evidence of left atrial or left atrial appendage thrombus. The left atrial appendageemptying velocity is normal at 61 cm/s.   7. Mildly dilated proximal ascending aorta, measuring 4.10 cm (indexed 1.93 cm/m²).   8. Compared to the transthoracic echocardiogram performed on 6/12/2023 more significant aortic regurgitation is noted on today's study.    < end of copied text >      ASSESSMENT/PLAN: Pt is a 77-year-old male with a past medical history of hypertension, diabetes, hyperlipidemia, bioprosthetic aortic valve replacement in 2009 by Dr. Ted Piña status post recent lower extremity angiogram status post angioplasty of right anterior tibial artery who presents from the office due to unsteadiness of his feet and room spinning sensation.    #Dizziness  - suspect vertigo  - neuro eval appreciated - MRI with no evidence of infarction  - orthostatics negative  - tele stable with no events    #Bio-AVR prosthetic valve stenosis  - recent office echo with elevated gradients across bioprosthetic aortic valve, inpatient TTE confirmed elevated gradients, moderate prosthetic valve stenosis  - BALJINDER now showing severe intravalvular regurgitation of his bio-AVR  - s/p preop cath with moderate prox LAD lesion 40% - recommend medical management  - Structural heart follow up appreciated - tentative Ria TAVR on 7/3    #HTN  - Continue Amlodipine 10mg daily, Imdur 60mg daily, and Toprol XL 100mg daily. Hold HCTZ and Valsartan given SAMARIA. Can restart Hydralazine if becomes hypertensive    #HLD  - Continue Lipitor    #PAD  - Continue ASA/Plavix and Lipitor    #SAMARIA  - Suspect contrast induced now improved s/p IVF  - Hold HCTZ and Valsartan  - Renal follow up noted    - No further inpatient cardiac w/u planned  - Patient has f/u with Dr. Plummer on 7/7 at 11:30 AM

## 2023-06-24 NOTE — DISCHARGE NOTE PROVIDER - NSDCFUSCHEDAPPT_GEN_ALL_CORE_FT
[facility]  [address] [facility]  Scheduled Appointment: 06/26/2023     Joo Rivera  [facility]  CTSURG [address]  Scheduled Appointment: 06/28/2023

## 2023-06-24 NOTE — PROGRESS NOTE ADULT - SUBJECTIVE AND OBJECTIVE BOX
Patient seen and examined  no complaints    No Known Allergies    Hospital Medications:   MEDICATIONS  (STANDING):  acetaminophen   IVPB .. 1000 milliGRAM(s) IV Intermittent once  amLODIPine   Tablet 10 milliGRAM(s) Oral daily  aspirin  chewable 81 milliGRAM(s) Oral daily  atorvastatin 40 milliGRAM(s) Oral at bedtime  chlorhexidine 2% Cloths 1 Application(s) Topical daily  clopidogrel Tablet 75 milliGRAM(s) Oral daily  dextrose 5%. 1000 milliLiter(s) (50 mL/Hr) IV Continuous <Continuous>  dextrose 5%. 1000 milliLiter(s) (100 mL/Hr) IV Continuous <Continuous>  dextrose 50% Injectable 12.5 Gram(s) IV Push once  dextrose 50% Injectable 25 Gram(s) IV Push once  dextrose 50% Injectable 25 Gram(s) IV Push once  gabapentin 100 milliGRAM(s) Oral three times a day  glucagon  Injectable 1 milliGRAM(s) IntraMuscular once  insulin glargine Injectable (LANTUS) 15 Unit(s) SubCutaneous at bedtime  insulin lispro (ADMELOG) corrective regimen sliding scale   SubCutaneous at bedtime  insulin lispro (ADMELOG) corrective regimen sliding scale   SubCutaneous three times a day before meals  isosorbide   mononitrate ER Tablet (IMDUR) 60 milliGRAM(s) Oral daily  memantine 5 milliGRAM(s) Oral two times a day  metoprolol succinate  milliGRAM(s) Oral daily  multivitamin 1 Tablet(s) Oral daily  pantoprazole    Tablet 40 milliGRAM(s) Oral before breakfast  polyethylene glycol 3350 17 Gram(s) Oral at bedtime  senna 2 Tablet(s) Oral at bedtime  sodium chloride 0.9%. 1000 milliLiter(s) (50 mL/Hr) IV Continuous <Continuous>        VITALS:  T(F): 97.8 (06-24-23 @ 11:09), Max: 98.3 (06-24-23 @ 04:37)  HR: 67 (06-24-23 @ 11:09)  BP: 130/70 (06-24-23 @ 11:09)  RR: 18 (06-24-23 @ 11:09)  SpO2: 98% (06-24-23 @ 11:09)  Wt(kg): --    06-23 @ 07:01  -  06-24 @ 07:00  --------------------------------------------------------  IN: 600 mL / OUT: 1950 mL / NET: -1350 mL    06-24 @ 07:01  -  06-24 @ 13:05  --------------------------------------------------------  IN: 480 mL / OUT: 0 mL / NET: 480 mL        Physical Exam :-  Constitutional: NAD  Neck: Supple.  Respiratory: Bilateral equal breath sounds,  Cardiovascular: S1, S2 normal,  Gastrointestinal: Bowel Sounds present, soft, non tender.  Extremities: No edema  Neurological: Alert and Oriented x 3, no focal deficits  Psychiatric: Normal mood, normal affect    LABS:  06-24    140  |  104  |  22  ----------------------------<  149<H>  4.0   |  22  |  1.43<H>    Ca    9.1      24 Jun 2023 07:15  Mg     1.8     06-24      Creatinine Trend: 1.43 <--, 1.25 <--, 1.69 <--, 1.61 <--, 1.70 <--, 1.59 <--, 1.59 <--                        12.1   8.92  )-----------( 209      ( 23 Jun 2023 15:00 )             35.9     Urine Studies:  Urinalysis Basic - ( 24 Jun 2023 07:15 )    Color:  / Appearance:  / SG:  / pH:   Gluc: 149 mg/dL / Ketone:   / Bili:  / Urobili:    Blood:  / Protein:  / Nitrite:    Leuk Esterase:  / RBC:  / WBC    Sq Epi:  / Non Sq Epi:  / Bacteria:         RADIOLOGY & ADDITIONAL STUDIES:

## 2023-06-24 NOTE — DISCHARGE NOTE PROVIDER - NSDCCPCAREPLAN_GEN_ALL_CORE_FT
PRINCIPAL DISCHARGE DIAGNOSIS  Diagnosis: Vertigo  Assessment and Plan of Treatment: Please continue meclizine as prescribed  Follow-up with your primary care physician within 1 week. Call for appointment.  Please bring all discharge paperwork and list of medications to all follow up appointments  Please call for follow up appointments one day after discharge        SECONDARY DISCHARGE DIAGNOSES  Diagnosis: Prosthetic aortic valve stenosis  Assessment and Plan of Treatment: - Cardiac CTA preliminarily appears suitable for Ria TAVR with transfemoral approach  - tentatively scheduled for this procedure for 7/3  - You will see a CT Surgeon as an outpatient on the day of his presurgical testing

## 2023-06-24 NOTE — DISCHARGE NOTE PROVIDER - NSDCMRMEDTOKEN_GEN_ALL_CORE_FT
amLODIPine 10 mg oral tablet: 1 tab(s) orally once a day  aspirin 81 mg oral delayed release capsule: 1 tab(s) orally once a day  atorvastatin 40 mg oral tablet: 1 tab(s) orally once a day  ciclopirox 0.77% topical lotion: Apply topically to affected area 2 times a day  clopidogrel 75 mg oral tablet: 1 tab(s) orally once a day  diclofenac 1% topical gel: Apply topically to affected area once a day  fluticasone 50 mcg/inh nasal spray: 1 spray(s) in each nostril once a day  gabapentin 100 mg oral capsule: 1 tab(s) orally 3 times a day  hydrALAZINE 100 mg oral tablet: 1 tab(s) orally 3 times a day  hydroCHLOROthiazide 25 mg oral tablet: 1 tab(s) orally once a day  isosorbide mononitrate 60 mg oral tablet, extended release: 1 tab(s) orally once a day  Januvia 50 mg oral tablet: 1 tab(s) orally once a day  memantine 5 mg oral tablet: 1 tab(s) orally 2 times a day  metoprolol succinate 200 mg oral tablet, extended release: 1 tab(s) orally once a day  mometasone 50 mcg/inh nasal spray: 2 spray(s) intranasally once a day  montelukast 10 mg oral tablet: 1 tab(s) orally once a day  nitroglycerin 0.4 mg sublingual tablet: 1 tab(s) sublingually as needed for  chest pain  NovoLOG 100 units/mL injectable solution: 15 unit(s) injectable 3 times a day  omeprazole 20 mg oral delayed release capsule: 1 tab(s) orally once a day  Symbicort 80 mcg-4.5 mcg/inh inhalation aerosol: 2 puff(s) inhaled 2 times a day  Toujeo SoloStar 300 units/mL subcutaneous solution: 15 unit(s) subcutaneous once a day  valsartan 160 mg oral tablet: 1 tab(s) orally once a day   acetaminophen 325 mg oral tablet: 2 tab(s) orally every 6 hours As needed Moderate Pain (4 - 6)  amLODIPine 10 mg oral tablet: 1 tab(s) orally once a day  aspirin 81 mg oral delayed release capsule: 1 tab(s) orally once a day  atorvastatin 40 mg oral tablet: 1 tab(s) orally once a day  ciclopirox 0.77% topical lotion: Apply topically to affected area 2 times a day  clopidogrel 75 mg oral tablet: 1 tab(s) orally once a day  diclofenac 1% topical gel: Apply topically to affected area once a day  fluticasone 50 mcg/inh nasal spray: 1 spray(s) in each nostril once a day  gabapentin 100 mg oral capsule: 1 tab(s) orally 3 times a day  hydrALAZINE 100 mg oral tablet: 1 tab(s) orally 3 times a day  isosorbide mononitrate 60 mg oral tablet, extended release: 1 tab(s) orally once a day  Januvia 50 mg oral tablet: 1 tab(s) orally once a day  memantine 5 mg oral tablet: 1 tab(s) orally 2 times a day  metoprolol succinate 200 mg oral tablet, extended release: 1 tab(s) orally once a day  mometasone 50 mcg/inh nasal spray: 2 spray(s) intranasally once a day  montelukast 10 mg oral tablet: 1 tab(s) orally once a day  nitroglycerin 0.4 mg sublingual tablet: 1 tab(s) sublingually as needed for  chest pain  NovoLOG 100 units/mL injectable solution: 15 unit(s) injectable 3 times a day  omeprazole 20 mg oral delayed release capsule: 1 tab(s) orally once a day  Symbicort 80 mcg-4.5 mcg/inh inhalation aerosol: 2 puff(s) inhaled 2 times a day  Toujeo SoloStar 300 units/mL subcutaneous solution: 15 unit(s) subcutaneous once a day   acetaminophen 325 mg oral tablet: 2 tab(s) orally every 6 hours As needed Moderate Pain (4 - 6)  amLODIPine 10 mg oral tablet: 1 tab(s) orally once a day  aspirin 81 mg oral delayed release capsule: 1 tab(s) orally once a day  atorvastatin 40 mg oral tablet: 1 tab(s) orally once a day  ciclopirox 0.77% topical lotion: Apply topically to affected area 2 times a day  clopidogrel 75 mg oral tablet: 1 tab(s) orally once a day  diclofenac 1% topical gel: Apply topically to affected area once a day  fluticasone 50 mcg/inh nasal spray: 1 spray(s) in each nostril once a day  gabapentin 100 mg oral capsule: 1 tab(s) orally 3 times a day  isosorbide mononitrate 60 mg oral tablet, extended release: 1 tab(s) orally once a day  Januvia 50 mg oral tablet: 1 tab(s) orally once a day  memantine 5 mg oral tablet: 1 tab(s) orally 2 times a day  mometasone 50 mcg/inh nasal spray: 2 spray(s) intranasally once a day  montelukast 10 mg oral tablet: 1 tab(s) orally once a day  Multiple Vitamins oral tablet: 1 tab(s) orally once a day  NovoLOG 100 units/mL injectable solution: 15 unit(s) injectable 3 times a day  omeprazole 20 mg oral delayed release capsule: 1 tab(s) orally once a day  polyethylene glycol 3350 oral powder for reconstitution: 17 gram(s) orally once a day (at bedtime)  senna leaf extract oral tablet: 2 tab(s) orally once a day (at bedtime)  Symbicort 80 mcg-4.5 mcg/inh inhalation aerosol: 2 puff(s) inhaled 2 times a day  Toujeo SoloStar 300 units/mL subcutaneous solution: 15 unit(s) subcutaneous once a day   acetaminophen 325 mg oral tablet: 2 tab(s) orally every 6 hours As needed Moderate Pain (4 - 6)  amLODIPine 10 mg oral tablet: 1 tab(s) orally once a day  aspirin 81 mg oral delayed release capsule: 1 tab(s) orally once a day  atorvastatin 40 mg oral tablet: 1 tab(s) orally once a day  ciclopirox 0.77% topical lotion: Apply topically to affected area 2 times a day  clopidogrel 75 mg oral tablet: 1 tab(s) orally once a day  diclofenac 1% topical gel: Apply topically to affected area once a day  fluticasone 50 mcg/inh nasal spray: 1 spray(s) in each nostril once a day  gabapentin 100 mg oral capsule: 1 tab(s) orally 3 times a day  isosorbide mononitrate 60 mg oral tablet, extended release: 1 tab(s) orally once a day  Januvia 50 mg oral tablet: 1 tab(s) orally once a day  meclizine 12.5 mg oral tablet: 1 tab(s) orally every 8 hours as needed for Dizziness  memantine 5 mg oral tablet: 1 tab(s) orally 2 times a day  metoprolol succinate 100 mg oral tablet, extended release: 1 tab(s) orally once a day  mometasone 50 mcg/inh nasal spray: 2 spray(s) intranasally once a day  montelukast 10 mg oral tablet: 1 tab(s) orally once a day  Multiple Vitamins oral tablet: 1 tab(s) orally once a day  NovoLOG 100 units/mL injectable solution: 15 unit(s) injectable 3 times a day  omeprazole 20 mg oral delayed release capsule: 1 tab(s) orally once a day  polyethylene glycol 3350 oral powder for reconstitution: 17 gram(s) orally once a day (at bedtime)  senna leaf extract oral tablet: 2 tab(s) orally once a day (at bedtime)  Symbicort 80 mcg-4.5 mcg/inh inhalation aerosol: 2 puff(s) inhaled 2 times a day  Toujeo SoloStar 300 units/mL subcutaneous solution: 15 unit(s) subcutaneous once a day

## 2023-06-24 NOTE — PROGRESS NOTE ADULT - ASSESSMENT
a 77-year-old male with a past medical history of hypertension, diabetes, hyperlipidemia, bioprosthetic aortic valvereplacement in 2009 by Dr. Ted Piña status post recent lower extremity angiogram status post angioplasty of right anterior tibial artery who presents from the office due to unsteadiness of his feet and room spinning sensation  Renal following for YFN on CKD Mx.    Yfn on CKD stage 3- improved and stable  Creatinine Trend: 1.43 <--, 1.25 <--, 1.69 <--, 1.61 <--, 1.70 <--, 1.59 <--, 1.59 <--  likely has underlying diabetic and hypertensive nephropathy  likely pre renal superimposed  Renal US- no obstruction; renal calculi- no hydro  Urine lytes--> intravascular depletion--> s/p Iv nacl @ 75cc for 24 hours  S/P CTA of heart --> cr now stable  s/p cardiac cath- cr stable  daily BMP  dose all meds for eGFR  avoid ACEi/ARB for now/NSAIDs/Nephrotoxics if able.  encourage po intake  will monitor with you      Hypokalemia  improved  trend k    Dr Hess  [phone]

## 2023-06-24 NOTE — DISCHARGE NOTE PROVIDER - HOSPITAL COURSE
77 year old male with a pmhx significant for aortic valve replacement on aspirin, hx of vertigo, HLD, BPH, DM, HTN p        # Bio AVR Prosthetic valve stenosis   - TTE confirmed elevated gradients, moderate prosthetic valve stenosis  -  s/p  BALJINDER   TAVR work up in progress   BALJINDER showing severe intravalvular regurgitation of his bio-AVR  - Structural heart consult appreciated -      BALJINDER now showing severe intravalvular regurgitation of his bio-AVR  - s/p preop cath with moderate prox LAD lesion 40% - recommend medical management  - Structural heart follow up appreciated - tentative Ria TAVR on 7/3    Dizziness Orthostatic negative     Start patient on Meclizine     # DM hiss   Follow up A1C     #PAD s/p Angioplasty anterior tibial artery   Plavix 77-year-old male with a past medical history of hypertension, diabetes, hyperlipidemia, bioprosthetic aortic valve replacement in 2009 by Dr. Ted Piña status post recent lower extremity angiogram status post angioplasty of right anterior tibial artery who presents from the office due to unsteadiness of his feet and room spinning sensation. Patient was admitted for further medical management of:    # Bio AVR Prosthetic valve stenosis   - TTE confirmed elevated gradients, moderate prosthetic valve stenosis  -  s/p  BALJINDER   TAVR work up in progress   BALJINDER showing severe intravalvular regurgitation of his bio-AVR  - Structural heart consult appreciated -      BALJINDER now showing severe intravalvular regurgitation of his bio-AVR  - s/p preop cath with moderate prox LAD lesion 40% - recommend medical management  - Structural heart follow up appreciated - tentative Ria TAVR on 7/3    Dizziness Orthostatic negative   - started on Meclizine     # DM hiss   A1C on 6/10 5.6    #PAD s/p Angioplasty anterior tibial artery   Plavix     Discharge/Dispo/Med rec discussed with attending Dr. Mace. Patient medically cleared for discharge home with outpatient follow up.

## 2023-06-24 NOTE — PROGRESS NOTE ADULT - SUBJECTIVE AND OBJECTIVE BOX
Patient is a 77y old  Male who presents with a chief complaint of Vertigo today (10 Gelacio 2023 09:57)      SUBJECTIVE / OVERNIGHT EVENTS: no events      T(C): 36.9 (06-24-23 @ 20:26), Max: 36.9 (06-24-23 @ 20:26)  HR: 71 (06-24-23 @ 20:26) (71 - 72)  BP: 137/64 (06-24-23 @ 20:26) (137/64 - 154/69)  RR: 18 (06-24-23 @ 20:26) (18 - 18)  SpO2: 94% (06-24-23 @ 20:26) (94% - 98%)      MEDICATIONS  (STANDING):  acetaminophen   IVPB .. 1000 milliGRAM(s) IV Intermittent once  amLODIPine   Tablet 10 milliGRAM(s) Oral daily  aspirin  chewable 81 milliGRAM(s) Oral daily  atorvastatin 40 milliGRAM(s) Oral at bedtime  chlorhexidine 2% Cloths 1 Application(s) Topical daily  clopidogrel Tablet 75 milliGRAM(s) Oral daily  dextrose 5%. 1000 milliLiter(s) (50 mL/Hr) IV Continuous <Continuous>  dextrose 5%. 1000 milliLiter(s) (100 mL/Hr) IV Continuous <Continuous>  dextrose 50% Injectable 12.5 Gram(s) IV Push once  dextrose 50% Injectable 25 Gram(s) IV Push once  dextrose 50% Injectable 25 Gram(s) IV Push once  gabapentin 100 milliGRAM(s) Oral three times a day  glucagon  Injectable 1 milliGRAM(s) IntraMuscular once  insulin glargine Injectable (LANTUS) 15 Unit(s) SubCutaneous at bedtime  insulin lispro (ADMELOG) corrective regimen sliding scale   SubCutaneous three times a day before meals  insulin lispro (ADMELOG) corrective regimen sliding scale   SubCutaneous at bedtime  isosorbide   mononitrate ER Tablet (IMDUR) 60 milliGRAM(s) Oral daily  memantine 5 milliGRAM(s) Oral two times a day  metoprolol succinate  milliGRAM(s) Oral daily  multivitamin 1 Tablet(s) Oral daily  pantoprazole    Tablet 40 milliGRAM(s) Oral before breakfast  polyethylene glycol 3350 17 Gram(s) Oral at bedtime  senna 2 Tablet(s) Oral at bedtime    MEDICATIONS  (PRN):  acetaminophen     Tablet .. 650 milliGRAM(s) Oral every 6 hours PRN Moderate Pain (4 - 6)  benzocaine/menthol Lozenge 1 Lozenge Oral four times a day PRN Sore Throat  dextrose Oral Gel 15 Gram(s) Oral once PRN Blood Glucose LESS THAN 70 milliGRAM(s)/deciliter  meclizine 12.5 milliGRAM(s) Oral every 8 hours PRN Dizziness          PHYSICAL EXAM:  GENERAL: NAD, well-developed  HEAD:  Atraumatic, Normocephalic  EYES: EOMI, conjunctiva and sclera clear  NECK: Supple, No JVD  CHEST/LUNG: Clear to auscultation bilaterally; No wheeze  HEART: Regular rate and rhythm; No murmurs, rubs, or gallops  ABDOMEN: Soft, Nontender, Nondistended; Bowel sounds present  EXTREMITIES:  2+ Peripheral Pulses, No clubbing, cyanosis, or edema  PSYCH: AAOx3  NEUROLOGY: non-focal  SKIN: No rashes or lesions                                        12.0   6.98  )-----------( 193      ( 22 Jun 2023 07:18 )             35.9               137|101|35<261  3.5|22|1.69  9.2,1.6,--  06-22 @ 07:18

## 2023-06-24 NOTE — DISCHARGE NOTE PROVIDER - NSDCFUADDAPPT_GEN_ALL_CORE_FT
APPTS ARE READY TO BE MADE: [ ] YES    Best Family or Patient Contact (if needed):    Additional Information about above appointments (if needed):    1:   2:   3:     Other comments or requests:    APPTS ARE READY TO BE MADE: [x] YES    Best Family or Patient Contact (if needed):    Additional Information about above appointments (if needed):    1:   2:   3:     Other comments or requests:    APPTS ARE READY TO BE MADE: [x] YES    Best Family or Patient Contact (if needed):    Additional Information about above appointments (if needed):    1:   2:   3:     Other comments or requests:   Patient was previously scheduled with Dr. Eda Plummer on 07/07/2023 11:30am at [address] [place] [address].    Patient was scheduled with Dr. Coffman on 07/25/2023 3:30pm at [address] [place] [address] through the provider's office. Patient advised of appointment details.

## 2023-06-24 NOTE — DISCHARGE NOTE PROVIDER - PROVIDER TOKENS
PROVIDER:[TOKEN:[[id number]],SCHEDULEDAPPT:[07/07/2023]],PROVIDER:[TOKEN:[[id number]],FOLLOWUP:[1 week]] PROVIDER:[TOKEN:[[id number]],SCHEDULEDAPPT:[07/07/2023]]

## 2023-06-25 ENCOUNTER — TRANSCRIPTION ENCOUNTER (OUTPATIENT)
Age: 78
End: 2023-06-25

## 2023-06-25 VITALS
DIASTOLIC BLOOD PRESSURE: 78 MMHG | HEART RATE: 70 BPM | SYSTOLIC BLOOD PRESSURE: 148 MMHG | TEMPERATURE: 98 F | OXYGEN SATURATION: 97 % | RESPIRATION RATE: 18 BRPM

## 2023-06-25 LAB
ANION GAP SERPL CALC-SCNC: 12 MMOL/L — SIGNIFICANT CHANGE UP (ref 5–17)
BUN SERPL-MCNC: 31 MG/DL — HIGH (ref 7–23)
CALCIUM SERPL-MCNC: 9.1 MG/DL — SIGNIFICANT CHANGE UP (ref 8.4–10.5)
CHLORIDE SERPL-SCNC: 103 MMOL/L — SIGNIFICANT CHANGE UP (ref 96–108)
CO2 SERPL-SCNC: 24 MMOL/L — SIGNIFICANT CHANGE UP (ref 22–31)
CREAT SERPL-MCNC: 1.5 MG/DL — HIGH (ref 0.5–1.3)
EGFR: 47 ML/MIN/1.73M2 — LOW
GLUCOSE BLDC GLUCOMTR-MCNC: 201 MG/DL — HIGH (ref 70–99)
GLUCOSE BLDC GLUCOMTR-MCNC: 220 MG/DL — HIGH (ref 70–99)
GLUCOSE SERPL-MCNC: 187 MG/DL — HIGH (ref 70–99)
POTASSIUM SERPL-MCNC: 3.6 MMOL/L — SIGNIFICANT CHANGE UP (ref 3.5–5.3)
POTASSIUM SERPL-SCNC: 3.6 MMOL/L — SIGNIFICANT CHANGE UP (ref 3.5–5.3)
SODIUM SERPL-SCNC: 139 MMOL/L — SIGNIFICANT CHANGE UP (ref 135–145)

## 2023-06-25 PROCEDURE — 82435 ASSAY OF BLOOD CHLORIDE: CPT

## 2023-06-25 PROCEDURE — 84300 ASSAY OF URINE SODIUM: CPT

## 2023-06-25 PROCEDURE — 70553 MRI BRAIN STEM W/O & W/DYE: CPT | Mod: MA

## 2023-06-25 PROCEDURE — 70450 CT HEAD/BRAIN W/O DYE: CPT | Mod: MA

## 2023-06-25 PROCEDURE — 70496 CT ANGIOGRAPHY HEAD: CPT | Mod: MA

## 2023-06-25 PROCEDURE — 97162 PT EVAL MOD COMPLEX 30 MIN: CPT

## 2023-06-25 PROCEDURE — 83036 HEMOGLOBIN GLYCOSYLATED A1C: CPT

## 2023-06-25 PROCEDURE — 93454 CORONARY ARTERY ANGIO S&I: CPT

## 2023-06-25 PROCEDURE — 82803 BLOOD GASES ANY COMBINATION: CPT

## 2023-06-25 PROCEDURE — 70498 CT ANGIOGRAPHY NECK: CPT | Mod: MA

## 2023-06-25 PROCEDURE — 99285 EMERGENCY DEPT VISIT HI MDM: CPT

## 2023-06-25 PROCEDURE — 71045 X-RAY EXAM CHEST 1 VIEW: CPT

## 2023-06-25 PROCEDURE — 36415 COLL VENOUS BLD VENIPUNCTURE: CPT

## 2023-06-25 PROCEDURE — 84540 ASSAY OF URINE/UREA-N: CPT

## 2023-06-25 PROCEDURE — 84133 ASSAY OF URINE POTASSIUM: CPT

## 2023-06-25 PROCEDURE — 93320 DOPPLER ECHO COMPLETE: CPT

## 2023-06-25 PROCEDURE — 85025 COMPLETE CBC W/AUTO DIFF WBC: CPT

## 2023-06-25 PROCEDURE — 82947 ASSAY GLUCOSE BLOOD QUANT: CPT

## 2023-06-25 PROCEDURE — 84105 ASSAY OF URINE PHOSPHORUS: CPT

## 2023-06-25 PROCEDURE — 82570 ASSAY OF URINE CREATININE: CPT

## 2023-06-25 PROCEDURE — 0042T: CPT

## 2023-06-25 PROCEDURE — 97530 THERAPEUTIC ACTIVITIES: CPT

## 2023-06-25 PROCEDURE — 76377 3D RENDER W/INTRP POSTPROCES: CPT

## 2023-06-25 PROCEDURE — 83735 ASSAY OF MAGNESIUM: CPT

## 2023-06-25 PROCEDURE — A9585: CPT

## 2023-06-25 PROCEDURE — 85610 PROTHROMBIN TIME: CPT

## 2023-06-25 PROCEDURE — 85027 COMPLETE CBC AUTOMATED: CPT

## 2023-06-25 PROCEDURE — 93312 ECHO TRANSESOPHAGEAL: CPT

## 2023-06-25 PROCEDURE — 81001 URINALYSIS AUTO W/SCOPE: CPT

## 2023-06-25 PROCEDURE — 80048 BASIC METABOLIC PNL TOTAL CA: CPT

## 2023-06-25 PROCEDURE — 93325 DOPPLER ECHO COLOR FLOW MAPG: CPT

## 2023-06-25 PROCEDURE — 83605 ASSAY OF LACTIC ACID: CPT

## 2023-06-25 PROCEDURE — 76770 US EXAM ABDO BACK WALL COMP: CPT

## 2023-06-25 PROCEDURE — 97116 GAIT TRAINING THERAPY: CPT

## 2023-06-25 PROCEDURE — 83935 ASSAY OF URINE OSMOLALITY: CPT

## 2023-06-25 PROCEDURE — 82962 GLUCOSE BLOOD TEST: CPT

## 2023-06-25 PROCEDURE — C1894: CPT

## 2023-06-25 PROCEDURE — 75574 CT ANGIO HRT W/3D IMAGE: CPT

## 2023-06-25 PROCEDURE — 84100 ASSAY OF PHOSPHORUS: CPT

## 2023-06-25 PROCEDURE — 82436 ASSAY OF URINE CHLORIDE: CPT

## 2023-06-25 PROCEDURE — 85018 HEMOGLOBIN: CPT

## 2023-06-25 PROCEDURE — 71275 CT ANGIOGRAPHY CHEST: CPT

## 2023-06-25 PROCEDURE — C1760: CPT

## 2023-06-25 PROCEDURE — 85014 HEMATOCRIT: CPT

## 2023-06-25 PROCEDURE — 80053 COMPREHEN METABOLIC PANEL: CPT

## 2023-06-25 PROCEDURE — C1887: CPT

## 2023-06-25 PROCEDURE — 74174 CTA ABD&PLVS W/CONTRAST: CPT

## 2023-06-25 PROCEDURE — 94010 BREATHING CAPACITY TEST: CPT

## 2023-06-25 PROCEDURE — 85730 THROMBOPLASTIN TIME PARTIAL: CPT

## 2023-06-25 PROCEDURE — 87640 STAPH A DNA AMP PROBE: CPT

## 2023-06-25 PROCEDURE — 87641 MR-STAPH DNA AMP PROBE: CPT

## 2023-06-25 PROCEDURE — C1769: CPT

## 2023-06-25 PROCEDURE — C8929: CPT

## 2023-06-25 PROCEDURE — 84295 ASSAY OF SERUM SODIUM: CPT

## 2023-06-25 PROCEDURE — 93880 EXTRACRANIAL BILAT STUDY: CPT

## 2023-06-25 PROCEDURE — 82330 ASSAY OF CALCIUM: CPT

## 2023-06-25 PROCEDURE — 84132 ASSAY OF SERUM POTASSIUM: CPT

## 2023-06-25 PROCEDURE — 84156 ASSAY OF PROTEIN URINE: CPT

## 2023-06-25 PROCEDURE — 97110 THERAPEUTIC EXERCISES: CPT

## 2023-06-25 RX ORDER — POLYETHYLENE GLYCOL 3350 17 G/17G
17 POWDER, FOR SOLUTION ORAL
Qty: 0 | Refills: 0 | DISCHARGE
Start: 2023-06-25

## 2023-06-25 RX ORDER — MECLIZINE HCL 12.5 MG
1 TABLET ORAL
Qty: 30 | Refills: 0
Start: 2023-06-25

## 2023-06-25 RX ORDER — NITROGLYCERIN 6.5 MG
1 CAPSULE, EXTENDED RELEASE ORAL
Refills: 0 | DISCHARGE

## 2023-06-25 RX ORDER — SENNA PLUS 8.6 MG/1
2 TABLET ORAL
Qty: 0 | Refills: 0 | DISCHARGE
Start: 2023-06-25

## 2023-06-25 RX ORDER — METOPROLOL TARTRATE 50 MG
1 TABLET ORAL
Qty: 30 | Refills: 0
Start: 2023-06-25 | End: 2023-07-24

## 2023-06-25 RX ORDER — HYDRALAZINE HCL 50 MG
1 TABLET ORAL
Refills: 0 | DISCHARGE

## 2023-06-25 RX ORDER — METOPROLOL TARTRATE 50 MG
1 TABLET ORAL
Refills: 0 | DISCHARGE

## 2023-06-25 RX ADMIN — PANTOPRAZOLE SODIUM 40 MILLIGRAM(S): 20 TABLET, DELAYED RELEASE ORAL at 05:22

## 2023-06-25 RX ADMIN — GABAPENTIN 100 MILLIGRAM(S): 400 CAPSULE ORAL at 13:38

## 2023-06-25 RX ADMIN — MEMANTINE HYDROCHLORIDE 5 MILLIGRAM(S): 10 TABLET ORAL at 05:20

## 2023-06-25 RX ADMIN — Medication 4: at 11:23

## 2023-06-25 RX ADMIN — Medication 4: at 07:25

## 2023-06-25 RX ADMIN — Medication 81 MILLIGRAM(S): at 11:23

## 2023-06-25 RX ADMIN — CLOPIDOGREL BISULFATE 75 MILLIGRAM(S): 75 TABLET, FILM COATED ORAL at 11:23

## 2023-06-25 RX ADMIN — Medication 100 MILLIGRAM(S): at 05:20

## 2023-06-25 RX ADMIN — ISOSORBIDE MONONITRATE 60 MILLIGRAM(S): 60 TABLET, EXTENDED RELEASE ORAL at 11:23

## 2023-06-25 RX ADMIN — Medication 1 TABLET(S): at 11:23

## 2023-06-25 RX ADMIN — CHLORHEXIDINE GLUCONATE 1 APPLICATION(S): 213 SOLUTION TOPICAL at 11:24

## 2023-06-25 RX ADMIN — AMLODIPINE BESYLATE 10 MILLIGRAM(S): 2.5 TABLET ORAL at 05:20

## 2023-06-25 RX ADMIN — GABAPENTIN 100 MILLIGRAM(S): 400 CAPSULE ORAL at 05:20

## 2023-06-25 NOTE — PROGRESS NOTE ADULT - NS ATTEND AMEND GEN_ALL_CORE FT
Patient seen and examined. Agree with plan as detailed in PA/NP Note.     BP stable would continue to hold HCTZ and ARB      Albert Serna MD  Pager: [phone]

## 2023-06-25 NOTE — PROGRESS NOTE ADULT - SUBJECTIVE AND OBJECTIVE BOX
C A R D I O L O G Y  **********************************    DATE OF SERVICE: 06-25-23    No events overnight       acetaminophen     Tablet .. 650 milliGRAM(s) Oral every 6 hours PRN  acetaminophen   IVPB .. 1000 milliGRAM(s) IV Intermittent once  amLODIPine   Tablet 10 milliGRAM(s) Oral daily  aspirin  chewable 81 milliGRAM(s) Oral daily  atorvastatin 40 milliGRAM(s) Oral at bedtime  benzocaine/menthol Lozenge 1 Lozenge Oral four times a day PRN  chlorhexidine 2% Cloths 1 Application(s) Topical daily  clopidogrel Tablet 75 milliGRAM(s) Oral daily  dextrose 5%. 1000 milliLiter(s) IV Continuous <Continuous>  dextrose 5%. 1000 milliLiter(s) IV Continuous <Continuous>  dextrose 50% Injectable 12.5 Gram(s) IV Push once  dextrose 50% Injectable 25 Gram(s) IV Push once  dextrose 50% Injectable 25 Gram(s) IV Push once  dextrose Oral Gel 15 Gram(s) Oral once PRN  gabapentin 100 milliGRAM(s) Oral three times a day  glucagon  Injectable 1 milliGRAM(s) IntraMuscular once  insulin glargine Injectable (LANTUS) 15 Unit(s) SubCutaneous at bedtime  insulin lispro (ADMELOG) corrective regimen sliding scale   SubCutaneous at bedtime  insulin lispro (ADMELOG) corrective regimen sliding scale   SubCutaneous three times a day before meals  isosorbide   mononitrate ER Tablet (IMDUR) 60 milliGRAM(s) Oral daily  meclizine 12.5 milliGRAM(s) Oral every 8 hours PRN  memantine 5 milliGRAM(s) Oral two times a day  metoprolol succinate  milliGRAM(s) Oral daily  multivitamin 1 Tablet(s) Oral daily  pantoprazole    Tablet 40 milliGRAM(s) Oral before breakfast  polyethylene glycol 3350 17 Gram(s) Oral at bedtime  senna 2 Tablet(s) Oral at bedtime                            12.1   8.92  )-----------( 209      ( 23 Jun 2023 15:00 )             35.9       Hemoglobin: 12.1 g/dL (06-23 @ 15:00)  Hemoglobin: 12.0 g/dL (06-22 @ 07:18)      06-25    139  |  103  |  31<H>  ----------------------------<  187<H>  3.6   |  24  |  1.50<H>    Ca    9.1      25 Jun 2023 07:26  Mg     1.8     06-24      Creatinine Trend: 1.50<--, 1.43<--, 1.25<--, 1.69<--, 1.61<--, 1.70<--    COAGS:           T(C): 36.8 (06-25-23 @ 04:29), Max: 36.9 (06-24-23 @ 20:26)  HR: 78 (06-25-23 @ 04:29) (67 - 78)  BP: 126/62 (06-25-23 @ 04:29) (126/62 - 154/69)  RR: 18 (06-25-23 @ 04:29) (18 - 18)  SpO2: 97% (06-25-23 @ 04:29) (94% - 98%)  Wt(kg): --    I&O's Summary    24 Jun 2023 07:01  -  25 Jun 2023 07:00  --------------------------------------------------------  IN: 960 mL / OUT: 250 mL / NET: 710 mL        Gen: NAD  HEENT:  (-)icterus (-)pallor  CV: N S1 S2 1/6 MORENA (+)2 Pulses B/l  Resp:  Clear to auscultation B/L, normal effort  GI: (+) BS Soft, NT, ND  Lymph:  (-)Edema, (-)obvious lymphadenopathy  Skin: Warm to touch, Normal turgor  Psych: Appropriate mood and affect      TELEMETRY: SR 60-80    < from: TTE Limited W or WO Ultrasound Enhancing Agent (06.12.23 @ 10:51) >  CONCLUSIONS:      1. Technically difficult image quality.   2. There is increasedLV mass and concentric hypertrophy.   3. Mild left ventricular hypertrophy.   4. The left ventricular systolic function is normal with an ejection fraction of 68 % by Roach's method of disks. There are no regional wall motion abnormalities seen.   5. Normal right ventricular cavity size, normal wall thickness and normal systolic function. The tricuspid annular plane systolic excursion (TAPSE) is 1.9 cm (normal >=1.7 cm).   6. A bioprosthetic valve replacement present in the aortic position. Aortic valve is not well visualized. Calcualted EOA and dimensionless index are consistent with moderate prosthetic valve stenosis. Consider additional imaging of the aortic valve if clinically indicated.   7. Mild aortic regurgitation.   8. Aortic regurgitation appears intravalvular.   9. Trace mitral regurgitation.  10. Compared to the transesophageal echocardiogram performed on 7/12/2017 aortic valve gradients are higher on the current study. Peak/mean aortic valve gradients were 21/13 mmHg on theprior study.    < end of copied text >    < from: MR Head w/wo IV Cont (06.10.23 @ 19:03) >  IMPRESSION:    1. Ischemic white matter disease and atrophy typical for age    2. Remote petechial hemorrhages    3. No evidence of infarction    --- End of Report ---    < end of copied text >    < from: BALJINDER W or WO Ultrasound Enhancing Agent (06.14.23 @ 15:06) >  CONCLUSIONS:      1. The left ventricular systolic function is normal with an ejection fraction visually estimated at 55 to 60 %.   2. Normal right ventricular cavity size, normal wall thickness and normal systolic function.   3. A bioprosthetic valve replacement present in the aortic position. Severe intravalvular regurgitation originating centrally and is eccentrically directed anteriorly. Bioprosthetic leaflets are suboptimally visualized.   4. Mild mitral regurgitation.   5. 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   6. No evidence of left atrial or left atrial appendage thrombus. The left atrial appendageemptying velocity is normal at 61 cm/s.   7. Mildly dilated proximal ascending aorta, measuring 4.10 cm (indexed 1.93 cm/m²).   8. Compared to the transthoracic echocardiogram performed on 6/12/2023 more significant aortic regurgitation is noted on today's study.    < end of copied text >      ASSESSMENT/PLAN: Pt is a 77-year-old male with a past medical history of hypertension, diabetes, hyperlipidemia, bioprosthetic aortic valve replacement in 2009 by Dr. Ted Piña status post recent lower extremity angiogram status post angioplasty of right anterior tibial artery who presents from the office due to unsteadiness of his feet and room spinning sensation.    #Dizziness  - suspect vertigo  - neuro eval appreciated - MRI with no evidence of infarction  - orthostatics negative  - tele stable with no events    #Bio-AVR prosthetic valve stenosis  - recent office echo with elevated gradients across bioprosthetic aortic valve, inpatient TTE confirmed elevated gradients, moderate prosthetic valve stenosis  - BALJINDER now showing severe intravalvular regurgitation of his bio-AVR  - s/p preop cath with moderate prox LAD lesion 40% - recommend medical management  - Structural heart follow up appreciated - tentative Ria TAVR on 7/3    #HTN  - Continue Amlodipine 10mg daily, Imdur 60mg daily, and Toprol XL 100mg daily. Hold HCTZ and Valsartan given SAMARIA. Can restart Hydralazine if becomes hypertensive    #HLD  - Continue Lipitor    #PAD  - Continue ASA/Plavix and Lipitor    #SAMARIA  - Suspect contrast induced now improved s/p IVF  - Hold HCTZ and Valsartan  - Renal follow up noted    - No further inpatient cardiac w/u planned  - Patient has f/u with Dr. Plummer on 7/7 at 11:30 AM  C A R D I O L O G Y  **********************************    DATE OF SERVICE: 06-25-23    No events overnight       acetaminophen     Tablet .. 650 milliGRAM(s) Oral every 6 hours PRN  acetaminophen   IVPB .. 1000 milliGRAM(s) IV Intermittent once  amLODIPine   Tablet 10 milliGRAM(s) Oral daily  aspirin  chewable 81 milliGRAM(s) Oral daily  atorvastatin 40 milliGRAM(s) Oral at bedtime  benzocaine/menthol Lozenge 1 Lozenge Oral four times a day PRN  chlorhexidine 2% Cloths 1 Application(s) Topical daily  clopidogrel Tablet 75 milliGRAM(s) Oral daily  dextrose 5%. 1000 milliLiter(s) IV Continuous <Continuous>  dextrose 5%. 1000 milliLiter(s) IV Continuous <Continuous>  dextrose 50% Injectable 12.5 Gram(s) IV Push once  dextrose 50% Injectable 25 Gram(s) IV Push once  dextrose 50% Injectable 25 Gram(s) IV Push once  dextrose Oral Gel 15 Gram(s) Oral once PRN  gabapentin 100 milliGRAM(s) Oral three times a day  glucagon  Injectable 1 milliGRAM(s) IntraMuscular once  insulin glargine Injectable (LANTUS) 15 Unit(s) SubCutaneous at bedtime  insulin lispro (ADMELOG) corrective regimen sliding scale   SubCutaneous at bedtime  insulin lispro (ADMELOG) corrective regimen sliding scale   SubCutaneous three times a day before meals  isosorbide   mononitrate ER Tablet (IMDUR) 60 milliGRAM(s) Oral daily  meclizine 12.5 milliGRAM(s) Oral every 8 hours PRN  memantine 5 milliGRAM(s) Oral two times a day  metoprolol succinate  milliGRAM(s) Oral daily  multivitamin 1 Tablet(s) Oral daily  pantoprazole    Tablet 40 milliGRAM(s) Oral before breakfast  polyethylene glycol 3350 17 Gram(s) Oral at bedtime  senna 2 Tablet(s) Oral at bedtime                            12.1   8.92  )-----------( 209      ( 23 Jun 2023 15:00 )             35.9       Hemoglobin: 12.1 g/dL (06-23 @ 15:00)  Hemoglobin: 12.0 g/dL (06-22 @ 07:18)      06-25    139  |  103  |  31<H>  ----------------------------<  187<H>  3.6   |  24  |  1.50<H>    Ca    9.1      25 Jun 2023 07:26  Mg     1.8     06-24      Creatinine Trend: 1.50<--, 1.43<--, 1.25<--, 1.69<--, 1.61<--, 1.70<--    COAGS:           T(C): 36.8 (06-25-23 @ 04:29), Max: 36.9 (06-24-23 @ 20:26)  HR: 78 (06-25-23 @ 04:29) (67 - 78)  BP: 126/62 (06-25-23 @ 04:29) (126/62 - 154/69)  RR: 18 (06-25-23 @ 04:29) (18 - 18)  SpO2: 97% (06-25-23 @ 04:29) (94% - 98%)  Wt(kg): --    I&O's Summary    24 Jun 2023 07:01  -  25 Jun 2023 07:00  --------------------------------------------------------  IN: 960 mL / OUT: 250 mL / NET: 710 mL        Gen: NAD  HEENT:  (-)icterus (-)pallor  CV: N S1 S2 1/6 MORENA (+)2 Pulses B/l  Resp:  Clear to auscultation B/L, normal effort  GI: (+) BS Soft, NT, ND  Lymph:  (-)Edema, (-)obvious lymphadenopathy  Skin: Warm to touch, Normal turgor  Psych: Appropriate mood and affect      TELEMETRY: SR 60-80    < from: TTE Limited W or WO Ultrasound Enhancing Agent (06.12.23 @ 10:51) >  CONCLUSIONS:      1. Technically difficult image quality.   2. There is increasedLV mass and concentric hypertrophy.   3. Mild left ventricular hypertrophy.   4. The left ventricular systolic function is normal with an ejection fraction of 68 % by Roach's method of disks. There are no regional wall motion abnormalities seen.   5. Normal right ventricular cavity size, normal wall thickness and normal systolic function. The tricuspid annular plane systolic excursion (TAPSE) is 1.9 cm (normal >=1.7 cm).   6. A bioprosthetic valve replacement present in the aortic position. Aortic valve is not well visualized. Calcualted EOA and dimensionless index are consistent with moderate prosthetic valve stenosis. Consider additional imaging of the aortic valve if clinically indicated.   7. Mild aortic regurgitation.   8. Aortic regurgitation appears intravalvular.   9. Trace mitral regurgitation.  10. Compared to the transesophageal echocardiogram performed on 7/12/2017 aortic valve gradients are higher on the current study. Peak/mean aortic valve gradients were 21/13 mmHg on theprior study.    < end of copied text >    < from: MR Head w/wo IV Cont (06.10.23 @ 19:03) >  IMPRESSION:    1. Ischemic white matter disease and atrophy typical for age    2. Remote petechial hemorrhages    3. No evidence of infarction    --- End of Report ---    < end of copied text >    < from: BLAJINDER W or WO Ultrasound Enhancing Agent (06.14.23 @ 15:06) >  CONCLUSIONS:      1. The left ventricular systolic function is normal with an ejection fraction visually estimated at 55 to 60 %.   2. Normal right ventricular cavity size, normal wall thickness and normal systolic function.   3. A bioprosthetic valve replacement present in the aortic position. Severe intravalvular regurgitation originating centrally and is eccentrically directed anteriorly. Bioprosthetic leaflets are suboptimally visualized.   4. Mild mitral regurgitation.   5. 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   6. No evidence of left atrial or left atrial appendage thrombus. The left atrial appendageemptying velocity is normal at 61 cm/s.   7. Mildly dilated proximal ascending aorta, measuring 4.10 cm (indexed 1.93 cm/m²).   8. Compared to the transthoracic echocardiogram performed on 6/12/2023 more significant aortic regurgitation is noted on today's study.    < end of copied text >      ASSESSMENT/PLAN: Pt is a 77-year-old male with a past medical history of hypertension, diabetes, hyperlipidemia, bioprosthetic aortic valve replacement in 2009 by Dr. Ted Piña status post recent lower extremity angiogram status post angioplasty of right anterior tibial artery who presents from the office due to unsteadiness of his feet and room spinning sensation.    #Dizziness  - suspect vertigo  - neuro eval appreciated - MRI with no evidence of infarction  - orthostatics negative  - tele stable with no events    #Bio-AVR prosthetic valve stenosis  - recent office echo with elevated gradients across bioprosthetic aortic valve, inpatient TTE confirmed elevated gradients, moderate prosthetic valve stenosis  - BALJINDER now showing severe intravalvular regurgitation of his bio-AVR  - s/p preop cath with moderate prox LAD lesion 40% - recommend medical management  - Structural heart follow up appreciated - tentative Ria TAVR on 7/3    #HTN  - Continue Amlodipine 10mg daily, Imdur 60mg daily, and Toprol XL 100mg daily. Hold HCTZ and Valsartan given SAMARIA. Can restart Hydralazine if becomes hypertensive    #HLD  - Continue Lipitor    #PAD  - Continue ASA/Plavix and Lipitor    #SAMARIA  - Suspect contrast induced now improved s/p IVF  - Hold HCTZ and Valsartan  - Renal follow up noted    - No further inpatient cardiac w/u planned  - Patient has f/u with Dr. Plummer on 7/7 at 11:30 AM

## 2023-06-25 NOTE — DISCHARGE NOTE NURSING/CASE MANAGEMENT/SOCIAL WORK - PATIENT PORTAL LINK FT
You can access the FollowMyHealth Patient Portal offered by [facility] by registering at the following website: [email or web address]. By joining [facility]’s FollowMyHealth portal, you will also be able to view your health information using other applications (apps) compatible with our system.

## 2023-06-25 NOTE — PROGRESS NOTE ADULT - NS ATTEND OPT1 GEN_ALL_CORE
I attest my time as attending is greater than 50% of the total combined time spent on qualifying patient care activities by the PA/NP and attending.
I independently performed the documented:

## 2023-06-25 NOTE — PROGRESS NOTE ADULT - PROVIDER SPECIALTY LIST ADULT
Cardiology
Internal Medicine
Internal Medicine
Nephrology
Structural Heart
Cardiology
Hospitalist
Internal Medicine
Nephrology
Structural Heart
Structural Heart
Cardiology
Hospitalist
Hospitalist
Internal Medicine
Nephrology
Structural Heart
Structural Heart
Nephrology
Nephrology
Structural Heart

## 2023-06-26 ENCOUNTER — APPOINTMENT (OUTPATIENT)
Dept: OTOLARYNGOLOGY | Facility: CLINIC | Age: 78
End: 2023-06-26

## 2023-06-26 NOTE — CHART NOTE - NSCHARTNOTEFT_GEN_A_CORE
1 attempt was made to reach patient, which have been unsuccessful. Unable to leave voicemail on 6/26/2023

## 2023-06-28 ENCOUNTER — APPOINTMENT (OUTPATIENT)
Dept: CARDIOTHORACIC SURGERY | Facility: CLINIC | Age: 78
End: 2023-06-28
Payer: MEDICARE

## 2023-06-28 ENCOUNTER — OUTPATIENT (OUTPATIENT)
Dept: OUTPATIENT SERVICES | Facility: HOSPITAL | Age: 78
LOS: 1 days | End: 2023-06-28
Payer: COMMERCIAL

## 2023-06-28 VITALS
OXYGEN SATURATION: 98 % | TEMPERATURE: 98.1 F | DIASTOLIC BLOOD PRESSURE: 70 MMHG | RESPIRATION RATE: 16 BRPM | WEIGHT: 195 LBS | SYSTOLIC BLOOD PRESSURE: 153 MMHG | BODY MASS INDEX: 32.49 KG/M2 | HEIGHT: 65 IN | HEART RATE: 66 BPM

## 2023-06-28 VITALS
SYSTOLIC BLOOD PRESSURE: 162 MMHG | WEIGHT: 188.94 LBS | DIASTOLIC BLOOD PRESSURE: 67 MMHG | HEART RATE: 64 BPM | HEIGHT: 65 IN | OXYGEN SATURATION: 99 % | RESPIRATION RATE: 14 BRPM | TEMPERATURE: 98 F

## 2023-06-28 DIAGNOSIS — Z29.9 ENCOUNTER FOR PROPHYLACTIC MEASURES, UNSPECIFIED: ICD-10-CM

## 2023-06-28 DIAGNOSIS — G47.33 OBSTRUCTIVE SLEEP APNEA (ADULT) (PEDIATRIC): ICD-10-CM

## 2023-06-28 DIAGNOSIS — I35.0 NONRHEUMATIC AORTIC (VALVE) STENOSIS: ICD-10-CM

## 2023-06-28 DIAGNOSIS — E11.9 TYPE 2 DIABETES MELLITUS WITHOUT COMPLICATIONS: ICD-10-CM

## 2023-06-28 DIAGNOSIS — Z01.818 ENCOUNTER FOR OTHER PREPROCEDURAL EXAMINATION: ICD-10-CM

## 2023-06-28 DIAGNOSIS — Z95.2 PRESENCE OF PROSTHETIC HEART VALVE: Chronic | ICD-10-CM

## 2023-06-28 DIAGNOSIS — Z98.890 OTHER SPECIFIED POSTPROCEDURAL STATES: Chronic | ICD-10-CM

## 2023-06-28 DIAGNOSIS — Z90.49 ACQUIRED ABSENCE OF OTHER SPECIFIED PARTS OF DIGESTIVE TRACT: Chronic | ICD-10-CM

## 2023-06-28 LAB
ANION GAP SERPL CALC-SCNC: 13 MMOL/L — SIGNIFICANT CHANGE UP (ref 5–17)
BLD GP AB SCN SERPL QL: NEGATIVE — SIGNIFICANT CHANGE UP
BUN SERPL-MCNC: 25 MG/DL — HIGH (ref 7–23)
CALCIUM SERPL-MCNC: 9.1 MG/DL — SIGNIFICANT CHANGE UP (ref 8.4–10.5)
CHLORIDE SERPL-SCNC: 104 MMOL/L — SIGNIFICANT CHANGE UP (ref 96–108)
CO2 SERPL-SCNC: 23 MMOL/L — SIGNIFICANT CHANGE UP (ref 22–31)
CREAT SERPL-MCNC: 1.49 MG/DL — HIGH (ref 0.5–1.3)
EGFR: 48 ML/MIN/1.73M2 — LOW
GLUCOSE SERPL-MCNC: 140 MG/DL — HIGH (ref 70–99)
POTASSIUM SERPL-MCNC: 4.3 MMOL/L — SIGNIFICANT CHANGE UP (ref 3.5–5.3)
POTASSIUM SERPL-SCNC: 4.3 MMOL/L — SIGNIFICANT CHANGE UP (ref 3.5–5.3)
RH IG SCN BLD-IMP: POSITIVE — SIGNIFICANT CHANGE UP
SODIUM SERPL-SCNC: 140 MMOL/L — SIGNIFICANT CHANGE UP (ref 135–145)

## 2023-06-28 PROCEDURE — 86900 BLOOD TYPING SEROLOGIC ABO: CPT

## 2023-06-28 PROCEDURE — 99214 OFFICE O/P EST MOD 30 MIN: CPT

## 2023-06-28 PROCEDURE — 86901 BLOOD TYPING SEROLOGIC RH(D): CPT

## 2023-06-28 PROCEDURE — G0463: CPT

## 2023-06-28 PROCEDURE — 87641 MR-STAPH DNA AMP PROBE: CPT

## 2023-06-28 PROCEDURE — 80048 BASIC METABOLIC PNL TOTAL CA: CPT

## 2023-06-28 PROCEDURE — 86850 RBC ANTIBODY SCREEN: CPT

## 2023-06-28 PROCEDURE — 87640 STAPH A DNA AMP PROBE: CPT

## 2023-06-28 RX ORDER — AMLODIPINE BESYLATE 2.5 MG/1
1 TABLET ORAL
Qty: 0 | Refills: 0 | DISCHARGE

## 2023-06-28 RX ORDER — ASPIRIN/CALCIUM CARB/MAGNESIUM 324 MG
1 TABLET ORAL
Qty: 0 | Refills: 0 | DISCHARGE

## 2023-06-28 RX ORDER — ATORVASTATIN CALCIUM 80 MG/1
1 TABLET, FILM COATED ORAL
Qty: 0 | Refills: 0 | DISCHARGE

## 2023-06-28 NOTE — H&P PST ADULT - AS BP NONINV METHOD
Patient called stating that [facility] did not get test strips prescription for the one touch verio   Spoke with [facility] pharmacist did verbal over the phone  Patient has been notified   electronic

## 2023-06-28 NOTE — ASSESSMENT
[FreeTextEntry1] : 77M with severe symptomatic bioprosthetic aortic valve stenosis.  He is an appropriate candidate to proceed with juan TAVR\par \par I reviewed the cardiac imaging, medical records and reports with patient and discussed the case.  I discussed the risks, benefits and alternatives to both surgical aortic valve replacement (SAVR) and Transcatheter Aortic Valve Replacement (TAVR). Risks included but not limited to bleeding, stroke, Myocardial Infarction, kidney problems, blood transfusion, permanent  pacemaker implantation, infections and death. I also discussed the various approaches in detail.  I feel that the patient will benefit and is a candidate for TAVR. All questions and concerns were addressed and patient agrees to proceed with TAVR.\par \par Plan:\par \par - TAVR 7/23

## 2023-06-28 NOTE — H&P PST ADULT - PROBLEM SELECTOR PLAN 1
Scheduled for TAVR on 7/3/2023  Recently admitted-ekg, echo, cath, carotid dopplers and CXR in chart and in [place]  Seen by Dr. Rivera today (6/28/2023) for preop cardiac sz appointment  To continue medications as listed on instruction sheet, may continue ASA and Plavix  Labs pending results BMP, T&S, and MRSA, other labs in [place]  PCP eval done today 6/2028

## 2023-06-28 NOTE — PHYSICAL EXAM
[General Appearance - Alert] : alert [General Appearance - In No Acute Distress] : in no acute distress [General Appearance - Well Nourished] : well nourished [General Appearance - Well Developed] : well developed [Sclera] : the sclera and conjunctiva were normal [Neck Appearance] : the appearance of the neck was normal [Jugular Venous Distention Increased] : there was no jugular-venous distention [] : no respiratory distress [Respiration, Rhythm And Depth] : normal respiratory rhythm and effort [Exaggerated Use Of Accessory Muscles For Inspiration] : no accessory muscle use [Auscultation Breath Sounds / Voice Sounds] : lungs were clear to auscultation bilaterally [Apical Impulse] : the apical impulse was normal [Bowel Sounds] : normal bowel sounds [Abdomen Soft] : soft [Abdomen Tenderness] : non-tender [Involuntary Movements] : no involuntary movements were seen [No Focal Deficits] : no focal deficits [Oriented To Time, Place, And Person] : oriented to person, place, and time [Impaired Insight] : insight and judgment were intact [Affect] : the affect was normal [Mood] : the mood was normal

## 2023-06-28 NOTE — HISTORY OF PRESENT ILLNESS
[FreeTextEntry1] : 77-year-old male with a past medical history of hypertension, diabetes, hyperlipidemia, bioprosthetic aortic valve replacement in 2009 by Dr. Ted Piña status post recent lower extremity angiogram status post angioplasty of right anterior tibial artery who was recently admitted due to unsteadiness of his feet and room spinning sensation. Patient was admitted for further medical management. \par \par Workup revealed Bio AVR Prosthetic valve stenosis. TTE confirmed elevated gradients, moderate prosthetic valve stenosis s/p  BALJINDER.  Seen in house by structural heart and is scheduled for Ria TAVR 7/3. \par \par (LHC showed mod disease of the LAD with medical management recommended), cardiac CTA preliminarily appears suitable for Ria TAVR with transfemoral approach. \par \par Presents with his granddaughter Bethanie.  He reports since DC his dizziness and unsteadiness is better but still present.  He reports that prior to his first AVR in 2009 he was also dizzy and unsteady prior to requiring valve replacement.  Reports SOB with exertion and laying flat.  Sleeps with 2 pillows.  Deniies swelling to LE. \par \par acetaminophen 325 mg oral tablet: 2 tab(s) orally every 6 hours As needed Moderate Pain (4 - 6) \par amLODIPine 10 mg oral tablet: 1 tab(s) orally once a day \par aspirin 81 mg oral delayed release capsule: 1 tab(s) orally once a day \par atorvastatin 40 mg oral tablet: 1 tab(s) orally once a day \par ciclopirox 0.77% topical lotion: Apply topically to affected area 2 times a day \par clopidogrel 75 mg oral tablet: 1 tab(s) orally once a day \par diclofenac 1% topical gel: Apply topically to affected area once a day \par fluticasone 50 mcg/inh nasal spray: 1 spray(s) in each nostril once a day \par gabapentin 100 mg oral capsule: 1 tab(s) orally 3 times a day \par isosorbide mononitrate 60 mg oral tablet, extended release: 1 tab(s) orally \par Januvia 50 mg oral tablet: 1 tab(s) orally once a day \par meclizine 12.5 mg oral tablet: 1 tab(s) orally every 8 hours as needed for Dizziness \par memantine 5 mg oral tablet: 1 tab(s) orally 2 times a day \par metoprolol succinate 100 mg oral tablet, extended release: 1 tab(s) orally once a day \par mometasone 50 mcg/inh nasal spray: 2 spray(s) intranasally once a day \par montelukast 10 mg oral tablet: 1 tab(s) orally once a day \par Multiple Vitamins oral tablet: 1 tab(s) orally once a day \par NovoLOG 100 units/mL injectable solution: 15 unit(s) injectable 3 times a day \par omeprazole 20 mg oral delayed release capsule: 1 tab(s) orally once a day \par polyethylene glycol 3350 oral powder for reconstitution: 17 gram(s) orally once a day (at bedtime) \par senna leaf extract oral tablet: 2 tab(s) orally once a day (at bedtime) \par Symbicort 80 mcg-4.5 mcg/inh inhalation aerosol: 2 puff(s) inhaled 2 times a day \par Toujeo SoloStar 300 units/mL subcutaneous solution: 15 unit(s) subcutaneous once a day \par \par Dr. Ash for PCP\par Dr. Strauss for cards 7/12\par Plans to fu with Dr. Plummer 7/7\par , \par

## 2023-06-28 NOTE — H&P PST ADULT - NEUROLOGICAL COMMENTS
had c/o dizziness upon admission to [facility], code Stroke called w/u negative, dx with Aortic stenosis

## 2023-06-28 NOTE — H&P PST ADULT - PROBLEM SELECTOR PLAN 2
Class III - visualization of the soft palate and the base of the uvula To hold diabetes insulin DOS, evening dose on 7/2 will only be 12 units  To hold Januvia X3 days preop  FS upon admission Class I (easy) - visualization of the soft palate, fauces, uvula, and both anterior and posterior pillars

## 2023-06-28 NOTE — H&P PST ADULT - HISTORY OF PRESENT ILLNESS
Mr. Madison is a 77 year old man with PMH HTN, HLD, T2DM on insulin, BPH, mild memory issues, CAD, ROMERO, bioprosthetic aortic valve replacement in 2009 by Dr. Ted Piña and s/p recent LE angiogram s/p angioplasty of right anterior tibial artery on ASA and Plavix.  He was recently admitted to [facility] for unsteadiness on his feet and room spinning sensation, he was r/o for Stroke and cardio work up revealed Bio AVR Prosthetic valve stenosis, BALJINDER confirmed elevated gradients, moderate prosthetic valve stenosis s/p BALJINDER and is now scheduled for TAVR on 7/3/2023.  He presently feels better since discharge, has some ROMERO after walking long distances.  Denies dizziness while in PST, after PST was heading to Dr. Rivera's office (Cardiothoracic Surgery).  He is accompanied by his granddaughter.

## 2023-06-28 NOTE — H&P PST ADULT - NSICDXPASTSURGICALHX_GEN_ALL_CORE_FT
PAST SURGICAL HISTORY:  Aortic valve replaced 2009 with bovine tissue valve    History of cholecystectomy     History of ear surgery ? surgery on left ear    Hx of cholecystectomy     S/P AVR (aortic valve replacement) 2009 @ [place] (bovine)

## 2023-06-28 NOTE — H&P PST ADULT - ASSESSMENT
Dentures: no loose teeth, no dentures    DASI activity:  ADLs, light housekeeping, can walk up a flight of stairs slowly, can walk 1-2 blocks, limited due to SOB, Dizziness brought on by AS        Score: 5.5    CAPRINI SCORE [CLOT]    AGE RELATED RISK FACTORS                                                       MOBILITY RELATED FACTORS  [ ] Age 41-60 years                                            (1 Point)                  [ ] Bed rest                                                        (1 Point)  [ ] Age: 61-74 years                                           (2 Points)                 [ ] Plaster cast                                                   (2 Points)  [x ] Age= 75 years                                              (3 Points)                 [ ] Bed bound for more than 72 hours                 (2 Points)    DISEASE RELATED RISK FACTORS                                               GENDER SPECIFIC FACTORS  [ ] Edema in the lower extremities                       (1 Point)                  [ ] Pregnancy                                                     (1 Point)  [ ] Varicose veins                                               (1 Point)                  [ ] Post-partum < 6 weeks                                   (1 Point)             [x ] BMI > 25 Kg/m2                                            (1 Point)                  [ ] Hormonal therapy  or oral contraception          (1 Point)                 [ ] Sepsis (in the previous month)                        (1 Point)                  [ ] History of pregnancy complications                 (1 point)  [ ] Pneumonia or serious lung disease                                               [ ] Unexplained or recurrent                     (1 Point)           (in the previous month)                               (1 Point)  [ ] Abnormal pulmonary function test                     (1 Point)                 SURGERY RELATED RISK FACTORS  [ ] Acute myocardial infarction                              (1 Point)                 [ ]  Section                                             (1 Point)  [ ] Congestive heart failure (in the previous month)  (1 Point)               [ ] Minor surgery                                                  (1 Point)   [ ] Inflammatory bowel disease                             (1 Point)                 [ ] Arthroscopic surgery                                        (2 Points)  [ ] Central venous access                                      (2 Points)                [ x] General surgery lasting more than 45 minutes   (2 Points)       [ ] Stroke (in the previous month)                          (5 Points)               [ ] Elective arthroplasty                                         (5 Points)                                                                                                                                               HEMATOLOGY RELATED FACTORS                                                 TRAUMA RELATED RISK FACTORS  [ ] Prior episodes of VTE                                     (3 Points)                 [ ] Fracture of the hip, pelvis, or leg                       (5 Points)  [ ] Positive family history for VTE                         (3 Points)                 [ ] Acute spinal cord injury (in the previous month)  (5 Points)  [ ] Prothrombin 22331 A                                     (3 Points)                 [ ] Paralysis  (less than 1 month)                             (5 Points)  [ ] Factor V Leiden                                             (3 Points)                  [ ] Multiple Trauma within 1 month                        (5 Points)  [ ] Lupus anticoagulants                                     (3 Points)                                                           [ ] Anticardiolipin antibodies                               (3 Points)                                                       [ ] High homocysteine in the blood                      (3 Points)                                             [ ] Other congenital or acquired thrombophilia      (3 Points)                                                [ ] Heparin induced thrombocytopenia                  (3 Points)                                          Total Score [    6      ]

## 2023-06-28 NOTE — REVIEW OF SYSTEMS
[Feeling Poorly] : feeling poorly [Feeling Tired] : feeling tired [As noted in HPI] : as noted in HPI [As Noted in HPI] : as noted in HPI [SOB on Exertion] : shortness of breath during exertion [Orthopnea] : orthopnea [Negative] : Heme/Lymph [Chest Pain] : no chest pain [Palpitations] : no palpitations [Lower Ext Edema] : no extremity edema

## 2023-06-29 LAB
MRSA PCR RESULT.: SIGNIFICANT CHANGE UP
S AUREUS DNA NOSE QL NAA+PROBE: SIGNIFICANT CHANGE UP

## 2023-07-02 ENCOUNTER — TRANSCRIPTION ENCOUNTER (OUTPATIENT)
Age: 78
End: 2023-07-02

## 2023-07-03 ENCOUNTER — INPATIENT (INPATIENT)
Facility: HOSPITAL | Age: 78
LOS: 0 days | Discharge: HOME | DRG: 267 | End: 2023-07-04
Attending: STUDENT IN AN ORGANIZED HEALTH CARE EDUCATION/TRAINING PROGRAM | Admitting: STUDENT IN AN ORGANIZED HEALTH CARE EDUCATION/TRAINING PROGRAM
Payer: COMMERCIAL

## 2023-07-03 ENCOUNTER — APPOINTMENT (OUTPATIENT)
Dept: CARDIOTHORACIC SURGERY | Facility: HOSPITAL | Age: 78
End: 2023-07-03

## 2023-07-03 VITALS
TEMPERATURE: 98 F | OXYGEN SATURATION: 97 % | HEIGHT: 65 IN | SYSTOLIC BLOOD PRESSURE: 143 MMHG | HEART RATE: 64 BPM | WEIGHT: 190.7 LBS | RESPIRATION RATE: 18 BRPM | DIASTOLIC BLOOD PRESSURE: 65 MMHG

## 2023-07-03 DIAGNOSIS — Z90.49 ACQUIRED ABSENCE OF OTHER SPECIFIED PARTS OF DIGESTIVE TRACT: Chronic | ICD-10-CM

## 2023-07-03 DIAGNOSIS — I35.0 NONRHEUMATIC AORTIC (VALVE) STENOSIS: ICD-10-CM

## 2023-07-03 DIAGNOSIS — Z98.890 OTHER SPECIFIED POSTPROCEDURAL STATES: Chronic | ICD-10-CM

## 2023-07-03 DIAGNOSIS — Z95.2 PRESENCE OF PROSTHETIC HEART VALVE: ICD-10-CM

## 2023-07-03 DIAGNOSIS — Z95.2 PRESENCE OF PROSTHETIC HEART VALVE: Chronic | ICD-10-CM

## 2023-07-03 LAB
ANION GAP SERPL CALC-SCNC: 11 MMOL/L — SIGNIFICANT CHANGE UP (ref 5–17)
BUN SERPL-MCNC: 27 MG/DL — HIGH (ref 7–23)
CALCIUM SERPL-MCNC: 8.5 MG/DL — SIGNIFICANT CHANGE UP (ref 8.4–10.5)
CHLORIDE SERPL-SCNC: 105 MMOL/L — SIGNIFICANT CHANGE UP (ref 96–108)
CO2 SERPL-SCNC: 25 MMOL/L — SIGNIFICANT CHANGE UP (ref 22–31)
CREAT SERPL-MCNC: 1.44 MG/DL — HIGH (ref 0.5–1.3)
EGFR: 50 ML/MIN/1.73M2 — LOW
GLUCOSE BLDC GLUCOMTR-MCNC: 106 MG/DL — HIGH (ref 70–99)
GLUCOSE BLDC GLUCOMTR-MCNC: 112 MG/DL — HIGH (ref 70–99)
GLUCOSE BLDC GLUCOMTR-MCNC: 126 MG/DL — HIGH (ref 70–99)
GLUCOSE SERPL-MCNC: 109 MG/DL — HIGH (ref 70–99)
POTASSIUM SERPL-MCNC: 3.4 MMOL/L — LOW (ref 3.5–5.3)
POTASSIUM SERPL-SCNC: 3.4 MMOL/L — LOW (ref 3.5–5.3)
SODIUM SERPL-SCNC: 141 MMOL/L — SIGNIFICANT CHANGE UP (ref 135–145)

## 2023-07-03 PROCEDURE — 93306 TTE W/DOPPLER COMPLETE: CPT | Mod: 26

## 2023-07-03 PROCEDURE — 99232 SBSQ HOSP IP/OBS MODERATE 35: CPT

## 2023-07-03 PROCEDURE — 33361 REPLACE AORTIC VALVE PERQ: CPT | Mod: Q0,62

## 2023-07-03 PROCEDURE — 33361 REPLACE AORTIC VALVE PERQ: CPT | Mod: 62,Q0

## 2023-07-03 PROCEDURE — 93010 ELECTROCARDIOGRAM REPORT: CPT

## 2023-07-03 DEVICE — GWIRE RUNTHRU NS .014X300CM: Type: IMPLANTABLE DEVICE | Status: FUNCTIONAL

## 2023-07-03 DEVICE — VALVE TAVR EVOLUT FX 29MM: Type: IMPLANTABLE DEVICE | Status: FUNCTIONAL

## 2023-07-03 DEVICE — SYS VASCADE MVP VASCULAR CLOSURE 6-12F: Type: IMPLANTABLE DEVICE | Status: FUNCTIONAL

## 2023-07-03 DEVICE — GWIRE STR .038X180 STIFF LONG TAPR: Type: IMPLANTABLE DEVICE | Status: FUNCTIONAL

## 2023-07-03 DEVICE — SUT PERCLOSE PROGLIDE 6FR: Type: IMPLANTABLE DEVICE | Status: FUNCTIONAL

## 2023-07-03 DEVICE — PACING CATH PACEL RIGHT HEART CURVE 5FR: Type: IMPLANTABLE DEVICE | Status: FUNCTIONAL

## 2023-07-03 DEVICE — KIT RADIAL MINI ACCESS PRELUDE SHEATH: Type: IMPLANTABLE DEVICE | Status: FUNCTIONAL

## 2023-07-03 DEVICE — INTRODUCER SHEATH DRYSEAL FLEX 18FR X 33CM: Type: IMPLANTABLE DEVICE | Status: FUNCTIONAL

## 2023-07-03 DEVICE — SHEATH INTRODUCER TERUMO PINNACLE CORONARY 6FR X 10CM X 0.038" MINI WIRE: Type: IMPLANTABLE DEVICE | Status: FUNCTIONAL

## 2023-07-03 DEVICE — CATH TAVR EVOLUT FX 14FR 23-29MM: Type: IMPLANTABLE DEVICE | Status: FUNCTIONAL

## 2023-07-03 DEVICE — CATH VASCULAR EXPO VENTRICULAR PIGTAIL CURVE (PIG) 0.045" X 5FR X 100CM: Type: IMPLANTABLE DEVICE | Status: FUNCTIONAL

## 2023-07-03 DEVICE — GWIRE GUID  0.035INX150CM: Type: IMPLANTABLE DEVICE | Status: FUNCTIONAL

## 2023-07-03 DEVICE — CATH VASCULAR EXPO VENTRICULAR PIGTAIL CURVE (PIG 145) 0.045" X 5FR X 10CM: Type: IMPLANTABLE DEVICE | Status: FUNCTIONAL

## 2023-07-03 DEVICE — SHEATH INTRODUCER TERUMO PINNACLE CORONARY 8FR X 10CM X 0.038" MINI WIRE: Type: IMPLANTABLE DEVICE | Status: FUNCTIONAL

## 2023-07-03 DEVICE — GUIDEWIRE AMPLATZ SUPER-STIFF SHORT TAPER .035" X 260CM: Type: IMPLANTABLE DEVICE | Status: FUNCTIONAL

## 2023-07-03 DEVICE — CATH VASCULAR EXPO EXPO AMPLATZ LEFT CURVE (AL1) 0.045" X 5FR X 100CM: Type: IMPLANTABLE DEVICE | Status: FUNCTIONAL

## 2023-07-03 DEVICE — WIRE GD CONFIDA BRECKER CRV .035X260: Type: IMPLANTABLE DEVICE | Status: FUNCTIONAL

## 2023-07-03 DEVICE — LOADING SYSTEM EVOLUT FX 23-29MM: Type: IMPLANTABLE DEVICE | Status: FUNCTIONAL

## 2023-07-03 DEVICE — WIRE GUIDE EXCHANGE J TIP 3MM X 260CM: Type: IMPLANTABLE DEVICE | Status: FUNCTIONAL

## 2023-07-03 RX ORDER — BUDESONIDE AND FORMOTEROL FUMARATE DIHYDRATE 160; 4.5 UG/1; UG/1
1 AEROSOL RESPIRATORY (INHALATION)
Refills: 0 | Status: DISCONTINUED | OUTPATIENT
Start: 2023-07-03 | End: 2023-07-04

## 2023-07-03 RX ORDER — AMLODIPINE BESYLATE 2.5 MG/1
1 TABLET ORAL
Refills: 0 | DISCHARGE

## 2023-07-03 RX ORDER — MEMANTINE HYDROCHLORIDE 10 MG/1
5 TABLET ORAL
Refills: 0 | Status: DISCONTINUED | OUTPATIENT
Start: 2023-07-03 | End: 2023-07-04

## 2023-07-03 RX ORDER — ASPIRIN/CALCIUM CARB/MAGNESIUM 324 MG
81 TABLET ORAL DAILY
Refills: 0 | Status: DISCONTINUED | OUTPATIENT
Start: 2023-07-03 | End: 2023-07-04

## 2023-07-03 RX ORDER — GABAPENTIN 400 MG/1
1 CAPSULE ORAL
Refills: 0 | DISCHARGE

## 2023-07-03 RX ORDER — DEXTROSE 50 % IN WATER 50 %
12.5 SYRINGE (ML) INTRAVENOUS ONCE
Refills: 0 | Status: DISCONTINUED | OUTPATIENT
Start: 2023-07-03 | End: 2023-07-04

## 2023-07-03 RX ORDER — CEFAZOLIN SODIUM 1 G
2000 VIAL (EA) INJECTION ONCE
Refills: 0 | Status: COMPLETED | OUTPATIENT
Start: 2023-07-03 | End: 2023-07-03

## 2023-07-03 RX ORDER — MOMETASONE FUROATE 50 UG/1
2 SPRAY NASAL
Refills: 0 | DISCHARGE

## 2023-07-03 RX ORDER — INSULIN LISPRO 100/ML
VIAL (ML) SUBCUTANEOUS
Refills: 0 | Status: DISCONTINUED | OUTPATIENT
Start: 2023-07-03 | End: 2023-07-04

## 2023-07-03 RX ORDER — SODIUM CHLORIDE 9 MG/ML
1000 INJECTION, SOLUTION INTRAVENOUS
Refills: 0 | Status: DISCONTINUED | OUTPATIENT
Start: 2023-07-03 | End: 2023-07-04

## 2023-07-03 RX ORDER — SITAGLIPTIN 50 MG/1
1 TABLET, FILM COATED ORAL
Refills: 0 | DISCHARGE

## 2023-07-03 RX ORDER — SODIUM CHLORIDE 9 MG/ML
3 INJECTION INTRAMUSCULAR; INTRAVENOUS; SUBCUTANEOUS EVERY 8 HOURS
Refills: 0 | Status: DISCONTINUED | OUTPATIENT
Start: 2023-07-03 | End: 2023-07-03

## 2023-07-03 RX ORDER — ATORVASTATIN CALCIUM 80 MG/1
40 TABLET, FILM COATED ORAL AT BEDTIME
Refills: 0 | Status: DISCONTINUED | OUTPATIENT
Start: 2023-07-03 | End: 2023-07-04

## 2023-07-03 RX ORDER — INSULIN LISPRO 100/ML
12 VIAL (ML) SUBCUTANEOUS
Refills: 0 | Status: DISCONTINUED | OUTPATIENT
Start: 2023-07-03 | End: 2023-07-04

## 2023-07-03 RX ORDER — BUDESONIDE AND FORMOTEROL FUMARATE DIHYDRATE 160; 4.5 UG/1; UG/1
2 AEROSOL RESPIRATORY (INHALATION)
Refills: 0 | DISCHARGE

## 2023-07-03 RX ORDER — GABAPENTIN 400 MG/1
100 CAPSULE ORAL THREE TIMES A DAY
Refills: 0 | Status: DISCONTINUED | OUTPATIENT
Start: 2023-07-03 | End: 2023-07-04

## 2023-07-03 RX ORDER — MEMANTINE HYDROCHLORIDE 10 MG/1
1 TABLET ORAL
Refills: 0 | DISCHARGE

## 2023-07-03 RX ORDER — AMLODIPINE BESYLATE 2.5 MG/1
10 TABLET ORAL DAILY
Refills: 0 | Status: DISCONTINUED | OUTPATIENT
Start: 2023-07-03 | End: 2023-07-03

## 2023-07-03 RX ORDER — CHLORHEXIDINE GLUCONATE 213 G/1000ML
1 SOLUTION TOPICAL ONCE
Refills: 0 | Status: DISCONTINUED | OUTPATIENT
Start: 2023-07-03 | End: 2023-07-03

## 2023-07-03 RX ORDER — GLUCAGON INJECTION, SOLUTION 0.5 MG/.1ML
1 INJECTION, SOLUTION SUBCUTANEOUS ONCE
Refills: 0 | Status: DISCONTINUED | OUTPATIENT
Start: 2023-07-03 | End: 2023-07-04

## 2023-07-03 RX ORDER — DEXTROSE 50 % IN WATER 50 %
25 SYRINGE (ML) INTRAVENOUS ONCE
Refills: 0 | Status: DISCONTINUED | OUTPATIENT
Start: 2023-07-03 | End: 2023-07-04

## 2023-07-03 RX ORDER — INSULIN GLARGINE 100 [IU]/ML
9 INJECTION, SOLUTION SUBCUTANEOUS AT BEDTIME
Refills: 0 | Status: DISCONTINUED | OUTPATIENT
Start: 2023-07-03 | End: 2023-07-04

## 2023-07-03 RX ORDER — POTASSIUM CHLORIDE 20 MEQ
20 PACKET (EA) ORAL
Refills: 0 | Status: COMPLETED | OUTPATIENT
Start: 2023-07-03 | End: 2023-07-03

## 2023-07-03 RX ORDER — MONTELUKAST 4 MG/1
10 TABLET, CHEWABLE ORAL DAILY
Refills: 0 | Status: DISCONTINUED | OUTPATIENT
Start: 2023-07-03 | End: 2023-07-04

## 2023-07-03 RX ORDER — INSULIN ASPART 100 [IU]/ML
15 INJECTION, SOLUTION SUBCUTANEOUS
Refills: 0 | DISCHARGE

## 2023-07-03 RX ORDER — OMEPRAZOLE 10 MG/1
1 CAPSULE, DELAYED RELEASE ORAL
Refills: 0 | DISCHARGE

## 2023-07-03 RX ORDER — INSULIN LISPRO 100/ML
VIAL (ML) SUBCUTANEOUS AT BEDTIME
Refills: 0 | Status: DISCONTINUED | OUTPATIENT
Start: 2023-07-03 | End: 2023-07-04

## 2023-07-03 RX ORDER — INSULIN GLARGINE 100 [IU]/ML
15 INJECTION, SOLUTION SUBCUTANEOUS
Refills: 0 | DISCHARGE

## 2023-07-03 RX ORDER — ISOSORBIDE MONONITRATE 60 MG/1
60 TABLET, EXTENDED RELEASE ORAL DAILY
Refills: 0 | Status: DISCONTINUED | OUTPATIENT
Start: 2023-07-03 | End: 2023-07-03

## 2023-07-03 RX ORDER — DICLOFENAC SODIUM 30 MG/G
1 GEL TOPICAL
Refills: 0 | DISCHARGE

## 2023-07-03 RX ORDER — POTASSIUM BICARBONATE 978 MG/1
25 TABLET, EFFERVESCENT ORAL ONCE
Refills: 0 | Status: COMPLETED | OUTPATIENT
Start: 2023-07-03 | End: 2023-07-03

## 2023-07-03 RX ORDER — DEXTROSE 50 % IN WATER 50 %
15 SYRINGE (ML) INTRAVENOUS ONCE
Refills: 0 | Status: DISCONTINUED | OUTPATIENT
Start: 2023-07-03 | End: 2023-07-04

## 2023-07-03 RX ORDER — FLUTICASONE PROPIONATE 50 MCG
1 SPRAY, SUSPENSION NASAL
Refills: 0 | DISCHARGE

## 2023-07-03 RX ORDER — CEFUROXIME AXETIL 250 MG
1500 TABLET ORAL EVERY 8 HOURS
Refills: 0 | Status: COMPLETED | OUTPATIENT
Start: 2023-07-03 | End: 2023-07-04

## 2023-07-03 RX ORDER — LIDOCAINE HCL 20 MG/ML
0.2 VIAL (ML) INJECTION ONCE
Refills: 0 | Status: DISCONTINUED | OUTPATIENT
Start: 2023-07-03 | End: 2023-07-03

## 2023-07-03 RX ORDER — MONTELUKAST 4 MG/1
1 TABLET, CHEWABLE ORAL
Refills: 0 | DISCHARGE

## 2023-07-03 RX ORDER — CLOPIDOGREL BISULFATE 75 MG/1
75 TABLET, FILM COATED ORAL DAILY
Refills: 0 | Status: DISCONTINUED | OUTPATIENT
Start: 2023-07-03 | End: 2023-07-04

## 2023-07-03 RX ORDER — CICLOPIROX OLAMINE 7.7 MG/G
1 CREAM TOPICAL
Refills: 0 | DISCHARGE

## 2023-07-03 RX ORDER — ATORVASTATIN CALCIUM 80 MG/1
1 TABLET, FILM COATED ORAL
Refills: 0 | DISCHARGE

## 2023-07-03 RX ADMIN — POTASSIUM BICARBONATE 25 MILLIEQUIVALENT(S): 978 TABLET, EFFERVESCENT ORAL at 18:15

## 2023-07-03 RX ADMIN — BUDESONIDE AND FORMOTEROL FUMARATE DIHYDRATE 1 PUFF(S): 160; 4.5 AEROSOL RESPIRATORY (INHALATION) at 18:03

## 2023-07-03 RX ADMIN — Medication 20 MILLIEQUIVALENT(S): at 20:17

## 2023-07-03 RX ADMIN — MEMANTINE HYDROCHLORIDE 5 MILLIGRAM(S): 10 TABLET ORAL at 18:03

## 2023-07-03 RX ADMIN — CLOPIDOGREL BISULFATE 75 MILLIGRAM(S): 75 TABLET, FILM COATED ORAL at 18:03

## 2023-07-03 RX ADMIN — GABAPENTIN 100 MILLIGRAM(S): 400 CAPSULE ORAL at 21:29

## 2023-07-03 RX ADMIN — ATORVASTATIN CALCIUM 40 MILLIGRAM(S): 80 TABLET, FILM COATED ORAL at 21:29

## 2023-07-03 RX ADMIN — Medication 81 MILLIGRAM(S): at 18:03

## 2023-07-03 RX ADMIN — Medication 100 MILLIGRAM(S): at 18:02

## 2023-07-03 RX ADMIN — Medication 20 MILLIEQUIVALENT(S): at 21:29

## 2023-07-03 RX ADMIN — INSULIN GLARGINE 9 UNIT(S): 100 INJECTION, SOLUTION SUBCUTANEOUS at 21:29

## 2023-07-03 RX ADMIN — Medication 12 UNIT(S): at 17:42

## 2023-07-03 RX ADMIN — GABAPENTIN 100 MILLIGRAM(S): 400 CAPSULE ORAL at 14:26

## 2023-07-03 NOTE — CONSULT NOTE ADULT - ASSESSMENT
76 y/o male well known to our office with PMH of hypertension, diabetes, hyperlipidemia, bioprosthetic aortic valve replacement in 2009 by Dr. Ted Piña c/b severe intravalvular regurgitation, nonobstructive CAD s/p cath with moderate prox LAD lesion 40% (6/2023), PAD s/p angioplasty of right anterior tibial artery, and recently diagnosed vertigo who is admitted s/p Ria TAVR. Renal consult for ckd stage 3        1)CKD stage 3-  b/l cr ranges around 1.4 to 1.6mg/dl  likely has underlying Dm and HTN nephropathy  sp TAVR  cr pending--> will follow up  encourage po intake  off ivf and diuretics  will monitor with you    Dr Dallas  [phone]

## 2023-07-03 NOTE — PROGRESS NOTE ADULT - ASSESSMENT
77 year old man with PMH HTN, HLD, T2DM on insulin, BPH, mild memory issues, CAD, ROMERO, bioprosthetic aortic valve replacement in 2009 by Dr. Ted Piña and s/p recent LE angiogram s/p angioplasty of right anterior tibial artery on ASA and Plavix.  He was recently admitted to [facility] for unsteadiness on his feet and room spinning sensation, he was r/o for Stroke and cardio work up revealed Bio AVR Prosthetic valve stenosis, BALJINDER confirmed elevated gradients, moderate prosthetic valve stenosis s/p BALJINDER and is now scheduled for TAVR on 7/3/2023.  He presently feels better since discharge, has some ROMERO after walking long distances.  Denies dizziness while in PST, after PST was heading to Dr. Rivera's office (Cardiothoracic Surgery).  He is accompanied by his granddaughter.   77 year old man with PMH HTN, HLD, T2DM on insulin, BPH, mild memory issues, CAD, ROMERO, bioprosthetic aortic valve replacement in 2009 by Dr. Ted Piña and s/p recent LE angiogram s/p angioplasty of right anterior tibial artery on ASA and Plavix.  He was recently admitted to [facility] for unsteadiness on his feet and room spinning sensation, he was r/o for Stroke and cardio work up revealed Bio AVR Prosthetic valve stenosis, BALJINDER confirmed elevated gradients, moderate prosthetic valve stenosis s/p BALJINDER and is now scheduled for TAVR on 7/3/2023.  He presently feels better since discharge, has some ROMERO after walking long distances.  Denies dizziness while in PST, after PST was heading to Dr. Rivera's office (Cardiothoracic Surgery).  He is accompanied by his granddaughter.  s/p 7/3 TAVR via TF  intraop LBBB now 1st 60's; EP and cardiology following ---> continue to observe as per EP  continue asa and plavix for AC as per struct heart  ck echo and ekg in am   discharge planning- home in am with mcot if HR stable

## 2023-07-03 NOTE — PROGRESS NOTE ADULT - PROBLEM SELECTOR PLAN 2
continue postop care  tele  asa and plavix for AC  monitor bilateral groin sites   ck echo and ekg in am tue  resume preop meds   discharge planning- home with mcot in am tue if HR stable

## 2023-07-03 NOTE — CONSULT NOTE ADULT - ASSESSMENT
Patient seen and examined, agree with above assessment and plan as transcribed above.    - s/p ASAEL  - Post op LBBB, EP eval Dr. Palmer called    Chalino Evans MD, [facility]  BEEPER [phone]

## 2023-07-03 NOTE — BRIEF OPERATIVE NOTE - OPERATION/FINDINGS
percutaneous TAVR 29mm Medtronic valve via right femoral artery access; left femoral artery pigtail access; left femoral venous TVP; access sites closed with perclosure

## 2023-07-03 NOTE — CONSULT NOTE ADULT - SUBJECTIVE AND OBJECTIVE BOX
[facility] Consult Note Nephrology - CONSULTATION NOTE    78 y/o male well known to our office with PMH of hypertension, diabetes, hyperlipidemia, bioprosthetic aortic valve replacement in 2009 by Dr. Ted Piña c/b severe intravalvular regurgitation, nonobstructive CAD s/p cath with moderate prox LAD lesion 40% (6/2023), PAD s/p angioplasty of right anterior tibial artery, and recently diagnosed vertigo who is admitted s/p Ria TAVR. Patient resting comfortably in no distress post op. Has intermittent episodes of dizziness since last admission but none currently. Denies chest pain, SOB, palpitations, dizziness, or syncope.    Renal consult for ckd stage 3  b/l cr appears to be ranging around 1.4 to 1.6mg/dl  s/p TAVR  cr pending  no complaints  denies any f/c/n/v/d/c/sob    PAST MEDICAL & SURGICAL HISTORY:  HTN (hypertension)      Hyperlipidemia      BPH (benign prostatic hypertrophy)      GERD (gastroesophageal reflux disease)      Chronic mastoiditis of left side      Gall stones      HTN (hypertension)      DM (diabetes mellitus)      High cholesterol      Aortic valve replaced  2009 with bovine tissue valve      Hx of cholecystectomy      S/P AVR (aortic valve replacement)  2009 @ [place] (bovine)      History of cholecystectomy      History of ear surgery  ? surgery on left ear        No Known Allergies    Home Medications Reviewed  Hospital Medications:   MEDICATIONS  (STANDING):  aspirin enteric coated 81 milliGRAM(s) Oral daily  atorvastatin 40 milliGRAM(s) Oral at bedtime  cefuroxime  IVPB 1500 milliGRAM(s) IV Intermittent every 8 hours  clopidogrel Tablet 75 milliGRAM(s) Oral daily  dextrose 5%. 1000 milliLiter(s) (50 mL/Hr) IV Continuous <Continuous>  dextrose 5%. 1000 milliLiter(s) (100 mL/Hr) IV Continuous <Continuous>  dextrose 50% Injectable 25 Gram(s) IV Push once  dextrose 50% Injectable 12.5 Gram(s) IV Push once  dextrose 50% Injectable 25 Gram(s) IV Push once  gabapentin 100 milliGRAM(s) Oral three times a day  glucagon  Injectable 1 milliGRAM(s) IntraMuscular once  insulin glargine Injectable (LANTUS) 9 Unit(s) SubCutaneous at bedtime  insulin lispro (ADMELOG) corrective regimen sliding scale   SubCutaneous at bedtime  insulin lispro (ADMELOG) corrective regimen sliding scale   SubCutaneous three times a day before meals  insulin lispro Injectable (ADMELOG) 12 Unit(s) SubCutaneous three times a day before meals  memantine 5 milliGRAM(s) Oral two times a day  montelukast 10 milliGRAM(s) Oral daily  multivitamin 1 Tablet(s) Oral daily    SOCIAL HISTORY:  Denies ETOh,Smoking,   FAMILY HISTORY:  No pertinent family history in first degree relatives    No pertinent family history in first degree relatives      REVIEW OF SYSTEMS:  CONSTITUTIONAL: No weakness, fevers or chills  EYES/ENT: No visual changes;  No vertigo or throat pain   NECK: No pain or stiffness  RESPIRATORY: No cough, wheezing, hemoptysis; No shortness of breath  CARDIOVASCULAR: No chest pain or palpitations.  GASTROINTESTINAL: No abdominal or epigastric pain. No nausea, vomiting, or hematemesis; No diarrhea or constipation. No melena or hematochezia.  GENITOURINARY: No dysuria, frequency, foamy urine, urinary urgency, incontinence or hematuria  NEUROLOGICAL: No numbness or weakness  SKIN: No itching, burning, rashes, or lesions   VASCULAR: No bilateral lower extremity edema.   All other review of systems is negative unless indicated above.    VITALS:  T(F): 98.3 (07-03-23 @ 13:40), Max: 98.3 (07-03-23 @ 13:40)  HR: 52 (07-03-23 @ 13:40)  BP: 113/69 (07-03-23 @ 13:40)  RR: 18 (07-03-23 @ 13:40)  SpO2: 95% (07-03-23 @ 13:40)  Wt(kg): --    07-03 @ 07:01  -  07-03 @ 15:37  --------------------------------------------------------  IN: 0 mL / OUT: 350 mL / NET: -350 mL      Height (cm): 165.1 (07-03 @ 09:56)  Weight (kg): 86.5 (07-03 @ 09:56)  BMI (kg/m2): 31.7 (07-03 @ 09:56)  BSA (m2): 1.94 (07-03 @ 09:56)  PHYSICAL EXAM:  Constitutional: NAD  HEENT: anicteric sclera, oropharynx clear, MMM  Neck: No JVD  Respiratory: CTAB, no wheezes, rales or rhonchi  Cardiovascular: S1, S2, RRR  Gastrointestinal: BS+, soft, NT/ND  Extremities: No cyanosis or clubbing. No peripheral edema  Neurological: A/O x 3, no focal deficits  Psychiatric: Normal mood, normal affect      LABS:        Creatinine Trend: 1.49 <--    Urine Studies:  Urinalysis Basic - ( 28 Jun 2023 15:25 )    Color:  / Appearance:  / SG:  / pH:   Gluc: 140 mg/dL / Ketone:   / Bili:  / Urobili:    Blood:  / Protein:  / Nitrite:    Leuk Esterase:  / RBC:  / WBC    Sq Epi:  / Non Sq Epi:  / Bacteria:         RADIOLOGY & ADDITIONAL STUDIES:                
C A R D I O L O G Y  *********************    DATE OF SERVICE: 07-03-23    HISTORY OF PRESENT ILLNESS: HPI:  Patient is a 78 y/o male well known to our office with PMH of hypertension, diabetes, hyperlipidemia, bioprosthetic aortic valve replacement in 2009 by Dr. Ted Piña c/b severe intravalvular regurgitation, nonobstructive CAD s/p cath with moderate prox LAD lesion 40% (6/2023), PAD s/p angioplasty of right anterior tibial artery, and recently diagnosed vertigo who is admitted s/p Ria TAVR. Patient resting comfortably in no distress post op. Has intermittent episodes of dizziness since last admission but none currently. Denies chest pain, SOB, palpitations, dizziness, or syncope.    PAST MEDICAL & SURGICAL HISTORY:  HTN (hypertension)      Hyperlipidemia      BPH (benign prostatic hypertrophy)      GERD (gastroesophageal reflux disease)      Chronic mastoiditis of left side      Gall stones      HTN (hypertension)      DM (diabetes mellitus)      High cholesterol      Aortic valve replaced  2009 with bovine tissue valve      Hx of cholecystectomy      S/P AVR (aortic valve replacement)  2009 @ [place] (bovine)      History of cholecystectomy      History of ear surgery  ? surgery on left ear              MEDICATIONS:  MEDICATIONS  (STANDING):  aspirin enteric coated 81 milliGRAM(s) Oral daily  atorvastatin 40 milliGRAM(s) Oral at bedtime  cefuroxime  IVPB 1500 milliGRAM(s) IV Intermittent every 8 hours  clopidogrel Tablet 75 milliGRAM(s) Oral daily  dextrose 5%. 1000 milliLiter(s) (100 mL/Hr) IV Continuous <Continuous>  dextrose 5%. 1000 milliLiter(s) (50 mL/Hr) IV Continuous <Continuous>  dextrose 50% Injectable 12.5 Gram(s) IV Push once  dextrose 50% Injectable 25 Gram(s) IV Push once  dextrose 50% Injectable 25 Gram(s) IV Push once  gabapentin 100 milliGRAM(s) Oral three times a day  glucagon  Injectable 1 milliGRAM(s) IntraMuscular once  insulin glargine Injectable (LANTUS) 9 Unit(s) SubCutaneous at bedtime  insulin lispro (ADMELOG) corrective regimen sliding scale   SubCutaneous at bedtime  insulin lispro (ADMELOG) corrective regimen sliding scale   SubCutaneous three times a day before meals  insulin lispro Injectable (ADMELOG) 12 Unit(s) SubCutaneous three times a day before meals  memantine 5 milliGRAM(s) Oral two times a day  montelukast 10 milliGRAM(s) Oral daily  multivitamin 1 Tablet(s) Oral daily      Allergies    No Known Allergies    Intolerances        FAMILY HISTORY:  No pertinent family history in first degree relatives    No pertinent family history in first degree relatives      Non-contributary for premature coronary disease or sudden cardiac death    SOCIAL HISTORY:    [x ] Non-smoker  [ ] Smoker  [ ] Alcohol    FLU VACCINE THIS YEAR STARTS IN AUGUST:  [ ] Yes    [ ] No    IF OVER 65 HAVE YOU EVER HAD A PNA VACCINE:  [ ] Yes    [ ] No       [ ] N/A      REVIEW OF SYSTEMS:  [ ]chest pain  [  ]shortness of breath  [  ]palpitations  [  ]syncope  [x ]near syncope [ ]upper extremity weakness   [ ] lower extremity weakness  [  ]diplopia  [  ]altered mental status   [  ]fevers  [ ]chills [ ]nausea  [ ]vomiting  [  ]dysphagia    [ ]abdominal pain  [ ]melena  [ ]BRBPR    [  ]epistaxis  [  ]rash    [ ]lower extremity edema        [X] All others negative	  [ ] Unable to obtain      LABS:	 	    CARDIAC MARKERS:          Hb Trend:           Creatinine Trend: 1.49<--, 1.50<--, 1.43<--, 1.25<--, 1.69<--, 1.61<--    Coags:      proBNP:   Lipid Profile:   HgA1c:   TSH:         PHYSICAL EXAM:  T(C): 36.8 (07-03-23 @ 13:40), Max: 36.8 (07-03-23 @ 08:30)  HR: 52 (07-03-23 @ 13:40) (48 - 64)  BP: 113/69 (07-03-23 @ 13:40) (112/64 - 158/80)  RR: 18 (07-03-23 @ 13:40) (15 - 18)  SpO2: 95% (07-03-23 @ 13:40) (94% - 100%)  Wt(kg): --   BMI (kg/m2): 31.7 (07-03-23 @ 09:56)  I&O's Summary    03 Jul 2023 07:01  -  03 Jul 2023 15:14  --------------------------------------------------------  IN: 0 mL / OUT: 350 mL / NET: -350 mL        Gen: NAD  HEENT:  (-)icterus (-)pallor  CV: N S1 S2 1/6 MORENA (+)2 Pulses B/l  Resp:  Clear to auscultation B/L, normal effort  GI: (+) BS Soft, NT, ND  Lymph:  (-)Edema, (-)obvious lymphadenopathy  Skin: Warm to touch, Normal turgor  Psych: Appropriate mood and affect      TELEMETRY: SB 45-50s	      ECG: SB, 1st degree AVB, new LBBB  	    RADIOLOGY:         CXR: None    ASSESSMENT/PLAN: Patient is a 78 y/o male well known to our office with PMH of hypertension, diabetes, hyperlipidemia, bioprosthetic aortic valve replacement in 2009 by Dr. eTd Piña c/b severe intravalvular regurgitation, nonobstructive CAD s/p cath with moderate prox LAD lesion 40% (6/2023), PAD s/p angioplasty of right anterior tibial artery, and recently diagnosed vertigo who is admitted s/p Ria TAVR.     #Severe Intravalvular Regurgitation of Bio-AVR  - s/p Ria TAVR on 7/3  - Continue ASA/Plavix  - Structural heart/CTS follow up  - Post op TTE pending    #New LBBB  - Monitor tele and EKG post TAVR  - EP consult with Dr. Palmer called    #CAD  - Continue ASA and Lipitor    #HTN  - Restart home BP meds as tolerated  - Hold Toprol XL given bradycardia    #HLD  - Continue Lipitor    #PAD  - Continue ASA/Plavix and Lipitor    - Patient has f/u with Dr. Plummer on 7/28 at 12:30 PM    Howard Victor PA-C  Pager: [phone]  
EP Attending    HISTORY OF PRESENT ILLNESS: HPI:  Mr. Madison is a 77 year old man with PMH HTN, HLD, T2DM on insulin, BPH, mild memory issues, CAD, ROMERO, bioprosthetic aortic valve replacement in 2009 by Dr. Ted Piña and s/p recent LE angiogram s/p angioplasty of right anterior tibial artery on ASA and Plavix.  He was recently admitted to [facility] for unsteadiness on his feet and room spinning sensation, he was r/o for Stroke and cardio work up revealed Bio AVR Prosthetic valve stenosis, BALJINDER confirmed elevated gradients, moderate prosthetic valve stenosis s/p BALJINDER and is now scheduled for TAVR on 7/3/2023.  He presently feels better since discharge, has some ROMERO after walking long distances.  Denies dizziness while in PST, after PST was heading to Dr. Rivera's office (Cardiothoracic Surgery).  He is accompanied by his granddaughter. (28 Jun 2023 14:21)    He is s/p Valve-in-Valve TAVR.  The procedure went well. Pre-EKG showed a first degree AV block which is chronic.  Post-EKG showed a new LBBB 138ms, which has turned out to be transient. This has resolved on transport upstairs to recovery in [facility].  Feeling well, no leg or flank pain, no chest pain, no shortness of breath.  A 10 pt ROS is otherwise negative.    PAST MEDICAL & SURGICAL HISTORY:  HTN (hypertension)  Hyperlipidemia  BPH (benign prostatic hypertrophy)  GERD (gastroesophageal reflux disease)  Chronic mastoiditis of left side  Gall stones  HTN (hypertension)  DM (diabetes mellitus)  High cholesterol  Aortic valve replaced  2009 with bovine tissue valve  Hx of cholecystectomy  S/P AVR (aortic valve replacement)  2009 @ [place] (bovine)  History of cholecystectomy  History of ear surgery  ? surgery on left ear      MEDICATIONS  (STANDING):  aspirin enteric coated 81 milliGRAM(s) Oral daily  atorvastatin 40 milliGRAM(s) Oral at bedtime  cefuroxime  IVPB 1500 milliGRAM(s) IV Intermittent every 8 hours  clopidogrel Tablet 75 milliGRAM(s) Oral daily  dextrose 5%. 1000 milliLiter(s) (100 mL/Hr) IV Continuous <Continuous>  dextrose 5%. 1000 milliLiter(s) (50 mL/Hr) IV Continuous <Continuous>  dextrose 50% Injectable 12.5 Gram(s) IV Push once  dextrose 50% Injectable 25 Gram(s) IV Push once  dextrose 50% Injectable 25 Gram(s) IV Push once  gabapentin 100 milliGRAM(s) Oral three times a day  glucagon  Injectable 1 milliGRAM(s) IntraMuscular once  insulin glargine Injectable (LANTUS) 9 Unit(s) SubCutaneous at bedtime  insulin lispro (ADMELOG) corrective regimen sliding scale   SubCutaneous at bedtime  insulin lispro (ADMELOG) corrective regimen sliding scale   SubCutaneous three times a day before meals  insulin lispro Injectable (ADMELOG) 12 Unit(s) SubCutaneous three times a day before meals  memantine 5 milliGRAM(s) Oral two times a day  montelukast 10 milliGRAM(s) Oral daily  multivitamin 1 Tablet(s) Oral daily    Allergies    No Known Allergies    Intolerances      FAMILY HISTORY:  No pertinent family history in first degree relatives    No pertinent family history in first degree relatives    Non-contributary for premature coronary disease or sudden cardiac death    SOCIAL HISTORY:    [ x] Non-smoker  [ ] Smoker  [ ] Alcohol      PHYSICAL EXAM:  T(C): 36.8 (07-03-23 @ 13:40), Max: 36.8 (07-03-23 @ 08:30)  HR: 52 (07-03-23 @ 13:40) (48 - 64)  BP: 113/69 (07-03-23 @ 13:40) (112/64 - 158/80)  RR: 18 (07-03-23 @ 13:40) (15 - 18)  SpO2: 95% (07-03-23 @ 13:40) (94% - 100%)  Wt(kg): --    Appearance: Normal appearing adult male in no acute distress, resting comfortably	  HEENT:   Normal oral mucosa, PERRL, EOMI	  Lymphatic: No lymphadenopathy , no edema  Cardiovascular: Normal S1 S2, No JVD, No murmurs , Peripheral pulses palpable 2+ bilaterally  Respiratory: Lungs clear to auscultation, normal effort 	  Gastrointestinal:  Soft, Non-tender, + BS	  Skin: No rashes, No ecchymoses, No cyanosis, warm to touch  Musculoskeletal: Normal range of motion, normal strength  Psychiatry:  Mood & affect appropriate      TELEMETRY: NSR, narrow QRS, first degree AVB	    ECG: NSR, SD 268ms (chronic), briefly a LBBB 130-140ms which has since resolved. 	      ASSESSMENT/PLAN: Mr Madison is a very pleasant 77y Male who underwent valve-in-valve TAVR today.  He briefly had a new LBBB on ECG, that has since resolved.  His first degree AV block is chronic, and persists.  No Mobitz II on telemetry.  Recommend observation on telemetry thru discharge.  At this time, I won't recommend an MCOT, but OK with it if desired by CTS team.  I've explained the ECG findings, and that there is only a very low likelihood of needing a pacemaker after this otherwise successful and uneventful TAVR implant.  Again, his ECG has promptly returned to his pre-op 'normal'.  will follow with you.    Tony Palmer M.D.  Cardiac Electrophysiology    office [phone]  pager [phone]

## 2023-07-03 NOTE — PROGRESS NOTE ADULT - SUBJECTIVE AND OBJECTIVE BOX
VITAL SIGNS    Telemetry:    Vital Signs Last 24 Hrs  T(C): 36.8 (07-03-23 @ 13:40), Max: 36.8 (07-03-23 @ 08:30)  T(F): 98.3 (07-03-23 @ 13:40), Max: 98.3 (07-03-23 @ 13:40)  HR: 52 (07-03-23 @ 13:40) (48 - 64)  BP: 113/69 (07-03-23 @ 13:40) (112/64 - 158/80)  RR: 18 (07-03-23 @ 13:40) (15 - 18)  SpO2: 95% (07-03-23 @ 13:40) (94% - 100%)            07-03 @ 07:01  -  07-03 @ 16:07  --------------------------------------------------------  IN: 0 mL / OUT: 350 mL / NET: -350 mL       Daily Height in cm: 165.1 (03 Jul 2023 09:56)    Daily   Admit Wt: Drug Dosing Weight  Height (cm): 165.1 (03 Jul 2023 09:56)  Weight (kg): 86.5 (03 Jul 2023 09:56)  BMI (kg/m2): 31.7 (03 Jul 2023 09:56)  BSA (m2): 1.94 (03 Jul 2023 09:56)      CAPILLARY BLOOD GLUCOSE      POCT Blood Glucose.: 126 mg/dL (03 Jul 2023 08:04)          LABS:    07-03 @ 07:01  -  07-03 @ 16:07  --------------------------------------------------------  IN: 0 mL / OUT: 350 mL / NET: -350 mL    cret              aspirin enteric coated 81 milliGRAM(s) Oral daily  atorvastatin 40 milliGRAM(s) Oral at bedtime  budesonide  80 MICROgram(s)/formoterol 4.5 MICROgram(s) Inhaler 1 Puff(s) Inhalation two times a day  cefuroxime  IVPB 1500 milliGRAM(s) IV Intermittent every 8 hours  clopidogrel Tablet 75 milliGRAM(s) Oral daily  dextrose 5%. 1000 milliLiter(s) IV Continuous <Continuous>  dextrose 5%. 1000 milliLiter(s) IV Continuous <Continuous>  dextrose 50% Injectable 12.5 Gram(s) IV Push once  dextrose 50% Injectable 25 Gram(s) IV Push once  dextrose 50% Injectable 25 Gram(s) IV Push once  dextrose Oral Gel 15 Gram(s) Oral once PRN  gabapentin 100 milliGRAM(s) Oral three times a day  glucagon  Injectable 1 milliGRAM(s) IntraMuscular once  insulin glargine Injectable (LANTUS) 9 Unit(s) SubCutaneous at bedtime  insulin lispro (ADMELOG) corrective regimen sliding scale   SubCutaneous at bedtime  insulin lispro (ADMELOG) corrective regimen sliding scale   SubCutaneous three times a day before meals  insulin lispro Injectable (ADMELOG) 12 Unit(s) SubCutaneous three times a day before meals  memantine 5 milliGRAM(s) Oral two times a day  montelukast 10 milliGRAM(s) Oral daily  multivitamin 1 Tablet(s) Oral daily      PHYSICAL EXAM    Subjective: "Hi.   Neurology: alert and oriented x 3, nonfocal, no gross deficits  CV : tele:  RSR  Sternal Wound :  CDI with dressing , Stable  Lungs: clear. RR easy, unlabored   Abdomen: soft, nontender, nondistended, positive bowel sounds, bowel movement   Neg N/V/D   :  pt voiding without difficulty   Extremities:   MEZA; edema, neg calf tenderness.   PPP bilaterally      PW:  Chest tubes:                 VITAL SIGNS    Telemetry:  RSR/ SB 50'S   Vital Signs Last 24 Hrs  T(C): 36.8 (07-03-23 @ 13:40), Max: 36.8 (07-03-23 @ 08:30)  T(F): 98.3 (07-03-23 @ 13:40), Max: 98.3 (07-03-23 @ 13:40)  HR: 52 (07-03-23 @ 13:40) (48 - 64)  BP: 113/69 (07-03-23 @ 13:40) (112/64 - 158/80)  RR: 18 (07-03-23 @ 13:40) (15 - 18)  SpO2: 95% (07-03-23 @ 13:40) (94% - 100%)            07-03 @ 07:01  -  07-03 @ 16:07  --------------------------------------------------------  IN: 0 mL / OUT: 350 mL / NET: -350 mL       Daily Height in cm: 165.1 (03 Jul 2023 09:56)    Daily   Admit Wt: Drug Dosing Weight  Height (cm): 165.1 (03 Jul 2023 09:56)  Weight (kg): 86.5 (03 Jul 2023 09:56)  BMI (kg/m2): 31.7 (03 Jul 2023 09:56)  BSA (m2): 1.94 (03 Jul 2023 09:56)      CAPILLARY BLOOD GLUCOSE      POCT Blood Glucose.: 126 mg/dL (03 Jul 2023 08:04)          LABS:    07-03 @ 07:01  -  07-03 @ 16:07  --------------------------------------------------------  IN: 0 mL / OUT: 350 mL / NET: -350 mL      aspirin enteric coated 81 milliGRAM(s) Oral daily  atorvastatin 40 milliGRAM(s) Oral at bedtime  budesonide  80 MICROgram(s)/formoterol 4.5 MICROgram(s) Inhaler 1 Puff(s) Inhalation two times a day  cefuroxime  IVPB 1500 milliGRAM(s) IV Intermittent every 8 hours  clopidogrel Tablet 75 milliGRAM(s) Oral daily  dextrose 5%. 1000 milliLiter(s) IV Continuous <Continuous>  dextrose 5%. 1000 milliLiter(s) IV Continuous <Continuous>  dextrose 50% Injectable 12.5 Gram(s) IV Push once  dextrose 50% Injectable 25 Gram(s) IV Push once  dextrose 50% Injectable 25 Gram(s) IV Push once  dextrose Oral Gel 15 Gram(s) Oral once PRN  gabapentin 100 milliGRAM(s) Oral three times a day  glucagon  Injectable 1 milliGRAM(s) IntraMuscular once  insulin glargine Injectable (LANTUS) 9 Unit(s) SubCutaneous at bedtime  insulin lispro (ADMELOG) corrective regimen sliding scale   SubCutaneous at bedtime  insulin lispro (ADMELOG) corrective regimen sliding scale   SubCutaneous three times a day before meals  insulin lispro Injectable (ADMELOG) 12 Unit(s) SubCutaneous three times a day before meals  memantine 5 milliGRAM(s) Oral two times a day  montelukast 10 milliGRAM(s) Oral daily  multivitamin 1 Tablet(s) Oral daily        PHYSICAL EXAM    Subjective: "I feel ok."   Neurology: alert and oriented x 3, nonfocal, no gross deficits  CV : tele:   RSR/ SB 50'S   Lungs: clear. RR easy, unlabored   Abdomen: soft, nontender, nondistended, positive bowel sounds, neg bowel movement   Neg N/V/D   :  pt voiding without difficulty   Extremities:   MEZA; neg LE edema, neg calf tenderness.   PPP bilaterally; bilateral groin sites cdi w/ dressing--> neg hematoma/bleeding

## 2023-07-04 ENCOUNTER — TRANSCRIPTION ENCOUNTER (OUTPATIENT)
Age: 78
End: 2023-07-04

## 2023-07-04 VITALS
SYSTOLIC BLOOD PRESSURE: 142 MMHG | HEART RATE: 78 BPM | TEMPERATURE: 98 F | OXYGEN SATURATION: 98 % | RESPIRATION RATE: 18 BRPM | DIASTOLIC BLOOD PRESSURE: 89 MMHG

## 2023-07-04 LAB
ANION GAP SERPL CALC-SCNC: 14 MMOL/L — SIGNIFICANT CHANGE UP (ref 5–17)
BUN SERPL-MCNC: 26 MG/DL — HIGH (ref 7–23)
CALCIUM SERPL-MCNC: 8.8 MG/DL — SIGNIFICANT CHANGE UP (ref 8.4–10.5)
CHLORIDE SERPL-SCNC: 102 MMOL/L — SIGNIFICANT CHANGE UP (ref 96–108)
CO2 SERPL-SCNC: 23 MMOL/L — SIGNIFICANT CHANGE UP (ref 22–31)
CREAT SERPL-MCNC: 1.39 MG/DL — HIGH (ref 0.5–1.3)
EGFR: 52 ML/MIN/1.73M2 — LOW
GLUCOSE BLDC GLUCOMTR-MCNC: 127 MG/DL — HIGH (ref 70–99)
GLUCOSE BLDC GLUCOMTR-MCNC: 197 MG/DL — HIGH (ref 70–99)
GLUCOSE SERPL-MCNC: 117 MG/DL — HIGH (ref 70–99)
HCT VFR BLD CALC: 39.1 % — SIGNIFICANT CHANGE UP (ref 39–50)
HGB BLD-MCNC: 12.7 G/DL — LOW (ref 13–17)
MCHC RBC-ENTMCNC: 27.4 PG — SIGNIFICANT CHANGE UP (ref 27–34)
MCHC RBC-ENTMCNC: 32.5 GM/DL — SIGNIFICANT CHANGE UP (ref 32–36)
MCV RBC AUTO: 84.4 FL — SIGNIFICANT CHANGE UP (ref 80–100)
NRBC # BLD: 0 /100 WBCS — SIGNIFICANT CHANGE UP (ref 0–0)
PLATELET # BLD AUTO: 170 K/UL — SIGNIFICANT CHANGE UP (ref 150–400)
POTASSIUM SERPL-MCNC: 4.1 MMOL/L — SIGNIFICANT CHANGE UP (ref 3.5–5.3)
POTASSIUM SERPL-SCNC: 4.1 MMOL/L — SIGNIFICANT CHANGE UP (ref 3.5–5.3)
RBC # BLD: 4.63 M/UL — SIGNIFICANT CHANGE UP (ref 4.2–5.8)
RBC # FLD: 13.3 % — SIGNIFICANT CHANGE UP (ref 10.3–14.5)
SODIUM SERPL-SCNC: 139 MMOL/L — SIGNIFICANT CHANGE UP (ref 135–145)
WBC # BLD: 7.54 K/UL — SIGNIFICANT CHANGE UP (ref 3.8–10.5)
WBC # FLD AUTO: 7.54 K/UL — SIGNIFICANT CHANGE UP (ref 3.8–10.5)

## 2023-07-04 PROCEDURE — 93005 ELECTROCARDIOGRAM TRACING: CPT

## 2023-07-04 PROCEDURE — 36415 COLL VENOUS BLD VENIPUNCTURE: CPT

## 2023-07-04 PROCEDURE — C8929: CPT

## 2023-07-04 PROCEDURE — 80048 BASIC METABOLIC PNL TOTAL CA: CPT

## 2023-07-04 PROCEDURE — C1887: CPT

## 2023-07-04 PROCEDURE — 93306 TTE W/DOPPLER COMPLETE: CPT | Mod: 26

## 2023-07-04 PROCEDURE — 93010 ELECTROCARDIOGRAM REPORT: CPT

## 2023-07-04 PROCEDURE — 94640 AIRWAY INHALATION TREATMENT: CPT

## 2023-07-04 PROCEDURE — 93306 TTE W/DOPPLER COMPLETE: CPT

## 2023-07-04 PROCEDURE — 99239 HOSP IP/OBS DSCHRG MGMT >30: CPT

## 2023-07-04 PROCEDURE — 76000 FLUOROSCOPY <1 HR PHYS/QHP: CPT

## 2023-07-04 PROCEDURE — 99233 SBSQ HOSP IP/OBS HIGH 50: CPT

## 2023-07-04 PROCEDURE — C1760: CPT

## 2023-07-04 PROCEDURE — 82962 GLUCOSE BLOOD TEST: CPT

## 2023-07-04 PROCEDURE — 86923 COMPATIBILITY TEST ELECTRIC: CPT

## 2023-07-04 PROCEDURE — C1894: CPT

## 2023-07-04 PROCEDURE — C1769: CPT

## 2023-07-04 PROCEDURE — C1889: CPT

## 2023-07-04 PROCEDURE — 85027 COMPLETE CBC AUTOMATED: CPT

## 2023-07-04 RX ORDER — AMLODIPINE BESYLATE 2.5 MG/1
10 TABLET ORAL DAILY
Refills: 0 | Status: DISCONTINUED | OUTPATIENT
Start: 2023-07-04 | End: 2023-07-04

## 2023-07-04 RX ORDER — METOPROLOL TARTRATE 50 MG
1 TABLET ORAL
Qty: 30 | Refills: 0
Start: 2023-07-04 | End: 2023-08-02

## 2023-07-04 RX ORDER — ISOSORBIDE MONONITRATE 60 MG/1
60 TABLET, EXTENDED RELEASE ORAL DAILY
Refills: 0 | Status: DISCONTINUED | OUTPATIENT
Start: 2023-07-04 | End: 2023-07-04

## 2023-07-04 RX ORDER — METOPROLOL TARTRATE 50 MG
25 TABLET ORAL DAILY
Refills: 0 | Status: DISCONTINUED | OUTPATIENT
Start: 2023-07-04 | End: 2023-07-04

## 2023-07-04 RX ADMIN — MEMANTINE HYDROCHLORIDE 5 MILLIGRAM(S): 10 TABLET ORAL at 05:02

## 2023-07-04 RX ADMIN — BUDESONIDE AND FORMOTEROL FUMARATE DIHYDRATE 1 PUFF(S): 160; 4.5 AEROSOL RESPIRATORY (INHALATION) at 05:02

## 2023-07-04 RX ADMIN — ISOSORBIDE MONONITRATE 60 MILLIGRAM(S): 60 TABLET, EXTENDED RELEASE ORAL at 12:02

## 2023-07-04 RX ADMIN — AMLODIPINE BESYLATE 10 MILLIGRAM(S): 2.5 TABLET ORAL at 12:02

## 2023-07-04 RX ADMIN — Medication 1 TABLET(S): at 13:18

## 2023-07-04 RX ADMIN — Medication 25 MILLIGRAM(S): at 10:32

## 2023-07-04 RX ADMIN — CLOPIDOGREL BISULFATE 75 MILLIGRAM(S): 75 TABLET, FILM COATED ORAL at 12:00

## 2023-07-04 RX ADMIN — Medication 1: at 07:52

## 2023-07-04 RX ADMIN — Medication 100 MILLIGRAM(S): at 02:33

## 2023-07-04 RX ADMIN — Medication 12 UNIT(S): at 11:57

## 2023-07-04 RX ADMIN — Medication 12 UNIT(S): at 07:52

## 2023-07-04 RX ADMIN — Medication 81 MILLIGRAM(S): at 12:00

## 2023-07-04 RX ADMIN — GABAPENTIN 100 MILLIGRAM(S): 400 CAPSULE ORAL at 05:02

## 2023-07-04 RX ADMIN — GABAPENTIN 100 MILLIGRAM(S): 400 CAPSULE ORAL at 13:23

## 2023-07-04 RX ADMIN — MONTELUKAST 10 MILLIGRAM(S): 4 TABLET, CHEWABLE ORAL at 12:00

## 2023-07-04 NOTE — DISCHARGE NOTE PROVIDER - DISCHARGE SERVICE FOR PATIENT
no change
on the discharge service for the patient. I have reviewed and made amendments to the documentation where necessary.

## 2023-07-04 NOTE — DISCHARGE NOTE PROVIDER - CARE PROVIDER_API CALL
Joo Rivera  Thoracic and Cardiac Surgery  [address] [place] [address]  Phone: [phone]  Fax: [phone]  Follow Up Time: 1 week    Oniel Brown  Interventional Cardiology  [address], [address] [place] [id number]  Phone: [phone]  Fax: [phone]  Follow Up Time: 1 month    Yue Ash  [facility]  [address] [place] [address]  Phone: [phone]  Fax: [phone]  Follow Up Time: 2 weeks    Eda Plummer  Cardiovascular Disease  [address] [facility], [address]  [place] [address]  Phone: [phone]  Fax: [phone]  Follow Up Time: 2 weeks

## 2023-07-04 NOTE — PROGRESS NOTE ADULT - ASSESSMENT
78 y/o male well known to our office with PMH of hypertension, diabetes, hyperlipidemia, bioprosthetic aortic valve replacement in 2009 by Dr. Ted Piña c/b severe intravalvular regurgitation, nonobstructive CAD s/p cath with moderate prox LAD lesion 40% (6/2023), PAD s/p angioplasty of right anterior tibial artery, and recently diagnosed vertigo who is admitted s/p Ria TAVR. Renal consult for ckd stage 3    1)CKD stage 3-  b/l cr ranges around 1.4 to 1.6mg/dl  likely has underlying Dm and HTN nephropathy  sp TAVR  cr stable  encourage po intake  off ivf and diuretics  will monitor with you      For any question, call:  Cell # [phone]  Pager # [phone]  Callback # [phone]

## 2023-07-04 NOTE — DISCHARGE NOTE PROVIDER - PROVIDER TOKENS
PROVIDER:[TOKEN:[[id number]],FOLLOWUP:[1 week]],PROVIDER:[TOKEN:[[id number]],FOLLOWUP:[1 month]],PROVIDER:[TOKEN:[[id number]],FOLLOWUP:[2 weeks]],PROVIDER:[TOKEN:[[id number]],FOLLOWUP:[2 weeks]]

## 2023-07-04 NOTE — DISCHARGE NOTE PROVIDER - NSDCFUADDAPPT_GEN_ALL_CORE_FT
Follow up with Dr. Rivera in 1 week at CTS office at [facility], call [phone] to schedule an appointment.  Follow up with Dr. Brown in 1 month for repeat echocardiogram at [facility] office at [facility], call [phone] to schedule an appointment.   Follow up with your Cardiologist, Dr. Plummer in 1-2 weeks, please call the office to schedule an appointment.  Follow up with your PCP, Dr. Ash in 1-2 weeks, call the office to schedule an appointment.

## 2023-07-04 NOTE — DISCHARGE NOTE PROVIDER - NSDCPNSUBOBJ_GEN_ALL_CORE
Vital Signs Last 24 Hrs  T(C): 36.3 (04 Jul 2023 10:00), Max: 37 (04 Jul 2023 04:19)  T(F): 97.3 (04 Jul 2023 10:00), Max: 98.6 (04 Jul 2023 04:19)  HR: 89 (04 Jul 2023 10:00) (48 - 89)  BP: 153/89 (04 Jul 2023 10:00) (112/64 - 157/79)  BP(mean): 97 (04 Jul 2023 04:19) (97 - 97)  RR: 18 (04 Jul 2023 10:00) (16 - 18)  SpO2: 98% (04 Jul 2023 10:00) (94% - 100%)   room air      PHYSICAL EXAM  Neurology: A&Ox3, NAD  CV : RRR+S1S2  Lungs: Respirations non-labored, B/L BS CTA  Abdomen: Soft, NT/ND, +BSx4Q, +BM (-)N/V/D  : Voiding without difficulty  Extremities: B/L LE no edema, negative calf tenderness, +PP                    B/L groins soft CDI MITCHEL, no bleeding no hematoma      45 minutes face to face spent on total encounter, patient counseling and discharge instructions.

## 2023-07-04 NOTE — DISCHARGE NOTE PROVIDER - DID THE PATIENT PRESENT WITH OR WAS TREATED FOR MALNUTRITION DURING THIS ADMISSION
to auscultation bilaterally, no wheezing or crackles  · CV: RRR, S1 S2 heard, no MRG  · Abd: soft, nontender, nondistended, BS +  · Ext: no pedal edema  · Neuro: Alert, awake, no gross focal neurological deficit    Pending test results:   None    Consults:  1. General Surgery  2. Oncology    Procedures:  1. Endoscopic US (6/26/2020)    Condition at discharge: Stable    Disposition: home    Discharge Medications:   Latrell Molina Medication Instructions [facility]:[id number]    Printed on:06/27/20 3619   Medication Information                      amLODIPine (NORVASC) 10 MG tablet  Take 1 tablet by mouth daily             atorvastatin (LIPITOR) 20 MG tablet  Take 1 tablet by mouth nightly             dicyclomine (BENTYL) 10 MG capsule  Take 2 capsules by mouth 4 times daily (before meals and nightly)             escitalopram (LEXAPRO) 20 MG tablet  Take 1 tablet by mouth daily             gabapentin (NEURONTIN) 300 MG capsule  Take 1 capsule by mouth 3 times daily for 30 days. insulin glargine (BASAGLAR KWIKPEN) 100 UNIT/ML injection pen  Inject 30 Units into the skin nightly             insulin lispro, 1 Unit Dial, (HUMALOG KWIKPEN) 100 UNIT/ML SOPN  Inject 5 units Sub q TID before meals plus Sliding scale. Glucose: Dose:   No Insulin 140-199 1 Unit 200-249 2 Units 250-299 3 Units 300-349 4 Units 350-399 5 Units Over 399 6 Units             Insulin Pen Needle (RA PEN NEEDLES) 31G X 5 MM MISC  INJECT 4 TIMES A DAY             lisinopril (PRINIVIL;ZESTRIL) 10 MG tablet  Take 1 tablet by mouth daily .              metoclopramide (REGLAN) 5 MG tablet  Take 1 tablet by mouth 2 times daily             nicotine polacrilex (NICORETTE) 2 MG gum  Take 1 each by mouth every 2 hours as needed for Smoking cessation             Nutritional Supplements (GLUCERNA 1.5 STEVENSON) LIQD  Take 1 Can by mouth 3 times daily (with meals)             ondansetron (ZOFRAN) 4 MG tablet  Take 1 tablet by mouth 3 times daily as needed for Nausea or Vomiting             oxyCODONE-acetaminophen (PERCOCET) 5-325 MG per tablet  Take 1 tablet by mouth every 6 hours as needed for Pain for up to 3 days. Intended supply: 3 days. Take lowest dose possible to manage pain             pantoprazole (PROTONIX) 40 MG tablet  Take 1 tablet by mouth every morning (before breakfast) .              promethazine (PHENERGAN) 12.5 MG tablet  Take 1 tablet by mouth 3 times daily as needed for Nausea             RA Alcohol Swabs 70 % PADS  use as directed             senna-docusate (PERICOLACE) 8.6-50 MG per tablet  Take 2 tablets by mouth daily as needed for Constipation             sucralfate (CARAFATE) 1 GM tablet  Take 1 tablet by mouth 4 times daily             TRUE METRIX BLOOD GLUCOSE TEST strip  TEST BLOOD SUGAR 4 TIMES A DAY AS NEEDED FOR SYMPTOMS OF IRREGULAR BLOOD GLUCOSE             TRUEplus Lancets 33G MISC  TEST 4 TIMES A DAY               Patient Instructions:   Discharge to:  home  Diet: Diabetic, low salt diet  Activity: activity as tolerated   Exercise: As tolerated     · Be compliant with medication  · Take insulin Lantus 30 U at night and Insulin Lispro 5 U before meal 3 times daily as instructed  · Check blood sugar 4 times daily at home and maintain a blood sugar log  · If blood sugar <70 mg/dl, please take sugar containing food, orange juice and recheck blood sugar  · Take percocet every 6 hours as needed for abdominal pain  · Follow up with hepatobiliary   · Follow up with endocrinology  · If symptoms do not get better or get worse, please go to the nearest ED        Theo Garcia MD  PGY 1   9:46 AM 6/27/2020 No

## 2023-07-04 NOTE — DISCHARGE NOTE PROVIDER - NSRESEARCHGRANT_PROPHYLAXISRECOMFT_GEN_A_CORE
Subjective   Marcela Ramírez is a 50 y.o. female    Chief Complaint    Weight management/obesity  Nausea  Ear pain    History of Present Illness  Patient presents today for monthly follow-up regarding weight management.  She has lost some weight watching calories and changing some of her diet.  She is being considered for bariatric procedure.  She reports that she had a CT scan with contrast done 2 days ago and is having some persistent nausea.  Also complains of left ear pain.  She has a history of chronic versus recurrent otitis externa.  Her ENT is Dr. Blaise Canchola but she was unable to get an appointment for several weeks.    The following portions of the patient's history were reviewed and updated as appropriate: allergies, current medications, past social history and problem list    Review of Systems   Constitutional: Negative for diaphoresis, fatigue and fever.   HENT: Positive for congestion, ear discharge and ear pain.    Eyes: Negative.    Respiratory: Negative.    Gastrointestinal: Positive for nausea. Negative for abdominal pain, diarrhea and vomiting.   Musculoskeletal: Positive for arthralgias and myalgias.   Neurological: Negative.    Hematological: Negative for adenopathy. Does not bruise/bleed easily.       Objective     Vitals:    03/28/19 1134   BP: 122/80   Pulse: 90   Temp: 98 °F (36.7 °C)   SpO2: 97%       Physical Exam   Constitutional: She is oriented to person, place, and time. She appears well-developed.   obese   HENT:   Head: Normocephalic and atraumatic.   Right Ear: There is swelling. Tympanic membrane is scarred.   Left Ear: There is drainage, swelling and tenderness.   Eyes: Conjunctivae are normal. Pupils are equal, round, and reactive to light.   Neck: Normal range of motion. Neck supple.   Cardiovascular: Normal rate and regular rhythm.   Pulmonary/Chest: Effort normal and breath sounds normal.   Abdominal: Soft. Bowel sounds are normal.   Neurological: She is alert and  oriented to person, place, and time.   Skin: Skin is warm and dry.   Psychiatric: She has a normal mood and affect. Her behavior is normal.   Nursing note and vitals reviewed.      Assessment/Plan   Problem List Items Addressed This Visit        Digestive    Nausea      Other Visit Diagnoses     Class 3 severe obesity without serious comorbidity with body mass index (BMI) of 40.0 to 44.9 in adult, unspecified obesity type (CMS/[facility])    -  Primary    Chronic otitis externa of both ears, unspecified type        Relevant Orders    Ambulatory Referral to ENT (Otolaryngology)                This is a surgical and/or non-medical patient.

## 2023-07-04 NOTE — DISCHARGE NOTE PROVIDER - NSDCMRMEDTOKEN_GEN_ALL_CORE_FT
acetaminophen 325 mg oral tablet: 2 tab(s) orally every 6 hours As needed Moderate Pain (4 - 6)  amLODIPine 10 mg oral tablet: 1 tab(s) orally once a day  aspirin 81 mg oral delayed release capsule: 1 tab(s) orally once a day  atorvastatin 40 mg oral tablet: 1 tab(s) orally once a day  ciclopirox 0.77% topical lotion: Apply topically to affected area 2 times a day  clopidogrel 75 mg oral tablet: 1 tab(s) orally once a day  diclofenac 1% topical gel: Apply topically to affected area once a day  fluticasone 50 mcg/inh nasal spray: 1 spray(s) in each nostril once a day  gabapentin 100 mg oral capsule: 1 tab(s) orally 3 times a day  isosorbide mononitrate 60 mg oral tablet, extended release: 1 tab(s) orally once a day  Januvia 50 mg oral tablet: 1 tab(s) orally once a day  meclizine 12.5 mg oral tablet: 1 tab(s) orally every 8 hours as needed for Dizziness  memantine 5 mg oral tablet: 1 tab(s) orally 2 times a day  metoprolol succinate 100 mg oral tablet, extended release: 1 tab(s) orally once a day  mometasone 50 mcg/inh nasal spray: 2 spray(s) intranasally once a day  montelukast 10 mg oral tablet: 1 tab(s) orally once a day  Multiple Vitamins oral tablet: 1 tab(s) orally once a day  NovoLOG 100 units/mL injectable solution: 15 unit(s) injectable 3 times a day  omeprazole 20 mg oral delayed release capsule: 1 tab(s) orally once a day  polyethylene glycol 3350 oral powder for reconstitution: 17 gram(s) orally once a day (at bedtime)  senna leaf extract oral tablet: 2 tab(s) orally once a day (at bedtime)  Symbicort 80 mcg-4.5 mcg/inh inhalation aerosol: 2 puff(s) inhaled 2 times a day  Toujeo SoloStar 300 units/mL subcutaneous solution: 15 unit(s) subcutaneous once a day   acetaminophen 325 mg oral tablet: 2 tab(s) orally every 6 hours As needed Moderate Pain (4 - 6)  amLODIPine 10 mg oral tablet: 1 tab(s) orally once a day  aspirin 81 mg oral delayed release capsule: 1 tab(s) orally once a day  atorvastatin 40 mg oral tablet: 1 tab(s) orally once a day  ciclopirox 0.77% topical lotion: Apply topically to affected area 2 times a day  clopidogrel 75 mg oral tablet: 1 tab(s) orally once a day  diclofenac 1% topical gel: Apply topically to affected area once a day  fluticasone 50 mcg/inh nasal spray: 1 spray(s) in each nostril once a day  gabapentin 100 mg oral capsule: 1 tab(s) orally 3 times a day  isosorbide mononitrate 60 mg oral tablet, extended release: 1 tab(s) orally once a day  Januvia 50 mg oral tablet: 1 tab(s) orally once a day  meclizine 12.5 mg oral tablet: 1 tab(s) orally every 8 hours as needed for Dizziness  memantine 5 mg oral tablet: 1 tab(s) orally 2 times a day  metoprolol succinate 25 mg oral tablet, extended release: 1 tab(s) orally once a day  mometasone 50 mcg/inh nasal spray: 2 spray(s) intranasally once a day  montelukast 10 mg oral tablet: 1 tab(s) orally once a day  Multiple Vitamins oral tablet: 1 tab(s) orally once a day  NovoLOG 100 units/mL injectable solution: 15 unit(s) injectable 3 times a day  omeprazole 20 mg oral delayed release capsule: 1 tab(s) orally once a day  polyethylene glycol 3350 oral powder for reconstitution: 17 gram(s) orally once a day (at bedtime)  senna leaf extract oral tablet: 2 tab(s) orally once a day (at bedtime)  Symbicort 80 mcg-4.5 mcg/inh inhalation aerosol: 2 puff(s) inhaled 2 times a day  Toujeo SoloStar 300 units/mL subcutaneous solution: 15 unit(s) subcutaneous once a day

## 2023-07-04 NOTE — DISCHARGE NOTE NURSING/CASE MANAGEMENT/SOCIAL WORK - PATIENT PORTAL LINK FT
You can access the FollowMyHealth Patient Portal offered by [facility] by registering at the following website: [email or web address]. By joining [employer]’s FollowMyHealth portal, you will also be able to view your health information using other applications (apps) compatible with our system.

## 2023-07-04 NOTE — DISCHARGE NOTE PROVIDER - HOSPITAL COURSE
77 year old man with PMH HTN, HLD, T2DM on insulin, BPH, mild memory issues, CAD, ROMERO, bioprosthetic aortic valve replacement in 2009 by Dr. Ted Piña and s/p recent LE angiogram s/p angioplasty of right anterior tibial artery on ASA and Plavix.  He was recently admitted to [facility] for unsteadiness on his feet and room spinning sensation, he was r/o for Stroke and cardio work up revealed Bio AVR Prosthetic valve stenosis, BALJINDER confirmed elevated gradients, moderate prosthetic valve stenosis s/p BALJINDER and is now scheduled for TAVR on 7/3/2023.  He presently feels better since discharge, has some ROMERO after walking long distances.  Denies dizziness while in PST, after PST was heading to Dr. Rivera's office (Cardiothoracic Surgery).  He is accompanied by his granddaughter.  s/p 7/3 TAVR via TF  intraop LBBB now 1st 60's; EP and cardiology following ---> continue to observe as per EP  continue asa and plavix for AC as per struct heart, ck echo and ekg in am   7/4 VSS, NSR 1st degree 70s on EKG, TTE completed. Resumed on low dose Toprol XL 25 daily for discharge. Pt cleared for discharge home today with [facility] per Dr. Rivera.    77 year old man with PMH HTN, HLD, T2DM on insulin, BPH, mild memory issues, CAD, ROMERO, bioprosthetic aortic valve replacement in 2009 by Dr. Ted Piña and s/p recent LE angiogram s/p angioplasty of right anterior tibial artery on ASA and Plavix.  He was recently admitted to [facility] for unsteadiness on his feet and room spinning sensation, he was r/o for Stroke and cardio work up revealed Bio AVR Prosthetic valve stenosis, BALJINDER confirmed elevated gradients, moderate prosthetic valve stenosis s/p BALJINDER and is now scheduled for TAVR on 7/3/2023.  He presently feels better since discharge, has some ROMERO after walking long distances.  Denies dizziness while in PST, after PST was heading to Dr. Rivera's office (Cardiothoracic Surgery).  He is accompanied by his granddaughter.  7/3 s/p TAVR  intraop LBBB resolved now 1st 60's; EP and cardiology following ---> continue to observe as per EP, no PPM at this time.  continue asa and plavix for AC as per struct heart, ck echo and ekg in am   7/4 VSS, NSR 1st degree 70s on EKG, TTE completed-there is no evidence of aortic regurgitation. Resumed on low dose Toprol XL 25 daily for discharge. Pt cleared for discharge home today with [facility] per Dr. Rivera.

## 2023-07-04 NOTE — PROGRESS NOTE ADULT - SUBJECTIVE AND OBJECTIVE BOX
C A R D I O L O G Y  **********************************     DATE OF SERVICE: 07-04-23    Patient denies chest pain, dizziness, palpitations, or shortness of breath.   Review of systems otherwise negative.  	  MEDICATIONS:  MEDICATIONS  (STANDING):  amLODIPine   Tablet 10 milliGRAM(s) Oral daily  aspirin enteric coated 81 milliGRAM(s) Oral daily  atorvastatin 40 milliGRAM(s) Oral at bedtime  budesonide  80 MICROgram(s)/formoterol 4.5 MICROgram(s) Inhaler 1 Puff(s) Inhalation two times a day  clopidogrel Tablet 75 milliGRAM(s) Oral daily  dextrose 5%. 1000 milliLiter(s) (50 mL/Hr) IV Continuous <Continuous>  dextrose 5%. 1000 milliLiter(s) (100 mL/Hr) IV Continuous <Continuous>  dextrose 50% Injectable 25 Gram(s) IV Push once  dextrose 50% Injectable 12.5 Gram(s) IV Push once  dextrose 50% Injectable 25 Gram(s) IV Push once  gabapentin 100 milliGRAM(s) Oral three times a day  glucagon  Injectable 1 milliGRAM(s) IntraMuscular once  insulin glargine Injectable (LANTUS) 9 Unit(s) SubCutaneous at bedtime  insulin lispro (ADMELOG) corrective regimen sliding scale   SubCutaneous at bedtime  insulin lispro (ADMELOG) corrective regimen sliding scale   SubCutaneous three times a day before meals  insulin lispro Injectable (ADMELOG) 12 Unit(s) SubCutaneous three times a day before meals  isosorbide   mononitrate ER Tablet (IMDUR) 60 milliGRAM(s) Oral daily  memantine 5 milliGRAM(s) Oral two times a day  metoprolol succinate ER 25 milliGRAM(s) Oral daily  montelukast 10 milliGRAM(s) Oral daily  multivitamin 1 Tablet(s) Oral daily      LABS:	 	    CARDIAC MARKERS:                                12.7   7.54  )-----------( 170      ( 04 Jul 2023 06:00 )             39.1     Hemoglobin: 12.7 g/dL (07-04 @ 06:00)      07-04    139  |  102  |  26<H>  ----------------------------<  117<H>  4.1   |  23  |  1.39<H>    Ca    8.8      04 Jul 2023 06:00      Creatinine Trend: 1.39<--, 1.44<--, 1.49<--, 1.50<--, 1.43<--, 1.25<--    COAGS:       proBNP:   Lipid Profile:   HgA1c:   TSH:       PHYSICAL EXAM:  T(C): 36.3 (07-04-23 @ 10:00), Max: 37 (07-04-23 @ 04:19)  HR: 89 (07-04-23 @ 10:00) (48 - 89)  BP: 153/89 (07-04-23 @ 10:00) (112/64 - 158/80)  RR: 18 (07-04-23 @ 10:00) (15 - 18)  SpO2: 98% (07-04-23 @ 10:00) (94% - 100%)  Wt(kg): --  I&O's Summary    03 Jul 2023 07:01  -  04 Jul 2023 07:00  --------------------------------------------------------  IN: 530 mL / OUT: 2550 mL / NET: -2020 mL    04 Jul 2023 07:01  -  04 Jul 2023 12:10  --------------------------------------------------------  IN: 360 mL / OUT: 500 mL / NET: -140 mL      Gen: NAD  HEENT:  (-)icterus (-)pallor  CV: N S1 S2 1/6 MORENA (+)2 Pulses B/l  Resp:  Clear to auscultation B/L, normal effort  GI: (+) BS Soft, NT, ND  Lymph:  (-)Edema, (-)obvious lymphadenopathy  Skin: Warm to touch, Normal turgor  Psych: Appropriate mood and affect      TELEMETRY: SR/ST 	      ASSESSMENT/PLAN: Patient is a 76 y/o male well known to our office with PMH of hypertension, diabetes, hyperlipidemia, bioprosthetic aortic valve replacement in 2009 by Dr. Ted Piña c/b severe intravalvular regurgitation, nonobstructive CAD s/p cath with moderate prox LAD lesion 40% (6/2023), PAD s/p angioplasty of right anterior tibial artery, and recently diagnosed vertigo who is admitted s/p Ria TAVR.     #Severe Intravalvular Regurgitation of Bio-AVR  - s/p Ria TAVR on 7/3  - Continue ASA/Plavix  - Structural heart/CTS follow up  - Post op TTE results pending    #New LBBB  - Tele stable with no events  - Resolved on repeat EKG  - EP consult appreciated  - Plan to discharge home with [facility]    #CAD  - Continue ASA and Lipitor    #HTN  - Continue Amlodipine and Imdur  - Toprol XL restarted given tachycardia/bradycardia resolved    #HLD  - Continue Lipitor    #PAD  - Continue ASA/Plavix and Lipitor    - Patient to f/u with Dr. Plummer after discharge    Howard Victor PA-C  Pager: [phone]

## 2023-07-04 NOTE — PROGRESS NOTE ADULT - SUBJECTIVE AND OBJECTIVE BOX
[facility] - MIRIAM Beltran / MIRIAM Corbett / MICHELLE Martínez/ MIRIAM Hess/ MIRIAM Ogden/ SHELIA Peck / BRIAN Ordonez / LAMAR Solis  ---------------------------------------------------------------------------------------------------------------  seen and examined today for CKD3  Interval : NAD  VITALS:  T(F): 98.6 (07-04-23 @ 04:19), Max: 98.6 (07-04-23 @ 04:19)  HR: 87 (07-04-23 @ 04:19)  BP: 131/79 (07-04-23 @ 04:19)  RR: 18 (07-04-23 @ 04:19)  SpO2: 94% (07-04-23 @ 04:19)  Wt(kg): --    07-03 @ 07:01  -  07-04 @ 07:00  --------------------------------------------------------  IN: 530 mL / OUT: 2550 mL / NET: -2020 mL    07-04 @ 07:01  -  07-04 @ 09:22  --------------------------------------------------------  IN: 360 mL / OUT: 500 mL / NET: -140 mL      Physical Exam :-  Constitutional: NAD  Neck: Supple.  Respiratory: Bilateral equal breath sounds,  Cardiovascular: S1, S2 normal,  Gastrointestinal: Bowel Sounds present, soft, non tender.  Extremities: No edema  Neurological: Alert and Oriented x 3, no focal deficits  Psychiatric: Normal mood, normal affect  Data:-  Allergies :   No Known Allergies    Hospital Medications:   MEDICATIONS  (STANDING):  amLODIPine   Tablet 10 milliGRAM(s) Oral daily  aspirin enteric coated 81 milliGRAM(s) Oral daily  atorvastatin 40 milliGRAM(s) Oral at bedtime  budesonide  80 MICROgram(s)/formoterol 4.5 MICROgram(s) Inhaler 1 Puff(s) Inhalation two times a day  clopidogrel Tablet 75 milliGRAM(s) Oral daily  dextrose 5%. 1000 milliLiter(s) (50 mL/Hr) IV Continuous <Continuous>  dextrose 5%. 1000 milliLiter(s) (100 mL/Hr) IV Continuous <Continuous>  dextrose 50% Injectable 25 Gram(s) IV Push once  dextrose 50% Injectable 12.5 Gram(s) IV Push once  dextrose 50% Injectable 25 Gram(s) IV Push once  gabapentin 100 milliGRAM(s) Oral three times a day  glucagon  Injectable 1 milliGRAM(s) IntraMuscular once  insulin glargine Injectable (LANTUS) 9 Unit(s) SubCutaneous at bedtime  insulin lispro (ADMELOG) corrective regimen sliding scale   SubCutaneous at bedtime  insulin lispro (ADMELOG) corrective regimen sliding scale   SubCutaneous three times a day before meals  insulin lispro Injectable (ADMELOG) 12 Unit(s) SubCutaneous three times a day before meals  isosorbide   mononitrate ER Tablet (IMDUR) 60 milliGRAM(s) Oral daily  memantine 5 milliGRAM(s) Oral two times a day  metoprolol succinate ER 25 milliGRAM(s) Oral daily  montelukast 10 milliGRAM(s) Oral daily  multivitamin 1 Tablet(s) Oral daily    07-04    139  |  102  |  26<H>  ----------------------------<  117<H>  4.1   |  23  |  1.39<H>    Ca    8.8      04 Jul 2023 06:00      Creatinine Trend: 1.39 <--, 1.44 <--, 1.49 <--                        12.7   7.54  )-----------( 170      ( 04 Jul 2023 06:00 )             39.1

## 2023-07-04 NOTE — PROGRESS NOTE ADULT - SUBJECTIVE AND OBJECTIVE BOX
Subjective  No acute events reported overnight.  The patient is currently sitting in recliner.  He denies any cardiopulmonary complaints at rest or upon exertion.  Denies any fevers, chills, sweats, lighted sensation, dizziness or palpitations.  No pain in his groin sites bilaterally.    Review of systems  14 point review of systems is otherwise unremarkable except what is described above in the history of present illness    Telemetry   Sinus rhythm/sinus tachycardia with rates in the 80s to 110s   -> 218  136 -> 104  483 -> 462    Physical examination  No apparent distress, alert and oriented 3, appropriate affect  JVD is not elevated, supple, no lymphadenopathy  Clear to auscultation bilaterally with no wheezing rhonchi crackles  Regular rate and rhythm with no murmur rub or gallop  Positive bowel sound, soft, nontender, nondistended, no masses, guarding or rebound tenderness  1+ DP/PT pulses  Moving all extremities spontaneously  No clubbing, cyanosis or edema    Assessment/plan  77-year-old man with a past medical history significant for    Hypertension  Hyperlipidemia  Type 2 diabetes mellitus on insulin  BPH  Mild memory issues  Coronary artery disease  Bioprosthetic aortic valve replacement in 2009/[place] procedure  Peripheral arterial disease status post Sarah of right anterior tibial artery    -- The patient on 7/3/2023 underwent transcatheter aortic valve in valve placement of a Medtronic CoreValve 29mm valve through the right femoral artery.  The patient tolerated the procedure well.  -- Continue aspirin 81 mg daily and clopidogrel 75 mg daily.  Closely monitor for signs and symptoms of bleeding.  -- Continue amlodipine 10 mg daily, isosorbide mononitrate 60 mg daily and metoprolol succinate 25 mg daily.  --Continue atorvastatin 40 mg daily.  --Continue all other home medications as prescribed.  -- TTE was performed earlier today and results are pending.  -- Indications for extended Holter monitor were reviewed with the patient and his family.  -- Continue telemetry monitoring.  Aim for potassium greater than 4 magnesium greater than 2.    All questions and concerns of the patient and his granddaughter/Bethanie who was called on the phone were addressed.  If the patient is clinically doing well later today and TTE/telemetry is stable in nature he may be discharged    Findings and plan were discussed with cardiac surgery team.

## 2023-07-04 NOTE — DISCHARGE NOTE PROVIDER - NSDCCPCAREPLAN_GEN_ALL_CORE_FT
PRINCIPAL DISCHARGE DIAGNOSIS  Diagnosis: S/P TAVR (transcatheter aortic valve replacement)  Assessment and Plan of Treatment: 1. Daily Shower  2. Weight yourself daily.  3. Regular diet - low fat, low cholesterol, no added salt.  4. Increase Activity as tolerated.  5. Be sure to let your dentist/physician know about your valve and assess the need for prophylactic antibiotics before any invasive procedures.  6. Follow up in CT Surgery office in 1 week at [address] [place] [address]. Call [phone] to schedule an appointment.

## 2023-07-05 ENCOUNTER — APPOINTMENT (OUTPATIENT)
Dept: CARE COORDINATION | Facility: HOME HEALTH | Age: 78
End: 2023-07-05

## 2023-07-05 VITALS
SYSTOLIC BLOOD PRESSURE: 126 MMHG | RESPIRATION RATE: 17 BRPM | HEART RATE: 72 BPM | OXYGEN SATURATION: 97 % | DIASTOLIC BLOOD PRESSURE: 78 MMHG

## 2023-07-05 RX ORDER — ATORVASTATIN CALCIUM 80 MG/1
TABLET, FILM COATED ORAL
Refills: 0 | Status: DISCONTINUED | COMMUNITY
End: 2023-07-05

## 2023-07-05 RX ORDER — ISOSORBIDE MONONITRATE 60 MG/1
60 TABLET, EXTENDED RELEASE ORAL
Qty: 90 | Refills: 0 | Status: DISCONTINUED | COMMUNITY
Start: 2018-08-03 | End: 2023-07-05

## 2023-07-05 RX ORDER — DICLOFENAC SODIUM 1% 10 MG/G
1 GEL TOPICAL
Qty: 100 | Refills: 0 | Status: DISCONTINUED | COMMUNITY
Start: 2020-07-15 | End: 2023-07-05

## 2023-07-05 RX ORDER — HYDRALAZINE HYDROCHLORIDE 100 MG/1
100 TABLET ORAL 3 TIMES DAILY
Qty: 90 | Refills: 0 | Status: DISCONTINUED | COMMUNITY
End: 2023-07-05

## 2023-07-05 RX ORDER — ACETAMINOPHEN 500 MG/1
TABLET ORAL EVERY 6 HOURS
Refills: 0 | Status: ACTIVE | COMMUNITY

## 2023-07-05 RX ORDER — HYDROCORTISONE AND ACETIC ACID OTIC 20.75; 10.375 MG/ML; MG/ML
1-2 SOLUTION AURICULAR (OTIC) 4 TIMES DAILY
Qty: 1 | Refills: 2 | Status: DISCONTINUED | COMMUNITY
Start: 2022-05-13 | End: 2023-07-05

## 2023-07-05 RX ORDER — SULFACETAMIDE SODIUM AND PREDNISOLONE SODIUM PHOSPHATE 100; 2.3 MG/ML; MG/ML
10-0.23 SOLUTION/ DROPS OPHTHALMIC
Qty: 2 | Refills: 3 | Status: DISCONTINUED | COMMUNITY
Start: 2021-10-06 | End: 2023-07-05

## 2023-07-05 RX ORDER — CLOPIDOGREL BISULFATE 75 MG/1
75 TABLET, FILM COATED ORAL
Qty: 30 | Refills: 0 | Status: DISCONTINUED | COMMUNITY
Start: 2022-07-27 | End: 2023-07-05

## 2023-07-05 RX ORDER — INSULIN GLARGINE 300 U/ML
300 INJECTION, SOLUTION SUBCUTANEOUS DAILY
Refills: 0 | Status: ACTIVE | COMMUNITY

## 2023-07-05 RX ORDER — POLYETHYLENE GLYCOL 3350
POWDER (GRAM) MISCELLANEOUS
Qty: 1 | Refills: 0 | Status: ACTIVE | COMMUNITY

## 2023-07-05 RX ORDER — OMEPRAZOLE 20 MG/1
20 CAPSULE, DELAYED RELEASE ORAL DAILY
Refills: 0 | Status: ACTIVE | COMMUNITY

## 2023-07-05 RX ORDER — CLOPIDOGREL 75 MG/1
75 TABLET, FILM COATED ORAL
Refills: 0 | Status: ACTIVE | COMMUNITY

## 2023-07-05 RX ORDER — INSULIN ASPART 100 [IU]/ML
INJECTION, SOLUTION INTRAVENOUS; SUBCUTANEOUS 3 TIMES DAILY
Refills: 0 | Status: ACTIVE | COMMUNITY

## 2023-07-05 RX ORDER — VALSARTAN 160 MG/1
160 TABLET, COATED ORAL
Qty: 90 | Refills: 0 | Status: DISCONTINUED | COMMUNITY
Start: 2020-07-06 | End: 2023-07-05

## 2023-07-05 RX ORDER — SITAGLIPTIN 50 MG/1
50 TABLET, FILM COATED ORAL DAILY
Refills: 0 | Status: ACTIVE | COMMUNITY

## 2023-07-05 RX ORDER — AMLODIPINE BESYLATE 10 MG/1
10 TABLET ORAL DAILY
Refills: 0 | Status: ACTIVE | COMMUNITY

## 2023-07-05 RX ORDER — INSULIN ASPART 100 [IU]/ML
100 INJECTION, SOLUTION INTRAVENOUS; SUBCUTANEOUS
Qty: 1 | Refills: 2 | Status: DISCONTINUED | COMMUNITY
Start: 2020-08-21 | End: 2023-07-05

## 2023-07-05 RX ORDER — INSULIN DEGLUDEC INJECTION 100 U/ML
100 INJECTION, SOLUTION SUBCUTANEOUS
Qty: 1 | Refills: 2 | Status: DISCONTINUED | COMMUNITY
Start: 2020-06-24 | End: 2023-07-05

## 2023-07-05 RX ORDER — SITAGLIPTIN 50 MG/1
50 TABLET, FILM COATED ORAL
Qty: 30 | Refills: 0 | Status: DISCONTINUED | COMMUNITY
Start: 2017-10-20 | End: 2023-07-05

## 2023-07-05 RX ORDER — ATORVASTATIN CALCIUM 40 MG/1
40 TABLET, FILM COATED ORAL
Refills: 0 | Status: ACTIVE | COMMUNITY

## 2023-07-05 RX ORDER — CLOTRIMAZOLE AND BETAMETHASONE DIPROPIONATE 10; .5 MG/G; MG/G
1-0.05 CREAM TOPICAL
Qty: 45 | Refills: 0 | Status: DISCONTINUED | COMMUNITY
Start: 2022-07-27 | End: 2023-07-05

## 2023-07-05 RX ORDER — METOPROLOL SUCCINATE 25 MG/1
25 TABLET, EXTENDED RELEASE ORAL DAILY
Refills: 0 | Status: ACTIVE | COMMUNITY

## 2023-07-05 RX ORDER — SENNOSIDES 8.6 MG TABLETS 8.6 MG/1
8.6 TABLET ORAL AT BEDTIME
Refills: 0 | Status: ACTIVE | COMMUNITY

## 2023-07-05 RX ORDER — OMEPRAZOLE 40 MG/1
40 CAPSULE, DELAYED RELEASE ORAL
Qty: 90 | Refills: 0 | Status: DISCONTINUED | COMMUNITY
Start: 2020-11-09 | End: 2023-07-05

## 2023-07-05 RX ORDER — HYDROCHLOROTHIAZIDE 25 MG/1
25 TABLET ORAL
Qty: 30 | Refills: 0 | Status: DISCONTINUED | COMMUNITY
Start: 2018-06-20 | End: 2023-07-05

## 2023-07-05 RX ORDER — AMLODIPINE BESYLATE 5 MG/1
TABLET ORAL
Refills: 0 | Status: DISCONTINUED | COMMUNITY
End: 2023-07-05

## 2023-07-05 RX ORDER — ISOSORBIDE MONONITRATE 60 MG/1
60 TABLET, EXTENDED RELEASE ORAL DAILY
Refills: 0 | Status: ACTIVE | COMMUNITY

## 2023-07-05 NOTE — PHYSICAL EXAM
[] : no respiratory distress [Exaggerated Use Of Accessory Muscles For Inspiration] : no accessory muscle use [Auscultation Breath Sounds / Voice Sounds] : lungs were clear to auscultation bilaterally [Heart Sounds] : normal S1 and S2 [Bowel Sounds] : normal bowel sounds [FreeTextEntry1] : B/l groin MITCHEL, clean, dry and intact. No erythema, hematoma or drainage noted.  [Oriented To Time, Place, And Person] : oriented to person, place, and time

## 2023-07-05 NOTE — HISTORY OF PRESENT ILLNESS
[FreeTextEntry1] : 77 year old man with PMH HTN, HLD, T2DM on insulin, BPH, mild memory issues, \par CAD, ROMERO, bioprosthetic aortic valve replacement in 2009 by Dr. Ted Piña \par and s/p recent LE angiogram s/p angioplasty of right anterior tibial artery on \par ASA and Plavix.  He was recently admitted to [facility] for unsteadiness on his feet \par and room spinning sensation, he was r/o for Stroke and cardio work up revealed \par Bio AVR Prosthetic valve stenosis, BALJINDER confirmed elevated gradients, moderate \par prosthetic valve stenosis s/p BALJINDER and is now scheduled for TAVR on 7/3/2023. \par He presently feels better since discharge, has some ROMERO after walking long \par distances.  Denies dizziness while in PST, after PST was heading to Dr. Rivera's \par office (Cardiothoracic Surgery).  He is accompanied by his granddaughter. \par 7/3 s/p TAVR \par intraop LBBB resolved now 1st 60's; EP and cardiology following ---> continue \par to observe as per EP, no PPM at this time. \par continue asa and plavix for AC as per struct heart, ck echo and ekg in am \par 7/4 VSS, NSR 1st degree 70s on EKG, TTE completed-there is no evidence of \par aortic regurgitation. Resumed on low dose Toprol XL 25 daily for discharge. Pt \par cleared for discharge home today with [facility] per Dr. Rivera. \par 7/5 Initial visit completed at home\par \par CC " I am doing ok"

## 2023-07-11 PROBLEM — Z95.2 S/P TAVR (TRANSCATHETER AORTIC VALVE REPLACEMENT): Status: ACTIVE | Noted: 2023-07-11

## 2023-07-12 ENCOUNTER — APPOINTMENT (OUTPATIENT)
Dept: CARDIOTHORACIC SURGERY | Facility: CLINIC | Age: 78
End: 2023-07-12
Payer: MEDICARE

## 2023-07-12 ENCOUNTER — NON-APPOINTMENT (OUTPATIENT)
Age: 78
End: 2023-07-12

## 2023-07-12 VITALS
DIASTOLIC BLOOD PRESSURE: 80 MMHG | BODY MASS INDEX: 31.16 KG/M2 | WEIGHT: 187 LBS | HEART RATE: 76 BPM | SYSTOLIC BLOOD PRESSURE: 140 MMHG | HEIGHT: 65 IN | RESPIRATION RATE: 16 BRPM | OXYGEN SATURATION: 99 % | TEMPERATURE: 98 F

## 2023-07-12 DIAGNOSIS — Z95.2 PRESENCE OF PROSTHETIC HEART VALVE: ICD-10-CM

## 2023-07-12 PROCEDURE — 99213 OFFICE O/P EST LOW 20 MIN: CPT

## 2023-07-12 NOTE — ASSESSMENT
[FreeTextEntry1] : Today on exam, patient's lungs are clear bilaterally, normal sinus rhythm and bilateral groins are clean, dry and intact. There is no hematoma. No peripheral edema noted on exam. EKG performed and reviewed, 1st degree. SBE antibiotic prophylaxis discussed at length.  Patient instructed to continue current medication regimen and followup with cardiology.\par \par Plan:\par \par - Follow up with cardiologist Dr. Plummer and Dr. Coffman\par - Follow up with cardiologist with TTE in one month\par - SBE antibiotic prophylaxis discussed at length\par - Call with any questions or concerns\par

## 2023-07-12 NOTE — CONSULT LETTER
[Dear  ___] : Dear  [unfilled], [Courtesy Letter:] : I had the pleasure of seeing your patient, [unfilled], in my office today. [Please see my note below.] : Please see my note below. [Sincerely,] : Sincerely, [FreeTextEntry2] : Dr. Strauss [FreeTextEntry3] : Mitali Rivera MD\par Attending Surgeon\par Cardiovascular & Thoracic Surgery\par  \par [employer] [facility] [employer]\par

## 2023-07-12 NOTE — REASON FOR VISIT
[de-identified] : TAVR [de-identified] : 7/3/2023 [de-identified] : 77 year old man with PMH HTN, HLD, T2DM on insulin, BPH, mild memory issues, CAD, ROMERO, bioprosthetic aortic valve replacement in 2009 by Dr. Ted Piña and s/p recent LE angiogram s/p angioplasty of right anterior tibial artery on ASA and Plavix. .\par \par Intraop LBBB resolved now 1st 60's; EP and cardiology following ---> continue to observe as per EP, no PPM at this time. asa and plavix for AC as per struct heart, NSR 1st degree 70s on EKG, TTE completed-there is no evidence of aortic regurgitation. Resumed on low dose Toprol XL 25 daily for discharge. Pt cleared for discharge home today with [facility] per Dr. Rivera. Follows Dr. Strauss for cardiology\par \par Presents today with his granddaughter Geovanna and reports feeling ok.  Reports he doesn’t feel much different in terms of his energy.  His weight is down 7 lbs.  Eating and drinking with +BM.  Denies swelling, cp, SOB, palpitations, fever or chills. Followed up with Dr. Plummer 2 days ago who started him on Hydralazine 50mg ID for elevated BP in office. He continues to take Metoprolol succinate 25mg since DC.  Has follow up with Dr. Strauss 7/28.

## 2023-08-16 ENCOUNTER — NON-APPOINTMENT (OUTPATIENT)
Age: 78
End: 2023-08-16

## 2023-08-30 ENCOUNTER — APPOINTMENT (OUTPATIENT)
Dept: OTOLARYNGOLOGY | Facility: CLINIC | Age: 78
End: 2023-08-30
Payer: MEDICARE

## 2023-08-30 DIAGNOSIS — H95.122: ICD-10-CM

## 2023-08-30 DIAGNOSIS — H92.12 OTORRHEA, LEFT EAR: ICD-10-CM

## 2023-08-30 DIAGNOSIS — R42 DIZZINESS AND GIDDINESS: ICD-10-CM

## 2023-08-30 DIAGNOSIS — H70.12 CHRONIC MASTOIDITIS, LEFT EAR: ICD-10-CM

## 2023-08-30 DIAGNOSIS — R05.3 CHRONIC COUGH: ICD-10-CM

## 2023-08-30 PROCEDURE — 17250 CHEM CAUT OF GRANLTJ TISSUE: CPT | Mod: LT

## 2023-08-30 PROCEDURE — 99214 OFFICE O/P EST MOD 30 MIN: CPT | Mod: 25

## 2023-08-30 NOTE — HISTORY OF PRESENT ILLNESS
[de-identified] : 78 year old male presents for follow up for left otorrhea. History of right CWD and left TM perforation, last seen in March 2023. States having problems with imbalance and bleeding from left ear - no spinning vertigo - also reporting swallowing problems - triggers cough -

## 2023-08-30 NOTE — PHYSICAL EXAM
[Binocular Microscopic Exam] : Binocular microscopic exam was performed [FreeTextEntry8] : CWD - clean and dry [FreeTextEntry9] : pus suctioned for cx - has granulation - cauterized extensively - sprayed with mastoid powder  [de-identified] : b/l atlectasis [Midline] : trachea located in midline position [Normal] : orientation to person, place, and time: normal

## 2023-09-01 ENCOUNTER — APPOINTMENT (OUTPATIENT)
Dept: OTOLARYNGOLOGY | Facility: CLINIC | Age: 78
End: 2023-09-01
Payer: MEDICARE

## 2023-09-01 PROCEDURE — 92540 BASIC VESTIBULAR EVALUATION: CPT

## 2023-09-01 PROCEDURE — 92567 TYMPANOMETRY: CPT

## 2023-09-01 PROCEDURE — 92537 CALORIC VSTBLR TEST W/REC: CPT

## 2023-09-01 PROCEDURE — 92547 SUPPLEMENTAL ELECTRICAL TEST: CPT

## 2023-09-06 LAB — EAR NOSE AND THROAT CULTURE: ABNORMAL

## 2023-09-12 ENCOUNTER — NON-APPOINTMENT (OUTPATIENT)
Age: 78
End: 2023-09-12

## 2023-09-18 ENCOUNTER — NON-APPOINTMENT (OUTPATIENT)
Age: 78
End: 2023-09-18

## 2023-09-19 ENCOUNTER — APPOINTMENT (OUTPATIENT)
Dept: OTOLARYNGOLOGY | Facility: CLINIC | Age: 78
End: 2023-09-19
Payer: MEDICARE

## 2023-09-19 VITALS
WEIGHT: 190 LBS | DIASTOLIC BLOOD PRESSURE: 82 MMHG | BODY MASS INDEX: 31.65 KG/M2 | OXYGEN SATURATION: 98 % | HEART RATE: 73 BPM | SYSTOLIC BLOOD PRESSURE: 163 MMHG | HEIGHT: 65 IN

## 2023-09-19 DIAGNOSIS — K21.9 GASTRO-ESOPHAGEAL REFLUX DISEASE W/OUT ESOPHAGITIS: ICD-10-CM

## 2023-09-19 DIAGNOSIS — R09.89 OTHER SPECIFIED SYMPTOMS AND SIGNS INVOLVING THE CIRCULATORY AND RESPIRATORY SYSTEMS: ICD-10-CM

## 2023-09-19 PROCEDURE — 99214 OFFICE O/P EST MOD 30 MIN: CPT | Mod: 25

## 2023-09-19 PROCEDURE — 31575 DIAGNOSTIC LARYNGOSCOPY: CPT

## 2023-09-19 RX ORDER — FAMOTIDINE 40 MG/1
40 TABLET, FILM COATED ORAL
Qty: 30 | Refills: 3 | Status: ACTIVE | COMMUNITY
Start: 2023-09-19 | End: 1900-01-01

## 2023-10-11 NOTE — REASON FOR VISIT
"Malnutrition Severity Assessment    Patient Name:  Lynette Alcantara  YOB: 1981  MRN: [id number]  Admit Date:  10/10/Lilia Cha, MS,RD,LD  10/11/23 11:06 EDT  " [Initial Evaluation] : an initial evaluation [FreeTextEntry1] : memory loss

## 2023-10-16 ENCOUNTER — APPOINTMENT (OUTPATIENT)
Dept: PULMONOLOGY | Facility: CLINIC | Age: 78
End: 2023-10-16
Payer: MEDICARE

## 2023-10-16 ENCOUNTER — APPOINTMENT (OUTPATIENT)
Dept: PULMONOLOGY | Facility: CLINIC | Age: 78
End: 2023-10-16

## 2023-10-16 VITALS
HEIGHT: 65 IN | WEIGHT: 196 LBS | DIASTOLIC BLOOD PRESSURE: 78 MMHG | OXYGEN SATURATION: 95 % | BODY MASS INDEX: 32.65 KG/M2 | SYSTOLIC BLOOD PRESSURE: 132 MMHG | HEART RATE: 69 BPM | RESPIRATION RATE: 17 BRPM

## 2023-10-16 DIAGNOSIS — R06.09 OTHER FORMS OF DYSPNEA: ICD-10-CM

## 2023-10-16 DIAGNOSIS — G47.33 OBSTRUCTIVE SLEEP APNEA (ADULT) (PEDIATRIC): ICD-10-CM

## 2023-10-16 PROCEDURE — 99214 OFFICE O/P EST MOD 30 MIN: CPT

## 2023-10-20 ENCOUNTER — APPOINTMENT (OUTPATIENT)
Dept: OTOLARYNGOLOGY | Facility: CLINIC | Age: 78
End: 2023-10-20

## 2023-12-05 NOTE — H&P ADULT - BREASTS
----- Message from Ambrose Jamal sent at 12/5/2023  1:24 PM CST -----  Regarding: Self  [phone]  Type: Patient Call Back    Who called: Self     What is the request in detail: called to get a work note from the office because the pt had an appt yesterday and an ultrasound this morning. Would brittany a call back.     Can the clinic reply by [facility]? No     Would the patient rather a call back or a response via [facility]? Call back     Best call back number: [phone]     Additional Information:    Thank you.     not examined

## 2023-12-06 ENCOUNTER — INPATIENT (INPATIENT)
Facility: HOSPITAL | Age: 78
LOS: 2 days | Discharge: HOME | DRG: 394 | End: 2023-12-09
Attending: STUDENT IN AN ORGANIZED HEALTH CARE EDUCATION/TRAINING PROGRAM | Admitting: STUDENT IN AN ORGANIZED HEALTH CARE EDUCATION/TRAINING PROGRAM
Payer: COMMERCIAL

## 2023-12-06 VITALS
WEIGHT: 201.06 LBS | HEART RATE: 77 BPM | DIASTOLIC BLOOD PRESSURE: 75 MMHG | HEIGHT: 65 IN | OXYGEN SATURATION: 97 % | TEMPERATURE: 98 F | RESPIRATION RATE: 17 BRPM | SYSTOLIC BLOOD PRESSURE: 121 MMHG

## 2023-12-06 DIAGNOSIS — Z90.49 ACQUIRED ABSENCE OF OTHER SPECIFIED PARTS OF DIGESTIVE TRACT: Chronic | ICD-10-CM

## 2023-12-06 DIAGNOSIS — Z98.890 OTHER SPECIFIED POSTPROCEDURAL STATES: Chronic | ICD-10-CM

## 2023-12-06 DIAGNOSIS — R10.9 UNSPECIFIED ABDOMINAL PAIN: ICD-10-CM

## 2023-12-06 DIAGNOSIS — Z95.2 PRESENCE OF PROSTHETIC HEART VALVE: Chronic | ICD-10-CM

## 2023-12-06 LAB
ALBUMIN SERPL ELPH-MCNC: 3.4 G/DL — SIGNIFICANT CHANGE UP (ref 3.3–5)
ALBUMIN SERPL ELPH-MCNC: 3.4 G/DL — SIGNIFICANT CHANGE UP (ref 3.3–5)
ALP SERPL-CCNC: 89 U/L — SIGNIFICANT CHANGE UP (ref 40–120)
ALP SERPL-CCNC: 89 U/L — SIGNIFICANT CHANGE UP (ref 40–120)
ALT FLD-CCNC: 36 U/L — SIGNIFICANT CHANGE UP (ref 10–45)
ALT FLD-CCNC: 36 U/L — SIGNIFICANT CHANGE UP (ref 10–45)
ANION GAP SERPL CALC-SCNC: 14 MMOL/L — SIGNIFICANT CHANGE UP (ref 5–17)
ANION GAP SERPL CALC-SCNC: 14 MMOL/L — SIGNIFICANT CHANGE UP (ref 5–17)
APPEARANCE UR: CLEAR — SIGNIFICANT CHANGE UP
APPEARANCE UR: CLEAR — SIGNIFICANT CHANGE UP
APTT BLD: 30.4 SEC — SIGNIFICANT CHANGE UP (ref 24.5–35.6)
APTT BLD: 30.4 SEC — SIGNIFICANT CHANGE UP (ref 24.5–35.6)
AST SERPL-CCNC: 63 U/L — HIGH (ref 10–40)
AST SERPL-CCNC: 63 U/L — HIGH (ref 10–40)
BACTERIA # UR AUTO: NEGATIVE /HPF — SIGNIFICANT CHANGE UP
BACTERIA # UR AUTO: NEGATIVE /HPF — SIGNIFICANT CHANGE UP
BASE EXCESS BLDV CALC-SCNC: -2.4 MMOL/L — LOW (ref -2–3)
BASE EXCESS BLDV CALC-SCNC: -2.4 MMOL/L — LOW (ref -2–3)
BASOPHILS # BLD AUTO: 0.04 K/UL — SIGNIFICANT CHANGE UP (ref 0–0.2)
BASOPHILS # BLD AUTO: 0.04 K/UL — SIGNIFICANT CHANGE UP (ref 0–0.2)
BASOPHILS NFR BLD AUTO: 0.5 % — SIGNIFICANT CHANGE UP (ref 0–2)
BASOPHILS NFR BLD AUTO: 0.5 % — SIGNIFICANT CHANGE UP (ref 0–2)
BILIRUB SERPL-MCNC: 0.8 MG/DL — SIGNIFICANT CHANGE UP (ref 0.2–1.2)
BILIRUB SERPL-MCNC: 0.8 MG/DL — SIGNIFICANT CHANGE UP (ref 0.2–1.2)
BILIRUB UR-MCNC: NEGATIVE — SIGNIFICANT CHANGE UP
BILIRUB UR-MCNC: NEGATIVE — SIGNIFICANT CHANGE UP
BLD GP AB SCN SERPL QL: NEGATIVE — SIGNIFICANT CHANGE UP
BLD GP AB SCN SERPL QL: NEGATIVE — SIGNIFICANT CHANGE UP
BUN SERPL-MCNC: 32 MG/DL — HIGH (ref 7–23)
BUN SERPL-MCNC: 32 MG/DL — HIGH (ref 7–23)
CA-I SERPL-SCNC: 1.14 MMOL/L — LOW (ref 1.15–1.33)
CA-I SERPL-SCNC: 1.14 MMOL/L — LOW (ref 1.15–1.33)
CALCIUM SERPL-MCNC: 8.4 MG/DL — SIGNIFICANT CHANGE UP (ref 8.4–10.5)
CALCIUM SERPL-MCNC: 8.4 MG/DL — SIGNIFICANT CHANGE UP (ref 8.4–10.5)
CAST: 0 /LPF — SIGNIFICANT CHANGE UP (ref 0–4)
CAST: 0 /LPF — SIGNIFICANT CHANGE UP (ref 0–4)
CHLORIDE BLDV-SCNC: 99 MMOL/L — SIGNIFICANT CHANGE UP (ref 96–108)
CHLORIDE BLDV-SCNC: 99 MMOL/L — SIGNIFICANT CHANGE UP (ref 96–108)
CHLORIDE SERPL-SCNC: 98 MMOL/L — SIGNIFICANT CHANGE UP (ref 96–108)
CHLORIDE SERPL-SCNC: 98 MMOL/L — SIGNIFICANT CHANGE UP (ref 96–108)
CO2 BLDV-SCNC: 24 MMOL/L — SIGNIFICANT CHANGE UP (ref 22–26)
CO2 BLDV-SCNC: 24 MMOL/L — SIGNIFICANT CHANGE UP (ref 22–26)
CO2 SERPL-SCNC: 19 MMOL/L — LOW (ref 22–31)
CO2 SERPL-SCNC: 19 MMOL/L — LOW (ref 22–31)
COLOR SPEC: YELLOW — SIGNIFICANT CHANGE UP
COLOR SPEC: YELLOW — SIGNIFICANT CHANGE UP
CREAT SERPL-MCNC: 1.53 MG/DL — HIGH (ref 0.5–1.3)
CREAT SERPL-MCNC: 1.53 MG/DL — HIGH (ref 0.5–1.3)
DIFF PNL FLD: NEGATIVE — SIGNIFICANT CHANGE UP
DIFF PNL FLD: NEGATIVE — SIGNIFICANT CHANGE UP
EGFR: 46 ML/MIN/1.73M2 — LOW
EGFR: 46 ML/MIN/1.73M2 — LOW
EOSINOPHIL # BLD AUTO: 0.26 K/UL — SIGNIFICANT CHANGE UP (ref 0–0.5)
EOSINOPHIL # BLD AUTO: 0.26 K/UL — SIGNIFICANT CHANGE UP (ref 0–0.5)
EOSINOPHIL NFR BLD AUTO: 3 % — SIGNIFICANT CHANGE UP (ref 0–6)
EOSINOPHIL NFR BLD AUTO: 3 % — SIGNIFICANT CHANGE UP (ref 0–6)
GAS PNL BLDV: 129 MMOL/L — LOW (ref 136–145)
GAS PNL BLDV: 129 MMOL/L — LOW (ref 136–145)
GAS PNL BLDV: SIGNIFICANT CHANGE UP
GLUCOSE BLDV-MCNC: 211 MG/DL — HIGH (ref 70–99)
GLUCOSE BLDV-MCNC: 211 MG/DL — HIGH (ref 70–99)
GLUCOSE SERPL-MCNC: 211 MG/DL — HIGH (ref 70–99)
GLUCOSE SERPL-MCNC: 211 MG/DL — HIGH (ref 70–99)
GLUCOSE UR QL: NEGATIVE MG/DL — SIGNIFICANT CHANGE UP
GLUCOSE UR QL: NEGATIVE MG/DL — SIGNIFICANT CHANGE UP
HCO3 BLDV-SCNC: 22 MMOL/L — SIGNIFICANT CHANGE UP (ref 22–29)
HCO3 BLDV-SCNC: 22 MMOL/L — SIGNIFICANT CHANGE UP (ref 22–29)
HCT VFR BLD CALC: 29.4 % — LOW (ref 39–50)
HCT VFR BLD CALC: 29.4 % — LOW (ref 39–50)
HCT VFR BLD CALC: 30.4 % — LOW (ref 39–50)
HCT VFR BLD CALC: 30.4 % — LOW (ref 39–50)
HCT VFR BLDA CALC: 28 % — LOW (ref 39–51)
HCT VFR BLDA CALC: 28 % — LOW (ref 39–51)
HGB BLD CALC-MCNC: 9.4 G/DL — LOW (ref 12.6–17.4)
HGB BLD CALC-MCNC: 9.4 G/DL — LOW (ref 12.6–17.4)
HGB BLD-MCNC: 9.3 G/DL — LOW (ref 13–17)
HGB BLD-MCNC: 9.3 G/DL — LOW (ref 13–17)
HGB BLD-MCNC: 9.6 G/DL — LOW (ref 13–17)
HGB BLD-MCNC: 9.6 G/DL — LOW (ref 13–17)
IMM GRANULOCYTES NFR BLD AUTO: 1.7 % — HIGH (ref 0–0.9)
IMM GRANULOCYTES NFR BLD AUTO: 1.7 % — HIGH (ref 0–0.9)
INR BLD: 1.27 RATIO — HIGH (ref 0.85–1.18)
INR BLD: 1.27 RATIO — HIGH (ref 0.85–1.18)
KETONES UR-MCNC: NEGATIVE MG/DL — SIGNIFICANT CHANGE UP
KETONES UR-MCNC: NEGATIVE MG/DL — SIGNIFICANT CHANGE UP
LACTATE BLDV-MCNC: 1.7 MMOL/L — SIGNIFICANT CHANGE UP (ref 0.5–2)
LACTATE BLDV-MCNC: 1.7 MMOL/L — SIGNIFICANT CHANGE UP (ref 0.5–2)
LEUKOCYTE ESTERASE UR-ACNC: NEGATIVE — SIGNIFICANT CHANGE UP
LEUKOCYTE ESTERASE UR-ACNC: NEGATIVE — SIGNIFICANT CHANGE UP
LYMPHOCYTES # BLD AUTO: 1.43 K/UL — SIGNIFICANT CHANGE UP (ref 1–3.3)
LYMPHOCYTES # BLD AUTO: 1.43 K/UL — SIGNIFICANT CHANGE UP (ref 1–3.3)
LYMPHOCYTES # BLD AUTO: 16.5 % — SIGNIFICANT CHANGE UP (ref 13–44)
LYMPHOCYTES # BLD AUTO: 16.5 % — SIGNIFICANT CHANGE UP (ref 13–44)
MAGNESIUM SERPL-MCNC: 2.1 MG/DL — SIGNIFICANT CHANGE UP (ref 1.6–2.6)
MAGNESIUM SERPL-MCNC: 2.1 MG/DL — SIGNIFICANT CHANGE UP (ref 1.6–2.6)
MCHC RBC-ENTMCNC: 27.4 PG — SIGNIFICANT CHANGE UP (ref 27–34)
MCHC RBC-ENTMCNC: 27.4 PG — SIGNIFICANT CHANGE UP (ref 27–34)
MCHC RBC-ENTMCNC: 27.6 PG — SIGNIFICANT CHANGE UP (ref 27–34)
MCHC RBC-ENTMCNC: 27.6 PG — SIGNIFICANT CHANGE UP (ref 27–34)
MCHC RBC-ENTMCNC: 31.6 GM/DL — LOW (ref 32–36)
MCV RBC AUTO: 86.5 FL — SIGNIFICANT CHANGE UP (ref 80–100)
MCV RBC AUTO: 86.5 FL — SIGNIFICANT CHANGE UP (ref 80–100)
MCV RBC AUTO: 87.4 FL — SIGNIFICANT CHANGE UP (ref 80–100)
MCV RBC AUTO: 87.4 FL — SIGNIFICANT CHANGE UP (ref 80–100)
MONOCYTES # BLD AUTO: 1.04 K/UL — HIGH (ref 0–0.9)
MONOCYTES # BLD AUTO: 1.04 K/UL — HIGH (ref 0–0.9)
MONOCYTES NFR BLD AUTO: 12 % — SIGNIFICANT CHANGE UP (ref 2–14)
MONOCYTES NFR BLD AUTO: 12 % — SIGNIFICANT CHANGE UP (ref 2–14)
NEUTROPHILS # BLD AUTO: 5.75 K/UL — SIGNIFICANT CHANGE UP (ref 1.8–7.4)
NEUTROPHILS # BLD AUTO: 5.75 K/UL — SIGNIFICANT CHANGE UP (ref 1.8–7.4)
NEUTROPHILS NFR BLD AUTO: 66.3 % — SIGNIFICANT CHANGE UP (ref 43–77)
NEUTROPHILS NFR BLD AUTO: 66.3 % — SIGNIFICANT CHANGE UP (ref 43–77)
NITRITE UR-MCNC: NEGATIVE — SIGNIFICANT CHANGE UP
NITRITE UR-MCNC: NEGATIVE — SIGNIFICANT CHANGE UP
NRBC # BLD: 0 /100 WBCS — SIGNIFICANT CHANGE UP (ref 0–0)
NT-PROBNP SERPL-SCNC: 387 PG/ML — HIGH (ref 0–300)
NT-PROBNP SERPL-SCNC: 387 PG/ML — HIGH (ref 0–300)
PCO2 BLDV: 38 MMHG — LOW (ref 42–55)
PCO2 BLDV: 38 MMHG — LOW (ref 42–55)
PH BLDV: 7.38 — SIGNIFICANT CHANGE UP (ref 7.32–7.43)
PH BLDV: 7.38 — SIGNIFICANT CHANGE UP (ref 7.32–7.43)
PH UR: 6.5 — SIGNIFICANT CHANGE UP (ref 5–8)
PH UR: 6.5 — SIGNIFICANT CHANGE UP (ref 5–8)
PHOSPHATE SERPL-MCNC: 3.5 MG/DL — SIGNIFICANT CHANGE UP (ref 2.5–4.5)
PHOSPHATE SERPL-MCNC: 3.5 MG/DL — SIGNIFICANT CHANGE UP (ref 2.5–4.5)
PLATELET # BLD AUTO: 253 K/UL — SIGNIFICANT CHANGE UP (ref 150–400)
PLATELET # BLD AUTO: 253 K/UL — SIGNIFICANT CHANGE UP (ref 150–400)
PLATELET # BLD AUTO: 263 K/UL — SIGNIFICANT CHANGE UP (ref 150–400)
PLATELET # BLD AUTO: 263 K/UL — SIGNIFICANT CHANGE UP (ref 150–400)
PO2 BLDV: 65 MMHG — HIGH (ref 25–45)
PO2 BLDV: 65 MMHG — HIGH (ref 25–45)
POTASSIUM BLDV-SCNC: 4.8 MMOL/L — SIGNIFICANT CHANGE UP (ref 3.5–5.1)
POTASSIUM BLDV-SCNC: 4.8 MMOL/L — SIGNIFICANT CHANGE UP (ref 3.5–5.1)
POTASSIUM SERPL-MCNC: 4.3 MMOL/L — SIGNIFICANT CHANGE UP (ref 3.5–5.3)
POTASSIUM SERPL-MCNC: 4.3 MMOL/L — SIGNIFICANT CHANGE UP (ref 3.5–5.3)
POTASSIUM SERPL-SCNC: 4.3 MMOL/L — SIGNIFICANT CHANGE UP (ref 3.5–5.3)
POTASSIUM SERPL-SCNC: 4.3 MMOL/L — SIGNIFICANT CHANGE UP (ref 3.5–5.3)
PROT SERPL-MCNC: 6.8 G/DL — SIGNIFICANT CHANGE UP (ref 6–8.3)
PROT SERPL-MCNC: 6.8 G/DL — SIGNIFICANT CHANGE UP (ref 6–8.3)
PROT UR-MCNC: 30 MG/DL
PROT UR-MCNC: 30 MG/DL
PROTHROM AB SERPL-ACNC: 13.2 SEC — HIGH (ref 9.5–13)
PROTHROM AB SERPL-ACNC: 13.2 SEC — HIGH (ref 9.5–13)
RBC # BLD: 3.4 M/UL — LOW (ref 4.2–5.8)
RBC # BLD: 3.4 M/UL — LOW (ref 4.2–5.8)
RBC # BLD: 3.48 M/UL — LOW (ref 4.2–5.8)
RBC # BLD: 3.48 M/UL — LOW (ref 4.2–5.8)
RBC # FLD: 14.9 % — HIGH (ref 10.3–14.5)
RBC # FLD: 14.9 % — HIGH (ref 10.3–14.5)
RBC # FLD: 15 % — HIGH (ref 10.3–14.5)
RBC # FLD: 15 % — HIGH (ref 10.3–14.5)
RBC CASTS # UR COMP ASSIST: 0 /HPF — SIGNIFICANT CHANGE UP (ref 0–4)
RBC CASTS # UR COMP ASSIST: 0 /HPF — SIGNIFICANT CHANGE UP (ref 0–4)
RH IG SCN BLD-IMP: POSITIVE — SIGNIFICANT CHANGE UP
RH IG SCN BLD-IMP: POSITIVE — SIGNIFICANT CHANGE UP
SAO2 % BLDV: 91.9 % — HIGH (ref 67–88)
SAO2 % BLDV: 91.9 % — HIGH (ref 67–88)
SODIUM SERPL-SCNC: 131 MMOL/L — LOW (ref 135–145)
SODIUM SERPL-SCNC: 131 MMOL/L — LOW (ref 135–145)
SP GR SPEC: 1.02 — SIGNIFICANT CHANGE UP (ref 1–1.03)
SP GR SPEC: 1.02 — SIGNIFICANT CHANGE UP (ref 1–1.03)
SQUAMOUS # UR AUTO: 1 /HPF — SIGNIFICANT CHANGE UP (ref 0–5)
SQUAMOUS # UR AUTO: 1 /HPF — SIGNIFICANT CHANGE UP (ref 0–5)
TROPONIN T, HIGH SENSITIVITY RESULT: 24 NG/L — SIGNIFICANT CHANGE UP (ref 0–51)
TROPONIN T, HIGH SENSITIVITY RESULT: 24 NG/L — SIGNIFICANT CHANGE UP (ref 0–51)
UROBILINOGEN FLD QL: 0.2 MG/DL — SIGNIFICANT CHANGE UP (ref 0.2–1)
UROBILINOGEN FLD QL: 0.2 MG/DL — SIGNIFICANT CHANGE UP (ref 0.2–1)
WBC # BLD: 10.79 K/UL — HIGH (ref 3.8–10.5)
WBC # BLD: 10.79 K/UL — HIGH (ref 3.8–10.5)
WBC # BLD: 8.67 K/UL — SIGNIFICANT CHANGE UP (ref 3.8–10.5)
WBC # BLD: 8.67 K/UL — SIGNIFICANT CHANGE UP (ref 3.8–10.5)
WBC # FLD AUTO: 10.79 K/UL — HIGH (ref 3.8–10.5)
WBC # FLD AUTO: 10.79 K/UL — HIGH (ref 3.8–10.5)
WBC # FLD AUTO: 8.67 K/UL — SIGNIFICANT CHANGE UP (ref 3.8–10.5)
WBC # FLD AUTO: 8.67 K/UL — SIGNIFICANT CHANGE UP (ref 3.8–10.5)
WBC UR QL: 0 /HPF — SIGNIFICANT CHANGE UP (ref 0–5)
WBC UR QL: 0 /HPF — SIGNIFICANT CHANGE UP (ref 0–5)

## 2023-12-06 PROCEDURE — 74177 CT ABD & PELVIS W/CONTRAST: CPT | Mod: 26,MA

## 2023-12-06 PROCEDURE — 93010 ELECTROCARDIOGRAM REPORT: CPT

## 2023-12-06 PROCEDURE — 99285 EMERGENCY DEPT VISIT HI MDM: CPT

## 2023-12-06 RX ORDER — DEXTROSE 50 % IN WATER 50 %
25 SYRINGE (ML) INTRAVENOUS ONCE
Refills: 0 | Status: DISCONTINUED | OUTPATIENT
Start: 2023-12-06 | End: 2023-12-09

## 2023-12-06 RX ORDER — DEXTROSE 50 % IN WATER 50 %
12.5 SYRINGE (ML) INTRAVENOUS ONCE
Refills: 0 | Status: DISCONTINUED | OUTPATIENT
Start: 2023-12-06 | End: 2023-12-09

## 2023-12-06 RX ORDER — SODIUM CHLORIDE 9 MG/ML
1000 INJECTION INTRAMUSCULAR; INTRAVENOUS; SUBCUTANEOUS ONCE
Refills: 0 | Status: COMPLETED | OUTPATIENT
Start: 2023-12-06 | End: 2023-12-06

## 2023-12-06 RX ORDER — SODIUM CHLORIDE 9 MG/ML
1000 INJECTION, SOLUTION INTRAVENOUS
Refills: 0 | Status: DISCONTINUED | OUTPATIENT
Start: 2023-12-06 | End: 2023-12-06

## 2023-12-06 RX ORDER — GLUCAGON INJECTION, SOLUTION 0.5 MG/.1ML
1 INJECTION, SOLUTION SUBCUTANEOUS ONCE
Refills: 0 | Status: DISCONTINUED | OUTPATIENT
Start: 2023-12-06 | End: 2023-12-06

## 2023-12-06 RX ORDER — METOPROLOL TARTRATE 50 MG
25 TABLET ORAL DAILY
Refills: 0 | Status: DISCONTINUED | OUTPATIENT
Start: 2023-12-06 | End: 2023-12-09

## 2023-12-06 RX ORDER — DEXTROSE 50 % IN WATER 50 %
15 SYRINGE (ML) INTRAVENOUS ONCE
Refills: 0 | Status: DISCONTINUED | OUTPATIENT
Start: 2023-12-06 | End: 2023-12-06

## 2023-12-06 RX ORDER — BUDESONIDE AND FORMOTEROL FUMARATE DIHYDRATE 160; 4.5 UG/1; UG/1
2 AEROSOL RESPIRATORY (INHALATION)
Refills: 0 | Status: DISCONTINUED | OUTPATIENT
Start: 2023-12-06 | End: 2023-12-09

## 2023-12-06 RX ORDER — ISOSORBIDE MONONITRATE 60 MG/1
1 TABLET, EXTENDED RELEASE ORAL
Refills: 0 | DISCHARGE

## 2023-12-06 RX ORDER — ACETAMINOPHEN 500 MG
1000 TABLET ORAL ONCE
Refills: 0 | Status: COMPLETED | OUTPATIENT
Start: 2023-12-06 | End: 2023-12-06

## 2023-12-06 RX ORDER — SODIUM CHLORIDE 9 MG/ML
1000 INJECTION, SOLUTION INTRAVENOUS
Refills: 0 | Status: DISCONTINUED | OUTPATIENT
Start: 2023-12-06 | End: 2023-12-08

## 2023-12-06 RX ORDER — MEMANTINE HYDROCHLORIDE 10 MG/1
5 TABLET ORAL
Refills: 0 | Status: DISCONTINUED | OUTPATIENT
Start: 2023-12-06 | End: 2023-12-09

## 2023-12-06 RX ORDER — MECLIZINE HCL 12.5 MG
12.5 TABLET ORAL EVERY 8 HOURS
Refills: 0 | Status: DISCONTINUED | OUTPATIENT
Start: 2023-12-06 | End: 2023-12-09

## 2023-12-06 RX ORDER — INSULIN LISPRO 100/ML
VIAL (ML) SUBCUTANEOUS EVERY 6 HOURS
Refills: 0 | Status: DISCONTINUED | OUTPATIENT
Start: 2023-12-06 | End: 2023-12-06

## 2023-12-06 RX ORDER — SODIUM CHLORIDE 9 MG/ML
1000 INJECTION, SOLUTION INTRAVENOUS
Refills: 0 | Status: DISCONTINUED | OUTPATIENT
Start: 2023-12-06 | End: 2023-12-09

## 2023-12-06 RX ORDER — SODIUM CHLORIDE 9 MG/ML
1000 INJECTION INTRAMUSCULAR; INTRAVENOUS; SUBCUTANEOUS ONCE
Refills: 0 | Status: DISCONTINUED | OUTPATIENT
Start: 2023-12-06 | End: 2023-12-06

## 2023-12-06 RX ORDER — GABAPENTIN 400 MG/1
100 CAPSULE ORAL THREE TIMES A DAY
Refills: 0 | Status: DISCONTINUED | OUTPATIENT
Start: 2023-12-06 | End: 2023-12-09

## 2023-12-06 RX ORDER — AMLODIPINE BESYLATE 2.5 MG/1
10 TABLET ORAL DAILY
Refills: 0 | Status: DISCONTINUED | OUTPATIENT
Start: 2023-12-06 | End: 2023-12-09

## 2023-12-06 RX ORDER — MONTELUKAST 4 MG/1
10 TABLET, CHEWABLE ORAL DAILY
Refills: 0 | Status: DISCONTINUED | OUTPATIENT
Start: 2023-12-06 | End: 2023-12-09

## 2023-12-06 RX ORDER — PANTOPRAZOLE SODIUM 20 MG/1
40 TABLET, DELAYED RELEASE ORAL
Refills: 0 | Status: DISCONTINUED | OUTPATIENT
Start: 2023-12-06 | End: 2023-12-09

## 2023-12-06 RX ORDER — DEXTROSE 50 % IN WATER 50 %
25 SYRINGE (ML) INTRAVENOUS ONCE
Refills: 0 | Status: DISCONTINUED | OUTPATIENT
Start: 2023-12-06 | End: 2023-12-06

## 2023-12-06 RX ORDER — INSULIN GLARGINE 100 [IU]/ML
30 INJECTION, SOLUTION SUBCUTANEOUS AT BEDTIME
Refills: 0 | Status: DISCONTINUED | OUTPATIENT
Start: 2023-12-06 | End: 2023-12-06

## 2023-12-06 RX ORDER — DEXTROSE 50 % IN WATER 50 %
15 SYRINGE (ML) INTRAVENOUS ONCE
Refills: 0 | Status: DISCONTINUED | OUTPATIENT
Start: 2023-12-06 | End: 2023-12-09

## 2023-12-06 RX ORDER — FLUTICASONE PROPIONATE 50 MCG
1 SPRAY, SUSPENSION NASAL EVERY 12 HOURS
Refills: 0 | Status: DISCONTINUED | OUTPATIENT
Start: 2023-12-06 | End: 2023-12-09

## 2023-12-06 RX ORDER — DEXTROSE 50 % IN WATER 50 %
12.5 SYRINGE (ML) INTRAVENOUS ONCE
Refills: 0 | Status: DISCONTINUED | OUTPATIENT
Start: 2023-12-06 | End: 2023-12-06

## 2023-12-06 RX ORDER — INSULIN LISPRO 100/ML
VIAL (ML) SUBCUTANEOUS EVERY 6 HOURS
Refills: 0 | Status: DISCONTINUED | OUTPATIENT
Start: 2023-12-06 | End: 2023-12-08

## 2023-12-06 RX ORDER — GLUCAGON INJECTION, SOLUTION 0.5 MG/.1ML
1 INJECTION, SOLUTION SUBCUTANEOUS ONCE
Refills: 0 | Status: DISCONTINUED | OUTPATIENT
Start: 2023-12-06 | End: 2023-12-09

## 2023-12-06 RX ORDER — ATORVASTATIN CALCIUM 80 MG/1
40 TABLET, FILM COATED ORAL AT BEDTIME
Refills: 0 | Status: DISCONTINUED | OUTPATIENT
Start: 2023-12-06 | End: 2023-12-09

## 2023-12-06 RX ORDER — ACETAMINOPHEN 500 MG
650 TABLET ORAL EVERY 6 HOURS
Refills: 0 | Status: DISCONTINUED | OUTPATIENT
Start: 2023-12-06 | End: 2023-12-06

## 2023-12-06 RX ADMIN — Medication 400 MILLIGRAM(S): at 11:55

## 2023-12-06 RX ADMIN — ATORVASTATIN CALCIUM 40 MILLIGRAM(S): 80 TABLET, FILM COATED ORAL at 22:41

## 2023-12-06 RX ADMIN — GABAPENTIN 100 MILLIGRAM(S): 400 CAPSULE ORAL at 22:41

## 2023-12-06 RX ADMIN — SODIUM CHLORIDE 500 MILLILITER(S): 9 INJECTION INTRAMUSCULAR; INTRAVENOUS; SUBCUTANEOUS at 11:56

## 2023-12-06 RX ADMIN — SODIUM CHLORIDE 100 MILLILITER(S): 9 INJECTION, SOLUTION INTRAVENOUS at 18:05

## 2023-12-06 NOTE — H&P ADULT - ATTENDING COMMENTS
Abdominal pain for 2 weeks found to have a small bowel mesenteric hematoma  No recollection of recent trauma  On ASA/Plavix for cardiac history  Passing flatus  Abdomen soft, tender in the LUQ  No surgical intervention at this time  NPO

## 2023-12-06 NOTE — ED ADULT NURSE NOTE - NSFALLHARMRISKINTERV_ED_ALL_ED
Communicate risk of Fall with Harm to all staff, patient, and family/Provide visual cue: red socks, yellow wristband, yellow gown, etc/Reinforce activity limits and safety measures with patient and family/Bed in lowest position, wheels locked, appropriate side rails in place/Call bell, personal items and telephone in reach/Instruct patient to call for assistance before getting out of bed/chair/stretcher/Non-slip footwear applied when patient is off stretcher/[place] to call system/Physically safe environment - no spills, clutter or unnecessary equipment/Purposeful Proactive Rounding/Room/bathroom lighting operational, light cord in reach Communicate risk of Fall with Harm to all staff, patient, and family/Provide visual cue: red socks, yellow wristband, yellow gown, etc/Reinforce activity limits and safety measures with patient and family/Bed in lowest position, wheels locked, appropriate side rails in place/Call bell, personal items and telephone in reach/Instruct patient to call for assistance before getting out of bed/chair/stretcher/Non-slip footwear applied when patient is off stretcher/[place] to call system/Physically safe environment - no spills, clutter or unnecessary equipment/Purposeful Proactive Rounding/Room/bathroom lighting operational, light cord in reach

## 2023-12-06 NOTE — ED ADULT NURSE NOTE - NSICDXPASTSURGICALHX_GEN_ALL_CORE_FT
PAST SURGICAL HISTORY:  Aortic valve replaced 2009 with bovine tissue valve    History of cholecystectomy     History of ear surgery ? surgery on left ear    Hx of cholecystectomy     S/P AVR (aortic valve replacement) 2009 @ [place] (bovine)     PAST SURGICAL HISTORY:  Aortic valve replaced 2009 with bovine tissue valve    History of cholecystectomy     History of ear surgery ? surgery on left ear    Hx of cholecystectomy     S/P AVR (aortic valve replacement) 2009 @ [place] (bovine)

## 2023-12-06 NOTE — H&P ADULT - NSHPPHYSICALEXAM_GEN_ALL_CORE
GENERAL: NAD, lying in bed comfortably  HEENT: NC/AT  CV/LUNGS: Unlabored respirations.   ABDOMEN: Distended, TTP in left hemiabdomen, Fullness noted along left abdomen. No rebound tenderness.   EXTREMITIES: 2+ peripheral pulses bilaterally. Warm and well perfused   NERVOUS SYSTEM:  A&Ox3, no focal deficits   SKIN: No rashes or lesions

## 2023-12-06 NOTE — H&P ADULT - HISTORY OF PRESENT ILLNESS
78M with history of HTN, HLD, DM, CAD, TAVR in 7/2023, PAD on ASA/Plavix, BPH, chronic dyspnea, and mild memory issues presenting with chronic abdominal pain.  Patient states his pain started about 2 weeks ago, few days prior to his trip to [facility]. As soon as he landed in [facility], he went to go see a doctor and was eventually admitted to the hospital for LLQ abdominal pain with nausea and vomiting. He received some medications and got imaging (bedside paper record shows nephrolithiasis on sonogram), and was eventually discharged and [place] back to [place] on Monday. He is presenting today due to persistent symptoms. His pain is constant, fluctuating in intensity, all throughout lower quadrants. Patient tolerating PO intake; however, states he has a reduced appetite. Patient states he is passing flatus, having BMs. Denies any hematochezia, fever, chills, chest pain.     In the ED, patient AF, HDS. Labs remarkable for WBC 10.79, H/H 9.6/ 30.4.  78M with history of HTN, HLD, DM, CAD, TAVR in 7/2023, PAD on ASA/Plavix, BPH, chronic dyspnea, and mild memory issues presenting with chronic abdominal pain.  Patient states his pain started about 2 weeks ago, few days prior to his trip to [facility]. As soon as he landed in [facility], he went to go see a doctor and was eventually admitted to the hospital for LLQ abdominal pain with nausea and vomiting. He received some medications and got imaging (bedside paper record shows nephrolithiasis on sonogram), and was eventually discharged and came back to [place] on Monday. He is presenting today due to persistent symptoms. His pain is constant, fluctuating in intensity, all throughout lower quadrants. Patient tolerating PO intake; however, states he has a reduced appetite. Patient states he is passing flatus, having BMs. Denies any hematochezia, fever, chills, chest pain.     In the ED, patient AF, HDS. Labs remarkable for WBC 10.79, H/H 9.6/ 30.4.

## 2023-12-06 NOTE — H&P ADULT - NSICDXPASTSURGICALHX_GEN_ALL_CORE_FT
PAST SURGICAL HISTORY:  Aortic valve replaced 2009 with bovine tissue valve    History of cholecystectomy     History of ear surgery ? surgery on left ear    Hx of cholecystectomy     S/P AVR (aortic valve replacement) 2009 @ [place] (bovine)     PAST SURGICAL HISTORY:  Aortic valve replaced 2009 with bovine tissue valve    History of cholecystectomy     History of ear surgery ? surgery on left ear    Hx of cholecystectomy     S/P AVR (aortic valve replacement) 2009 @ [place] (bovine)

## 2023-12-06 NOTE — ED PROVIDER NOTE - NSICDXPASTSURGICALHX_GEN_ALL_CORE_FT
PAST SURGICAL HISTORY:  Aortic valve replaced 2009 with bovine tissue valve    History of cholecystectomy     History of ear surgery ? surgery on left ear    Hx of cholecystectomy     S/P AVR (aortic valve replacement) 2009 @ [place] (bovine)     PAST SURGICAL HISTORY:  Aortic valve replaced 2009 with bovine tissue valve    History of cholecystectomy     History of ear surgery ? surgery on left ear    Hx of cholecystectomy     S/P AVR (aortic valve replacement) 2009 @ [place] (bovine)

## 2023-12-06 NOTE — H&P ADULT - ASSESSMENT
ASSESSMENT: 78M with history of HTN, HLD, DM, CAD, TAVR in 7/2023, PAD on ASA/Plavix, BPH, chronic dyspnea, and mild memory issues presenting with chronic abdominal pain. In the ED, patient AF, HDS. Labs remarkable for WBC 10.79, H/H 9.6/ 30.4. CT demonstrating hematoma in the small bowel mesentery in the left hemiabdomen.    PLAN:  -Admit to surgery  -NPO, IVF   -Exam before pain medications   -Holding ASA/Plavix, VTE ppx   -Monitor H/H   -Medicine consult given co-morbidities   -Med rec to be completed   -Discussed with Dr. Bart Morgan Team, [id number]  ASSESSMENT: 78M with history of HTN, HLD, DM, CAD, TAVR in 7/2023, PAD on ASA/Plavix, BPH, chronic dyspnea, and mild memory issues presenting with chronic abdominal pain. In the ED, patient AF, HDS. Labs remarkable for WBC 10.79, H/H 9.6/ 30.4. CT demonstrating hematoma in the small bowel mesentery in the left hemiabdomen.    PLAN:  -Admit to surgery  -NPO, IVF   -Exam before pain medications   -Holding ASA/Plavix, VTE ppx   -Monitor H/H   -Medicine consult given co-morbidities   -Med rec to be completed   -Discussed with Dr. Bart Morgan Team, [id number]

## 2023-12-06 NOTE — ED PROVIDER NOTE - OBJECTIVE STATEMENT
78M with history of HTN, HLD, DM, CAD, TAVR in 7/2023, PAD on ASA, BPH, chronic dyspnea, and mild memory issues presenting with chronic abdominal pain. Accompanied by granddaughter at bedside. Patient states his pain started about 2 weeks ago, few days prior to his trip to [facility]. As soon as he landed in [facility], he went to go see a doctor and was eventually admitted to the hospital for LLQ abdominal pain with nausea and vomiting. He received some medications and got imaging (bedside paper record shows nephrolithiasis on sonogram), and was eventually discharged and [place]. He is presenting today due to persistent symptoms. His pain is constant, fluctuating in intensity, all throughout lower quadrants. Has not tolerated much PO since pain started. No dysuria or hematuria. 78M with history of HTN, HLD, DM, CAD, TAVR in 7/2023, PAD on ASA, BPH, chronic dyspnea, and mild memory issues presenting with chronic abdominal pain. Accompanied by granddaughter at bedside. Patient states his pain started about 2 weeks ago, few days prior to his trip to [facility]. As soon as he landed in [facility], he went to go see a doctor and was eventually admitted to the hospital for LLQ abdominal pain with nausea and vomiting. He received some medications and got imaging (bedside paper record shows nephrolithiasis on sonogram), and was eventually discharged and came back to [place] on Monday. He is presenting today due to persistent symptoms. His pain is constant, fluctuating in intensity, all throughout lower quadrants. Has not tolerated much PO since pain started. No dysuria or hematuria.

## 2023-12-06 NOTE — ED PROVIDER NOTE - PHYSICAL EXAMINATION
GENERAL: NAD, well-groomed, well-developed  HEAD: atraumatic, normocephalic  EYES: PERRLA, sclera clear  ENMT: no tonsillar erythema/exudate, MMM  NECK: supple  LUNG: clear to auscultation anteriorly, no wheezes  HEART: +s1/s2, systolic murmur throughout, regular rate and rhythm  ABDOMEN: TTP in lower quadrants, no rigidity, soft  EXTREMITIES: no peripheral edema bilaterally  SKIN: no rash or lesions  MSK: no CVA tenderness bilaterally  NEURO: alert and oriented, 5/5 strength throughout

## 2023-12-06 NOTE — ED PROVIDER NOTE - CLINICAL SUMMARY MEDICAL DECISION MAKING FREE TEXT BOX
78M with history of HTN, HLD, DM, CAD, TAVR in 7/2023, PAD on ASA, BPH, chronic dyspnea, and mild memory issues presenting with abdominal pain for the past 2 weeks associated with nausea and vomiting. VSS. Exam with TTP in lower quadrants. DDx diverticulitis vs colitis vs retained nephrolithiasis. Will obtain full labs, hydration with IVF given poor PO intake, and CT AP with cont to evaluate for etiology.

## 2023-12-06 NOTE — ED PROVIDER NOTE - PROGRESS NOTE DETAILS
CT AP showing small bowel mesenteric hematoma. General surgery consulted, planning on admission under Dr. Arriaga. H/H stable at 9. Urine study pending.

## 2023-12-06 NOTE — ED PROVIDER NOTE - ATTENDING CONTRIBUTION TO CARE
RGUJRAL 79yo male hx listed travelled from [facility] yesterday was hospitalized there with findings of kidney stone. Pt states he was not discharge on antibiotics. Pt complains of LLQ abd pain and n/v. Denies any diarrhea. Complains of pain with urination. No fever or chills.   On exam, Patient is awake,alert,oriented x 3. Patient is well appearing and in no acute distress. Patient's chest is clear to ausculation, +s1s2. Abdomen is soft nd/+LLQ tender to palpation +BS. No cvat. Extremity with no swelling or calf tenderness.   Check labs, UA, CT to eval for UTI, Diverticulitis, kidney stone. IVF and monitor. RGUJRAL 77yo male hx listed travelled from [facility] yesterday was hospitalized there with findings of kidney stone. Pt states he was not discharge on antibiotics. Pt complains of LLQ abd pain and n/v. Denies any diarrhea. Complains of pain with urination. No fever or chills.   On exam, Patient is awake,alert,oriented x 3. Patient is well appearing and in no acute distress. Patient's chest is clear to ausculation, +s1s2. Abdomen is soft nd/+LLQ tender to palpation +BS. No cvat. Extremity with no swelling or calf tenderness.   Check labs, UA, CT to eval for UTI, Diverticulitis, kidney stone. IVF and monitor.

## 2023-12-06 NOTE — H&P ADULT - NSHPLABSRESULTS_GEN_ALL_CORE
LABS:                        9.3    8.67  )-----------( 263      ( 06 Dec 2023 15:58 )             29.4     12-06    131<L>  |  98  |  32<H>  ----------------------------<  211<H>  4.3   |  19<L>  |  1.53<H>    Ca    8.4      06 Dec 2023 10:57  Phos  3.5     12-06  Mg     2.1     12-06    TPro  6.8  /  Alb  3.4  /  TBili  0.8  /  DBili  x   /  AST  63<H>  /  ALT  36  /  AlkPhos  89  12-06    PT/INR - ( 06 Dec 2023 15:11 )   PT: 13.2 sec;   INR: 1.27 ratio         PTT - ( 06 Dec 2023 15:11 )  PTT:30.4 sec  CAPILLARY BLOOD GLUCOSE        < from: CT Abdomen and Pelvis w/ IV Cont (12.06.23 @ 12:28) >    FINDINGS:  LOWER CHEST: TAVR.    LIVER: Subcentimeter hypodense hepatic lesions, too small to characterize.  BILE DUCTS: Prominence of the intrahepatic and extrahepatic bile ducts,   probably reflecting postcholecystectomy state.  GALLBLADDER: Cholecystectomy.  SPLEEN: Within normal limits.  PANCREAS: Within normal limits.  ADRENALS: Within normal limits.  KIDNEYS/URETERS: No hydronephrosis. Hypodense left renal lesion,   measuring 1.2 cm, previously characterized as a cyst.    BLADDER: Within normal limits.  REPRODUCTIVE ORGANS: Prostate is enlarged.    BOWEL: Small hiatal hernia. No bowel obstruction. Appendix is normal.  PERITONEUM: Heterogeneous mass in the small bowel mesentery (series 3,   image 101) in the left hemiabdomen, measuring 9.3 x 6.5 cm, probably   hematoma.  VESSELS: Atherosclerotic changes.  RETROPERITONEUM/LYMPH NODES: No lymphadenopathy.  ABDOMINAL WALL: Within normal limits.  BONES: Degenerative changes. Sternotomy.    IMPRESSION:  Hematoma in the small bowel mesentery in the left hemiabdomen.

## 2023-12-06 NOTE — ED ADULT NURSE NOTE - OBJECTIVE STATEMENT
Home 79 yo M presents to ED A+Ox3 accompanied by granddaughter vis EMS from PCP office c/o abdominal pain. Patient reports 2 weeks of intermittent generalized abdominal pain that became constant last night. Also reports one week of generalized weakness, urinary frequency  with nausea and vomiting. Denies fever, chills, cough, chest pain, SOB. Breathing spontaneous and unlabored on room air. Skin warm dry and of color appropriate for ethnicity. Abdomen distended, firm to touch. Bed in lowest position, side rails up. Granddaughter at bedside. 77 yo M presents to ED A+Ox3 accompanied by granddaughter vis EMS from PCP office c/o abdominal pain. Patient reports 2 weeks of intermittent generalized abdominal pain that became constant last night. Also reports one week of generalized weakness, urinary frequency  with nausea and vomiting. Denies fever, chills, cough, chest pain, SOB. Breathing spontaneous and unlabored on room air. Skin warm dry and of color appropriate for ethnicity. Abdomen distended, firm to touch. Bed in lowest position, side rails up. Granddaughter at bedside.

## 2023-12-06 NOTE — ED PROVIDER NOTE - NSICDXPASTMEDICALHX_GEN_ALL_CORE_FT
PAST MEDICAL HISTORY:  BPH (benign prostatic hypertrophy)     Chronic mastoiditis of left side     DM (diabetes mellitus)     Gall stones     GERD (gastroesophageal reflux disease)     High cholesterol     HTN (hypertension)     HTN (hypertension)     Hyperlipidemia

## 2023-12-07 LAB
A1C WITH ESTIMATED AVERAGE GLUCOSE RESULT: 6.6 % — HIGH (ref 4–5.6)
A1C WITH ESTIMATED AVERAGE GLUCOSE RESULT: 6.6 % — HIGH (ref 4–5.6)
ANION GAP SERPL CALC-SCNC: 12 MMOL/L — SIGNIFICANT CHANGE UP (ref 5–17)
ANION GAP SERPL CALC-SCNC: 12 MMOL/L — SIGNIFICANT CHANGE UP (ref 5–17)
BUN SERPL-MCNC: 22 MG/DL — SIGNIFICANT CHANGE UP (ref 7–23)
BUN SERPL-MCNC: 22 MG/DL — SIGNIFICANT CHANGE UP (ref 7–23)
CALCIUM SERPL-MCNC: 8.6 MG/DL — SIGNIFICANT CHANGE UP (ref 8.4–10.5)
CALCIUM SERPL-MCNC: 8.6 MG/DL — SIGNIFICANT CHANGE UP (ref 8.4–10.5)
CHLORIDE SERPL-SCNC: 100 MMOL/L — SIGNIFICANT CHANGE UP (ref 96–108)
CHLORIDE SERPL-SCNC: 100 MMOL/L — SIGNIFICANT CHANGE UP (ref 96–108)
CO2 SERPL-SCNC: 23 MMOL/L — SIGNIFICANT CHANGE UP (ref 22–31)
CO2 SERPL-SCNC: 23 MMOL/L — SIGNIFICANT CHANGE UP (ref 22–31)
CREAT SERPL-MCNC: 1.29 MG/DL — SIGNIFICANT CHANGE UP (ref 0.5–1.3)
CREAT SERPL-MCNC: 1.29 MG/DL — SIGNIFICANT CHANGE UP (ref 0.5–1.3)
CULTURE RESULTS: SIGNIFICANT CHANGE UP
CULTURE RESULTS: SIGNIFICANT CHANGE UP
EGFR: 56 ML/MIN/1.73M2 — LOW
EGFR: 56 ML/MIN/1.73M2 — LOW
ESTIMATED AVERAGE GLUCOSE: 143 MG/DL — HIGH (ref 68–114)
ESTIMATED AVERAGE GLUCOSE: 143 MG/DL — HIGH (ref 68–114)
GLUCOSE BLDC GLUCOMTR-MCNC: 125 MG/DL — HIGH (ref 70–99)
GLUCOSE BLDC GLUCOMTR-MCNC: 125 MG/DL — HIGH (ref 70–99)
GLUCOSE BLDC GLUCOMTR-MCNC: 135 MG/DL — HIGH (ref 70–99)
GLUCOSE BLDC GLUCOMTR-MCNC: 135 MG/DL — HIGH (ref 70–99)
GLUCOSE BLDC GLUCOMTR-MCNC: 137 MG/DL — HIGH (ref 70–99)
GLUCOSE BLDC GLUCOMTR-MCNC: 137 MG/DL — HIGH (ref 70–99)
GLUCOSE BLDC GLUCOMTR-MCNC: 138 MG/DL — HIGH (ref 70–99)
GLUCOSE BLDC GLUCOMTR-MCNC: 138 MG/DL — HIGH (ref 70–99)
GLUCOSE BLDC GLUCOMTR-MCNC: 169 MG/DL — HIGH (ref 70–99)
GLUCOSE BLDC GLUCOMTR-MCNC: 169 MG/DL — HIGH (ref 70–99)
GLUCOSE BLDC GLUCOMTR-MCNC: 183 MG/DL — HIGH (ref 70–99)
GLUCOSE BLDC GLUCOMTR-MCNC: 183 MG/DL — HIGH (ref 70–99)
GLUCOSE SERPL-MCNC: 132 MG/DL — HIGH (ref 70–99)
GLUCOSE SERPL-MCNC: 132 MG/DL — HIGH (ref 70–99)
HCT VFR BLD CALC: 32.5 % — LOW (ref 39–50)
HCT VFR BLD CALC: 32.5 % — LOW (ref 39–50)
HGB BLD-MCNC: 10.6 G/DL — LOW (ref 13–17)
HGB BLD-MCNC: 10.6 G/DL — LOW (ref 13–17)
MAGNESIUM SERPL-MCNC: 1.9 MG/DL — SIGNIFICANT CHANGE UP (ref 1.6–2.6)
MAGNESIUM SERPL-MCNC: 1.9 MG/DL — SIGNIFICANT CHANGE UP (ref 1.6–2.6)
MCHC RBC-ENTMCNC: 27.5 PG — SIGNIFICANT CHANGE UP (ref 27–34)
MCHC RBC-ENTMCNC: 27.5 PG — SIGNIFICANT CHANGE UP (ref 27–34)
MCHC RBC-ENTMCNC: 32.6 GM/DL — SIGNIFICANT CHANGE UP (ref 32–36)
MCHC RBC-ENTMCNC: 32.6 GM/DL — SIGNIFICANT CHANGE UP (ref 32–36)
MCV RBC AUTO: 84.2 FL — SIGNIFICANT CHANGE UP (ref 80–100)
MCV RBC AUTO: 84.2 FL — SIGNIFICANT CHANGE UP (ref 80–100)
NRBC # BLD: 0 /100 WBCS — SIGNIFICANT CHANGE UP (ref 0–0)
NRBC # BLD: 0 /100 WBCS — SIGNIFICANT CHANGE UP (ref 0–0)
PHOSPHATE SERPL-MCNC: 3.3 MG/DL — SIGNIFICANT CHANGE UP (ref 2.5–4.5)
PHOSPHATE SERPL-MCNC: 3.3 MG/DL — SIGNIFICANT CHANGE UP (ref 2.5–4.5)
PLATELET # BLD AUTO: 268 K/UL — SIGNIFICANT CHANGE UP (ref 150–400)
PLATELET # BLD AUTO: 268 K/UL — SIGNIFICANT CHANGE UP (ref 150–400)
POTASSIUM SERPL-MCNC: 4 MMOL/L — SIGNIFICANT CHANGE UP (ref 3.5–5.3)
POTASSIUM SERPL-MCNC: 4 MMOL/L — SIGNIFICANT CHANGE UP (ref 3.5–5.3)
POTASSIUM SERPL-SCNC: 4 MMOL/L — SIGNIFICANT CHANGE UP (ref 3.5–5.3)
POTASSIUM SERPL-SCNC: 4 MMOL/L — SIGNIFICANT CHANGE UP (ref 3.5–5.3)
RBC # BLD: 3.86 M/UL — LOW (ref 4.2–5.8)
RBC # BLD: 3.86 M/UL — LOW (ref 4.2–5.8)
RBC # FLD: 14.6 % — HIGH (ref 10.3–14.5)
RBC # FLD: 14.6 % — HIGH (ref 10.3–14.5)
SODIUM SERPL-SCNC: 135 MMOL/L — SIGNIFICANT CHANGE UP (ref 135–145)
SODIUM SERPL-SCNC: 135 MMOL/L — SIGNIFICANT CHANGE UP (ref 135–145)
SPECIMEN SOURCE: SIGNIFICANT CHANGE UP
SPECIMEN SOURCE: SIGNIFICANT CHANGE UP
WBC # BLD: 9.41 K/UL — SIGNIFICANT CHANGE UP (ref 3.8–10.5)
WBC # BLD: 9.41 K/UL — SIGNIFICANT CHANGE UP (ref 3.8–10.5)
WBC # FLD AUTO: 9.41 K/UL — SIGNIFICANT CHANGE UP (ref 3.8–10.5)
WBC # FLD AUTO: 9.41 K/UL — SIGNIFICANT CHANGE UP (ref 3.8–10.5)

## 2023-12-07 PROCEDURE — 93306 TTE W/DOPPLER COMPLETE: CPT | Mod: 26

## 2023-12-07 PROCEDURE — 74183 MRI ABD W/O CNTR FLWD CNTR: CPT | Mod: 26

## 2023-12-07 RX ORDER — MAGNESIUM SULFATE 500 MG/ML
1 VIAL (ML) INJECTION ONCE
Refills: 0 | Status: COMPLETED | OUTPATIENT
Start: 2023-12-07 | End: 2023-12-07

## 2023-12-07 RX ADMIN — BUDESONIDE AND FORMOTEROL FUMARATE DIHYDRATE 2 PUFF(S): 160; 4.5 AEROSOL RESPIRATORY (INHALATION) at 06:03

## 2023-12-07 RX ADMIN — Medication 1 SPRAY(S): at 18:13

## 2023-12-07 RX ADMIN — AMLODIPINE BESYLATE 10 MILLIGRAM(S): 2.5 TABLET ORAL at 06:02

## 2023-12-07 RX ADMIN — GABAPENTIN 100 MILLIGRAM(S): 400 CAPSULE ORAL at 06:02

## 2023-12-07 RX ADMIN — MEMANTINE HYDROCHLORIDE 5 MILLIGRAM(S): 10 TABLET ORAL at 18:13

## 2023-12-07 RX ADMIN — ATORVASTATIN CALCIUM 40 MILLIGRAM(S): 80 TABLET, FILM COATED ORAL at 21:47

## 2023-12-07 RX ADMIN — PANTOPRAZOLE SODIUM 40 MILLIGRAM(S): 20 TABLET, DELAYED RELEASE ORAL at 06:02

## 2023-12-07 RX ADMIN — MEMANTINE HYDROCHLORIDE 5 MILLIGRAM(S): 10 TABLET ORAL at 06:02

## 2023-12-07 RX ADMIN — SODIUM CHLORIDE 100 MILLILITER(S): 9 INJECTION, SOLUTION INTRAVENOUS at 22:24

## 2023-12-07 RX ADMIN — GABAPENTIN 100 MILLIGRAM(S): 400 CAPSULE ORAL at 21:47

## 2023-12-07 RX ADMIN — MONTELUKAST 10 MILLIGRAM(S): 4 TABLET, CHEWABLE ORAL at 18:13

## 2023-12-07 RX ADMIN — Medication 1 SPRAY(S): at 06:02

## 2023-12-07 RX ADMIN — Medication 100 GRAM(S): at 09:15

## 2023-12-07 RX ADMIN — Medication 25 MILLIGRAM(S): at 06:02

## 2023-12-07 RX ADMIN — SODIUM CHLORIDE 100 MILLILITER(S): 9 INJECTION, SOLUTION INTRAVENOUS at 00:45

## 2023-12-07 RX ADMIN — BUDESONIDE AND FORMOTEROL FUMARATE DIHYDRATE 2 PUFF(S): 160; 4.5 AEROSOL RESPIRATORY (INHALATION) at 18:14

## 2023-12-07 RX ADMIN — Medication 1: at 11:38

## 2023-12-07 RX ADMIN — SODIUM CHLORIDE 100 MILLILITER(S): 9 INJECTION, SOLUTION INTRAVENOUS at 18:14

## 2023-12-07 NOTE — PROGRESS NOTE ADULT - SUBJECTIVE AND OBJECTIVE BOX
RED TEAM Surgery Daily Progress Note  =====================================================  INTERVAL EVENTS: NAEO    SUBJECTIVE: Patient seen and examined at bedside on AM rounds. Patient reports that they're having abdominal discomfort. Denies fever, chills, N/V, chest pain, SOB.    ALLERGIES:  No Known Allergies      --------------------------------------------------------------------------------------    MEDICATIONS:    Neurologic Medications  gabapentin 100 milliGRAM(s) Oral three times a day  meclizine 12.5 milliGRAM(s) Oral every 8 hours PRN Dizziness  memantine 5 milliGRAM(s) Oral two times a day    Respiratory Medications  budesonide  80 MICROgram(s)/formoterol 4.5 MICROgram(s) Inhaler 2 Puff(s) Inhalation two times a day  montelukast 10 milliGRAM(s) Oral daily    Cardiovascular Medications  amLODIPine   Tablet 10 milliGRAM(s) Oral daily  metoprolol succinate ER 25 milliGRAM(s) Oral daily    Gastrointestinal Medications  dextrose 5%. 1000 milliLiter(s) IV Continuous <Continuous>  dextrose 5%. 1000 milliLiter(s) IV Continuous <Continuous>  lactated ringers. 1000 milliLiter(s) IV Continuous <Continuous>  pantoprazole    Tablet 40 milliGRAM(s) Oral before breakfast    Genitourinary Medications    Hematologic/Oncologic Medications    Antimicrobial/Immunologic Medications    Endocrine/Metabolic Medications  atorvastatin 40 milliGRAM(s) Oral at bedtime  dextrose 50% Injectable 25 Gram(s) IV Push once  dextrose 50% Injectable 12.5 Gram(s) IV Push once  dextrose 50% Injectable 25 Gram(s) IV Push once  dextrose Oral Gel 15 Gram(s) Oral once PRN Blood Glucose LESS THAN 70 milliGRAM(s)/deciliter  glucagon  Injectable 1 milliGRAM(s) IntraMuscular once  insulin lispro (ADMELOG) corrective regimen sliding scale   SubCutaneous every 6 hours    Topical/Other Medications  fluticasone propionate 50 MICROgram(s)/spray Nasal Spray 1 Spray(s) Both Nostrils every 12 hours    --------------------------------------------------------------------------------------    VITAL SIGNS:  T(C): 36.8 (12-07-23 @ 04:59), Max: 37 (12-07-23 @ 00:36)  HR: 88 (12-07-23 @ 04:59) (69 - 88)  BP: 122/79 (12-07-23 @ 04:59) (121/75 - 144/76)  RR: 18 (12-07-23 @ 04:59) (16 - 18)  SpO2: 97% (12-07-23 @ 04:59) (96% - 98%)  --------------------------------------------------------------------------------------    INS AND OUTS:    --------------------------------------------------------------------------------------      EXAM    General: NAD, resting in bed comfortably  Cardiac: Regular rate, warm and well perfused  Respiratory: Nonlabored respirations, normal cw expansion, on RA  Abdomen: Soft, distended, palpable firm mass in left mid/lower abdomen  Extremities: Normal strength, FROM, no deformities    --------------------------------------------------------------------------------------    LABS

## 2023-12-07 NOTE — PROGRESS NOTE ADULT - ASSESSMENT
78M with history of HTN, HLD, DM, CAD, TAVR in 7/2023, PAD on ASA/Plavix, BPH, chronic dyspnea, and mild memory issues presenting with chronic abdominal pain. In the ED, patient AF, HDS. Labs remarkable for WBC 10.79, H/H 9.6/ 30.4. CT demonstrating hematoma vs solid mass in the small bowel mesentery in the left taiwo-abdomen.     PLAN:  - NPO, IVF   - Exam before pain medications   - Holding ASA/Plavix, VTE ppx   - Monitor H/H   - Medicine consult given co-morbidities   - Med rec to be completed this AM      Red Team, [id number]  78M with history of HTN, HLD, DM, CAD, TAVR in 7/2023, PAD on ASA/Plavix, BPH, chronic dyspnea, and mild memory issues presenting with chronic abdominal pain. In the ED, patient AF, HDS. Labs remarkable for WBC 10.79, H/H 9.6/ 30.4. CT demonstrating hematoma vs solid mass in the small bowel mesentery in the left taiwo-abdomen.     PLAN:  - NPO, IVF   - Exam before pain medications   - Holding ASA/Plavix, VTE ppx   - Monitor H/H   - Medicine consult given co-morbidities   - Med rec to be completed this AM      Red Team, [id number]  78M with history of HTN, HLD, DM, CAD, TAVR in 7/2023, PAD on ASA/Plavix, BPH, chronic dyspnea, and mild memory issues presenting with chronic abdominal pain. In the ED, patient AF, HDS. Labs remarkable for WBC 10.79, H/H 9.6/ 30.4. CT demonstrating hematoma vs solid mass in the small bowel mesentery in the left taiwo-abdomen. Mass is nonobstructing and patient is still having normal bowel function w/o nausea.    PLAN:  - NPO, IVF   - Exam before pain medications   - Holding ASA/Plavix, VTE ppx   - Monitor H/H   - MRI for further evaluation of mass  - Medicine recommendations appreciated      Red Team, [id number]  78M with history of HTN, HLD, DM, CAD, TAVR in 7/2023, PAD on ASA/Plavix, BPH, chronic dyspnea, and mild memory issues presenting with chronic abdominal pain. In the ED, patient AF, HDS. Labs remarkable for WBC 10.79, H/H 9.6/ 30.4. CT demonstrating hematoma vs solid mass in the small bowel mesentery in the left taiwo-abdomen. Mass is nonobstructing and patient is still having normal bowel function w/o nausea.    PLAN:  - NPO, IVF   - Exam before pain medications   - Holding ASA/Plavix, VTE ppx   - Monitor H/H   - MRI for further evaluation of mass  - Medicine recommendations appreciated      Red Team, [id number]  No

## 2023-12-07 NOTE — CONSULT NOTE ADULT - ASSESSMENT
Patient is a 78 year old Male with a PMHx of HTN, HLD, DM, CAD, TAVR in 7/2023, PAD on ASA/Plavix, BPH, chronic dyspnea, and mild memory issues presenting who presents to [facility] with a chief complaint of chronic abdominal pain. Patient reports that his pain began bout 2 weeks ago, prior to his trip to [facility]. Reportedly patient was hospitalized in [facility] for LLQ abdominal pain, with nausea and vomiting. Per patient, he was found to have nephrolithiasis on sonogram. Patient presents to [facility] with a chief complaint of persistent abdominal pain and discomfort. Patient reports that his pain is constant, fluctuating in intensity and diffuse throughout his abdomen. Internal Medicine has been consulted on Mr. Madison's care for medical management. Patient states he is passing flatus, having BMs. Denies any hematochezia, fever, chills, chest pain, palpitations, shortness of breath or dyspnea.       Hematoma   - CT A/P with Subcentimeter hypodense hepatic lesions, Hypodense left renal lesion 1.2 CM, Heterogenous mass in the small bowel mesentry in the left hemiabdomen, 9.3 X 6.5 cm, read as a hematoma   - Check Serial CBC   - Monitor hemodynamics   - Transfuse for Hgb < 8.0 in view of cardiac history   - Per Surgery     CAD, TAVR  - On Toprol XL 25 PO Qd  - DAPT on hold 2/2 hematoma  - Lipitor 40     Chronic Dyspnea  - On Budesonide/ Flonase / Montelukast  - Incentive spirometer   - Monitor O2 saturation; Supplement to maintain > 90%  - Outpatient pulm follow up for PFTs    PAD   - ASA/Plavix on hold 2/2 hematoma. Resume once cleared from Surgery standpoint     HTN  - On Norvasc 10 and Toprol XL 25 PO Qd   - Monitor BP, VS and patient     HLD  - On Lipitor 40     DM  - A1C 6.6   - Diabetic DASH diet   - Monitor glucose levels closely     SAMARIA, BPH   - Cr down-trending with IVF   - Monitor I and O   - Monitor and trend renal function     Renal lesion   - Read as 1.2 CM   - Outpatient follow up       PPX        Discussed with Attending    Patient is a 78 year old Male with a PMHx of HTN, HLD, DM, CAD, TAVR in 7/2023, PAD on ASA/Plavix, BPH, chronic dyspnea, and mild memory issues presenting who presents to [facility] with a chief complaint of chronic abdominal pain. Patient reports that his pain began bout 2 weeks ago, prior to his trip to [facility]. Reportedly patient was hospitalized in [facility] for LLQ abdominal pain, with nausea and vomiting. Per patient, he was found to have nephrolithiasis on sonogram. Patient presents to [facility] with a chief complaint of persistent abdominal pain and discomfort. Patient reports that his pain is constant, fluctuating in intensity and diffuse throughout his abdomen. Internal Medicine has been consulted on Mr. Madison's care for medical management. Patient states he is passing flatus, having BMs. Denies any hematochezia, fever, chills, chest pain, palpitations, shortness of breath or dyspnea.       Hematoma   - CT A/P with Subcentimeter hypodense hepatic lesions, Hypodense left renal lesion 1.2 CM, Heterogenous mass in the small bowel mesentry in the left hemiabdomen, 9.3 X 6.5 cm, read as a hematoma   - Check Serial CBC   - Monitor hemodynamics   - Transfuse for Hgb < 8.0 in view of cardiac history   - Per Surgery     CAD, TAVR  - On Toprol XL 25 PO Qd  - DAPT on hold 2/2 hematoma  - Lipitor 40     Chronic Dyspnea  - On Budesonide/ Flonase / Montelukast  - Incentive spirometer   - Monitor O2 saturation; Supplement to maintain > 90%  - Outpatient pulm follow up for PFTs    PAD   - ASA/Plavix on hold 2/2 hematoma. Resume once cleared from Surgery standpoint     HTN  - On Norvasc 10 and Toprol XL 25 PO Qd   - Monitor BP, VS and patient     HLD  - On Lipitor 40     DM  - A1C 6.6   - Diabetic DASH diet   - Monitor glucose levels closely     SAMARIA, BPH   - Cr down-trending with IVF   - Monitor I and O   - Monitor and trend renal function     Renal lesion   - Read as 1.2 CM   - Outpatient follow up       PPX        Discussed with Attending    Patient is a 78 year old Male with a PMHx of HTN, HLD, DM, CAD, TAVR in 7/2023, PAD on ASA/Plavix, BPH, chronic dyspnea, and mild memory issues presenting who presents to [facility] with a chief complaint of chronic abdominal pain. Patient reports that his pain began bout 2 weeks ago, prior to his trip to [facility]. Reportedly patient was hospitalized in [facility] for LLQ abdominal pain, with nausea and vomiting. Per patient, he was found to have nephrolithiasis on sonogram. Patient presents to [facility] with a chief complaint of persistent abdominal pain and discomfort. Patient reports that his pain is constant, fluctuating in intensity and diffuse throughout his abdomen. Internal Medicine has been consulted on Mr. Madison's care for medical management. Patient states he is passing flatus, having BMs. Denies any hematochezia, fever, chills, chest pain, palpitations, shortness of breath or dyspnea.       Hematoma   - CT A/P with Subcentimeter hypodense hepatic lesions, Hypodense left renal lesion 1.2 CM, Heterogenous mass in the small bowel mesentry in the left hemiabdomen, 9.3 X 6.5 cm, read as a hematoma   - Pending MR Abdomen   - Check Serial CBC   - Monitor hemodynamics   - Transfuse for Hgb < 8.0 in view of cardiac history   - Per Surgery     Small Bowel Mesentry Mass  - CT as above   - Pending MR ABD   - Per Surgery     CAD, TAVR  - On Toprol XL 25 PO Qd  - DAPT on hold 2/2 hematoma  - Lipitor 40   - Check TTE for possible risk stratification     ? COPD versus Asthma   - Pt unclear if COPD versus Asthma   - On Budesonide/ Flonase / Montelukast  - Incentive spirometer   - Monitor O2 saturation; Supplement to maintain > 90%  - Outpatient pulm follow up for PFTs    PAD   - ASA/Plavix on hold 2/2 hematoma. Resume once cleared from Surgery standpoint     HTN  - On Norvasc 10 and Toprol XL 25 PO Qd   - Monitor BP, VS and patient     HLD  - On Lipitor 40     DM  - A1C 6.6   - Diabetic DASH diet   - Monitor glucose levels closely     SAMARIA, BPH   - Cr down-trending with IVF   - Monitor I and O   - Monitor and trend renal function     Renal lesion   - Read as 1.2 CM   - Outpatient follow up       PPX        Discussed with Attending    Patient is a 78 year old Male with a PMHx of HTN, HLD, DM, CAD, TAVR in 7/2023, PAD on ASA/Plavix, BPH, chronic dyspnea, and mild memory issues presenting who presents to [facility] with a chief complaint of chronic abdominal pain. Patient reports that his pain began bout 2 weeks ago, prior to his trip to [facility]. Reportedly patient was hospitalized in [facility] for LLQ abdominal pain, with nausea and vomiting. Per patient, he was found to have nephrolithiasis on sonogram. Patient presents to [facility] with a chief complaint of persistent abdominal pain and discomfort. Patient reports that his pain is constant, fluctuating in intensity and diffuse throughout his abdomen. Internal Medicine has been consulted on Mr. Madison's care for medical management. Patient states he is passing flatus, having BMs. Denies any hematochezia, fever, chills, chest pain, palpitations, shortness of breath or dyspnea.       Hematoma   - CT A/P with Subcentimeter hypodense hepatic lesions, Hypodense left renal lesion 1.2 CM, Heterogenous mass in the small bowel mesentry in the left hemiabdomen, 9.3 X 6.5 cm, read as a hematoma   - Pending MR Abdomen   - Check Serial CBC   - Monitor hemodynamics   - Transfuse for Hgb < 8.0 in view of cardiac history   - Per Surgery     Small Bowel Mesentry Mass  - CT as above   - Pending MR ABD   - Per Surgery     CAD, TAVR  - On Toprol XL 25 PO Qd  - DAPT on hold 2/2 hematoma  - Lipitor 40   - Check TTE for possible risk stratification     ? COPD versus Asthma   - Pt unclear if COPD versus Asthma   - On Budesonide/ Flonase / Montelukast  - Incentive spirometer   - Monitor O2 saturation; Supplement to maintain > 90%  - Outpatient pulm follow up for PFTs    PAD   - ASA/Plavix on hold 2/2 hematoma. Resume once cleared from Surgery standpoint     HTN  - On Norvasc 10 and Toprol XL 25 PO Qd   - Monitor BP, VS and patient     HLD  - On Lipitor 40     DM  - A1C 6.6   - Diabetic DASH diet   - Monitor glucose levels closely     SAMARIA, BPH   - Cr down-trending with IVF   - Monitor I and O   - Monitor and trend renal function     Renal lesion   - Read as 1.2 CM   - Outpatient follow up       PPX        Discussed with Attending

## 2023-12-07 NOTE — CONSULT NOTE ADULT - SUBJECTIVE AND OBJECTIVE BOX
HPI: Patient is a 78 year old Male with a PMHx of HTN, HLD, DM, CAD, TAVR in 7/2023, PAD on ASA/Plavix, BPH, chronic dyspnea, and mild memory issues presenting who presents to [facility] with a chief complaint of chronic abdominal pain. Patient reports that his pain began bout 2 weeks ago, prior to his trip to [facility]. Reportedly patient was hospitalized in [facility] for LLQ abdominal pain, with nausea and vomiting. Per patient, he was found to have nephrolithiasis on sonogram. Patient presents to [facility] with a chief complaint of persistent abdominal pain and discomfort. Patient reports that his pain is constant, fluctuating in intensity and diffuse throughout his abdomen. Internal Medicine has been consulted on Mr. Madison's care for medical management. Patient states he is passing flatus, having BMs. Denies any hematochezia, fever, chills, chest pain, palpitations, shortness of breath or dyspnea.       REVIEW OF SYSTEMS:    CONSTITUTIONAL: Generalized weakness   EYES/ENT: No visual changes;  No vertigo or throat pain   NECK: No pain or stiffness  RESPIRATORY: No cough, wheezing, hemoptysis; No shortness of breath  CARDIOVASCULAR: No chest pain or palpitations  GASTROINTESTINAL: + Persistent acute on chronic abdominal pain; No nausea, vomiting, or hematemesis; No diarrhea or constipation. No melena or hematochezia.  GENITOURINARY: No dysuria, frequency or hematuria  NEUROLOGICAL: No numbness or weakness  SKIN: No itching, burning, rashes, or lesions   All other review of systems is negative unless indicated above.         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 1000 milliLiter(s) (100 mL/Hr) IV Continuous <Continuous>  dextrose 5%. 1000 milliLiter(s) (50 mL/Hr) IV Continuous <Continuous>  dextrose 50% Injectable 25 Gram(s) IV Push once  dextrose 50% Injectable 12.5 Gram(s) IV Push once  dextrose 50% Injectable 25 Gram(s) IV Push once  fluticasone propionate 50 MICROgram(s)/spray Nasal Spray 1 Spray(s) Both Nostrils every 12 hours  gabapentin 100 milliGRAM(s) Oral three times a day  glucagon  Injectable 1 milliGRAM(s) IntraMuscular once  insulin lispro (ADMELOG) corrective regimen sliding scale   SubCutaneous every 6 hours  lactated ringers. 1000 milliLiter(s) (100 mL/Hr) IV Continuous <Continuous>  memantine 5 milliGRAM(s) Oral two times a day  metoprolol succinate ER 25 milliGRAM(s) Oral daily  montelukast 10 milliGRAM(s) Oral daily  pantoprazole    Tablet 40 milliGRAM(s) Oral before breakfast      Allergies    No Known Allergies    Intolerances          Vital Signs Last 24 Hrs  T(C): 37 (07 Dec 2023 09:02), Max: 37 (07 Dec 2023 00:36)  T(F): 98.6 (07 Dec 2023 09:02), Max: 98.6 (07 Dec 2023 00:36)  HR: 78 (07 Dec 2023 09:02) (69 - 88)  BP: 128/76 (07 Dec 2023 09:02) (122/79 - 144/76)  BP(mean): 93 (07 Dec 2023 09:02) (93 - 99)  RR: 18 (07 Dec 2023 09:02) (16 - 18)  SpO2: 96% (07 Dec 2023 09:02) (96% - 97%)    Parameters below as of 07 Dec 2023 09:02  Patient On (Oxygen Delivery Method): room air        Appearance: Awake, weak appearing male, lying down in bed 	  HEENT:   Normal oral mucosa, Eyes are open   Lymphatic: No lymphadenopathy grossly   Cardiovascular: Normal S1 S2, No JVD   Respiratory: Lungs clear to auscultation, normal effort 	  Gastrointestinal:  + Tenderness upon palpitation  Skin: No rashes, No ecchymoses, No cyanosis, warm to touch  Musculoskeletal: Normal range of motion, normal strength  Psychiatry:  Mood & affect appropriate  Ext: No edema                            10.6   9.41  )-----------( 268      ( 07 Dec 2023 05:56 )             32.5               12-07    135  |  100  |  22  ----------------------------<  132<H>  4.0   |  23  |  1.29    Ca    8.6      07 Dec 2023 05:58  Phos  3.3     12-07  Mg     1.9     12-07    TPro  6.8  /  Alb  3.4  /  TBili  0.8  /  DBili  x   /  AST  63<H>  /  ALT  36  /  AlkPhos  89  12-06    PT/INR - ( 06 Dec 2023 15:11 )   PT: 13.2 sec;   INR: 1.27 ratio         PTT - ( 06 Dec 2023 15:11 )  PTT:30.4 sec                   Urinalysis Basic - ( 07 Dec 2023 05:58 )    Color: x / Appearance: x / SG: x / pH: x  Gluc: 132 mg/dL / Ketone: x  / Bili: x / Urobili: x   Blood: x / Protein: x / Nitrite: x   Leuk Esterase: x / RBC: x / WBC x   Sq Epi: x / Non Sq Epi: x / Bacteria: x      < from: CT Abdomen and Pelvis w/ IV Cont (12.06.23 @ 12:28) >    ACC: [id number] EXAM:  CT ABDOMEN AND PELVIS IC   ORDERED BY:  VIC PEREZ     PROCEDURE DATE:  12/06/2023          INTERPRETATION:  CLINICAL INFORMATION: Left lower quadrant pain.    COMPARISON: June 19, 2023.    CONTRAST/COMPLICATIONS:  IV Contrast:Omnipaque 350  90 cc administered   10 cc discarded  Oral Contrast: NONE  Complications: None reported at time of study completion    PROCEDURE:  CT of the Abdomen and Pelvis was performed.  Sagittal and coronal reformats were performed.    FINDINGS:  LOWER CHEST: TAVR.    LIVER: Subcentimeter hypodense hepatic lesions, too small to characterize.  BILE DUCTS: Prominence of the intrahepatic and extrahepatic bile ducts,   probably reflecting postcholecystectomy state.  GALLBLADDER: Cholecystectomy.  SPLEEN: Within normal limits.  PANCREAS: Within normal limits.  ADRENALS: Within normal limits.  KIDNEYS/URETERS: No hydronephrosis. Hypodense left renal lesion,   measuring 1.2 cm, previously characterized as a cyst.    BLADDER: Within normal limits.  REPRODUCTIVE ORGANS: Prostate is enlarged.    BOWEL: Small hiatal hernia. No bowel obstruction. Appendix is normal.  PERITONEUM: Heterogeneous mass in the small bowel mesentery (series 3,   image 101) in the left hemiabdomen, measuring 9.3 x 6.5 cm, probably   hematoma.  VESSELS: Atherosclerotic changes.  RETROPERITONEUM/LYMPH NODES: No lymphadenopathy.  ABDOMINAL WALL: Within normal limits.  BONES: Degenerative changes. Sternotomy.    IMPRESSION:  Hematoma in the small bowel mesentery in the left hemiabdomen.    Dr. Peck discussed the findings with Dr. Hinojosa on December 6, 2023 2:48   PM.  Readback was obtained.    --- End of Report ---            < end of copied text >     HPI: Patient is a 78 year old Male with a PMHx of HTN, HLD, DM, CAD, TAVR in 7/2023, PAD on ASA/Plavix, BPH, chronic dyspnea, and mild memory issues presenting who presents to [facility] with a chief complaint of chronic abdominal pain. Patient reports that his pain began bout 2 weeks ago, prior to his trip to [facility]. Reportedly patient was hospitalized in [facility] for LLQ abdominal pain, with nausea and vomiting. Per patient, he was found to have nephrolithiasis on sonogram. Patient presents to [facility] with a chief complaint of persistent abdominal pain and discomfort. Patient reports that his pain is constant, fluctuating in intensity and diffuse throughout his abdomen. Internal Medicine has been consulted on Mr. Madison's care for medical management. Patient states he is passing flatus, having BMs. Denies any hematochezia, fever, chills, chest pain, palpitations, shortness of breath or dyspnea.       REVIEW OF SYSTEMS:    CONSTITUTIONAL: Generalized weakness   EYES/ENT: No visual changes;  No vertigo or throat pain   NECK: No pain or stiffness  RESPIRATORY: No cough, wheezing, hemoptysis; No shortness of breath  CARDIOVASCULAR: No chest pain or palpitations  GASTROINTESTINAL: + Persistent acute on chronic abdominal pain; No nausea, vomiting, or hematemesis; No diarrhea or constipation. No melena or hematochezia.  GENITOURINARY: No dysuria, frequency or hematuria  NEUROLOGICAL: No numbness or weakness  SKIN: No itching, burning, rashes, or lesions   All other review of systems is negative unless indicated above.         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 1000 milliLiter(s) (100 mL/Hr) IV Continuous <Continuous>  dextrose 5%. 1000 milliLiter(s) (50 mL/Hr) IV Continuous <Continuous>  dextrose 50% Injectable 25 Gram(s) IV Push once  dextrose 50% Injectable 12.5 Gram(s) IV Push once  dextrose 50% Injectable 25 Gram(s) IV Push once  fluticasone propionate 50 MICROgram(s)/spray Nasal Spray 1 Spray(s) Both Nostrils every 12 hours  gabapentin 100 milliGRAM(s) Oral three times a day  glucagon  Injectable 1 milliGRAM(s) IntraMuscular once  insulin lispro (ADMELOG) corrective regimen sliding scale   SubCutaneous every 6 hours  lactated ringers. 1000 milliLiter(s) (100 mL/Hr) IV Continuous <Continuous>  memantine 5 milliGRAM(s) Oral two times a day  metoprolol succinate ER 25 milliGRAM(s) Oral daily  montelukast 10 milliGRAM(s) Oral daily  pantoprazole    Tablet 40 milliGRAM(s) Oral before breakfast      Allergies    No Known Allergies    Intolerances          Vital Signs Last 24 Hrs  T(C): 37 (07 Dec 2023 09:02), Max: 37 (07 Dec 2023 00:36)  T(F): 98.6 (07 Dec 2023 09:02), Max: 98.6 (07 Dec 2023 00:36)  HR: 78 (07 Dec 2023 09:02) (69 - 88)  BP: 128/76 (07 Dec 2023 09:02) (122/79 - 144/76)  BP(mean): 93 (07 Dec 2023 09:02) (93 - 99)  RR: 18 (07 Dec 2023 09:02) (16 - 18)  SpO2: 96% (07 Dec 2023 09:02) (96% - 97%)    Parameters below as of 07 Dec 2023 09:02  Patient On (Oxygen Delivery Method): room air        Appearance: Awake, weak appearing male, lying down in bed 	  HEENT:   Normal oral mucosa, Eyes are open   Lymphatic: No lymphadenopathy grossly   Cardiovascular: Normal S1 S2, No JVD   Respiratory: Lungs clear to auscultation, normal effort 	  Gastrointestinal:  + Tenderness upon palpitation  Skin: No rashes, No ecchymoses, No cyanosis, warm to touch  Musculoskeletal: Normal range of motion, normal strength  Psychiatry:  Mood & affect appropriate  Ext: No edema                            10.6   9.41  )-----------( 268      ( 07 Dec 2023 05:56 )             32.5               12-07    135  |  100  |  22  ----------------------------<  132<H>  4.0   |  23  |  1.29    Ca    8.6      07 Dec 2023 05:58  Phos  3.3     12-07  Mg     1.9     12-07    TPro  6.8  /  Alb  3.4  /  TBili  0.8  /  DBili  x   /  AST  63<H>  /  ALT  36  /  AlkPhos  89  12-06    PT/INR - ( 06 Dec 2023 15:11 )   PT: 13.2 sec;   INR: 1.27 ratio         PTT - ( 06 Dec 2023 15:11 )  PTT:30.4 sec                   Urinalysis Basic - ( 07 Dec 2023 05:58 )    Color: x / Appearance: x / SG: x / pH: x  Gluc: 132 mg/dL / Ketone: x  / Bili: x / Urobili: x   Blood: x / Protein: x / Nitrite: x   Leuk Esterase: x / RBC: x / WBC x   Sq Epi: x / Non Sq Epi: x / Bacteria: x      < from: CT Abdomen and Pelvis w/ IV Cont (12.06.23 @ 12:28) >    ACC: [id number] EXAM:  CT ABDOMEN AND PELVIS IC   ORDERED BY:  VIC PEREZ     PROCEDURE DATE:  12/06/2023          INTERPRETATION:  CLINICAL INFORMATION: Left lower quadrant pain.    COMPARISON: June 19, 2023.    CONTRAST/COMPLICATIONS:  IV Contrast:Omnipaque 350  90 cc administered   10 cc discarded  Oral Contrast: NONE  Complications: None reported at time of study completion    PROCEDURE:  CT of the Abdomen and Pelvis was performed.  Sagittal and coronal reformats were performed.    FINDINGS:  LOWER CHEST: TAVR.    LIVER: Subcentimeter hypodense hepatic lesions, too small to characterize.  BILE DUCTS: Prominence of the intrahepatic and extrahepatic bile ducts,   probably reflecting postcholecystectomy state.  GALLBLADDER: Cholecystectomy.  SPLEEN: Within normal limits.  PANCREAS: Within normal limits.  ADRENALS: Within normal limits.  KIDNEYS/URETERS: No hydronephrosis. Hypodense left renal lesion,   measuring 1.2 cm, previously characterized as a cyst.    BLADDER: Within normal limits.  REPRODUCTIVE ORGANS: Prostate is enlarged.    BOWEL: Small hiatal hernia. No bowel obstruction. Appendix is normal.  PERITONEUM: Heterogeneous mass in the small bowel mesentery (series 3,   image 101) in the left hemiabdomen, measuring 9.3 x 6.5 cm, probably   hematoma.  VESSELS: Atherosclerotic changes.  RETROPERITONEUM/LYMPH NODES: No lymphadenopathy.  ABDOMINAL WALL: Within normal limits.  BONES: Degenerative changes. Sternotomy.    IMPRESSION:  Hematoma in the small bowel mesentery in the left hemiabdomen.    Dr. Peck discussed the findings with Dr. Hinojosa on December 6, 2023 2:48   PM.  Readback was obtained.    --- End of Report ---            < end of copied text >

## 2023-12-07 NOTE — CONSULT NOTE ADULT - SUBJECTIVE AND OBJECTIVE BOX
C A R D I O L O G Y  *********************    DATE OF SERVICE: 12-07-23    HISTORY OF PRESENT ILLNESS: HPI:  Patient is a 77 y/o male well known to our office with PMH of hypertension, diabetes, hyperlipidemia, bioprosthetic aortic valve replacement in 2009 by Dr. Ted Piña c/b severe intravalvular regurgitation s/p Ria TAVR (7/2023), nonobstructive CAD s/p cath with moderate prox LAD lesion 40% (6/2023), PAD s/p angioplasty of right anterior tibial artery, and vertigo who presented with LLQ abd pain admitted for hematoma in small bowel mesentery. Denies chest pain, SOB, palpitations, dizziness, or syncope.    PAST MEDICAL & SURGICAL HISTORY:  HTN (hypertension)      Hyperlipidemia      BPH (benign prostatic hypertrophy)      GERD (gastroesophageal reflux disease)      Chronic mastoiditis of left side      Gall stones      HTN (hypertension)      DM (diabetes mellitus)      High cholesterol      Aortic valve replaced  2009 with bovine tissue valve      Hx of cholecystectomy      S/P AVR (aortic valve replacement)  2009 @ [place] (bovine)      History of cholecystectomy      History of ear surgery  ? surgery on left ear      MEDICATIONS:  MEDICATIONS  (STANDING):  amLODIPine   Tablet 10 milliGRAM(s) Oral daily  atorvastatin 40 milliGRAM(s) Oral at bedtime  budesonide  80 MICROgram(s)/formoterol 4.5 MICROgram(s) Inhaler 2 Puff(s) Inhalation two times a day  dextrose 5%. 1000 milliLiter(s) (50 mL/Hr) IV Continuous <Continuous>  dextrose 5%. 1000 milliLiter(s) (100 mL/Hr) IV Continuous <Continuous>  dextrose 50% Injectable 25 Gram(s) IV Push once  dextrose 50% Injectable 25 Gram(s) IV Push once  dextrose 50% Injectable 12.5 Gram(s) IV Push once  fluticasone propionate 50 MICROgram(s)/spray Nasal Spray 1 Spray(s) Both Nostrils every 12 hours  gabapentin 100 milliGRAM(s) Oral three times a day  glucagon  Injectable 1 milliGRAM(s) IntraMuscular once  insulin lispro (ADMELOG) corrective regimen sliding scale   SubCutaneous every 6 hours  lactated ringers. 1000 milliLiter(s) (100 mL/Hr) IV Continuous <Continuous>  memantine 5 milliGRAM(s) Oral two times a day  metoprolol succinate ER 25 milliGRAM(s) Oral daily  montelukast 10 milliGRAM(s) Oral daily  pantoprazole    Tablet 40 milliGRAM(s) Oral before breakfast      Allergies    No Known Allergies    Intolerances        FAMILY HISTORY:  No pertinent family history in first degree relatives      Non-contributary for premature coronary disease or sudden cardiac death    SOCIAL HISTORY:    [x ] Non-smoker  [ ] Smoker  [ ] Alcohol    FLU VACCINE THIS YEAR STARTS IN AUGUST:  [ ] Yes    [ ] No    IF OVER 65 HAVE YOU EVER HAD A PNA VACCINE:  [ ] Yes    [ ] No       [ ] N/A      REVIEW OF SYSTEMS:  [ ]chest pain  [  ]shortness of breath  [  ]palpitations  [  ]syncope  [ ]near syncope [ ]upper extremity weakness   [ ] lower extremity weakness  [  ]diplopia  [  ]altered mental status   [  ]fevers  [ ]chills [ ]nausea  [ ]vomiting  [  ]dysphagia    [x ]abdominal pain  [ ]melena  [ ]BRBPR    [  ]epistaxis  [  ]rash    [ ]lower extremity edema        [X] All others negative	  [ ] Unable to obtain      LABS:	 	    CARDIAC MARKERS:                              10.6   9.41  )-----------( 268      ( 07 Dec 2023 05:56 )             32.5     Hb Trend: 10.6<--, 9.3<--    12-07    135  |  100  |  22  ----------------------------<  132<H>  4.0   |  23  |  1.29    Ca    8.6      07 Dec 2023 05:58  Phos  3.3     12-07  Mg     1.9     12-07    TPro  6.8  /  Alb  3.4  /  TBili  0.8  /  DBili  x   /  AST  63<H>  /  ALT  36  /  AlkPhos  89  12-06    Creatinine Trend: 1.29<--, 1.53<--    Coags:      proBNP:   Lipid Profile:   HgA1c:   TSH:         PHYSICAL EXAM:  T(C): 36.9 (12-07-23 @ 13:02), Max: 37 (12-07-23 @ 00:36)  HR: 81 (12-07-23 @ 13:02) (69 - 88)  BP: 134/76 (12-07-23 @ 13:02) (122/79 - 144/76)  RR: 18 (12-07-23 @ 13:02) (16 - 18)  SpO2: 95% (12-07-23 @ 13:02) (95% - 97%)  Wt(kg): --   BMI (kg/m2): 33.5 (12-06-23 @ 10:11)  I&O's Summary    07 Dec 2023 07:01  -  07 Dec 2023 15:11  --------------------------------------------------------  IN: 460 mL / OUT: 400 mL / NET: 60 mL        Gen: NAD  HEENT:  (-)icterus (-)pallor  CV: N S1 S2 1/6 MORENA (+)2 Pulses B/l  Resp:  Clear to auscultation B/L, normal effort  GI: (+) BS Soft, NT, ND  Lymph:  (-)Edema, (-)obvious lymphadenopathy  Skin: Warm to touch, Normal turgor  Psych: Appropriate mood and affect      TELEMETRY: None	      ECG: SR 1st degree AVB  	    RADIOLOGY:         CXR: None    ASSESSMENT/PLAN: Patient is a 77 y/o male well known to our office with PMH of hypertension, diabetes, hyperlipidemia, bioprosthetic aortic valve replacement in 2009 by Dr. Ted Piña c/b severe intravalvular regurgitation s/p Ria TAVR (7/2023), nonobstructive CAD s/p cath with moderate prox LAD lesion 40% (6/2023), PAD s/p angioplasty of right anterior tibial artery, and vertigo who presented with LLQ abd pain admitted for hematoma in small bowel mesentery.    #Hematoma in small bowel mesentery  - Monitor H/H  - Hold antiplatelets per surgery  - Surgery follow up  - MRI Abd pending    #Hx Ria TAVR  - ASA/Plavix on hold per surgery given hematoma  - TTE pending    #CAD/PAD  - ASA on hold as above  - Continue Lipitor    #HTN  - Continue Amlodipine and Toprol XL    #HLD  - Continue Lipitor    - Patient to f/u with Dr. Coffman after discharge    Howard Victor PA-C  Pager: [phone]   C A R D I O L O G Y  *********************    DATE OF SERVICE: 12-07-23    HISTORY OF PRESENT ILLNESS: HPI:  Patient is a 79 y/o male well known to our office with PMH of hypertension, diabetes, hyperlipidemia, bioprosthetic aortic valve replacement in 2009 by Dr. Ted Piña c/b severe intravalvular regurgitation s/p Ria TAVR (7/2023), nonobstructive CAD s/p cath with moderate prox LAD lesion 40% (6/2023), PAD s/p angioplasty of right anterior tibial artery, and vertigo who presented with LLQ abd pain admitted for hematoma in small bowel mesentery. Denies chest pain, SOB, palpitations, dizziness, or syncope.    PAST MEDICAL & SURGICAL HISTORY:  HTN (hypertension)      Hyperlipidemia      BPH (benign prostatic hypertrophy)      GERD (gastroesophageal reflux disease)      Chronic mastoiditis of left side      Gall stones      HTN (hypertension)      DM (diabetes mellitus)      High cholesterol      Aortic valve replaced  2009 with bovine tissue valve      Hx of cholecystectomy      S/P AVR (aortic valve replacement)  2009 @ [place] (bovine)      History of cholecystectomy      History of ear surgery  ? surgery on left ear      MEDICATIONS:  MEDICATIONS  (STANDING):  amLODIPine   Tablet 10 milliGRAM(s) Oral daily  atorvastatin 40 milliGRAM(s) Oral at bedtime  budesonide  80 MICROgram(s)/formoterol 4.5 MICROgram(s) Inhaler 2 Puff(s) Inhalation two times a day  dextrose 5%. 1000 milliLiter(s) (50 mL/Hr) IV Continuous <Continuous>  dextrose 5%. 1000 milliLiter(s) (100 mL/Hr) IV Continuous <Continuous>  dextrose 50% Injectable 25 Gram(s) IV Push once  dextrose 50% Injectable 25 Gram(s) IV Push once  dextrose 50% Injectable 12.5 Gram(s) IV Push once  fluticasone propionate 50 MICROgram(s)/spray Nasal Spray 1 Spray(s) Both Nostrils every 12 hours  gabapentin 100 milliGRAM(s) Oral three times a day  glucagon  Injectable 1 milliGRAM(s) IntraMuscular once  insulin lispro (ADMELOG) corrective regimen sliding scale   SubCutaneous every 6 hours  lactated ringers. 1000 milliLiter(s) (100 mL/Hr) IV Continuous <Continuous>  memantine 5 milliGRAM(s) Oral two times a day  metoprolol succinate ER 25 milliGRAM(s) Oral daily  montelukast 10 milliGRAM(s) Oral daily  pantoprazole    Tablet 40 milliGRAM(s) Oral before breakfast      Allergies    No Known Allergies    Intolerances        FAMILY HISTORY:  No pertinent family history in first degree relatives      Non-contributary for premature coronary disease or sudden cardiac death    SOCIAL HISTORY:    [x ] Non-smoker  [ ] Smoker  [ ] Alcohol    FLU VACCINE THIS YEAR STARTS IN AUGUST:  [ ] Yes    [ ] No    IF OVER 65 HAVE YOU EVER HAD A PNA VACCINE:  [ ] Yes    [ ] No       [ ] N/A      REVIEW OF SYSTEMS:  [ ]chest pain  [  ]shortness of breath  [  ]palpitations  [  ]syncope  [ ]near syncope [ ]upper extremity weakness   [ ] lower extremity weakness  [  ]diplopia  [  ]altered mental status   [  ]fevers  [ ]chills [ ]nausea  [ ]vomiting  [  ]dysphagia    [x ]abdominal pain  [ ]melena  [ ]BRBPR    [  ]epistaxis  [  ]rash    [ ]lower extremity edema        [X] All others negative	  [ ] Unable to obtain      LABS:	 	    CARDIAC MARKERS:                              10.6   9.41  )-----------( 268      ( 07 Dec 2023 05:56 )             32.5     Hb Trend: 10.6<--, 9.3<--    12-07    135  |  100  |  22  ----------------------------<  132<H>  4.0   |  23  |  1.29    Ca    8.6      07 Dec 2023 05:58  Phos  3.3     12-07  Mg     1.9     12-07    TPro  6.8  /  Alb  3.4  /  TBili  0.8  /  DBili  x   /  AST  63<H>  /  ALT  36  /  AlkPhos  89  12-06    Creatinine Trend: 1.29<--, 1.53<--    Coags:      proBNP:   Lipid Profile:   HgA1c:   TSH:         PHYSICAL EXAM:  T(C): 36.9 (12-07-23 @ 13:02), Max: 37 (12-07-23 @ 00:36)  HR: 81 (12-07-23 @ 13:02) (69 - 88)  BP: 134/76 (12-07-23 @ 13:02) (122/79 - 144/76)  RR: 18 (12-07-23 @ 13:02) (16 - 18)  SpO2: 95% (12-07-23 @ 13:02) (95% - 97%)  Wt(kg): --   BMI (kg/m2): 33.5 (12-06-23 @ 10:11)  I&O's Summary    07 Dec 2023 07:01  -  07 Dec 2023 15:11  --------------------------------------------------------  IN: 460 mL / OUT: 400 mL / NET: 60 mL        Gen: NAD  HEENT:  (-)icterus (-)pallor  CV: N S1 S2 1/6 MORENA (+)2 Pulses B/l  Resp:  Clear to auscultation B/L, normal effort  GI: (+) BS Soft, NT, ND  Lymph:  (-)Edema, (-)obvious lymphadenopathy  Skin: Warm to touch, Normal turgor  Psych: Appropriate mood and affect      TELEMETRY: None	      ECG: SR 1st degree AVB  	    RADIOLOGY:         CXR: None    ASSESSMENT/PLAN: Patient is a 79 y/o male well known to our office with PMH of hypertension, diabetes, hyperlipidemia, bioprosthetic aortic valve replacement in 2009 by Dr. Ted Piña c/b severe intravalvular regurgitation s/p Ria TAVR (7/2023), nonobstructive CAD s/p cath with moderate prox LAD lesion 40% (6/2023), PAD s/p angioplasty of right anterior tibial artery, and vertigo who presented with LLQ abd pain admitted for hematoma in small bowel mesentery.    #Hematoma in small bowel mesentery  - Monitor H/H  - Hold antiplatelets per surgery  - Surgery follow up  - MRI Abd pending    #Hx Ria TAVR  - ASA/Plavix on hold per surgery given hematoma  - TTE pending    #CAD/PAD  - ASA on hold as above  - Continue Lipitor    #HTN  - Continue Amlodipine and Toprol XL    #HLD  - Continue Lipitor    - Patient to f/u with Dr. Coffman after discharge    Howard Victor PA-C  Pager: [phone]

## 2023-12-08 ENCOUNTER — TRANSCRIPTION ENCOUNTER (OUTPATIENT)
Age: 78
End: 2023-12-08

## 2023-12-08 LAB
ANION GAP SERPL CALC-SCNC: 11 MMOL/L — SIGNIFICANT CHANGE UP (ref 5–17)
ANION GAP SERPL CALC-SCNC: 11 MMOL/L — SIGNIFICANT CHANGE UP (ref 5–17)
BUN SERPL-MCNC: 17 MG/DL — SIGNIFICANT CHANGE UP (ref 7–23)
BUN SERPL-MCNC: 17 MG/DL — SIGNIFICANT CHANGE UP (ref 7–23)
CALCIUM SERPL-MCNC: 8.7 MG/DL — SIGNIFICANT CHANGE UP (ref 8.4–10.5)
CALCIUM SERPL-MCNC: 8.7 MG/DL — SIGNIFICANT CHANGE UP (ref 8.4–10.5)
CHLORIDE SERPL-SCNC: 101 MMOL/L — SIGNIFICANT CHANGE UP (ref 96–108)
CHLORIDE SERPL-SCNC: 101 MMOL/L — SIGNIFICANT CHANGE UP (ref 96–108)
CO2 SERPL-SCNC: 25 MMOL/L — SIGNIFICANT CHANGE UP (ref 22–31)
CO2 SERPL-SCNC: 25 MMOL/L — SIGNIFICANT CHANGE UP (ref 22–31)
CREAT SERPL-MCNC: 1.52 MG/DL — HIGH (ref 0.5–1.3)
CREAT SERPL-MCNC: 1.52 MG/DL — HIGH (ref 0.5–1.3)
EGFR: 46 ML/MIN/1.73M2 — LOW
EGFR: 46 ML/MIN/1.73M2 — LOW
GLUCOSE BLDC GLUCOMTR-MCNC: 141 MG/DL — HIGH (ref 70–99)
GLUCOSE BLDC GLUCOMTR-MCNC: 141 MG/DL — HIGH (ref 70–99)
GLUCOSE BLDC GLUCOMTR-MCNC: 158 MG/DL — HIGH (ref 70–99)
GLUCOSE BLDC GLUCOMTR-MCNC: 158 MG/DL — HIGH (ref 70–99)
GLUCOSE BLDC GLUCOMTR-MCNC: 178 MG/DL — HIGH (ref 70–99)
GLUCOSE BLDC GLUCOMTR-MCNC: 178 MG/DL — HIGH (ref 70–99)
GLUCOSE BLDC GLUCOMTR-MCNC: 201 MG/DL — HIGH (ref 70–99)
GLUCOSE BLDC GLUCOMTR-MCNC: 201 MG/DL — HIGH (ref 70–99)
GLUCOSE BLDC GLUCOMTR-MCNC: 229 MG/DL — HIGH (ref 70–99)
GLUCOSE BLDC GLUCOMTR-MCNC: 229 MG/DL — HIGH (ref 70–99)
GLUCOSE SERPL-MCNC: 140 MG/DL — HIGH (ref 70–99)
GLUCOSE SERPL-MCNC: 140 MG/DL — HIGH (ref 70–99)
HCT VFR BLD CALC: 30.5 % — LOW (ref 39–50)
HCT VFR BLD CALC: 30.5 % — LOW (ref 39–50)
HGB BLD-MCNC: 10.2 G/DL — LOW (ref 13–17)
HGB BLD-MCNC: 10.2 G/DL — LOW (ref 13–17)
MAGNESIUM SERPL-MCNC: 2.1 MG/DL — SIGNIFICANT CHANGE UP (ref 1.6–2.6)
MAGNESIUM SERPL-MCNC: 2.1 MG/DL — SIGNIFICANT CHANGE UP (ref 1.6–2.6)
MCHC RBC-ENTMCNC: 27.9 PG — SIGNIFICANT CHANGE UP (ref 27–34)
MCHC RBC-ENTMCNC: 27.9 PG — SIGNIFICANT CHANGE UP (ref 27–34)
MCHC RBC-ENTMCNC: 33.4 GM/DL — SIGNIFICANT CHANGE UP (ref 32–36)
MCHC RBC-ENTMCNC: 33.4 GM/DL — SIGNIFICANT CHANGE UP (ref 32–36)
MCV RBC AUTO: 83.6 FL — SIGNIFICANT CHANGE UP (ref 80–100)
MCV RBC AUTO: 83.6 FL — SIGNIFICANT CHANGE UP (ref 80–100)
NRBC # BLD: 0 /100 WBCS — SIGNIFICANT CHANGE UP (ref 0–0)
NRBC # BLD: 0 /100 WBCS — SIGNIFICANT CHANGE UP (ref 0–0)
PHOSPHATE SERPL-MCNC: 3.6 MG/DL — SIGNIFICANT CHANGE UP (ref 2.5–4.5)
PHOSPHATE SERPL-MCNC: 3.6 MG/DL — SIGNIFICANT CHANGE UP (ref 2.5–4.5)
PLATELET # BLD AUTO: 265 K/UL — SIGNIFICANT CHANGE UP (ref 150–400)
PLATELET # BLD AUTO: 265 K/UL — SIGNIFICANT CHANGE UP (ref 150–400)
POTASSIUM SERPL-MCNC: 3.8 MMOL/L — SIGNIFICANT CHANGE UP (ref 3.5–5.3)
POTASSIUM SERPL-MCNC: 3.8 MMOL/L — SIGNIFICANT CHANGE UP (ref 3.5–5.3)
POTASSIUM SERPL-SCNC: 3.8 MMOL/L — SIGNIFICANT CHANGE UP (ref 3.5–5.3)
POTASSIUM SERPL-SCNC: 3.8 MMOL/L — SIGNIFICANT CHANGE UP (ref 3.5–5.3)
RBC # BLD: 3.65 M/UL — LOW (ref 4.2–5.8)
RBC # BLD: 3.65 M/UL — LOW (ref 4.2–5.8)
RBC # FLD: 14.5 % — SIGNIFICANT CHANGE UP (ref 10.3–14.5)
RBC # FLD: 14.5 % — SIGNIFICANT CHANGE UP (ref 10.3–14.5)
SODIUM SERPL-SCNC: 137 MMOL/L — SIGNIFICANT CHANGE UP (ref 135–145)
SODIUM SERPL-SCNC: 137 MMOL/L — SIGNIFICANT CHANGE UP (ref 135–145)
WBC # BLD: 8.73 K/UL — SIGNIFICANT CHANGE UP (ref 3.8–10.5)
WBC # BLD: 8.73 K/UL — SIGNIFICANT CHANGE UP (ref 3.8–10.5)
WBC # FLD AUTO: 8.73 K/UL — SIGNIFICANT CHANGE UP (ref 3.8–10.5)
WBC # FLD AUTO: 8.73 K/UL — SIGNIFICANT CHANGE UP (ref 3.8–10.5)

## 2023-12-08 RX ORDER — ACETAMINOPHEN 500 MG
975 TABLET ORAL EVERY 6 HOURS
Refills: 0 | Status: DISCONTINUED | OUTPATIENT
Start: 2023-12-08 | End: 2023-12-09

## 2023-12-08 RX ORDER — INSULIN LISPRO 100/ML
VIAL (ML) SUBCUTANEOUS AT BEDTIME
Refills: 0 | Status: DISCONTINUED | OUTPATIENT
Start: 2023-12-08 | End: 2023-12-09

## 2023-12-08 RX ORDER — INSULIN LISPRO 100/ML
VIAL (ML) SUBCUTANEOUS
Refills: 0 | Status: DISCONTINUED | OUTPATIENT
Start: 2023-12-08 | End: 2023-12-09

## 2023-12-08 RX ADMIN — ATORVASTATIN CALCIUM 40 MILLIGRAM(S): 80 TABLET, FILM COATED ORAL at 22:24

## 2023-12-08 RX ADMIN — BUDESONIDE AND FORMOTEROL FUMARATE DIHYDRATE 2 PUFF(S): 160; 4.5 AEROSOL RESPIRATORY (INHALATION) at 06:04

## 2023-12-08 RX ADMIN — Medication 975 MILLIGRAM(S): at 23:58

## 2023-12-08 RX ADMIN — Medication 1 SPRAY(S): at 06:04

## 2023-12-08 RX ADMIN — Medication 2: at 13:19

## 2023-12-08 RX ADMIN — Medication 25 MILLIGRAM(S): at 06:04

## 2023-12-08 RX ADMIN — AMLODIPINE BESYLATE 10 MILLIGRAM(S): 2.5 TABLET ORAL at 06:04

## 2023-12-08 RX ADMIN — GABAPENTIN 100 MILLIGRAM(S): 400 CAPSULE ORAL at 13:20

## 2023-12-08 RX ADMIN — BUDESONIDE AND FORMOTEROL FUMARATE DIHYDRATE 2 PUFF(S): 160; 4.5 AEROSOL RESPIRATORY (INHALATION) at 18:08

## 2023-12-08 RX ADMIN — GABAPENTIN 100 MILLIGRAM(S): 400 CAPSULE ORAL at 22:24

## 2023-12-08 RX ADMIN — Medication 1: at 06:03

## 2023-12-08 RX ADMIN — MEMANTINE HYDROCHLORIDE 5 MILLIGRAM(S): 10 TABLET ORAL at 18:06

## 2023-12-08 RX ADMIN — MONTELUKAST 10 MILLIGRAM(S): 4 TABLET, CHEWABLE ORAL at 13:20

## 2023-12-08 RX ADMIN — Medication 975 MILLIGRAM(S): at 18:43

## 2023-12-08 RX ADMIN — PANTOPRAZOLE SODIUM 40 MILLIGRAM(S): 20 TABLET, DELAYED RELEASE ORAL at 06:05

## 2023-12-08 RX ADMIN — Medication 975 MILLIGRAM(S): at 18:06

## 2023-12-08 RX ADMIN — GABAPENTIN 100 MILLIGRAM(S): 400 CAPSULE ORAL at 06:04

## 2023-12-08 RX ADMIN — MEMANTINE HYDROCHLORIDE 5 MILLIGRAM(S): 10 TABLET ORAL at 06:05

## 2023-12-08 RX ADMIN — Medication 1 SPRAY(S): at 18:05

## 2023-12-08 NOTE — DISCHARGE NOTE PROVIDER - HOSPITAL COURSE
78M with history of HTN, HLD, DM, CAD, TAVR in 7/2023, PAD on ASA/Plavix, BPH, chronic dyspnea, and mild memory issues presenting with chronic abdominal pain.  Patient states his pain started about 2 weeks ago, few days prior to his trip to [facility]. As soon as he landed in [facility], he went to go see a doctor and was eventually admitted to the hospital for LLQ abdominal pain with nausea and vomiting. He received some medications and got imaging (bedside paper record shows nephrolithiasis on sonogram), and was eventually [place] He is presenting today due to persistent symptoms. His pain is constant, fluctuating in intensity, all throughout lower quadrants. Patient tolerating PO intake; however, states he has a reduced appetite. Patient states he is passing flatus, having BMs. Denies any hematochezia, fever, chills, chest pain.   In the ED, patient AF, HDS. Labs remarkable for WBC 10.79, H/H 9.6/ 30.4. CT abd/pelvis (12/06) demonstrating hematoma in the small bowel mesentery in the left hemiabdomen.     Pt was admitted under General (Red) Surgery for further evaluation and management. Patient was made NPO with IV fluids. ASA and plavix were held. MRI abdomen (12/07) showed mesenteric hematoma; no malignancy. Hgb/Hct were trended daily with no episodes of anemia throughout hospital course. Diet was advanced as tolerated.     Internal medicine was consulted for medical co-management. Cardiology was consulted for cardiac co-management    On the day of discharge, the patient's vitals are within normal limits, pain is controlled, voiding urine, passing gas/stool, tolerating a PO diet, and ambulating well. Pt will f/u with Dr. Arriaga in 1-2 weeks. Pt will f/u with PCP in 1-2 weeks. 78M with history of HTN, HLD, DM, CAD, TAVR in 7/2023, PAD on ASA/Plavix, BPH, chronic dyspnea, and mild memory issues presenting with chronic abdominal pain.  Patient states his pain started about 2 weeks ago, few days prior to his trip to [facility]. As soon as he landed in [facility], he went to go see a doctor and was eventually admitted to the hospital for LLQ abdominal pain with nausea and vomiting. He received some medications and got imaging (bedside paper record shows nephrolithiasis on sonogram), and was eventually discharged [place] He is presenting today due to persistent symptoms. His pain is constant, fluctuating in intensity, all throughout lower quadrants. Patient tolerating PO intake; however, states he has a reduced appetite. Patient states he is passing flatus, having BMs. Denies any hematochezia, fever, chills, chest pain.   In the ED, patient AF, HDS. Labs remarkable for WBC 10.79, H/H 9.6/ 30.4. CT abd/pelvis (12/06) demonstrating hematoma in the small bowel mesentery in the left hemiabdomen.     Pt was admitted under General (Red) Surgery for further evaluation and management. Patient was made NPO with IV fluids. ASA and plavix were held. MRI abdomen (12/07) showed mesenteric hematoma; no malignancy. Hgb/Hct were trended daily with no episodes of anemia throughout hospital course. Diet was advanced as tolerated.     Internal medicine was consulted for medical co-management. Cardiology was consulted for cardiac co-management    On the day of discharge, the patient's vitals are within normal limits, pain is controlled, voiding urine, passing gas/stool, tolerating a PO diet, and ambulating well. Pt will f/u with Dr. Arriaga in 1-2 weeks. Pt will f/u with PCP in 1-2 weeks. 78M with history of HTN, HLD, DM, CAD, TAVR in 7/2023, PAD on ASA/Plavix, BPH, chronic dyspnea, and mild memory issues presenting with chronic abdominal pain.  Patient states his pain started about 2 weeks ago, few days prior to his trip to [facility]. As soon as he landed in [facility], he went to go see a doctor and was eventually admitted to the hospital for LLQ abdominal pain with nausea and vomiting. He received some medications and got imaging (bedside paper record shows nephrolithiasis on sonogram), and was eventually [place]. He is presenting today due to persistent symptoms. His pain is constant, fluctuating in intensity, all throughout lower quadrants. Patient tolerating PO intake; however, states he has a reduced appetite. Patient states he is passing flatus, having BMs. Denies any hematochezia, fever, chills, chest pain.   In the ED, patient AF, HDS. Labs remarkable for WBC 10.79, H/H 9.6/ 30.4. CT abd/pelvis (12/06) demonstrating hematoma in the small bowel mesentery in the left hemiabdomen.     Pt was admitted under General (Red) Surgery for further evaluation and management. Patient was made NPO with IV fluids. ASA and plavix were held. MRI abdomen (12/07) showed mesenteric hematoma; no malignancy. Hgb/Hct were trended daily with no episodes of anemia throughout hospital course. Diet was advanced as tolerated.     Internal medicine was consulted for medical co-management. Cardiology was consulted for cardiac co-management    On the day of discharge, the patient's vitals are within normal limits, pain is controlled, voiding urine, passing gas/stool, tolerating a PO diet, and ambulating well. Pt will f/u with Dr. Arriaga in 1-2 weeks. Pt will f/u with PCP in 1-2 weeks. Patient instructed to resume home aspirin and plavix on 12/10/23. 78M with history of HTN, HLD, DM, CAD, TAVR in 7/2023, PAD on ASA/Plavix, BPH, chronic dyspnea, and mild memory issues presenting with chronic abdominal pain.  Patient states his pain started about 2 weeks ago, few days prior to his trip to [facility]. As soon as he landed in [facility], he went to go see a doctor and was eventually admitted to the hospital for LLQ abdominal pain with nausea and vomiting. He received some medications and got imaging (bedside paper record shows nephrolithiasis on sonogram), and was eventually discharged [place] He is presenting today due to persistent symptoms. His pain is constant, fluctuating in intensity, all throughout lower quadrants. Patient tolerating PO intake; however, states he has a reduced appetite. Patient states he is passing flatus, having BMs. Denies any hematochezia, fever, chills, chest pain.   In the ED, patient AF, HDS. Labs remarkable for WBC 10.79, H/H 9.6/ 30.4. CT abd/pelvis (12/06) demonstrating hematoma in the small bowel mesentery in the left hemiabdomen.     Pt was admitted under General (Red) Surgery for further evaluation and management. Patient was made NPO with IV fluids. ASA and plavix were held. MRI abdomen (12/07) showed mesenteric hematoma; no malignancy. Hgb/Hct were trended daily with no episodes of anemia throughout hospital course. Diet was advanced as tolerated.     Internal medicine was consulted for medical co-management. Cardiology was consulted for cardiac co-management    On the day of discharge, the patient's vitals are within normal limits, pain is controlled, voiding urine, passing gas/stool, tolerating a PO diet, and ambulating well. Pt will f/u with Dr. Arriaga in 1-2 weeks. Pt will f/u with PCP in 1-2 weeks. Patient instructed to resume home aspirin and plavix on 12/10/23.

## 2023-12-08 NOTE — PROGRESS NOTE ADULT - SUBJECTIVE AND OBJECTIVE BOX
CARDIOLOGY ATTENDING    no tele    denies palpitations, syncope, nor angina, abdominal pain improving    DATE OF SERVICE - 12-08-23     Review of Systems:   Constitutional: [ ] fevers, [ ] chills.   Skin: [ ] dry skin. [ ] rashes.  Psychiatric: [ ] depression, [ ] anxiety.   Gastrointestinal: [ ] BRBPR, [ ] melena.   Neurological: [ ] confusion. [ ] seizures. [ ] shuffling gait.   Ears,Nose,Mouth and Throat: [ ] ear pain [ ] sore throat.   Eyes: [ ] diplopia.   Respiratory: [ ] hemoptysis. [ ] shortness of breath  Cardiovascular: See HPI above  Hematologic/Lymphatic: [ ] anemia. [ ] painful nodes. [ ] prolonged bleeding.   Genitourinary: [ ] hematuria. [ ] flank pain.   Endocrine: [ ] significant change in weight. [ ] intolerance to heat and cold.     Review of systems [ x] otherwise negative, [ ] otherwise unable to obtain    FH: no family history of sudden cardiac death in first degree relatives    SH: [ ] tobacco, [ ] alcohol, [ ] drugs    amLODIPine   Tablet 10 milliGRAM(s) Oral daily  atorvastatin 40 milliGRAM(s) Oral at bedtime  budesonide  80 MICROgram(s)/formoterol 4.5 MICROgram(s) Inhaler 2 Puff(s) Inhalation two times a day  dextrose 5%. 1000 milliLiter(s) IV Continuous <Continuous>  dextrose 5%. 1000 milliLiter(s) IV Continuous <Continuous>  dextrose 50% Injectable 25 Gram(s) IV Push once  dextrose 50% Injectable 25 Gram(s) IV Push once  dextrose 50% Injectable 12.5 Gram(s) IV Push once  dextrose Oral Gel 15 Gram(s) Oral once PRN  fluticasone propionate 50 MICROgram(s)/spray Nasal Spray 1 Spray(s) Both Nostrils every 12 hours  gabapentin 100 milliGRAM(s) Oral three times a day  glucagon  Injectable 1 milliGRAM(s) IntraMuscular once  insulin lispro (ADMELOG) corrective regimen sliding scale   SubCutaneous at bedtime  insulin lispro (ADMELOG) corrective regimen sliding scale   SubCutaneous three times a day before meals  lactated ringers. 1000 milliLiter(s) IV Continuous <Continuous>  meclizine 12.5 milliGRAM(s) Oral every 8 hours PRN  memantine 5 milliGRAM(s) Oral two times a day  metoprolol succinate ER 25 milliGRAM(s) Oral daily  montelukast 10 milliGRAM(s) Oral daily  pantoprazole    Tablet 40 milliGRAM(s) Oral before breakfast                            10.2   8.73  )-----------( 265      ( 08 Dec 2023 05:27 )             30.5       12-08    137  |  101  |  17  ----------------------------<  140<H>  3.8   |  25  |  1.52<H>    Ca    8.7      08 Dec 2023 05:27  Phos  3.6     12-08  Mg     2.1     12-08              T(C): 36.8 (12-08-23 @ 08:37), Max: 37.2 (12-07-23 @ 17:31)  HR: 83 (12-08-23 @ 08:37) (71 - 90)  BP: 153/87 (12-08-23 @ 08:37) (128/79 - 153/87)  RR: 18 (12-08-23 @ 08:37) (18 - 18)  SpO2: 98% (12-08-23 @ 08:37) (95% - 98%)  Wt(kg): --    I&O's Summary    07 Dec 2023 07:01  -  08 Dec 2023 07:00  --------------------------------------------------------  IN: 1360 mL / OUT: 1000 mL / NET: 360 mL    08 Dec 2023 07:01  -  08 Dec 2023 12:13  --------------------------------------------------------  IN: 360 mL / OUT: 700 mL / NET: -340 mL        General: Well nourished in no acute distress. Alert and Oriented * 3.   Head: Normocephalic and atraumatic.   Neck: No JVD. No bruits. Supple. Does not appear to be enlarged.   Cardiovascular: + S1,S2 ; RRR Soft systolic murmur at the left lower sternal border. No rubs noted.    Lungs: CTA b/l. No rhonchi, rales or wheezes.   Abdomen: + BS, soft. Non tender. Non distended. No rebound. No guarding.   Extremities: No clubbing/cyanosis/edema.   Neurologic: Moves all four extremities. Full range of motion.   Skin: Warm and moist. The patient's skin has normal elasticity and good skin turgor.   Psychiatric: Appropriate mood and affect.  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    -continue norvasc for hypertension  -continue lipitor for hyperlipidemia  -continue metoprolol for stable CAD with normal LVEF  -restart anti-platelet therapy (aspirin +/- plavix) when ok with surgery  - Patient to f/u with Dr. Coffman after discharge

## 2023-12-08 NOTE — PROGRESS NOTE ADULT - ATTENDING COMMENTS
MRI reviewed - no malignancy identified  Hb stable  Left sided tenderness  Regular diet  Reassured patient pain will likely take longer to fully resolve - plan for dc home tomorrow if remains stable/improves
No acute events  Reviewed CT with radiology - recommend MRI to better characterize lesion to rule out malignancy  Patient in MRI

## 2023-12-08 NOTE — DISCHARGE NOTE PROVIDER - NSDCMRMEDTOKEN_GEN_ALL_CORE_FT
acetaminophen 325 mg oral tablet: 2 tab(s) orally every 6 hours As needed Moderate Pain (4 - 6)  amLODIPine 10 mg oral tablet: 1 tab(s) orally once a day  aspirin 81 mg oral delayed release capsule: 1 tab(s) orally once a day  atorvastatin 40 mg oral tablet: 1 tab(s) orally once a day  ciclopirox 0.77% topical lotion: Apply topically to affected area 2 times a day  clopidogrel 75 mg oral tablet: 1 tab(s) orally once a day  diclofenac 1% topical gel: Apply topically to affected area once a day  fluticasone 50 mcg/inh nasal spray: 1 spray(s) in each nostril once a day  gabapentin 100 mg oral capsule: 1 tab(s) orally 3 times a day  Januvia 50 mg oral tablet: 1 tab(s) orally once a day  meclizine 12.5 mg oral tablet: 1 tab(s) orally every 8 hours as needed for Dizziness  memantine 5 mg oral tablet: 1 tab(s) orally 2 times a day  metoprolol succinate 25 mg oral tablet, extended release: 1 tab(s) orally once a day  mometasone 50 mcg/inh nasal spray: 2 spray(s) intranasally once a day  montelukast 10 mg oral tablet: 1 tab(s) orally once a day  Multiple Vitamins oral tablet: 1 tab(s) orally once a day  NovoLOG 100 units/mL injectable solution: 15 unit(s) injectable 3 times a day  omeprazole 20 mg oral delayed release capsule: 1 tab(s) orally once a day  Symbicort 80 mcg-4.5 mcg/inh inhalation aerosol: 2 puff(s) inhaled 2 times a day  Toujeo SoloStar 300 units/mL subcutaneous solution: 15 unit(s) subcutaneous once a day   acetaminophen 325 mg oral tablet: 2 tab(s) orally every 6 hours As needed Moderate Pain (4 - 6)  amLODIPine 10 mg oral tablet: 1 tab(s) orally once a day  aspirin 81 mg oral delayed release capsule: 1 tab(s) orally once a day Resume aspirin on 12/10/2023, 1 day after hospital discharge  atorvastatin 40 mg oral tablet: 1 tab(s) orally once a day  ciclopirox 0.77% topical lotion: Apply topically to affected area 2 times a day  clopidogrel 75 mg oral tablet: 1 tab(s) orally once a day Resume clopidogrel on 12/10/2023, 1 day after hospital discharge  diclofenac 1% topical gel: Apply topically to affected area once a day  fluticasone 50 mcg/inh nasal spray: 1 spray(s) in each nostril once a day  gabapentin 100 mg oral capsule: 1 tab(s) orally 3 times a day  Januvia 50 mg oral tablet: 1 tab(s) orally once a day  meclizine 12.5 mg oral tablet: 1 tab(s) orally every 8 hours as needed for Dizziness  memantine 5 mg oral tablet: 1 tab(s) orally 2 times a day  metoprolol succinate 25 mg oral tablet, extended release: 1 tab(s) orally once a day  mometasone 50 mcg/inh nasal spray: 2 spray(s) intranasally once a day  montelukast 10 mg oral tablet: 1 tab(s) orally once a day  Multiple Vitamins oral tablet: 1 tab(s) orally once a day  NovoLOG 100 units/mL injectable solution: 15 unit(s) injectable 3 times a day  omeprazole 20 mg oral delayed release capsule: 1 tab(s) orally once a day  Symbicort 80 mcg-4.5 mcg/inh inhalation aerosol: 2 puff(s) inhaled 2 times a day  Toujeo SoloStar 300 units/mL subcutaneous solution: 15 unit(s) subcutaneous once a day

## 2023-12-08 NOTE — PROGRESS NOTE ADULT - SUBJECTIVE AND OBJECTIVE BOX
RED TEAM Surgery Daily Progress Note  ====================================================  INTERVAL EVENTS: NAEO    SUBJECTIVE: Patient seen and examined at bedside on AM rounds. Patient reports that they're feeling well, (+,+), tolerating CLD w/o n/v. Still endorsing abdominal pain. Denies fever, chills.    --------------------------------------------------------------------------------------  VITAL SIGNS:  T(C): 36.9 (12-08-23 @ 04:32), Max: 37.2 (12-07-23 @ 17:31)  HR: 90 (12-08-23 @ 04:32) (71 - 90)  BP: 128/79 (12-08-23 @ 04:32) (128/76 - 151/80)  RR: 18 (12-08-23 @ 04:32) (18 - 18)  SpO2: 97% (12-08-23 @ 04:32) (95% - 98%)  --------------------------------------------------------------------------------------    EXAM    General: NAD, resting in bed comfortably  Cardiac: Regular rate, warm and well perfused  Respiratory: Nonlabored respirations, normal cw expansion, on RA  Abdomen: Soft, distended, palpable mass in inferior abdomen  Extremities: Normal strength, FROM, no deformities    --------------------------------------------------------------------------------------

## 2023-12-08 NOTE — DISCHARGE NOTE PROVIDER - PROVIDER TOKENS
PROVIDER:[TOKEN:[[id number]],FOLLOWUP:[1 week]] PROVIDER:[TOKEN:[[id number]],FOLLOWUP:[1 week]] PROVIDER:[TOKEN:[[id number]],FOLLOWUP:[2 weeks]] PROVIDER:[TOKEN:[[id number]],FOLLOWUP:[2 weeks]]

## 2023-12-08 NOTE — PROGRESS NOTE ADULT - ASSESSMENT
Patient is a 78 year old Male with a PMHx of HTN, HLD, DM, CAD, TAVR in 7/2023, PAD on ASA/Plavix, BPH, chronic dyspnea, and mild memory issues presenting who presents to [facility] with a chief complaint of chronic abdominal pain. Patient reports that his pain began bout 2 weeks ago, prior to his trip to [facility]. Reportedly patient was hospitalized in [facility] for LLQ abdominal pain, with nausea and vomiting. Per patient, he was found to have nephrolithiasis on sonogram. Patient presents to [facility] with a chief complaint of persistent abdominal pain and discomfort. Patient reports that his pain is constant, fluctuating in intensity and diffuse throughout his abdomen. Internal Medicine has been consulted on Mr. Madison's care for medical management. Patient states he is passing flatus, having BMs. Denies any hematochezia, fever, chills, chest pain, palpitations, shortness of breath or dyspnea.       Hematoma   - CT A/P with Subcentimeter hypodense hepatic lesions, Hypodense left renal lesion 1.2 CM, Heterogenous mass in the small bowel mesentry in the left hemiabdomen, 9.3 X 6.5 cm, read as a hematoma   - MR Abdomen w/ 9.4 x 6.0 cm mesenteric mass in the lower abdomen -- likely representing hematoma per MR report   - AP on hold as per surgery.   - Check Serial CBC   - Monitor hemodynamics   - Transfuse for Hgb < 8.0 in view of cardiac history   - Per Surgery     Small Bowel Mesentry Mass  - CT as above   - MR ABD report with hematoma   - Per Surgery     AK due to PAYAL, BPH   - Cr down-trending with IVF   - Monitor I and O   - Monitor and trend renal function   --> patient w/ PAYAL. C/w PO hydration as tolerated and trend in AM. In up-trending, can trial gentle IVF for hydration    CAD, TAVR  - On Toprol XL 25 PO Qd  - DAPT on hold 2/2 hematoma  - Lipitor 40   - TTE  w/ Hyperdynamic LVSF w/ EF of 71%, no WMA, TAVR     ? COPD versus Asthma   - Pt unclear if COPD versus Asthma   - On Budesonide/ Flonase / Montelukast  - Incentive spirometer   - Monitor O2 saturation; Supplement to maintain > 90%  - Outpatient pulm follow up for PFTs    PAD   - ASA/Plavix on hold 2/2 hematoma. Resume once cleared from Surgery standpoint     HTN  - On Norvasc 10 and Toprol XL 25 PO Qd   - Monitor BP, VS and patient     HLD  - On Lipitor 40     DM  - A1C 6.6   - Diabetic DASH diet   - Monitor glucose levels closely     Renal lesion   - Read as 1.2 CM  on CT   - MR read as cysts.  - Outpatient follow up       PPX        Discussed with Attending    Patient is a 78 year old Male with a PMHx of HTN, HLD, DM, CAD, TAVR in 7/2023, PAD on ASA/Plavix, BPH, chronic dyspnea, and mild memory issues presenting who presents to [facility] with a chief complaint of chronic abdominal pain. Patient reports that his pain began bout 2 weeks ago, prior to his trip to [facility]. Reportedly patient was hospitalized in [facility] for LLQ abdominal pain, with nausea and vomiting. Per patient, he was found to have nephrolithiasis on sonogram. Patient presents to [facility] with a chief complaint of persistent abdominal pain and discomfort. Patient reports that his pain is constant, fluctuating in intensity and diffuse throughout his abdomen. Internal Medicine has been consulted on Mr. Madison's care for medical management. Patient states he is passing flatus, having BMs. Denies any hematochezia, fever, chills, chest pain, palpitations, shortness of breath or dyspnea.       Hematoma   - CT A/P with Subcentimeter hypodense hepatic lesions, Hypodense left renal lesion 1.2 CM, Heterogenous mass in the small bowel mesentry in the left hemiabdomen, 9.3 X 6.5 cm, read as a hematoma   - MR Abdomen w/ 9.4 x 6.0 cm mesenteric mass in the lower abdomen -- likely representing hematoma per MR report   - AP on hold as per surgery.   - Check Serial CBC   - Monitor hemodynamics   - Transfuse for Hgb < 8.0 in view of cardiac history   - Per Surgery     Small Bowel Mesentry Mass  - CT as above   - MR ABD report with hematoma   - Per Surgery     AK due to PAYAL, BPH   - Cr down-trending with IVF   - Monitor I and O   - Monitor and trend renal function   --> patient w/ PAYAL. C/w PO hydration as tolerated and trend in AM. In up-trending, can trial gentle IVF for hydration    CAD, TAVR  - On Toprol XL 25 PO Qd  - DAPT on hold 2/2 hematoma  - Lipitor 40   - TTE  w/ Hyperdynamic LVSF w/ EF of 71%, no WMA, TAVR     ? COPD versus Asthma   - Pt unclear if COPD versus Asthma   - On Budesonide/ Flonase / Montelukast  - Incentive spirometer   - Monitor O2 saturation; Supplement to maintain > 90%  - Outpatient pulm follow up for PFTs    PAD   - ASA/Plavix on hold 2/2 hematoma. Resume once cleared from Surgery standpoint     HTN  - On Norvasc 10 and Toprol XL 25 PO Qd   - Monitor BP, VS and patient     HLD  - On Lipitor 40     DM  - A1C 6.6   - Diabetic DASH diet   - Monitor glucose levels closely     Renal lesion   - Read as 1.2 CM  on CT   - MR read as cysts.  - Outpatient follow up       PPX        Discussed with Attending

## 2023-12-08 NOTE — DISCHARGE NOTE PROVIDER - CARE PROVIDER_API CALL
Vania Arriaga  Surgery  [address] [place] [address]  Phone: [phone]  Fax: [phone]  Follow Up Time: 1 week   Vania Arriaga  Surgery  [address] [place] [address]  Phone: [phone]  Fax: [phone]  Follow Up Time: 1 week   Vania Arriaga  Surgery  [address] [place] [address]  Phone: [phone]  Fax: [phone]  Follow Up Time: 2 weeks   Vania Arriaga  Surgery  [address] [place] [address]  Phone: [phone]  Fax: [phone]  Follow Up Time: 2 weeks

## 2023-12-08 NOTE — PROGRESS NOTE ADULT - SUBJECTIVE AND OBJECTIVE BOX
Name of Patient : MARCELA FARAH  MRN: [id number]  Date of visit: 12-08-23 @ 15:37      Subjective: Patient seen and examined. No new events except as noted.   Patient seen earlier this AM. Lying down in bed. Reports continued pain in left side of abdomen.     REVIEW OF SYSTEMS:    CONSTITUTIONAL: No weakness, fevers or chills  EYES/ENT: No visual changes;  No vertigo or throat pain   NECK: No pain or stiffness  RESPIRATORY: No cough, wheezing, hemoptysis; No shortness of breath  CARDIOVASCULAR: No chest pain or palpitations  GASTROINTESTINAL: + L sided abdominal pain; No nausea, vomiting, or hematemesis; No diarrhea or constipation. No melena or hematochezia.  GENITOURINARY: No dysuria, frequency or hematuria  NEUROLOGICAL: No numbness or weakness  SKIN: No itching, burning, rashes, or lesions   All other review of systems is negative unless indicated above.    MEDICATIONS:  MEDICATIONS  (STANDING):  acetaminophen     Tablet .. 975 milliGRAM(s) Oral every 6 hours  amLODIPine   Tablet 10 milliGRAM(s) Oral daily  atorvastatin 40 milliGRAM(s) Oral at bedtime  budesonide  80 MICROgram(s)/formoterol 4.5 MICROgram(s) Inhaler 2 Puff(s) Inhalation two times a day  dextrose 5%. 1000 milliLiter(s) (100 mL/Hr) IV Continuous <Continuous>  dextrose 5%. 1000 milliLiter(s) (50 mL/Hr) IV Continuous <Continuous>  dextrose 50% Injectable 25 Gram(s) IV Push once  dextrose 50% Injectable 12.5 Gram(s) IV Push once  dextrose 50% Injectable 25 Gram(s) IV Push once  fluticasone propionate 50 MICROgram(s)/spray Nasal Spray 1 Spray(s) Both Nostrils every 12 hours  gabapentin 100 milliGRAM(s) Oral three times a day  glucagon  Injectable 1 milliGRAM(s) IntraMuscular once  insulin lispro (ADMELOG) corrective regimen sliding scale   SubCutaneous three times a day before meals  insulin lispro (ADMELOG) corrective regimen sliding scale   SubCutaneous at bedtime  memantine 5 milliGRAM(s) Oral two times a day  metoprolol succinate ER 25 milliGRAM(s) Oral daily  montelukast 10 milliGRAM(s) Oral daily  pantoprazole    Tablet 40 milliGRAM(s) Oral before breakfast      PHYSICAL EXAM:  T(C): 36.7 (12-08-23 @ 13:40), Max: 37.2 (12-07-23 @ 17:31)  HR: 88 (12-08-23 @ 13:40) (71 - 90)  BP: 143/77 (12-08-23 @ 13:40) (128/79 - 153/87)  RR: 18 (12-08-23 @ 13:40) (18 - 18)  SpO2: 98% (12-08-23 @ 13:40) (96% - 98%)  Wt(kg): --  I&O's Summary    07 Dec 2023 07:01  -  08 Dec 2023 07:00  --------------------------------------------------------  IN: 1360 mL / OUT: 1000 mL / NET: 360 mL    08 Dec 2023 07:01  -  08 Dec 2023 15:37  --------------------------------------------------------  IN: 360 mL / OUT: 700 mL / NET: -340 mL          Appearance: Awake, generalized weakness, lying down in bed 	  HEENT: Eyes are open   Lymphatic: No lymphadenopathy grossly   Cardiovascular: Normal    Respiratory: normal effort , clear  Gastrointestinal:  Soft, + L sided abdominal pain   Skin: No rashes,  warm to touch  Psychiatry:  Mood & affect appropriate  Musculoskeletal: No edema      12-07-23 @ 07:01  -  12-08-23 @ 07:00  --------------------------------------------------------  IN: 1360 mL / OUT: 1000 mL / NET: 360 mL    12-08-23 @ 07:01  -  12-08-23 @ 15:37  --------------------------------------------------------  IN: 360 mL / OUT: 700 mL / NET: -340 mL                              10.2   8.73  )-----------( 265      ( 08 Dec 2023 05:27 )             30.5               12-08    137  |  101  |  17  ----------------------------<  140<H>  3.8   |  25  |  1.52<H>    Ca    8.7      08 Dec 2023 05:27  Phos  3.6     12-08  Mg     2.1     12-08                    Urinalysis Basic - ( 08 Dec 2023 05:27 )    Color: x / Appearance: x / SG: x / pH: x  Gluc: 140 mg/dL / Ketone: x  / Bili: x / Urobili: x   Blood: x / Protein: x / Nitrite: x   Leuk Esterase: x / RBC: x / WBC x   Sq Epi: x / Non Sq Epi: x / Bacteria: x        Culture - Urine (12.06.23 @ 15:13)   Specimen Source: Clean Catch Clean Catch (Midstream)  Culture Results: <10,000 CFU/mL Normal Urogenital Kimberlyn    < from: MR Abdomen w/wo IV Cont (12.07.23 @ 17:44) >    ACC: [id number] EXAM:  MR ABDOMEN WAW IC   ORDERED BY:  JORGE MATUTE     PROCEDURE DATE:  12/07/2023          INTERPRETATION:  CLINICAL INFORMATION: Lower small bowel mesentery mass.    COMPARISON: CT abdomen and pelvis 12/6/2023    CONTRAST/COMPLICATIONS:  IV Contrast: Gadavist  8.5 cc administered   1.5 cc discarded  Oral Contrast: NONE  Complications: None reported at time of study completion    PROCEDURE:  MRI of the abdomen and partial pelvis was performed.  MRCP was performed.    FINDINGS:  Motion degraded exam.    LOWER CHEST: Median sternotomy. TAVR.    LIVER: Scattered subcentimeter cysts.  BILE DUCTS: Stable common bile duct dilatation up to 1.3 cm with smooth   distal tapering.  GALLBLADDER: Cholecystectomy.  SPLEEN: Within normal limits.  PANCREAS: Within normal limits.  ADRENALS: Within normal limits.  KIDNEYS/URETERS: Bilateral renal cysts. No hydronephrosis.    VISUALIZED PORTIONS:  BOWEL: Small hiatal hernia. Duodenal diverticula. Colonic diverticulosis.  PERITONEUM: Redemonstrated 9.4 x 6.0 cm mesenteric mass in the lower   abdomen which demonstrates peripheral high T1 signal and T2 intermediate   signal and does not enhance, likely representing hematoma. No ascites.  VESSELS: Mild dilation of the celiac axis measuring 1.1 cm.  RETROPERITONEUM/LYMPH NODES: No lymphadenopathy.  ABDOMINAL WALL: Within normal limits.  BONES: Within normal limits.    IMPRESSION:  Mesenteric hematoma, without significant change from prior exam.    --- End of Report ---           JUNE MORELOS MD; Resident Radiologist  This document has been electronically signed.  LUCAS WASHINGTON MD; Attending Radiologist  This document has been electronically signed. Dec  8 2023 10:53AM    < end of copied text >          < from: TTE W or WO Ultrasound Enhancing Agent (12.07.23 @ 15:30) >     CONCLUSIONS:      1. Left ventricular cavity is small. Left ventricular systolic function is hyperdynamic with an ejection fraction of 71 % by Roach's method of disks. There are no regional wall motion abnormalities seen.   2. Normal right ventricular cavity size and systolic function. Tricuspid annular plane systolic excursion (TAPSE) is 1.4 cm (normal >=1.7 cm). Tricuspid annular tissue Doppler S' is 11.0 cm/s (normal >10 cm/s).   3. Pulmonary artery systolic pressure could not be estimated.   4. Bioprosthetic valve replacement present in the aortic position ( a transcatheter deployed (TAVR). The prosthetic valve is well seated. The peak transaortic velocity is 2.48 m/s, peak transaortic gradient is 24.6 mmHg and mean transaortic gradient is 14.0 mmHg. The effective orifice area is estimated at 1.66 cm² by the continuity equation. There is no intravalvular regurgitation. There is no paravalvular regurgitation. There is no evidence of aortic regurgitation.   5. Compared to the transthoracic echocardiogram performed on 7/3/2023 There is an increase in peak velocity from 1.75 m/s to 2.48 m/s, mean gradient of 12 mmHg to now 24.6 mmHg. The LVOT / VTI ratio is 0.44 which has declined but is still within normal range.   6. No pericardial effusion seen.      < end of copied text >       Name of Patient : MARCELA FARAH  MRN: [id number]  Date of visit: 12-08-23 @ 15:37      Subjective: Patient seen and examined. No new events except as noted.   Patient seen earlier this AM. Lying down in bed. Reports continued pain in left side of abdomen.     REVIEW OF SYSTEMS:    CONSTITUTIONAL: No weakness, fevers or chills  EYES/ENT: No visual changes;  No vertigo or throat pain   NECK: No pain or stiffness  RESPIRATORY: No cough, wheezing, hemoptysis; No shortness of breath  CARDIOVASCULAR: No chest pain or palpitations  GASTROINTESTINAL: + L sided abdominal pain; No nausea, vomiting, or hematemesis; No diarrhea or constipation. No melena or hematochezia.  GENITOURINARY: No dysuria, frequency or hematuria  NEUROLOGICAL: No numbness or weakness  SKIN: No itching, burning, rashes, or lesions   All other review of systems is negative unless indicated above.    MEDICATIONS:  MEDICATIONS  (STANDING):  acetaminophen     Tablet .. 975 milliGRAM(s) Oral every 6 hours  amLODIPine   Tablet 10 milliGRAM(s) Oral daily  atorvastatin 40 milliGRAM(s) Oral at bedtime  budesonide  80 MICROgram(s)/formoterol 4.5 MICROgram(s) Inhaler 2 Puff(s) Inhalation two times a day  dextrose 5%. 1000 milliLiter(s) (100 mL/Hr) IV Continuous <Continuous>  dextrose 5%. 1000 milliLiter(s) (50 mL/Hr) IV Continuous <Continuous>  dextrose 50% Injectable 25 Gram(s) IV Push once  dextrose 50% Injectable 12.5 Gram(s) IV Push once  dextrose 50% Injectable 25 Gram(s) IV Push once  fluticasone propionate 50 MICROgram(s)/spray Nasal Spray 1 Spray(s) Both Nostrils every 12 hours  gabapentin 100 milliGRAM(s) Oral three times a day  glucagon  Injectable 1 milliGRAM(s) IntraMuscular once  insulin lispro (ADMELOG) corrective regimen sliding scale   SubCutaneous three times a day before meals  insulin lispro (ADMELOG) corrective regimen sliding scale   SubCutaneous at bedtime  memantine 5 milliGRAM(s) Oral two times a day  metoprolol succinate ER 25 milliGRAM(s) Oral daily  montelukast 10 milliGRAM(s) Oral daily  pantoprazole    Tablet 40 milliGRAM(s) Oral before breakfast      PHYSICAL EXAM:  T(C): 36.7 (12-08-23 @ 13:40), Max: 37.2 (12-07-23 @ 17:31)  HR: 88 (12-08-23 @ 13:40) (71 - 90)  BP: 143/77 (12-08-23 @ 13:40) (128/79 - 153/87)  RR: 18 (12-08-23 @ 13:40) (18 - 18)  SpO2: 98% (12-08-23 @ 13:40) (96% - 98%)  Wt(kg): --  I&O's Summary    07 Dec 2023 07:01  -  08 Dec 2023 07:00  --------------------------------------------------------  IN: 1360 mL / OUT: 1000 mL / NET: 360 mL    08 Dec 2023 07:01  -  08 Dec 2023 15:37  --------------------------------------------------------  IN: 360 mL / OUT: 700 mL / NET: -340 mL          Appearance: Awake, generalized weakness, lying down in bed 	  HEENT: Eyes are open   Lymphatic: No lymphadenopathy grossly   Cardiovascular: Normal    Respiratory: normal effort , clear  Gastrointestinal:  Soft, + L sided abdominal pain   Skin: No rashes,  warm to touch  Psychiatry:  Mood & affect appropriate  Musculoskeletal: No edema      12-07-23 @ 07:01  -  12-08-23 @ 07:00  --------------------------------------------------------  IN: 1360 mL / OUT: 1000 mL / NET: 360 mL    12-08-23 @ 07:01  -  12-08-23 @ 15:37  --------------------------------------------------------  IN: 360 mL / OUT: 700 mL / NET: -340 mL                              10.2   8.73  )-----------( 265      ( 08 Dec 2023 05:27 )             30.5               12-08    137  |  101  |  17  ----------------------------<  140<H>  3.8   |  25  |  1.52<H>    Ca    8.7      08 Dec 2023 05:27  Phos  3.6     12-08  Mg     2.1     12-08                    Urinalysis Basic - ( 08 Dec 2023 05:27 )    Color: x / Appearance: x / SG: x / pH: x  Gluc: 140 mg/dL / Ketone: x  / Bili: x / Urobili: x   Blood: x / Protein: x / Nitrite: x   Leuk Esterase: x / RBC: x / WBC x   Sq Epi: x / Non Sq Epi: x / Bacteria: x        Culture - Urine (12.06.23 @ 15:13)   Specimen Source: Clean Catch Clean Catch (Midstream)  Culture Results: <10,000 CFU/mL Normal Urogenital Kimberlyn    < from: MR Abdomen w/wo IV Cont (12.07.23 @ 17:44) >    ACC: [id number] EXAM:  MR ABDOMEN WAW IC   ORDERED BY:  JORGE MATUTE     PROCEDURE DATE:  12/07/2023          INTERPRETATION:  CLINICAL INFORMATION: Lower small bowel mesentery mass.    COMPARISON: CT abdomen and pelvis 12/6/2023    CONTRAST/COMPLICATIONS:  IV Contrast: Gadavist  8.5 cc administered   1.5 cc discarded  Oral Contrast: NONE  Complications: None reported at time of study completion    PROCEDURE:  MRI of the abdomen and partial pelvis was performed.  MRCP was performed.    FINDINGS:  Motion degraded exam.    LOWER CHEST: Median sternotomy. TAVR.    LIVER: Scattered subcentimeter cysts.  BILE DUCTS: Stable common bile duct dilatation up to 1.3 cm with smooth   distal tapering.  GALLBLADDER: Cholecystectomy.  SPLEEN: Within normal limits.  PANCREAS: Within normal limits.  ADRENALS: Within normal limits.  KIDNEYS/URETERS: Bilateral renal cysts. No hydronephrosis.    VISUALIZED PORTIONS:  BOWEL: Small hiatal hernia. Duodenal diverticula. Colonic diverticulosis.  PERITONEUM: Redemonstrated 9.4 x 6.0 cm mesenteric mass in the lower   abdomen which demonstrates peripheral high T1 signal and T2 intermediate   signal and does not enhance, likely representing hematoma. No ascites.  VESSELS: Mild dilation of the celiac axis measuring 1.1 cm.  RETROPERITONEUM/LYMPH NODES: No lymphadenopathy.  ABDOMINAL WALL: Within normal limits.  BONES: Within normal limits.    IMPRESSION:  Mesenteric hematoma, without significant change from prior exam.    --- End of Report ---           JUNE MORELOS MD; Resident Radiologist  This document has been electronically signed.  LUCAS WASHINGTON MD; Attending Radiologist  This document has been electronically signed. Dec  8 2023 10:53AM    < end of copied text >          < from: TTE W or WO Ultrasound Enhancing Agent (12.07.23 @ 15:30) >     CONCLUSIONS:      1. Left ventricular cavity is small. Left ventricular systolic function is hyperdynamic with an ejection fraction of 71 % by Roach's method of disks. There are no regional wall motion abnormalities seen.   2. Normal right ventricular cavity size and systolic function. Tricuspid annular plane systolic excursion (TAPSE) is 1.4 cm (normal >=1.7 cm). Tricuspid annular tissue Doppler S' is 11.0 cm/s (normal >10 cm/s).   3. Pulmonary artery systolic pressure could not be estimated.   4. Bioprosthetic valve replacement present in the aortic position ( a transcatheter deployed (TAVR). The prosthetic valve is well seated. The peak transaortic velocity is 2.48 m/s, peak transaortic gradient is 24.6 mmHg and mean transaortic gradient is 14.0 mmHg. The effective orifice area is estimated at 1.66 cm² by the continuity equation. There is no intravalvular regurgitation. There is no paravalvular regurgitation. There is no evidence of aortic regurgitation.   5. Compared to the transthoracic echocardiogram performed on 7/3/2023 There is an increase in peak velocity from 1.75 m/s to 2.48 m/s, mean gradient of 12 mmHg to now 24.6 mmHg. The LVOT / VTI ratio is 0.44 which has declined but is still within normal range.   6. No pericardial effusion seen.      < end of copied text >

## 2023-12-08 NOTE — PROGRESS NOTE ADULT - ASSESSMENT
78M with history of HTN, HLD, DM, CAD, TAVR in 7/2023, PAD on ASA/Plavix, BPH, chronic dyspnea, and mild memory issues presenting with chronic abdominal pain. In the ED, patient AF, HDS. Labs remarkable for WBC 10.79, H/H 9.6/ 30.4. CT demonstrating hematoma vs solid mass in the small bowel mesentery in the left taiwo-abdomen. Mass is nonobstructing and patient is still having normal bowel function w/o nausea. Pending MRI read for further characterization of the mass.    PLAN:  - NPO, IVF   - Exam before pain medications   - Holding ASA/Plavix, VTE ppx   - Monitor H/H   - No chemical DVT ppx  - Patient to come to surgical floor  - MRI read pending  - Medicine recommendations appreciated      Red Team, [id number]  78M with history of HTN, HLD, DM, CAD, TAVR in 7/2023, PAD on ASA/Plavix, BPH, chronic dyspnea, and mild memory issues presenting with chronic abdominal pain. In the ED, patient AF, HDS. Labs remarkable for WBC 10.79, H/H 9.6/ 30.4. CT demonstrating hematoma vs solid mass in the small bowel mesentery in the left taiwo-abdomen. Mass is nonobstructing and patient is still having normal bowel function w/o nausea. Pending MRI read for further characterization of the mass.    PLAN:  - NPO, IVF   - Exam before pain medications   - Holding ASA/Plavix, VTE ppx   - Monitor H/H   - No chemical DVT ppx  - Patient to come to surgical floor  - MRI read pending  - Medicine recommendations appreciated      Red Team, [id number]

## 2023-12-08 NOTE — DISCHARGE NOTE PROVIDER - NSDCCPCAREPLAN_GEN_ALL_CORE_FT
PRINCIPAL DISCHARGE DIAGNOSIS  Diagnosis: Abdominal pain  Assessment and Plan of Treatment: You were found to have a mesenteric hematoma. Your bloodwork was monitored throughout hospital course, and you did not have any episodes of anemia  Please follow up with Dr. Arriaga and your PCP in 1-2 weeks after discharge from the hospital

## 2023-12-09 ENCOUNTER — TRANSCRIPTION ENCOUNTER (OUTPATIENT)
Age: 78
End: 2023-12-09

## 2023-12-09 VITALS
DIASTOLIC BLOOD PRESSURE: 75 MMHG | SYSTOLIC BLOOD PRESSURE: 128 MMHG | RESPIRATION RATE: 18 BRPM | OXYGEN SATURATION: 95 % | TEMPERATURE: 98 F | HEART RATE: 86 BPM

## 2023-12-09 LAB
ANION GAP SERPL CALC-SCNC: 10 MMOL/L — SIGNIFICANT CHANGE UP (ref 5–17)
ANION GAP SERPL CALC-SCNC: 10 MMOL/L — SIGNIFICANT CHANGE UP (ref 5–17)
BUN SERPL-MCNC: 17 MG/DL — SIGNIFICANT CHANGE UP (ref 7–23)
BUN SERPL-MCNC: 17 MG/DL — SIGNIFICANT CHANGE UP (ref 7–23)
CALCIUM SERPL-MCNC: 8.5 MG/DL — SIGNIFICANT CHANGE UP (ref 8.4–10.5)
CALCIUM SERPL-MCNC: 8.5 MG/DL — SIGNIFICANT CHANGE UP (ref 8.4–10.5)
CHLORIDE SERPL-SCNC: 103 MMOL/L — SIGNIFICANT CHANGE UP (ref 96–108)
CHLORIDE SERPL-SCNC: 103 MMOL/L — SIGNIFICANT CHANGE UP (ref 96–108)
CO2 SERPL-SCNC: 25 MMOL/L — SIGNIFICANT CHANGE UP (ref 22–31)
CO2 SERPL-SCNC: 25 MMOL/L — SIGNIFICANT CHANGE UP (ref 22–31)
CREAT SERPL-MCNC: 1.65 MG/DL — HIGH (ref 0.5–1.3)
CREAT SERPL-MCNC: 1.65 MG/DL — HIGH (ref 0.5–1.3)
EGFR: 42 ML/MIN/1.73M2 — LOW
EGFR: 42 ML/MIN/1.73M2 — LOW
GLUCOSE BLDC GLUCOMTR-MCNC: 156 MG/DL — HIGH (ref 70–99)
GLUCOSE BLDC GLUCOMTR-MCNC: 156 MG/DL — HIGH (ref 70–99)
GLUCOSE BLDC GLUCOMTR-MCNC: 248 MG/DL — HIGH (ref 70–99)
GLUCOSE BLDC GLUCOMTR-MCNC: 248 MG/DL — HIGH (ref 70–99)
GLUCOSE SERPL-MCNC: 130 MG/DL — HIGH (ref 70–99)
GLUCOSE SERPL-MCNC: 130 MG/DL — HIGH (ref 70–99)
HCT VFR BLD CALC: 30.8 % — LOW (ref 39–50)
HCT VFR BLD CALC: 30.8 % — LOW (ref 39–50)
HGB BLD-MCNC: 10.3 G/DL — LOW (ref 13–17)
HGB BLD-MCNC: 10.3 G/DL — LOW (ref 13–17)
MAGNESIUM SERPL-MCNC: 1.9 MG/DL — SIGNIFICANT CHANGE UP (ref 1.6–2.6)
MAGNESIUM SERPL-MCNC: 1.9 MG/DL — SIGNIFICANT CHANGE UP (ref 1.6–2.6)
MCHC RBC-ENTMCNC: 28.1 PG — SIGNIFICANT CHANGE UP (ref 27–34)
MCHC RBC-ENTMCNC: 28.1 PG — SIGNIFICANT CHANGE UP (ref 27–34)
MCHC RBC-ENTMCNC: 33.4 GM/DL — SIGNIFICANT CHANGE UP (ref 32–36)
MCHC RBC-ENTMCNC: 33.4 GM/DL — SIGNIFICANT CHANGE UP (ref 32–36)
MCV RBC AUTO: 83.9 FL — SIGNIFICANT CHANGE UP (ref 80–100)
MCV RBC AUTO: 83.9 FL — SIGNIFICANT CHANGE UP (ref 80–100)
NRBC # BLD: 0 /100 WBCS — SIGNIFICANT CHANGE UP (ref 0–0)
NRBC # BLD: 0 /100 WBCS — SIGNIFICANT CHANGE UP (ref 0–0)
PHOSPHATE SERPL-MCNC: 4.3 MG/DL — SIGNIFICANT CHANGE UP (ref 2.5–4.5)
PHOSPHATE SERPL-MCNC: 4.3 MG/DL — SIGNIFICANT CHANGE UP (ref 2.5–4.5)
PLATELET # BLD AUTO: 271 K/UL — SIGNIFICANT CHANGE UP (ref 150–400)
PLATELET # BLD AUTO: 271 K/UL — SIGNIFICANT CHANGE UP (ref 150–400)
POTASSIUM SERPL-MCNC: 3.9 MMOL/L — SIGNIFICANT CHANGE UP (ref 3.5–5.3)
POTASSIUM SERPL-MCNC: 3.9 MMOL/L — SIGNIFICANT CHANGE UP (ref 3.5–5.3)
POTASSIUM SERPL-SCNC: 3.9 MMOL/L — SIGNIFICANT CHANGE UP (ref 3.5–5.3)
POTASSIUM SERPL-SCNC: 3.9 MMOL/L — SIGNIFICANT CHANGE UP (ref 3.5–5.3)
RBC # BLD: 3.67 M/UL — LOW (ref 4.2–5.8)
RBC # BLD: 3.67 M/UL — LOW (ref 4.2–5.8)
RBC # FLD: 14.4 % — SIGNIFICANT CHANGE UP (ref 10.3–14.5)
RBC # FLD: 14.4 % — SIGNIFICANT CHANGE UP (ref 10.3–14.5)
SODIUM SERPL-SCNC: 138 MMOL/L — SIGNIFICANT CHANGE UP (ref 135–145)
SODIUM SERPL-SCNC: 138 MMOL/L — SIGNIFICANT CHANGE UP (ref 135–145)
WBC # BLD: 9.3 K/UL — SIGNIFICANT CHANGE UP (ref 3.8–10.5)
WBC # BLD: 9.3 K/UL — SIGNIFICANT CHANGE UP (ref 3.8–10.5)
WBC # FLD AUTO: 9.3 K/UL — SIGNIFICANT CHANGE UP (ref 3.8–10.5)
WBC # FLD AUTO: 9.3 K/UL — SIGNIFICANT CHANGE UP (ref 3.8–10.5)

## 2023-12-09 PROCEDURE — 93005 ELECTROCARDIOGRAM TRACING: CPT

## 2023-12-09 PROCEDURE — 85610 PROTHROMBIN TIME: CPT

## 2023-12-09 PROCEDURE — 83605 ASSAY OF LACTIC ACID: CPT

## 2023-12-09 PROCEDURE — C8929: CPT

## 2023-12-09 PROCEDURE — 85027 COMPLETE CBC AUTOMATED: CPT

## 2023-12-09 PROCEDURE — 80053 COMPREHEN METABOLIC PANEL: CPT

## 2023-12-09 PROCEDURE — 84295 ASSAY OF SERUM SODIUM: CPT

## 2023-12-09 PROCEDURE — 86900 BLOOD TYPING SEROLOGIC ABO: CPT

## 2023-12-09 PROCEDURE — 82803 BLOOD GASES ANY COMBINATION: CPT

## 2023-12-09 PROCEDURE — 96374 THER/PROPH/DIAG INJ IV PUSH: CPT

## 2023-12-09 PROCEDURE — 86850 RBC ANTIBODY SCREEN: CPT

## 2023-12-09 PROCEDURE — A9585: CPT

## 2023-12-09 PROCEDURE — 74177 CT ABD & PELVIS W/CONTRAST: CPT | Mod: MA

## 2023-12-09 PROCEDURE — 94640 AIRWAY INHALATION TREATMENT: CPT

## 2023-12-09 PROCEDURE — 82435 ASSAY OF BLOOD CHLORIDE: CPT

## 2023-12-09 PROCEDURE — 82962 GLUCOSE BLOOD TEST: CPT

## 2023-12-09 PROCEDURE — 82947 ASSAY GLUCOSE BLOOD QUANT: CPT

## 2023-12-09 PROCEDURE — 84484 ASSAY OF TROPONIN QUANT: CPT

## 2023-12-09 PROCEDURE — 86901 BLOOD TYPING SEROLOGIC RH(D): CPT

## 2023-12-09 PROCEDURE — 84132 ASSAY OF SERUM POTASSIUM: CPT

## 2023-12-09 PROCEDURE — 83880 ASSAY OF NATRIURETIC PEPTIDE: CPT

## 2023-12-09 PROCEDURE — 36415 COLL VENOUS BLD VENIPUNCTURE: CPT

## 2023-12-09 PROCEDURE — 80048 BASIC METABOLIC PNL TOTAL CA: CPT

## 2023-12-09 PROCEDURE — 82330 ASSAY OF CALCIUM: CPT

## 2023-12-09 PROCEDURE — 85014 HEMATOCRIT: CPT

## 2023-12-09 PROCEDURE — 81001 URINALYSIS AUTO W/SCOPE: CPT

## 2023-12-09 PROCEDURE — 85730 THROMBOPLASTIN TIME PARTIAL: CPT

## 2023-12-09 PROCEDURE — 74183 MRI ABD W/O CNTR FLWD CNTR: CPT

## 2023-12-09 PROCEDURE — 85018 HEMOGLOBIN: CPT

## 2023-12-09 PROCEDURE — 83735 ASSAY OF MAGNESIUM: CPT

## 2023-12-09 PROCEDURE — 83036 HEMOGLOBIN GLYCOSYLATED A1C: CPT

## 2023-12-09 PROCEDURE — 87086 URINE CULTURE/COLONY COUNT: CPT

## 2023-12-09 PROCEDURE — 99285 EMERGENCY DEPT VISIT HI MDM: CPT

## 2023-12-09 PROCEDURE — 85025 COMPLETE CBC W/AUTO DIFF WBC: CPT

## 2023-12-09 PROCEDURE — 84100 ASSAY OF PHOSPHORUS: CPT

## 2023-12-09 RX ORDER — CLOPIDOGREL BISULFATE 75 MG/1
1 TABLET, FILM COATED ORAL
Qty: 0 | Refills: 0 | DISCHARGE

## 2023-12-09 RX ORDER — ASPIRIN/CALCIUM CARB/MAGNESIUM 324 MG
1 TABLET ORAL
Qty: 0 | Refills: 0 | DISCHARGE

## 2023-12-09 RX ADMIN — AMLODIPINE BESYLATE 10 MILLIGRAM(S): 2.5 TABLET ORAL at 05:41

## 2023-12-09 RX ADMIN — PANTOPRAZOLE SODIUM 40 MILLIGRAM(S): 20 TABLET, DELAYED RELEASE ORAL at 08:26

## 2023-12-09 RX ADMIN — Medication 1 SPRAY(S): at 05:40

## 2023-12-09 RX ADMIN — Medication 2: at 11:54

## 2023-12-09 RX ADMIN — Medication 975 MILLIGRAM(S): at 11:55

## 2023-12-09 RX ADMIN — MONTELUKAST 10 MILLIGRAM(S): 4 TABLET, CHEWABLE ORAL at 11:55

## 2023-12-09 RX ADMIN — Medication 975 MILLIGRAM(S): at 00:28

## 2023-12-09 RX ADMIN — GABAPENTIN 100 MILLIGRAM(S): 400 CAPSULE ORAL at 05:41

## 2023-12-09 RX ADMIN — Medication 25 MILLIGRAM(S): at 05:41

## 2023-12-09 RX ADMIN — Medication 1: at 08:25

## 2023-12-09 RX ADMIN — MEMANTINE HYDROCHLORIDE 5 MILLIGRAM(S): 10 TABLET ORAL at 05:41

## 2023-12-09 RX ADMIN — Medication 975 MILLIGRAM(S): at 05:40

## 2023-12-09 RX ADMIN — BUDESONIDE AND FORMOTEROL FUMARATE DIHYDRATE 2 PUFF(S): 160; 4.5 AEROSOL RESPIRATORY (INHALATION) at 05:40

## 2023-12-09 NOTE — PROGRESS NOTE ADULT - SUBJECTIVE AND OBJECTIVE BOX
CARDIOLOGY      no tele  no events overnight   DATE OF SERVICE - 12-09-23     Review of Systems:   Constitutional: [ ] fevers, [ ] chills.   Skin: [ ] dry skin. [ ] rashes.  Psychiatric: [ ] depression, [ ] anxiety.   Gastrointestinal: [ ] BRBPR, [ ] melena.   Neurological: [ ] confusion. [ ] seizures. [ ] shuffling gait.   Ears,Nose,Mouth and Throat: [ ] ear pain [ ] sore throat.   Eyes: [ ] diplopia.   Respiratory: [ ] hemoptysis. [ ] shortness of breath  Cardiovascular: See HPI above  Hematologic/Lymphatic: [ ] anemia. [ ] painful nodes. [ ] prolonged bleeding.   Genitourinary: [ ] hematuria. [ ] flank pain.   Endocrine: [ ] significant change in weight. [ ] intolerance to heat and cold.     Review of systems [ x] otherwise negative, [ ] otherwise unable to obtain    FH: no family history of sudden cardiac death in first degree relatives    SH: [ ] tobacco, [ ] alcohol, [ ] drugs        acetaminophen     Tablet .. 975 milliGRAM(s) Oral every 6 hours  amLODIPine   Tablet 10 milliGRAM(s) Oral daily  atorvastatin 40 milliGRAM(s) Oral at bedtime  budesonide  80 MICROgram(s)/formoterol 4.5 MICROgram(s) Inhaler 2 Puff(s) Inhalation two times a day  dextrose 5%. 1000 milliLiter(s) IV Continuous <Continuous>  dextrose 5%. 1000 milliLiter(s) IV Continuous <Continuous>  dextrose 50% Injectable 25 Gram(s) IV Push once  dextrose 50% Injectable 25 Gram(s) IV Push once  dextrose 50% Injectable 12.5 Gram(s) IV Push once  dextrose Oral Gel 15 Gram(s) Oral once PRN  fluticasone propionate 50 MICROgram(s)/spray Nasal Spray 1 Spray(s) Both Nostrils every 12 hours  gabapentin 100 milliGRAM(s) Oral three times a day  glucagon  Injectable 1 milliGRAM(s) IntraMuscular once  insulin lispro (ADMELOG) corrective regimen sliding scale   SubCutaneous at bedtime  insulin lispro (ADMELOG) corrective regimen sliding scale   SubCutaneous three times a day before meals  meclizine 12.5 milliGRAM(s) Oral every 8 hours PRN  memantine 5 milliGRAM(s) Oral two times a day  metoprolol succinate ER 25 milliGRAM(s) Oral daily  montelukast 10 milliGRAM(s) Oral daily  pantoprazole    Tablet 40 milliGRAM(s) Oral before breakfast                            10.3   9.30  )-----------( 271      ( 09 Dec 2023 06:29 )             30.8       Hemoglobin: 10.3 g/dL (12-09 @ 06:29)  Hemoglobin: 10.2 g/dL (12-08 @ 05:27)  Hemoglobin: 10.6 g/dL (12-07 @ 05:56)  Hemoglobin: 9.3 g/dL (12-06 @ 15:58)  Hemoglobin: 9.6 g/dL (12-06 @ 10:57)      12-09    138  |  103  |  17  ----------------------------<  130<H>  3.9   |  25  |  1.65<H>    Ca    8.5      09 Dec 2023 06:29  Phos  4.3     12-09  Mg     1.9     12-09      Creatinine Trend: 1.65<--, 1.52<--, 1.29<--, 1.53<--    COAGS:           T(C): 36.6 (12-09-23 @ 09:13), Max: 36.9 (12-08-23 @ 16:10)  HR: 78 (12-09-23 @ 09:13) (78 - 94)  BP: 134/77 (12-09-23 @ 09:13) (100/63 - 153/88)  RR: 18 (12-09-23 @ 09:13) (18 - 18)  SpO2: 96% (12-09-23 @ 09:13) (92% - 98%)  Wt(kg): --    I&O's Summary    08 Dec 2023 07:01  -  09 Dec 2023 07:00  --------------------------------------------------------  IN: 360 mL / OUT: 2150 mL / NET: -1790 mL    09 Dec 2023 07:01  -  09 Dec 2023 12:07  --------------------------------------------------------  IN: 480 mL / OUT: 500 mL / NET: -20 mL       General: Well nourished in no acute distress. Alert and Oriented * 3.   Head: Normocephalic and atraumatic.   Neck: No JVD. No bruits. Supple. Does not appear to be enlarged.   Cardiovascular: + S1,S2 ; RRR Soft systolic murmur at the left lower sternal border. No rubs noted.    Lungs: CTA b/l. No rhonchi, rales or wheezes.   Abdomen: + BS, soft. Non tender. Non distended. No rebound. No guarding.   Extremities: No clubbing/cyanosis/edema.   Neurologic: Moves all four extremities. Full range of motion.   Skin: Warm and moist. The patient's skin has normal elasticity and good skin turgor.   Psychiatric: Appropriate mood and affect.  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    -continue norvasc for hypertension  -continue lipitor for hyperlipidemia  -continue metoprolol for stable CAD with normal LVEF  -restart anti-platelet therapy (aspirin +/- plavix) when ok with surgery , stable SB mesentery hematoma,    - Patient to f/u with Dr. Coffman after discharge CARDIOLOGY    DATE OF SERVICE - 12-09-23   no tele  no events overnight       Review of Systems:   Constitutional: [ ] fevers, [ ] chills.   Skin: [ ] dry skin. [ ] rashes.  Psychiatric: [ ] depression, [ ] anxiety.   Gastrointestinal: [ ] BRBPR, [ ] melena.   Neurological: [ ] confusion. [ ] seizures. [ ] shuffling gait.   Ears,Nose,Mouth and Throat: [ ] ear pain [ ] sore throat.   Eyes: [ ] diplopia.   Respiratory: [ ] hemoptysis. [ ] shortness of breath  Cardiovascular: See HPI above  Hematologic/Lymphatic: [ ] anemia. [ ] painful nodes. [ ] prolonged bleeding.   Genitourinary: [ ] hematuria. [ ] flank pain.   Endocrine: [ ] significant change in weight. [ ] intolerance to heat and cold.     Review of systems [ x] otherwise negative, [ ] otherwise unable to obtain    FH: no family history of sudden cardiac death in first degree relatives    SH: [ ] tobacco, [ ] alcohol, [ ] drugs        acetaminophen     Tablet .. 975 milliGRAM(s) Oral every 6 hours  amLODIPine   Tablet 10 milliGRAM(s) Oral daily  atorvastatin 40 milliGRAM(s) Oral at bedtime  budesonide  80 MICROgram(s)/formoterol 4.5 MICROgram(s) Inhaler 2 Puff(s) Inhalation two times a day  dextrose 5%. 1000 milliLiter(s) IV Continuous <Continuous>  dextrose 5%. 1000 milliLiter(s) IV Continuous <Continuous>  dextrose 50% Injectable 25 Gram(s) IV Push once  dextrose 50% Injectable 25 Gram(s) IV Push once  dextrose 50% Injectable 12.5 Gram(s) IV Push once  dextrose Oral Gel 15 Gram(s) Oral once PRN  fluticasone propionate 50 MICROgram(s)/spray Nasal Spray 1 Spray(s) Both Nostrils every 12 hours  gabapentin 100 milliGRAM(s) Oral three times a day  glucagon  Injectable 1 milliGRAM(s) IntraMuscular once  insulin lispro (ADMELOG) corrective regimen sliding scale   SubCutaneous at bedtime  insulin lispro (ADMELOG) corrective regimen sliding scale   SubCutaneous three times a day before meals  meclizine 12.5 milliGRAM(s) Oral every 8 hours PRN  memantine 5 milliGRAM(s) Oral two times a day  metoprolol succinate ER 25 milliGRAM(s) Oral daily  montelukast 10 milliGRAM(s) Oral daily  pantoprazole    Tablet 40 milliGRAM(s) Oral before breakfast                            10.3   9.30  )-----------( 271      ( 09 Dec 2023 06:29 )             30.8       Hemoglobin: 10.3 g/dL (12-09 @ 06:29)  Hemoglobin: 10.2 g/dL (12-08 @ 05:27)  Hemoglobin: 10.6 g/dL (12-07 @ 05:56)  Hemoglobin: 9.3 g/dL (12-06 @ 15:58)  Hemoglobin: 9.6 g/dL (12-06 @ 10:57)      12-09    138  |  103  |  17  ----------------------------<  130<H>  3.9   |  25  |  1.65<H>    Ca    8.5      09 Dec 2023 06:29  Phos  4.3     12-09  Mg     1.9     12-09      Creatinine Trend: 1.65<--, 1.52<--, 1.29<--, 1.53<--    COAGS:           T(C): 36.6 (12-09-23 @ 09:13), Max: 36.9 (12-08-23 @ 16:10)  HR: 78 (12-09-23 @ 09:13) (78 - 94)  BP: 134/77 (12-09-23 @ 09:13) (100/63 - 153/88)  RR: 18 (12-09-23 @ 09:13) (18 - 18)  SpO2: 96% (12-09-23 @ 09:13) (92% - 98%)  Wt(kg): --    I&O's Summary    08 Dec 2023 07:01  -  09 Dec 2023 07:00  --------------------------------------------------------  IN: 360 mL / OUT: 2150 mL / NET: -1790 mL    09 Dec 2023 07:01  -  09 Dec 2023 12:07  --------------------------------------------------------  IN: 480 mL / OUT: 500 mL / NET: -20 mL       General: Well nourished in no acute distress. Alert and Oriented * 3.   Head: Normocephalic and atraumatic.   Neck: No JVD. No bruits. Supple. Does not appear to be enlarged.   Cardiovascular: + S1,S2 ; RRR Soft systolic murmur at the left lower sternal border. No rubs noted.    Lungs: CTA b/l. No rhonchi, rales or wheezes.   Abdomen: + BS, soft. Non tender. Non distended. No rebound. No guarding.   Extremities: No clubbing/cyanosis/edema.   Neurologic: Moves all four extremities. Full range of motion.   Skin: Warm and moist. The patient's skin has normal elasticity and good skin turgor.   Psychiatric: Appropriate mood and affect.  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    -continue norvasc for hypertension  -continue lipitor for hyperlipidemia  -continue metoprolol for stable CAD with normal LVEF  -restart anti-platelet therapy (aspirin +/- plavix) when ok with surgery , stable SB mesentery hematoma,    - Patient to f/u with Dr. Coffman after discharge CARDIOLOGY    DATE OF SERVICE - 12-09-23   no tele  no events overnight       Review of Systems:   Constitutional: [ ] fevers, [ ] chills.   Skin: [ ] dry skin. [ ] rashes.  Psychiatric: [ ] depression, [ ] anxiety.   Gastrointestinal: [ ] BRBPR, [ ] melena.   Neurological: [ ] confusion. [ ] seizures. [ ] shuffling gait.   Ears,Nose,Mouth and Throat: [ ] ear pain [ ] sore throat.   Eyes: [ ] diplopia.   Respiratory: [ ] hemoptysis. [ ] shortness of breath  Cardiovascular: See HPI above  Hematologic/Lymphatic: [ ] anemia. [ ] painful nodes. [ ] prolonged bleeding.   Genitourinary: [ ] hematuria. [ ] flank pain.   Endocrine: [ ] significant change in weight. [ ] intolerance to heat and cold.     Review of systems [ x] otherwise negative, [ ] otherwise unable to obtain    FH: no family history of sudden cardiac death in first degree relatives    SH: [ ] tobacco, [ ] alcohol, [ ] drugs        acetaminophen     Tablet .. 975 milliGRAM(s) Oral every 6 hours  amLODIPine   Tablet 10 milliGRAM(s) Oral daily  atorvastatin 40 milliGRAM(s) Oral at bedtime  budesonide  80 MICROgram(s)/formoterol 4.5 MICROgram(s) Inhaler 2 Puff(s) Inhalation two times a day  dextrose 5%. 1000 milliLiter(s) IV Continuous <Continuous>  dextrose 5%. 1000 milliLiter(s) IV Continuous <Continuous>  dextrose 50% Injectable 25 Gram(s) IV Push once  dextrose 50% Injectable 25 Gram(s) IV Push once  dextrose 50% Injectable 12.5 Gram(s) IV Push once  dextrose Oral Gel 15 Gram(s) Oral once PRN  fluticasone propionate 50 MICROgram(s)/spray Nasal Spray 1 Spray(s) Both Nostrils every 12 hours  gabapentin 100 milliGRAM(s) Oral three times a day  glucagon  Injectable 1 milliGRAM(s) IntraMuscular once  insulin lispro (ADMELOG) corrective regimen sliding scale   SubCutaneous at bedtime  insulin lispro (ADMELOG) corrective regimen sliding scale   SubCutaneous three times a day before meals  meclizine 12.5 milliGRAM(s) Oral every 8 hours PRN  memantine 5 milliGRAM(s) Oral two times a day  metoprolol succinate ER 25 milliGRAM(s) Oral daily  montelukast 10 milliGRAM(s) Oral daily  pantoprazole    Tablet 40 milliGRAM(s) Oral before breakfast                            10.3   9.30  )-----------( 271      ( 09 Dec 2023 06:29 )             30.8       Hemoglobin: 10.3 g/dL (12-09 @ 06:29)  Hemoglobin: 10.2 g/dL (12-08 @ 05:27)  Hemoglobin: 10.6 g/dL (12-07 @ 05:56)  Hemoglobin: 9.3 g/dL (12-06 @ 15:58)  Hemoglobin: 9.6 g/dL (12-06 @ 10:57)      12-09    138  |  103  |  17  ----------------------------<  130<H>  3.9   |  25  |  1.65<H>    Ca    8.5      09 Dec 2023 06:29  Phos  4.3     12-09  Mg     1.9     12-09      Creatinine Trend: 1.65<--, 1.52<--, 1.29<--, 1.53<--    COAGS:           T(C): 36.6 (12-09-23 @ 09:13), Max: 36.9 (12-08-23 @ 16:10)  HR: 78 (12-09-23 @ 09:13) (78 - 94)  BP: 134/77 (12-09-23 @ 09:13) (100/63 - 153/88)  RR: 18 (12-09-23 @ 09:13) (18 - 18)  SpO2: 96% (12-09-23 @ 09:13) (92% - 98%)  Wt(kg): --    I&O's Summary    08 Dec 2023 07:01  -  09 Dec 2023 07:00  --------------------------------------------------------  IN: 360 mL / OUT: 2150 mL / NET: -1790 mL    09 Dec 2023 07:01  -  09 Dec 2023 12:07  --------------------------------------------------------  IN: 480 mL / OUT: 500 mL / NET: -20 mL       General: Well nourished in no acute distress. Alert and Oriented * 3.   Head: Normocephalic and atraumatic.   Neck: No JVD. No bruits. Supple. Does not appear to be enlarged.   Cardiovascular: + S1,S2 ; RRR Soft systolic murmur at the left lower sternal border. No rubs noted.    Lungs: CTA b/l. No rhonchi, rales or wheezes.   Abdomen: + BS, soft. Non tender. Non distended. No rebound. No guarding.   Extremities: No clubbing/cyanosis/edema.   Neurologic: Moves all four extremities. Full range of motion.   Skin: Warm and moist. The patient's skin has normal elasticity and good skin turgor.   Psychiatric: Appropriate mood and affect.  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    -continue norvasc for hypertension  -continue lipitor for hyperlipidemia  -continue metoprolol for stable CAD with normal LVEF  -restart anti-platelet therapy (aspirin +/- plavix) when ok with surgery , stable SB mesentery hematoma,    - Patient to f/u with Dr. Coffman after discharge

## 2023-12-09 NOTE — PROGRESS NOTE ADULT - ASSESSMENT
Patient is a 78 year old Male with a PMHx of HTN, HLD, DM, CAD, TAVR in 7/2023, PAD on ASA/Plavix, BPH, chronic dyspnea, and mild memory issues presenting who presents to [facility] with a chief complaint of chronic abdominal pain. Patient reports that his pain began bout 2 weeks ago, prior to his trip to [facility]. Reportedly patient was hospitalized in [facility] for LLQ abdominal pain, with nausea and vomiting. Per patient, he was found to have nephrolithiasis on sonogram. Patient presents to [facility] with a chief complaint of persistent abdominal pain and discomfort. Patient reports that his pain is constant, fluctuating in intensity and diffuse throughout his abdomen. Internal Medicine has been consulted on Mr. Madison's care for medical management. Patient states he is passing flatus, having BMs. Denies any hematochezia, fever, chills, chest pain, palpitations, shortness of breath or dyspnea.       Hematoma   - CT A/P with Subcentimeter hypodense hepatic lesions, Hypodense left renal lesion 1.2 CM, Heterogenous mass in the small bowel mesentry in the left hemiabdomen, 9.3 X 6.5 cm, read as a hematoma   - MR Abdomen w/ 9.4 x 6.0 cm mesenteric mass in the lower abdomen -- likely representing hematoma per MR report   - AP on hold as per surgery.   - Check Serial CBC   - Monitor hemodynamics   - Transfuse for Hgb < 8.0 in view of cardiac history   - Per Surgery     Small Bowel Mesentry Mass  - CT as above   - MR ABD report with hematoma   - Per Surgery     AK due to PYAAL, BPH   - Cr down-trending with IVF   - Monitor I and O   - Monitor and trend renal function   --> patient w/ PAYAL. C/w PO hydration as tolerated and trend in AM. In up-trending, can trial gentle IVF for hydration    CAD, TAVR  - On Toprol XL 25 PO Qd  - DAPT on hold 2/2 hematoma  - Lipitor 40   - TTE  w/ Hyperdynamic LVSF w/ EF of 71%, no WMA, TAVR     ? COPD versus Asthma   - Pt unclear if COPD versus Asthma   - On Budesonide/ Flonase / Montelukast  - Incentive spirometer   - Monitor O2 saturation; Supplement to maintain > 90%  - Outpatient pulm follow up for PFTs    PAD   - ASA/Plavix on hold 2/2 hematoma. Resume once cleared from Surgery standpoint     HTN  - On Norvasc 10 and Toprol XL 25 PO Qd   - Monitor BP, VS and patient     HLD  - On Lipitor 40     DM  - A1C 6.6   - Diabetic DASH diet   - Monitor glucose levels closely     Renal lesion   - Read as 1.2 CM  on CT   - MR read as cysts.  - Outpatient follow up       PPX       Patient is a 78 year old Male with a PMHx of HTN, HLD, DM, CAD, TAVR in 7/2023, PAD on ASA/Plavix, BPH, chronic dyspnea, and mild memory issues presenting who presents to [facility] with a chief complaint of chronic abdominal pain. Patient reports that his pain began bout 2 weeks ago, prior to his trip to [facility]. Reportedly patient was hospitalized in [facility] for LLQ abdominal pain, with nausea and vomiting. Per patient, he was found to have nephrolithiasis on sonogram. Patient presents to [facility] with a chief complaint of persistent abdominal pain and discomfort. Patient reports that his pain is constant, fluctuating in intensity and diffuse throughout his abdomen. Internal Medicine has been consulted on Mr. Madison's care for medical management. Patient states he is passing flatus, having BMs. Denies any hematochezia, fever, chills, chest pain, palpitations, shortness of breath or dyspnea.       Hematoma   - CT A/P with Subcentimeter hypodense hepatic lesions, Hypodense left renal lesion 1.2 CM, Heterogenous mass in the small bowel mesentry in the left hemiabdomen, 9.3 X 6.5 cm, read as a hematoma   - MR Abdomen w/ 9.4 x 6.0 cm mesenteric mass in the lower abdomen -- likely representing hematoma per MR report   - AP on hold as per surgery.   - Check Serial CBC   - Monitor hemodynamics   - Transfuse for Hgb < 8.0 in view of cardiac history   - Per Surgery     Small Bowel Mesentry Mass  - CT as above   - MR ABD report with hematoma   - Per Surgery     AK due to PAYAL, BPH   - Cr down-trending with IVF   - Monitor I and O   - Monitor and trend renal function   --> patient w/ PAYAL. C/w PO hydration as tolerated and trend in AM. In up-trending, can trial gentle IVF for hydration    CAD, TAVR  - On Toprol XL 25 PO Qd  - DAPT on hold 2/2 hematoma  - Lipitor 40   - TTE  w/ Hyperdynamic LVSF w/ EF of 71%, no WMA, TAVR     ? COPD versus Asthma   - Pt unclear if COPD versus Asthma   - On Budesonide/ Flonase / Montelukast  - Incentive spirometer   - Monitor O2 saturation; Supplement to maintain > 90%  - Outpatient pulm follow up for PFTs    PAD   - ASA/Plavix on hold 2/2 hematoma. Resume once cleared from Surgery standpoint     HTN  - On Norvasc 10 and Toprol XL 25 PO Qd   - Monitor BP, VS and patient     HLD  - On Lipitor 40     DM  - A1C 6.6   - Diabetic DASH diet   - Monitor glucose levels closely     Renal lesion   - Read as 1.2 CM  on CT   - MR read as cysts.  - Outpatient follow up       PPX

## 2023-12-09 NOTE — PROGRESS NOTE ADULT - ASSESSMENT
78M with SB mesentery hematoma    Hb stable  Tolerating diet  For DC home today  Resume ASA/Plavix   FU 1 wk with Dr. Arriaga for repeat imaging

## 2023-12-09 NOTE — PROGRESS NOTE ADULT - SUBJECTIVE AND OBJECTIVE BOX
Name of Patient : MARCELA FARAH  MRN: [id number]  Date of visit: 12-09-23       Subjective: Patient seen and examined. No new events except as noted.     REVIEW OF SYSTEMS:    CONSTITUTIONAL: No weakness, fevers or chills  EYES/ENT: No visual changes;  No vertigo or throat pain   NECK: No pain or stiffness  RESPIRATORY: No cough, wheezing, hemoptysis; No shortness of breath  CARDIOVASCULAR: No chest pain or palpitations  GASTROINTESTINAL: No abdominal or epigastric pain. No nausea, vomiting, or hematemesis; No diarrhea or constipation. No melena or hematochezia.  GENITOURINARY: No dysuria, frequency or hematuria  NEUROLOGICAL: No numbness or weakness  SKIN: No itching, burning, rashes, or lesions   All other review of systems is negative unless indicated above.    MEDICATIONS:  MEDICATIONS  (STANDING):  acetaminophen     Tablet .. 975 milliGRAM(s) Oral every 6 hours  amLODIPine   Tablet 10 milliGRAM(s) Oral daily  atorvastatin 40 milliGRAM(s) Oral at bedtime  budesonide  80 MICROgram(s)/formoterol 4.5 MICROgram(s) Inhaler 2 Puff(s) Inhalation two times a day  dextrose 5%. 1000 milliLiter(s) (50 mL/Hr) IV Continuous <Continuous>  dextrose 5%. 1000 milliLiter(s) (100 mL/Hr) IV Continuous <Continuous>  dextrose 50% Injectable 25 Gram(s) IV Push once  dextrose 50% Injectable 25 Gram(s) IV Push once  dextrose 50% Injectable 12.5 Gram(s) IV Push once  fluticasone propionate 50 MICROgram(s)/spray Nasal Spray 1 Spray(s) Both Nostrils every 12 hours  gabapentin 100 milliGRAM(s) Oral three times a day  glucagon  Injectable 1 milliGRAM(s) IntraMuscular once  insulin lispro (ADMELOG) corrective regimen sliding scale   SubCutaneous three times a day before meals  insulin lispro (ADMELOG) corrective regimen sliding scale   SubCutaneous at bedtime  memantine 5 milliGRAM(s) Oral two times a day  metoprolol succinate ER 25 milliGRAM(s) Oral daily  montelukast 10 milliGRAM(s) Oral daily  pantoprazole    Tablet 40 milliGRAM(s) Oral before breakfast      PHYSICAL EXAM:  T(C): 36.8 (12-09-23 @ 13:22), Max: 36.8 (12-09-23 @ 13:22)  HR: 86 (12-09-23 @ 13:22) (78 - 86)  BP: 128/75 (12-09-23 @ 13:22) (100/63 - 134/77)  RR: 18 (12-09-23 @ 13:22) (18 - 18)  SpO2: 95% (12-09-23 @ 13:22) (94% - 96%)  Wt(kg): --  I&O's Summary    08 Dec 2023 07:01  -  09 Dec 2023 07:00  --------------------------------------------------------  IN: 360 mL / OUT: 2150 mL / NET: -1790 mL    09 Dec 2023 07:01  -  09 Dec 2023 22:37  --------------------------------------------------------  IN: 840 mL / OUT: 900 mL / NET: -60 mL          Appearance: Normal	  HEENT:  PERRLA   Lymphatic: No lymphadenopathy   Cardiovascular: Normal S1 S2, no JVD  Respiratory: normal effort , clear  Gastrointestinal:  Soft, Non-tender  Skin: No rashes,  warm to touch  Psychiatry:  Mood & affect appropriate  Musculuskeletal: No edema    recent labs, Imaging and EKGs personally reviewed     12-08-23 @ 07:01  -  12-09-23 @ 07:00  --------------------------------------------------------  IN: 360 mL / OUT: 2150 mL / NET: -1790 mL    12-09-23 @ 07:01  -  12-09-23 @ 22:37  --------------------------------------------------------  IN: 840 mL / OUT: 900 mL / NET: -60 mL                          10.3   9.30  )-----------( 271      ( 09 Dec 2023 06:29 )             30.8               12-09    138  |  103  |  17  ----------------------------<  130<H>  3.9   |  25  |  1.65<H>    Ca    8.5      09 Dec 2023 06:29  Phos  4.3     12-09  Mg     1.9     12-09                         Urinalysis Basic - ( 09 Dec 2023 06:29 )    Color: x / Appearance: x / SG: x / pH: x  Gluc: 130 mg/dL / Ketone: x  / Bili: x / Urobili: x   Blood: x / Protein: x / Nitrite: x   Leuk Esterase: x / RBC: x / WBC x   Sq Epi: x / Non Sq Epi: x / Bacteria: x               Name of Patient : MARCELA FARAH  MRN: [id number]  Date of visit: 12-09-23       Subjective: Patient seen and examined. No new events except as noted.     REVIEW OF SYSTEMS:    CONSTITUTIONAL: No weakness, fevers or chills  EYES/ENT: No visual changes;  No vertigo or throat pain   NECK: No pain or stiffness  RESPIRATORY: No cough, wheezing, hemoptysis; No shortness of breath  CARDIOVASCULAR: No chest pain or palpitations  GASTROINTESTINAL: No abdominal or epigastric pain. No nausea, vomiting, or hematemesis; No diarrhea or constipation. No melena or hematochezia.  GENITOURINARY: No dysuria, frequency or hematuria  NEUROLOGICAL: No numbness or weakness  SKIN: No itching, burning, rashes, or lesions   All other review of systems is negative unless indicated above.    MEDICATIONS:  MEDICATIONS  (STANDING):  acetaminophen     Tablet .. 975 milliGRAM(s) Oral every 6 hours  amLODIPine   Tablet 10 milliGRAM(s) Oral daily  atorvastatin 40 milliGRAM(s) Oral at bedtime  budesonide  80 MICROgram(s)/formoterol 4.5 MICROgram(s) Inhaler 2 Puff(s) Inhalation two times a day  dextrose 5%. 1000 milliLiter(s) (50 mL/Hr) IV Continuous <Continuous>  dextrose 5%. 1000 milliLiter(s) (100 mL/Hr) IV Continuous <Continuous>  dextrose 50% Injectable 25 Gram(s) IV Push once  dextrose 50% Injectable 25 Gram(s) IV Push once  dextrose 50% Injectable 12.5 Gram(s) IV Push once  fluticasone propionate 50 MICROgram(s)/spray Nasal Spray 1 Spray(s) Both Nostrils every 12 hours  gabapentin 100 milliGRAM(s) Oral three times a day  glucagon  Injectable 1 milliGRAM(s) IntraMuscular once  insulin lispro (ADMELOG) corrective regimen sliding scale   SubCutaneous three times a day before meals  insulin lispro (ADMELOG) corrective regimen sliding scale   SubCutaneous at bedtime  memantine 5 milliGRAM(s) Oral two times a day  metoprolol succinate ER 25 milliGRAM(s) Oral daily  montelukast 10 milliGRAM(s) Oral daily  pantoprazole    Tablet 40 milliGRAM(s) Oral before breakfast      PHYSICAL EXAM:  T(C): 36.8 (12-09-23 @ 13:22), Max: 36.8 (12-09-23 @ 13:22)  HR: 86 (12-09-23 @ 13:22) (78 - 86)  BP: 128/75 (12-09-23 @ 13:22) (100/63 - 134/77)  RR: 18 (12-09-23 @ 13:22) (18 - 18)  SpO2: 95% (12-09-23 @ 13:22) (94% - 96%)  Wt(kg): --  I&O's Summary    08 Dec 2023 07:01  -  09 Dec 2023 07:00  --------------------------------------------------------  IN: 360 mL / OUT: 2150 mL / NET: -1790 mL    09 Dec 2023 07:01  -  09 Dec 2023 22:37  --------------------------------------------------------  IN: 840 mL / OUT: 900 mL / NET: -60 mL          Appearance: Normal	  HEENT:  PERRLA   Lymphatic: No lymphadenopathy   Cardiovascular: Normal S1 S2, no JVD  Respiratory: normal effort , clear  Gastrointestinal:  Soft, Non-tender  Skin: No rashes,  warm to touch  Psychiatry:  Mood & affect appropriate  Musculuskeletal: No edema    recent labs, Imaging and EKGs personally reviewed     12-08-23 @ 07:01  -  12-09-23 @ 07:00  --------------------------------------------------------  IN: 360 mL / OUT: 2150 mL / NET: -1790 mL    12-09-23 @ 07:01  -  12-09-23 @ 22:37  --------------------------------------------------------  IN: 840 mL / OUT: 900 mL / NET: -60 mL                          10.3   9.30  )-----------( 271      ( 09 Dec 2023 06:29 )             30.8               12-09    138  |  103  |  17  ----------------------------<  130<H>  3.9   |  25  |  1.65<H>    Ca    8.5      09 Dec 2023 06:29  Phos  4.3     12-09  Mg     1.9     12-09                         Urinalysis Basic - ( 09 Dec 2023 06:29 )    Color: x / Appearance: x / SG: x / pH: x  Gluc: 130 mg/dL / Ketone: x  / Bili: x / Urobili: x   Blood: x / Protein: x / Nitrite: x   Leuk Esterase: x / RBC: x / WBC x   Sq Epi: x / Non Sq Epi: x / Bacteria: x

## 2023-12-09 NOTE — PROGRESS NOTE ADULT - SUBJECTIVE AND OBJECTIVE BOX
DATE OF SERVICE: 12-09-23 @ 11:58    INTERVAL HPI/OVERNIGHT EVENTS:  HB stable, doing well    MEDICATIONS  (STANDING):  acetaminophen     Tablet .. 975 milliGRAM(s) Oral every 6 hours  amLODIPine   Tablet 10 milliGRAM(s) Oral daily  atorvastatin 40 milliGRAM(s) Oral at bedtime  budesonide  80 MICROgram(s)/formoterol 4.5 MICROgram(s) Inhaler 2 Puff(s) Inhalation two times a day  dextrose 5%. 1000 milliLiter(s) (100 mL/Hr) IV Continuous <Continuous>  dextrose 5%. 1000 milliLiter(s) (50 mL/Hr) IV Continuous <Continuous>  dextrose 50% Injectable 25 Gram(s) IV Push once  dextrose 50% Injectable 12.5 Gram(s) IV Push once  dextrose 50% Injectable 25 Gram(s) IV Push once  fluticasone propionate 50 MICROgram(s)/spray Nasal Spray 1 Spray(s) Both Nostrils every 12 hours  gabapentin 100 milliGRAM(s) Oral three times a day  glucagon  Injectable 1 milliGRAM(s) IntraMuscular once  insulin lispro (ADMELOG) corrective regimen sliding scale   SubCutaneous three times a day before meals  insulin lispro (ADMELOG) corrective regimen sliding scale   SubCutaneous at bedtime  memantine 5 milliGRAM(s) Oral two times a day  metoprolol succinate ER 25 milliGRAM(s) Oral daily  montelukast 10 milliGRAM(s) Oral daily  pantoprazole    Tablet 40 milliGRAM(s) Oral before breakfast    MEDICATIONS  (PRN):  dextrose Oral Gel 15 Gram(s) Oral once PRN Blood Glucose LESS THAN 70 milliGRAM(s)/deciliter  meclizine 12.5 milliGRAM(s) Oral every 8 hours PRN Dizziness      Vital Signs Last 24 Hrs  T(C): 36.6 (09 Dec 2023 09:13), Max: 36.9 (08 Dec 2023 16:10)  T(F): 97.8 (09 Dec 2023 09:13), Max: 98.5 (08 Dec 2023 16:10)  HR: 78 (09 Dec 2023 09:13) (78 - 94)  BP: 134/77 (09 Dec 2023 09:13) (100/63 - 153/88)  BP(mean): --  RR: 18 (09 Dec 2023 09:13) (18 - 18)  SpO2: 96% (09 Dec 2023 09:13) (92% - 98%)    Parameters below as of 09 Dec 2023 09:13  Patient On (Oxygen Delivery Method): room air      I&O's Detail    08 Dec 2023 07:01  -  09 Dec 2023 07:00  --------------------------------------------------------  IN:    Oral Fluid: 360 mL  Total IN: 360 mL    OUT:    Voided (mL): 2150 mL  Total OUT: 2150 mL    Total NET: -1790 mL      09 Dec 2023 07:01  -  09 Dec 2023 11:58  --------------------------------------------------------  IN:    Oral Fluid: 480 mL  Total IN: 480 mL    OUT:    Voided (mL): 500 mL  Total OUT: 500 mL    Total NET: -20 mL            Physical Exam:  General: WN/WD NAD  Respiratory: airway patent, respirations unlabored, no increased WoB  Neurology: A&Ox3, nonfocal, MEZA x 4  Abdominal: Soft NT/ND      LABS:                        10.3   9.30  )-----------( 271      ( 09 Dec 2023 06:29 )             30.8     12-09    138  |  103  |  17  ----------------------------<  130<H>  3.9   |  25  |  1.65<H>    Ca    8.5      09 Dec 2023 06:29  Phos  4.3     12-09  Mg     1.9     12-09        Urinalysis Basic - ( 09 Dec 2023 06:29 )    Color: x / Appearance: x / SG: x / pH: x  Gluc: 130 mg/dL / Ketone: x  / Bili: x / Urobili: x   Blood: x / Protein: x / Nitrite: x   Leuk Esterase: x / RBC: x / WBC x   Sq Epi: x / Non Sq Epi: x / Bacteria: x        RADIOLOGY & ADDITIONAL STUDIES:

## 2023-12-09 NOTE — PROGRESS NOTE ADULT - NS ATTEND AMEND GEN_ALL_CORE FT
Patient seen and examined. Agree with plan as detailed in PA/NP Note.     Albert Serna MD  Pager: [phone] Patient seen and examined. Agree with plan as detailed in PA/NP Note.     Albert Serna MD  Pager: [phone]

## 2023-12-09 NOTE — DISCHARGE NOTE NURSING/CASE MANAGEMENT/SOCIAL WORK - PATIENT PORTAL LINK FT
You can access the FollowMyHealth Patient Portal offered by [facility] by registering at the following website: [email or web address]. By joining [employer]’s FollowMyHealth portal, you will also be able to view your health information using other applications (apps) compatible with our system. You can access the FollowMyHealth Patient Portal offered by [facility] by registering at the following website: [email or web address]. By joining [employer]’s FollowMyHealth portal, you will also be able to view your health information using other applications (apps) compatible with our system.

## 2024-01-02 ENCOUNTER — APPOINTMENT (OUTPATIENT)
Dept: CT IMAGING | Facility: IMAGING CENTER | Age: 79
End: 2024-01-02
Payer: MEDICARE

## 2024-01-02 ENCOUNTER — OUTPATIENT (OUTPATIENT)
Dept: OUTPATIENT SERVICES | Facility: HOSPITAL | Age: 79
LOS: 1 days | End: 2024-01-02
Payer: COMMERCIAL

## 2024-01-02 DIAGNOSIS — Z00.8 ENCOUNTER FOR OTHER GENERAL EXAMINATION: ICD-10-CM

## 2024-01-02 DIAGNOSIS — Z90.49 ACQUIRED ABSENCE OF OTHER SPECIFIED PARTS OF DIGESTIVE TRACT: Chronic | ICD-10-CM

## 2024-01-02 DIAGNOSIS — Z98.890 OTHER SPECIFIED POSTPROCEDURAL STATES: Chronic | ICD-10-CM

## 2024-01-02 DIAGNOSIS — Z95.2 PRESENCE OF PROSTHETIC HEART VALVE: Chronic | ICD-10-CM

## 2024-01-02 PROCEDURE — 74176 CT ABD & PELVIS W/O CONTRAST: CPT

## 2024-01-02 PROCEDURE — 74176 CT ABD & PELVIS W/O CONTRAST: CPT | Mod: 26

## 2024-03-17 NOTE — ASSESSMENT
[FreeTextEntry1] : check ua cx\par \par f/u card before any treatment for ed Patient with what appears to be severe iron deficiency secondary to milk intake.  Awaiting CBC, will discuss care with hematology regarding infusion of iron and blood transfusion.  Rocco MONAHAN Attending Sylvia Price M.D. (Resident Physician): Discussed with hem and will decrease milk to 8 oz per day, add ferrous sulfate, and iron rich food to diet. Pt will f/u with pmd in 1 wk and hem in 1 month or sooner if not tolerating oral iron.

## 2024-04-29 ENCOUNTER — APPOINTMENT (OUTPATIENT)
Dept: OTOLARYNGOLOGY | Facility: CLINIC | Age: 79
End: 2024-04-29
Payer: MEDICARE

## 2024-04-29 DIAGNOSIS — H90.6 MIXED CONDUCTIVE AND SENSORINEURAL HEARING LOSS, BILATERAL: ICD-10-CM

## 2024-04-29 DIAGNOSIS — H70.13 CHRONIC MASTOIDITIS, BILATERAL: ICD-10-CM

## 2024-04-29 DIAGNOSIS — H61.23 IMPACTED CERUMEN, BILATERAL: ICD-10-CM

## 2024-04-29 PROCEDURE — 92567 TYMPANOMETRY: CPT

## 2024-04-29 PROCEDURE — 69220 CLEAN OUT MASTOID CAVITY: CPT | Mod: LT

## 2024-04-29 PROCEDURE — 92557 COMPREHENSIVE HEARING TEST: CPT

## 2024-04-29 PROCEDURE — 99213 OFFICE O/P EST LOW 20 MIN: CPT | Mod: 25

## 2024-04-29 NOTE — PHYSICAL EXAM
[Binocular Microscopic Exam] : Binocular microscopic exam was performed [FreeTextEntry8] : CWD - sprayed with mastoid powder  - moist [FreeTextEntry9] : CWD - wax rmeoved with alligator AS - sprayed with mastoid powder  [Normal] : mucosa is normal [Midline] : trachea located in midline position

## 2024-04-29 NOTE — HISTORY OF PRESENT ILLNESS
[de-identified] : 77 y/o M presents for follow up for b/l otorrhea. History of right CWD and left TM perforation, last seen Aug 2023. Reports intermittent b/l otorrhea. Denies foul smelling drainage or fevers. Reports intermittent imbalance - using meclizine daily. Pending new hearing aid. Hearing overall stable.

## 2024-05-14 ENCOUNTER — APPOINTMENT (OUTPATIENT)
Dept: PHARMACY | Facility: CLINIC | Age: 79
End: 2024-05-14

## 2024-05-20 ENCOUNTER — EMERGENCY (EMERGENCY)
Facility: HOSPITAL | Age: 79
LOS: 1 days | Discharge: HOME | End: 2024-05-20
Attending: EMERGENCY MEDICINE | Admitting: EMERGENCY MEDICINE
Payer: MEDICARE

## 2024-05-20 VITALS
RESPIRATION RATE: 16 BRPM | SYSTOLIC BLOOD PRESSURE: 132 MMHG | TEMPERATURE: 98 F | HEART RATE: 72 BPM | DIASTOLIC BLOOD PRESSURE: 68 MMHG | WEIGHT: 197.98 LBS | OXYGEN SATURATION: 100 %

## 2024-05-20 VITALS
SYSTOLIC BLOOD PRESSURE: 133 MMHG | HEART RATE: 70 BPM | OXYGEN SATURATION: 99 % | RESPIRATION RATE: 20 BRPM | DIASTOLIC BLOOD PRESSURE: 78 MMHG | TEMPERATURE: 98 F

## 2024-05-20 DIAGNOSIS — Z95.2 PRESENCE OF PROSTHETIC HEART VALVE: Chronic | ICD-10-CM

## 2024-05-20 DIAGNOSIS — Z98.890 OTHER SPECIFIED POSTPROCEDURAL STATES: Chronic | ICD-10-CM

## 2024-05-20 DIAGNOSIS — Z90.49 ACQUIRED ABSENCE OF OTHER SPECIFIED PARTS OF DIGESTIVE TRACT: Chronic | ICD-10-CM

## 2024-05-20 LAB
ADD ON TEST-SPECIMEN IN LAB: SIGNIFICANT CHANGE UP
ALBUMIN SERPL ELPH-MCNC: 3.8 G/DL — SIGNIFICANT CHANGE UP (ref 3.3–5)
ALP SERPL-CCNC: 83 U/L — SIGNIFICANT CHANGE UP (ref 40–120)
ALT FLD-CCNC: 19 U/L — SIGNIFICANT CHANGE UP (ref 4–41)
ANION GAP SERPL CALC-SCNC: 14 MMOL/L — SIGNIFICANT CHANGE UP (ref 7–14)
APPEARANCE UR: CLEAR — SIGNIFICANT CHANGE UP
APTT BLD: 31.3 SEC — SIGNIFICANT CHANGE UP (ref 24.5–35.6)
AST SERPL-CCNC: 35 U/L — SIGNIFICANT CHANGE UP (ref 4–40)
BACTERIA # UR AUTO: NEGATIVE /HPF — SIGNIFICANT CHANGE UP
BASE EXCESS BLDV CALC-SCNC: 1.8 MMOL/L — SIGNIFICANT CHANGE UP (ref -2–3)
BASOPHILS # BLD AUTO: 0.03 K/UL — SIGNIFICANT CHANGE UP (ref 0–0.2)
BASOPHILS NFR BLD AUTO: 0.3 % — SIGNIFICANT CHANGE UP (ref 0–2)
BILIRUB SERPL-MCNC: 0.6 MG/DL — SIGNIFICANT CHANGE UP (ref 0.2–1.2)
BILIRUB UR-MCNC: NEGATIVE — SIGNIFICANT CHANGE UP
BLD GP AB SCN SERPL QL: NEGATIVE — SIGNIFICANT CHANGE UP
BLOOD GAS VENOUS COMPREHENSIVE RESULT: SIGNIFICANT CHANGE UP
BUN SERPL-MCNC: 27 MG/DL — HIGH (ref 7–23)
CALCIUM SERPL-MCNC: 8.9 MG/DL — SIGNIFICANT CHANGE UP (ref 8.4–10.5)
CAST: 1 /LPF — SIGNIFICANT CHANGE UP (ref 0–4)
CHLORIDE BLDV-SCNC: 104 MMOL/L — SIGNIFICANT CHANGE UP (ref 96–108)
CHLORIDE SERPL-SCNC: 101 MMOL/L — SIGNIFICANT CHANGE UP (ref 98–107)
CO2 BLDV-SCNC: 29.2 MMOL/L — HIGH (ref 22–26)
CO2 SERPL-SCNC: 24 MMOL/L — SIGNIFICANT CHANGE UP (ref 22–31)
COLOR SPEC: YELLOW — SIGNIFICANT CHANGE UP
CREAT SERPL-MCNC: 1.73 MG/DL — HIGH (ref 0.5–1.3)
DIFF PNL FLD: NEGATIVE — SIGNIFICANT CHANGE UP
EGFR: 40 ML/MIN/1.73M2 — LOW
EOSINOPHIL # BLD AUTO: 0.18 K/UL — SIGNIFICANT CHANGE UP (ref 0–0.5)
EOSINOPHIL NFR BLD AUTO: 1.7 % — SIGNIFICANT CHANGE UP (ref 0–6)
GAS PNL BLDV: 135 MMOL/L — LOW (ref 136–145)
GLUCOSE BLDV-MCNC: 111 MG/DL — HIGH (ref 70–99)
GLUCOSE SERPL-MCNC: 118 MG/DL — HIGH (ref 70–99)
GLUCOSE UR QL: NEGATIVE MG/DL — SIGNIFICANT CHANGE UP
HCO3 BLDV-SCNC: 28 MMOL/L — SIGNIFICANT CHANGE UP (ref 22–29)
HCT VFR BLD CALC: 34.1 % — LOW (ref 39–50)
HCT VFR BLDA CALC: 35 % — LOW (ref 39–51)
HGB BLD CALC-MCNC: 11.5 G/DL — LOW (ref 12.6–17.4)
HGB BLD-MCNC: 11.2 G/DL — LOW (ref 13–17)
IANC: 7.61 K/UL — HIGH (ref 1.8–7.4)
IMM GRANULOCYTES NFR BLD AUTO: 0.3 % — SIGNIFICANT CHANGE UP (ref 0–0.9)
INR BLD: 1.03 RATIO — SIGNIFICANT CHANGE UP (ref 0.85–1.18)
KETONES UR-MCNC: NEGATIVE MG/DL — SIGNIFICANT CHANGE UP
LACTATE BLDV-MCNC: 1.4 MMOL/L — SIGNIFICANT CHANGE UP (ref 0.5–2)
LEUKOCYTE ESTERASE UR-ACNC: NEGATIVE — SIGNIFICANT CHANGE UP
LIDOCAIN IGE QN: 38 U/L — SIGNIFICANT CHANGE UP (ref 7–60)
LYMPHOCYTES # BLD AUTO: 1.78 K/UL — SIGNIFICANT CHANGE UP (ref 1–3.3)
LYMPHOCYTES # BLD AUTO: 16.4 % — SIGNIFICANT CHANGE UP (ref 13–44)
MCHC RBC-ENTMCNC: 27.8 PG — SIGNIFICANT CHANGE UP (ref 27–34)
MCHC RBC-ENTMCNC: 32.8 GM/DL — SIGNIFICANT CHANGE UP (ref 32–36)
MCV RBC AUTO: 84.6 FL — SIGNIFICANT CHANGE UP (ref 80–100)
MONOCYTES # BLD AUTO: 1.24 K/UL — HIGH (ref 0–0.9)
MONOCYTES NFR BLD AUTO: 11.4 % — SIGNIFICANT CHANGE UP (ref 2–14)
NEUTROPHILS # BLD AUTO: 7.61 K/UL — HIGH (ref 1.8–7.4)
NEUTROPHILS NFR BLD AUTO: 69.9 % — SIGNIFICANT CHANGE UP (ref 43–77)
NITRITE UR-MCNC: NEGATIVE — SIGNIFICANT CHANGE UP
NRBC # BLD: 0 /100 WBCS — SIGNIFICANT CHANGE UP (ref 0–0)
NRBC # FLD: 0 K/UL — SIGNIFICANT CHANGE UP (ref 0–0)
PCO2 BLDV: 48 MMHG — SIGNIFICANT CHANGE UP (ref 42–55)
PH BLDV: 7.37 — SIGNIFICANT CHANGE UP (ref 7.32–7.43)
PH UR: 6.5 — SIGNIFICANT CHANGE UP (ref 5–8)
PLATELET # BLD AUTO: 158 K/UL — SIGNIFICANT CHANGE UP (ref 150–400)
PO2 BLDV: 36 MMHG — SIGNIFICANT CHANGE UP (ref 25–45)
POTASSIUM BLDV-SCNC: 3.6 MMOL/L — SIGNIFICANT CHANGE UP (ref 3.5–5.1)
POTASSIUM SERPL-MCNC: 3.4 MMOL/L — LOW (ref 3.5–5.3)
POTASSIUM SERPL-SCNC: 3.4 MMOL/L — LOW (ref 3.5–5.3)
PROT SERPL-MCNC: 7 G/DL — SIGNIFICANT CHANGE UP (ref 6–8.3)
PROT UR-MCNC: 30 MG/DL
PROTHROM AB SERPL-ACNC: 11.6 SEC — SIGNIFICANT CHANGE UP (ref 9.5–13)
RBC # BLD: 4.03 M/UL — LOW (ref 4.2–5.8)
RBC # FLD: 14.7 % — HIGH (ref 10.3–14.5)
RBC CASTS # UR COMP ASSIST: 0 /HPF — SIGNIFICANT CHANGE UP (ref 0–4)
RH IG SCN BLD-IMP: POSITIVE — SIGNIFICANT CHANGE UP
SAO2 % BLDV: 56.7 % — LOW (ref 67–88)
SODIUM SERPL-SCNC: 139 MMOL/L — SIGNIFICANT CHANGE UP (ref 135–145)
SP GR SPEC: 1.01 — SIGNIFICANT CHANGE UP (ref 1–1.03)
SQUAMOUS # UR AUTO: 0 /HPF — SIGNIFICANT CHANGE UP (ref 0–5)
TROPONIN T, HIGH SENSITIVITY RESULT: 19 NG/L — SIGNIFICANT CHANGE UP
UROBILINOGEN FLD QL: 0.2 MG/DL — SIGNIFICANT CHANGE UP (ref 0.2–1)
WBC # BLD: 10.87 K/UL — HIGH (ref 3.8–10.5)
WBC # FLD AUTO: 10.87 K/UL — HIGH (ref 3.8–10.5)
WBC UR QL: 0 /HPF — SIGNIFICANT CHANGE UP (ref 0–5)

## 2024-05-20 PROCEDURE — 99285 EMERGENCY DEPT VISIT HI MDM: CPT

## 2024-05-20 PROCEDURE — 74177 CT ABD & PELVIS W/CONTRAST: CPT | Mod: 26,MC

## 2024-05-20 RX ORDER — MORPHINE SULFATE 50 MG/1
2 CAPSULE, EXTENDED RELEASE ORAL ONCE
Refills: 0 | Status: DISCONTINUED | OUTPATIENT
Start: 2024-05-20 | End: 2024-05-20

## 2024-05-20 RX ORDER — ACETAMINOPHEN 500 MG
975 TABLET ORAL ONCE
Refills: 0 | Status: COMPLETED | OUTPATIENT
Start: 2024-05-20 | End: 2024-05-20

## 2024-05-20 RX ADMIN — MORPHINE SULFATE 2 MILLIGRAM(S): 50 CAPSULE, EXTENDED RELEASE ORAL at 13:30

## 2024-05-20 RX ADMIN — MORPHINE SULFATE 2 MILLIGRAM(S): 50 CAPSULE, EXTENDED RELEASE ORAL at 19:15

## 2024-05-20 RX ADMIN — Medication 975 MILLIGRAM(S): at 19:19

## 2024-05-20 NOTE — ED PROVIDER NOTE - PATIENT PORTAL LINK FT
You can access the FollowMyHealth Patient Portal offered by [facility] by registering at the following website: [email or web address]. By joining [employer]’s FollowMyHealth portal, you will also be able to view your health information using other applications (apps) compatible with our system.

## 2024-05-20 NOTE — ED ADULT NURSE NOTE - CHIEF COMPLAINT QUOTE
c/o lower abd pain starting Saturday. Denies N/V/D, fevers, chills. States " last time this happened to me I had a clot in my stomach" Patient and family poor historian unable to provided full medical hx. hx DM2 HTN vertigo

## 2024-05-20 NOTE — ED PROVIDER NOTE - PHYSICAL EXAMINATION
Const: not in acute distress  Eyes: no conjunctival injection  HEENT: Head NCAT, Moist MM.  Neck: Trachea midline.   CVS: +S1/S2, Peripheral pulses 2+ and equal in all extremities.  RESP: Unlabored respiratory effort. Clear to auscultation bilaterally.  GI: Mildly distended, mild tenderness in LLQ/RLQ, No CVA tenderness b/l.   MSK: Normocephalic/Atraumatic, No Lower Extremities edema b/l.   Skin: Intact.   Neuro: Motor & Sensation grossly intact.  Psych: Awake, Alert, & Cooperative

## 2024-05-20 NOTE — ED ADULT NURSE NOTE - NSFALLRISKASMTTYPE_ED_ALL_ED
Health Maintenance Due   Topic Date Due   • Influenza Vaccine (1) 09/01/2020       Patient is due for topics as listed above but is not proceeding with Immunization(s) Influenza at this time. Education provided for Immunization(s) Influenza.        Preferred method of results communication:     Cell Phone:   Telephone Information:   Mobile [phone]     Okay to leave a message containing results? Yes               Initial (On Arrival)

## 2024-05-20 NOTE — ED ADULT NURSE NOTE - OBJECTIVE STATEMENT
ER pt recent arriving from [facility] coming with LLQ pain x few wks, denies N/V/D, denies CP, no dizziness, no SOB, no cough, no dysuria, no hematuria + chills.    lungs clear, resp. equal 20b/min, labs sent, IV to right AC 20g angio cath place, medicated as ordered.    Pending dispo.   Whit Perez RN

## 2024-05-20 NOTE — ED PROVIDER NOTE - NSFOLLOWUPINSTRUCTIONS_ED_ALL_ED_FT
You were seen in the Emergency Department for abdominal pain.  You received lab work and imaging to assess for your symptoms.  Your CAT scan of your abdomen pelvis showed signs of enteritis.  You are just prescribed Augmentin to be taken twice a day for 7 days for management of your enteritis.  Your previously imaged blood collection in your abdomen is now decreased in size compared to prior.  Please follow-up with your primary care doctor for further management of your symptoms.    1) Advance activity as tolerated.   2) Continue all previously prescribed medications as directed.    3) Follow up with your primary care physician in 1-3 days - take copies of your results.    4) Return to the Emergency Department for worsening or persistent symptoms, and/or ANY NEW OR CONCERNING SYMPTOMS.

## 2024-05-20 NOTE — ED PROVIDER NOTE - NSICDXPASTSURGICALHX_GEN_ALL_CORE_FT
PAST SURGICAL HISTORY:  Aortic valve replaced 2009 with bovine tissue valve    History of cholecystectomy     History of ear surgery ? surgery on left ear    Hx of cholecystectomy     S/P AVR (aortic valve replacement) 2009 @ [place] (bovine)

## 2024-05-20 NOTE — ED ADULT NURSE NOTE - NS_SISCREENINGSR_GEN_ALL_ED
Negative Consent (Marginal Mandibular)/Introductory Paragraph: The rationale for Mohs was explained to the patient and consent was obtained. The risks, benefits and alternatives to therapy were discussed in detail. Specifically, the risks of damage to the marginal mandibular branch of the facial nerve, infection, scarring, bleeding, prolonged wound healing, incomplete removal, allergy to anesthesia, and recurrence were addressed. Prior to the procedure, the treatment site was clearly identified and confirmed by the patient. All components of Universal Protocol/PAUSE Rule completed.

## 2024-05-20 NOTE — ED ADULT TRIAGE NOTE - MODE OF ARRIVAL
Private Auto [FreeTextEntry1] : augmentin\par \par bactroban\par \par if persists surgical consult  list given Walk in

## 2024-05-20 NOTE — ED PROVIDER NOTE - OBJECTIVE STATEMENT
78-year-old male with past medical history of hypertension, hyperlipidemia, diabetes, CAD, TAVR in 7/2023, PAD on Wednesday ASA/Plavix, BPH, presenting to the ED with abdominal pain has been occurring since Saturday.  Patient reports lower abdominal pain, similar to prior episode.  Patient was admitted to the hospital in December 2023 for a hematoma in the small bowel mesentery in the left hemiabdomen.  Denies any fever, chills, nausea, vomiting, diarrhea.  Patient is having regular bowel movements.

## 2024-05-20 NOTE — ED ADULT NURSE NOTE - NSICDXPASTSURGICALHX_GEN_ALL_CORE_FT
PAST SURGICAL HISTORY:  Aortic valve replaced 2009 with bovine tissue valve    History of cholecystectomy     History of ear surgery ? surgery on left ear    Hx of cholecystectomy     S/P AVR (aortic valve replacement) 2009 @ [place] (bovine)

## 2024-05-20 NOTE — ED PROVIDER NOTE - CLINICAL SUMMARY MEDICAL DECISION MAKING FREE TEXT BOX
78-year-old male with past medical history of hypertension, hyperlipidemia, diabetes, CAD, TAVR in 7/2023, PAD on Wednesday ASA/Plavix, BPH, presenting to the ED with abdominal pain has been occurring since Saturday. Hemodynamically stable.  Physical exam with heart regular rate and rhythm, lungs clear to auscultation, abdomen moderately distended with some mild tenderness in the lower abdomen.  Concern for diverticulitis, recurrence of the hematoma, appendicitis.  Will get labs including CBC, CMP, coags, type and screen, VBG, CT abdomen pelvis.  Will treat patient's pain with morphine.

## 2024-05-20 NOTE — ED PROVIDER NOTE - PROGRESS NOTE DETAILS
MD Sowmya (PGY-2) patient's labs and imaging reviewed.  Patient CT with signs of enteritis.  Patient with hematoma in the abdomen now decreased in size compared to prior.  Patient's labs without any acute findings.  Discussed with pt results of work up, strict return precautions, and need for follow up.  Pt expressed understanding and agrees with plan.

## 2024-05-20 NOTE — ED ADULT NURSE NOTE - NSFALLHARMRISKINTERV_ED_ALL_ED

## 2024-05-20 NOTE — ED ADULT TRIAGE NOTE - CHIEF COMPLAINT QUOTE
c/o lower abd pain starting Saturday. Denies N/V/D, fevers, chills. States " last time this happened to me I had a clot in my stomach" Patient and family poor historian unable to provided bull medical hx. hx DM2 HTN vertigo  c/o lower abd pain starting Saturday. Denies N/V/D, fevers, chills. States " last time this happened to me I had a clot in my stomach" Patient and family poor historian unable to provided full medical hx. hx DM2 HTN vertigo

## 2024-05-20 NOTE — ED PROVIDER NOTE - ATTENDING CONTRIBUTION TO CARE
Agree with resident note  78-year-old male with history of hypertension, hyperlipidemia, diabetes, CAD, TAVR, peripheral arterial disease on aspirin Plavix, BPH presents with abdominal pain since Saturday.  Patient in December 2023 was admitted for hematoma in the small bowel mesentery.  States his abdomen has felt a little bit more distended and firm over the last week or 2.  Denies constipation.  Denies vomiting or fevers.  Denies any surgeries in the past in his abdomen.  Physical exam  Chronically ill-appearing male in no distress  Vital signs stable  Clear to auscultation bilaterally  S1-S2 no murmurs rubs or gallops  Abdomen tympanic, distended, nontender  Extremities 1+ edema  Impression  Possibly fluid retention, workup for CHF versus renal insufficiency  Will get CT abdomen pelvis given distention  Basic labs  Reassess  's labs have returned showing chronic renal insufficiency which is consistent with prior labs  Pending CT

## 2024-05-22 LAB
CULTURE RESULTS: NO GROWTH — SIGNIFICANT CHANGE UP
SPECIMEN SOURCE: SIGNIFICANT CHANGE UP

## 2024-10-07 ENCOUNTER — APPOINTMENT (OUTPATIENT)
Dept: OTOLARYNGOLOGY | Facility: CLINIC | Age: 79
End: 2024-10-07
Payer: MEDICARE

## 2024-10-07 ENCOUNTER — NON-APPOINTMENT (OUTPATIENT)
Age: 79
End: 2024-10-07

## 2024-10-07 VITALS
HEART RATE: 74 BPM | HEIGHT: 65 IN | BODY MASS INDEX: 33.32 KG/M2 | SYSTOLIC BLOOD PRESSURE: 144 MMHG | DIASTOLIC BLOOD PRESSURE: 94 MMHG | WEIGHT: 200 LBS

## 2024-10-07 DIAGNOSIS — H70.13 CHRONIC MASTOIDITIS, BILATERAL: ICD-10-CM

## 2024-10-07 DIAGNOSIS — H61.23 IMPACTED CERUMEN, BILATERAL: ICD-10-CM

## 2024-10-07 DIAGNOSIS — Z78.9 OTHER SPECIFIED HEALTH STATUS: ICD-10-CM

## 2024-10-07 PROCEDURE — 99213 OFFICE O/P EST LOW 20 MIN: CPT | Mod: 25

## 2025-01-06 ENCOUNTER — APPOINTMENT (OUTPATIENT)
Dept: HOME HEALTH SERVICES | Facility: HOME HEALTH | Age: 80
End: 2025-01-06

## 2025-01-06 VITALS
TEMPERATURE: 97.6 F | OXYGEN SATURATION: 97 % | DIASTOLIC BLOOD PRESSURE: 82 MMHG | HEIGHT: 65 IN | SYSTOLIC BLOOD PRESSURE: 128 MMHG | HEART RATE: 74 BPM | BODY MASS INDEX: 33.32 KG/M2 | RESPIRATION RATE: 17 BRPM | WEIGHT: 200 LBS

## 2025-01-06 DIAGNOSIS — R41.3 OTHER AMNESIA: ICD-10-CM

## 2025-01-06 DIAGNOSIS — R26.89 OTHER ABNORMALITIES OF GAIT AND MOBILITY: ICD-10-CM

## 2025-01-06 DIAGNOSIS — I25.10 ATHEROSCLEROTIC HEART DISEASE OF NATIVE CORONARY ARTERY W/OUT ANGINA PECTORIS: ICD-10-CM

## 2025-01-06 DIAGNOSIS — Z95.2 PRESENCE OF PROSTHETIC HEART VALVE: ICD-10-CM

## 2025-01-06 DIAGNOSIS — H90.6 MIXED CONDUCTIVE AND SENSORINEURAL HEARING LOSS, BILATERAL: ICD-10-CM

## 2025-01-06 DIAGNOSIS — E11.9 TYPE 2 DIABETES MELLITUS W/OUT COMPLICATIONS: ICD-10-CM

## 2025-01-06 DIAGNOSIS — E11.43 TYPE 2 DIABETES MELLITUS WITH DIABETIC AUTONOMIC (POLY)NEUROPATHY: ICD-10-CM

## 2025-01-06 PROCEDURE — 99344 HOME/RES VST NEW MOD MDM 60: CPT | Mod: 93

## 2025-01-07 PROBLEM — E11.43 DIABETIC AUTONOMIC NEUROPATHY ASSOCIATED WITH TYPE 2 DIABETES MELLITUS: Status: ACTIVE | Noted: 2025-01-07

## 2025-01-19 ENCOUNTER — EMERGENCY (EMERGENCY)
Facility: HOSPITAL | Age: 80
LOS: 1 days | Discharge: HOME | End: 2025-01-19
Attending: STUDENT IN AN ORGANIZED HEALTH CARE EDUCATION/TRAINING PROGRAM | Admitting: STUDENT IN AN ORGANIZED HEALTH CARE EDUCATION/TRAINING PROGRAM
Payer: MEDICARE

## 2025-01-19 VITALS
TEMPERATURE: 98 F | WEIGHT: 199.96 LBS | SYSTOLIC BLOOD PRESSURE: 132 MMHG | OXYGEN SATURATION: 95 % | RESPIRATION RATE: 18 BRPM | DIASTOLIC BLOOD PRESSURE: 75 MMHG | HEART RATE: 73 BPM

## 2025-01-19 VITALS
OXYGEN SATURATION: 96 % | DIASTOLIC BLOOD PRESSURE: 70 MMHG | HEART RATE: 65 BPM | TEMPERATURE: 98 F | RESPIRATION RATE: 14 BRPM | SYSTOLIC BLOOD PRESSURE: 136 MMHG

## 2025-01-19 DIAGNOSIS — Z90.49 ACQUIRED ABSENCE OF OTHER SPECIFIED PARTS OF DIGESTIVE TRACT: Chronic | ICD-10-CM

## 2025-01-19 DIAGNOSIS — Z98.890 OTHER SPECIFIED POSTPROCEDURAL STATES: Chronic | ICD-10-CM

## 2025-01-19 DIAGNOSIS — Z95.2 PRESENCE OF PROSTHETIC HEART VALVE: Chronic | ICD-10-CM

## 2025-01-19 LAB
ALBUMIN SERPL ELPH-MCNC: 3.9 G/DL — SIGNIFICANT CHANGE UP (ref 3.3–5)
ALP SERPL-CCNC: 78 U/L — SIGNIFICANT CHANGE UP (ref 40–120)
ALT FLD-CCNC: 21 U/L — SIGNIFICANT CHANGE UP (ref 4–41)
ANION GAP SERPL CALC-SCNC: 15 MMOL/L — HIGH (ref 7–14)
APPEARANCE UR: CLEAR — SIGNIFICANT CHANGE UP
AST SERPL-CCNC: 55 U/L — HIGH (ref 4–40)
BASOPHILS # BLD AUTO: 0.03 K/UL — SIGNIFICANT CHANGE UP (ref 0–0.2)
BASOPHILS NFR BLD AUTO: 0.4 % — SIGNIFICANT CHANGE UP (ref 0–2)
BILIRUB SERPL-MCNC: 0.4 MG/DL — SIGNIFICANT CHANGE UP (ref 0.2–1.2)
BILIRUB UR-MCNC: NEGATIVE — SIGNIFICANT CHANGE UP
BLOOD GAS VENOUS COMPREHENSIVE RESULT: SIGNIFICANT CHANGE UP
BUN SERPL-MCNC: 26 MG/DL — HIGH (ref 7–23)
CALCIUM SERPL-MCNC: 9.2 MG/DL — SIGNIFICANT CHANGE UP (ref 8.4–10.5)
CHLORIDE SERPL-SCNC: 101 MMOL/L — SIGNIFICANT CHANGE UP (ref 98–107)
CO2 SERPL-SCNC: 21 MMOL/L — LOW (ref 22–31)
COLOR SPEC: YELLOW — SIGNIFICANT CHANGE UP
CREAT SERPL-MCNC: 1.54 MG/DL — HIGH (ref 0.5–1.3)
DIFF PNL FLD: NEGATIVE — SIGNIFICANT CHANGE UP
EGFR: 45 ML/MIN/1.73M2 — LOW
EOSINOPHIL # BLD AUTO: 0.26 K/UL — SIGNIFICANT CHANGE UP (ref 0–0.5)
EOSINOPHIL NFR BLD AUTO: 3.4 % — SIGNIFICANT CHANGE UP (ref 0–6)
FLUAV AG NPH QL: SIGNIFICANT CHANGE UP
FLUBV AG NPH QL: SIGNIFICANT CHANGE UP
GLUCOSE SERPL-MCNC: 101 MG/DL — HIGH (ref 70–99)
GLUCOSE UR QL: NEGATIVE MG/DL — SIGNIFICANT CHANGE UP
HCT VFR BLD CALC: 36.8 % — LOW (ref 39–50)
HGB BLD-MCNC: 12 G/DL — LOW (ref 13–17)
IANC: 3.92 K/UL — SIGNIFICANT CHANGE UP (ref 1.8–7.4)
IMM GRANULOCYTES NFR BLD AUTO: 0.3 % — SIGNIFICANT CHANGE UP (ref 0–0.9)
KETONES UR-MCNC: NEGATIVE MG/DL — SIGNIFICANT CHANGE UP
LEUKOCYTE ESTERASE UR-ACNC: NEGATIVE — SIGNIFICANT CHANGE UP
LYMPHOCYTES # BLD AUTO: 2.3 K/UL — SIGNIFICANT CHANGE UP (ref 1–3.3)
LYMPHOCYTES # BLD AUTO: 29.8 % — SIGNIFICANT CHANGE UP (ref 13–44)
MAGNESIUM SERPL-MCNC: 1.9 MG/DL — SIGNIFICANT CHANGE UP (ref 1.6–2.6)
MCHC RBC-ENTMCNC: 27.5 PG — SIGNIFICANT CHANGE UP (ref 27–34)
MCHC RBC-ENTMCNC: 32.6 G/DL — SIGNIFICANT CHANGE UP (ref 32–36)
MCV RBC AUTO: 84.4 FL — SIGNIFICANT CHANGE UP (ref 80–100)
MONOCYTES # BLD AUTO: 1.18 K/UL — HIGH (ref 0–0.9)
MONOCYTES NFR BLD AUTO: 15.3 % — HIGH (ref 2–14)
NEUTROPHILS # BLD AUTO: 3.92 K/UL — SIGNIFICANT CHANGE UP (ref 1.8–7.4)
NEUTROPHILS NFR BLD AUTO: 50.8 % — SIGNIFICANT CHANGE UP (ref 43–77)
NITRITE UR-MCNC: NEGATIVE — SIGNIFICANT CHANGE UP
NRBC # BLD: 0 /100 WBCS — SIGNIFICANT CHANGE UP (ref 0–0)
NRBC # FLD: 0.02 K/UL — HIGH (ref 0–0)
NT-PROBNP SERPL-SCNC: 337 PG/ML — HIGH
PH UR: 6.5 — SIGNIFICANT CHANGE UP (ref 5–8)
PLATELET # BLD AUTO: 177 K/UL — SIGNIFICANT CHANGE UP (ref 150–400)
POTASSIUM SERPL-MCNC: 3.5 MMOL/L — SIGNIFICANT CHANGE UP (ref 3.5–5.3)
POTASSIUM SERPL-SCNC: 3.5 MMOL/L — SIGNIFICANT CHANGE UP (ref 3.5–5.3)
PROT SERPL-MCNC: 7 G/DL — SIGNIFICANT CHANGE UP (ref 6–8.3)
PROT UR-MCNC: NEGATIVE MG/DL — SIGNIFICANT CHANGE UP
RBC # BLD: 4.36 M/UL — SIGNIFICANT CHANGE UP (ref 4.2–5.8)
RBC # FLD: 14.2 % — SIGNIFICANT CHANGE UP (ref 10.3–14.5)
RSV RNA NPH QL NAA+NON-PROBE: SIGNIFICANT CHANGE UP
SARS-COV-2 RNA SPEC QL NAA+PROBE: SIGNIFICANT CHANGE UP
SODIUM SERPL-SCNC: 137 MMOL/L — SIGNIFICANT CHANGE UP (ref 135–145)
SP GR SPEC: 1.01 — SIGNIFICANT CHANGE UP (ref 1–1.03)
TROPONIN T, HIGH SENSITIVITY RESULT: 18 NG/L — SIGNIFICANT CHANGE UP
TROPONIN T, HIGH SENSITIVITY RESULT: 19 NG/L — SIGNIFICANT CHANGE UP
UROBILINOGEN FLD QL: 0.2 MG/DL — SIGNIFICANT CHANGE UP (ref 0.2–1)
WBC # BLD: 7.71 K/UL — SIGNIFICANT CHANGE UP (ref 3.8–10.5)
WBC # FLD AUTO: 7.71 K/UL — SIGNIFICANT CHANGE UP (ref 3.8–10.5)

## 2025-01-19 PROCEDURE — 93010 ELECTROCARDIOGRAM REPORT: CPT

## 2025-01-19 PROCEDURE — 72131 CT LUMBAR SPINE W/O DYE: CPT | Mod: 26

## 2025-01-19 PROCEDURE — 74176 CT ABD & PELVIS W/O CONTRAST: CPT | Mod: 26

## 2025-01-19 PROCEDURE — 71046 X-RAY EXAM CHEST 2 VIEWS: CPT | Mod: 26

## 2025-01-19 PROCEDURE — 99285 EMERGENCY DEPT VISIT HI MDM: CPT

## 2025-01-19 RX ORDER — IPRATROPIUM BROMIDE AND ALBUTEROL SULFATE .5; 2.5 MG/3ML; MG/3ML
3 SOLUTION RESPIRATORY (INHALATION)
Refills: 0 | Status: COMPLETED | OUTPATIENT
Start: 2025-01-19 | End: 2025-01-19

## 2025-01-19 RX ORDER — CYCLOBENZAPRINE HCL 10 MG
5 TABLET ORAL ONCE
Refills: 0 | Status: COMPLETED | OUTPATIENT
Start: 2025-01-19 | End: 2025-01-19

## 2025-01-19 RX ORDER — CYCLOBENZAPRINE HCL 10 MG
1 TABLET ORAL
Qty: 9 | Refills: 0
Start: 2025-01-19 | End: 2025-01-21

## 2025-01-19 RX ORDER — ACETAMINOPHEN 80 MG/.8ML
650 SOLUTION/ DROPS ORAL ONCE
Refills: 0 | Status: COMPLETED | OUTPATIENT
Start: 2025-01-19 | End: 2025-01-19

## 2025-01-19 RX ADMIN — IPRATROPIUM BROMIDE AND ALBUTEROL SULFATE 3 MILLILITER(S): .5; 2.5 SOLUTION RESPIRATORY (INHALATION) at 16:57

## 2025-01-19 RX ADMIN — ACETAMINOPHEN 650 MILLIGRAM(S): 80 SOLUTION/ DROPS ORAL at 18:30

## 2025-01-19 RX ADMIN — IPRATROPIUM BROMIDE AND ALBUTEROL SULFATE 3 MILLILITER(S): .5; 2.5 SOLUTION RESPIRATORY (INHALATION) at 17:56

## 2025-01-19 RX ADMIN — IPRATROPIUM BROMIDE AND ALBUTEROL SULFATE 3 MILLILITER(S): .5; 2.5 SOLUTION RESPIRATORY (INHALATION) at 16:42

## 2025-01-19 RX ADMIN — Medication 5 MILLIGRAM(S): at 18:31

## 2025-01-19 NOTE — ED PROVIDER NOTE - PATIENT PORTAL LINK FT
You can access the FollowMyHealth Patient Portal offered by [facility] by registering at the following website: [email or web address]. By joining [employer]’s FollowMyHealth portal, you will also be able to view your health information using other applications (apps) compatible with our system.

## 2025-01-19 NOTE — ED PROVIDER NOTE - PHYSICAL EXAMINATION
GEN: NAD. A&Ox3. Non-toxic appearing.  HEENT: normocephalic, atraumatic, EOMI, PERRL, no scleral icterus, no conjuntival pallor, moist MM  CARDIAC: RRR, S1, S2, no murmur. Radial pulsess present and symmetric b/l  PULM: CTA B/L no wheeze, rhonchi, rales.   ABD: soft NT, ND, no rebound no guarding  MSK: mild left  lumbar paraspinal ttp. Moving all extremities, no edema. 5/5 strength and full ROM in all extremities.     NEURO: , no focal neurological deficits, CN 2-12 grossly intact  SKIN: warm, dry, no rash.

## 2025-01-19 NOTE — ED PROVIDER NOTE - ATTENDING CONTRIBUTION TO CARE
78 yo M hx HTN, HLD, DM2, CAD, TAVR, PAD, BPH, presenting with complaints of left sided lower back pain, worse with movement. Denies trauma. Denies focal weakness/numbness/tingling. Denies urinary symptoms. No fevers/chills. No changes in bowel. Pain does not radiate down legs. No reported saddle anesthesia.       VITALS: reviewed  GEN: NAD, A & O x 4  HEAD/EYES: NCAT, EOMI, anicteric sclerae,   ENT: mucus membranes moist, oropharynx WNL, trachea midline,  RESP: lungs CTA with equal breath sounds bilaterally, chest wall nontender and atraumatic  CV: heart with reg rhythm S1, S2, distal pulses intact and symmetric bilaterally  ABDOMEN: soft, nondistended, nontender, no palpable masses  : no CVAT  MSK: extremities atraumatic and nontender, no edema, no asymmetry. L lumbar ttp. No midlines spinal ttp. No step offs or masses.   SKIN: warm, dry, no rash, no bruising, no cyanosis. color appropriate for ethnicity  NEURO: alert, mentating appropriately, no facial asymmetry.   PSYCH: Affect appropriate      Plan for labs, meds, ct, and reassess.

## 2025-01-19 NOTE — ED ADULT NURSE NOTE - OBJECTIVE STATEMENT
pt received to 22A, A&Ox4, ambulatory, hx of HTN, DM, valve repair, HLD, coming to ED c/o left lower back pain x1 week. denies injury or trauma to back. denies fever, chills, cough, chest pain, SOB, abdominal pain, N/V, urinary symptoms. RR even and unlabored. Placed on cardiac monitor noted to be NSR. abdomen mildly distended. No neuro deficits. skin clean dry and intact. Mild diaphoresis noted to forehead. Scar noted to midsternal chest. 20 G IV placed to right AC. labs drawn and sent. medicated as ordered. safety maintained.

## 2025-01-19 NOTE — ED PROVIDER NOTE - NSICDXPASTSURGICALHX_GEN_ALL_CORE_FT
PAST SURGICAL HISTORY:  Aortic valve replaced 2009 with bovine tissue valve    History of cholecystectomy     History of ear surgery ? surgery on left ear    Hx of cholecystectomy     S/P AVR (aortic valve replacement) 2009 @ [place] (bovine)

## 2025-01-19 NOTE — ED ADULT TRIAGE NOTE - CHIEF COMPLAINT QUOTE
pt coming from home, pt c/o left side back pain x 1 week, denies trauma.  pt also c/o headache x 1 year.  Hx:  HTN, DM, valve repair, HLD

## 2025-01-23 ENCOUNTER — APPOINTMENT (OUTPATIENT)
Dept: HOME HEALTH SERVICES | Facility: HOME HEALTH | Age: 80
End: 2025-01-23

## 2025-01-23 DIAGNOSIS — R51.9 HEADACHE, UNSPECIFIED: ICD-10-CM

## 2025-01-23 DIAGNOSIS — M54.50 LOW BACK PAIN, UNSPECIFIED: ICD-10-CM

## 2025-01-23 RX ORDER — CYCLOBENZAPRINE HYDROCHLORIDE 10 MG/1
10 TABLET, FILM COATED ORAL 3 TIMES DAILY
Qty: 90 | Refills: 0 | Status: ACTIVE | COMMUNITY
Start: 2025-01-23 | End: 1900-01-01

## 2025-01-23 RX ORDER — LIDOCAINE 5% 700 MG/1
5 PATCH TOPICAL
Qty: 30 | Refills: 3 | Status: ACTIVE | COMMUNITY
Start: 2025-01-23 | End: 1900-01-01

## 2025-01-24 ENCOUNTER — NON-APPOINTMENT (OUTPATIENT)
Age: 80
End: 2025-01-24

## 2025-04-08 ENCOUNTER — EMERGENCY (EMERGENCY)
Facility: HOSPITAL | Age: 80
LOS: 1 days | End: 2025-04-08
Attending: STUDENT IN AN ORGANIZED HEALTH CARE EDUCATION/TRAINING PROGRAM | Admitting: STUDENT IN AN ORGANIZED HEALTH CARE EDUCATION/TRAINING PROGRAM
Payer: MEDICARE

## 2025-04-08 VITALS
OXYGEN SATURATION: 99 % | TEMPERATURE: 98 F | HEART RATE: 105 BPM | DIASTOLIC BLOOD PRESSURE: 75 MMHG | WEIGHT: 222.01 LBS | SYSTOLIC BLOOD PRESSURE: 145 MMHG | RESPIRATION RATE: 18 BRPM

## 2025-04-08 VITALS
HEART RATE: 82 BPM | RESPIRATION RATE: 16 BRPM | SYSTOLIC BLOOD PRESSURE: 118 MMHG | OXYGEN SATURATION: 99 % | TEMPERATURE: 100 F | DIASTOLIC BLOOD PRESSURE: 73 MMHG

## 2025-04-08 DIAGNOSIS — Z95.2 PRESENCE OF PROSTHETIC HEART VALVE: Chronic | ICD-10-CM

## 2025-04-08 DIAGNOSIS — Z90.49 ACQUIRED ABSENCE OF OTHER SPECIFIED PARTS OF DIGESTIVE TRACT: Chronic | ICD-10-CM

## 2025-04-08 DIAGNOSIS — Z98.890 OTHER SPECIFIED POSTPROCEDURAL STATES: Chronic | ICD-10-CM

## 2025-04-08 LAB
ADD ON TEST-SPECIMEN IN LAB: SIGNIFICANT CHANGE UP
ALBUMIN SERPL ELPH-MCNC: 4 G/DL — SIGNIFICANT CHANGE UP (ref 3.3–5)
ALP SERPL-CCNC: 102 U/L — SIGNIFICANT CHANGE UP (ref 40–120)
ALT FLD-CCNC: 37 U/L — SIGNIFICANT CHANGE UP (ref 4–41)
ANION GAP SERPL CALC-SCNC: 14 MMOL/L — SIGNIFICANT CHANGE UP (ref 7–14)
ANISOCYTOSIS BLD QL: SLIGHT — SIGNIFICANT CHANGE UP
APPEARANCE UR: CLEAR — SIGNIFICANT CHANGE UP
APTT BLD: 27.6 SEC — SIGNIFICANT CHANGE UP (ref 24.5–35.6)
AST SERPL-CCNC: 84 U/L — HIGH (ref 4–40)
BACTERIA # UR AUTO: NEGATIVE /HPF — SIGNIFICANT CHANGE UP
BASOPHILS # BLD AUTO: 0 K/UL — SIGNIFICANT CHANGE UP (ref 0–0.2)
BASOPHILS NFR BLD AUTO: 0 % — SIGNIFICANT CHANGE UP (ref 0–2)
BILIRUB SERPL-MCNC: 0.7 MG/DL — SIGNIFICANT CHANGE UP (ref 0.2–1.2)
BILIRUB UR-MCNC: NEGATIVE — SIGNIFICANT CHANGE UP
BLOOD GAS VENOUS COMPREHENSIVE RESULT: SIGNIFICANT CHANGE UP
BUN SERPL-MCNC: 33 MG/DL — HIGH (ref 7–23)
CALCIUM SERPL-MCNC: 9.6 MG/DL — SIGNIFICANT CHANGE UP (ref 8.4–10.5)
CAST: 0 /LPF — SIGNIFICANT CHANGE UP (ref 0–4)
CHLORIDE SERPL-SCNC: 100 MMOL/L — SIGNIFICANT CHANGE UP (ref 98–107)
CO2 SERPL-SCNC: 26 MMOL/L — SIGNIFICANT CHANGE UP (ref 22–31)
COLOR SPEC: YELLOW — SIGNIFICANT CHANGE UP
CREAT SERPL-MCNC: 1.83 MG/DL — HIGH (ref 0.5–1.3)
DIFF PNL FLD: NEGATIVE — SIGNIFICANT CHANGE UP
EGFR: 37 ML/MIN/1.73M2 — LOW
EGFR: 37 ML/MIN/1.73M2 — LOW
EOSINOPHIL # BLD AUTO: 0 K/UL — SIGNIFICANT CHANGE UP (ref 0–0.5)
EOSINOPHIL NFR BLD AUTO: 0 % — SIGNIFICANT CHANGE UP (ref 0–6)
FLUAV AG NPH QL: SIGNIFICANT CHANGE UP
FLUBV AG NPH QL: SIGNIFICANT CHANGE UP
GAS PNL BLDV: SIGNIFICANT CHANGE UP
GLUCOSE SERPL-MCNC: 113 MG/DL — HIGH (ref 70–99)
GLUCOSE UR QL: NEGATIVE MG/DL — SIGNIFICANT CHANGE UP
HCT VFR BLD CALC: 39.8 % — SIGNIFICANT CHANGE UP (ref 39–50)
HGB BLD-MCNC: 13.5 G/DL — SIGNIFICANT CHANGE UP (ref 13–17)
IANC: 9.43 K/UL — HIGH (ref 1.8–7.4)
INR BLD: 1.03 RATIO — SIGNIFICANT CHANGE UP (ref 0.85–1.16)
KETONES UR-MCNC: NEGATIVE MG/DL — SIGNIFICANT CHANGE UP
LACTATE SERPL-SCNC: 2.3 MMOL/L — HIGH (ref 0.5–2)
LEUKOCYTE ESTERASE UR-ACNC: NEGATIVE — SIGNIFICANT CHANGE UP
LG PLATELETS BLD QL AUTO: SLIGHT — SIGNIFICANT CHANGE UP
LYMPHOCYTES # BLD AUTO: 0.53 K/UL — LOW (ref 1–3.3)
LYMPHOCYTES # BLD AUTO: 5 % — LOW (ref 13–44)
MANUAL SMEAR VERIFICATION: SIGNIFICANT CHANGE UP
MCHC RBC-ENTMCNC: 28.8 PG — SIGNIFICANT CHANGE UP (ref 27–34)
MCHC RBC-ENTMCNC: 33.9 G/DL — SIGNIFICANT CHANGE UP (ref 32–36)
MCV RBC AUTO: 85 FL — SIGNIFICANT CHANGE UP (ref 80–100)
MONOCYTES # BLD AUTO: 0.21 K/UL — SIGNIFICANT CHANGE UP (ref 0–0.9)
MONOCYTES NFR BLD AUTO: 2 % — SIGNIFICANT CHANGE UP (ref 2–14)
NEUTROPHILS # BLD AUTO: 9.91 K/UL — HIGH (ref 1.8–7.4)
NEUTROPHILS NFR BLD AUTO: 84 % — HIGH (ref 43–77)
NEUTS BAND # BLD: 9 % — HIGH (ref 0–6)
NEUTS BAND NFR BLD: 9 % — HIGH (ref 0–6)
NITRITE UR-MCNC: NEGATIVE — SIGNIFICANT CHANGE UP
NRBC # BLD: 0 /100 WBCS — SIGNIFICANT CHANGE UP (ref 0–0)
NRBC BLD-RTO: 0 /100 WBCS — SIGNIFICANT CHANGE UP (ref 0–0)
OVALOCYTES BLD QL SMEAR: SLIGHT — SIGNIFICANT CHANGE UP
PH UR: 5.5 — SIGNIFICANT CHANGE UP (ref 5–8)
PLAT MORPH BLD: NORMAL — SIGNIFICANT CHANGE UP
PLATELET # BLD AUTO: 177 K/UL — SIGNIFICANT CHANGE UP (ref 150–400)
PLATELET COUNT - ESTIMATE: NORMAL — SIGNIFICANT CHANGE UP
POTASSIUM SERPL-MCNC: 3.3 MMOL/L — LOW (ref 3.5–5.3)
POTASSIUM SERPL-SCNC: 3.3 MMOL/L — LOW (ref 3.5–5.3)
PROT SERPL-MCNC: 7.3 G/DL — SIGNIFICANT CHANGE UP (ref 6–8.3)
PROT UR-MCNC: 30 MG/DL
PROTHROM AB SERPL-ACNC: 12.2 SEC — SIGNIFICANT CHANGE UP (ref 9.9–13.4)
RBC # BLD: 4.68 M/UL — SIGNIFICANT CHANGE UP (ref 4.2–5.8)
RBC # FLD: 13.5 % — SIGNIFICANT CHANGE UP (ref 10.3–14.5)
RBC BLD AUTO: NORMAL — SIGNIFICANT CHANGE UP
RBC CASTS # UR COMP ASSIST: 0 /HPF — SIGNIFICANT CHANGE UP (ref 0–4)
RSV RNA NPH QL NAA+NON-PROBE: SIGNIFICANT CHANGE UP
SARS-COV-2 RNA SPEC QL NAA+PROBE: SIGNIFICANT CHANGE UP
SODIUM SERPL-SCNC: 140 MMOL/L — SIGNIFICANT CHANGE UP (ref 135–145)
SOURCE RESPIRATORY: SIGNIFICANT CHANGE UP
SP GR SPEC: 1.01 — SIGNIFICANT CHANGE UP (ref 1–1.03)
SQUAMOUS # UR AUTO: 0 /HPF — SIGNIFICANT CHANGE UP (ref 0–5)
TROPONIN T, HIGH SENSITIVITY RESULT: 22 NG/L — SIGNIFICANT CHANGE UP
UROBILINOGEN FLD QL: 0.2 MG/DL — SIGNIFICANT CHANGE UP (ref 0.2–1)
WBC # BLD: 10.66 K/UL — HIGH (ref 3.8–10.5)
WBC # FLD AUTO: 10.66 K/UL — HIGH (ref 3.8–10.5)
WBC UR QL: 0 /HPF — SIGNIFICANT CHANGE UP (ref 0–5)

## 2025-04-08 PROCEDURE — 93010 ELECTROCARDIOGRAM REPORT: CPT

## 2025-04-08 PROCEDURE — 71046 X-RAY EXAM CHEST 2 VIEWS: CPT | Mod: 26

## 2025-04-08 PROCEDURE — 99284 EMERGENCY DEPT VISIT MOD MDM: CPT

## 2025-04-08 RX ORDER — SODIUM CHLORIDE 9 G/1000ML
500 INJECTION, SOLUTION INTRAVENOUS ONCE
Refills: 0 | Status: COMPLETED | OUTPATIENT
Start: 2025-04-08 | End: 2025-04-08

## 2025-04-08 RX ORDER — ACETAMINOPHEN 500 MG/5ML
975 LIQUID (ML) ORAL ONCE
Refills: 0 | Status: COMPLETED | OUTPATIENT
Start: 2025-04-08 | End: 2025-04-08

## 2025-04-08 RX ADMIN — Medication 975 MILLIGRAM(S): at 13:03

## 2025-04-08 RX ADMIN — Medication 100 MILLIEQUIVALENT(S): at 16:41

## 2025-04-08 RX ADMIN — Medication 100 MILLIEQUIVALENT(S): at 15:41

## 2025-04-08 RX ADMIN — Medication 100 MILLIEQUIVALENT(S): at 14:35

## 2025-04-08 RX ADMIN — SODIUM CHLORIDE 500 MILLILITER(S): 9 INJECTION, SOLUTION INTRAVENOUS at 15:41

## 2025-04-08 RX ADMIN — Medication 975 MILLIGRAM(S): at 14:37

## 2025-04-08 RX ADMIN — Medication 1000 MILLILITER(S): at 13:03

## 2025-04-08 NOTE — ED PROVIDER NOTE - CLINICAL SUMMARY MEDICAL DECISION MAKING FREE TEXT BOX
Randolph Hess, DO: 80 yo m pmh  hypertension, hyperlipidemia, diabetes, CAD, TAVR in 7/2023, PAD, BPH , pw fever. PW son bedside right collateral.  Patient reports was in usual state of health yesterday.  Reports this morning woke up feeling unwell, having myalgias.  Having F/C.  Denies N/V, abdominal pain, dysuria, D/C, recent travel, sick contacts, rash.  Patient arrives HDS, well-appearing, febrile rectally.  Suspect viral syndrome.  Will check labs, imaging, reassess. do not suspect acs.

## 2025-04-08 NOTE — ED PROVIDER NOTE - PROGRESS NOTE DETAILS
Re evaluated patient, patient endorses that he feels better.  Plan for discharge.  Alec Cervantes MD PGY-2

## 2025-04-08 NOTE — ED PROVIDER NOTE - ATTENDING CONTRIBUTION TO CARE
Randolph Hess DO:  patient seen and evaluated with the resident.  I was present for key portions of the History & Physical, and I agree with the Impression & Plan. Randolph Hess DO:  patient seen and evaluated with the resident.  I was present for key portions of the History & Physical, and I agree with the Impression & Plan..

## 2025-04-08 NOTE — ED PROVIDER NOTE - NSICDXPASTSURGICALHX_GEN_ALL_CORE_FT
PAST SURGICAL HISTORY:  Aortic valve replaced 2009 with bovine tissue valve    History of cholecystectomy     History of ear surgery ? surgery on left ear    Hx of cholecystectomy     S/P AVR (aortic valve replacement) 2009 @ [place] (bovine)

## 2025-04-08 NOTE — ED PROVIDER NOTE - NSFOLLOWUPINSTRUCTIONS_ED_ALL_ED_FT
You were evaluated in the Emergency Department today for your congestion, cough and fevers. Your evaluation suggests that your symptoms are most likely due to a viral illness, which will improve on its own with rest and fluids.    Please take tylenol as per package instructions as need for fever and pain.     Please schedule an appointment for follow up with your primary care physician this week.    Return to the Emergency Department if you experience worsening cough, fever 100.4 ° F or greater not controlled by Tylenol or Ibuprofen, recurrent vomiting, chest pain, shortness of breath, or any other concerning symptoms.

## 2025-04-08 NOTE — ED ADULT NURSE REASSESSMENT NOTE - NS ED NURSE REASSESS COMMENT FT1
Break RN: Patient awake and resting in stretcher; respirations even and unlabored, no signs/symptoms of acute distress. Patient denies dyspnea, shortness of breath, and chest pain. Patient denies pain and offers no complaints at this time. Urine collected and sent, medications administered per eMAR. Safety measures in place and call bell within reach.

## 2025-04-08 NOTE — ED PROVIDER NOTE - PATIENT PORTAL LINK FT
You can access the FollowMyHealth Patient Portal offered by [facility] by registering at the following website: [email or web address]. By joining [employer]’s FollowMyHealth portal, you will also be able to view your health information using other applications (apps) compatible with our system.

## 2025-04-08 NOTE — ED ADULT NURSE NOTE - OBJECTIVE STATEMENT
This is a 80 y/o male alert and oriented x 4, with a medical history of HLD, HTN, Gallstone, GERD, DM2, Presented today with fever, and generalized body aches. Denies n/v/d. Skin warm to touch. Abdomen is soft, bowel sounds present on all four quadrants. Breathing is even and unlabored. Not in any distress, no sob. No edema noted on all four extremities. IV initiated on right AC g 20, blood work sent to lab. Due meds given. Febrile at 100.8 rectally. Bed in lowest position side rails up for safety. Plan of care in progress.

## 2025-04-08 NOTE — ED ADULT NURSE NOTE - NSICDXPASTSURGICALHX_GEN_ALL_CORE_FT
PAST SURGICAL HISTORY:  Aortic valve replaced 2009 with bovine tissue valve    History of cholecystectomy     History of ear surgery ? surgery on left ear    Hx of cholecystectomy     S/P AVR (aortic valve replacement) 2009 @ [place] (bovine)

## 2025-04-08 NOTE — ED ADULT NURSE NOTE - CAS EDN DISCHARGE ASSESSMENT
Asthma Asthma    Cigarette smoker  (0.25 ppd x 10 years)  Eczema Alert and oriented to person, place and time

## 2025-04-09 ENCOUNTER — INPATIENT (INPATIENT)
Facility: HOSPITAL | Age: 80
LOS: 8 days | Discharge: HOME | End: 2025-04-18
Attending: STUDENT IN AN ORGANIZED HEALTH CARE EDUCATION/TRAINING PROGRAM | Admitting: STUDENT IN AN ORGANIZED HEALTH CARE EDUCATION/TRAINING PROGRAM
Payer: MEDICARE

## 2025-04-09 ENCOUNTER — TRANSCRIPTION ENCOUNTER (OUTPATIENT)
Age: 80
End: 2025-04-09

## 2025-04-09 VITALS
DIASTOLIC BLOOD PRESSURE: 74 MMHG | WEIGHT: 199.96 LBS | SYSTOLIC BLOOD PRESSURE: 124 MMHG | HEIGHT: 65 IN | RESPIRATION RATE: 20 BRPM | OXYGEN SATURATION: 96 % | HEART RATE: 100 BPM | TEMPERATURE: 99 F

## 2025-04-09 DIAGNOSIS — Z98.890 OTHER SPECIFIED POSTPROCEDURAL STATES: Chronic | ICD-10-CM

## 2025-04-09 DIAGNOSIS — Z95.2 PRESENCE OF PROSTHETIC HEART VALVE: Chronic | ICD-10-CM

## 2025-04-09 DIAGNOSIS — Z90.49 ACQUIRED ABSENCE OF OTHER SPECIFIED PARTS OF DIGESTIVE TRACT: Chronic | ICD-10-CM

## 2025-04-09 LAB
ADD ON TEST-SPECIMEN IN LAB: SIGNIFICANT CHANGE UP
ALBUMIN SERPL ELPH-MCNC: 4 G/DL — SIGNIFICANT CHANGE UP (ref 3.3–5)
ALP SERPL-CCNC: 101 U/L — SIGNIFICANT CHANGE UP (ref 40–120)
ALT FLD-CCNC: 42 U/L — HIGH (ref 4–41)
ANION GAP SERPL CALC-SCNC: 16 MMOL/L — HIGH (ref 7–14)
APTT BLD: 32.3 SEC — SIGNIFICANT CHANGE UP (ref 24.5–35.6)
AST SERPL-CCNC: 82 U/L — HIGH (ref 4–40)
BASOPHILS # BLD AUTO: 0.03 K/UL — SIGNIFICANT CHANGE UP (ref 0–0.2)
BASOPHILS NFR BLD AUTO: 0.3 % — SIGNIFICANT CHANGE UP (ref 0–2)
BILIRUB SERPL-MCNC: 0.8 MG/DL — SIGNIFICANT CHANGE UP (ref 0.2–1.2)
BUN SERPL-MCNC: 22 MG/DL — SIGNIFICANT CHANGE UP (ref 7–23)
CALCIUM SERPL-MCNC: 9.6 MG/DL — SIGNIFICANT CHANGE UP (ref 8.4–10.5)
CHLORIDE SERPL-SCNC: 96 MMOL/L — LOW (ref 98–107)
CO2 SERPL-SCNC: 25 MMOL/L — SIGNIFICANT CHANGE UP (ref 22–31)
CREAT SERPL-MCNC: 1.66 MG/DL — HIGH (ref 0.5–1.3)
EGFR: 41 ML/MIN/1.73M2 — LOW
EGFR: 41 ML/MIN/1.73M2 — LOW
EOSINOPHIL # BLD AUTO: 0.01 K/UL — SIGNIFICANT CHANGE UP (ref 0–0.5)
EOSINOPHIL NFR BLD AUTO: 0.1 % — SIGNIFICANT CHANGE UP (ref 0–6)
GAS PNL BLDV: SIGNIFICANT CHANGE UP
GLUCOSE SERPL-MCNC: 150 MG/DL — HIGH (ref 70–99)
HCT VFR BLD CALC: 39.7 % — SIGNIFICANT CHANGE UP (ref 39–50)
HGB BLD-MCNC: 13.3 G/DL — SIGNIFICANT CHANGE UP (ref 13–17)
IANC: 7.68 K/UL — HIGH (ref 1.8–7.4)
IMM GRANULOCYTES NFR BLD AUTO: 0.4 % — SIGNIFICANT CHANGE UP (ref 0–0.9)
INR BLD: 1.24 RATIO — HIGH (ref 0.85–1.16)
LYMPHOCYTES # BLD AUTO: 1.03 K/UL — SIGNIFICANT CHANGE UP (ref 1–3.3)
LYMPHOCYTES # BLD AUTO: 10.9 % — LOW (ref 13–44)
MCHC RBC-ENTMCNC: 28.4 PG — SIGNIFICANT CHANGE UP (ref 27–34)
MCHC RBC-ENTMCNC: 33.5 G/DL — SIGNIFICANT CHANGE UP (ref 32–36)
MCV RBC AUTO: 84.8 FL — SIGNIFICANT CHANGE UP (ref 80–100)
MONOCYTES # BLD AUTO: 0.67 K/UL — SIGNIFICANT CHANGE UP (ref 0–0.9)
MONOCYTES NFR BLD AUTO: 7.1 % — SIGNIFICANT CHANGE UP (ref 2–14)
NEUTROPHILS # BLD AUTO: 7.68 K/UL — HIGH (ref 1.8–7.4)
NEUTROPHILS NFR BLD AUTO: 81.2 % — HIGH (ref 43–77)
NRBC # BLD AUTO: 0 K/UL — SIGNIFICANT CHANGE UP (ref 0–0)
NRBC # FLD: 0 K/UL — SIGNIFICANT CHANGE UP (ref 0–0)
NRBC BLD AUTO-RTO: 0 /100 WBCS — SIGNIFICANT CHANGE UP (ref 0–0)
NT-PROBNP SERPL-SCNC: 1679 PG/ML — HIGH
PLATELET # BLD AUTO: 163 K/UL — SIGNIFICANT CHANGE UP (ref 150–400)
POTASSIUM SERPL-MCNC: 3.4 MMOL/L — LOW (ref 3.5–5.3)
POTASSIUM SERPL-SCNC: 3.4 MMOL/L — LOW (ref 3.5–5.3)
PROT SERPL-MCNC: 8 G/DL — SIGNIFICANT CHANGE UP (ref 6–8.3)
PROTHROM AB SERPL-ACNC: 14.4 SEC — HIGH (ref 9.9–13.4)
RBC # BLD: 4.68 M/UL — SIGNIFICANT CHANGE UP (ref 4.2–5.8)
RBC # FLD: 14.1 % — SIGNIFICANT CHANGE UP (ref 10.3–14.5)
SODIUM SERPL-SCNC: 137 MMOL/L — SIGNIFICANT CHANGE UP (ref 135–145)
TROPONIN T, HIGH SENSITIVITY RESULT: 27 NG/L — SIGNIFICANT CHANGE UP
WBC # BLD: 9.46 K/UL — SIGNIFICANT CHANGE UP (ref 3.8–10.5)
WBC # FLD AUTO: 9.46 K/UL — SIGNIFICANT CHANGE UP (ref 3.8–10.5)

## 2025-04-09 PROCEDURE — 99285 EMERGENCY DEPT VISIT HI MDM: CPT

## 2025-04-09 PROCEDURE — 71046 X-RAY EXAM CHEST 2 VIEWS: CPT | Mod: 26

## 2025-04-09 RX ORDER — ACETAMINOPHEN 500 MG/5ML
1000 LIQUID (ML) ORAL ONCE
Refills: 0 | Status: COMPLETED | OUTPATIENT
Start: 2025-04-09 | End: 2025-04-09

## 2025-04-09 RX ADMIN — Medication 500 MILLILITER(S): at 22:24

## 2025-04-09 RX ADMIN — Medication 400 MILLIGRAM(S): at 22:24

## 2025-04-09 NOTE — ED PROVIDER NOTE - ATTENDING CONTRIBUTION TO CARE
79M pmh  hypertension, hyperlipidemia, diabetes, CAD, TAVR in 7/2023, PAD, BPH pw fevers and malaise. Pt was seen here yesterday for same, thought to have viral illness and was discharged. He feels worse today, more tired, has dry cough, no abd pain, chest pain, back pain, dysuria, n/v, does report loss of appetite. Labs reviewed from yesterday, no elevated WBC, cxr clear, RVP neg. On exam, abd s/nt, lungs CTAB, has low grade fever, also reports just returned from [facility]. Plan for full sepsis eval, will pursue CT chest given fever and cough and neg cxr yesterday, would also consider malaria and dengue, likely admit

## 2025-04-09 NOTE — ED PROVIDER NOTE - NSICDXPASTSURGICALHX_GEN_ALL_CORE_FT
PAST SURGICAL HISTORY:  Aortic valve replaced 2009 with bovine tissue valve    History of cholecystectomy     History of ear surgery ? surgery on left ear    Hx of cholecystectomy     S/P AVR (aortic valve replacement) 2009 @ [place] (bovine)

## 2025-04-09 NOTE — ED ADULT NURSE NOTE - OBJECTIVE STATEMENT
ELLEN RN: Pt arrives to 3a. Pt is A and Ox4 and ambulatory with a cane. Hx: DM type 2, HTN, CKD, CAD. Pt arrives to the ED complaining of fever/chills, HA, dizziness, and feeling weak x 3days. Pt endorses he was seen in the ED yesterday and dc home, but sx continue. Pt appears lethargic. + travel within the past week. Airway is patent, respirations are even and unlabored. Pt denies shortness of breath, headaches, numbness and tingling, nausea/vomitting/diarrhea. Skin is clean, dry, intact, and appropriate for race.  20 G IV placed in the LAC. Labs sent per provider orders. Pt medicated per MAR. Report given to primary RN Tiny.

## 2025-04-09 NOTE — ED PROVIDER NOTE - PROGRESS NOTE DETAILS
Jordin Cash PGY3:  spoke with [facility] who accepted Pt on tele for fever of unknown origin, Given hx of TAVR, concern for possible endocarditis. Ordered abx.

## 2025-04-09 NOTE — ED PROVIDER NOTE - PHYSICAL EXAMINATION
GENERAL: + slightly lethargic however responds to verbal stimuli, answers all questions and follows commands, Not in acute distress, non-toxic appearing  HEAD: normocephalic, atraumatic  HEENT: EOMI, normal conjunctiva, oral mucosa moist, neck supple  CARDIAC: appears well perfused  PULM: normal work of breathing, + fine crackles to the right mid lung field  GI: abdomen nondistended, nontender, no cva tenderness  NEURO: alert and oriented x 3, normal speech, no focal motor or sensory deficits, no gross neurologic deficit  MSK: No lower extremity edema, erythema, No visible deformities, no peripheral edema,   SKIN: No visible rashes, dry, well-perfused  PSYCH: appropriate mood and affect

## 2025-04-09 NOTE — ED PROVIDER NOTE - CLINICAL SUMMARY MEDICAL DECISION MAKING FREE TEXT BOX
80 yo male with hx of HTN, HLD, T2DM, CAD, s/p TAVR, PAD comes back to the ED after he was seen yesterday for fever and general malaise. He was dc home yesterday and he has nirali taking tylenol every 4 hours but continues to be febrile. Last dose was at 4pm. He is here with granddaughter at bedside. She reports that he just got back from [facility].     On exam he has some mild crackesl to the right lung and seems slightly lethargic but is otherwise unremarkable. Given the pt had a full RVP yesterday which was negative and he continues to feel worse, Will order sepsis work up, CT chest if nothing seen on chest xray. Also considering malaria and dengue. Pt will likely need to be admitted for infection of unknown origin and failure to thrive at home.

## 2025-04-09 NOTE — ED ADULT TRIAGE NOTE - CHIEF COMPLAINT QUOTE
c/o lack of appetite for the past 2 days after being discharged from the hospital also reports B/L abd pain, denies N/V diarrhea, phx fo DM type 2, HTN, GERD

## 2025-04-09 NOTE — ED ADULT NURSE NOTE - NSFALLRISKINTERV_ED_ALL_ED

## 2025-04-10 ENCOUNTER — RESULT REVIEW (OUTPATIENT)
Age: 80
End: 2025-04-10

## 2025-04-10 DIAGNOSIS — R50.9 FEVER, UNSPECIFIED: ICD-10-CM

## 2025-04-10 DIAGNOSIS — N17.9 ACUTE KIDNEY FAILURE, UNSPECIFIED: ICD-10-CM

## 2025-04-10 DIAGNOSIS — R74.01 ELEVATION OF LEVELS OF LIVER TRANSAMINASE LEVELS: ICD-10-CM

## 2025-04-10 DIAGNOSIS — A41.9 SEPSIS, UNSPECIFIED ORGANISM: ICD-10-CM

## 2025-04-10 DIAGNOSIS — E11.9 TYPE 2 DIABETES MELLITUS WITHOUT COMPLICATIONS: ICD-10-CM

## 2025-04-10 DIAGNOSIS — I10 ESSENTIAL (PRIMARY) HYPERTENSION: ICD-10-CM

## 2025-04-10 DIAGNOSIS — I25.10 ATHEROSCLEROTIC HEART DISEASE OF NATIVE CORONARY ARTERY WITHOUT ANGINA PECTORIS: ICD-10-CM

## 2025-04-10 DIAGNOSIS — E78.5 HYPERLIPIDEMIA, UNSPECIFIED: ICD-10-CM

## 2025-04-10 DIAGNOSIS — Z29.9 ENCOUNTER FOR PROPHYLACTIC MEASURES, UNSPECIFIED: ICD-10-CM

## 2025-04-10 LAB
A1C WITH ESTIMATED AVERAGE GLUCOSE RESULT: 6.4 % — HIGH (ref 4–5.6)
ADD ON TEST-SPECIMEN IN LAB: SIGNIFICANT CHANGE UP
ANION GAP SERPL CALC-SCNC: 12 MMOL/L — SIGNIFICANT CHANGE UP (ref 7–14)
APAP SERPL-MCNC: <10 UG/ML — LOW (ref 15–25)
APPEARANCE UR: CLEAR — SIGNIFICANT CHANGE UP
B PERT DNA SPEC QL NAA+PROBE: SIGNIFICANT CHANGE UP
B PERT+PARAPERT DNA PNL SPEC NAA+PROBE: SIGNIFICANT CHANGE UP
BACTERIA # UR AUTO: NEGATIVE /HPF — SIGNIFICANT CHANGE UP
BILIRUB UR-MCNC: NEGATIVE — SIGNIFICANT CHANGE UP
BUN SERPL-MCNC: 18 MG/DL — SIGNIFICANT CHANGE UP (ref 7–23)
C PNEUM DNA SPEC QL NAA+PROBE: SIGNIFICANT CHANGE UP
CALCIUM SERPL-MCNC: 9.3 MG/DL — SIGNIFICANT CHANGE UP (ref 8.4–10.5)
CAST: 0 /LPF — SIGNIFICANT CHANGE UP (ref 0–4)
CHLORIDE SERPL-SCNC: 96 MMOL/L — LOW (ref 98–107)
CHOLEST SERPL-MCNC: 117 MG/DL — SIGNIFICANT CHANGE UP
CO2 SERPL-SCNC: 26 MMOL/L — SIGNIFICANT CHANGE UP (ref 22–31)
COLOR SPEC: YELLOW — SIGNIFICANT CHANGE UP
CREAT SERPL-MCNC: 1.5 MG/DL — HIGH (ref 0.5–1.3)
DIFF PNL FLD: NEGATIVE — SIGNIFICANT CHANGE UP
EGFR: 47 ML/MIN/1.73M2 — LOW
EGFR: 47 ML/MIN/1.73M2 — LOW
ERYTHROCYTE [SEDIMENTATION RATE] IN BLOOD: 70 MM/HR — HIGH (ref 1–15)
ESTIMATED AVERAGE GLUCOSE: 137 — SIGNIFICANT CHANGE UP
ETHANOL SERPL-MCNC: <10 MG/DL — SIGNIFICANT CHANGE UP
FLUAV AG NPH QL: SIGNIFICANT CHANGE UP
FLUAV SUBTYP SPEC NAA+PROBE: SIGNIFICANT CHANGE UP
FLUBV AG NPH QL: SIGNIFICANT CHANGE UP
FLUBV RNA SPEC QL NAA+PROBE: SIGNIFICANT CHANGE UP
GLUCOSE BLDC GLUCOMTR-MCNC: 150 MG/DL — HIGH (ref 70–99)
GLUCOSE BLDC GLUCOMTR-MCNC: 152 MG/DL — HIGH (ref 70–99)
GLUCOSE BLDC GLUCOMTR-MCNC: 171 MG/DL — HIGH (ref 70–99)
GLUCOSE BLDC GLUCOMTR-MCNC: 197 MG/DL — HIGH (ref 70–99)
GLUCOSE SERPL-MCNC: 135 MG/DL — HIGH (ref 70–99)
GLUCOSE UR QL: NEGATIVE MG/DL — SIGNIFICANT CHANGE UP
HADV DNA SPEC QL NAA+PROBE: SIGNIFICANT CHANGE UP
HCOV 229E RNA SPEC QL NAA+PROBE: SIGNIFICANT CHANGE UP
HCOV HKU1 RNA SPEC QL NAA+PROBE: SIGNIFICANT CHANGE UP
HCOV NL63 RNA SPEC QL NAA+PROBE: SIGNIFICANT CHANGE UP
HCOV OC43 RNA SPEC QL NAA+PROBE: SIGNIFICANT CHANGE UP
HCT VFR BLD CALC: 36.4 % — LOW (ref 39–50)
HDLC SERPL-MCNC: 31 MG/DL — LOW
HGB BLD-MCNC: 12.7 G/DL — LOW (ref 13–17)
HMPV RNA SPEC QL NAA+PROBE: SIGNIFICANT CHANGE UP
HPIV1 RNA SPEC QL NAA+PROBE: SIGNIFICANT CHANGE UP
HPIV2 RNA SPEC QL NAA+PROBE: SIGNIFICANT CHANGE UP
HPIV3 RNA SPEC QL NAA+PROBE: SIGNIFICANT CHANGE UP
HPIV4 RNA SPEC QL NAA+PROBE: SIGNIFICANT CHANGE UP
KETONES UR-MCNC: NEGATIVE MG/DL — SIGNIFICANT CHANGE UP
LACTATE SERPL-SCNC: 1.5 MMOL/L — SIGNIFICANT CHANGE UP (ref 0.5–2)
LDLC SERPL-MCNC: 60 MG/DL — SIGNIFICANT CHANGE UP
LEUKOCYTE ESTERASE UR-ACNC: NEGATIVE — SIGNIFICANT CHANGE UP
LIPID PNL WITH DIRECT LDL SERPL: 60 MG/DL — SIGNIFICANT CHANGE UP
M PNEUMO DNA SPEC QL NAA+PROBE: SIGNIFICANT CHANGE UP
MAGNESIUM SERPL-MCNC: 1.6 MG/DL — SIGNIFICANT CHANGE UP (ref 1.6–2.6)
MCHC RBC-ENTMCNC: 28.4 PG — SIGNIFICANT CHANGE UP (ref 27–34)
MCHC RBC-ENTMCNC: 34.9 G/DL — SIGNIFICANT CHANGE UP (ref 32–36)
MCV RBC AUTO: 81.4 FL — SIGNIFICANT CHANGE UP (ref 80–100)
MRSA PCR RESULT.: SIGNIFICANT CHANGE UP
NITRITE UR-MCNC: NEGATIVE — SIGNIFICANT CHANGE UP
NONHDLC SERPL-MCNC: 86 MG/DL — SIGNIFICANT CHANGE UP
NRBC # BLD AUTO: 0 K/UL — SIGNIFICANT CHANGE UP (ref 0–0)
NRBC # FLD: 0 K/UL — SIGNIFICANT CHANGE UP (ref 0–0)
NRBC BLD AUTO-RTO: 0 /100 WBCS — SIGNIFICANT CHANGE UP (ref 0–0)
PH UR: 7 — SIGNIFICANT CHANGE UP (ref 5–8)
PHOSPHATE SERPL-MCNC: 2.3 MG/DL — LOW (ref 2.5–4.5)
PLATELET # BLD AUTO: 144 K/UL — LOW (ref 150–400)
POTASSIUM SERPL-MCNC: 3.3 MMOL/L — LOW (ref 3.5–5.3)
POTASSIUM SERPL-SCNC: 3.3 MMOL/L — LOW (ref 3.5–5.3)
PROCALCITONIN SERPL-MCNC: 1.7 NG/ML — HIGH (ref 0.02–0.1)
PROT UR-MCNC: 300 MG/DL
RAPID RVP RESULT: SIGNIFICANT CHANGE UP
RBC # BLD: 4.47 M/UL — SIGNIFICANT CHANGE UP (ref 4.2–5.8)
RBC # FLD: 13.6 % — SIGNIFICANT CHANGE UP (ref 10.3–14.5)
RBC CASTS # UR COMP ASSIST: 3 /HPF — SIGNIFICANT CHANGE UP (ref 0–4)
RSV RNA NPH QL NAA+NON-PROBE: SIGNIFICANT CHANGE UP
RSV RNA SPEC QL NAA+PROBE: SIGNIFICANT CHANGE UP
RV+EV RNA SPEC QL NAA+PROBE: SIGNIFICANT CHANGE UP
S AUREUS DNA NOSE QL NAA+PROBE: SIGNIFICANT CHANGE UP
SALICYLATES SERPL-MCNC: <0.3 MG/DL — LOW (ref 15–30)
SARS-COV-2 RNA SPEC QL NAA+PROBE: SIGNIFICANT CHANGE UP
SARS-COV-2 RNA SPEC QL NAA+PROBE: SIGNIFICANT CHANGE UP
SODIUM SERPL-SCNC: 134 MMOL/L — LOW (ref 135–145)
SOURCE RESPIRATORY: SIGNIFICANT CHANGE UP
SP GR SPEC: 1.02 — SIGNIFICANT CHANGE UP (ref 1–1.03)
SQUAMOUS # UR AUTO: 0 /HPF — SIGNIFICANT CHANGE UP (ref 0–5)
TOXICOLOGY SCREEN, DRUGS OF ABUSE, SERUM RESULT: SIGNIFICANT CHANGE UP
TRIGL SERPL-MCNC: 153 MG/DL — HIGH
UROBILINOGEN FLD QL: 0.2 MG/DL — SIGNIFICANT CHANGE UP (ref 0.2–1)
WBC # BLD: 10.42 K/UL — SIGNIFICANT CHANGE UP (ref 3.8–10.5)
WBC # FLD AUTO: 10.42 K/UL — SIGNIFICANT CHANGE UP (ref 3.8–10.5)
WBC UR QL: 0 /HPF — SIGNIFICANT CHANGE UP (ref 0–5)

## 2025-04-10 PROCEDURE — 76770 US EXAM ABDO BACK WALL COMP: CPT | Mod: 26

## 2025-04-10 PROCEDURE — 93306 TTE W/DOPPLER COMPLETE: CPT | Mod: 26

## 2025-04-10 PROCEDURE — 99223 1ST HOSP IP/OBS HIGH 75: CPT

## 2025-04-10 PROCEDURE — 71250 CT THORAX DX C-: CPT | Mod: 26

## 2025-04-10 RX ORDER — GLUCAGON 3 MG/1
1 POWDER NASAL ONCE
Refills: 0 | Status: DISCONTINUED | OUTPATIENT
Start: 2025-04-10 | End: 2025-04-18

## 2025-04-10 RX ORDER — METOPROLOL SUCCINATE 50 MG/1
100 TABLET, EXTENDED RELEASE ORAL DAILY
Refills: 0 | Status: DISCONTINUED | OUTPATIENT
Start: 2025-04-10 | End: 2025-04-18

## 2025-04-10 RX ORDER — CEFEPIME 2 G/20ML
1000 INJECTION, POWDER, FOR SOLUTION INTRAVENOUS ONCE
Refills: 0 | Status: COMPLETED | OUTPATIENT
Start: 2025-04-10 | End: 2025-04-10

## 2025-04-10 RX ORDER — ONDANSETRON HCL/PF 4 MG/2 ML
4 VIAL (ML) INJECTION ONCE
Refills: 0 | Status: COMPLETED | OUTPATIENT
Start: 2025-04-10 | End: 2025-04-10

## 2025-04-10 RX ORDER — CLOPIDOGREL BISULFATE 75 MG/1
75 TABLET, FILM COATED ORAL DAILY
Refills: 0 | Status: DISCONTINUED | OUTPATIENT
Start: 2025-04-10 | End: 2025-04-11

## 2025-04-10 RX ORDER — INSULIN LISPRO 100 U/ML
INJECTION, SOLUTION INTRAVENOUS; SUBCUTANEOUS AT BEDTIME
Refills: 0 | Status: DISCONTINUED | OUTPATIENT
Start: 2025-04-10 | End: 2025-04-18

## 2025-04-10 RX ORDER — DEXTROSE 50 % IN WATER 50 %
25 SYRINGE (ML) INTRAVENOUS ONCE
Refills: 0 | Status: DISCONTINUED | OUTPATIENT
Start: 2025-04-10 | End: 2025-04-18

## 2025-04-10 RX ORDER — ISOSORBIDE MONONITRATE 60 MG/1
1 TABLET, EXTENDED RELEASE ORAL
Refills: 0 | DISCHARGE

## 2025-04-10 RX ORDER — DOXYCYCLINE HYCLATE 100 MG
100 TABLET ORAL EVERY 12 HOURS
Refills: 0 | Status: DISCONTINUED | OUTPATIENT
Start: 2025-04-11 | End: 2025-04-13

## 2025-04-10 RX ORDER — VANCOMYCIN HCL IN 5 % DEXTROSE 1.5G/250ML
1250 PLASTIC BAG, INJECTION (ML) INTRAVENOUS EVERY 24 HOURS
Refills: 0 | Status: DISCONTINUED | OUTPATIENT
Start: 2025-04-10 | End: 2025-04-11

## 2025-04-10 RX ORDER — METOPROLOL SUCCINATE 50 MG/1
1 TABLET, EXTENDED RELEASE ORAL
Refills: 0 | DISCHARGE

## 2025-04-10 RX ORDER — AMLODIPINE BESYLATE 10 MG/1
10 TABLET ORAL DAILY
Refills: 0 | Status: DISCONTINUED | OUTPATIENT
Start: 2025-04-10 | End: 2025-04-18

## 2025-04-10 RX ORDER — ASPIRIN 325 MG
81 TABLET ORAL DAILY
Refills: 0 | Status: DISCONTINUED | OUTPATIENT
Start: 2025-04-10 | End: 2025-04-16

## 2025-04-10 RX ORDER — CYCLOSPORINE OPHTHALMIC SOLUTION 1 MG/ML
1 SOLUTION/ DROPS OPHTHALMIC
Refills: 0 | DISCHARGE

## 2025-04-10 RX ORDER — INSULIN LISPRO 100 U/ML
INJECTION, SOLUTION INTRAVENOUS; SUBCUTANEOUS
Refills: 0 | Status: DISCONTINUED | OUTPATIENT
Start: 2025-04-10 | End: 2025-04-18

## 2025-04-10 RX ORDER — ATORVASTATIN CALCIUM 80 MG/1
40 TABLET, FILM COATED ORAL AT BEDTIME
Refills: 0 | Status: DISCONTINUED | OUTPATIENT
Start: 2025-04-10 | End: 2025-04-18

## 2025-04-10 RX ORDER — DEXTROSE 50 % IN WATER 50 %
15 SYRINGE (ML) INTRAVENOUS ONCE
Refills: 0 | Status: DISCONTINUED | OUTPATIENT
Start: 2025-04-10 | End: 2025-04-18

## 2025-04-10 RX ORDER — ACETAMINOPHEN 500 MG/5ML
650 LIQUID (ML) ORAL EVERY 6 HOURS
Refills: 0 | Status: DISCONTINUED | OUTPATIENT
Start: 2025-04-10 | End: 2025-04-18

## 2025-04-10 RX ORDER — LINACLOTIDE 290 UG/1
1 CAPSULE, GELATIN COATED ORAL
Refills: 0 | DISCHARGE

## 2025-04-10 RX ORDER — SODIUM CHLORIDE 9 G/1000ML
1000 INJECTION, SOLUTION INTRAVENOUS
Refills: 0 | Status: DISCONTINUED | OUTPATIENT
Start: 2025-04-10 | End: 2025-04-18

## 2025-04-10 RX ORDER — CEFEPIME 2 G/20ML
INJECTION, POWDER, FOR SOLUTION INTRAVENOUS
Refills: 0 | Status: DISCONTINUED | OUTPATIENT
Start: 2025-04-10 | End: 2025-04-11

## 2025-04-10 RX ORDER — VANCOMYCIN HCL IN 5 % DEXTROSE 1.5G/250ML
1000 PLASTIC BAG, INJECTION (ML) INTRAVENOUS ONCE
Refills: 0 | Status: COMPLETED | OUTPATIENT
Start: 2025-04-10 | End: 2025-04-10

## 2025-04-10 RX ORDER — DOXYCYCLINE HYCLATE 100 MG
TABLET ORAL
Refills: 0 | Status: DISCONTINUED | OUTPATIENT
Start: 2025-04-10 | End: 2025-04-13

## 2025-04-10 RX ORDER — GABAPENTIN 400 MG/1
100 CAPSULE ORAL THREE TIMES A DAY
Refills: 0 | Status: DISCONTINUED | OUTPATIENT
Start: 2025-04-10 | End: 2025-04-18

## 2025-04-10 RX ORDER — HEPARIN SODIUM 1000 [USP'U]/ML
5000 INJECTION INTRAVENOUS; SUBCUTANEOUS EVERY 8 HOURS
Refills: 0 | Status: DISCONTINUED | OUTPATIENT
Start: 2025-04-10 | End: 2025-04-18

## 2025-04-10 RX ORDER — DEXTROSE 50 % IN WATER 50 %
12.5 SYRINGE (ML) INTRAVENOUS ONCE
Refills: 0 | Status: DISCONTINUED | OUTPATIENT
Start: 2025-04-10 | End: 2025-04-18

## 2025-04-10 RX ORDER — CEFEPIME 2 G/20ML
1000 INJECTION, POWDER, FOR SOLUTION INTRAVENOUS EVERY 8 HOURS
Refills: 0 | Status: DISCONTINUED | OUTPATIENT
Start: 2025-04-10 | End: 2025-04-11

## 2025-04-10 RX ORDER — DOXYCYCLINE HYCLATE 100 MG
100 TABLET ORAL ONCE
Refills: 0 | Status: COMPLETED | OUTPATIENT
Start: 2025-04-10 | End: 2025-04-10

## 2025-04-10 RX ADMIN — Medication 50 MILLIGRAM(S): at 22:25

## 2025-04-10 RX ADMIN — GABAPENTIN 100 MILLIGRAM(S): 400 CAPSULE ORAL at 22:25

## 2025-04-10 RX ADMIN — CEFEPIME 100 MILLIGRAM(S): 2 INJECTION, POWDER, FOR SOLUTION INTRAVENOUS at 07:05

## 2025-04-10 RX ADMIN — Medication 40 MILLIEQUIVALENT(S): at 14:21

## 2025-04-10 RX ADMIN — Medication 4 MILLIGRAM(S): at 05:41

## 2025-04-10 RX ADMIN — Medication 50 MILLIGRAM(S): at 13:58

## 2025-04-10 RX ADMIN — AMLODIPINE BESYLATE 10 MILLIGRAM(S): 10 TABLET ORAL at 17:08

## 2025-04-10 RX ADMIN — Medication 1000 MILLIGRAM(S): at 00:33

## 2025-04-10 RX ADMIN — HEPARIN SODIUM 5000 UNIT(S): 1000 INJECTION INTRAVENOUS; SUBCUTANEOUS at 22:26

## 2025-04-10 RX ADMIN — METOPROLOL SUCCINATE 100 MILLIGRAM(S): 50 TABLET, EXTENDED RELEASE ORAL at 13:59

## 2025-04-10 RX ADMIN — CLOPIDOGREL BISULFATE 75 MILLIGRAM(S): 75 TABLET, FILM COATED ORAL at 13:58

## 2025-04-10 RX ADMIN — GABAPENTIN 100 MILLIGRAM(S): 400 CAPSULE ORAL at 13:58

## 2025-04-10 RX ADMIN — Medication 650 MILLIGRAM(S): at 17:19

## 2025-04-10 RX ADMIN — Medication 250 MILLIGRAM(S): at 07:30

## 2025-04-10 RX ADMIN — Medication 100 MILLIGRAM(S): at 16:06

## 2025-04-10 RX ADMIN — ATORVASTATIN CALCIUM 40 MILLIGRAM(S): 80 TABLET, FILM COATED ORAL at 22:25

## 2025-04-10 RX ADMIN — INSULIN LISPRO 1: 100 INJECTION, SOLUTION INTRAVENOUS; SUBCUTANEOUS at 12:45

## 2025-04-10 RX ADMIN — HEPARIN SODIUM 5000 UNIT(S): 1000 INJECTION INTRAVENOUS; SUBCUTANEOUS at 14:24

## 2025-04-10 RX ADMIN — Medication 650 MILLIGRAM(S): at 10:51

## 2025-04-10 RX ADMIN — Medication 81 MILLIGRAM(S): at 13:58

## 2025-04-10 RX ADMIN — CEFEPIME 100 MILLIGRAM(S): 2 INJECTION, POWDER, FOR SOLUTION INTRAVENOUS at 22:26

## 2025-04-10 RX ADMIN — Medication 166.67 MILLIGRAM(S): at 14:23

## 2025-04-10 RX ADMIN — CEFEPIME 100 MILLIGRAM(S): 2 INJECTION, POWDER, FOR SOLUTION INTRAVENOUS at 13:58

## 2025-04-10 RX ADMIN — INSULIN LISPRO 1: 100 INJECTION, SOLUTION INTRAVENOUS; SUBCUTANEOUS at 18:40

## 2025-04-10 NOTE — PATIENT PROFILE ADULT - NSPROPTRIGHTSUPPORTPERSON_GEN_A_NUR
23 y/o female with hx IVDA, Polysubstance abuse, Bipolar disorder, Methadone, smoker presents to the ED accompanied by mother c/o "I think I have a stomach virus. I haven't been able to keep anything down since Saturday. I last shot up heroin yesterday and I got my methadone today before I got here. I have abdominal pain all over my stomach." no fever/ chills/ urinary symptoms
declines

## 2025-04-10 NOTE — ED ADULT NURSE REASSESSMENT NOTE - NS ED NURSE REASSESS COMMENT FT1
break coverage RN. received report from Tiny RN; pt A&Ox4, GCS 15, sitting in stretcher in high fowlers position, breathing even and unlabored, non diaphoretic, vitally stable. NAD distress noted; pending CT imaging and urine labs; pt states he cannot pee as of yet. Safety is maintained.

## 2025-04-10 NOTE — CONSULT NOTE ADULT - SUBJECTIVE AND OBJECTIVE BOX
C A R D I O L O G Y  *********************    DATE OF SERVICE: 04-10-25    HISTORY OF PRESENT ILLNESS: HPI: Pt is an 79 year old male with  a past medical history of hypertension, hypercholesterolemia, diabetes, chronic renal insufficiency, peripheral vascular disease, bioprosthetic AVR in 2009 status post TAVR of his bioprosthetic AVR in July 2023, with  known venous insufficiency status post Varithena closure of the left great saphenous vein presented to the ER with recurrent fevers, chills, and malaise for 3 days. Pt was seen in the ER 2 days ago with similar complaints, and was thought to have a viral illness and was discharged home. He feels worse today, more fatigued, and c/o dry cough. Of note, pt states he recently traveled to [facility] 3-4 weeks ago but denies any insect or tick bites and denies sick contacts. He otherwise feels well and denies SOB, N/V, abdominal pain, chest pain, back pain, urinary complaints, or other acute symptoms. In the ED, vital signs were significant for 101F fever and tachycardia 102 bpm.      PAST MEDICAL & SURGICAL HISTORY:  HTN (hypertension)  Hyperlipidemia  BPH (benign prostatic hypertrophy)  GERD (gastroesophageal reflux disease)  Chronic mastoiditis of left side  Gall stones  HTN (hypertension)  DM (diabetes mellitus)  High cholesterol  Aortic valve replaced 2009 with bovine tissue valve  Hx of cholecystectomy  S/P AVR (aortic valve replacement) 2009 @ [place] (bovine)  History of cholecystectomy  History of ear surgery ? surgery on left ear      MEDICATIONS:  MEDICATIONS  (STANDING):  amLODIPine   Tablet 10 milliGRAM(s) Oral daily  aspirin  chewable 81 milliGRAM(s) Oral daily  atorvastatin 40 milliGRAM(s) Oral at bedtime  cefepime   IVPB 1000 milliGRAM(s) IV Intermittent every 8 hours  cefepime   IVPB      clopidogrel Tablet 75 milliGRAM(s) Oral daily  dextrose 5%. 1000 milliLiter(s) (50 mL/Hr) IV Continuous <Continuous>  dextrose 5%. 1000 milliLiter(s) (100 mL/Hr) IV Continuous <Continuous>  dextrose 50% Injectable 25 Gram(s) IV Push once  dextrose 50% Injectable 12.5 Gram(s) IV Push once  dextrose 50% Injectable 25 Gram(s) IV Push once  doxycycline IVPB      doxycycline IVPB 100 milliGRAM(s) IV Intermittent once  gabapentin 100 milliGRAM(s) Oral three times a day  glucagon  Injectable 1 milliGRAM(s) IntraMuscular once  heparin   Injectable 5000 Unit(s) SubCutaneous every 8 hours  hydrALAZINE 50 milliGRAM(s) Oral three times a day  insulin lispro (ADMELOG) corrective regimen sliding scale   SubCutaneous three times a day before meals  insulin lispro (ADMELOG) corrective regimen sliding scale   SubCutaneous at bedtime  metoprolol succinate  milliGRAM(s) Oral daily  vancomycin  IVPB 1250 milliGRAM(s) IV Intermittent every 24 hours    Allergies: No Known Allergies    FAMILY HISTORY: Non-contributary for premature coronary disease or sudden cardiac death    SOCIAL HISTORY:    [X] Non-smoker  [ ] Smoker  [ ] Alcohol    REVIEW OF SYSTEMS:  [ ]chest pain  [  ]shortness of breath  [  ]palpitations  [  ]syncope  [ ]near syncope [ ]upper extremity weakness   [ ] lower extremity weakness  [  ]diplopia  [  ]altered mental status   [  ]fevers  [ ]chills [ ]nausea  [ ]vomiting  [  ]dysphagia    [ ]abdominal pain  [ ]melena  [ ]BRBPR    [  ]epistaxis  [  ]rash    [ ]lower extremity edema    [X] All others negative	  [ ] Unable to obtain    LABS:	 	    CARDIAC MARKERS:             12.7   10.42 )-----------( 144      ( 10 Apr 2025 13:30 )             36.4     Hb Trend: 12.7<--, 13.3<--    04-10    134[L]  |  96[L]  |  18  ----------------------------<  135[H]  3.3[L]   |  26  |  1.50[H]    Ca    9.3      10 Apr 2025 13:30  Phos  2.3     04-10  Mg     1.60     04-10    TPro  8.0  /  Alb  4.0  /  TBili  0.8  /  DBili  x   /  AST  82[H]  /  ALT  42[H]  /  AlkPhos  101  04-09  Creatinine Trend: 1.50<--, 1.66<--, 1.83<--  Coags:  PT/INR - ( 09 Apr 2025 22:20 )   PT: 14.4 sec;   INR: 1.24 ratio    PTT - ( 09 Apr 2025 22:20 )  PTT:32.3 sec      PHYSICAL EXAM:  T(C): 38.3 (04-10-25 @ 14:15), Max: 38.4 (04-10-25 @ 10:15)  HR: 82 (04-10-25 @ 14:15) (76 - 100)  BP: 142/69 (04-10-25 @ 14:15) (124/74 - 172/99)  RR: 18 (04-10-25 @ 14:15) (17 - 20)  SpO2: 97% (04-10-25 @ 14:15) (95% - 100%)  Wt(kg): --   BMI (kg/m2): 31.8 (04-10-25 @ 10:15)  I&O's Summary      General:  Alert and Oriented * 3.   Head: Normocephalic and atraumatic.   Neck: No JVD. No bruits. Supple. Does not appear to be enlarged.   Cardiovascular: + S1,S2 ; RRR Soft systolic murmur at the left lower sternal border. No rubs noted.    Lungs: CTA b/l. No rhonchi, rales or wheezes.   Abdomen: + BS, soft. Non tender. Non distended. No rebound. No guarding.   Extremities: No clubbing/cyanosis/edema.   Neurologic: Moves all four extremities. Full range of motion.   Skin: Warm and moist. The patient's skin has normal elasticity and good skin turgor.   Psychiatric: Appropriate mood and affect.  Musculoskeletal: Normal range of motion, normal strength       TELEMETRY: NSR	      ECG:  	NSR    RADIOLOGY:         CXR:   < from: CT Chest No Cont (04.10.25 @ 02:08) >    IMPRESSION:  No CT evidence of pneumonia, pulmonary edema, or pleural effusion.    The patient is status post TAVR.  Stable aneurysmal dilatation of the mid   ascending thoracic aorta, to approximately 4.2 cm in transverse caliber.    Approximately 1.2 cm thyroid nodule.  In the absence of a family history   or risk factors for thyroid cancer, the American College of Radiology   does not recommend routine follow-up of incidental thyroid nodules   measuring less than 1.5 cm in this age group.    < end of copied text >      < from: TTE W or WO Ultrasound Enhancing Agent (04.10.25 @ 08:47) >    CONCLUSIONS:      1. Left ventricular systolic function is normal.   2. The right ventricle is not well visualized.   3. A atranscatheter deployed (TAVR) is present in the aortic position. The peak transaortic velocity is 1.42 m/s, peak transaortic gradient is 8.0 mmHg and mean transaortic gradient is 4.1 mmHg with an LVOT/aortic valve VTI ratio of 0.62.   4. No pericardial effusion seen.   5. Technically difficult image quality.    < end of copied text >      < from: Cardiac Catheterization (06.22.23 @ 17:04) >  The coronary circulation is right dominant. Cardiac catheterization  was performed electively.  LM   Left main artery: Angiography shows minor irregularities.      LAD   Proximal left anterior descending: The segment is moderately ectatic.Angiography shows moderate atherosclerosis. There is a40 % stenosis.      CX   Circumflex: Angiography shows minor irregularities.      RCA   Right coronary artery: Angiography shows minor irregularities.        ASSESSMENT/PLAN: Pt is an 79 year old male with  a past medical history of hypertension, hypercholesterolemia, diabetes, chronic renal insufficiency, peripheral vascular disease, bioprosthetic AVR in 2009 status post TAVR of his bioprosthetic AVR in July 2023, with  known venous insufficiency status post Varithena closure of the left great saphenous vein presented to the ER with recurrent fevers, chills, and malaise for 3 days.     HTN/HLD/TAVR/CAD/TAA  - c/w ASA and statin, has h/p of moderate non obstructive CAD  - c/w BB for stable TAA  - c/w Amlodipine and hydralazine  - c/w DAPT will d/w his cardiologist about taking off plavix  - abx  per medicine    Albert Serna MD  Pager: [phone]  Office: [phone]  C A R D I O L O G Y  *********************    DATE OF SERVICE: 04-10-25    HISTORY OF PRESENT ILLNESS: HPI: Pt is an 79 year old male with  a past medical history of hypertension, hypercholesterolemia, diabetes, CKD, TAA,bioprosthetic AVR in 2009 status post TAVR of his bioprosthetic AVR in July 2023 for severe AI, nonobstructive CAD, PAD s/p angioplasty of right anterior tibial artery   withknown venous insufficiency status post Varithena closure of the left great saphenous vein presented to the ER with recurrent fevers, chills, and malaise for 3 days. Pt was seen in the ER 2 days ago with similar complaints, and was thought to have a viral illness and was discharged home. He feels worse today, more fatigued, and c/o dry cough. Of note, pt states he recently traveled to [facility] 3-4 weeks ago but denies any insect or tick bites and denies sick contacts. He otherwise feels well and denies SOB, N/V, abdominal pain, chest pain, back pain, urinary complaints, or other acute symptoms. In the ED, vital signs were significant for 101F fever and tachycardia 102 bpm.      PAST MEDICAL & SURGICAL HISTORY:  HTN (hypertension)  Hyperlipidemia  BPH (benign prostatic hypertrophy)  GERD (gastroesophageal reflux disease)  Chronic mastoiditis of left side  Gall stones  HTN (hypertension)  DM (diabetes mellitus)  High cholesterol  Aortic valve replaced 2009 with bovine tissue valve  Hx of cholecystectomy  S/P AVR (aortic valve replacement) 2009 @ [place] (bovine)  History of cholecystectomy  History of ear surgery ? surgery on left ear      MEDICATIONS:  MEDICATIONS  (STANDING):  amLODIPine   Tablet 10 milliGRAM(s) Oral daily  aspirin  chewable 81 milliGRAM(s) Oral daily  atorvastatin 40 milliGRAM(s) Oral at bedtime  cefepime   IVPB 1000 milliGRAM(s) IV Intermittent every 8 hours  cefepime   IVPB      clopidogrel Tablet 75 milliGRAM(s) Oral daily  dextrose 5%. 1000 milliLiter(s) (50 mL/Hr) IV Continuous <Continuous>  dextrose 5%. 1000 milliLiter(s) (100 mL/Hr) IV Continuous <Continuous>  dextrose 50% Injectable 25 Gram(s) IV Push once  dextrose 50% Injectable 12.5 Gram(s) IV Push once  dextrose 50% Injectable 25 Gram(s) IV Push once  doxycycline IVPB      doxycycline IVPB 100 milliGRAM(s) IV Intermittent once  gabapentin 100 milliGRAM(s) Oral three times a day  glucagon  Injectable 1 milliGRAM(s) IntraMuscular once  heparin   Injectable 5000 Unit(s) SubCutaneous every 8 hours  hydrALAZINE 50 milliGRAM(s) Oral three times a day  insulin lispro (ADMELOG) corrective regimen sliding scale   SubCutaneous three times a day before meals  insulin lispro (ADMELOG) corrective regimen sliding scale   SubCutaneous at bedtime  metoprolol succinate  milliGRAM(s) Oral daily  vancomycin  IVPB 1250 milliGRAM(s) IV Intermittent every 24 hours    Allergies: No Known Allergies    FAMILY HISTORY: Non-contributary for premature coronary disease or sudden cardiac death    SOCIAL HISTORY:    [X] Non-smoker  [ ] Smoker  [ ] Alcohol    REVIEW OF SYSTEMS:  [ ]chest pain  [  ]shortness of breath  [  ]palpitations  [  ]syncope  [ ]near syncope [ ]upper extremity weakness   [ ] lower extremity weakness  [  ]diplopia  [  ]altered mental status   [  ]fevers  [ ]chills [ ]nausea  [ ]vomiting  [  ]dysphagia    [ ]abdominal pain  [ ]melena  [ ]BRBPR    [  ]epistaxis  [  ]rash    [ ]lower extremity edema    [X] All others negative	  [ ] Unable to obtain    LABS:	 	    CARDIAC MARKERS:             12.7   10.42 )-----------( 144      ( 10 Apr 2025 13:30 )             36.4     Hb Trend: 12.7<--, 13.3<--    04-10    134[L]  |  96[L]  |  18  ----------------------------<  135[H]  3.3[L]   |  26  |  1.50[H]    Ca    9.3      10 Apr 2025 13:30  Phos  2.3     04-10  Mg     1.60     04-10    TPro  8.0  /  Alb  4.0  /  TBili  0.8  /  DBili  x   /  AST  82[H]  /  ALT  42[H]  /  AlkPhos  101  04-09  Creatinine Trend: 1.50<--, 1.66<--, 1.83<--  Coags:  PT/INR - ( 09 Apr 2025 22:20 )   PT: 14.4 sec;   INR: 1.24 ratio    PTT - ( 09 Apr 2025 22:20 )  PTT:32.3 sec      PHYSICAL EXAM:  T(C): 38.3 (04-10-25 @ 14:15), Max: 38.4 (04-10-25 @ 10:15)  HR: 82 (04-10-25 @ 14:15) (76 - 100)  BP: 142/69 (04-10-25 @ 14:15) (124/74 - 172/99)  RR: 18 (04-10-25 @ 14:15) (17 - 20)  SpO2: 97% (04-10-25 @ 14:15) (95% - 100%)  Wt(kg): --   BMI (kg/m2): 31.8 (04-10-25 @ 10:15)  I&O's Summary      General:  Alert and Oriented * 3.   Head: Normocephalic and atraumatic.   Neck: No JVD. No bruits. Supple. Does not appear to be enlarged.   Cardiovascular: + S1,S2 ; RRR Soft systolic murmur at the left lower sternal border. No rubs noted.    Lungs: CTA b/l. No rhonchi, rales or wheezes.   Abdomen: + BS, soft. Non tender. Non distended. No rebound. No guarding.   Extremities: No clubbing/cyanosis/edema.   Neurologic: Moves all four extremities. Full range of motion.   Skin: Warm and moist. The patient's skin has normal elasticity and good skin turgor.   Psychiatric: Appropriate mood and affect.  Musculoskeletal: Normal range of motion, normal strength       TELEMETRY: NSR	      ECG:  	NSR    RADIOLOGY:         CXR:   < from: CT Chest No Cont (04.10.25 @ 02:08) >    IMPRESSION:  No CT evidence of pneumonia, pulmonary edema, or pleural effusion.    The patient is status post TAVR.  Stable aneurysmal dilatation of the mid   ascending thoracic aorta, to approximately 4.2 cm in transverse caliber.    Approximately 1.2 cm thyroid nodule.  In the absence of a family history   or risk factors for thyroid cancer, the American College of Radiology   does not recommend routine follow-up of incidental thyroid nodules   measuring less than 1.5 cm in this age group.    < end of copied text >      < from: TTE W or WO Ultrasound Enhancing Agent (04.10.25 @ 08:47) >    CONCLUSIONS:      1. Left ventricular systolic function is normal.   2. The right ventricle is not well visualized.   3. A atranscatheter deployed (TAVR) is present in the aortic position. The peak transaortic velocity is 1.42 m/s, peak transaortic gradient is 8.0 mmHg and mean transaortic gradient is 4.1 mmHg with an LVOT/aortic valve VTI ratio of 0.62.   4. No pericardial effusion seen.   5. Technically difficult image quality.    < end of copied text >      < from: Cardiac Catheterization (06.22.23 @ 17:04) >  The coronary circulation is right dominant. Cardiac catheterization  was performed electively.  LM   Left main artery: Angiography shows minor irregularities.      LAD   Proximal left anterior descending: The segment is moderately ectatic.Angiography shows moderate atherosclerosis. There is a40 % stenosis.      CX   Circumflex: Angiography shows minor irregularities.      RCA   Right coronary artery: Angiography shows minor irregularities.        ASSESSMENT/PLAN: Pt is an 79 year old male with  a past medical history of hypertension, hypercholesterolemia, diabetes, chronic renal insufficiency, peripheral vascular disease, bioprosthetic AVR in 2009 status post TAVR of his bioprosthetic AVR in July 2023, with  known venous insufficiency status post Varithena closure of the left great saphenous vein presented to the ER with recurrent fevers, chills, and malaise for 3 days.     HTN/HLD/TAVR/CAD/TAA  - c/w ASA and statin, has h/p of moderate non obstructive CAD  - c/w BB for stable TAA  - c/w Amlodipine and hydralazine  - c/w DAPT will d/w his cardiologist about taking off plavix  - abx  per medicine    Albert Serna MD  Pager: [phone]  Office: [phone]

## 2025-04-10 NOTE — H&P ADULT - HISTORY OF PRESENT ILLNESS
78 yo M with PMH of hypertension, hyperlipidemia, type 2 diabetes mellitus, CAD, PAD, AS s/p TAVR presented to the ER with recurrent fevers, chills, and malaise for 3 days. Pt was seen in the ER 2 days ago with similar complaints, and was thought to have a viral illness and was discharged home. He feels worse today, more fatigued, and c/o dry cough. Of note, pt states he recently traveled to [facility] 3-4 weeks ago but denies any insect or tick bites and denies sick contacts. He otherwise feels well and denies SOB, N/V, abdominal pain, chest pain, back pain, urinary complaints, or other acute symptoms.     In the ED, vital signs were significant for 101F fever and tachycardia 102 bpm.  Labs reviewed showed no elevated WBC, CXR clear, UA negative, RVP negative, but were remarkable for elevated lactate 2.6, elevated .  CXR and CT Chest: Unremarkable with no evidence of consolidation, effusion, or pneumothorax    Pt was given 500 ml NS bolus  and 1 dose of Vanc and Cefepime by ER physician.     Pt was then endorsed to Medicine for admission for sepsis of unclear etiology.

## 2025-04-10 NOTE — CONSULT NOTE ADULT - SUBJECTIVE AND OBJECTIVE BOX
[facility] - MIRIAM Beltran / MIRIAM Clemons / MICHELLE Martínez/ MIRIAM Hess/ MIRIAM Ogden/ SHELIA Peck / BRIAN Ordonez / LAMAR Solis / VIPIN Duvall  -------------------------------------------------------------------------------------------------------  The patient seen and examined today.  HPI:  78 yo male with hx of HTN, HLD, T2DM, CAD, s/p TAVR, PAD, CKD 3a comes back to the ED after he was seen yesterday for fever and general malaise. He was dc home yesterday and he has nirali taking tylenol every 4 hours but continues to be febrile.     PAST MEDICAL & SURGICAL HISTORY:  HTN (hypertension)      Hyperlipidemia      BPH (benign prostatic hypertrophy)      GERD (gastroesophageal reflux disease)      Chronic mastoiditis of left side      Gall stones      HTN (hypertension)      DM (diabetes mellitus)      High cholesterol      Aortic valve replaced   with bovine tissue valve      Hx of cholecystectomy      S/P AVR (aortic valve replacement)   @ [place] (bovine)      History of cholecystectomy      History of ear surgery  ? surgery on left ear        Allergies :- No Known Allergies    Home Medications Reviewed  Hospital Medications:   MEDICATIONS  (STANDING):    SOCIAL HISTORY:  Denies ETOh,Smoking,   FAMILY HISTORY:      REVIEW OF SYSTEMS:  CONSTITUTIONAL: fever +  EYES/ENT: No visual changes;  No vertigo or throat pain   NECK: No pain or stiffness  RESPIRATORY: No cough, wheezing, hemoptysis; No shortness of breath  CARDIOVASCULAR: No chest pain or palpitations.  GASTROINTESTINAL: No abdominal or epigastric pain. No nausea, vomiting, or hematemesis; No diarrhea or constipation. No melena or hematochezia.  GENITOURINARY: No dysuria, frequency, foamy urine, urinary urgency, incontinence or hematuria  NEUROLOGICAL: No numbness or weakness  SKIN: No itching, burning, rashes, or lesions   VASCULAR: No bilateral lower extremity edema.   All other review of systems is negative unless indicated above.    VITALS:  T(F): 101.1 (04-10-25 @ 10:15), Max: 101.1 (04-10-25 @ 10:15)  HR: 82 (04-10-25 @ 10:15)  BP: 168/79 (04-10-25 @ 10:15)  RR: 19 (04-10-25 @ 10:15)  SpO2: 100% (04-10-25 @ 10:15)  Wt(kg): --    Height (cm): 165.1 (:)  Weight (kg): 90.7 (:)  BMI (kg/m2): 33.3 (:)  BSA (m2): 1.98 (:)    PHYSICAL EXAM:  Constitutional: NAD  HEENT: anicteric sclera, oropharynx clear, MMM  Neck: supple.   Respiratory: Bilateral equal breath sounds , no wheezes, no crackles  Cardiovascular: S1, S2, Regular, Murmur present.  Gastrointestinal: Bowel Sound present, soft, NT/ND  Extremities: No cyanosis or clubbing. No peripheral edema  Neurological: Alert and oriented x 3, no focal deficits  Psychiatric: Normal mood, normal affect  : No CVA tenderness. No velasco.   Skin: No rashes    Data:      137  |  96[L]  |  22  ----------------------------<  150[H]  3.4[L]   |  25  |  1.66[H]    Ca    9.6      2025 22:20    TPro  8.0  /  Alb  4.0  /  TBili  0.8  /  DBili      /  AST  82[H]  /  ALT  42[H]  /  AlkPhos  101      Creatinine Trend: 1.66 <--, 1.83 <--                        13.3   9.46  )-----------( 163      ( 2025 22:20 )             39.7     Urine Studies:  Urinalysis Basic - ( 10 Apr 2025 05:29 )    Color: Yellow / Appearance: Clear / S.022 / pH:   Gluc:  / Ketone: Negative mg/dL  / Bili: Negative / Urobili: 0.2 mg/dL   Blood:  / Protein: 300 mg/dL / Nitrite: Negative   Leuk Esterase: Negative / RBC: 3 /HPF / WBC 0 /HPF   Sq Epi:  / Non Sq Epi: 0 /HPF / Bacteria: Negative /HPF

## 2025-04-10 NOTE — PATIENT PROFILE ADULT - FALL HARM RISK - HARM RISK INTERVENTIONS

## 2025-04-10 NOTE — H&P ADULT - PROBLEM SELECTOR PLAN 4
- Mildly elevated LFTs (AST/ALT: 82/42, normal alkaline phosphatase 101) on admission  - Pt states he drinks alcohol only socially at parties  - Send hepatitis panel  - Trend LFTs  - Avoid hepatotoxic medications

## 2025-04-10 NOTE — H&P ADULT - NSHPSOCIALHISTORY_GEN_ALL_CORE
Living situation: Pt lives with his wife  Ambulation status: Walks using a cane and walker for ambulation  Dependent in all ADLs with the help of his wife.   Denies smoking or recreational drug use. Drinks alcohol socially at parties.

## 2025-04-10 NOTE — PHARMACOTHERAPY INTERVENTION NOTE - COMMENTS
Medication list in Outpatient Medication Review (OMR) has been updated; however saved as incomplete as patient could not confirm medication list entirely.  Patient reports to taking all medications dispensed to him by pharmacy however upon reviewing each medication, patient did not seem to be able to confirm all of them?   Medication list in OMR updated with current med list from [facility] - some marked for further clarification.     Home Medications:  Advair Diskus 250 mcg-50 mcg/inh inhalation powder: 1 puff(s) inhaled 2 times a day   amLODIPine 10 mg oral tablet: 1 tab(s) orally once a day   aspirin 81 mg oral delayed release tablet: 1 tab(s) orally once a day   atorvastatin 40 mg oral tablet: 1 tab(s) orally once a day  clopidogrel 75 mg oral tablet: 1 tab(s) orally once a day  gabapentin 100 mg oral capsule: 1 tab(s) orally 3 times a day  hydrALAZINE 50 mg oral tablet: 1 tab(s) orally 3 times a day   hydroCHLOROthiazide 25 mg oral tablet: 1 tab(s) orally once a day   isosorbide mononitrate 60 mg oral tablet, extended release: 1 tab(s) orally once a day  Januvia 50 mg oral tablet: 1 tab(s) orally once a day   Linzess 145 mcg oral capsule: 1 cap(s) orally once a day   memantine 5 mg oral tablet: 1 tab(s) orally 2 times a day  Metoprolol Succinate  mg oral tablet, extended release: 1 tab(s) orally once a day   Multiple Vitamins oral tablet: 1 tab(s) orally once a day   NovoLOG 100 units/mL injectable solution: Pt reports using it based on a scale he has at home   omeprazole 20 mg oral delayed release capsule: 1 tab(s) orally once a day   Restasis 0.05% ophthalmic emulsion: 1 drop(s) in each affected eye 2 times a day   Toujeo SoloStar 300 units/mL subcutaneous solution: Per Pt: Depends on BG if >;200, injects 30 units SQ QHS, if lower - adjusts on own  valsartan 160 mg oral tablet: 1 tab(s) orally once a day

## 2025-04-10 NOTE — CONSULT NOTE ADULT - ASSESSMENT
80 yo male with hx of HTN, HLD, T2DM, CAD, s/p TAVR, PAD comes back to the ED after he was seen yesterday for fever and general malaise. He was dc home yesterday and he has nirali taking tylenol every 4 hours but continues to be febrile.     CKD 3a  serum creatinine at baseline  plan:  no acute renal intervention at this time  BMP QD    Hypokalemia  DASH diet    Fever  suspected viral illness  serology and imaging negative  appreciate primary team input      Thank you for allowing me to participate in the care of your patient    For any question, call:  Cell # [phone]  Pager # [phone]  Callback # [phone]

## 2025-04-10 NOTE — H&P ADULT - NSHPREVIEWOFSYSTEMS_GEN_ALL_CORE
CONSTITUTIONAL:  c/o weakness, fever, and chills  EYES/ENT:  No visual changes;  No vertigo or throat pain   NECK:  No pain or stiffness  RESPIRATORY:  c/o dry cough; denies shortness of breath  CARDIOVASCULAR:  No chest pain or palpitations  GASTROINTESTINAL:  No abdominal pain. No nausea or vomiting; No diarrhea or constipation. No melena or hematochezia.  GENITOURINARY:  No frequency, or hematuria  MUSCULOSKELETAL:  Normal strength in all extremities, No calf tenderness  NEUROLOGICAL:  No numbness, weakness, or tingling   SKIN:  No itching, no rashes or lesions

## 2025-04-10 NOTE — H&P ADULT - NSHPLABSRESULTS_GEN_ALL_CORE
13.3   9.46  )-----------( 163      ( 09 Apr 2025 22:20 )             39.7     04-09    137  |  96[L]  |  22  ----------------------------<  150[H]  3.4[L]   |  25  |  1.66[H]    Ca    9.6      09 Apr 2025 22:20    TPro  8.0  /  Alb  4.0  /  TBili  0.8  /  DBili  x   /  AST  82[H]  /  ALT  42[H]  /  AlkPhos  101  04-09      CT Chest IMPRESSION:  No CT evidence of pneumonia, pulmonary edema, or pleural effusion.    The patient is status post TAVR.  Stable aneurysmal dilatation of the mid   ascending thoracic aorta, to approximately 4.2 cm in transverse caliber.    Approximately 1.2 cm thyroid nodule.  In the absence of a family history   or risk factors for thyroid cancer, the American College of Radiology   does not recommend routine follow-up of incidental thyroid nodules   measuring less than 1.5 cm in this age group.

## 2025-04-10 NOTE — ED ADULT NURSE REASSESSMENT NOTE - NS ED NURSE REASSESS COMMENT FT1
patient laying in semi fowlers position on the stretcher. patient alert and oriented times three. patient denies shortness of breath, chest pain, nausea, vomiting, chill, fever. Patient normal sinus on the monitor. Respirations equal and adequate. Patients IV patent, no signs of infiltration. Safety measures in place, call bell within reach. Patient stable upon leaving the room.

## 2025-04-10 NOTE — H&P ADULT - PROBLEM SELECTOR PLAN 1
Pt presented to the ER with recurrent fevers, chills, and malaise for 3 days  H/o recent travel to [facility] 3-4 weeks ago  Denies any insect or tick bites; Denies sick contacts.   In the ED, vital signs were significant for 101F fever and tachycardia 102 bpm (SIRS+), unclear etiology of sepsis at this time  S/p 500 ml NS bolus  and 1 dose of Vanc and Cefepime by ER physician.     - Elevated lactate 2.6 and elevated  noted on admission  - Full RVP negative  - CXR and CT Chest reviewed: No evidence of pneumonia, pulmonary edema, or pleural effusion  - UA negative  - Follow blood and urine cultures  - Send sputum culture  - Send urine antigens for legionella and streptococcus pneumoniae  - Send Mycoplasma IgM  - Send procal  - Obtain MRSA PCR  - Obtain ESR  - Obtain Dengue and Malaria antibodies given recent travel history to [facility]  - Obtain TTE to assess for vegetations r/o infective endocarditis in pt with h/o TAVR  - Start IV Vancomycin, Cefepime, and Doxycycline for now - will de-escalate antibiotics according to culture sensitivities

## 2025-04-10 NOTE — ED ADULT NURSE REASSESSMENT NOTE - NS ED NURSE REASSESS COMMENT FT1
Received report from night RN Shruti. Pt laying in stretcher comfortably. NAD. Pt endorses relief of nausea, however states they still are experiencing poor appetite. Pt denies any dizziness, lightheadedness, weakness, chest pain, SOB, palpitations, or any pain at this time. AxOx4. RR even and unlabored on room air. S1 andS2 noted, rate and rhythm regular. Abdomen soft, nondistended, and nontender. Pending TTE and bed placement. Safety maintained.

## 2025-04-10 NOTE — ED ADULT NURSE REASSESSMENT NOTE - NS ED NURSE REASSESS COMMENT FT1
patient laying in semi fowlers position on the stretcher. patient alert and oriented times four. patient denies shortness of breath, chest pain, nausea, vomiting, chill, fever. Patient normal sinus on the monitor. Respirations equal and adequate. Patients IV patent, no signs of infiltration. Safety measures in place, call bell within reach. Patient stable upon assessment. patient laying in semi fowlers position on the stretcher. patient alert and oriented times four. patient denies shortness of breath, chest pain, nausea, vomiting, chill, fever. Respirations equal and adequate. Patients IV patent, no signs of infiltration. Safety measures in place, call bell within reach. Patient stable upon assessment.

## 2025-04-10 NOTE — H&P ADULT - PROBLEM SELECTOR PLAN 6
- According to pt, home medications include Januvia 50 mg daily, Insulin Novolog and Toujeo, unclear dose  - Pt reports compliance with medications  - Send HbA1c  - Hold Januvia inpatient  - Start Insulin lispro correctional sliding scale  - Monitor POCT glucose before each meal and at bedtime, or q6h while NPO  - Carbohydrate-consistent diet  - Inpatient glucose goal 100-180  - Hypoglycemia orderset prn  - Extensively discussed importance of glycemic control to prevent micro- and macrovascular complications  - Discussed importance of weight loss, exercise, and changing diet   - Reviewed symptoms and management of hypoglycemia  - Reviewed importance of medication adherence,  glucose monitoring, and following a consistent carb diet   - F/u outpatient with PCP

## 2025-04-10 NOTE — H&P ADULT - ASSESSMENT
78 yo M with PMH of hypertension, hyperlipidemia, type 2 diabetes mellitus, CAD, PAD, AS s/p TAVR presented to the ER with recurrent fevers, chills, and malaise for 3 days. Admitted to Medicine for sepsis of unclear etiology.

## 2025-04-10 NOTE — H&P ADULT - PROBLEM SELECTOR PLAN 3
- Elevated BUN / Cr: 33/1.83 on admission i/s/o underlying sepsis, medication (Valsartan) use, and CKD stage 3A (Baseline Cr 1.5 in 01/2025)  - BMP, mag, phos daily - trend Cr, maintain K+ > 4, Mg+ > 2  - Monitor urine output  - Send urine electrolytes   - Obtain Renal and bladder USG  - Avoid NSAIDs and other nephrotoxic agents  - Renally dose medications  - Nephrology following

## 2025-04-10 NOTE — H&P ADULT - NSHPPHYSICALEXAM_GEN_ALL_CORE
Vital Signs Last 24 Hrs  T(C): 38.4 (10 Apr 2025 10:15), Max: 38.4 (10 Apr 2025 10:15)  T(F): 101.1 (10 Apr 2025 10:15), Max: 101.1 (10 Apr 2025 10:15)  HR: 82 (10 Apr 2025 10:15) (76 - 100)  BP: 168/79 (10 Apr 2025 10:15) (124/74 - 172/99)  BP(mean): 102 (10 Apr 2025 08:30) (102 - 109)  RR: 19 (10 Apr 2025 10:15) (17 - 20)  SpO2: 100% (10 Apr 2025 10:15) (96% - 100%)    Parameters below as of 10 Apr 2025 10:15  Patient On (Oxygen Delivery Method): room air    PHYSICAL EXAM:  GENERAL: NAD, ill-appearing  HEAD:  Atraumatic, Normocephalic  EYES: EOMI, PERRLA, conjunctiva and sclera clear  NECK: Supple, No JVD  CHEST/LUNG: Clear to auscultation bilaterally; No wheezing, rhonchi, or rales  HEART: Tachycardic, regular rhythm; No murmurs, rubs, or gallops  ABDOMEN: Soft, Nontender, Nondistended; Bowel sounds present  EXTREMITIES:  2+ Peripheral Pulses, No clubbing, cyanosis, or edema  NEUROLOGY: AAOx3, no focal neurological deficits   SKIN: No rashes or lesions observed

## 2025-04-10 NOTE — H&P ADULT - PROBLEM SELECTOR PLAN 5
- C/w home dose Metoprolol succinate 100 mg daily  - C/w home dose Amlodipine 10 mg daily  - C/w home dose Hydralazine 50 mg q8h  - Hold home Valsartan 160 mg daily  - Monitor vitals q 4 hrs

## 2025-04-11 ENCOUNTER — TRANSCRIPTION ENCOUNTER (OUTPATIENT)
Age: 80
End: 2025-04-11

## 2025-04-11 LAB
ADD ON TEST-SPECIMEN IN LAB: SIGNIFICANT CHANGE UP
ADD ON TEST-SPECIMEN IN LAB: SIGNIFICANT CHANGE UP
ALBUMIN SERPL ELPH-MCNC: 3.4 G/DL — SIGNIFICANT CHANGE UP (ref 3.3–5)
ALP SERPL-CCNC: 103 U/L — SIGNIFICANT CHANGE UP (ref 40–120)
ALT FLD-CCNC: 29 U/L — SIGNIFICANT CHANGE UP (ref 4–41)
AMPHET UR-MCNC: NEGATIVE — SIGNIFICANT CHANGE UP
ANION GAP SERPL CALC-SCNC: 11 MMOL/L — SIGNIFICANT CHANGE UP (ref 7–14)
ANION GAP SERPL CALC-SCNC: 13 MMOL/L — SIGNIFICANT CHANGE UP (ref 7–14)
AST SERPL-CCNC: 72 U/L — HIGH (ref 4–40)
B BURGDOR C6 AB SER-ACNC: NEGATIVE — SIGNIFICANT CHANGE UP
B BURGDOR IGG+IGM SER-ACNC: 0.08 INDEX — SIGNIFICANT CHANGE UP (ref 0.01–0.9)
BARBITURATES UR SCN-MCNC: NEGATIVE — SIGNIFICANT CHANGE UP
BENZODIAZ UR-MCNC: NEGATIVE — SIGNIFICANT CHANGE UP
BILIRUB DIRECT SERPL-MCNC: <0.2 MG/DL — SIGNIFICANT CHANGE UP (ref 0–0.3)
BILIRUB INDIRECT FLD-MCNC: >0.5 MG/DL — SIGNIFICANT CHANGE UP (ref 0–1)
BILIRUB SERPL-MCNC: 0.7 MG/DL — SIGNIFICANT CHANGE UP (ref 0.2–1.2)
BUN SERPL-MCNC: 17 MG/DL — SIGNIFICANT CHANGE UP (ref 7–23)
BUN SERPL-MCNC: 20 MG/DL — SIGNIFICANT CHANGE UP (ref 7–23)
CALCIUM SERPL-MCNC: 8.9 MG/DL — SIGNIFICANT CHANGE UP (ref 8.4–10.5)
CALCIUM SERPL-MCNC: 9 MG/DL — SIGNIFICANT CHANGE UP (ref 8.4–10.5)
CHLORIDE SERPL-SCNC: 95 MMOL/L — LOW (ref 98–107)
CHLORIDE SERPL-SCNC: 96 MMOL/L — LOW (ref 98–107)
CHLORIDE UR-SCNC: <20 MMOL/L — SIGNIFICANT CHANGE UP
CK MB BLD-MCNC: 3.4 % — HIGH (ref 0–2.5)
CK MB CFR SERPL CALC: 1.4 NG/ML — SIGNIFICANT CHANGE UP
CK SERPL-CCNC: 41 U/L — SIGNIFICANT CHANGE UP (ref 30–200)
CO2 SERPL-SCNC: 24 MMOL/L — SIGNIFICANT CHANGE UP (ref 22–31)
CO2 SERPL-SCNC: 25 MMOL/L — SIGNIFICANT CHANGE UP (ref 22–31)
COCAINE METAB.OTHER UR-MCNC: NEGATIVE — SIGNIFICANT CHANGE UP
CREAT SERPL-MCNC: 1.47 MG/DL — HIGH (ref 0.5–1.3)
CREAT SERPL-MCNC: 1.52 MG/DL — HIGH (ref 0.5–1.3)
CREATININE URINE RESULT, DAU: 105 MG/DL — SIGNIFICANT CHANGE UP
CULTURE RESULTS: SIGNIFICANT CHANGE UP
EGFR: 46 ML/MIN/1.73M2 — LOW
EGFR: 46 ML/MIN/1.73M2 — LOW
EGFR: 48 ML/MIN/1.73M2 — LOW
EGFR: 48 ML/MIN/1.73M2 — LOW
FENTANYL UR QL SCN: NEGATIVE — SIGNIFICANT CHANGE UP
GLUCOSE BLDC GLUCOMTR-MCNC: 175 MG/DL — HIGH (ref 70–99)
GLUCOSE BLDC GLUCOMTR-MCNC: 187 MG/DL — HIGH (ref 70–99)
GLUCOSE BLDC GLUCOMTR-MCNC: 194 MG/DL — HIGH (ref 70–99)
GLUCOSE BLDC GLUCOMTR-MCNC: 283 MG/DL — HIGH (ref 70–99)
GLUCOSE SERPL-MCNC: 159 MG/DL — HIGH (ref 70–99)
GLUCOSE SERPL-MCNC: 229 MG/DL — HIGH (ref 70–99)
HAV IGM SER-ACNC: SIGNIFICANT CHANGE UP
HBV CORE IGM SER-ACNC: SIGNIFICANT CHANGE UP
HBV SURFACE AG SER-ACNC: SIGNIFICANT CHANGE UP
HCT VFR BLD CALC: 38.1 % — LOW (ref 39–50)
HCV AB S/CO SERPL IA: 0.24 S/CO — SIGNIFICANT CHANGE UP (ref 0–0.79)
HCV AB SERPL-IMP: SIGNIFICANT CHANGE UP
HGB BLD-MCNC: 12.8 G/DL — LOW (ref 13–17)
LEGIONELLA AG UR QL: NEGATIVE — SIGNIFICANT CHANGE UP
MCHC RBC-ENTMCNC: 28.8 PG — SIGNIFICANT CHANGE UP (ref 27–34)
MCHC RBC-ENTMCNC: 33.6 G/DL — SIGNIFICANT CHANGE UP (ref 32–36)
MCV RBC AUTO: 85.6 FL — SIGNIFICANT CHANGE UP (ref 80–100)
METHADONE UR-MCNC: NEGATIVE — SIGNIFICANT CHANGE UP
NRBC # BLD AUTO: 0 K/UL — SIGNIFICANT CHANGE UP (ref 0–0)
NRBC # FLD: 0 K/UL — SIGNIFICANT CHANGE UP (ref 0–0)
NRBC BLD AUTO-RTO: 0 /100 WBCS — SIGNIFICANT CHANGE UP (ref 0–0)
OPIATES UR-MCNC: NEGATIVE — SIGNIFICANT CHANGE UP
OXYCODONE UR-MCNC: NEGATIVE — SIGNIFICANT CHANGE UP
PCP SPEC-MCNC: SIGNIFICANT CHANGE UP
PCP UR-MCNC: NEGATIVE — SIGNIFICANT CHANGE UP
PLATELET # BLD AUTO: 143 K/UL — LOW (ref 150–400)
POTASSIUM SERPL-MCNC: 3.4 MMOL/L — LOW (ref 3.5–5.3)
POTASSIUM SERPL-MCNC: 3.9 MMOL/L — SIGNIFICANT CHANGE UP (ref 3.5–5.3)
POTASSIUM SERPL-SCNC: 3.4 MMOL/L — LOW (ref 3.5–5.3)
POTASSIUM SERPL-SCNC: 3.9 MMOL/L — SIGNIFICANT CHANGE UP (ref 3.5–5.3)
POTASSIUM UR-SCNC: 28.3 MMOL/L — SIGNIFICANT CHANGE UP
PROT SERPL-MCNC: 7.1 G/DL — SIGNIFICANT CHANGE UP (ref 6–8.3)
RBC # BLD: 4.45 M/UL — SIGNIFICANT CHANGE UP (ref 4.2–5.8)
RBC # FLD: 14 % — SIGNIFICANT CHANGE UP (ref 10.3–14.5)
S PNEUM AG UR QL: NEGATIVE — SIGNIFICANT CHANGE UP
SODIUM SERPL-SCNC: 130 MMOL/L — LOW (ref 135–145)
SODIUM SERPL-SCNC: 134 MMOL/L — LOW (ref 135–145)
SODIUM UR-SCNC: <20 MMOL/L — SIGNIFICANT CHANGE UP
SPECIMEN SOURCE: SIGNIFICANT CHANGE UP
THC UR QL: NEGATIVE — SIGNIFICANT CHANGE UP
WBC # BLD: 8.26 K/UL — SIGNIFICANT CHANGE UP (ref 3.8–10.5)
WBC # FLD AUTO: 8.26 K/UL — SIGNIFICANT CHANGE UP (ref 3.8–10.5)

## 2025-04-11 PROCEDURE — 99223 1ST HOSP IP/OBS HIGH 75: CPT

## 2025-04-11 PROCEDURE — G0545: CPT

## 2025-04-11 PROCEDURE — 99233 SBSQ HOSP IP/OBS HIGH 50: CPT

## 2025-04-11 PROCEDURE — 74176 CT ABD & PELVIS W/O CONTRAST: CPT | Mod: 26

## 2025-04-11 RX ORDER — POLYETHYLENE GLYCOL 3350 17 G/17G
17 POWDER, FOR SOLUTION ORAL DAILY
Refills: 0 | Status: DISCONTINUED | OUTPATIENT
Start: 2025-04-11 | End: 2025-04-12

## 2025-04-11 RX ORDER — BISACODYL 5 MG
5 TABLET, DELAYED RELEASE (ENTERIC COATED) ORAL EVERY 12 HOURS
Refills: 0 | Status: DISCONTINUED | OUTPATIENT
Start: 2025-04-11 | End: 2025-04-18

## 2025-04-11 RX ADMIN — HEPARIN SODIUM 5000 UNIT(S): 1000 INJECTION INTRAVENOUS; SUBCUTANEOUS at 06:10

## 2025-04-11 RX ADMIN — Medication 50 MILLIGRAM(S): at 13:11

## 2025-04-11 RX ADMIN — GABAPENTIN 100 MILLIGRAM(S): 400 CAPSULE ORAL at 06:09

## 2025-04-11 RX ADMIN — CEFEPIME 100 MILLIGRAM(S): 2 INJECTION, POWDER, FOR SOLUTION INTRAVENOUS at 06:09

## 2025-04-11 RX ADMIN — INSULIN LISPRO 1: 100 INJECTION, SOLUTION INTRAVENOUS; SUBCUTANEOUS at 22:24

## 2025-04-11 RX ADMIN — INSULIN LISPRO 1: 100 INJECTION, SOLUTION INTRAVENOUS; SUBCUTANEOUS at 13:09

## 2025-04-11 RX ADMIN — GABAPENTIN 100 MILLIGRAM(S): 400 CAPSULE ORAL at 22:26

## 2025-04-11 RX ADMIN — METOPROLOL SUCCINATE 100 MILLIGRAM(S): 50 TABLET, EXTENDED RELEASE ORAL at 06:09

## 2025-04-11 RX ADMIN — HEPARIN SODIUM 5000 UNIT(S): 1000 INJECTION INTRAVENOUS; SUBCUTANEOUS at 22:26

## 2025-04-11 RX ADMIN — AMLODIPINE BESYLATE 10 MILLIGRAM(S): 10 TABLET ORAL at 06:09

## 2025-04-11 RX ADMIN — Medication 5 MILLIGRAM(S): at 09:10

## 2025-04-11 RX ADMIN — ATORVASTATIN CALCIUM 40 MILLIGRAM(S): 80 TABLET, FILM COATED ORAL at 22:26

## 2025-04-11 RX ADMIN — Medication 40 MILLIEQUIVALENT(S): at 09:10

## 2025-04-11 RX ADMIN — Medication 81 MILLIGRAM(S): at 13:10

## 2025-04-11 RX ADMIN — Medication 50 MILLIGRAM(S): at 22:26

## 2025-04-11 RX ADMIN — INSULIN LISPRO 1: 100 INJECTION, SOLUTION INTRAVENOUS; SUBCUTANEOUS at 09:10

## 2025-04-11 RX ADMIN — HEPARIN SODIUM 5000 UNIT(S): 1000 INJECTION INTRAVENOUS; SUBCUTANEOUS at 13:11

## 2025-04-11 RX ADMIN — Medication 50 MILLIGRAM(S): at 06:09

## 2025-04-11 RX ADMIN — GABAPENTIN 100 MILLIGRAM(S): 400 CAPSULE ORAL at 13:09

## 2025-04-11 RX ADMIN — Medication 100 MILLIGRAM(S): at 17:32

## 2025-04-11 RX ADMIN — INSULIN LISPRO 1: 100 INJECTION, SOLUTION INTRAVENOUS; SUBCUTANEOUS at 18:01

## 2025-04-11 RX ADMIN — Medication 40 MILLIEQUIVALENT(S): at 14:34

## 2025-04-11 RX ADMIN — Medication 100 MILLIGRAM(S): at 06:10

## 2025-04-11 NOTE — DISCHARGE NOTE PROVIDER - NSDCFUSCHEDAPPT_GEN_ALL_CORE_FT
[facility]  [address] [address] [facility]  Scheduled Appointment: 04/14/2025

## 2025-04-11 NOTE — DISCHARGE NOTE PROVIDER - ATTENDING DISCHARGE PHYSICAL EXAMINATION:
Vital Signs Last 24 Hrs  T(C): 36.7 (18 Apr 2025 12:37), Max: 36.8 (17 Apr 2025 17:43)  T(F): 98 (18 Apr 2025 12:37), Max: 98.2 (17 Apr 2025 17:43)  HR: 68 (18 Apr 2025 12:37) (68 - 88)  BP: 145/85 (18 Apr 2025 12:37) (122/60 - 145/85)  BP(mean): --  RR: 17 (18 Apr 2025 12:37) (17 - 18)  SpO2: 98% (18 Apr 2025 12:37) (98% - 100%)    Parameters below as of 18 Apr 2025 05:31  Patient On (Oxygen Delivery Method): room air    PHYSICAL EXAM:  GENERAL: NAD, well-developed  HEAD:  Atraumatic, Normocephalic  EYES: EOMI, PERRLA, conjunctiva and sclera clear  NECK: Supple, No JVD  CHEST/LUNG: Clear to auscultation bilaterally; No wheezing, rhonchi, or rales  HEART: Regular rate and rhythm; No murmurs, rubs, or gallops  ABDOMEN: Soft, Nontender, Nondistended; Bowel sounds present  EXTREMITIES:  2+ Peripheral Pulses, No clubbing, cyanosis, or edema  NEUROLOGY: AAOx3, no focal neurological deficits   SKIN: No rashes or lesions observed

## 2025-04-11 NOTE — PHYSICAL THERAPY INITIAL EVALUATION ADULT - GENERAL OBSERVATIONS, REHAB EVAL
Patient found semi reclined in bed; HR 72bpm; spoke with SAMINA Colbert, who advised patient may participate as tolerated.

## 2025-04-11 NOTE — PROGRESS NOTE ADULT - SUBJECTIVE AND OBJECTIVE BOX
DATE OF SERVICE: 04-11-25    Patient denies chest pain or shortness of breath.   Review of symptoms otherwise negative.    MEDICATIONS:  acetaminophen     Tablet .. 650 milliGRAM(s) Oral every 6 hours PRN  amLODIPine   Tablet 10 milliGRAM(s) Oral daily  aspirin  chewable 81 milliGRAM(s) Oral daily  atorvastatin 40 milliGRAM(s) Oral at bedtime  bisacodyl 5 milliGRAM(s) Oral every 12 hours PRN  cefepime   IVPB      cefepime   IVPB 1000 milliGRAM(s) IV Intermittent every 8 hours  clopidogrel Tablet 75 milliGRAM(s) Oral daily  dextrose 5%. 1000 milliLiter(s) IV Continuous <Continuous>  dextrose 5%. 1000 milliLiter(s) IV Continuous <Continuous>  dextrose 50% Injectable 25 Gram(s) IV Push once  dextrose 50% Injectable 12.5 Gram(s) IV Push once  dextrose 50% Injectable 25 Gram(s) IV Push once  dextrose Oral Gel 15 Gram(s) Oral once PRN  doxycycline IVPB      doxycycline IVPB 100 milliGRAM(s) IV Intermittent every 12 hours  gabapentin 100 milliGRAM(s) Oral three times a day  glucagon  Injectable 1 milliGRAM(s) IntraMuscular once  heparin   Injectable 5000 Unit(s) SubCutaneous every 8 hours  hydrALAZINE 50 milliGRAM(s) Oral three times a day  insulin lispro (ADMELOG) corrective regimen sliding scale   SubCutaneous three times a day before meals  insulin lispro (ADMELOG) corrective regimen sliding scale   SubCutaneous at bedtime  metoprolol succinate  milliGRAM(s) Oral daily  polyethylene glycol 3350 17 Gram(s) Oral daily PRN  potassium chloride   Powder 40 milliEquivalent(s) Oral every 6 hours      LABS:                        12.8   8.26  )-----------( 143      ( 11 Apr 2025 06:15 )             38.1       Hemoglobin: 12.8 g/dL (04-11 @ 06:15)  Hemoglobin: 12.7 g/dL (04-10 @ 13:30)  Hemoglobin: 13.3 g/dL (04-09 @ 22:20)  Hemoglobin: 13.5 g/dL (04-08 @ 12:52)      04-11    134[L]  |  96[L]  |  17  ----------------------------<  159[H]  3.4[L]   |  25  |  1.47[H]    Ca    9.0      11 Apr 2025 06:15  Phos  2.3     04-10  Mg     1.60     04-10    TPro  8.0  /  Alb  4.0  /  TBili  0.8  /  DBili  x   /  AST  82[H]  /  ALT  42[H]  /  AlkPhos  101  04-09    Creatinine Trend: 1.47<--, 1.50<--, 1.66<--, 1.83<--    COAGS:           PHYSICAL EXAM:  T(C): 37.2 (04-11-25 @ 05:04), Max: 38.4 (04-10-25 @ 13:35)  HR: 74 (04-11-25 @ 05:04) (61 - 84)  BP: 147/80 (04-11-25 @ 05:04) (129/81 - 159/79)  RR: 16 (04-11-25 @ 05:04) (16 - 18)  SpO2: 99% (04-11-25 @ 05:04) (95% - 99%)  Wt(kg): --    I&O's Summary      General:  Alert and Oriented * 3.   Head: Normocephalic and atraumatic.   Neck: No JVD. No bruits. Supple. Does not appear to be enlarged.   Cardiovascular: + S1,S2 ; RRR Soft systolic murmur at the left lower sternal border. No rubs noted.    Lungs: CTA b/l. No rhonchi, rales or wheezes.   Abdomen: + BS, soft. Non tender. Non distended. No rebound. No guarding.   Extremities: No clubbing/cyanosis/edema.   Neurologic: Moves all four extremities. Full range of motion.   Skin: Warm and moist. The patient's skin has normal elasticity and good skin turgor.   Psychiatric: Appropriate mood and affect.  Musculoskeletal: Normal range of motion, normal strength     TELEMETRY: NSR	      ECG:  	NSR    RADIOLOGY:         CXR:   < from: CT Chest No Cont (04.10.25 @ 02:08) >    IMPRESSION:  No CT evidence of pneumonia, pulmonary edema, or pleural effusion.    The patient is status post TAVR.  Stable aneurysmal dilatation of the mid   ascending thoracic aorta, to approximately 4.2 cm in transverse caliber.    Approximately 1.2 cm thyroid nodule.  In the absence of a family history   or risk factors for thyroid cancer, the American College of Radiology   does not recommend routine follow-up of incidental thyroid nodules   measuring less than 1.5 cm in this age group.    < end of copied text >      < from: TTE W or WO Ultrasound Enhancing Agent (04.10.25 @ 08:47) >    CONCLUSIONS:      1. Left ventricular systolic function is normal.   2. The right ventricle is not well visualized.   3. A atranscatheter deployed (TAVR) is present in the aortic position. The peak transaortic velocity is 1.42 m/s, peak transaortic gradient is 8.0 mmHg and mean transaortic gradient is 4.1 mmHg with an LVOT/aortic valve VTI ratio of 0.62.   4. No pericardial effusion seen.   5. Technically difficult image quality.    < end of copied text >      < from: Cardiac Catheterization (06.22.23 @ 17:04) >  The coronary circulation is right dominant. Cardiac catheterization  was performed electively.  LM   Left main artery: Angiography shows minor irregularities.      LAD   Proximal left anterior descending: The segment is moderately ectatic.Angiography shows moderate atherosclerosis. There is a40 % stenosis.      CX   Circumflex: Angiography shows minor irregularities.      RCA   Right coronary artery: Angiography shows minor irregularities.        ASSESSMENT/PLAN: Pt is an 79 year old male with  a past medical history of hypertension, hypercholesterolemia, diabetes, chronic renal insufficiency, peripheral vascular disease, bioprosthetic AVR in 2009 status post TAVR of his bioprosthetic AVR in July 2023, with  known venous insufficiency status post Varithena closure of the left great saphenous vein presented to the ER with recurrent fevers, chills, and malaise for 3 days.     HTN/HLD/TAVR/CAD/TAA  - c/w ASA and statin, has h/p of moderate non obstructive CAD  - c/w BB for stable TAA  - c/w Amlodipine and hydralazine  - c/w ASA, discussed with Dr. Coffman will take off Plavix  - abx  per medicine/ID, cultures NGTD    Albert Serna MD  Pager: [phone]  Office: [phone]

## 2025-04-11 NOTE — DISCHARGE NOTE PROVIDER - CARE PROVIDER_API CALL
Yue Ash  [facility]  [address] [place] [address]  Phone: [phone]  Fax: [phone]  Follow Up Time:     Ej Sanders  Gastroenterology  [address] [place] [id number]  Phone: [phone]  Fax: [phone]  Follow Up Time:

## 2025-04-11 NOTE — PHYSICAL THERAPY INITIAL EVALUATION ADULT - LEVEL OF INDEPENDENCE: SIT/STAND, REHAB EVAL
patient deferred; patient reports feeling dizzy in sitting with /81 mmHg SAMINA Colbert aware/unable to perform

## 2025-04-11 NOTE — CONSULT NOTE ADULT - ASSESSMENT
80 yo M with PMH of hypertension, hyperlipidemia, type 2 diabetes mellitus, CAD, PAD, AS s/p TAVR in 2023 presented to the ER with recurrent fevers, chills, and malaise for 3 days. Pt was seen in the ER 2 days ago with similar complaints, and was thought to have a viral illness and was discharged home. He feels worse today, more fatigued, and c/o dry cough. Of note, pt states he recently traveled to [facility] 3-4 weeks ago but denies any insect or tick bites and denies sick contacts. He otherwise feels well and denies SOB, N/V, abdominal pain, chest pain, back pain, urinary complaints, or other acute symptoms.     In the ED, vital signs were significant for 101F fever and tachycardia 102 bpm.    Labs reviewed showed no leucocytosis, bands 9%, CXR clear, UA negative, RVP negative, but were remarkable for elevated lactate 2.6, elevated , procal 1.70, ESR: 70, mildly elevated LFTs AST/ALT 82/41, ALP: normal, Bili: normal. A1c: 6.4, creat 1.54--, Acute hepatitis panel negative.     CXR and CT Chest: Unremarkable with no evidence of consolidation, effusion, or pneumothorax  US kidneys 04/10-   Mildly echogenic kidneys. No hydronephrosis.    MICRO:   04/10 full RVP: negative   04/10 Ucx- negative   04/10 Bcx- ngtd  04/09 BCX- NGTD  04/08 Full RVP: negative   04/08 BCX- ngtd    # Fevers  # mildly elevated LFTs  # S/P TAVR   # Thrombocytopenia     Recommendations:           In addition to reviewing history, imaging, documents, labs, microbiology, took into account antibiotic stewardship, local antibiogram and infection control strategies and potential transmission issues.    Discussed with the primary team.    Marleni Escobar MD  Attending Physician, Division of Infectious Diseases  Department of Medicine   [facility]    Contact on TEAMS messaging from 9am - 5pm  Office: [phone] (after 5 PM or weekend)   80 yo M with PMH of hypertension, hyperlipidemia, type 2 diabetes mellitus, CAD, PAD, AS s/p TAVR  presented to the ER with fevers, chills, and malaise and generalized body aches for 3 days starting 04/07/25. Pt was seen in the ER 2 days ago with similar complaints, and was thought to have a viral illness and was discharged home. His symptoms didn't improve and felt worse, more fatigued, and c/o dry cough. He otherwise denies SOB, N/V, abdominal pain, chest pain, back pain, urinary complaints, joint pain, oral ulcers or other acute symptoms.     The patient is originally from [place] [facility] [place]. Moved to the  30 years ago. Lives with his wife. Reports social alcohol use, no smoking, no drug use. No pets at home. Travels back to [place] often, most recently was in [place] for a month and [place] the [place] on 04[place]05/25  Stayed at home and denies exposure to freshwater water. Reports no exposure to cattle or other animals, Denies mosquito bites and exposure to other insects. 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 Reports no exposure to anyone with active TB, no personal history of latent TB.     In the ED, vital signs were significant for 101F fever and tachycardia 102 bpm. Labs reviewed showed no leucocytosis, bands 9%, CXR clear, UA negative, RVP negative, but were remarkable for elevated lactate 2.6, elevated , procal 1.70, ESR: 70, mildly elevated LFTs AST/ALT 82/41, ALP: normal, Bili: normal. A1c: 6.4, creat 1.54--, Acute hepatitis panel negative. CXR and CT Chest: Unremarkable with no evidence of consolidation, effusion, or pneumothorax. US kidneys 04/10-   Mildly echogenic kidneys. No hydronephrosis.    Infectious diseases consultation requested today for further management.     MICRO:   04/10 full RVP: negative   04/10 Ucx- negative   04/10 Bcx- ngtd  04/09 BCX- NGTD  04/08 Full RVP: negative   04/08 BCX- ngtd    # Fevers  # mildly elevated LFTs - trending down   # S/P TAVR   # Mild Thrombocytopenia   # SAMARIA on CKD     Recommendations:   - Can stop cefepime, no evidence of acute bacterial infection at this time.   - Can continue Doxycycline 100 mg Q12H   - Can check for malaria (blood parasite smear X 3- 12 hrs apart) pending  - Dengue serologies pending   - Obtain CTAP non contrast  - Monitor LFTs closely   - Check HIV CIMA   - Can check Leptospira serologies (although low suspicion)      In addition to reviewing history, imaging, documents, labs, microbiology, took into account antibiotic stewardship, local antibiogram and infection control strategies and potential transmission issues.    Discussed with the primary team.    Marleni Escobar MD  Attending Physician, Division of Infectious Diseases  Department of Medicine   [facility]    Contact on TEAMS messaging from 9am - 5pm  Office: [phone] (after 5 PM or weekend)   78 yo M with PMH of hypertension, hyperlipidemia, type 2 diabetes mellitus, CAD, PAD, AS s/p TAVR  presented to the ER with fevers, chills, and malaise and generalized body aches for 3 days starting 04/07/25. Pt was seen in the ER 2 days ago with similar complaints, and was thought to have a viral illness and was discharged home. His symptoms didn't improve and felt worse, more fatigued, and c/o dry cough. He otherwise denies SOB, N/V, abdominal pain, chest pain, back pain, urinary complaints, joint pain, oral ulcers or other acute symptoms.     The patient is originally from [facility]. Moved to the  30 years ago. Lives with his wife. Reports social alcohol use, no smoking, no drug use. No pets at home. Travels back to [place] often, most recently was in [place] for a month and [place] 04[place]05[place]25. Stayed at home and denies exposure to freshwater water. Reports no exposure to cattle or other animals, Denies mosquito bites and exposure to other insects. 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 Reports no exposure to anyone with active TB, no personal history of latent TB. Reports recent dental procedure 4 weeks ago before traveling to [place] [facility] [facility].     In the ED, vital signs were significant for 101F fever and tachycardia 102 bpm. Labs reviewed showed no leucocytosis, bands 9%, CXR clear, UA negative, RVP negative, but were remarkable for elevated lactate 2.6, elevated , procal 1.70, ESR: 70, mildly elevated LFTs AST/ALT 82/41, ALP: normal, Bili: normal. A1c: 6.4, creat 1.54--, Acute hepatitis panel negative. CXR and CT Chest: Unremarkable with no evidence of consolidation, effusion, or pneumothorax. US kidneys 04/10-   Mildly echogenic kidneys. No hydronephrosis. TTE was Technically difficult image quality.  A transcatheter deployed (TAVR) is present in the aortic position.     Infectious diseases consultation requested today for further management.     MICRO:   04/10 full RVP: negative   04/10 Ucx- negative   04/10 Bcx- ngtd  04/09 BCX- NGTD  04/08 Full RVP: negative   04/08 BCX- ngtd    # Fevers  # mildly elevated LFTs - trending down   # S/P TAVR   # Mild Thrombocytopenia   # SAMARIA on CKD   # Elevated inflammatory markers     Recommendations:   - Can stop cefepime, no evidence of acute bacterial infection at this time.   - Can continue Doxycycline 100 mg Q12H   - Can check for malaria (blood parasite smear X 3- 12 hrs apart) pending  - Dengue serologies pending   - Obtain CTAP non contrast  - Monitor LFTs closely   - Check HIV CIMA   - Can check Leptospira serologies (although low suspicion)  - If infectious workup remains negative, and still febrile would need further workup (WBC scan/BALJINDER)      In addition to reviewing history, imaging, documents, labs, microbiology, took into account antibiotic stewardship, local antibiogram and infection control strategies and potential transmission issues.    Discussed with the primary team.    Marleni Escobar MD  Attending Physician, Division of Infectious Diseases  Department of Medicine   [facility]    Contact on TEAMS messaging from 9am - 5pm  Office: [phone] (after 5 PM or weekend)

## 2025-04-11 NOTE — CONSULT NOTE ADULT - SUBJECTIVE AND OBJECTIVE BOX
Patient is a 79y old  Male who presents with a chief complaint of Fever of unknown origin (11 Apr 2025 08:59)    HPI:  78 yo M with PMH of hypertension, hyperlipidemia, type 2 diabetes mellitus, CAD, PAD, AS s/p TAVR presented to the ER with recurrent fevers, chills, and malaise for 3 days. Pt was seen in the ER 2 days ago with similar complaints, and was thought to have a viral illness and was discharged home. He feels worse today, more fatigued, and c/o dry cough. Of note, pt states he recently traveled to [facility] 3-4 weeks ago but denies any insect or tick bites and denies sick contacts. He otherwise feels well and denies SOB, N/V, abdominal pain, chest pain, back pain, urinary complaints, or other acute symptoms.     In the ED, vital signs were significant for 101F fever and tachycardia 102 bpm.  Labs reviewed showed no elevated WBC, CXR clear, UA negative, RVP negative, but were remarkable for elevated lactate 2.6, elevated .  CXR and CT Chest: Unremarkable with no evidence of consolidation, effusion, or pneumothorax    Pt was given 500 ml NS bolus  and 1 dose of Vanc and Cefepime by ER physician.     Pt was then endorsed to Medicine for admission for sepsis of unclear etiology.  (10 Apr 2025 12:29)       prior hospital charts reviewed [X  ]  primary team notes reviewed [ X ]  other consultant notes reviewed [X  ]    PAST MEDICAL & SURGICAL HISTORY:  HTN (hypertension)      Hyperlipidemia      BPH (benign prostatic hypertrophy)      GERD (gastroesophageal reflux disease)      Chronic mastoiditis of left side      Gall stones      HTN (hypertension)      DM (diabetes mellitus)      High cholesterol      Aortic valve replaced  2009 with bovine tissue valve      Hx of cholecystectomy      S/P AVR (aortic valve replacement)  2009 @ [place] (bovine)      History of cholecystectomy      History of ear surgery  ? surgery on left ear          Allergies  No Known Allergies    ANTIMICROBIALS (past 90 days)  MEDICATIONS  (STANDING):    cefepime   IVPB   100 mL/Hr IV Intermittent (04-10-25 @ 13:58)    cefepime   IVPB   100 mL/Hr IV Intermittent (04-11-25 @ 06:09)   100 mL/Hr IV Intermittent (04-10-25 @ 22:26)    cefepime   IVPB   100 mL/Hr IV Intermittent (04-10-25 @ 07:05)    doxycycline IVPB   100 mL/Hr IV Intermittent (04-10-25 @ 16:06)    doxycycline IVPB   100 mL/Hr IV Intermittent (04-11-25 @ 06:10)    vancomycin  IVPB   166.67 mL/Hr IV Intermittent (04-10-25 @ 14:23)    vancomycin  IVPB.   250 mL/Hr IV Intermittent (04-10-25 @ 07:30)        cefepime   IVPB    cefepime   IVPB 1000 every 8 hours  doxycycline IVPB    doxycycline IVPB 100 every 12 hours    MEDICATIONS  (STANDING):  acetaminophen     Tablet .. 650 every 6 hours PRN  amLODIPine   Tablet 10 daily  aspirin  chewable 81 daily  atorvastatin 40 at bedtime  bisacodyl 5 every 12 hours PRN  clopidogrel Tablet 75 daily  dextrose 50% Injectable 25 once  dextrose 50% Injectable 12.5 once  dextrose 50% Injectable 25 once  dextrose Oral Gel 15 once PRN  gabapentin 100 three times a day  glucagon  Injectable 1 once  heparin   Injectable 5000 every 8 hours  hydrALAZINE 50 three times a day  insulin lispro (ADMELOG) corrective regimen sliding scale  three times a day before meals  insulin lispro (ADMELOG) corrective regimen sliding scale  at bedtime  metoprolol succinate  daily  polyethylene glycol 3350 17 daily PRN    SOCIAL HISTORY:       FAMILY HISTORY:    REVIEW OF SYSTEMS  [  ] ROS unobtainable because:    [ X ] All other systems negative except as noted below:	    Constitutional:  [ ] fever [ ] chills  [ ] weight loss  [ ] weakness  Skin:  [ ] rash [ ] phlebitis	  Eyes: [ ] icterus [ ] pain  [ ] discharge	  ENMT: [ ] sore throat  [ ] thrush [ ] ulcers [ ] exudates  Respiratory: [ ] dyspnea [ ] hemoptysis [ ] cough [ ] sputum	  Cardiovascular:  [ ] chest pain [ ] palpitations [ ] edema	  Gastrointestinal:  [ ] nausea [ ] vomiting [ ] diarrhea [ ] constipation [ ] pain	  Genitourinary:  [ ] dysuria [ ] frequency [ ] hematuria [ ] discharge [ ] flank pain  [ ] incontinence  Musculoskeletal:  [ ] myalgias [ ] arthralgias [ ] arthritis  [ ] back pain  Neurological:  [ ] headache [ ] seizures  [ ] confusion/altered mental status  Psychiatric:  [ ] anxiety [ ] depression	  Hematology/Lymphatics:  [ ] lymphadenopathy  Endocrine:  [ ] adrenal [ ] thyroid  Allergic/Immunologic:	 [ ] transplant [ ] seasonal    Vital Signs Last 24 Hrs  T(F): 99 (04-11-25 @ 05:04), Max: 101.2 (04-10-25 @ 13:35)  Vital Signs Last 24 Hrs  HR: 74 (04-11-25 @ 05:04) (61 - 84)  BP: 147/80 (04-11-25 @ 05:04) (129/81 - 159/79)  RR: 16 (04-11-25 @ 05:04)  SpO2: 99% (04-11-25 @ 05:04) (95% - 99%)  Wt(kg): --    PHYSICAL EXAM:  Constitutional: non-toxic, no distress  HEAD/EYES: anicteric, no conjunctival injection  ENT:  supple, no thrush  Cardiovascular:   normal S1, S2, no murmur, no edema  Respiratory:  clear BS bilaterally, no wheezes, no rales  GI:  soft, non-tender, normal bowel sounds  :  no velasco, no CVA tenderness  Musculoskeletal:  no synovitis, normal ROM  Neurologic: awake and alert, normal strength, no focal findings  Skin:  no rash, no erythema, no phlebitis  Heme/Onc: no lymphadenopathy   Psychiatric:  awake, alert, appropriate mood    LABS:                         12.8   8.26  )-----------( 143      ( 11 Apr 2025 06:15 )             38.1   04-11    134[L]  |  96[L]  |  17  ----------------------------<  159[H]  3.4[L]   |  25  |  1.47[H]    Ca    9.0      11 Apr 2025 06:15  Phos  2.3     04-10  Mg     1.60     04-10    TPro  8.0  /  Alb  4.0  /  TBili  0.8  /  DBili  x   /  AST  82[H]  /  ALT  42[H]  /  AlkPhos  101  04-09      MICROBIOLOGY:  Culture - Urine (collected 10 Apr 2025 05:29)  Source: Clean Catch Clean Catch (Midstream)  Final Report (11 Apr 2025 07:28):    <10,000 CFU/mL Normal Urogenital Kimberlyn    Culture - Blood (collected 10 Apr 2025 05:20)  Source: Blood Blood-Peripheral  Preliminary Report (11 Apr 2025 09:01):    No growth at 24 hours    Culture - Blood (collected 09 Apr 2025 22:25)  Source: Blood Blood-Peripheral  Preliminary Report (11 Apr 2025 03:02):    No growth at 24 hours    Culture - Blood (collected 09 Apr 2025 22:10)  Source: Blood Blood-Peripheral  Preliminary Report (11 Apr 2025 03:02):    No growth at 24 hours    Urinalysis with Rflx Culture (collected 08 Apr 2025 16:00)    Culture - Blood (collected 08 Apr 2025 13:10)  Source: Blood Blood-Peripheral  Preliminary Report (10 Apr 2025 15:02):    No growth at 48 Hours    Culture - Blood (collected 08 Apr 2025 12:42)  Source: Blood Blood-Peripheral  Preliminary Report (10 Apr 2025 15:01):    No growth at 48 Hours              Rapid RVP Result: NotDetec (04-10 @ 09:52)  Rapid RVP Result: NotDetec (04-08 @ 12:52)      RADIOLOGY:  imaging below personally reviewed and agree with findings    CT chest 04/10     IMPRESSION:  No CT evidence of pneumonia, pulmonary edema, or pleural effusion.    The patient is status post TAVR.  Stable aneurysmal dilatation of the mid   ascending thoracic aorta, to approximately 4.2 cm in transverse caliber.    Approximately 1.2 cm thyroid nodule.  In the absence of a family history   or risk factors for thyroid cancer, the American College of Radiology   does not recommend routine follow-up of incidental thyroid nodules   measuring less than 1.5 cm in this age group.    04/10 TTE_   CONCLUSIONS:      1. Left ventricular systolic function is normal.   2. The right ventricle is not well visualized.   3. A a transcatheter deployed (TAVR) is present in the aortic position. The peak transaortic velocity is 1.42 m/s, peak transaortic gradient is 8.0 mmHg and mean transaortic gradient is 4.1 mmHg with an LVOT/aortic valve VTI ratio of 0.62.   4. No pericardial effusion seen.   5. Technically difficult image quality.   Patient is a 79y old  Male who presents with a chief complaint of Fever of unknown origin (11 Apr 2025 08:59)    HPI:  78 yo M with PMH of hypertension, hyperlipidemia, type 2 diabetes mellitus, CAD, PAD, AS s/p TAVR presented to the ER with recurrent fevers, chills, and malaise for 3 days. Pt was seen in the ER 2 days ago with similar complaints, and was thought to have a viral illness and was discharged home. He feels worse today, more fatigued, and c/o dry cough. Of note, pt states he recently traveled to [facility] 3-4 weeks ago but denies any insect or tick bites and denies sick contacts. He otherwise feels well and denies SOB, N/V, abdominal pain, chest pain, back pain, urinary complaints, or other acute symptoms.     In the ED, vital signs were significant for 101F fever and tachycardia 102 bpm.  Labs reviewed showed no elevated WBC, CXR clear, UA negative, RVP negative, but were remarkable for elevated lactate 2.6, elevated .  CXR and CT Chest: Unremarkable with no evidence of consolidation, effusion, or pneumothorax    Pt was given 500 ml NS bolus  and 1 dose of Vanc and Cefepime by ER physician.     Pt was then endorsed to Medicine for admission for sepsis of unclear etiology.  (10 Apr 2025 12:29)       prior hospital charts reviewed [X  ]  primary team notes reviewed [ X ]  other consultant notes reviewed [X  ]    PAST MEDICAL & SURGICAL HISTORY:  HTN (hypertension)      Hyperlipidemia      BPH (benign prostatic hypertrophy)      GERD (gastroesophageal reflux disease)      Chronic mastoiditis of left side      Gall stones      HTN (hypertension)      DM (diabetes mellitus)      High cholesterol      Aortic valve replaced  2009 with bovine tissue valve      Hx of cholecystectomy      S/P AVR (aortic valve replacement)  2009 @ [place] (bovine)      History of cholecystectomy      History of ear surgery  ? surgery on left ear          Allergies  No Known Allergies    ANTIMICROBIALS (past 90 days)  MEDICATIONS  (STANDING):    cefepime   IVPB   100 mL/Hr IV Intermittent (04-10-25 @ 13:58)    cefepime   IVPB   100 mL/Hr IV Intermittent (04-11-25 @ 06:09)   100 mL/Hr IV Intermittent (04-10-25 @ 22:26)    cefepime   IVPB   100 mL/Hr IV Intermittent (04-10-25 @ 07:05)    doxycycline IVPB   100 mL/Hr IV Intermittent (04-10-25 @ 16:06)    doxycycline IVPB   100 mL/Hr IV Intermittent (04-11-25 @ 06:10)    vancomycin  IVPB   166.67 mL/Hr IV Intermittent (04-10-25 @ 14:23)    vancomycin  IVPB.   250 mL/Hr IV Intermittent (04-10-25 @ 07:30)        cefepime   IVPB    cefepime   IVPB 1000 every 8 hours  doxycycline IVPB    doxycycline IVPB 100 every 12 hours    MEDICATIONS  (STANDING):  acetaminophen     Tablet .. 650 every 6 hours PRN  amLODIPine   Tablet 10 daily  aspirin  chewable 81 daily  atorvastatin 40 at bedtime  bisacodyl 5 every 12 hours PRN  clopidogrel Tablet 75 daily  dextrose 50% Injectable 25 once  dextrose 50% Injectable 12.5 once  dextrose 50% Injectable 25 once  dextrose Oral Gel 15 once PRN  gabapentin 100 three times a day  glucagon  Injectable 1 once  heparin   Injectable 5000 every 8 hours  hydrALAZINE 50 three times a day  insulin lispro (ADMELOG) corrective regimen sliding scale  three times a day before meals  insulin lispro (ADMELOG) corrective regimen sliding scale  at bedtime  metoprolol succinate  daily  polyethylene glycol 3350 17 daily PRN    SOCIAL HISTORY:     Lives with his wife  Reports social alcohol use, no smoking, no drug use   No pets at home   Originally from [facility], lives in [place]. [place].   Travels often back home. Last travel was on Nov 2024.   Most recently was in [facility] for a month and [place]05[place]25  Stayed at home and denies exposure to freshwater water.   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 Now retired   Reports no exposure to anyone with active TB, no personal history of latent TB     FAMILY HISTORY: no pertinent FH     REVIEW OF SYSTEMS  [  ] ROS unobtainable because:    [ X ] All other systems negative except as noted below:	    Constitutional:  [ ] fever [ ] chills  [ ] weight loss  [ ] weakness  Skin:  [ ] rash [ ] phlebitis	  Eyes: [ ] icterus [ ] pain  [ ] discharge	  ENMT: [ ] sore throat  [ ] thrush [ ] ulcers [ ] exudates  Respiratory: [ ] dyspnea [ ] hemoptysis [ ] cough [ ] sputum	  Cardiovascular:  [ ] chest pain - intermittent [ ] palpitations [ ] edema	  Gastrointestinal:  [ ] nausea [ ] vomiting [ ] diarrhea [ ] constipation [ ] pain	  Genitourinary:  [ ] dysuria [ ] frequency [ ] hematuria [ ] discharge [ ] flank pain  [ ] incontinence  Musculoskeletal:  [ ] myalgias [ ] arthralgias [ ] arthritis  [ ] back pain  Neurological:  [ ] headache [ ] seizures  [ ] confusion/altered mental status  Psychiatric:  [ ] anxiety [ ] depression	  Hematology/Lymphatics:  [ ] lymphadenopathy  Endocrine:  [ ] adrenal [ ] thyroid  Allergic/Immunologic:	 [ ] transplant [ ] seasonal    Vital Signs Last 24 Hrs  T(F): 99 (04-11-25 @ 05:04), Max: 101.2 (04-10-25 @ 13:35)  Vital Signs Last 24 Hrs  HR: 74 (04-11-25 @ 05:04) (61 - 84)  BP: 147/80 (04-11-25 @ 05:04) (129/81 - 159/79)  RR: 16 (04-11-25 @ 05:04)  SpO2: 99% (04-11-25 @ 05:04) (95% - 99%)  Wt(kg): --    PHYSICAL EXAM:  Constitutional: non-toxic, no distress  HEAD/EYES: anicteric, no conjunctival injection  ENT:  supple, no thrush  Cardiovascular:   normal S1, S2, no murmur, no edema  Respiratory:  clear BS bilaterally, no wheezes, no rales  GI:  soft, non-tender, normal bowel sounds, distended   :  no velasco, no CVA tenderness  Musculoskeletal:  no synovitis, normal ROM  Neurologic: awake and alert, normal strength, no focal findings  Skin:  no rash, no erythema, no phlebitis  Psychiatric:  awake, alert, appropriate mood    LABS:                         12.8   8.26  )-----------( 143      ( 11 Apr 2025 06:15 )             38.1   04-11    134[L]  |  96[L]  |  17  ----------------------------<  159[H]  3.4[L]   |  25  |  1.47[H]    Ca    9.0      11 Apr 2025 06:15  Phos  2.3     04-10  Mg     1.60     04-10    TPro  8.0  /  Alb  4.0  /  TBili  0.8  /  DBili  x   /  AST  82[H]  /  ALT  42[H]  /  AlkPhos  101  04-09      MICROBIOLOGY:  Culture - Urine (collected 10 Apr 2025 05:29)  Source: Clean Catch Clean Catch (Midstream)  Final Report (11 Apr 2025 07:28):    <10,000 CFU/mL Normal Urogenital Kimberlyn    Culture - Blood (collected 10 Apr 2025 05:20)  Source: Blood Blood-Peripheral  Preliminary Report (11 Apr 2025 09:01):    No growth at 24 hours    Culture - Blood (collected 09 Apr 2025 22:25)  Source: Blood Blood-Peripheral  Preliminary Report (11 Apr 2025 03:02):    No growth at 24 hours    Culture - Blood (collected 09 Apr 2025 22:10)  Source: Blood Blood-Peripheral  Preliminary Report (11 Apr 2025 03:02):    No growth at 24 hours    Urinalysis with Rflx Culture (collected 08 Apr 2025 16:00)    Culture - Blood (collected 08 Apr 2025 13:10)  Source: Blood Blood-Peripheral  Preliminary Report (10 Apr 2025 15:02):    No growth at 48 Hours    Culture - Blood (collected 08 Apr 2025 12:42)  Source: Blood Blood-Peripheral  Preliminary Report (10 Apr 2025 15:01):    No growth at 48 Hours              Rapid RVP Result: NotDetec (04-10 @ 09:52)  Rapid RVP Result: NotDetec (04-08 @ 12:52)      RADIOLOGY:  imaging below personally reviewed and agree with findings    CT chest 04/10     IMPRESSION:  No CT evidence of pneumonia, pulmonary edema, or pleural effusion.    The patient is status post TAVR.  Stable aneurysmal dilatation of the mid   ascending thoracic aorta, to approximately 4.2 cm in transverse caliber.    Approximately 1.2 cm thyroid nodule.  In the absence of a family history   or risk factors for thyroid cancer, the American College of Radiology   does not recommend routine follow-up of incidental thyroid nodules   measuring less than 1.5 cm in this age group.    04/10 TTE-   CONCLUSIONS:      1. Left ventricular systolic function is normal.   2. The right ventricle is not well visualized.   3. A a transcatheter deployed (TAVR) is present in the aortic position. The peak transaortic velocity is 1.42 m/s, peak transaortic gradient is 8.0 mmHg and mean transaortic gradient is 4.1 mmHg with an LVOT/aortic valve VTI ratio of 0.62.   4. No pericardial effusion seen.   5. Technically difficult image quality.

## 2025-04-11 NOTE — DISCHARGE NOTE PROVIDER - NSDCMRMEDTOKEN_GEN_ALL_CORE_FT
Advair Diskus 250 mcg-50 mcg/inh inhalation powder: 1 puff(s) inhaled 2 times a day  amLODIPine 10 mg oral tablet: 1 tab(s) orally once a day  aspirin 81 mg oral delayed release tablet: 1 tab(s) orally once a day  atorvastatin 40 mg oral tablet: 1 tab(s) orally once a day  clopidogrel 75 mg oral tablet: 1 tab(s) orally once a day  gabapentin 100 mg oral capsule: 1 tab(s) orally 3 times a day  hydrALAZINE 50 mg oral tablet: 1 tab(s) orally 3 times a day  hydroCHLOROthiazide 25 mg oral tablet: 1 tab(s) orally once a day  isosorbide mononitrate 60 mg oral tablet, extended release: 1 tab(s) orally once a day  Januvia 50 mg oral tablet: 1 tab(s) orally once a day  Linzess 145 mcg oral capsule: 1 cap(s) orally once a day  memantine 5 mg oral tablet: 1 tab(s) orally 2 times a day  Metoprolol Succinate  mg oral tablet, extended release: 1 tab(s) orally once a day  Multiple Vitamins oral tablet: 1 tab(s) orally once a day  NovoLOG 100 units/mL injectable solution: Pt reports using it based on a scale he has at home  omeprazole 20 mg oral delayed release capsule: 1 tab(s) orally once a day  Restasis 0.05% ophthalmic emulsion: 1 drop(s) in each affected eye 2 times a day  Toujeo SoloStar 300 units/mL subcutaneous solution: Per Pt: Depends on BG if &gt;200; injects 30 units SQ QHS, if lower - adjusts on own  valsartan 160 mg oral tablet: 1 tab(s) orally once a day   Advair Diskus 250 mcg-50 mcg/inh inhalation powder: 1 puff(s) inhaled 2 times a day  amLODIPine 10 mg oral tablet: 1 tab(s) orally once a day  aspirin 81 mg oral delayed release tablet: 1 tab(s) orally once a day  atorvastatin 40 mg oral tablet: 1 tab(s) orally once a day  clopidogrel 75 mg oral tablet: 1 tab(s) orally once a day  gabapentin 100 mg oral capsule: 1 tab(s) orally 3 times a day  hydrALAZINE 50 mg oral tablet: 1 tab(s) orally 3 times a day  hydroCHLOROthiazide 25 mg oral tablet: 1 tab(s) orally once a day  isosorbide mononitrate 60 mg oral tablet, extended release: 1 tab(s) orally once a day  Januvia 50 mg oral tablet: 1 tab(s) orally once a day  Linzess 145 mcg oral capsule: 1 cap(s) orally once a day  memantine 5 mg oral tablet: 1 tab(s) orally 2 times a day  Metoprolol Succinate  mg oral tablet, extended release: 1 tab(s) orally once a day  Multiple Vitamins oral tablet: 1 tab(s) orally once a day  NovoLOG 100 units/mL injectable solution: Pt reports using it based on a scale he has at home  Restasis 0.05% ophthalmic emulsion: 1 drop(s) in each affected eye 2 times a day  Toujeo SoloStar 300 units/mL subcutaneous solution: Per Pt: Depends on BG if &gt;200; injects 30 units SQ QHS, if lower - adjusts on own  valsartan 160 mg oral tablet: 1 tab(s) orally once a day   Advair Diskus 250 mcg-50 mcg/inh inhalation powder: 1 puff(s) inhaled 2 times a day  amLODIPine 10 mg oral tablet: 1 tab(s) orally once a day  atorvastatin 40 mg oral tablet: 1 tab(s) orally once a day  clopidogrel 75 mg oral tablet: 1 tab(s) orally once a day  furosemide 40 mg oral tablet: 1 tab(s) orally once a day  gabapentin 100 mg oral capsule: 1 tab(s) orally 3 times a day  hydrALAZINE 50 mg oral tablet: 1 tab(s) orally 3 times a day  isosorbide mononitrate 60 mg oral tablet, extended release: 1 tab(s) orally once a day  Januvia 50 mg oral tablet: 1 tab(s) orally once a day  levoFLOXacin 750 mg oral tablet: 1 tab(s) orally once a day  Linzess 145 mcg oral capsule: 1 cap(s) orally once a day  Metoprolol Succinate  mg oral tablet, extended release: 1 tab(s) orally once a day  metroNIDAZOLE 500 mg oral tablet: 1 tab(s) orally 2 times a day  Multiple Vitamins oral tablet: 1 tab(s) orally once a day  NovoLOG 100 units/mL injectable solution: Pt reports using it based on a scale he has at home  pantoprazole 40 mg oral delayed release tablet: 1 tab(s) orally every 12 hours  polyethylene glycol 3350 oral powder for reconstitution: 17 gram(s) orally once a day  Restasis 0.05% ophthalmic emulsion: 1 drop(s) in each affected eye 2 times a day  senna leaf extract oral tablet: 2 tab(s) orally once a day (at bedtime)  Tokaran SoloStar 300 units/mL subcutaneous solution: Per Pt: Depends on BG if &gt;200; injects 30 units SQ QHS, if lower - adjusts on own

## 2025-04-11 NOTE — DISCHARGE NOTE PROVIDER - HOSPITAL COURSE
HPI:  78 yo M with PMH of hypertension, hyperlipidemia, type 2 diabetes mellitus, CAD, PAD, AS s/p TAVR presented to the ER with recurrent fevers, chills, and malaise for 3 days. Pt was seen in the ER 2 days ago with similar complaints, and was thought to have a viral illness and was discharged home. He feels worse today, more fatigued, and c/o dry cough. Of note, pt states he recently traveled to [facility] 3-4 weeks ago but denies any insect or tick bites and denies sick contacts. He otherwise feels well and denies SOB, N/V, abdominal pain, chest pain, back pain, urinary complaints, or other acute symptoms.     In the ED, vital signs were significant for 101F fever and tachycardia 102 bpm.  Labs reviewed showed no elevated WBC, CXR clear, UA negative, RVP negative, but were remarkable for elevated lactate 2.6, elevated .  CXR and CT Chest: Unremarkable with no evidence of consolidation, effusion, or pneumothorax    Pt was given 500 ml NS bolus  and 1 dose of Vanc and Cefepime by ER physician.     Pt was then endorsed to Medicine for admission for sepsis of unclear etiology.  (10 Apr 2025 12:29)    Hospital Course:  The patient was admitted for further infectious workup. Prelim workup neg so far. ID consulted for further recs. Tickborne/mosquito born infection workup started. Serologies were ______. Pt empirically treated w/ cefepime/doxycycline. Creatinine improved w/ supportive measures.     Important Medication Changes and Reason:    Active or Pending Issues Requiring Follow-up:    Advanced Directives:   [X] Full code  [ ] DNR  [ ] Hospice    Discharge Diagnoses:         HPI:  80 yo M with PMH of hypertension, hyperlipidemia, type 2 diabetes mellitus, CAD, PAD, AS s/p TAVR presented to the ER with recurrent fevers, chills, and malaise for 3 days. Pt was seen in the ER 2 days ago with similar complaints, and was thought to have a viral illness and was discharged home. He feels worse today, more fatigued, and c/o dry cough. Of note, pt states he recently traveled to [facility] 3-4 weeks ago but denies any insect or tick bites and denies sick contacts. He otherwise feels well and denies SOB, N/V, abdominal pain, chest pain, back pain, urinary complaints, or other acute symptoms.     In the ED, vital signs were significant for 101F fever and tachycardia 102 bpm.  Labs reviewed showed no elevated WBC, CXR clear, UA negative, RVP negative, but were remarkable for elevated lactate 2.6, elevated .  CXR and CT Chest: Unremarkable with no evidence of consolidation, effusion, or pneumothorax    Pt was given 500 ml NS bolus  and 1 dose of Vanc and Cefepime by ER physician.     Pt was then endorsed to Medicine for admission for sepsis of unclear etiology.  (10 Apr 2025 12:29)    Hospital Course:  The patient was admitted for further infectious workup. Prelim workup neg so far. ID consulted for further recs. Tickborne/mosquito born infection workup started. Serologies were negative. Pt empirically treated w/ doxy. Creatinine improved w/ supportive measures. Bcx subsequently pos for GNR organism -- abx switched to cefepime. MRCP ordered to eval for potential source given LFTs and possible abnormal hepatobiliary finding on CT.    Important Medication Changes and Reason:    Active or Pending Issues Requiring Follow-up:    Advanced Directives:   [X] Full code  [ ] DNR  [ ] Hospice    Discharge Diagnoses:         78 yo M with PMH of hypertension, hyperlipidemia, type 2 diabetes mellitus, CAD, PAD, AS s/p TAVR presented to the ER with recurrent fevers, chills, and malaise for 3 days. Pt was seen in the ER 2 days ago with similar complaints, and was thought to have a viral illness and was discharged home. He feels worse today, more fatigued, and c/o dry cough. Of note, pt states he recently traveled to [facility] 3-4 weeks ago but denies any insect or tick bites and denies sick contacts. He otherwise feels well and denies SOB, N/V, abdominal pain, chest pain, back pain, urinary complaints, or other acute symptoms. In the ED, vital signs were significant for 101F fever and tachycardia 102 bpm. Labs reviewed showed no elevated WBC, CXR clear, UA negative, RVP negative, but were remarkable for elevated lactate 2.6, elevated . CXR and CT Chest: Unremarkable with no evidence of consolidation, effusion, or pneumothorax. Pt was given 500 ml NS bolus  and 1 dose of Vanc and Cefepime by ER physician. Pt was then endorsed to Medicine for admission for sepsis of unclear etiology.      Patient was admitted for sepsis. RVP, CXR and CT chest negative. Tickborne/mosquito borne and parasitic infections all negative. TTE unremarkable. CT A/P showed mild CBD dilation. Bcx were positive for Edwardsiella tarda. ID was consulted. Patient was started on IV CTX and IV metronidazole. Plan for 10 day course of antibiotics from 4/16-4/25. Patient to be discharged on PO levaquin 750 mg qd and PO metronidazole 500 mg q12h. Repeat Bcx - NGTD. MRCP was done which suggested cholangitis. GI was consulted. ERCP showed choledocholithiasis and scattered antral ulcers and biopsy was taken. Patient to follow up results outpatient. GI recommended repeat EGD and ERCP in 2-3 months.     On 4/18/25 this case was reviewed with Dr. Vang, the patient is medically stable and optimized for discharge. All medications were reviewed and prescriptions were sent to mutually agreed upon pharmacy.                    HPI:    78 yo M with PMH of hypertension, hyperlipidemia, type 2 diabetes mellitus, CAD, PAD, AS s/p TAVR presented to the ER with recurrent fevers, chills, and malaise for 3 days. Pt was seen in the ER 2 days ago with similar complaints, and was thought to have a viral illness and was discharged home. He feels worse today, more fatigued, and c/o dry cough. Of note, pt states he recently traveled to [facility] 3-4 weeks ago but denies any insect or tick bites and denies sick contacts. He otherwise feels well and denies SOB, N/V, abdominal pain, chest pain, back pain, urinary complaints, or other acute symptoms.     In the ED, vital signs were significant for 101F fever and tachycardia 102 bpm.  Labs reviewed showed no elevated WBC, CXR clear, UA negative, RVP negative, but were remarkable for elevated lactate 2.6, elevated .  CXR and CT Chest: Unremarkable with no evidence of consolidation, effusion, or pneumothorax    Pt was given 500 ml NS bolus  and 1 dose of Vanc and Cefepime by ER physician.     Pt was then endorsed to Medicine for admission for sepsis of unclear etiology.  (10 Apr 2025 12:29)      Hospital Course:    78 yo M with PMH of hypertension, hyperlipidemia, type 2 diabetes mellitus, CAD, PAD, AS s/p TAVR presented to the ER with recurrent fevers, chills, and malaise for 3 days. Pt was seen in the ER 2 days ago with similar complaints, and was thought to have a viral illness and was discharged home. He feels worse today, more fatigued, and c/o dry cough. Of note, pt states he recently traveled to [facility] 3-4 weeks ago but denies any insect or tick bites and denies sick contacts. He otherwise feels well and denies SOB, N/V, abdominal pain, chest pain, back pain, urinary complaints, or other acute symptoms. In the ED, vital signs were significant for 101F fever and tachycardia 102 bpm. Labs reviewed showed no elevated WBC, CXR clear, UA negative, RVP negative, but were remarkable for elevated lactate 2.6, elevated . CXR and CT Chest: Unremarkable with no evidence of consolidation, effusion, or pneumothorax. Pt was given 500 ml NS bolus  and 1 dose of Vanc and Cefepime by ER physician. Pt was then endorsed to Medicine for admission for sepsis of unclear etiology.      Patient was admitted for sepsis. RVP, CXR and CT chest negative. Tickborne/mosquito borne and parasitic infections all negative. TTE unremarkable. CT A/P showed mild CBD dilation. Bcx were positive for Edwardsiella tarda. ID was consulted. Patient was started on IV CTX and IV metronidazole. Plan for 10 day course of antibiotics from 4/16-4/25. Patient to be discharged on PO levaquin 750 mg qd and PO metronidazole 500 mg q12h. Repeat Bcx - NGTD. MRCP was done which suggested cholangitis. GI was consulted. ERCP showed choledocholithiasis and scattered antral ulcers and biopsy was taken. Patient to follow up results outpatient. GI recommended repeat EGD and ERCP in 2-3 months.     The patient was seen on the date of discharge and deemed stable and acceptable for discharge. The patient's chronic medical conditions were treated accordingly per the patient's home medication regimen. The patient's medication reconciliation (with changes made to chronic medications), follow up appointments, discharge orders, instructions, and significant labs and diagnostic studies are as noted.

## 2025-04-11 NOTE — PHYSICAL THERAPY INITIAL EVALUATION ADULT - PERTINENT HX OF CURRENT PROBLEM, REHAB EVAL
As per chart, patient is an 79 year old male with PMH of hypertension, hyperlipidemia, type 2 diabetes mellitus, CAD, PAD, AS s/p TAVR presented to the ER with recurrent fevers, chills, and malaise for 3 days. Pt was seen in the ER 2 days ago with similar complaints, and was thought to have a viral illness and was discharged home. He feels worse today, more fatigued, and c/o dry cough.

## 2025-04-11 NOTE — PHYSICAL THERAPY INITIAL EVALUATION ADULT - PATIENT/FAMILY/SIGNIFICANT OTHER GOALS STATEMENT, PT EVAL
Per DR Brianna Abbott ok to hold Brilinta x 3 days. Resume after.  Will fax letter
Combined hyperlipidemia    H/O essential hypertension    Peripheral vascular disease
patient wants to walk

## 2025-04-11 NOTE — PROGRESS NOTE ADULT - SUBJECTIVE AND OBJECTIVE BOX
[facility] - MIRIAM Beltran / MIRIAM Clemons / MICHELLE Martínez/ MIRIAM Hess/ MIRIAM Ogden/ SHELIA Peck / BRIAN Ordonez / LAMAR Solis / VIPIN Duvall  ---------------------------------------------------------------------------------------------------------------  seen and examined today for CKD3a  Interval : NAD  VITALS:  T(F): 99 (04-11-25 @ 05:04), Max: 101.2 (04-10-25 @ 13:35)  HR: 74 (04-11-25 @ 05:04)  BP: 147/80 (04-11-25 @ 05:04)  RR: 16 (04-11-25 @ 05:04)  SpO2: 99% (04-11-25 @ 05:04)  Wt(kg): --    Physical Exam :-  Constitutional: NAD  Neck: Supple.  Respiratory: Bilateral equal breath sounds,  Cardiovascular: S1, S2 normal,  Gastrointestinal: Bowel Sounds present, soft, non tender.  Extremities: No edema  Neurological: Alert and Oriented x 3, no focal deficits  Psychiatric: Normal mood, normal affect  Data:-  Allergies :   No Known Allergies    Hospital Medications:   MEDICATIONS  (STANDING):  amLODIPine   Tablet 10 milliGRAM(s) Oral daily  aspirin  chewable 81 milliGRAM(s) Oral daily  atorvastatin 40 milliGRAM(s) Oral at bedtime  cefepime   IVPB 1000 milliGRAM(s) IV Intermittent every 8 hours  cefepime   IVPB      clopidogrel Tablet 75 milliGRAM(s) Oral daily  dextrose 5%. 1000 milliLiter(s) (50 mL/Hr) IV Continuous <Continuous>  dextrose 5%. 1000 milliLiter(s) (100 mL/Hr) IV Continuous <Continuous>  dextrose 50% Injectable 25 Gram(s) IV Push once  dextrose 50% Injectable 12.5 Gram(s) IV Push once  dextrose 50% Injectable 25 Gram(s) IV Push once  doxycycline IVPB      doxycycline IVPB 100 milliGRAM(s) IV Intermittent every 12 hours  gabapentin 100 milliGRAM(s) Oral three times a day  glucagon  Injectable 1 milliGRAM(s) IntraMuscular once  heparin   Injectable 5000 Unit(s) SubCutaneous every 8 hours  hydrALAZINE 50 milliGRAM(s) Oral three times a day  insulin lispro (ADMELOG) corrective regimen sliding scale   SubCutaneous three times a day before meals  insulin lispro (ADMELOG) corrective regimen sliding scale   SubCutaneous at bedtime  metoprolol succinate  milliGRAM(s) Oral daily  potassium chloride   Powder 40 milliEquivalent(s) Oral every 6 hours    04-11    134[L]  |  96[L]  |  17  ----------------------------<  159[H]  3.4[L]   |  25  |  1.47[H]    Ca    9.0      11 Apr 2025 06:15  Phos  2.3     04-10  Mg     1.60     04-10    TPro  8.0  /  Alb  4.0  /  TBili  0.8  /  DBili      /  AST  82[H]  /  ALT  42[H]  /  AlkPhos  101  04-09    Creatinine Trend: 1.47 <--, 1.50 <--, 1.66 <--, 1.83 <--                        12.8   8.26  )-----------( 143      ( 11 Apr 2025 06:15 )             38.1

## 2025-04-11 NOTE — CONSULT NOTE ADULT - TIME BILLING
- Chart review  - Independent review and interpretation of data  - Patient examination, evaluation, and assessment  - Discussion at length with patient and family at bedside relating to clinical management  - Discussion with primary team relating to long term management on multiple occasions throughout the day  - Care coordination.

## 2025-04-11 NOTE — DISCHARGE NOTE PROVIDER - NSDCCPCAREPLAN_GEN_ALL_CORE_FT
PRINCIPAL DISCHARGE DIAGNOSIS  Diagnosis: Fever  Assessment and Plan of Treatment: You were found to have ___. You were treated with a course of antibiotics.      SECONDARY DISCHARGE DIAGNOSES  Diagnosis: Acute kidney injury superimposed on CKD  Assessment and Plan of Treatment: Your kidney numbers were elevated likely due to dehydration and infection. Your kidney function has since improved. Please follow up with your PCP.     PRINCIPAL DISCHARGE DIAGNOSIS  Diagnosis: Fever  Assessment and Plan of Treatment: You were admitted for sepsis. Your blood cultrues were positive for a bacteria called Edwardsiella tarda. Infectious disease team was consulted. You were started on IV antibiotics. Plan is for a 10 day course of antibiotics from 4/16-4/25. You are to be discharged on oral antibiotics called levaquin 750 mg once a day and metronidazole 500 mg every 12 hours. Repeat blood cultures were negative. Please follow up with your primary care physician after discharge for continued monitoring and management.      SECONDARY DISCHARGE DIAGNOSES  Diagnosis: Transaminitis  Assessment and Plan of Treatment: Gastroenterology recommended repeat endoscopy and ERCP in 2-3 months.    Diagnosis: Acute kidney injury superimposed on CKD  Assessment and Plan of Treatment: Your kidney numbers were elevated likely due to dehydration and infection. Your kidney function has since improved. Please follow up with your PCP.     PRINCIPAL DISCHARGE DIAGNOSIS  Diagnosis: Fever  Assessment and Plan of Treatment: You were admitted for sepsis. Your blood cultrues were positive for a bacteria called Edwardsiella tarda. Infectious disease team was consulted. You were started on IV antibiotics. Plan is for a 10 day course of antibiotics from 4/16-4/25. You are to be discharged on oral antibiotics called levaquin 750 mg once a day and metronidazole 500 mg every 12 hours. Repeat blood cultures were negative.   Your symptoms improved and you are now stable to be discharged home.   Please take your medications as directed and follow-up with your primary care physician within 1-2 weeks of discharge.   Please return to the hospital if you have fevers or shaking chills, if you have chest pain or shortness of breath that do not quickly resolve, if you pass out, if you have bleeding, or if you have any other symptoms that worry you. It was our pleasure taking care of you.      SECONDARY DISCHARGE DIAGNOSES  Diagnosis: Acute kidney injury superimposed on CKD  Assessment and Plan of Treatment: Your kidney numbers were elevated likely due to dehydration and infection. Your kidney function has since improved. Please follow up with your PCP.    Diagnosis: Transaminitis  Assessment and Plan of Treatment: Gastroenterology doctor recommended repeat endoscopy and ERCP in 2-3 months to remove the biliary stent that was placed when you were found to have a stone blocking the common bile duct. Endocscopy also showed stomach ulcers, for which you were prescribed Protonix 40 mg twice a day for 4 weeks, then 40 mg once a day afterwards. Hold Aspirin and NSAID pain medications for weeks in the setting of stomach ulcers. Follow-up with the GI doctor outpatient in the clinic.     PRINCIPAL DISCHARGE DIAGNOSIS  Diagnosis: Other gram-negative sepsis  Assessment and Plan of Treatment: You were admitted for sepsis. Your blood cultrues were positive for a bacteria called Edwardsiella tarda. Infectious disease team was consulted. You were started on IV antibiotics. Plan is for a 10 day course of antibiotics from 4/16-4/25. You are to be discharged on oral antibiotics called levaquin 750 mg once a day and metronidazole 500 mg every 12 hours. Repeat blood cultures were negative.   Your symptoms improved and you are now stable to be discharged home.   Please take your medications as directed and follow-up with your primary care physician within 1-2 weeks of discharge.   Please return to the hospital if you have fevers or shaking chills, if you have chest pain or shortness of breath that do not quickly resolve, if you pass out, if you have bleeding, or if you have any other symptoms that worry you. It was our pleasure taking care of you.      SECONDARY DISCHARGE DIAGNOSES  Diagnosis: Acute kidney injury superimposed on CKD  Assessment and Plan of Treatment: Your kidney numbers were elevated likely due to dehydration and infection. Your kidney function has since improved. Please follow up with your PCP.    Diagnosis: Transaminitis  Assessment and Plan of Treatment: Gastroenterology doctor recommended repeat endoscopy and ERCP in 2-3 months to remove the biliary stent that was placed when you were found to have a stone blocking the common bile duct. Endocscopy also showed stomach ulcers, for which you were prescribed Protonix 40 mg twice a day for 4 weeks, then 40 mg once a day afterwards. Hold Aspirin and NSAID pain medications for weeks in the setting of stomach ulcers. Follow-up with the GI doctor outpatient in the clinic.

## 2025-04-11 NOTE — PROGRESS NOTE ADULT - PROBLEM SELECTOR PLAN 1
Recent travel. Met criteria for SIRS POA. R/o tickborne/mosquito borne, parasitic infections.  - Elevated lactate 2.6 and elevated  noted on admission  - Full RVP negative. CXR and CT Chest reviewed: No evidence of pneumonia, pulmonary edema, or pleural effusion  - Procal elevated. Inflammatory markers high.   - Obtain TTE to assess for vegetations r/o infective endocarditis in pt with h/o TAVR  - CT A/P as a part of infectious workup given LFTs and still no clear source.  - Cont Cefepime and Doxycycline for now.  - ID recs appreciated - additional serologies to be sent as advised. Recent travel. Met criteria for SIRS POA. R/o tickborne/mosquito borne, parasitic infections.  - Elevated lactate 2.6 and elevated  noted on admission  - Full RVP negative. CXR and CT Chest reviewed: No evidence of pneumonia, pulmonary edema, or pleural effusion  - Procal elevated. Inflammatory markers high.   - Obtain TTE to assess for vegetations r/o infective endocarditis in pt with h/o TAVR  - CT A/P as a part of infectious workup given LFTs and still no clear source.  - Cont Cefepime and Doxycycline for now.  - Given neg workup - ID consulted - additional serologies to be sent as advised.

## 2025-04-12 LAB
ADD ON TEST-SPECIMEN IN LAB: SIGNIFICANT CHANGE UP
ALBUMIN SERPL ELPH-MCNC: 3.2 G/DL — LOW (ref 3.3–5)
ALP SERPL-CCNC: 128 U/L — HIGH (ref 40–120)
ALT FLD-CCNC: 31 U/L — SIGNIFICANT CHANGE UP (ref 4–41)
ANION GAP SERPL CALC-SCNC: 15 MMOL/L — HIGH (ref 7–14)
AST SERPL-CCNC: 67 U/L — HIGH (ref 4–40)
BILIRUB DIRECT SERPL-MCNC: <0.2 MG/DL — SIGNIFICANT CHANGE UP (ref 0–0.3)
BILIRUB INDIRECT FLD-MCNC: >0.3 MG/DL — SIGNIFICANT CHANGE UP (ref 0–1)
BILIRUB SERPL-MCNC: 0.5 MG/DL — SIGNIFICANT CHANGE UP (ref 0.2–1.2)
BUN SERPL-MCNC: 17 MG/DL — SIGNIFICANT CHANGE UP (ref 7–23)
CALCIUM SERPL-MCNC: 8.6 MG/DL — SIGNIFICANT CHANGE UP (ref 8.4–10.5)
CHLORIDE SERPL-SCNC: 100 MMOL/L — SIGNIFICANT CHANGE UP (ref 98–107)
CO2 SERPL-SCNC: 21 MMOL/L — LOW (ref 22–31)
CREAT SERPL-MCNC: 1.33 MG/DL — HIGH (ref 0.5–1.3)
CULTURE RESULTS: SIGNIFICANT CHANGE UP
CULTURE RESULTS: SIGNIFICANT CHANGE UP
DENV IGG+IGM PNL SER IA: 11.54 ISR — HIGH
DENV IGM SER-ACNC: 1.39 ISR — SIGNIFICANT CHANGE UP
EGFR: 54 ML/MIN/1.73M2 — LOW
EGFR: 54 ML/MIN/1.73M2 — LOW
GLUCOSE BLDC GLUCOMTR-MCNC: 150 MG/DL — HIGH (ref 70–99)
GLUCOSE BLDC GLUCOMTR-MCNC: 167 MG/DL — HIGH (ref 70–99)
GLUCOSE BLDC GLUCOMTR-MCNC: 219 MG/DL — HIGH (ref 70–99)
GLUCOSE BLDC GLUCOMTR-MCNC: 330 MG/DL — HIGH (ref 70–99)
GLUCOSE SERPL-MCNC: 190 MG/DL — HIGH (ref 70–99)
HCT VFR BLD CALC: 35.4 % — LOW (ref 39–50)
HCV AB S/CO SERPL IA: 0.21 S/CO — SIGNIFICANT CHANGE UP (ref 0–0.79)
HCV AB SERPL-IMP: SIGNIFICANT CHANGE UP
HGB BLD-MCNC: 12.3 G/DL — LOW (ref 13–17)
HIV 1+2 AB+HIV1 P24 AG SERPL QL IA: SIGNIFICANT CHANGE UP
LIDOCAIN IGE QN: 46 U/L — SIGNIFICANT CHANGE UP (ref 7–60)
M PNEUMO IGM SER-ACNC: 0.23 INDEX — SIGNIFICANT CHANGE UP (ref 0–0.9)
MAGNESIUM SERPL-MCNC: 1.6 MG/DL — SIGNIFICANT CHANGE UP (ref 1.6–2.6)
MCHC RBC-ENTMCNC: 28.4 PG — SIGNIFICANT CHANGE UP (ref 27–34)
MCHC RBC-ENTMCNC: 34.7 G/DL — SIGNIFICANT CHANGE UP (ref 32–36)
MCV RBC AUTO: 81.8 FL — SIGNIFICANT CHANGE UP (ref 80–100)
MYCOPLASMA AG SPEC QL: NEGATIVE — SIGNIFICANT CHANGE UP
NRBC # BLD AUTO: 0 K/UL — SIGNIFICANT CHANGE UP (ref 0–0)
NRBC # FLD: 0 K/UL — SIGNIFICANT CHANGE UP (ref 0–0)
NRBC BLD AUTO-RTO: 0 /100 WBCS — SIGNIFICANT CHANGE UP (ref 0–0)
PHOSPHATE SERPL-MCNC: 2.6 MG/DL — SIGNIFICANT CHANGE UP (ref 2.5–4.5)
PLATELET # BLD AUTO: 152 K/UL — SIGNIFICANT CHANGE UP (ref 150–400)
POTASSIUM SERPL-MCNC: 3.5 MMOL/L — SIGNIFICANT CHANGE UP (ref 3.5–5.3)
POTASSIUM SERPL-SCNC: 3.5 MMOL/L — SIGNIFICANT CHANGE UP (ref 3.5–5.3)
PROT SERPL-MCNC: 6.7 G/DL — SIGNIFICANT CHANGE UP (ref 6–8.3)
RBC # BLD: 4.33 M/UL — SIGNIFICANT CHANGE UP (ref 4.2–5.8)
RBC # FLD: 13.5 % — SIGNIFICANT CHANGE UP (ref 10.3–14.5)
SODIUM SERPL-SCNC: 136 MMOL/L — SIGNIFICANT CHANGE UP (ref 135–145)
SPECIMEN SOURCE: SIGNIFICANT CHANGE UP
SPECIMEN SOURCE: SIGNIFICANT CHANGE UP
WBC # BLD: 7.75 K/UL — SIGNIFICANT CHANGE UP (ref 3.8–10.5)
WBC # FLD AUTO: 7.75 K/UL — SIGNIFICANT CHANGE UP (ref 3.8–10.5)

## 2025-04-12 PROCEDURE — 99232 SBSQ HOSP IP/OBS MODERATE 35: CPT | Mod: GC

## 2025-04-12 PROCEDURE — G0545: CPT

## 2025-04-12 PROCEDURE — 99233 SBSQ HOSP IP/OBS HIGH 50: CPT

## 2025-04-12 RX ORDER — SENNA 187 MG
2 TABLET ORAL AT BEDTIME
Refills: 0 | Status: DISCONTINUED | OUTPATIENT
Start: 2025-04-12 | End: 2025-04-18

## 2025-04-12 RX ORDER — INSULIN GLARGINE-YFGN 100 [IU]/ML
3 INJECTION, SOLUTION SUBCUTANEOUS AT BEDTIME
Refills: 0 | Status: DISCONTINUED | OUTPATIENT
Start: 2025-04-12 | End: 2025-04-18

## 2025-04-12 RX ORDER — MECLIZINE HCL 12.5 MG
12.5 TABLET ORAL THREE TIMES A DAY
Refills: 0 | Status: COMPLETED | OUTPATIENT
Start: 2025-04-12 | End: 2025-04-17

## 2025-04-12 RX ORDER — POLYETHYLENE GLYCOL 3350 17 G/17G
17 POWDER, FOR SOLUTION ORAL DAILY
Refills: 0 | Status: DISCONTINUED | OUTPATIENT
Start: 2025-04-12 | End: 2025-04-18

## 2025-04-12 RX ADMIN — INSULIN LISPRO 2: 100 INJECTION, SOLUTION INTRAVENOUS; SUBCUTANEOUS at 21:36

## 2025-04-12 RX ADMIN — HEPARIN SODIUM 5000 UNIT(S): 1000 INJECTION INTRAVENOUS; SUBCUTANEOUS at 06:48

## 2025-04-12 RX ADMIN — Medication 2 TABLET(S): at 21:25

## 2025-04-12 RX ADMIN — ATORVASTATIN CALCIUM 40 MILLIGRAM(S): 80 TABLET, FILM COATED ORAL at 21:19

## 2025-04-12 RX ADMIN — Medication 50 MILLIGRAM(S): at 06:48

## 2025-04-12 RX ADMIN — Medication 12.5 MILLIGRAM(S): at 14:20

## 2025-04-12 RX ADMIN — HEPARIN SODIUM 5000 UNIT(S): 1000 INJECTION INTRAVENOUS; SUBCUTANEOUS at 14:05

## 2025-04-12 RX ADMIN — Medication 100 MILLIGRAM(S): at 19:07

## 2025-04-12 RX ADMIN — INSULIN LISPRO 1: 100 INJECTION, SOLUTION INTRAVENOUS; SUBCUTANEOUS at 13:03

## 2025-04-12 RX ADMIN — GABAPENTIN 100 MILLIGRAM(S): 400 CAPSULE ORAL at 14:11

## 2025-04-12 RX ADMIN — Medication 5 MILLIGRAM(S): at 13:05

## 2025-04-12 RX ADMIN — POLYETHYLENE GLYCOL 3350 17 GRAM(S): 17 POWDER, FOR SOLUTION ORAL at 08:57

## 2025-04-12 RX ADMIN — Medication 50 MILLIGRAM(S): at 14:06

## 2025-04-12 RX ADMIN — METOPROLOL SUCCINATE 100 MILLIGRAM(S): 50 TABLET, EXTENDED RELEASE ORAL at 06:48

## 2025-04-12 RX ADMIN — Medication 81 MILLIGRAM(S): at 12:59

## 2025-04-12 RX ADMIN — INSULIN LISPRO 2: 100 INJECTION, SOLUTION INTRAVENOUS; SUBCUTANEOUS at 08:50

## 2025-04-12 RX ADMIN — INSULIN GLARGINE-YFGN 3 UNIT(S): 100 INJECTION, SOLUTION SUBCUTANEOUS at 21:36

## 2025-04-12 RX ADMIN — GABAPENTIN 100 MILLIGRAM(S): 400 CAPSULE ORAL at 06:48

## 2025-04-12 RX ADMIN — AMLODIPINE BESYLATE 10 MILLIGRAM(S): 10 TABLET ORAL at 06:48

## 2025-04-12 RX ADMIN — Medication 100 MILLIGRAM(S): at 08:49

## 2025-04-12 RX ADMIN — GABAPENTIN 100 MILLIGRAM(S): 400 CAPSULE ORAL at 21:19

## 2025-04-12 RX ADMIN — Medication 12.5 MILLIGRAM(S): at 21:19

## 2025-04-12 RX ADMIN — Medication 50 MILLIGRAM(S): at 21:19

## 2025-04-12 RX ADMIN — HEPARIN SODIUM 5000 UNIT(S): 1000 INJECTION INTRAVENOUS; SUBCUTANEOUS at 21:19

## 2025-04-12 NOTE — PROGRESS NOTE ADULT - ASSESSMENT
78 yo M with PMH of hypertension, hyperlipidemia, type 2 diabetes mellitus, CAD, PAD, AS s/p TAVR  presented to the ER with fevers, chills, and malaise and generalized body aches for 3 days starting 04/07/25. Pt was seen in the ER 2 days ago with similar complaints, and was thought to have a viral illness and was discharged home. His symptoms didn't improve and felt worse, more fatigued, and c/o dry cough. He otherwise denies SOB, N/V, abdominal pain, chest pain, back pain, urinary complaints, joint pain, oral ulcers or other acute symptoms.     The patient is originally from [facility]. Moved to the  30 years ago. Lives with his wife. Reports social alcohol use, no smoking, no drug use. No pets at home. Travels back to [facility] often, most recently was in [facility] for a month and [place] the [place] on 04[place]05[place]25. Stayed at home and denies exposure to freshwater water. Reports no exposure to cattle or other animals, Denies mosquito bites and exposure to other insects. 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 Reports no exposure to anyone with active TB, no personal history of latent TB. Reports recent dental procedure 4 weeks ago before traveling to [facility].     In the ED, vital signs were significant for 101F fever and tachycardia 102 bpm. Labs reviewed showed no leucocytosis, bands 9%, CXR clear, UA negative, RVP negative, but were remarkable for elevated lactate 2.6, elevated , procal 1.70, ESR: 70, mildly elevated LFTs AST/ALT 82/41, ALP: normal, Bili: normal. A1c: 6.4, creat 1.54--, Acute hepatitis panel negative. CXR and CT Chest: Unremarkable with no evidence of consolidation, effusion, or pneumothorax. US kidneys 04/10-   Mildly echogenic kidneys. No hydronephrosis. TTE was Technically difficult image quality.  A transcatheter deployed (TAVR) is present in the aortic position.     Infectious diseases consultation requested for further management.     MICRO:   04/10 full RVP: negative   04/10 Ucx- negative   04/10 Bcx- ngtd  04/09 BCX- NGTD  04/08 Full RVP: negative   04/08 BCX- ngtd  4/11 Blood parasites negative  4/9 Dengue serology IgM neg, IgG +    CT A/P  Trace questionable fat stranding around the pancreatic head with proximal   dilatation of the CBD. Dilatation of the CBD also seen on prior. Low   suspicion for pancreatitis, however correlate with lipase.    No other acute intra-abdominal findings.      # Fevers  # mildly elevated LFTs - trending down   # S/P TAVR   # Mild Thrombocytopenia   # SAMARIA on CKD   # Elevated inflammatory markers     Recommendations:   - Can continue Doxycycline 100 mg Q12H   - repeat blood parasite for malaria  - Monitor LFTs closely   - Check HIV CIMA   - Follow up Leptospira PCR/serologies (although low suspicion)  - Further evaluation of CT A/P findings per primary  - If infectious workup remains negative, and still febrile would need further workup (WBC scan/BALJINDER)      All recommendations are tentative pending Attending Attestation.    Elvis Travis MD, PGY-5  ID Fellow  [employer] Teams Preferred  After 5pm/weekends call [phone]

## 2025-04-12 NOTE — DIETITIAN INITIAL EVALUATION ADULT - PERTINENT LABORATORY DATA
04-12    136  |  100  |  17  ----------------------------<  190[H]  3.5   |  21[L]  |  1.33[H]    Ca    8.6      12 Apr 2025 07:15  Phos  2.6     04-12  Mg     1.60     04-12    TPro  6.7  /  Alb  3.2[L]  /  TBili  0.5  /  DBili  <0.2  /  AST  67[H]  /  ALT  31  /  AlkPhos  128[H]  04-12  POCT Blood Glucose.: 219 mg/dL (04-12-25 @ 08:45)  A1C with Estimated Average Glucose Result: 6.4 % (04-10-25 @ 13:30)

## 2025-04-12 NOTE — DIETITIAN INITIAL EVALUATION ADULT - NSICDXPASTSURGICALHX_GEN_ALL_CORE_FT
PAST SURGICAL HISTORY:  Aortic valve replaced 2009 with bovine tissue valve    History of cholecystectomy     History of ear surgery ? surgery on left ear    Hx of cholecystectomy     S/P AVR (aortic valve replacement) 2009 @ [place] (bovine)

## 2025-04-12 NOTE — DIETITIAN INITIAL EVALUATION ADULT - OTHER INFO
Patient met at bedside for nutrition consult. Noted diet order: CSTCHOSN with 1000ml fluid restriction s/p hyponatremia (resolved today). No difficulty chewing or swallowing. Reports sub-optimal PO intake with poor appetite in house. Noted % intake per RN flow sheet. Patient contacted his granddaughter via personal phone, granddaughter reports that "patient has no problem with eating". No report of nausea, vomit, diarrhea. Patient complains of constipation to team/provider, ordered for bowel regimen (senna, polyethylene glycol, bisacodyl). Noted BM on 4/11 per RN flow sheets. Labs notable for POCT ranging 148-283mg/dl with good glycemic control Hgb A1c@ 6.4% (4/10). Dosing weight is 86.6kg, reflective of 8-9 lbs weight loss from reported  lbs. Recommend Glucerna Therapeutic Shake 8 oz. 1x/day (220 kcal, 10gm protein daily) to optimize oral intake secondary reported no appetite.  Nutrition education focus on fluid restriction, reading material provided. Patient verbalized understanding of current diet order, able to make meal selection and utilizing 60-75-75g CHO counting in house. Patient has no other nutrition related questions or concerns.

## 2025-04-12 NOTE — PROGRESS NOTE ADULT - SUBJECTIVE AND OBJECTIVE BOX
DATE OF SERVICE: 04-12-25      no events overnight ,   pt denies chest pain or sob        acetaminophen     Tablet .. 650 milliGRAM(s) Oral every 6 hours PRN  amLODIPine   Tablet 10 milliGRAM(s) Oral daily  aspirin  chewable 81 milliGRAM(s) Oral daily  atorvastatin 40 milliGRAM(s) Oral at bedtime  bisacodyl 5 milliGRAM(s) Oral every 12 hours PRN  dextrose 5%. 1000 milliLiter(s) IV Continuous <Continuous>  dextrose 5%. 1000 milliLiter(s) IV Continuous <Continuous>  dextrose 50% Injectable 25 Gram(s) IV Push once  dextrose 50% Injectable 12.5 Gram(s) IV Push once  dextrose 50% Injectable 25 Gram(s) IV Push once  dextrose Oral Gel 15 Gram(s) Oral once PRN  doxycycline IVPB      doxycycline IVPB 100 milliGRAM(s) IV Intermittent every 12 hours  gabapentin 100 milliGRAM(s) Oral three times a day  glucagon  Injectable 1 milliGRAM(s) IntraMuscular once  heparin   Injectable 5000 Unit(s) SubCutaneous every 8 hours  hydrALAZINE 50 milliGRAM(s) Oral three times a day  insulin lispro (ADMELOG) corrective regimen sliding scale   SubCutaneous three times a day before meals  insulin lispro (ADMELOG) corrective regimen sliding scale   SubCutaneous at bedtime  meclizine 12.5 milliGRAM(s) Oral three times a day  metoprolol succinate  milliGRAM(s) Oral daily  polyethylene glycol 3350 17 Gram(s) Oral daily  senna 2 Tablet(s) Oral at bedtime                            12.3   7.75  )-----------( 152      ( 12 Apr 2025 07:15 )             35.4       Hemoglobin: 12.3 g/dL (04-12 @ 07:15)  Hemoglobin: 12.8 g/dL (04-11 @ 06:15)  Hemoglobin: 12.7 g/dL (04-10 @ 13:30)  Hemoglobin: 13.3 g/dL (04-09 @ 22:20)  Hemoglobin: 13.5 g/dL (04-08 @ 12:52)      04-12    136  |  100  |  17  ----------------------------<  190[H]  3.5   |  21[L]  |  1.33[H]    Ca    8.6      12 Apr 2025 07:15  Phos  2.6     04-12  Mg     1.60     04-12    TPro  6.7  /  Alb  3.2[L]  /  TBili  0.5  /  DBili  <0.2  /  AST  67[H]  /  ALT  31  /  AlkPhos  128[H]  04-12    Creatinine Trend: 1.33<--, 1.52<--, 1.47<--, 1.50<--, 1.66<--, 1.83<--    COAGS:     CARDIAC MARKERS ( 11 Apr 2025 14:55 )  x     / x     / x     / x     / 1.4 ng/mL        T(C): 36.8 (04-12-25 @ 05:46), Max: 37.3 (04-11-25 @ 17:33)  HR: 71 (04-12-25 @ 05:46) (71 - 80)  BP: 134/64 (04-12-25 @ 05:46) (127/69 - 149/68)  RR: 18 (04-12-25 @ 05:46) (18 - 18)  SpO2: 98% (04-12-25 @ 05:46) (97% - 100%)  Wt(kg): --    I&O's Summary      General:  Alert and Oriented * 3.   Head: Normocephalic and atraumatic.   Neck: No JVD. No bruits. Supple. Does not appear to be enlarged.   Cardiovascular: + S1,S2 ; RRR Soft systolic murmur at the left lower sternal border. No rubs noted.    Lungs: CTA b/l. No rhonchi, rales or wheezes.   Abdomen: + BS, soft. Non tender. Non distended. No rebound. No guarding.   Extremities: No clubbing/cyanosis/edema.   Neurologic: Moves all four extremities. Full range of motion.   Skin: Warm and moist. The patient's skin has normal elasticity and good skin turgor.   Psychiatric: Appropriate mood and affect.  Musculoskeletal: Normal range of motion, normal strength     TELEMETRY: NSR	      ECG:  	NSR    RADIOLOGY:         CXR:   < from: CT Chest No Cont (04.10.25 @ 02:08) >    IMPRESSION:  No CT evidence of pneumonia, pulmonary edema, or pleural effusion.    The patient is status post TAVR.  Stable aneurysmal dilatation of the mid   ascending thoracic aorta, to approximately 4.2 cm in transverse caliber.    Approximately 1.2 cm thyroid nodule.  In the absence of a family history   or risk factors for thyroid cancer, the [employer]   does not recommend routine follow-up of incidental thyroid nodules   measuring less than 1.5 cm in this age group.    < end of copied text >      < from: TTE W or WO Ultrasound Enhancing Agent (04.10.25 @ 08:47) >    CONCLUSIONS:      1. Left ventricular systolic function is normal.   2. The right ventricle is not well visualized.   3. A atranscatheter deployed (TAVR) is present in the aortic position. The peak transaortic velocity is 1.42 m/s, peak transaortic gradient is 8.0 mmHg and mean transaortic gradient is 4.1 mmHg with an LVOT/aortic valve VTI ratio of 0.62.   4. No pericardial effusion seen.   5. Technically difficult image quality.    < end of copied text >      < from: Cardiac Catheterization (06.22.23 @ 17:04) >  The coronary circulation is right dominant. Cardiac catheterization  was performed electively.  LM   Left main artery: Angiography shows minor irregularities.      LAD   Proximal left anterior descending: The segment is moderately ectatic.Angiography shows moderate atherosclerosis. There is a40 % stenosis.      CX   Circumflex: Angiography shows minor irregularities.      RCA   Right coronary artery: Angiography shows minor irregularities.        ASSESSMENT/PLAN: Pt is an 79 year old male with  a past medical history of hypertension, hypercholesterolemia, diabetes, chronic renal insufficiency, peripheral vascular disease, bioprosthetic AVR in 2009 status post TAVR of his bioprosthetic AVR in July 2023, with  known venous insufficiency status post Varithena closure of the left great saphenous vein presented to the ER with recurrent fevers, chills, and malaise for 3 days.     HTN/HLD/TAVR/CAD/TAA  - c/w ASA and statin, has h/p of moderate non obstructive CAD  - c/w BB for stable TAA  - c/w Amlodipine and hydralazine  - c/w ASA, discussed with Dr. Coffman will take off Plavix  - abx  per medicine/ID, cultures NGTD      DATE OF SERVICE: 04-12-25      no events overnight ,   pt denies chest pain or sob        acetaminophen     Tablet .. 650 milliGRAM(s) Oral every 6 hours PRN  amLODIPine   Tablet 10 milliGRAM(s) Oral daily  aspirin  chewable 81 milliGRAM(s) Oral daily  atorvastatin 40 milliGRAM(s) Oral at bedtime  bisacodyl 5 milliGRAM(s) Oral every 12 hours PRN  dextrose 5%. 1000 milliLiter(s) IV Continuous <Continuous>  dextrose 5%. 1000 milliLiter(s) IV Continuous <Continuous>  dextrose 50% Injectable 25 Gram(s) IV Push once  dextrose 50% Injectable 12.5 Gram(s) IV Push once  dextrose 50% Injectable 25 Gram(s) IV Push once  dextrose Oral Gel 15 Gram(s) Oral once PRN  doxycycline IVPB      doxycycline IVPB 100 milliGRAM(s) IV Intermittent every 12 hours  gabapentin 100 milliGRAM(s) Oral three times a day  glucagon  Injectable 1 milliGRAM(s) IntraMuscular once  heparin   Injectable 5000 Unit(s) SubCutaneous every 8 hours  hydrALAZINE 50 milliGRAM(s) Oral three times a day  insulin lispro (ADMELOG) corrective regimen sliding scale   SubCutaneous three times a day before meals  insulin lispro (ADMELOG) corrective regimen sliding scale   SubCutaneous at bedtime  meclizine 12.5 milliGRAM(s) Oral three times a day  metoprolol succinate  milliGRAM(s) Oral daily  polyethylene glycol 3350 17 Gram(s) Oral daily  senna 2 Tablet(s) Oral at bedtime                            12.3   7.75  )-----------( 152      ( 12 Apr 2025 07:15 )             35.4       Hemoglobin: 12.3 g/dL (04-12 @ 07:15)  Hemoglobin: 12.8 g/dL (04-11 @ 06:15)  Hemoglobin: 12.7 g/dL (04-10 @ 13:30)  Hemoglobin: 13.3 g/dL (04-09 @ 22:20)  Hemoglobin: 13.5 g/dL (04-08 @ 12:52)      04-12    136  |  100  |  17  ----------------------------<  190[H]  3.5   |  21[L]  |  1.33[H]    Ca    8.6      12 Apr 2025 07:15  Phos  2.6     04-12  Mg     1.60     04-12    TPro  6.7  /  Alb  3.2[L]  /  TBili  0.5  /  DBili  <0.2  /  AST  67[H]  /  ALT  31  /  AlkPhos  128[H]  04-12    Creatinine Trend: 1.33<--, 1.52<--, 1.47<--, 1.50<--, 1.66<--, 1.83<--    COAGS:     CARDIAC MARKERS ( 11 Apr 2025 14:55 )  x     / x     / x     / x     / 1.4 ng/mL        T(C): 36.8 (04-12-25 @ 05:46), Max: 37.3 (04-11-25 @ 17:33)  HR: 71 (04-12-25 @ 05:46) (71 - 80)  BP: 134/64 (04-12-25 @ 05:46) (127/69 - 149/68)  RR: 18 (04-12-25 @ 05:46) (18 - 18)  SpO2: 98% (04-12-25 @ 05:46) (97% - 100%)  Wt(kg): --    I&O's Summary      General:  Alert and Oriented * 3.   Head: Normocephalic and atraumatic.   Neck: No JVD. No bruits. Supple. Does not appear to be enlarged.   Cardiovascular: + S1,S2 ; RRR Soft systolic murmur at the left lower sternal border. No rubs noted.    Lungs: CTA b/l. No rhonchi, rales or wheezes.   Abdomen: + BS, soft. Non tender. Non distended. No rebound. No guarding.   Extremities: No clubbing/cyanosis/edema.   Neurologic: Moves all four extremities. Full range of motion.   Skin: Warm and moist. The patient's skin has normal elasticity and good skin turgor.   Psychiatric: Appropriate mood and affect.  Musculoskeletal: Normal range of motion, normal strength     TELEMETRY: NSR	      ECG:  	NSR    RADIOLOGY:         CXR:   < from: CT Chest No Cont (04.10.25 @ 02:08) >    IMPRESSION:  No CT evidence of pneumonia, pulmonary edema, or pleural effusion.    The patient is status post TAVR.  Stable aneurysmal dilatation of the mid   ascending thoracic aorta, to approximately 4.2 cm in transverse caliber.    Approximately 1.2 cm thyroid nodule.  In the absence of a family history   or risk factors for thyroid cancer, the [employer]   does not recommend routine follow-up of incidental thyroid nodules   measuring less than 1.5 cm in this age group.    < end of copied text >      < from: TTE W or WO Ultrasound Enhancing Agent (04.10.25 @ 08:47) >    CONCLUSIONS:      1. Left ventricular systolic function is normal.   2. The right ventricle is not well visualized.   3. A atranscatheter deployed (TAVR) is present in the aortic position. The peak transaortic velocity is 1.42 m/s, peak transaortic gradient is 8.0 mmHg and mean transaortic gradient is 4.1 mmHg with an LVOT/aortic valve VTI ratio of 0.62.   4. No pericardial effusion seen.   5. Technically difficult image quality.    < end of copied text >      < from: Cardiac Catheterization (06.22.23 @ 17:04) >  The coronary circulation is right dominant. Cardiac catheterization  was performed electively.  LM   Left main artery: Angiography shows minor irregularities.      LAD   Proximal left anterior descending: The segment is moderately ectatic.Angiography shows moderate atherosclerosis. There is a40 % stenosis.      CX   Circumflex: Angiography shows minor irregularities.      RCA   Right coronary artery: Angiography shows minor irregularities.        ASSESSMENT/PLAN: Pt is an 79 year old male with  a past medical history of hypertension, hypercholesterolemia, diabetes, chronic renal insufficiency, peripheral vascular disease, bioprosthetic AVR in 2009 status post TAVR of his bioprosthetic AVR in July 2023, with  known venous insufficiency status post Varithena closure of the left great saphenous vein presented to the ER with recurrent fevers, chills, and malaise for 3 days.     HTN/HLD/TAVR/CAD/TAA  - c/w ASA and statin, has h/o of moderate non obstructive CAD  - c/w BB for stable TAA  - c/w Amlodipine and hydralazine  - c/w ASA, discussed with Dr. Coffman will take off Plavix  - abx  per medicine/ID, cultures NGTD

## 2025-04-12 NOTE — PROGRESS NOTE ADULT - PROBLEM SELECTOR PLAN 1
[Healthy eating counseling provided] : healthy eating [Walking] : Walking [None] : None Recent travel. Met criteria for SIRS POA. R/o tickborne/mosquito borne, parasitic infections.  - Elevated lactate 2.6 and elevated  noted on admission  - Full RVP negative. CXR and CT Chest reviewed: No evidence of pneumonia, pulmonary edema, or pleural effusion  - Parasitic serologies neg, Dengue IgM neg, Strep pneumo/legionella neg. Hep panel neg.  - Procal elevated. Inflammatory markers high.   - Obtain TTE to assess for vegetations r/o infective endocarditis in pt with h/o TAVR  - CT A/P - reviewed. periportal LAD. Mild CBD dilation 2' post-cholecystectomy status.  - Cont doxycycline.  - ID recs appreciated. Serologies in progress.

## 2025-04-12 NOTE — DIETITIAN INITIAL EVALUATION ADULT - PROBLEM SELECTOR PLAN 1
Pt presented to the ER with recurrent fevers, chills, and malaise for 3 days  H/o recent travel to [facility] 3-4 weeks ago  Denies any insect or tick bites; Denies sick contacts.   In the ED, vital signs were significant for 101F fever and tachycardia 102 bpm (SIRS+), unclear etiology of sepsis at this time  S/p 500 ml NS bolus  and 1 dose of Vanc and Cefepime by ER physician.     - Elevated lactate 2.6 and elevated  noted on admission  - Full RVP negative  - CXR and CT Chest reviewed: No evidence of pneumonia, pulmonary edema, or pleural effusion  - UA negative  - Follow blood and urine cultures  - Send sputum culture  - Send urine antigens for legionella and streptococcus pneumoniae  - Send Mycoplasma IgM  - Send procal  - Obtain MRSA PCR  - Obtain ESR  - Obtain Dengue and Malaria antibodies given recent travel history to [facility]  - Obtain TTE to assess for vegetations r/o infective endocarditis in pt with h/o TAVR  - Start IV Vancomycin, Cefepime, and Doxycycline for now - will de-escalate antibiotics according to culture sensitivities

## 2025-04-12 NOTE — DIETITIAN INITIAL EVALUATION ADULT - REASON FOR ADMISSION
Fever of unknown origin  Per chart review, patient is a 80 yo M with PMH of hypertension, hyperlipidemia, type 2 diabetes mellitus, CAD, PAD, AS s/p TAVR presented to the ER with recurrent fevers, chills, and malaise for 3 days. Admitted to Medicine for sepsis of unclear etiology.

## 2025-04-12 NOTE — PROGRESS NOTE ADULT - SUBJECTIVE AND OBJECTIVE BOX
Follow Up:  fever    Interval History/ROS: afebrile since 4/10, no overnight events, AM labs pending    Allergies  No Known Allergies        ANTIMICROBIALS:  doxycycline IVPB    doxycycline IVPB 100 every 12 hours      OTHER MEDS:  MEDICATIONS  (STANDING):  acetaminophen     Tablet .. 650 every 6 hours PRN  amLODIPine   Tablet 10 daily  aspirin  chewable 81 daily  atorvastatin 40 at bedtime  bisacodyl 5 every 12 hours PRN  dextrose 50% Injectable 25 once  dextrose 50% Injectable 12.5 once  dextrose 50% Injectable 25 once  dextrose Oral Gel 15 once PRN  gabapentin 100 three times a day  glucagon  Injectable 1 once  heparin   Injectable 5000 every 8 hours  hydrALAZINE 50 three times a day  insulin lispro (ADMELOG) corrective regimen sliding scale  three times a day before meals  insulin lispro (ADMELOG) corrective regimen sliding scale  at bedtime  metoprolol succinate  daily  polyethylene glycol 3350 17 daily PRN      Vital Signs Last 24 Hrs  T(C): 36.8 (12 Apr 2025 05:46), Max: 37.3 (11 Apr 2025 17:33)  T(F): 98.3 (12 Apr 2025 05:46), Max: 99.2 (11 Apr 2025 17:33)  HR: 71 (12 Apr 2025 05:46) (71 - 80)  BP: 134/64 (12 Apr 2025 05:46) (127/69 - 149/68)  BP(mean): --  RR: 18 (12 Apr 2025 05:46) (18 - 18)  SpO2: 98% (12 Apr 2025 05:46) (97% - 100%)    Parameters below as of 12 Apr 2025 05:46  Patient On (Oxygen Delivery Method): room air        PHYSICAL EXAM:                                12.8   8.26  )-----------( 143      ( 11 Apr 2025 06:15 )             38.1       04-11    130[L]  |  95[L]  |  20  ----------------------------<  229[H]  3.9   |  24  |  1.52[H]    Ca    8.9      11 Apr 2025 14:55  Phos  2.3     04-10  Mg     1.60     04-10    TPro  7.1  /  Alb  3.4  /  TBili  0.7  /  DBili  <0.2  /  AST  72[H]  /  ALT  29  /  AlkPhos  103  04-11        MICROBIOLOGY:  v    Culture - Urine (collected 10 Apr 2025 05:29)  Source: Clean Catch Clean Catch (Midstream)  Final Report (11 Apr 2025 07:28):    <10,000 CFU/mL Normal Urogenital Kimberlyn    Culture - Blood (collected 10 Apr 2025 05:20)  Source: Blood Blood-Peripheral  Preliminary Report (11 Apr 2025 09:01):    No growth at 24 hours    Culture - Blood (collected 09 Apr 2025 22:25)  Source: Blood Blood-Peripheral  Preliminary Report (12 Apr 2025 03:01):    No growth at 48 Hours    Culture - Blood (collected 09 Apr 2025 22:10)  Source: Blood Blood-Peripheral  Preliminary Report (12 Apr 2025 03:01):    No growth at 48 Hours    Urinalysis with Rflx Culture (collected 08 Apr 2025 16:00)    Culture - Blood (collected 08 Apr 2025 13:10)  Source: Blood Blood-Peripheral  Preliminary Report (11 Apr 2025 15:01):    No growth at 72 Hours    Culture - Blood (collected 08 Apr 2025 12:42)  Source: Blood Blood-Peripheral  Preliminary Report (11 Apr 2025 15:01):    No growth at 72 Hours              Rapid RVP Result: NotDetec (04-10 @ 09:52)  Rapid RVP Result: NotDetec (04-08 @ 12:52)        RADIOLOGY:       Follow Up:  fever    Interval History/ROS: afebrile since 4/10, no overnight events, feels weak and stiff, no focal complaints. Labs with unremarkable CBC , mildly elevated LFTS     Allergies  No Known Allergies        ANTIMICROBIALS:  doxycycline IVPB    doxycycline IVPB 100 every 12 hours      OTHER MEDS:  MEDICATIONS  (STANDING):  acetaminophen     Tablet .. 650 every 6 hours PRN  amLODIPine   Tablet 10 daily  aspirin  chewable 81 daily  atorvastatin 40 at bedtime  bisacodyl 5 every 12 hours PRN  dextrose 50% Injectable 25 once  dextrose 50% Injectable 12.5 once  dextrose 50% Injectable 25 once  dextrose Oral Gel 15 once PRN  gabapentin 100 three times a day  glucagon  Injectable 1 once  heparin   Injectable 5000 every 8 hours  hydrALAZINE 50 three times a day  insulin lispro (ADMELOG) corrective regimen sliding scale  three times a day before meals  insulin lispro (ADMELOG) corrective regimen sliding scale  at bedtime  metoprolol succinate  daily  polyethylene glycol 3350 17 daily PRN      Vital Signs Last 24 Hrs  T(C): 36.8 (12 Apr 2025 05:46), Max: 37.3 (11 Apr 2025 17:33)  T(F): 98.3 (12 Apr 2025 05:46), Max: 99.2 (11 Apr 2025 17:33)  HR: 71 (12 Apr 2025 05:46) (71 - 80)  BP: 134/64 (12 Apr 2025 05:46) (127/69 - 149/68)  BP(mean): --  RR: 18 (12 Apr 2025 05:46) (18 - 18)  SpO2: 98% (12 Apr 2025 05:46) (97% - 100%)    Parameters below as of 12 Apr 2025 05:46  Patient On (Oxygen Delivery Method): room air        PHYSICAL EXAM:    General: Patient in NAD  HEENT: NCAT, EOMI, PERRL, no oral lesions  CV: S1+S2, no m/r/g appreciated   Lungs: No respiratory distress, CTAB  Abd: Soft, nontender, no guarding, no rebound tenderness, + bowel sounds   : No suprapubic tenderness  Neuro: Alert and oriented to time, place and person. Moves all extremities against gravity.  Ext: No cyanosis, no edema  Skin: No rash, no phlebitis                          12.8   8.26  )-----------( 143      ( 11 Apr 2025 06:15 )             38.1       04-11    130[L]  |  95[L]  |  20  ----------------------------<  229[H]  3.9   |  24  |  1.52[H]    Ca    8.9      11 Apr 2025 14:55  Phos  2.3     04-10  Mg     1.60     04-10    TPro  7.1  /  Alb  3.4  /  TBili  0.7  /  DBili  <0.2  /  AST  72[H]  /  ALT  29  /  AlkPhos  103  04-11        MICROBIOLOGY:  v    Culture - Urine (collected 10 Apr 2025 05:29)  Source: Clean Catch Clean Catch (Midstream)  Final Report (11 Apr 2025 07:28):    <10,000 CFU/mL Normal Urogenital Kimberlyn    Culture - Blood (collected 10 Apr 2025 05:20)  Source: Blood Blood-Peripheral  Preliminary Report (11 Apr 2025 09:01):    No growth at 24 hours    Culture - Blood (collected 09 Apr 2025 22:25)  Source: Blood Blood-Peripheral  Preliminary Report (12 Apr 2025 03:01):    No growth at 48 Hours    Culture - Blood (collected 09 Apr 2025 22:10)  Source: Blood Blood-Peripheral  Preliminary Report (12 Apr 2025 03:01):    No growth at 48 Hours    Urinalysis with Rflx Culture (collected 08 Apr 2025 16:00)    Culture - Blood (collected 08 Apr 2025 13:10)  Source: Blood Blood-Peripheral  Preliminary Report (11 Apr 2025 15:01):    No growth at 72 Hours    Culture - Blood (collected 08 Apr 2025 12:42)  Source: Blood Blood-Peripheral  Preliminary Report (11 Apr 2025 15:01):    No growth at 72 Hours              Rapid RVP Result: NotDetec (04-10 @ 09:52)  Rapid RVP Result: NotDetec (04-08 @ 12:52)        RADIOLOGY:     Follow Up:  fever    Interval History/ROS: afebrile since 4/10, no overnight events, feels weak and stiff, no focal complaints. Labs with unremarkable CBC , mildly elevated LFTS     PAST MEDICAL & SURGICAL HISTORY:  HTN (hypertension)  Hyperlipidemia  BPH (benign prostatic hypertrophy)  GERD (gastroesophageal reflux disease)  Chronic mastoiditis of left side  Gall stones  DM (diabetes mellitus)  Hx of cholecystectomy  S/P AVR (aortic valve replacement) 2009 @ [place] (bovine)  History of ear surgery, left ear?    Allergies  No Known Allergies    ANTIMICROBIALS:    vancomycin  IVPB (4/10 x2)  cefepime   IVPB (4/10-4/11)    active:  doxycycline IVPB 100 every 12 hours (4/10-)    MEDICATIONS  (STANDING):  amLODIPine   Tablet 10 daily  aspirin  chewable 81 daily  atorvastatin 40 at bedtime  gabapentin 100 three times a day  heparin   Injectable 5000 every 8 hours  hydrALAZINE 50 three times a day  insulin glargine Injectable (LANTUS) 3 at bedtime  insulin lispro (ADMELOG) corrective regimen sliding scale  three times a day before meals  insulin lispro (ADMELOG) corrective regimen sliding scale  at bedtime  meclizine 12.5 three times a day  metoprolol succinate  daily  polyethylene glycol 3350 17 daily  senna 2 at bedtime    Vital Signs Last 24 Hrs  T(F): 98.3 (04-12-25 @ 05:46), Max: 99.2 (04-11-25 @ 17:33)  HR: 71 (04-12-25 @ 05:46)  BP: 134/64 (04-12-25 @ 05:46)  RR: 18 (04-12-25 @ 05:46)  SpO2: 98% (04-12-25 @ 05:46) (97% - 100%)  Wt(kg): --    PHYSICAL EXAM:  General: Patient in NAD  HEENT: NCAT, EOMI, PERRL, no oral lesions  CV: S1+S2, no m/r/g appreciated   Lungs: No respiratory distress, CTAB  Abd: Soft, nontender, no guarding, no rebound tenderness, + bowel sounds   : No suprapubic tenderness  Neuro: Alert and oriented to time, place and person. Moves all extremities against gravity.  Ext: No cyanosis, no edema  Skin: No rash, no phlebitis                      12.8   8.26  )-----------( 143      ( 11 Apr 2025 06:15 )             38.1     130[L]  |  95[L]  |  20  ----------------------------<  229[H]  3.9   |  24  |  1.52[H]    Ca    8.9      11 Apr 2025 14:55  Phos  2.3     04-10  Mg     1.60     04-10    TPro  7.1  /  Alb  3.4  /  TBili  0.7  /  DBili  <0.2  /  AST  72[H]  /  ALT  29  /  AlkPhos  103  04-11    MICROBIOLOGY:  malaria smear, leptospirosis serologies, Quantiferon pending    Culture - Urine (collected 10 Apr 2025 05:29)  Source: Clean Catch Clean Catch (Midstream)  Final Report (11 Apr 2025 07:28):    <10,000 CFU/mL Normal Urogenital Kimberlyn    Culture - Blood (collected 10 Apr 2025 05:20)  Source: Blood Blood-Peripheral  Preliminary Report (11 Apr 2025 09:01):    No growth at 24 hours    Culture - Blood (collected 09 Apr 2025 22:25)  Source: Blood Blood-Peripheral  Preliminary Report (12 Apr 2025 03:01):    No growth at 48 Hours    Culture - Blood (collected 09 Apr 2025 22:10)  Source: Blood Blood-Peripheral  Preliminary Report (12 Apr 2025 03:01):    No growth at 48 Hours    Urinalysis with Rflx Culture (collected 08 Apr 2025 16:00)    Culture - Blood (collected 08 Apr 2025 13:10)  Source: Blood Blood-Peripheral  Preliminary Report (11 Apr 2025 15:01):    No growth at 72 Hours    Culture - Blood (collected 08 Apr 2025 12:42)  Source: Blood Blood-Peripheral  Preliminary Report (11 Apr 2025 15:01):    No growth at 72 Hours    Rapid RVP Result: NotDetec (04-10 @ 09:52)  Rapid RVP Result: NotDetec (04-08 @ 12:52)    RADIOLOGY:  below radiology personally reviewed and agree with finding    CT Abdomen and Pelvis No Cont (04.11.25 @ 17:24) >  IMPRESSION:  No evidence of acute abnormality in the abdomen and pelvis.  Stable dilatation of the CBD, which measures 1.8 cm. Findings may reflect postcholecystectomy change.    US Kidney and Bladder (04.10.25 @ 13:12) >  IMPRESSION:  *  Mildly echogenic kidneys.  *  No hydronephrosis.    CT Chest No Cont (04.10.25 @ 02:08) >  IMPRESSION:  No CT evidence of pneumonia, pulmonary edema, or pleural effusion.  The patient is status post TAVR.  Stable aneurysmal dilatation of the mid ascending thoracic aorta, to approximately 4.2 cm in transverse caliber.  Approximately 1.2 cm thyroid nodule.  In the absence of a family history   or risk factors for thyroid cancer, the American College of Radiology does not recommend routine follow-up of incidental thyroid nodules measuring less than 1.5 cm in this age group.    Xray Chest 2 Views PA/Lat (04.09.25 @ 22:08) >  IMPRESSION:  Cardiomegaly with clear lungs.      ECHO:  TTE W or WO Ultrasound Enhancing Agent (04.10.25 @ 08:47) >  CONCLUSIONS:   1. Left ventricular systolic function is normal.   2. The right ventricle is not well visualized.   3. A atranscatheter deployed (TAVR) is present in the aortic position. The peak transaortic velocity is 1.42 m/s, peak transaortic gradient is 8.0 mmHg and mean transaortic gradient is 4.1 mmHg with an LVOT/aortic valve VTI ratio of 0.62.   4. No pericardial effusion seen.   5. Technically difficult image quality.     Follow Up:  fever    Interval History/ROS: afebrile since 4/10, no overnight events, feels weak and stiff, no focal complaints. Labs with unremarkable CBC , mildly elevated LFTS     PAST MEDICAL & SURGICAL HISTORY:  HTN (hypertension)  Hyperlipidemia  BPH (benign prostatic hypertrophy)  GERD (gastroesophageal reflux disease)  Chronic mastoiditis of left side  Gall stones  DM (diabetes mellitus)  Hx of cholecystectomy  S/P AVR (aortic valve replacement) 2009 @ [place] (bovine)  History of ear surgery, left ear?    Allergies  No Known Allergies    ANTIMICROBIALS:    vancomycin  IVPB (4/10 x2)  cefepime   IVPB (4/10-4/11)    active:  doxycycline IVPB 100 every 12 hours (4/10-)    MEDICATIONS  (STANDING):  amLODIPine   Tablet 10 daily  aspirin  chewable 81 daily  atorvastatin 40 at bedtime  gabapentin 100 three times a day  heparin   Injectable 5000 every 8 hours  hydrALAZINE 50 three times a day  insulin glargine Injectable (LANTUS) 3 at bedtime  insulin lispro (ADMELOG) corrective regimen sliding scale  three times a day before meals  insulin lispro (ADMELOG) corrective regimen sliding scale  at bedtime  meclizine 12.5 three times a day  metoprolol succinate  daily  polyethylene glycol 3350 17 daily  senna 2 at bedtime    Vital Signs Last 24 Hrs  T(F): 98.3 (04-12-25 @ 05:46), Max: 99.2 (04-11-25 @ 17:33)  HR: 71 (04-12-25 @ 05:46)  BP: 134/64 (04-12-25 @ 05:46)  RR: 18 (04-12-25 @ 05:46)  SpO2: 98% (04-12-25 @ 05:46) (97% - 100%)  Wt(kg): --    PHYSICAL EXAM:  General: Patient in NAD  HEENT: NCAT, EOMI, PERRL, no oral lesions  CV: S1+S2, no m/r/g appreciated   Lungs: No respiratory distress, CTAB  Abd: Soft, nontender, no guarding, no rebound tenderness, + bowel sounds   : No suprapubic tenderness  Neuro: Alert and oriented to time, place and person. Moves all extremities against gravity.  Ext: No cyanosis, no edema  Skin: umbilicated lesions forehead / face, no phlebitis                      12.8   8.26  )-----------( 143      ( 11 Apr 2025 06:15 )             38.1     130[L]  |  95[L]  |  20  ----------------------------<  229[H]  3.9   |  24  |  1.52[H]    Ca    8.9      11 Apr 2025 14:55  Phos  2.3     04-10  Mg     1.60     04-10    TPro  7.1  /  Alb  3.4  /  TBili  0.7  /  DBili  <0.2  /  AST  72[H]  /  ALT  29  /  AlkPhos  103  04-11    4/12/2025 HIV-1/2 Combo Result: Nonreact    MICROBIOLOGY:  malaria smear, leptospirosis serologies, Quantiferon pending    Culture - Urine (collected 10 Apr 2025 05:29)  Source: Clean Catch Clean Catch (Midstream)  Final Report (11 Apr 2025 07:28):    <10,000 CFU/mL Normal Urogenital Kimberlyn    Culture - Blood (collected 10 Apr 2025 05:20)  Source: Blood Blood-Peripheral  Preliminary Report (11 Apr 2025 09:01):    No growth at 24 hours    Culture - Blood (collected 09 Apr 2025 22:25)  Source: Blood Blood-Peripheral  Preliminary Report (12 Apr 2025 03:01):    No growth at 48 Hours    Culture - Blood (collected 09 Apr 2025 22:10)  Source: Blood Blood-Peripheral  Preliminary Report (12 Apr 2025 03:01):    No growth at 48 Hours    Urinalysis with Rflx Culture (collected 08 Apr 2025 16:00)    Culture - Blood (collected 08 Apr 2025 13:10)  Source: Blood Blood-Peripheral  Preliminary Report (11 Apr 2025 15:01):    No growth at 72 Hours    Culture - Blood (collected 08 Apr 2025 12:42)  Source: Blood Blood-Peripheral  Preliminary Report (11 Apr 2025 15:01):    No growth at 72 Hours    Rapid RVP Result: NotDetec (04-10 @ 09:52)  Rapid RVP Result: NotDetec (04-08 @ 12:52)    RADIOLOGY:  below radiology personally reviewed and agree with finding    CT Abdomen and Pelvis No Cont (04.11.25 @ 17:24) >  IMPRESSION:  No evidence of acute abnormality in the abdomen and pelvis.  Stable dilatation of the CBD, which measures 1.8 cm. Findings may reflect postcholecystectomy change.    US Kidney and Bladder (04.10.25 @ 13:12) >  IMPRESSION:  *  Mildly echogenic kidneys.  *  No hydronephrosis.    CT Chest No Cont (04.10.25 @ 02:08) >  IMPRESSION:  No CT evidence of pneumonia, pulmonary edema, or pleural effusion.  The patient is status post TAVR.  Stable aneurysmal dilatation of the mid ascending thoracic aorta, to approximately 4.2 cm in transverse caliber.  Approximately 1.2 cm thyroid nodule.  In the absence of a family history   or risk factors for thyroid cancer, the American College of Radiology does not recommend routine follow-up of incidental thyroid nodules measuring less than 1.5 cm in this age group.    Xray Chest 2 Views PA/Lat (04.09.25 @ 22:08) >  IMPRESSION:  Cardiomegaly with clear lungs.      ECHO:  TTE W or WO Ultrasound Enhancing Agent (04.10.25 @ 08:47) >  CONCLUSIONS:   1. Left ventricular systolic function is normal.   2. The right ventricle is not well visualized.   3. A atranscatheter deployed (TAVR) is present in the aortic position. The peak transaortic velocity is 1.42 m/s, peak transaortic gradient is 8.0 mmHg and mean transaortic gradient is 4.1 mmHg with an LVOT/aortic valve VTI ratio of 0.62.   4. No pericardial effusion seen.   5. Technically difficult image quality.

## 2025-04-12 NOTE — PROVIDER CONTACT NOTE (CRITICAL VALUE NOTIFICATION) - ASSESSMENT
Patient received A&Ox4 . 1st degree AV block on telemetry. Patient denies chest pain, shortness of breath, or generalized pain. Pt satting well on room air.

## 2025-04-12 NOTE — PROGRESS NOTE ADULT - SUBJECTIVE AND OBJECTIVE BOX
[facility] Division of Hospital Medicine  Rudolph Myers) MD Sahil  Pager [phone]    SUBJECTIVE:  Chief complaint: ________.    Pt was seen and evaluated       ROS: All systems negative except as noted.      Vital Signs Last 24 Hrs  T(C): 36.8 (12 Apr 2025 05:46), Max: 37.3 (11 Apr 2025 17:33)  T(F): 98.3 (12 Apr 2025 05:46), Max: 99.2 (11 Apr 2025 17:33)  HR: 71 (12 Apr 2025 05:46) (71 - 80)  BP: 134/64 (12 Apr 2025 05:46) (127/69 - 149/68)  BP(mean): --  RR: 18 (12 Apr 2025 05:46) (18 - 18)  SpO2: 98% (12 Apr 2025 05:46) (97% - 100%)    Parameters below as of 12 Apr 2025 05:46  Patient On (Oxygen Delivery Method): room air          PHYSICAL EXAM:              MEDICATION:  MEDICATIONS  (STANDING):  amLODIPine   Tablet 10 milliGRAM(s) Oral daily  aspirin  chewable 81 milliGRAM(s) Oral daily  atorvastatin 40 milliGRAM(s) Oral at bedtime  dextrose 5%. 1000 milliLiter(s) (50 mL/Hr) IV Continuous <Continuous>  dextrose 5%. 1000 milliLiter(s) (100 mL/Hr) IV Continuous <Continuous>  dextrose 50% Injectable 25 Gram(s) IV Push once  dextrose 50% Injectable 12.5 Gram(s) IV Push once  dextrose 50% Injectable 25 Gram(s) IV Push once  doxycycline IVPB      doxycycline IVPB 100 milliGRAM(s) IV Intermittent every 12 hours  gabapentin 100 milliGRAM(s) Oral three times a day  glucagon  Injectable 1 milliGRAM(s) IntraMuscular once  heparin   Injectable 5000 Unit(s) SubCutaneous every 8 hours  hydrALAZINE 50 milliGRAM(s) Oral three times a day  insulin lispro (ADMELOG) corrective regimen sliding scale   SubCutaneous three times a day before meals  insulin lispro (ADMELOG) corrective regimen sliding scale   SubCutaneous at bedtime  metoprolol succinate  milliGRAM(s) Oral daily    MEDICATIONS  (PRN):  acetaminophen     Tablet .. 650 milliGRAM(s) Oral every 6 hours PRN Temp greater or equal to 38C (100.4F), Mild Pain (1 - 3), Moderate Pain (4 - 6)  bisacodyl 5 milliGRAM(s) Oral every 12 hours PRN Constipation  dextrose Oral Gel 15 Gram(s) Oral once PRN Blood Glucose LESS THAN 70 milliGRAM(s)/deciliter  polyethylene glycol 3350 17 Gram(s) Oral daily PRN Constipation            LABORATORY:                          12.3   7.75  )-----------( 152      ( 12 Apr 2025 07:15 )             35.4     04-12    136  |  100  |  17  ----------------------------<  190[H]  3.5   |  21[L]  |  1.33[H]    Ca    8.6      12 Apr 2025 07:15  Phos  2.6     04-12  Mg     1.60     04-12    TPro  7.1  /  Alb  3.4  /  TBili  0.7  /  DBili  <0.2  /  AST  72[H]  /  ALT  29  /  AlkPhos  103  04-11      Urinalysis Basic - ( 12 Apr 2025 07:15 )    Color: x / Appearance: x / SG: x / pH: x  Gluc: 190 mg/dL / Ketone: x  / Bili: x / Urobili: x   Blood: x / Protein: x / Nitrite: x   Leuk Esterase: x / RBC: x / WBC x   Sq Epi: x / Non Sq Epi: x / Bacteria: x      CARDIAC MARKERS ( 11 Apr 2025 14:55 )  x     / x     / x     / x     / 1.4 ng/mL        SARS-CoV-2: NotDetec (10 Apr 2025 09:52)  SARS-CoV-2: NotDetec (08 Apr 2025 12:52)               [facility] Division of Hospital Medicine  Rudolph Myers) MD Sahil  Pager [phone]    SUBJECTIVE:  Chief complaint: Dizziness.    Pt was seen and evaluated at bedside this AM. No o/n events. States he feels weak. ALso reports dizziness. States he feel like he can tumble down to the ground if he stands. States he takes pills at home for dizziness chronically, which helps. He reports feeling constipated    ROS: All systems negative except as noted.      Vital Signs Last 24 Hrs  T(C): 36.8 (12 Apr 2025 05:46), Max: 37.3 (11 Apr 2025 17:33)  T(F): 98.3 (12 Apr 2025 05:46), Max: 99.2 (11 Apr 2025 17:33)  HR: 71 (12 Apr 2025 05:46) (71 - 80)  BP: 134/64 (12 Apr 2025 05:46) (127/69 - 149/68)  BP(mean): --  RR: 18 (12 Apr 2025 05:46) (18 - 18)  SpO2: 98% (12 Apr 2025 05:46) (97% - 100%)    Parameters below as of 12 Apr 2025 05:46  Patient On (Oxygen Delivery Method): room air      PHYSICAL EXAM:  Gen- In bed, disheveled  Eyes- EOMI, nonicteric  Resp- CTAB, good effort. No r/r/w.   CVS- RRR, S1S2, no g/r/m.  GI- Soft abd, NT, ND, +BSx4  Ext- No C/C.   Neuro- CN II-XII intact. Speech fluent face symmetric. Strength 5/5 throughout.        MEDICATION:  MEDICATIONS  (STANDING):  amLODIPine   Tablet 10 milliGRAM(s) Oral daily  aspirin  chewable 81 milliGRAM(s) Oral daily  atorvastatin 40 milliGRAM(s) Oral at bedtime  dextrose 5%. 1000 milliLiter(s) (50 mL/Hr) IV Continuous <Continuous>  dextrose 5%. 1000 milliLiter(s) (100 mL/Hr) IV Continuous <Continuous>  dextrose 50% Injectable 25 Gram(s) IV Push once  dextrose 50% Injectable 12.5 Gram(s) IV Push once  dextrose 50% Injectable 25 Gram(s) IV Push once  doxycycline IVPB      doxycycline IVPB 100 milliGRAM(s) IV Intermittent every 12 hours  gabapentin 100 milliGRAM(s) Oral three times a day  glucagon  Injectable 1 milliGRAM(s) IntraMuscular once  heparin   Injectable 5000 Unit(s) SubCutaneous every 8 hours  hydrALAZINE 50 milliGRAM(s) Oral three times a day  insulin lispro (ADMELOG) corrective regimen sliding scale   SubCutaneous three times a day before meals  insulin lispro (ADMELOG) corrective regimen sliding scale   SubCutaneous at bedtime  metoprolol succinate  milliGRAM(s) Oral daily    MEDICATIONS  (PRN):  acetaminophen     Tablet .. 650 milliGRAM(s) Oral every 6 hours PRN Temp greater or equal to 38C (100.4F), Mild Pain (1 - 3), Moderate Pain (4 - 6)  bisacodyl 5 milliGRAM(s) Oral every 12 hours PRN Constipation  dextrose Oral Gel 15 Gram(s) Oral once PRN Blood Glucose LESS THAN 70 milliGRAM(s)/deciliter  polyethylene glycol 3350 17 Gram(s) Oral daily PRN Constipation            LABORATORY:                          12.3   7.75  )-----------( 152      ( 12 Apr 2025 07:15 )             35.4     04-12    136  |  100  |  17  ----------------------------<  190[H]  3.5   |  21[L]  |  1.33[H]    Ca    8.6      12 Apr 2025 07:15  Phos  2.6     04-12  Mg     1.60     04-12    TPro  7.1  /  Alb  3.4  /  TBili  0.7  /  DBili  <0.2  /  AST  72[H]  /  ALT  29  /  AlkPhos  103  04-11      Urinalysis Basic - ( 12 Apr 2025 07:15 )    Color: x / Appearance: x / SG: x / pH: x  Gluc: 190 mg/dL / Ketone: x  / Bili: x / Urobili: x   Blood: x / Protein: x / Nitrite: x   Leuk Esterase: x / RBC: x / WBC x   Sq Epi: x / Non Sq Epi: x / Bacteria: x      CARDIAC MARKERS ( 11 Apr 2025 14:55 )  x     / x     / x     / x     / 1.4 ng/mL        SARS-CoV-2: NotDetec (10 Apr 2025 09:52)  SARS-CoV-2: NotDetec (08 Apr 2025 12:52)

## 2025-04-12 NOTE — DIETITIAN INITIAL EVALUATION ADULT - PERTINENT MEDS FT
MEDICATIONS  (STANDING):  amLODIPine   Tablet 10 milliGRAM(s) Oral daily  aspirin  chewable 81 milliGRAM(s) Oral daily  atorvastatin 40 milliGRAM(s) Oral at bedtime  dextrose 5%. 1000 milliLiter(s) (50 mL/Hr) IV Continuous <Continuous>  dextrose 5%. 1000 milliLiter(s) (100 mL/Hr) IV Continuous <Continuous>  dextrose 50% Injectable 25 Gram(s) IV Push once  dextrose 50% Injectable 12.5 Gram(s) IV Push once  dextrose 50% Injectable 25 Gram(s) IV Push once  doxycycline IVPB      doxycycline IVPB 100 milliGRAM(s) IV Intermittent every 12 hours  gabapentin 100 milliGRAM(s) Oral three times a day  glucagon  Injectable 1 milliGRAM(s) IntraMuscular once  heparin   Injectable 5000 Unit(s) SubCutaneous every 8 hours  hydrALAZINE 50 milliGRAM(s) Oral three times a day  insulin glargine Injectable (LANTUS) 3 Unit(s) SubCutaneous at bedtime  insulin lispro (ADMELOG) corrective regimen sliding scale   SubCutaneous three times a day before meals  insulin lispro (ADMELOG) corrective regimen sliding scale   SubCutaneous at bedtime  meclizine 12.5 milliGRAM(s) Oral three times a day  metoprolol succinate  milliGRAM(s) Oral daily  polyethylene glycol 3350 17 Gram(s) Oral daily  senna 2 Tablet(s) Oral at bedtime    MEDICATIONS  (PRN):  acetaminophen     Tablet .. 650 milliGRAM(s) Oral every 6 hours PRN Temp greater or equal to 38C (100.4F), Mild Pain (1 - 3), Moderate Pain (4 - 6)  bisacodyl 5 milliGRAM(s) Oral every 12 hours PRN Constipation  dextrose Oral Gel 15 Gram(s) Oral once PRN Blood Glucose LESS THAN 70 milliGRAM(s)/deciliter

## 2025-04-12 NOTE — DIETITIAN INITIAL EVALUATION ADULT - PROBLEM SELECTOR PLAN 9
- DVT prophylaxis: Heparin subcutaneous 5000 units q8h  - Diet: Carbohydrate-consistent   - Activity: as tolerated  - Disposition: Pending clinical course

## 2025-04-12 NOTE — DIETITIAN INITIAL EVALUATION ADULT - ADD RECOMMEND
1. Continue current diet order with addition of Glucerna Therapeutic Shake 8 oz. 1x/day. Fluid per medical discretion  2. Nursing please consistently document % PO intake, weekly weights, bowel movement, and GI intolerance in RN flow sheets; RD to follow per protocol.

## 2025-04-12 NOTE — DIETITIAN INITIAL EVALUATION ADULT - ORAL INTAKE PTA/DIET HISTORY
Patient is A&Ox 3 at baseline, Reports no food allergy or intolerance. Patient was feeling tired and lack of appetite during interview. Patient endorses  lbs for unknown period of time, height 65 inch. Observes no pork or beef, follows lacto-vegetarian diet on Thursday and Friday. Patient lives with spouse, denies prior use of nutritional supplements/shakes.

## 2025-04-12 NOTE — DIETITIAN INITIAL EVALUATION ADULT - NS FNS DIET ORDER
Diet, Consistent Carbohydrate w/Evening Snack:   1000mL Fluid Restriction ([id number]) (04-11-25 @ 16:03)

## 2025-04-13 LAB
-  ENTEROBACTERALES: SIGNIFICANT CHANGE UP
ADD ON TEST-SPECIMEN IN LAB: SIGNIFICANT CHANGE UP
ADD ON TEST-SPECIMEN IN LAB: SIGNIFICANT CHANGE UP
ALBUMIN SERPL ELPH-MCNC: 3.4 G/DL — SIGNIFICANT CHANGE UP (ref 3.3–5)
ALP SERPL-CCNC: 148 U/L — HIGH (ref 40–120)
ALT FLD-CCNC: 37 U/L — SIGNIFICANT CHANGE UP (ref 4–41)
ANION GAP SERPL CALC-SCNC: 13 MMOL/L — SIGNIFICANT CHANGE UP (ref 7–14)
ANION GAP SERPL CALC-SCNC: 18 MMOL/L — HIGH (ref 7–14)
AST SERPL-CCNC: 76 U/L — HIGH (ref 4–40)
BILIRUB DIRECT SERPL-MCNC: <0.2 MG/DL — SIGNIFICANT CHANGE UP (ref 0–0.3)
BILIRUB INDIRECT FLD-MCNC: >0.4 MG/DL — SIGNIFICANT CHANGE UP (ref 0–1)
BILIRUB SERPL-MCNC: 0.6 MG/DL — SIGNIFICANT CHANGE UP (ref 0.2–1.2)
BUN SERPL-MCNC: 17 MG/DL — SIGNIFICANT CHANGE UP (ref 7–23)
BUN SERPL-MCNC: 18 MG/DL — SIGNIFICANT CHANGE UP (ref 7–23)
CALCIUM SERPL-MCNC: 9 MG/DL — SIGNIFICANT CHANGE UP (ref 8.4–10.5)
CALCIUM SERPL-MCNC: 9 MG/DL — SIGNIFICANT CHANGE UP (ref 8.4–10.5)
CHLORIDE SERPL-SCNC: 100 MMOL/L — SIGNIFICANT CHANGE UP (ref 98–107)
CHLORIDE SERPL-SCNC: 98 MMOL/L — SIGNIFICANT CHANGE UP (ref 98–107)
CO2 SERPL-SCNC: 20 MMOL/L — LOW (ref 22–31)
CO2 SERPL-SCNC: 20 MMOL/L — LOW (ref 22–31)
CREAT SERPL-MCNC: 1.21 MG/DL — SIGNIFICANT CHANGE UP (ref 0.5–1.3)
CREAT SERPL-MCNC: 1.25 MG/DL — SIGNIFICANT CHANGE UP (ref 0.5–1.3)
CULTURE RESULTS: SIGNIFICANT CHANGE UP
EGFR: 58 ML/MIN/1.73M2 — LOW
EGFR: 58 ML/MIN/1.73M2 — LOW
EGFR: 61 ML/MIN/1.73M2 — SIGNIFICANT CHANGE UP
EGFR: 61 ML/MIN/1.73M2 — SIGNIFICANT CHANGE UP
GAS PNL BLDV: SIGNIFICANT CHANGE UP
GLUCOSE BLDC GLUCOMTR-MCNC: 174 MG/DL — HIGH (ref 70–99)
GLUCOSE BLDC GLUCOMTR-MCNC: 183 MG/DL — HIGH (ref 70–99)
GLUCOSE BLDC GLUCOMTR-MCNC: 185 MG/DL — HIGH (ref 70–99)
GLUCOSE BLDC GLUCOMTR-MCNC: 204 MG/DL — HIGH (ref 70–99)
GLUCOSE BLDC GLUCOMTR-MCNC: 260 MG/DL — HIGH (ref 70–99)
GLUCOSE SERPL-MCNC: 183 MG/DL — HIGH (ref 70–99)
GLUCOSE SERPL-MCNC: 239 MG/DL — HIGH (ref 70–99)
GRAM STN FLD: ABNORMAL
HCT VFR BLD CALC: 36.3 % — LOW (ref 39–50)
HGB BLD-MCNC: 12.7 G/DL — LOW (ref 13–17)
MAGNESIUM SERPL-MCNC: 1.7 MG/DL — SIGNIFICANT CHANGE UP (ref 1.6–2.6)
MAGNESIUM SERPL-MCNC: 1.7 MG/DL — SIGNIFICANT CHANGE UP (ref 1.6–2.6)
MCHC RBC-ENTMCNC: 28.5 PG — SIGNIFICANT CHANGE UP (ref 27–34)
MCHC RBC-ENTMCNC: 35 G/DL — SIGNIFICANT CHANGE UP (ref 32–36)
MCV RBC AUTO: 81.4 FL — SIGNIFICANT CHANGE UP (ref 80–100)
METHOD TYPE: SIGNIFICANT CHANGE UP
NRBC # BLD AUTO: 0 K/UL — SIGNIFICANT CHANGE UP (ref 0–0)
NRBC # FLD: 0 K/UL — SIGNIFICANT CHANGE UP (ref 0–0)
NRBC BLD AUTO-RTO: 0 /100 WBCS — SIGNIFICANT CHANGE UP (ref 0–0)
NT-PROBNP SERPL-SCNC: 986 PG/ML — HIGH
PHOSPHATE SERPL-MCNC: 2.7 MG/DL — SIGNIFICANT CHANGE UP (ref 2.5–4.5)
PHOSPHATE SERPL-MCNC: 3 MG/DL — SIGNIFICANT CHANGE UP (ref 2.5–4.5)
PLATELET # BLD AUTO: 166 K/UL — SIGNIFICANT CHANGE UP (ref 150–400)
POTASSIUM SERPL-MCNC: 3.4 MMOL/L — LOW (ref 3.5–5.3)
POTASSIUM SERPL-MCNC: 3.8 MMOL/L — SIGNIFICANT CHANGE UP (ref 3.5–5.3)
POTASSIUM SERPL-SCNC: 3.4 MMOL/L — LOW (ref 3.5–5.3)
POTASSIUM SERPL-SCNC: 3.8 MMOL/L — SIGNIFICANT CHANGE UP (ref 3.5–5.3)
PROT SERPL-MCNC: 7.1 G/DL — SIGNIFICANT CHANGE UP (ref 6–8.3)
RBC # BLD: 4.46 M/UL — SIGNIFICANT CHANGE UP (ref 4.2–5.8)
RBC # FLD: 13.2 % — SIGNIFICANT CHANGE UP (ref 10.3–14.5)
SODIUM SERPL-SCNC: 133 MMOL/L — LOW (ref 135–145)
SODIUM SERPL-SCNC: 136 MMOL/L — SIGNIFICANT CHANGE UP (ref 135–145)
SPECIMEN SOURCE: SIGNIFICANT CHANGE UP
WBC # BLD: 7.32 K/UL — SIGNIFICANT CHANGE UP (ref 3.8–10.5)
WBC # FLD AUTO: 7.32 K/UL — SIGNIFICANT CHANGE UP (ref 3.8–10.5)

## 2025-04-13 PROCEDURE — 99233 SBSQ HOSP IP/OBS HIGH 50: CPT

## 2025-04-13 RX ORDER — CEFEPIME 2 G/20ML
INJECTION, POWDER, FOR SOLUTION INTRAVENOUS
Refills: 0 | Status: DISCONTINUED | OUTPATIENT
Start: 2025-04-13 | End: 2025-04-15

## 2025-04-13 RX ORDER — FUROSEMIDE 10 MG/ML
40 INJECTION INTRAMUSCULAR; INTRAVENOUS ONCE
Refills: 0 | Status: COMPLETED | OUTPATIENT
Start: 2025-04-13 | End: 2025-04-13

## 2025-04-13 RX ORDER — CEFEPIME 2 G/20ML
2000 INJECTION, POWDER, FOR SOLUTION INTRAVENOUS ONCE
Refills: 0 | Status: COMPLETED | OUTPATIENT
Start: 2025-04-13 | End: 2025-04-13

## 2025-04-13 RX ORDER — CEFEPIME 2 G/20ML
2000 INJECTION, POWDER, FOR SOLUTION INTRAVENOUS EVERY 12 HOURS
Refills: 0 | Status: DISCONTINUED | OUTPATIENT
Start: 2025-04-13 | End: 2025-04-15

## 2025-04-13 RX ORDER — MAGNESIUM SULFATE 500 MG/ML
2 SYRINGE (ML) INJECTION ONCE
Refills: 0 | Status: COMPLETED | OUTPATIENT
Start: 2025-04-13 | End: 2025-04-13

## 2025-04-13 RX ORDER — INSULIN LISPRO 100 U/ML
2 INJECTION, SOLUTION INTRAVENOUS; SUBCUTANEOUS
Refills: 0 | Status: DISCONTINUED | OUTPATIENT
Start: 2025-04-13 | End: 2025-04-18

## 2025-04-13 RX ADMIN — CEFEPIME 100 MILLIGRAM(S): 2 INJECTION, POWDER, FOR SOLUTION INTRAVENOUS at 09:31

## 2025-04-13 RX ADMIN — INSULIN LISPRO 1: 100 INJECTION, SOLUTION INTRAVENOUS; SUBCUTANEOUS at 18:35

## 2025-04-13 RX ADMIN — POLYETHYLENE GLYCOL 3350 17 GRAM(S): 17 POWDER, FOR SOLUTION ORAL at 14:09

## 2025-04-13 RX ADMIN — ATORVASTATIN CALCIUM 40 MILLIGRAM(S): 80 TABLET, FILM COATED ORAL at 21:14

## 2025-04-13 RX ADMIN — GABAPENTIN 100 MILLIGRAM(S): 400 CAPSULE ORAL at 14:05

## 2025-04-13 RX ADMIN — Medication 50 MILLIGRAM(S): at 06:14

## 2025-04-13 RX ADMIN — Medication 100 MILLIGRAM(S): at 06:15

## 2025-04-13 RX ADMIN — Medication 50 MILLIGRAM(S): at 14:10

## 2025-04-13 RX ADMIN — Medication 40 MILLIEQUIVALENT(S): at 07:43

## 2025-04-13 RX ADMIN — GABAPENTIN 100 MILLIGRAM(S): 400 CAPSULE ORAL at 06:14

## 2025-04-13 RX ADMIN — INSULIN LISPRO 1: 100 INJECTION, SOLUTION INTRAVENOUS; SUBCUTANEOUS at 21:14

## 2025-04-13 RX ADMIN — Medication 25 GRAM(S): at 07:43

## 2025-04-13 RX ADMIN — INSULIN GLARGINE-YFGN 3 UNIT(S): 100 INJECTION, SOLUTION SUBCUTANEOUS at 21:15

## 2025-04-13 RX ADMIN — Medication 81 MILLIGRAM(S): at 14:09

## 2025-04-13 RX ADMIN — Medication 12.5 MILLIGRAM(S): at 21:14

## 2025-04-13 RX ADMIN — HEPARIN SODIUM 5000 UNIT(S): 1000 INJECTION INTRAVENOUS; SUBCUTANEOUS at 14:05

## 2025-04-13 RX ADMIN — AMLODIPINE BESYLATE 10 MILLIGRAM(S): 10 TABLET ORAL at 06:14

## 2025-04-13 RX ADMIN — HEPARIN SODIUM 5000 UNIT(S): 1000 INJECTION INTRAVENOUS; SUBCUTANEOUS at 21:14

## 2025-04-13 RX ADMIN — Medication 50 MILLIGRAM(S): at 21:14

## 2025-04-13 RX ADMIN — INSULIN LISPRO 2: 100 INJECTION, SOLUTION INTRAVENOUS; SUBCUTANEOUS at 09:31

## 2025-04-13 RX ADMIN — Medication 40 MILLIEQUIVALENT(S): at 06:16

## 2025-04-13 RX ADMIN — INSULIN LISPRO 1: 100 INJECTION, SOLUTION INTRAVENOUS; SUBCUTANEOUS at 14:04

## 2025-04-13 RX ADMIN — HEPARIN SODIUM 5000 UNIT(S): 1000 INJECTION INTRAVENOUS; SUBCUTANEOUS at 06:14

## 2025-04-13 RX ADMIN — METOPROLOL SUCCINATE 100 MILLIGRAM(S): 50 TABLET, EXTENDED RELEASE ORAL at 06:14

## 2025-04-13 RX ADMIN — Medication 650 MILLIGRAM(S): at 21:21

## 2025-04-13 RX ADMIN — Medication 12.5 MILLIGRAM(S): at 14:10

## 2025-04-13 RX ADMIN — FUROSEMIDE 40 MILLIGRAM(S): 10 INJECTION INTRAMUSCULAR; INTRAVENOUS at 14:03

## 2025-04-13 RX ADMIN — INSULIN LISPRO 2 UNIT(S): 100 INJECTION, SOLUTION INTRAVENOUS; SUBCUTANEOUS at 18:35

## 2025-04-13 RX ADMIN — INSULIN LISPRO 2 UNIT(S): 100 INJECTION, SOLUTION INTRAVENOUS; SUBCUTANEOUS at 14:04

## 2025-04-13 RX ADMIN — Medication 650 MILLIGRAM(S): at 22:20

## 2025-04-13 RX ADMIN — GABAPENTIN 100 MILLIGRAM(S): 400 CAPSULE ORAL at 21:13

## 2025-04-13 RX ADMIN — CEFEPIME 100 MILLIGRAM(S): 2 INJECTION, POWDER, FOR SOLUTION INTRAVENOUS at 18:52

## 2025-04-13 RX ADMIN — Medication 2 TABLET(S): at 21:15

## 2025-04-13 RX ADMIN — Medication 12.5 MILLIGRAM(S): at 06:14

## 2025-04-13 NOTE — CHART NOTE - NSCHARTNOTEFT_GEN_A_CORE
BCx 4/10 noted with Edwardsiella Tarda growth. Discussed with ID, c/w Cefepime and recommends MRCP as possibly may be infection from hepatobiliary source, as pt also with mild transaminitis. Discussed with Dr. Baxter, will order MRCP and f/u repeat BCx x2. Will continue to monitor pt closely.

## 2025-04-13 NOTE — PROGRESS NOTE ADULT - SUBJECTIVE AND OBJECTIVE BOX
[facility] Division of Hospital Medicine  Rudolph HOLDEN MilenaManny) MD Sahil  Pager [phone]    SUBJECTIVE:  Chief complaint: SOB.    Pt was seen and evaluated at bedside this am. Still feels about the same. REports SOB when laying down. Has occ HA. Seen at bedside w/ cardiologist as well.      ROS: All systems negative except as noted.      Vital Signs Last 24 Hrs  T(C): 37.1 (13 Apr 2025 11:23), Max: 37.6 (12 Apr 2025 20:47)  T(F): 98.8 (13 Apr 2025 11:23), Max: 99.7 (12 Apr 2025 20:47)  HR: 76 (13 Apr 2025 11:23) (76 - 80)  BP: 135/77 (13 Apr 2025 11:23) (135/77 - 149/86)  BP(mean): --  RR: 18 (13 Apr 2025 11:23) (18 - 19)  SpO2: 99% (13 Apr 2025 11:23) (98% - 99%)    Parameters below as of 13 Apr 2025 11:23  Patient On (Oxygen Delivery Method): room air      PHYSICAL EXAM:  Gen- In bed, NAD.  Eyes- EOMI, nonicteric.  Neck- Supple. +JVD  Resp- CTAB, no r/r/w.  CVS- RRR, S1S2, no peripheral edema.  Ext- No C/C.  Neuro- CN II-XII intact. Speech fluent/face symmetric.         MEDICATION:  MEDICATIONS  (STANDING):  amLODIPine   Tablet 10 milliGRAM(s) Oral daily  aspirin  chewable 81 milliGRAM(s) Oral daily  atorvastatin 40 milliGRAM(s) Oral at bedtime  cefepime   IVPB 2000 milliGRAM(s) IV Intermittent every 12 hours  cefepime   IVPB      dextrose 5%. 1000 milliLiter(s) (50 mL/Hr) IV Continuous <Continuous>  dextrose 5%. 1000 milliLiter(s) (100 mL/Hr) IV Continuous <Continuous>  dextrose 50% Injectable 25 Gram(s) IV Push once  dextrose 50% Injectable 12.5 Gram(s) IV Push once  dextrose 50% Injectable 25 Gram(s) IV Push once  furosemide   Injectable 40 milliGRAM(s) IV Push once  gabapentin 100 milliGRAM(s) Oral three times a day  glucagon  Injectable 1 milliGRAM(s) IntraMuscular once  heparin   Injectable 5000 Unit(s) SubCutaneous every 8 hours  hydrALAZINE 50 milliGRAM(s) Oral three times a day  insulin glargine Injectable (LANTUS) 3 Unit(s) SubCutaneous at bedtime  insulin lispro (ADMELOG) corrective regimen sliding scale   SubCutaneous three times a day before meals  insulin lispro (ADMELOG) corrective regimen sliding scale   SubCutaneous at bedtime  insulin lispro Injectable (ADMELOG) 2 Unit(s) SubCutaneous three times a day before meals  meclizine 12.5 milliGRAM(s) Oral three times a day  metoprolol succinate  milliGRAM(s) Oral daily  polyethylene glycol 3350 17 Gram(s) Oral daily  senna 2 Tablet(s) Oral at bedtime    MEDICATIONS  (PRN):  acetaminophen     Tablet .. 650 milliGRAM(s) Oral every 6 hours PRN Temp greater or equal to 38C (100.4F), Mild Pain (1 - 3), Moderate Pain (4 - 6)  bisacodyl 5 milliGRAM(s) Oral every 12 hours PRN Constipation  dextrose Oral Gel 15 Gram(s) Oral once PRN Blood Glucose LESS THAN 70 milliGRAM(s)/deciliter            LABORATORY:                          12.7   7.32  )-----------( 166      ( 13 Apr 2025 01:20 )             36.3     04-13    136  |  98  |  17  ----------------------------<  183[H]  3.8   |  20[L]  |  1.21    Ca    9.0      13 Apr 2025 04:44  Phos  3.0     04-13  Mg     1.70     04-13    TPro  7.1  /  Alb  3.4  /  TBili  0.6  /  DBili  <0.2  /  AST  76[H]  /  ALT  37  /  AlkPhos  148[H]  04-13      Urinalysis Basic - ( 13 Apr 2025 04:44 )    Color: x / Appearance: x / SG: x / pH: x  Gluc: 183 mg/dL / Ketone: x  / Bili: x / Urobili: x   Blood: x / Protein: x / Nitrite: x   Leuk Esterase: x / RBC: x / WBC x   Sq Epi: x / Non Sq Epi: x / Bacteria: x      CARDIAC MARKERS ( 11 Apr 2025 14:55 )  x     / x     / x     / x     / 1.4 ng/mL        SARS-CoV-2: NotDetec (10 Apr 2025 09:52)  SARS-CoV-2: NotDetec (08 Apr 2025 12:52)

## 2025-04-13 NOTE — PROGRESS NOTE ADULT - PROBLEM SELECTOR PLAN 1
Recent travel. Met criteria for SIRS POA. Tickborne/mosquito borne, parasitic infections - serologies so far negative.   - Elevated lactate 2.6 and elevated  noted on admission. lactate normalized.  - Full RVP negative. CXR and CT Chest reviewed: No evidence of pneumonia, pulmonary edema, or pleural effusion  - Parasitic serologies neg, Dengue IgM neg, Strep pneumo/legionella neg. Hep panel neg.  - Procal elevated. Inflammatory markers high.   - TTE otherwise unrevealing.  - CT A/P - reviewed. periportal LAD. Mild CBD dilation 2' post-cholecystectomy status.  - Bcx now w/ GNR -> DC doxy. Start cefepime.  - ID recs appreciated. e Recent travel. Met criteria for SIRS POA. Tickborne/mosquito borne, parasitic infections - serologies so far negative.   - Elevated lactate 2.6 and elevated  noted on admission. lactate normalized.  - Full RVP negative. CXR and CT Chest reviewed: No evidence of pneumonia, pulmonary edema, or pleural effusion  - Parasitic serologies neg, Dengue IgM neg, Strep pneumo/legionella neg. Hep panel neg.  - Procal elevated. Inflammatory markers high.   - TTE otherwise unrevealing.  - CT A/P - reviewed. periportal LAD. Mild CBD dilation 2' post-cholecystectomy status.  - Bcx now w/ GNR -> DC doxy. Start cefepime.  - MRCP to further eval for hepatobiliary source.  - ID recs appreciated.

## 2025-04-13 NOTE — PROGRESS NOTE ADULT - SUBJECTIVE AND OBJECTIVE BOX
[facility] - MIRIAM Beltran / MIRIAM Clemons / MICHELLE Martínez/ MIRIAM Hess/ MIRIAM Ogden/ SHELIA Peck / BRIAN Ordonez / LAMAR Solis / VIPIN Duvall  ---------------------------------------------------------------------------------------------------------------  seen and examined today for CKD  Interval : NAD, remains with malaise  VITALS:  T(F): 98.6 (04-13-25 @ 05:04), Max: 99.7 (04-12-25 @ 20:47)  HR: 76 (04-13-25 @ 05:04)  BP: 146/82 (04-13-25 @ 05:04)  RR: 19 (04-13-25 @ 05:04)  SpO2: 98% (04-13-25 @ 05:04)  Wt(kg): --    Physical Exam :-  Constitutional: NAD  Neck: Supple.  Respiratory: Bilateral equal breath sounds,  Cardiovascular: S1, S2 normal,  Gastrointestinal: Bowel Sounds present, soft, non tender.  Extremities: No edema  Neurological: Alert and Oriented x 3, no focal deficits  Psychiatric: Normal mood, normal affect  Data:-  Allergies :   No Known Allergies    Hospital Medications:   MEDICATIONS  (STANDING):  amLODIPine   Tablet 10 milliGRAM(s) Oral daily  aspirin  chewable 81 milliGRAM(s) Oral daily  atorvastatin 40 milliGRAM(s) Oral at bedtime  cefepime   IVPB 2000 milliGRAM(s) IV Intermittent every 12 hours  cefepime   IVPB      dextrose 5%. 1000 milliLiter(s) (50 mL/Hr) IV Continuous <Continuous>  dextrose 5%. 1000 milliLiter(s) (100 mL/Hr) IV Continuous <Continuous>  dextrose 50% Injectable 25 Gram(s) IV Push once  dextrose 50% Injectable 12.5 Gram(s) IV Push once  dextrose 50% Injectable 25 Gram(s) IV Push once  gabapentin 100 milliGRAM(s) Oral three times a day  glucagon  Injectable 1 milliGRAM(s) IntraMuscular once  heparin   Injectable 5000 Unit(s) SubCutaneous every 8 hours  hydrALAZINE 50 milliGRAM(s) Oral three times a day  insulin glargine Injectable (LANTUS) 3 Unit(s) SubCutaneous at bedtime  insulin lispro (ADMELOG) corrective regimen sliding scale   SubCutaneous three times a day before meals  insulin lispro (ADMELOG) corrective regimen sliding scale   SubCutaneous at bedtime  insulin lispro Injectable (ADMELOG) 2 Unit(s) SubCutaneous three times a day before meals  meclizine 12.5 milliGRAM(s) Oral three times a day  metoprolol succinate  milliGRAM(s) Oral daily  polyethylene glycol 3350 17 Gram(s) Oral daily  senna 2 Tablet(s) Oral at bedtime    04-13    136  |  98  |  17  ----------------------------<  183[H]  3.8   |  20[L]  |  1.21    Ca    9.0      13 Apr 2025 04:44  Phos  3.0     04-13  Mg     1.70     04-13    TPro  7.1  /  Alb  3.4  /  TBili  0.6  /  DBili  <0.2  /  AST  76[H]  /  ALT  37  /  AlkPhos  148[H]  04-13    Creatinine Trend: 1.21 <--, 1.25 <--, 1.33 <--, 1.52 <--, 1.47 <--, 1.50 <--, 1.66 <--, 1.83 <--                        12.7   7.32  )-----------( 166      ( 13 Apr 2025 01:20 )             36.3

## 2025-04-13 NOTE — PROGRESS NOTE ADULT - SUBJECTIVE AND OBJECTIVE BOX
DATE OF SERVICE: 04-13-25      pt resting in bed, no complaints   Abx changed this am,           acetaminophen     Tablet .. 650 milliGRAM(s) Oral every 6 hours PRN  amLODIPine   Tablet 10 milliGRAM(s) Oral daily  aspirin  chewable 81 milliGRAM(s) Oral daily  atorvastatin 40 milliGRAM(s) Oral at bedtime  bisacodyl 5 milliGRAM(s) Oral every 12 hours PRN  cefepime   IVPB 2000 milliGRAM(s) IV Intermittent once  cefepime   IVPB 2000 milliGRAM(s) IV Intermittent every 12 hours  cefepime   IVPB      dextrose 5%. 1000 milliLiter(s) IV Continuous <Continuous>  dextrose 5%. 1000 milliLiter(s) IV Continuous <Continuous>  dextrose 50% Injectable 25 Gram(s) IV Push once  dextrose 50% Injectable 12.5 Gram(s) IV Push once  dextrose 50% Injectable 25 Gram(s) IV Push once  dextrose Oral Gel 15 Gram(s) Oral once PRN  gabapentin 100 milliGRAM(s) Oral three times a day  glucagon  Injectable 1 milliGRAM(s) IntraMuscular once  heparin   Injectable 5000 Unit(s) SubCutaneous every 8 hours  hydrALAZINE 50 milliGRAM(s) Oral three times a day  insulin glargine Injectable (LANTUS) 3 Unit(s) SubCutaneous at bedtime  insulin lispro (ADMELOG) corrective regimen sliding scale   SubCutaneous three times a day before meals  insulin lispro (ADMELOG) corrective regimen sliding scale   SubCutaneous at bedtime  insulin lispro Injectable (ADMELOG) 2 Unit(s) SubCutaneous three times a day before meals  meclizine 12.5 milliGRAM(s) Oral three times a day  metoprolol succinate  milliGRAM(s) Oral daily  polyethylene glycol 3350 17 Gram(s) Oral daily  senna 2 Tablet(s) Oral at bedtime                            12.7   7.32  )-----------( 166      ( 13 Apr 2025 01:20 )             36.3       Hemoglobin: 12.7 g/dL (04-13 @ 01:20)  Hemoglobin: 12.3 g/dL (04-12 @ 07:15)  Hemoglobin: 12.8 g/dL (04-11 @ 06:15)  Hemoglobin: 12.7 g/dL (04-10 @ 13:30)  Hemoglobin: 13.3 g/dL (04-09 @ 22:20)      04-13    136  |  98  |  17  ----------------------------<  183[H]  3.8   |  20[L]  |  1.21    Ca    9.0      13 Apr 2025 04:44  Phos  3.0     04-13  Mg     1.70     04-13    TPro  6.7  /  Alb  3.2[L]  /  TBili  0.5  /  DBili  <0.2  /  AST  67[H]  /  ALT  31  /  AlkPhos  128[H]  04-12    Creatinine Trend: 1.21<--, 1.25<--, 1.33<--, 1.52<--, 1.47<--, 1.50<--    COAGS:     CARDIAC MARKERS ( 11 Apr 2025 14:55 )  x     / x     / x     / x     / 1.4 ng/mL        T(C): 37 (04-13-25 @ 05:04), Max: 37.6 (04-12-25 @ 20:47)  HR: 76 (04-13-25 @ 05:04) (74 - 80)  BP: 146/82 (04-13-25 @ 05:04) (139/78 - 149/86)  RR: 19 (04-13-25 @ 05:04) (18 - 19)  SpO2: 98% (04-13-25 @ 05:04) (98% - 99%)  Wt(kg): --    I&O's Summary    General:  Alert and Oriented * 3.   Head: Normocephalic and atraumatic.   Neck: No JVD. No bruits. Supple. Does not appear to be enlarged.   Cardiovascular: + S1,S2 ; RRR Soft systolic murmur at the left lower sternal border. No rubs noted.    Lungs: CTA b/l. No rhonchi, rales or wheezes.   Abdomen: + BS, soft. Non tender. Non distended. No rebound. No guarding.   Extremities: No clubbing/cyanosis/edema.   Neurologic: Moves all four extremities. Full range of motion.   Skin: Warm and moist. The patient's skin has normal elasticity and good skin turgor.   Psychiatric: Appropriate mood and affect.  Musculoskeletal: Normal range of motion, normal strength     TELEMETRY: NSR	      ECG:  	NSR    RADIOLOGY:         CXR:   < from: CT Chest No Cont (04.10.25 @ 02:08) >    IMPRESSION:  No CT evidence of pneumonia, pulmonary edema, or pleural effusion.    The patient is status post TAVR.  Stable aneurysmal dilatation of the mid   ascending thoracic aorta, to approximately 4.2 cm in transverse caliber.    Approximately 1.2 cm thyroid nodule.  In the absence of a family history   or risk factors for thyroid cancer, the American College of Radiology   does not recommend routine follow-up of incidental thyroid nodules   measuring less than 1.5 cm in this age group.    < end of copied text >      < from: TTE W or WO Ultrasound Enhancing Agent (04.10.25 @ 08:47) >    CONCLUSIONS:      1. Left ventricular systolic function is normal.   2. The right ventricle is not well visualized.   3. A atranscatheter deployed (TAVR) is present in the aortic position. The peak transaortic velocity is 1.42 m/s, peak transaortic gradient is 8.0 mmHg and mean transaortic gradient is 4.1 mmHg with an LVOT/aortic valve VTI ratio of 0.62.   4. No pericardial effusion seen.   5. Technically difficult image quality.    < end of copied text >      < from: Cardiac Catheterization (06.22.23 @ 17:04) >  The coronary circulation is right dominant. Cardiac catheterization  was performed electively.  LM   Left main artery: Angiography shows minor irregularities.      LAD   Proximal left anterior descending: The segment is moderately ectatic.Angiography shows moderate atherosclerosis. There is a40 % stenosis.      CX   Circumflex: Angiography shows minor irregularities.      RCA   Right coronary artery: Angiography shows minor irregularities.        ASSESSMENT/PLAN: Pt is an 79 year old male with  a past medical history of hypertension, hypercholesterolemia, diabetes, chronic renal insufficiency, peripheral vascular disease, bioprosthetic AVR in 2009 status post TAVR of his bioprosthetic AVR in July 2023, with  known venous insufficiency status post Varithena closure of the left great saphenous vein presented to the ER with recurrent fevers, chills, and malaise for 3 days.     HTN/HLD/TAVR/CAD/TAA  - c/w ASA and statin, has h/o of moderate non obstructive CAD  - c/w BB for stable TAA  - c/w Amlodipine and hydralazine  - c/w ASA, discussed with Dr. Coffman will take off Plavix  - abx  per ID

## 2025-04-13 NOTE — PROGRESS NOTE ADULT - ASSESSMENT
80 yo male with hx of HTN, HLD, T2DM, CAD, s/p TAVR, PAD comes back to the ED after he was seen yesterday for fever and general malaise. He was dc home yesterday and he has nirali taking tylenol every 4 hours but continues to be febrile.     CKD 3a  serum creatinine at baseline  plan:  no acute renal intervention at this time  BMP QD    Hypokalemia  DASH diet  agreed with supplementation    Fever  suspected viral illness  serology and imaging negative  appreciate primary team input      For any question, call:  Cell # [phone]  Pager # [phone]  Callback # [phone]

## 2025-04-13 NOTE — PROGRESS NOTE ADULT - ASSESSMENT
78 yo M with PMH of hypertension, hyperlipidemia, type 2 diabetes mellitus, CAD, PAD, AS s/p TAVR presented to the ER with recurrent fevers, chills, and malaise for 3 days. Admitted to Medicine for sepsis of unclear etiology. Tickborne/mosquito borne infections serologies negative. BCx ultimately grew GNR. CT AP unrevealing for any obvious sources of infection. Remains hospitalized pending further w/u.

## 2025-04-14 ENCOUNTER — APPOINTMENT (OUTPATIENT)
Dept: OTOLARYNGOLOGY | Facility: CLINIC | Age: 80
End: 2025-04-14

## 2025-04-14 LAB
-  AMPICILLIN/SULBACTAM: SIGNIFICANT CHANGE UP
-  AMPICILLIN: SIGNIFICANT CHANGE UP
-  AZTREONAM: SIGNIFICANT CHANGE UP
-  CEFAZOLIN: SIGNIFICANT CHANGE UP
-  CEFEPIME: SIGNIFICANT CHANGE UP
-  CEFOXITIN: SIGNIFICANT CHANGE UP
-  CEFTRIAXONE: SIGNIFICANT CHANGE UP
-  CIPROFLOXACIN: SIGNIFICANT CHANGE UP
-  ERTAPENEM: SIGNIFICANT CHANGE UP
-  GENTAMICIN: SIGNIFICANT CHANGE UP
-  LEVOFLOXACIN: SIGNIFICANT CHANGE UP
-  MEROPENEM: SIGNIFICANT CHANGE UP
-  PIPERACILLIN/TAZOBACTAM: SIGNIFICANT CHANGE UP
-  TOBRAMYCIN: SIGNIFICANT CHANGE UP
-  TRIMETHOPRIM/SULFAMETHOXAZOLE: SIGNIFICANT CHANGE UP
ALBUMIN SERPL ELPH-MCNC: 3.4 G/DL — SIGNIFICANT CHANGE UP (ref 3.3–5)
ALP SERPL-CCNC: 147 U/L — HIGH (ref 40–120)
ALT FLD-CCNC: 46 U/L — HIGH (ref 4–41)
ANION GAP SERPL CALC-SCNC: 16 MMOL/L — HIGH (ref 7–14)
AST SERPL-CCNC: 87 U/L — HIGH (ref 4–40)
BILIRUB DIRECT SERPL-MCNC: <0.2 MG/DL — SIGNIFICANT CHANGE UP (ref 0–0.3)
BILIRUB INDIRECT FLD-MCNC: >0.4 MG/DL — SIGNIFICANT CHANGE UP (ref 0–1)
BILIRUB SERPL-MCNC: 0.6 MG/DL — SIGNIFICANT CHANGE UP (ref 0.2–1.2)
BUN SERPL-MCNC: 27 MG/DL — HIGH (ref 7–23)
CALCIUM SERPL-MCNC: 9 MG/DL — SIGNIFICANT CHANGE UP (ref 8.4–10.5)
CHLORIDE SERPL-SCNC: 98 MMOL/L — SIGNIFICANT CHANGE UP (ref 98–107)
CO2 SERPL-SCNC: 20 MMOL/L — LOW (ref 22–31)
CREAT SERPL-MCNC: 1.34 MG/DL — HIGH (ref 0.5–1.3)
CULTURE RESULTS: ABNORMAL
EGFR: 54 ML/MIN/1.73M2 — LOW
EGFR: 54 ML/MIN/1.73M2 — LOW
GAMMA INTERFERON BACKGROUND BLD IA-ACNC: 0.08 IU/ML — SIGNIFICANT CHANGE UP
GLUCOSE BLDC GLUCOMTR-MCNC: 175 MG/DL — HIGH (ref 70–99)
GLUCOSE BLDC GLUCOMTR-MCNC: 194 MG/DL — HIGH (ref 70–99)
GLUCOSE BLDC GLUCOMTR-MCNC: 258 MG/DL — HIGH (ref 70–99)
GLUCOSE BLDC GLUCOMTR-MCNC: 286 MG/DL — HIGH (ref 70–99)
GLUCOSE SERPL-MCNC: 298 MG/DL — HIGH (ref 70–99)
HCT VFR BLD CALC: 39.9 % — SIGNIFICANT CHANGE UP (ref 39–50)
HGB BLD-MCNC: 13.6 G/DL — SIGNIFICANT CHANGE UP (ref 13–17)
LEPTOSPIRA AB TITR SER: NEGATIVE — SIGNIFICANT CHANGE UP
M TB IFN-G BLD-IMP: NEGATIVE — SIGNIFICANT CHANGE UP
M TB IFN-G CD4+ BCKGRND COR BLD-ACNC: 0 IU/ML — SIGNIFICANT CHANGE UP
M TB IFN-G CD4+CD8+ BCKGRND COR BLD-ACNC: 0 IU/ML — SIGNIFICANT CHANGE UP
MAGNESIUM SERPL-MCNC: 2 MG/DL — SIGNIFICANT CHANGE UP (ref 1.6–2.6)
MCHC RBC-ENTMCNC: 28.4 PG — SIGNIFICANT CHANGE UP (ref 27–34)
MCHC RBC-ENTMCNC: 34.1 G/DL — SIGNIFICANT CHANGE UP (ref 32–36)
MCV RBC AUTO: 83.3 FL — SIGNIFICANT CHANGE UP (ref 80–100)
METHOD TYPE: SIGNIFICANT CHANGE UP
NRBC # BLD AUTO: 0 K/UL — SIGNIFICANT CHANGE UP (ref 0–0)
NRBC # FLD: 0 K/UL — SIGNIFICANT CHANGE UP (ref 0–0)
NRBC BLD AUTO-RTO: 0 /100 WBCS — SIGNIFICANT CHANGE UP (ref 0–0)
ORGANISM # SPEC MICROSCOPIC CNT: ABNORMAL
PHOSPHATE SERPL-MCNC: 3.5 MG/DL — SIGNIFICANT CHANGE UP (ref 2.5–4.5)
PLATELET # BLD AUTO: 191 K/UL — SIGNIFICANT CHANGE UP (ref 150–400)
POTASSIUM SERPL-MCNC: 3.7 MMOL/L — SIGNIFICANT CHANGE UP (ref 3.5–5.3)
POTASSIUM SERPL-SCNC: 3.7 MMOL/L — SIGNIFICANT CHANGE UP (ref 3.5–5.3)
PROT SERPL-MCNC: 7.1 G/DL — SIGNIFICANT CHANGE UP (ref 6–8.3)
QUANT TB PLUS MITOGEN MINUS NIL: 0.51 IU/ML — SIGNIFICANT CHANGE UP
RBC # BLD: 4.79 M/UL — SIGNIFICANT CHANGE UP (ref 4.2–5.8)
RBC # FLD: 13.7 % — SIGNIFICANT CHANGE UP (ref 10.3–14.5)
SODIUM SERPL-SCNC: 134 MMOL/L — LOW (ref 135–145)
SPECIMEN SOURCE: SIGNIFICANT CHANGE UP
WBC # BLD: 8.58 K/UL — SIGNIFICANT CHANGE UP (ref 3.8–10.5)
WBC # FLD AUTO: 8.58 K/UL — SIGNIFICANT CHANGE UP (ref 3.8–10.5)

## 2025-04-14 PROCEDURE — 99232 SBSQ HOSP IP/OBS MODERATE 35: CPT

## 2025-04-14 PROCEDURE — 99233 SBSQ HOSP IP/OBS HIGH 50: CPT

## 2025-04-14 PROCEDURE — G0545: CPT

## 2025-04-14 RX ORDER — FUROSEMIDE 10 MG/ML
40 INJECTION INTRAMUSCULAR; INTRAVENOUS DAILY
Refills: 0 | Status: DISCONTINUED | OUTPATIENT
Start: 2025-04-14 | End: 2025-04-18

## 2025-04-14 RX ADMIN — Medication 81 MILLIGRAM(S): at 13:36

## 2025-04-14 RX ADMIN — Medication 650 MILLIGRAM(S): at 10:26

## 2025-04-14 RX ADMIN — CEFEPIME 100 MILLIGRAM(S): 2 INJECTION, POWDER, FOR SOLUTION INTRAVENOUS at 18:47

## 2025-04-14 RX ADMIN — INSULIN LISPRO 2 UNIT(S): 100 INJECTION, SOLUTION INTRAVENOUS; SUBCUTANEOUS at 09:13

## 2025-04-14 RX ADMIN — Medication 650 MILLIGRAM(S): at 11:26

## 2025-04-14 RX ADMIN — POLYETHYLENE GLYCOL 3350 17 GRAM(S): 17 POWDER, FOR SOLUTION ORAL at 13:36

## 2025-04-14 RX ADMIN — HEPARIN SODIUM 5000 UNIT(S): 1000 INJECTION INTRAVENOUS; SUBCUTANEOUS at 05:12

## 2025-04-14 RX ADMIN — GABAPENTIN 100 MILLIGRAM(S): 400 CAPSULE ORAL at 21:14

## 2025-04-14 RX ADMIN — Medication 12.5 MILLIGRAM(S): at 05:12

## 2025-04-14 RX ADMIN — INSULIN LISPRO 2 UNIT(S): 100 INJECTION, SOLUTION INTRAVENOUS; SUBCUTANEOUS at 18:46

## 2025-04-14 RX ADMIN — Medication 50 MILLIGRAM(S): at 13:36

## 2025-04-14 RX ADMIN — INSULIN LISPRO 3: 100 INJECTION, SOLUTION INTRAVENOUS; SUBCUTANEOUS at 09:13

## 2025-04-14 RX ADMIN — Medication 50 MILLIGRAM(S): at 05:12

## 2025-04-14 RX ADMIN — Medication 50 MILLIGRAM(S): at 21:15

## 2025-04-14 RX ADMIN — CEFEPIME 100 MILLIGRAM(S): 2 INJECTION, POWDER, FOR SOLUTION INTRAVENOUS at 05:12

## 2025-04-14 RX ADMIN — GABAPENTIN 100 MILLIGRAM(S): 400 CAPSULE ORAL at 13:42

## 2025-04-14 RX ADMIN — INSULIN GLARGINE-YFGN 3 UNIT(S): 100 INJECTION, SOLUTION SUBCUTANEOUS at 21:45

## 2025-04-14 RX ADMIN — ATORVASTATIN CALCIUM 40 MILLIGRAM(S): 80 TABLET, FILM COATED ORAL at 21:15

## 2025-04-14 RX ADMIN — Medication 2 TABLET(S): at 21:15

## 2025-04-14 RX ADMIN — METOPROLOL SUCCINATE 100 MILLIGRAM(S): 50 TABLET, EXTENDED RELEASE ORAL at 05:11

## 2025-04-14 RX ADMIN — HEPARIN SODIUM 5000 UNIT(S): 1000 INJECTION INTRAVENOUS; SUBCUTANEOUS at 13:37

## 2025-04-14 RX ADMIN — HEPARIN SODIUM 5000 UNIT(S): 1000 INJECTION INTRAVENOUS; SUBCUTANEOUS at 21:14

## 2025-04-14 RX ADMIN — INSULIN LISPRO 2 UNIT(S): 100 INJECTION, SOLUTION INTRAVENOUS; SUBCUTANEOUS at 13:32

## 2025-04-14 RX ADMIN — Medication 12.5 MILLIGRAM(S): at 21:15

## 2025-04-14 RX ADMIN — AMLODIPINE BESYLATE 10 MILLIGRAM(S): 10 TABLET ORAL at 05:12

## 2025-04-14 RX ADMIN — Medication 1 DOSE(S): at 21:18

## 2025-04-14 RX ADMIN — GABAPENTIN 100 MILLIGRAM(S): 400 CAPSULE ORAL at 05:11

## 2025-04-14 RX ADMIN — INSULIN LISPRO 3: 100 INJECTION, SOLUTION INTRAVENOUS; SUBCUTANEOUS at 13:32

## 2025-04-14 RX ADMIN — INSULIN LISPRO 1: 100 INJECTION, SOLUTION INTRAVENOUS; SUBCUTANEOUS at 18:46

## 2025-04-14 RX ADMIN — Medication 12.5 MILLIGRAM(S): at 13:37

## 2025-04-14 NOTE — PROGRESS NOTE ADULT - ASSESSMENT
80 yo M with PMH of hypertension, hyperlipidemia, type 2 diabetes mellitus, CAD, PAD, AS s/p TAVR presented to the ER with recurrent fevers, chills, and malaise for 3 days. Admitted to Medicine for sepsis of unclear etiology. Tickborne/mosquito borne infections serologies negative. BCx ultimately grew GNR. CT AP unrevealing for any obvious sources of infection. Remains hospitalized pending further w/u.

## 2025-04-14 NOTE — PROGRESS NOTE ADULT - SUBJECTIVE AND OBJECTIVE BOX
DATE OF SERVICE: 04-14-25      pt resting in bed, no chest pain, SOB or Palps, ROS otherwise negative    acetaminophen     Tablet .. 650 milliGRAM(s) Oral every 6 hours PRN  amLODIPine   Tablet 10 milliGRAM(s) Oral daily  aspirin  chewable 81 milliGRAM(s) Oral daily  atorvastatin 40 milliGRAM(s) Oral at bedtime  bisacodyl 5 milliGRAM(s) Oral every 12 hours PRN  cefepime   IVPB 2000 milliGRAM(s) IV Intermittent every 12 hours  gabapentin 100 milliGRAM(s) Oral three times a day  glucagon  Injectable 1 milliGRAM(s) IntraMuscular once  heparin   Injectable 5000 Unit(s) SubCutaneous every 8 hours  hydrALAZINE 50 milliGRAM(s) Oral three times a day  insulin glargine Injectable (LANTUS) 3 Unit(s) SubCutaneous at bedtime  insulin lispro (ADMELOG) corrective regimen sliding scale   SubCutaneous three times a day before meals  insulin lispro (ADMELOG) corrective regimen sliding scale   SubCutaneous at bedtime  insulin lispro Injectable (ADMELOG) 2 Unit(s) SubCutaneous three times a day before meals  meclizine 12.5 milliGRAM(s) Oral three times a day  metoprolol succinate  milliGRAM(s) Oral daily  polyethylene glycol 3350 17 Gram(s) Oral daily  senna 2 Tablet(s) Oral at bedtime                          13.6   8.58  )-----------( 191      ( 14 Apr 2025 07:03 )             39.9     134[L]  |  98  |  27[H]  ----------------------------<  298[H]  3.7   |  20[L]  |  1.34[H]    Ca    9.0      14 Apr 2025 07:03  Phos  3.5     04-14  Mg     2.00     04-14    TPro  7.1  /  Alb  3.4  /  TBili  0.6  /  DBili  <0.2  /  AST  87[H]  /  ALT  46[H]  /  AlkPhos  147[H]  04-14    Creatinine Trend: 1.34<--, 1.21<--, 1.25<--, 1.33<--, 1.52<--, 1.47<--    CARDIAC MARKERS ( 11 Apr 2025 14:55 )  x     / x     / x     / x     / 1.4 ng/mL    T(C): 36.6 (04-14-25 @ 05:02), Max: 37.2 (04-13-25 @ 20:45)  HR: 76 (04-14-25 @ 05:02) (76 - 89)  BP: 142/80 (04-14-25 @ 05:02) (126/74 - 142/80)  RR: 19 (04-14-25 @ 05:02) (18 - 19)  SpO2: 98% (04-14-25 @ 05:02) (98% - 99%)  Wt(kg): --    I&O's Summary    13 Apr 2025 07:01  -  14 Apr 2025 07:00  --------------------------------------------------------  IN: 0 mL / OUT: 350 mL / NET: -350 mL      General:  Alert and Oriented * 3.   Head: Normocephalic and atraumatic.   Neck: No JVD. No bruits. Supple. Does not appear to be enlarged.   Cardiovascular: + S1,S2 ; RRR Soft systolic murmur at the left lower sternal border. No rubs noted.    Lungs: CTA b/l. No rhonchi, rales or wheezes.   Abdomen: + BS, soft. Non tender. Non distended. No rebound. No guarding.   Extremities: No clubbing/cyanosis/edema.   Neurologic: Moves all four extremities. Full range of motion.   Skin: Warm and moist. The patient's skin has normal elasticity and good skin turgor.   Psychiatric: Appropriate mood and affect.  Musculoskeletal: Normal range of motion, normal strength     TELEMETRY: NSR	      ECG:  	NSR    RADIOLOGY:    < from: CT Chest No Cont (04.10.25 @ 02:08) >  IMPRESSION:  No CT evidence of pneumonia, pulmonary edema, or pleural effusion.    The patient is status post TAVR.  Stable aneurysmal dilatation of the mid   ascending thoracic aorta, to approximately 4.2 cm in transverse caliber.    Approximately 1.2 cm thyroid nodule.  In the absence of a family history   or risk factors for thyroid cancer, the American College [employer]   does not recommend routine follow-up of incidental thyroid nodules   measuring less than 1.5 cm in this age group.    < end of copied text >      < from: TTE W or WO Ultrasound Enhancing Agent (04.10.25 @ 08:47)   CONCLUSIONS:   1. Left ventricular systolic function is normal.   2. The right ventricle is not well visualized.   3. A atranscatheter deployed (TAVR) is present in the aortic position. The peak transaortic velocity is 1.42 m/s, peak transaortic gradient is 8.0 mmHg and mean transaortic gradient is 4.1 mmHg with an LVOT/aortic valve VTI ratio of 0.62.   4. No pericardial effusion seen.   5. Technically difficult image quality.  < end of copied text >      < from: Cardiac Catheterization (06.22.23 @ 17:04) >  The coronary circulation is right dominant. Cardiac catheterizationwas performed electively.  LM   Left main artery: Angiography shows minor irregularities.    LAD   Proximal left anterior descending: The segment is moderately ectatic.Angiography shows moderate atherosclerosis. There is a40 % stenosis.    CX   Circumflex: Angiography shows minor irregularities.    RCA   Right coronary artery: Angiography shows minor irregularities.        ASSESSMENT/PLAN: Pt is an 79 year old male with  a past medical history of hypertension, hypercholesterolemia, diabetes, chronic renal insufficiency, peripheral vascular disease, bioprosthetic AVR in 2009 status post TAVR of his bioprosthetic AVR in July 2023, with known venous insufficiency status post Varithena closure of the left great saphenous vein presented to the ER with recurrent fevers, chills, and malaise for 3 days. Found with Edwardsiella Tarda bacteremia.    HTN/HLD/TAVR/CAD/TAA  - c/w ASA and statin, has h/o of moderate non obstructive CAD  - c/w BB for stable TAA  - c/w Amlodipine and hydralazine  - c/w ASA, discussed with Dr. Coffman will take off Plavix  - abx per ID, planned for MRCP   - start Lasix 40mg PO daily, s/p IV Lasix x 1 yesterday for volume overload, now Euvolemic    Richelle CHRISTENSEN  [phone]

## 2025-04-14 NOTE — PROGRESS NOTE ADULT - ASSESSMENT
78 yo male with hx of HTN, HLD, T2DM, CAD, s/p TAVR, PAD comes back to the ED after he was seen yesterday for fever and general malaise. He was dc home yesterday and he has nirali taking tylenol every 4 hours but continues to be febrile.     CKD 3a  serum creatinine at baseline  plan:  no acute renal intervention at this time  BMP QD    Hypokalemia  DASH diet  agreed with supplementation    Fever  suspected viral illness  serology and imaging negative  per Infectious disease specialist and primary team      For any question, call:  Cell # [phone]  Pager # [phone]  Callback # [phone]

## 2025-04-14 NOTE — PROGRESS NOTE ADULT - SUBJECTIVE AND OBJECTIVE BOX
Division of Hospital Medicine  Radha Vang MD  Available via MS Teams    SUBJECTIVE / OVERNIGHT EVENTS: No active issues overnight; pt denies any new symptoms    MEDICATIONS  (STANDING):  amLODIPine   Tablet 10 milliGRAM(s) Oral daily  aspirin  chewable 81 milliGRAM(s) Oral daily  atorvastatin 40 milliGRAM(s) Oral at bedtime  cefepime   IVPB 2000 milliGRAM(s) IV Intermittent every 12 hours  cefepime   IVPB      dextrose 5%. 1000 milliLiter(s) (50 mL/Hr) IV Continuous <Continuous>  dextrose 5%. 1000 milliLiter(s) (100 mL/Hr) IV Continuous <Continuous>  dextrose 50% Injectable 25 Gram(s) IV Push once  dextrose 50% Injectable 12.5 Gram(s) IV Push once  dextrose 50% Injectable 25 Gram(s) IV Push once  fluticasone propionate/ salmeterol 250-50 MICROgram(s) Diskus 1 Dose(s) Inhalation two times a day  furosemide    Tablet 40 milliGRAM(s) Oral daily  gabapentin 100 milliGRAM(s) Oral three times a day  glucagon  Injectable 1 milliGRAM(s) IntraMuscular once  heparin   Injectable 5000 Unit(s) SubCutaneous every 8 hours  hydrALAZINE 50 milliGRAM(s) Oral three times a day  insulin glargine Injectable (LANTUS) 3 Unit(s) SubCutaneous at bedtime  insulin lispro (ADMELOG) corrective regimen sliding scale   SubCutaneous three times a day before meals  insulin lispro (ADMELOG) corrective regimen sliding scale   SubCutaneous at bedtime  insulin lispro Injectable (ADMELOG) 2 Unit(s) SubCutaneous three times a day before meals  meclizine 12.5 milliGRAM(s) Oral three times a day  metoprolol succinate  milliGRAM(s) Oral daily  polyethylene glycol 3350 17 Gram(s) Oral daily  senna 2 Tablet(s) Oral at bedtime    MEDICATIONS  (PRN):  acetaminophen     Tablet .. 650 milliGRAM(s) Oral every 6 hours PRN Temp greater or equal to 38C (100.4F), Mild Pain (1 - 3), Moderate Pain (4 - 6)  bisacodyl 5 milliGRAM(s) Oral every 12 hours PRN Constipation  dextrose Oral Gel 15 Gram(s) Oral once PRN Blood Glucose LESS THAN 70 milliGRAM(s)/deciliter      I&O's Summary    13 Apr 2025 07:01  -  14 Apr 2025 07:00  --------------------------------------------------------  IN: 0 mL / OUT: 350 mL / NET: -350 mL        PHYSICAL EXAM:  Vital Signs Last 24 Hrs  T(C): 37 (14 Apr 2025 12:48), Max: 37.2 (13 Apr 2025 20:45)  T(F): 98.6 (14 Apr 2025 12:48), Max: 98.9 (13 Apr 2025 20:45)  HR: 76 (14 Apr 2025 12:48) (76 - 89)  BP: 119/63 (14 Apr 2025 12:48) (119/63 - 142/80)  BP(mean): --  RR: 18 (14 Apr 2025 12:48) (18 - 19)  SpO2: 100% (14 Apr 2025 12:48) (98% - 100%)    Parameters below as of 14 Apr 2025 12:48  Patient On (Oxygen Delivery Method): room air      CONSTITUTIONAL: NAD  EYES: PERRLA; conjunctiva and sclera clear  ENMT: Moist oral mucosa, no pharyngeal injection or exudates; normal dentition  NECK: Supple, no palpable masses; no thyromegaly  RESPIRATORY: Normal respiratory effort; lungs are clear to auscultation bilaterally; no rales, rhonchi, or wheezing  CARDIOVASCULAR: Regular rate and rhythm, normal S1 and S2, no murmur/rub/gallop; Peripheral pulses are 2+ bilaterally  ABDOMEN: Nontender to palpation, normoactive bowel sounds, no rebound/guarding; No hepatosplenomegaly  MUSCULOSKELETAL: no clubbing or cyanosis of digits  NEUROLOGY: Awake and alert to person, place, and date; no focal neurological deficits   SKIN: No rashes; no palpable lesions    LABS:                        13.6   8.58  )-----------( 191      ( 14 Apr 2025 07:03 )             39.9     04-14    134[L]  |  98  |  27[H]  ----------------------------<  298[H]  3.7   |  20[L]  |  1.34[H]    Ca    9.0      14 Apr 2025 07:03  Phos  3.5     04-14  Mg     2.00     04-14    TPro  7.1  /  Alb  3.4  /  TBili  0.6  /  DBili  <0.2  /  AST  87[H]  /  ALT  46[H]  /  AlkPhos  147[H]  04-14          Urinalysis Basic - ( 14 Apr 2025 07:03 )    Color: x / Appearance: x / SG: x / pH: x  Gluc: 298 mg/dL / Ketone: x  / Bili: x / Urobili: x   Blood: x / Protein: x / Nitrite: x   Leuk Esterase: x / RBC: x / WBC x   Sq Epi: x / Non Sq Epi: x / Bacteria: x        Culture - Blood (collected 13 Apr 2025 10:15)  Source: Blood Blood-Peripheral  Preliminary Report (14 Apr 2025 13:02):    No growth at 24 hours    Culture - Blood (collected 13 Apr 2025 10:00)  Source: Blood Blood-Peripheral  Preliminary Report (14 Apr 2025 13:02):    No growth at 24 hours      SARS-CoV-2: NotDetec (10 Apr 2025 09:52)  SARS-CoV-2: NotDetec (08 Apr 2025 12:52)      Imaging and consultant notes reviewed.

## 2025-04-14 NOTE — PROGRESS NOTE ADULT - SUBJECTIVE AND OBJECTIVE BOX
[facility] - MIRIAM Beltran / MIRIAM Clemons / MICHELLE Martínez/ MIRIAM Hess/ MIRIAM Ogden/ SHELIA Peck / BRIAN Ordonez / LAMAR Solis / VIPIN Duvall  ---------------------------------------------------------------------------------------------------------------  seen and examined today for CKD  Interval : serum creatinine relatively stable  VITALS:  T(F): 97.8 (04-14-25 @ 05:02), Max: 98.9 (04-13-25 @ 20:45)  HR: 76 (04-14-25 @ 05:02)  BP: 142/80 (04-14-25 @ 05:02)  RR: 19 (04-14-25 @ 05:02)  SpO2: 98% (04-14-25 @ 05:02)  Wt(kg): --    04-13 @ 07:01  -  04-14 @ 07:00  --------------------------------------------------------  IN: 0 mL / OUT: 350 mL / NET: -350 mL      Physical Exam :-  Constitutional: NAD  Neck: Supple.  Respiratory: Bilateral equal breath sounds,  Cardiovascular: S1, S2 normal,  Gastrointestinal: Bowel Sounds present, soft, non tender.  Extremities: No edema  Neurological: Alert and Oriented x 3, no focal deficits  Psychiatric: Normal mood, normal affect  Data:-  Allergies :   No Known Allergies    Hospital Medications:   MEDICATIONS  (STANDING):  amLODIPine   Tablet 10 milliGRAM(s) Oral daily  aspirin  chewable 81 milliGRAM(s) Oral daily  atorvastatin 40 milliGRAM(s) Oral at bedtime  cefepime   IVPB 2000 milliGRAM(s) IV Intermittent every 12 hours  cefepime   IVPB      dextrose 5%. 1000 milliLiter(s) (50 mL/Hr) IV Continuous <Continuous>  dextrose 5%. 1000 milliLiter(s) (100 mL/Hr) IV Continuous <Continuous>  dextrose 50% Injectable 25 Gram(s) IV Push once  dextrose 50% Injectable 12.5 Gram(s) IV Push once  dextrose 50% Injectable 25 Gram(s) IV Push once  gabapentin 100 milliGRAM(s) Oral three times a day  glucagon  Injectable 1 milliGRAM(s) IntraMuscular once  heparin   Injectable 5000 Unit(s) SubCutaneous every 8 hours  hydrALAZINE 50 milliGRAM(s) Oral three times a day  insulin glargine Injectable (LANTUS) 3 Unit(s) SubCutaneous at bedtime  insulin lispro (ADMELOG) corrective regimen sliding scale   SubCutaneous three times a day before meals  insulin lispro (ADMELOG) corrective regimen sliding scale   SubCutaneous at bedtime  insulin lispro Injectable (ADMELOG) 2 Unit(s) SubCutaneous three times a day before meals  meclizine 12.5 milliGRAM(s) Oral three times a day  metoprolol succinate  milliGRAM(s) Oral daily  polyethylene glycol 3350 17 Gram(s) Oral daily  senna 2 Tablet(s) Oral at bedtime    04-14    134[L]  |  98  |  27[H]  ----------------------------<  298[H]  3.7   |  20[L]  |  1.34[H]    Ca    9.0      14 Apr 2025 07:03  Phos  3.5     04-14  Mg     2.00     04-14    TPro  7.1  /  Alb  3.4  /  TBili  0.6  /  DBili  <0.2  /  AST  87[H]  /  ALT  46[H]  /  AlkPhos  147[H]  04-14    Creatinine Trend: 1.34 <--, 1.21 <--, 1.25 <--, 1.33 <--, 1.52 <--, 1.47 <--, 1.50 <--, 1.66 <--, 1.83 <--                        13.6   8.58  )-----------( 191      ( 14 Apr 2025 07:03 )             39.9

## 2025-04-14 NOTE — PROGRESS NOTE ADULT - ASSESSMENT
80 yo M with PMH of hypertension, hyperlipidemia, type 2 diabetes mellitus, CAD, PAD, AS s/p TAVR  presented to the ER with fevers, chills, and malaise and generalized body aches for 3 days starting 04/07/25. Pt was seen in the ER 2 days ago with similar complaints, and was thought to have a viral illness and was discharged home. His symptoms didn't improve and felt worse, more fatigued, and c/o dry cough. He otherwise denies SOB, N/V, abdominal pain, chest pain, back pain, urinary complaints, joint pain, oral ulcers or other acute symptoms.     The patient is originally from [place]. Moved to the  30 years ago. Lives with his wife. Reports social alcohol use, no smoking, no drug use. No pets at home. Travels back to [place] often, most recently was in [place] for a month and came [place] the [place] on 04[place]05/25. Stayed at home and denies exposure to freshwater water. Reports no exposure to cattle or other animals, Denies mosquito bites and exposure to other insects. 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 Reports no exposure to anyone with active TB, no personal history of latent TB. Reports recent dental procedure 4 weeks ago before traveling to [place].     In the ED, vital signs were significant for 101F fever and tachycardia 102 bpm. Labs reviewed showed no leucocytosis, bands 9%, CXR clear, UA negative, RVP negative, but were remarkable for elevated lactate 2.6, elevated , procal 1.70, ESR: 70, mildly elevated LFTs AST/ALT 82/41, ALP: normal, Bili: normal. A1c: 6.4, creat 1.54--, Acute hepatitis panel negative. CXR and CT Chest: Unremarkable with no evidence of consolidation, effusion, or pneumothorax. US kidneys 04/10-   Mildly echogenic kidneys. No hydronephrosis. TTE was Technically difficult image quality.  A transcatheter deployed (TAVR) is present in the aortic position. CTAP non contrast- with no acute findings.     Now Bcx from 04/10 with 1/4 GNR- Edwardsiella tarda bacteremia.       MICRO:   04/12 HIV- negative   04/11,04/12, 04/13 Blood parasite smear- negative   04/10 full RVP: negative   04/10 Ucx- negative   04/10 Bcx- 1/4 GNR- Edwardsiella tarda     04/09 BCX- NGTD  04/08 Full RVP: negative   04/08 BCX- ngtd      # GNR bacteremia   # Fevers - now resolved   # mildly elevated LFTs - trending down   # S/P TAVR   # SAMARIA on CKD   # Elevated inflammatory markers     Recommendations:   - Can continue Cefepime for now   - Requested micro lab to release sensitivities   - MRCP pending   - Monitor LFTs closely   - Leptospira serologies pending   - If infectious workup remains negative, and still febrile would need further workup (WBC scan/BALJINDER)      In addition to reviewing history, imaging, documents, labs, microbiology, took into account antibiotic stewardship, local antibiogram and infection control strategies and potential transmission issues.    Discussed with the primary team.    Marleni Escobar MD  Attending Physician, Division of Infectious Diseases  Department of Medicine   [facility]    Contact on TEAMS messaging from 9am - 5pm  Office: [phone] (after 5 PM or weekend)

## 2025-04-14 NOTE — PROGRESS NOTE ADULT - PROBLEM SELECTOR PLAN 1
Recent travel to [facility]. Met criteria for SIRS POA. Tickborne/mosquito borne, parasitic infections - serologies so far negative.   - Elevated lactate 2.6 and elevated  noted on admission. lactate normalized.  - Full RVP negative. CXR and CT Chest reviewed: No evidence of pneumonia, pulmonary edema, or pleural effusion  - Parasitic serologies neg, Dengue IgM neg, Strep pneumo/legionella neg. Hep panel neg. F/u leptospira Ab neg, Borrelia Ab neg, Quantiferon TB neg.  - Procal elevated. Inflammatory markers high.   - TTE otherwise unrevealing.  - CT A/P - reviewed. periportal LAD. Mild CBD dilation 2' post-cholecystectomy status.  - Bcx now w/ GNR (Edwardsiella tarda) -> c/w cefepime for now (04/13 - )  - F/u repeat blood Cx 4/13: NGTD after 24 hrs  - Obtain MRCP to further evaluate for hepatobiliary source.  - ID following, recs appreciated.

## 2025-04-14 NOTE — PROGRESS NOTE ADULT - SUBJECTIVE AND OBJECTIVE BOX
Patient is a 79y old  Male who presents with a chief complaint of Fever of unknown origin (13 Apr 2025 13:55)    Being followed by ID for fevers     Interval history:  reports feeling improved   Still with some cough   Denies any abd pain and no diarrhea   MRCP pending   Remains afebrile since 04/10     ROS:  No N/V/D  No abd pain  No urinary complaints  No HA  No joint or limb pain  No other complaints    Antimicrobials:  cefepime   IVPB 2000 milliGRAM(s) IV Intermittent every 12 hours  cefepime   IVPB          Vital Signs Last 24 Hrs  T(C): 36.6 (04-14-25 @ 05:02), Max: 37.2 (04-13-25 @ 20:45)  T(F): 97.8 (04-14-25 @ 05:02), Max: 98.9 (04-13-25 @ 20:45)  HR: 76 (04-14-25 @ 05:02) (76 - 89)  BP: 142/80 (04-14-25 @ 05:02) (126/74 - 142/80)  BP(mean): --  RR: 19 (04-14-25 @ 05:02) (18 - 19)  SpO2: 98% (04-14-25 @ 05:02) (98% - 99%)    PHYSICAL EXAM:  Constitutional: non-toxic, no distress  HEAD/EYES: anicteric, no conjunctival injection  ENT:  supple, no thrush  Cardiovascular:   normal S1, S2, no murmur, no edema  Respiratory:  clear BS bilaterally, no wheezes, no rales  GI:  soft, non-tender, normal bowel sounds, distended   :  no velasco, no CVA tenderness  Musculoskeletal:  no synovitis, normal ROM  Neurologic: awake and alert, normal strength, no focal findings  Skin:  no rash, no erythema, no phlebitis  Psychiatric:  awake, alert, appropriate mood    Lab Data:                        13.6   8.58  )-----------( 191      ( 14 Apr 2025 07:03 )             39.9     04-14    134[L]  |  98  |  27[H]  ----------------------------<  298[H]  3.7   |  20[L]  |  1.34[H]    Ca    9.0      14 Apr 2025 07:03  Phos  3.5     04-14  Mg     2.00     04-14    TPro  7.1  /  Alb  3.4  /  TBili  0.6  /  DBili  <0.2  /  AST  87[H]  /  ALT  46[H]  /  AlkPhos  147[H]  04-14      Blood Blood  04-13-25   No Blood Parasites observed by giemsa stain  One negative set of blood smears does not rule out  the possibility of a parasitic infection.  A minimum of 3  specimens should be collected, at least 12-24 hours apart,  over a 36 hour time period.  ************************************************************  NEGATIVE for Plasmodium antigens. Microscopy is performed for  confirmation.  The Malaria Rapid antigen test does not detect the  presence of Babesia species. If Babesiosis is suspected  please order test Babesia PCR: Babesia species PCR Bld  ************************************************************  --  --      Blood Blood  04-12-25   No Blood Parasites observed by giemsa stain  One negative set of blood smears does not rule out  the possibility of a parasitic infection.  A minimum of 3  specimens should be collected, at least 12-24 hours apart,  over a 36 hour time period.  ************************************************************  NEGATIVE for Plasmodium antigens. Microscopy is performed for  confirmation.  The Malaria Rapid antigen test does not detect the  presence of Babesia species. If Babesiosis is suspected  please order test Babesia PCR: Babesia species PCR Bld  ************************************************************  --  --      Blood Blood  04-11-25   No Blood Parasites observed by giemsa stain  One negative set of blood smears does not rule out  the possibility of a parasitic infection.  A minimum of 3  specimens should be collected, at least 12-24 hours apart,  over a 36 hour time period.  ************************************************************  NEGATIVE for Plasmodium antigens. Microscopy is performed for  confirmation.  The Malaria Rapid antigen test does not detect the  presence of Babesia species. If Babesiosis is suspected  please order test Babesia PCR: Babesia species PCR Bld  ************************************************************  --  --      Clean Catch Clean Catch (Midstream)  04-10-25   <10,000 CFU/mL Normal Urogenital Kimberlyn  --  --      Blood Blood-Peripheral  04-10-25   Growth in aerobic bottle: Edwardsiella tarda  Direct identification is available within approximately 3-5  hours either by Blood Panel Multiplexed PCR or Direct  MALDI-TOF. Details: [email or web address]  --  Blood Culture PCR      Blood Blood-Peripheral  04-09-25   No growth at 4 days  --  --      Blood Blood-Peripheral  04-09-25   No growth at 4 days  --  --      Blood Blood-Peripheral  04-08-25   No growth at 5 days  --  --      Blood Blood-Peripheral  04-08-25   No growth at 5 days  --  --                  RADIOLOGY:  below reviewed    RADIOLOGY:  imaging below personally reviewed and agree with findings    CT chest 04/10     IMPRESSION:  No CT evidence of pneumonia, pulmonary edema, or pleural effusion.    The patient is status post TAVR.  Stable aneurysmal dilatation of the mid   ascending thoracic aorta, to approximately 4.2 cm in transverse caliber.    Approximately 1.2 cm thyroid nodule.  In the absence of a family history   or risk factors for thyroid cancer, the American College of Radiology   does not recommend routine follow-up of incidental thyroid nodules   measuring less than 1.5 cm in this age group.    04/10 TTE-   CONCLUSIONS:      1. Left ventricular systolic function is normal.   2. The right ventricle is not well visualized.   3. A a transcatheter deployed (TAVR) is present in the aortic position. The peak transaortic velocity is 1.42 m/s, peak transaortic gradient is 8.0 mmHg and mean transaortic gradient is 4.1 mmHg with an LVOT/aortic valve VTI ratio of 0.62.   4. No pericardial effusion seen.   5. Technically difficult image quality.      CTAP 04/11 non contrast     IMPRESSION:    No evidence of acute abnormality in the abdomen and pelvis.    Stable dilatation of the CBD, which measures 1.8 cm. Findings may reflect   postcholecystectomy change.    --- End of Report ---   No

## 2025-04-15 DIAGNOSIS — R78.81 BACTEREMIA: ICD-10-CM

## 2025-04-15 LAB
ANION GAP SERPL CALC-SCNC: 16 MMOL/L — HIGH (ref 7–14)
BUN SERPL-MCNC: 35 MG/DL — HIGH (ref 7–23)
CALCIUM SERPL-MCNC: 9 MG/DL — SIGNIFICANT CHANGE UP (ref 8.4–10.5)
CHLORIDE SERPL-SCNC: 98 MMOL/L — SIGNIFICANT CHANGE UP (ref 98–107)
CO2 SERPL-SCNC: 19 MMOL/L — LOW (ref 22–31)
CREAT SERPL-MCNC: 1.34 MG/DL — HIGH (ref 0.5–1.3)
CULTURE RESULTS: SIGNIFICANT CHANGE UP
CULTURE RESULTS: SIGNIFICANT CHANGE UP
EGFR: 54 ML/MIN/1.73M2 — LOW
EGFR: 54 ML/MIN/1.73M2 — LOW
GLUCOSE BLDC GLUCOMTR-MCNC: 204 MG/DL — HIGH (ref 70–99)
GLUCOSE BLDC GLUCOMTR-MCNC: 288 MG/DL — HIGH (ref 70–99)
GLUCOSE BLDC GLUCOMTR-MCNC: 305 MG/DL — HIGH (ref 70–99)
GLUCOSE BLDC GLUCOMTR-MCNC: 350 MG/DL — HIGH (ref 70–99)
GLUCOSE SERPL-MCNC: 259 MG/DL — HIGH (ref 70–99)
HCT VFR BLD CALC: 36.1 % — LOW (ref 39–50)
HGB BLD-MCNC: 12.5 G/DL — LOW (ref 13–17)
MAGNESIUM SERPL-MCNC: 1.9 MG/DL — SIGNIFICANT CHANGE UP (ref 1.6–2.6)
MCHC RBC-ENTMCNC: 28.3 PG — SIGNIFICANT CHANGE UP (ref 27–34)
MCHC RBC-ENTMCNC: 34.6 G/DL — SIGNIFICANT CHANGE UP (ref 32–36)
MCV RBC AUTO: 81.9 FL — SIGNIFICANT CHANGE UP (ref 80–100)
NRBC # BLD AUTO: 0 K/UL — SIGNIFICANT CHANGE UP (ref 0–0)
NRBC # FLD: 0 K/UL — SIGNIFICANT CHANGE UP (ref 0–0)
NRBC BLD AUTO-RTO: 0 /100 WBCS — SIGNIFICANT CHANGE UP (ref 0–0)
PHOSPHATE SERPL-MCNC: 3.4 MG/DL — SIGNIFICANT CHANGE UP (ref 2.5–4.5)
PLATELET # BLD AUTO: 215 K/UL — SIGNIFICANT CHANGE UP (ref 150–400)
POTASSIUM SERPL-MCNC: 3.7 MMOL/L — SIGNIFICANT CHANGE UP (ref 3.5–5.3)
POTASSIUM SERPL-SCNC: 3.7 MMOL/L — SIGNIFICANT CHANGE UP (ref 3.5–5.3)
RBC # BLD: 4.41 M/UL — SIGNIFICANT CHANGE UP (ref 4.2–5.8)
RBC # FLD: 13.5 % — SIGNIFICANT CHANGE UP (ref 10.3–14.5)
SODIUM SERPL-SCNC: 133 MMOL/L — LOW (ref 135–145)
SPECIMEN SOURCE: SIGNIFICANT CHANGE UP
SPECIMEN SOURCE: SIGNIFICANT CHANGE UP
WBC # BLD: 9.97 K/UL — SIGNIFICANT CHANGE UP (ref 3.8–10.5)
WBC # FLD AUTO: 9.97 K/UL — SIGNIFICANT CHANGE UP (ref 3.8–10.5)

## 2025-04-15 PROCEDURE — 99232 SBSQ HOSP IP/OBS MODERATE 35: CPT

## 2025-04-15 PROCEDURE — G0545: CPT

## 2025-04-15 PROCEDURE — 74183 MRI ABD W/O CNTR FLWD CNTR: CPT | Mod: 26

## 2025-04-15 PROCEDURE — 99233 SBSQ HOSP IP/OBS HIGH 50: CPT

## 2025-04-15 RX ORDER — CEFTRIAXONE 500 MG/1
2000 INJECTION, POWDER, FOR SOLUTION INTRAMUSCULAR; INTRAVENOUS ONCE
Refills: 0 | Status: COMPLETED | OUTPATIENT
Start: 2025-04-15 | End: 2025-04-15

## 2025-04-15 RX ORDER — CEFTRIAXONE 500 MG/1
2000 INJECTION, POWDER, FOR SOLUTION INTRAMUSCULAR; INTRAVENOUS EVERY 24 HOURS
Refills: 0 | Status: DISCONTINUED | OUTPATIENT
Start: 2025-04-16 | End: 2025-04-18

## 2025-04-15 RX ORDER — CEFTRIAXONE 500 MG/1
INJECTION, POWDER, FOR SOLUTION INTRAMUSCULAR; INTRAVENOUS
Refills: 0 | Status: DISCONTINUED | OUTPATIENT
Start: 2025-04-15 | End: 2025-04-18

## 2025-04-15 RX ADMIN — INSULIN LISPRO 2 UNIT(S): 100 INJECTION, SOLUTION INTRAVENOUS; SUBCUTANEOUS at 13:38

## 2025-04-15 RX ADMIN — HEPARIN SODIUM 5000 UNIT(S): 1000 INJECTION INTRAVENOUS; SUBCUTANEOUS at 21:13

## 2025-04-15 RX ADMIN — Medication 12.5 MILLIGRAM(S): at 13:39

## 2025-04-15 RX ADMIN — INSULIN LISPRO 1: 100 INJECTION, SOLUTION INTRAVENOUS; SUBCUTANEOUS at 21:13

## 2025-04-15 RX ADMIN — POLYETHYLENE GLYCOL 3350 17 GRAM(S): 17 POWDER, FOR SOLUTION ORAL at 13:40

## 2025-04-15 RX ADMIN — INSULIN LISPRO 4: 100 INJECTION, SOLUTION INTRAVENOUS; SUBCUTANEOUS at 18:17

## 2025-04-15 RX ADMIN — Medication 50 MILLIGRAM(S): at 05:17

## 2025-04-15 RX ADMIN — ATORVASTATIN CALCIUM 40 MILLIGRAM(S): 80 TABLET, FILM COATED ORAL at 21:13

## 2025-04-15 RX ADMIN — Medication 50 MILLIGRAM(S): at 13:39

## 2025-04-15 RX ADMIN — GABAPENTIN 100 MILLIGRAM(S): 400 CAPSULE ORAL at 13:38

## 2025-04-15 RX ADMIN — METOPROLOL SUCCINATE 100 MILLIGRAM(S): 50 TABLET, EXTENDED RELEASE ORAL at 05:16

## 2025-04-15 RX ADMIN — Medication 12.5 MILLIGRAM(S): at 21:13

## 2025-04-15 RX ADMIN — Medication 81 MILLIGRAM(S): at 13:39

## 2025-04-15 RX ADMIN — Medication 50 MILLIGRAM(S): at 21:13

## 2025-04-15 RX ADMIN — INSULIN LISPRO 4: 100 INJECTION, SOLUTION INTRAVENOUS; SUBCUTANEOUS at 09:25

## 2025-04-15 RX ADMIN — INSULIN LISPRO 2: 100 INJECTION, SOLUTION INTRAVENOUS; SUBCUTANEOUS at 13:37

## 2025-04-15 RX ADMIN — INSULIN LISPRO 2 UNIT(S): 100 INJECTION, SOLUTION INTRAVENOUS; SUBCUTANEOUS at 09:26

## 2025-04-15 RX ADMIN — Medication 12.5 MILLIGRAM(S): at 05:17

## 2025-04-15 RX ADMIN — GABAPENTIN 100 MILLIGRAM(S): 400 CAPSULE ORAL at 05:16

## 2025-04-15 RX ADMIN — CEFTRIAXONE 100 MILLIGRAM(S): 500 INJECTION, POWDER, FOR SOLUTION INTRAMUSCULAR; INTRAVENOUS at 16:37

## 2025-04-15 RX ADMIN — Medication 1 DOSE(S): at 21:13

## 2025-04-15 RX ADMIN — FUROSEMIDE 40 MILLIGRAM(S): 10 INJECTION INTRAMUSCULAR; INTRAVENOUS at 05:17

## 2025-04-15 RX ADMIN — Medication 1 DOSE(S): at 09:26

## 2025-04-15 RX ADMIN — INSULIN LISPRO 2 UNIT(S): 100 INJECTION, SOLUTION INTRAVENOUS; SUBCUTANEOUS at 18:18

## 2025-04-15 RX ADMIN — HEPARIN SODIUM 5000 UNIT(S): 1000 INJECTION INTRAVENOUS; SUBCUTANEOUS at 05:17

## 2025-04-15 RX ADMIN — HEPARIN SODIUM 5000 UNIT(S): 1000 INJECTION INTRAVENOUS; SUBCUTANEOUS at 13:39

## 2025-04-15 RX ADMIN — CEFEPIME 100 MILLIGRAM(S): 2 INJECTION, POWDER, FOR SOLUTION INTRAVENOUS at 05:23

## 2025-04-15 RX ADMIN — AMLODIPINE BESYLATE 10 MILLIGRAM(S): 10 TABLET ORAL at 05:17

## 2025-04-15 RX ADMIN — INSULIN GLARGINE-YFGN 3 UNIT(S): 100 INJECTION, SOLUTION SUBCUTANEOUS at 21:13

## 2025-04-15 RX ADMIN — GABAPENTIN 100 MILLIGRAM(S): 400 CAPSULE ORAL at 21:12

## 2025-04-15 RX ADMIN — Medication 2 TABLET(S): at 21:12

## 2025-04-15 NOTE — PROGRESS NOTE ADULT - SUBJECTIVE AND OBJECTIVE BOX
DATE OF SERVICE: 04-15-25      pt resting in bed, no chest pain, SOB or Palps, ROS otherwise negative    acetaminophen     Tablet .. 650 milliGRAM(s) Oral every 6 hours PRN  amLODIPine   Tablet 10 milliGRAM(s) Oral daily  aspirin  chewable 81 milliGRAM(s) Oral daily  atorvastatin 40 milliGRAM(s) Oral at bedtime  bisacodyl 5 milliGRAM(s) Oral every 12 hours PRN  cefTRIAXone   IVPB      fluticasone propionate/ salmeterol 250-50 MICROgram(s) Diskus 1 Dose(s) Inhalation two times a day  furosemide    Tablet 40 milliGRAM(s) Oral daily  gabapentin 100 milliGRAM(s) Oral three times a day  glucagon  Injectable 1 milliGRAM(s) IntraMuscular once  heparin   Injectable 5000 Unit(s) SubCutaneous every 8 hours  hydrALAZINE 50 milliGRAM(s) Oral three times a day  insulin glargine Injectable (LANTUS) 3 Unit(s) SubCutaneous at bedtime  insulin lispro (ADMELOG) corrective regimen sliding scale   SubCutaneous three times a day before meals  insulin lispro (ADMELOG) corrective regimen sliding scale   SubCutaneous at bedtime  insulin lispro Injectable (ADMELOG) 2 Unit(s) SubCutaneous three times a day before meals  meclizine 12.5 milliGRAM(s) Oral three times a day  metoprolol succinate  milliGRAM(s) Oral daily  polyethylene glycol 3350 17 Gram(s) Oral daily  senna 2 Tablet(s) Oral at bedtime                          12.5   9.97  )-----------( 215      ( 15 Apr 2025 05:58 )             36.1     133[L]  |  98  |  35[H]  ----------------------------<  259[H]  3.7   |  19[L]  |  1.34[H]    Ca    9.0      15 Apr 2025 05:58  Phos  3.4     04-15  Mg     1.90     04-15    TPro  7.1  /  Alb  3.4  /  TBili  0.6  /  DBili  <0.2  /  AST  87[H]  /  ALT  46[H]  /  AlkPhos  147[H]  04-14    Creatinine Trend: 1.34<--, 1.34<--, 1.21<--, 1.25<--, 1.33<--, 1.52<--      T(C): 36.7 (04-15-25 @ 05:02), Max: 36.7 (04-15-25 @ 05:02)  HR: 83 (04-15-25 @ 05:02) (71 - 83)  BP: 156/92 (04-15-25 @ 05:02) (134/71 - 156/92)  RR: 18 (04-15-25 @ 05:02) (17 - 18)  SpO2: 99% (04-15-25 @ 05:02) (99% - 100%)  Wt(kg): --    I&O's Summary    14 Apr 2025 07:01  -  15 Apr 2025 07:00  --------------------------------------------------------  IN: 0 mL / OUT: 700 mL / NET: -700 mL      General:  Alert and Oriented * 3.   Head: Normocephalic and atraumatic.   Neck: No JVD. No bruits. Supple. Does not appear to be enlarged.   Cardiovascular: + S1,S2 ; RRR Soft systolic murmur at the left lower sternal border. No rubs noted.    Lungs: CTA b/l. No rhonchi, rales or wheezes.   Abdomen: + BS, soft. Non tender. Non distended. No rebound. No guarding.   Extremities: No clubbing/cyanosis/edema.   Neurologic: Moves all four extremities. Full range of motion.   Skin: Warm and moist. The patient's skin has normal elasticity and good skin turgor.   Psychiatric: Appropriate mood and affect.  Musculoskeletal: Normal range of motion, normal strength     TELEMETRY: NSR	      ECG:  	NSR    RADIOLOGY:    < from: CT Chest No Cont (04.10.25 @ 02:08) >  IMPRESSION:  No CT evidence of pneumonia, pulmonary edema, or pleural effusion.    The patient is status post TAVR.  Stable aneurysmal dilatation of the mid   ascending thoracic aorta, to approximately 4.2 cm in transverse caliber.    Approximately 1.2 cm thyroid nodule.  In the absence of a family history   or risk factors for thyroid cancer, the American College of Radiology   does not recommend routine follow-up of incidental thyroid nodules   measuring less than 1.5 cm in this age group.    < end of copied text >      < from: TTE W or WO Ultrasound Enhancing Agent (04.10.25 @ 08:47)   CONCLUSIONS:   1. Left ventricular systolic function is normal.   2. The right ventricle is not well visualized.   3. A atranscatheter deployed (TAVR) is present in the aortic position. The peak transaortic velocity is 1.42 m/s, peak transaortic gradient is 8.0 mmHg and mean transaortic gradient is 4.1 mmHg with an LVOT/aortic valve VTI ratio of 0.62.   4. No pericardial effusion seen.   5. Technically difficult image quality.  < end of copied text >      < from: Cardiac Catheterization (06.22.23 @ 17:04) >  The coronary circulation is right dominant. Cardiac catheterizationwas performed electively.  LM   Left main artery: Angiography shows minor irregularities.    LAD   Proximal left anterior descending: The segment is moderately ectatic.Angiography shows moderate atherosclerosis. There is a40 % stenosis.    CX   Circumflex: Angiography shows minor irregularities.    RCA   Right coronary artery: Angiography shows minor irregularities.        ASSESSMENT/PLAN: Pt is an 79 year old male with  a past medical history of hypertension, hypercholesterolemia, diabetes, chronic renal insufficiency, peripheral vascular disease, bioprosthetic AVR in 2009 status post TAVR of his bioprosthetic AVR in July 2023, with known venous insufficiency status post Varithena closure of the left great saphenous vein presented to the ER with recurrent fevers, chills, and malaise for 3 days. Found with Edwardsiella Tarda bacteremia.    HTN/HLD/TAVR/CAD/TAA  - c/w ASA and statin, has h/o of moderate non obstructive CAD  - c/w BB for stable TAA  - c/w Amlodipine and hydralazine  - c/w ASA, discussed with Dr. Coffman will take off Plavix  - abx per ID, planned for MRCP   - Cont Lasix 40mg PO daily, s/p IV Lasix x 1 for volume overload, now Euvolemic    Richelle CHRISTENSEN  [phone]

## 2025-04-15 NOTE — PROGRESS NOTE ADULT - SUBJECTIVE AND OBJECTIVE BOX
[facility] - S Devin / Aric S /D Mauricio/ S Jimena/ KAREL Ogden/ Celestine Peck / GIDEON Ordonez/ O Will  service -[phone], office [phone]  ---------------------------------------------------------------------------------------------------------------    Patient seen and examined bedside    Subjective and Objective: No overnight events, sob resolved. No complaints today. feeling better    Allergies: No Known Allergies      Hospital Medications:   MEDICATIONS  (STANDING):  amLODIPine   Tablet 10 milliGRAM(s) Oral daily  aspirin  chewable 81 milliGRAM(s) Oral daily  atorvastatin 40 milliGRAM(s) Oral at bedtime  cefTRIAXone   IVPB      cefTRIAXone   IVPB 2000 milliGRAM(s) IV Intermittent once  dextrose 5%. 1000 milliLiter(s) (50 mL/Hr) IV Continuous <Continuous>  dextrose 5%. 1000 milliLiter(s) (100 mL/Hr) IV Continuous <Continuous>  dextrose 50% Injectable 25 Gram(s) IV Push once  dextrose 50% Injectable 12.5 Gram(s) IV Push once  dextrose 50% Injectable 25 Gram(s) IV Push once  fluticasone propionate/ salmeterol 250-50 MICROgram(s) Diskus 1 Dose(s) Inhalation two times a day  furosemide    Tablet 40 milliGRAM(s) Oral daily  gabapentin 100 milliGRAM(s) Oral three times a day  glucagon  Injectable 1 milliGRAM(s) IntraMuscular once  heparin   Injectable 5000 Unit(s) SubCutaneous every 8 hours  hydrALAZINE 50 milliGRAM(s) Oral three times a day  insulin glargine Injectable (LANTUS) 3 Unit(s) SubCutaneous at bedtime  insulin lispro (ADMELOG) corrective regimen sliding scale   SubCutaneous three times a day before meals  insulin lispro (ADMELOG) corrective regimen sliding scale   SubCutaneous at bedtime  insulin lispro Injectable (ADMELOG) 2 Unit(s) SubCutaneous three times a day before meals  meclizine 12.5 milliGRAM(s) Oral three times a day  metoprolol succinate  milliGRAM(s) Oral daily  polyethylene glycol 3350 17 Gram(s) Oral daily  senna 2 Tablet(s) Oral at bedtime      REVIEW OF SYSTEMS:  CONSTITUTIONAL: No weakness, fevers or chills  EYES/ENT: No visual changes;  No vertigo or throat pain   NECK: No pain or stiffness  RESPIRATORY: No cough, wheezing, hemoptysis; No shortness of breath  CARDIOVASCULAR: No chest pain or palpitations.  GASTROINTESTINAL: No abdominal or epigastric pain. No nausea, vomiting, or hematemesis; No diarrhea or constipation. No melena or hematochezia.  GENITOURINARY: No dysuria, frequency, foamy urine, urinary urgency, incontinence or hematuria  NEUROLOGICAL: No numbness or weakness  SKIN: No itching, burning, rashes, or lesions   VASCULAR: No bilateral lower extremity edema.   All other review of systems is negative unless indicated above.    VITALS:  T(F): 97.9 (04-15-25 @ 12:45), Max: 98 (04-15-25 @ 05:02)  HR: 75 (04-15-25 @ 12:45)  BP: 129/79 (04-15-25 @ 12:45)  RR: 18 (04-15-25 @ 12:45)  SpO2: 97% (04-15-25 @ 12:45)  Wt(kg): --    04-14 @ 07:01  -  04-15 @ 07:00  --------------------------------------------------------  IN: 0 mL / OUT: 700 mL / NET: -700 mL          PHYSICAL EXAM:  Constitutional: NAD  HEENT: anicteric sclera, oropharynx clear  Neck: No JVD  Respiratory: CTAB, no wheezes, rales or rhonchi  Cardiovascular: S1, S2, RRR  Gastrointestinal: BS+, soft, NT/ND  Extremities: No cyanosis or clubbing. No peripheral edema  Neurological: A/O x 3, no focal deficits  Psychiatric: Normal mood, normal affect  : No CVA tenderness. No velasco.   Skin: No rashes  Vascular Access:    LABS:  04-15    133[L]  |  98  |  35[H]  ----------------------------<  259[H]  3.7   |  19[L]  |  1.34[H]    Ca    9.0      15 Apr 2025 05:58  Phos  3.4     04-15  Mg     1.90     04-15    TPro  7.1  /  Alb  3.4  /  TBili  0.6  /  DBili  <0.2  /  AST  87[H]  /  ALT  46[H]  /  AlkPhos  147[H]  04-14    Creatinine Trend: 1.34 <--, 1.34 <--, 1.21 <--, 1.25 <--, 1.33 <--, 1.52 <--, 1.47 <--, 1.50 <--, 1.66 <--                        12.5   9.97  )-----------( 215      ( 15 Apr 2025 05:58 )             36.1     Urine Studies:  Urinalysis Basic - ( 15 Apr 2025 05:58 )    Color:  / Appearance:  / SG:  / pH:   Gluc: 259 mg/dL / Ketone:   / Bili:  / Urobili:    Blood:  / Protein:  / Nitrite:    Leuk Esterase:  / RBC:  / WBC    Sq Epi:  / Non Sq Epi:  / Bacteria:       Sodium, Random Urine: <20 mmol/L (04-11 @ 17:22)  Potassium, Random Urine: 28.3 mmol/L (04-11 @ 17:22)  Chloride, Random Urine: <20 mmol/L (04-11 @ 17:22)      RADIOLOGY & ADDITIONAL STUDIES:   [facility] - S Devin / Aric S /D Mauricio/ S Jimena/ S Alin/ Celestine Peck / GIDEON Metcalfu/ O Will  service -[phone], office [phone]  ---------------------------------------------------------------------------------------------------------------    Patient seen and examined bedside    Subjective and Objective: No overnight events, c/o sob+ NAD, on RA. No new complaints today.    Allergies: No Known Allergies      Hospital Medications:   MEDICATIONS  (STANDING):  amLODIPine   Tablet 10 milliGRAM(s) Oral daily  aspirin  chewable 81 milliGRAM(s) Oral daily  atorvastatin 40 milliGRAM(s) Oral at bedtime  cefTRIAXone   IVPB      cefTRIAXone   IVPB 2000 milliGRAM(s) IV Intermittent once  dextrose 5%. 1000 milliLiter(s) (50 mL/Hr) IV Continuous <Continuous>  dextrose 5%. 1000 milliLiter(s) (100 mL/Hr) IV Continuous <Continuous>  dextrose 50% Injectable 25 Gram(s) IV Push once  dextrose 50% Injectable 12.5 Gram(s) IV Push once  dextrose 50% Injectable 25 Gram(s) IV Push once  fluticasone propionate/ salmeterol 250-50 MICROgram(s) Diskus 1 Dose(s) Inhalation two times a day  furosemide    Tablet 40 milliGRAM(s) Oral daily  gabapentin 100 milliGRAM(s) Oral three times a day  glucagon  Injectable 1 milliGRAM(s) IntraMuscular once  heparin   Injectable 5000 Unit(s) SubCutaneous every 8 hours  hydrALAZINE 50 milliGRAM(s) Oral three times a day  insulin glargine Injectable (LANTUS) 3 Unit(s) SubCutaneous at bedtime  insulin lispro (ADMELOG) corrective regimen sliding scale   SubCutaneous three times a day before meals  insulin lispro (ADMELOG) corrective regimen sliding scale   SubCutaneous at bedtime  insulin lispro Injectable (ADMELOG) 2 Unit(s) SubCutaneous three times a day before meals  meclizine 12.5 milliGRAM(s) Oral three times a day  metoprolol succinate  milliGRAM(s) Oral daily  polyethylene glycol 3350 17 Gram(s) Oral daily  senna 2 Tablet(s) Oral at bedtime    VITALS:  T(F): 97.9 (04-15-25 @ 12:45), Max: 98 (04-15-25 @ 05:02)  HR: 75 (04-15-25 @ 12:45)  BP: 129/79 (04-15-25 @ 12:45)  RR: 18 (04-15-25 @ 12:45)  SpO2: 97% (04-15-25 @ 12:45)  Wt(kg): --    04-14 @ 07:01  -  04-15 @ 07:00  --------------------------------------------------------  IN: 0 mL / OUT: 700 mL / NET: -700 mL    PHYSICAL EXAM:  Constitutional: NAD  HEENT: anicteric sclera  Neck: No JVD  Respiratory: CTAB, no wheezes, rales or rhonchi  Cardiovascular: S1, S2, RRR  Gastrointestinal: BS+, soft  Extremities: no pedal edema b/l   Neurological: A/O x 3  Psychiatric: Normal mood, normal affect  : No velasco.     LABS:  04-15    133[L]  |  98  |  35[H]  ----------------------------<  259[H]  3.7   |  19[L]  |  1.34[H]    Ca    9.0      15 Apr 2025 05:58  Phos  3.4     04-15  Mg     1.90     04-15    TPro  7.1  /  Alb  3.4  /  TBili  0.6  /  DBili  <0.2  /  AST  87[H]  /  ALT  46[H]  /  AlkPhos  147[H]  04-14    Creatinine Trend: 1.34 <--, 1.34 <--, 1.21 <--, 1.25 <--, 1.33 <--, 1.52 <--, 1.47 <--, 1.50 <--, 1.66 <--                        12.5   9.97  )-----------( 215      ( 15 Apr 2025 05:58 )             36.1     Urine Studies:  Urinalysis Basic - ( 15 Apr 2025 05:58 )    Color:  / Appearance:  / SG:  / pH:   Gluc: 259 mg/dL / Ketone:   / Bili:  / Urobili:    Blood:  / Protein:  / Nitrite:    Leuk Esterase:  / RBC:  / WBC    Sq Epi:  / Non Sq Epi:  / Bacteria:       Sodium, Random Urine: <20 mmol/L (04-11 @ 17:22)  Potassium, Random Urine: 28.3 mmol/L (04-11 @ 17:22)  Chloride, Random Urine: <20 mmol/L (04-11 @ 17:22)      RADIOLOGY & ADDITIONAL STUDIES:

## 2025-04-15 NOTE — PROGRESS NOTE ADULT - PROBLEM SELECTOR PLAN 1
Recent travel to [facility]. Met criteria for SIRS POA. Tickborne/mosquito borne, parasitic infections - serologies so far negative.   - Elevated lactate 2.6 and elevated  noted on admission. lactate normalized.  - Full RVP negative. CXR and CT Chest reviewed: No evidence of pneumonia, pulmonary edema, or pleural effusion  - Parasitic serologies neg, Dengue IgM neg, Strep pneumo/legionella neg. Hep panel neg. F/u leptospira Ab neg, Borrelia Ab neg, Quantiferon TB neg.  - Procal elevated. Inflammatory markers high.   - TTE otherwise unrevealing.  - CT A/P - reviewed. periportal LAD. Mild CBD dilation 2' post-cholecystectomy status.  - BCx positive for GNR (Edwardsiella tarda) -> s/p cefepime (04/13-4/14)  - F/u repeat blood Cx 4/13: NGTD after 24 hrs  - Obtain MRCP to further evaluate for hepatobiliary source  - ID following, recs appreciated - D/c cefepime, start Ceftriaxone 2 gm Q24H (04/15 - ), plan for discharge to give PO Levaquin 750 mg Q24H for total 10 days from 04/13 (up to 04/22)

## 2025-04-15 NOTE — PROGRESS NOTE ADULT - SUBJECTIVE AND OBJECTIVE BOX
Patient is a 79y old  Male who presents with a chief complaint of Fever of unknown origin (14 Apr 2025 15:02)    Being followed by ID for fevers     Interval history:  Sensitivities noted-   repeat Bcx from 04/13- NGTD     Only with cough,   Denies any abd pain and no diarrhea   MRCP pending   Remains afebrile since 04/10     ROS:  No N/V/D  No abd pain  No urinary complaints  No HA  No joint or limb pain  No other complaints    Antimicrobials:  cefepime   IVPB 2000 milliGRAM(s) IV Intermittent every 12 hours  cefepime   IVPB          Vital Signs Last 24 Hrs  T(C): 36.7 (04-15-25 @ 05:02), Max: 37 (04-14-25 @ 12:48)  T(F): 98 (04-15-25 @ 05:02), Max: 98.6 (04-14-25 @ 12:48)  HR: 83 (04-15-25 @ 05:02) (71 - 83)  BP: 156/92 (04-15-25 @ 05:02) (119/63 - 156/92)  BP(mean): --  RR: 18 (04-15-25 @ 05:02) (17 - 18)  SpO2: 99% (04-15-25 @ 05:02) (99% - 100%)    PHYSICAL EXAM:  Constitutional: non-toxic, no distress  HEAD/EYES: anicteric, no conjunctival injection  ENT:  supple, no thrush  Cardiovascular:   normal S1, S2, no murmur, no edema  Respiratory:  clear BS bilaterally, no wheezes, no rales  GI:  soft, non-tender, normal bowel sounds, distended   :  no velasco, no CVA tenderness  Musculoskeletal:  no synovitis, normal ROM  Neurologic: awake and alert, normal strength, no focal findings  Skin:  no rash, no erythema, no phlebitis  Psychiatric:  awake, alert, appropriate mood      Lab Data:                        12.5   9.97  )-----------( 215      ( 15 Apr 2025 05:58 )             36.1     04-15    133[L]  |  98  |  35[H]  ----------------------------<  259[H]  3.7   |  19[L]  |  1.34[H]    Ca    9.0      15 Apr 2025 05:58  Phos  3.4     04-15  Mg     1.90     04-15    TPro  7.1  /  Alb  3.4  /  TBili  0.6  /  DBili  <0.2  /  AST  87[H]  /  ALT  46[H]  /  AlkPhos  147[H]  04-14      Blood Blood-Peripheral  04-13-25   No growth at 24 hours  --  --      Blood Blood-Peripheral  04-13-25   No growth at 24 hours  --  --      Blood Blood  04-13-25   No Blood Parasites observed by giemsa stain  One negative set of blood smears does not rule out  the possibility of a parasitic infection.  A minimum of 3  specimens should be collected, at least 12-24 hours apart,  over a 36 hour time period.  ************************************************************  NEGATIVE for Plasmodium antigens. Microscopy is performed for  confirmation.  The Malaria Rapid antigen test does not detect the  presence of Babesia species. If Babesiosis is suspected  please order test Babesia PCR: Babesia species PCR Bld  ************************************************************  --  --      Blood Blood  04-12-25   No Blood Parasites observed by giemsa stain  One negative set of blood smears does not rule out  the possibility of a parasitic infection.  A minimum of 3  specimens should be collected, at least 12-24 hours apart,  over a 36 hour time period.  ************************************************************  NEGATIVE for Plasmodium antigens. Microscopy is performed for  confirmation.  The Malaria Rapid antigen test does not detect the  presence of Babesia species. If Babesiosis is suspected  please order test Babesia PCR: Babesia species PCR Bld  ************************************************************  --  --      Blood Blood  04-11-25   No Blood Parasites observed by giemsa stain  One negative set of blood smears does not rule out  the possibility of a parasitic infection.  A minimum of 3  specimens should be collected, at least 12-24 hours apart,  over a 36 hour time period.  ************************************************************  NEGATIVE for Plasmodium antigens. Microscopy is performed for  confirmation.  The Malaria Rapid antigen test does not detect the  presence of Babesia species. If Babesiosis is suspected  please order test Babesia PCR: Babesia species PCR Bld  ************************************************************  --  --      Clean Catch Clean Catch (Midstream)  04-10-25   <10,000 CFU/mL Normal Urogenital Kimberlyn  --  --      Blood Blood-Peripheral  04-10-25   Growth in aerobic bottle: Edwardsiella tarda  Direct identification is available within approximately 3-5  hours either by Blood Panel Multiplexed PCR or Direct  MALDI-TOF. Details: [email or web address]  --  Blood Culture PCR  Edwardsiella tarda      Blood Blood-Peripheral  04-09-25   No growth at 5 days  --  --      Blood Blood-Peripheral  04-09-25   No growth at 5 days  --  --      Blood Blood-Peripheral  04-08-25   No growth at 5 days  --  --      Blood Blood-Peripheral  04-08-25   No growth at 5 days  --  --          RADIOLOGY:  imaging below personally reviewed and agree with findings    CT chest 04/10     IMPRESSION:  No CT evidence of pneumonia, pulmonary edema, or pleural effusion.    The patient is status post TAVR.  Stable aneurysmal dilatation of the mid   ascending thoracic aorta, to approximately 4.2 cm in transverse caliber.    Approximately 1.2 cm thyroid nodule.  In the absence of a family history   or risk factors for thyroid cancer, the American College of Radiology   does not recommend routine follow-up of incidental thyroid nodules   measuring less than 1.5 cm in this age group.    04/10 TTE-   CONCLUSIONS:      1. Left ventricular systolic function is normal.   2. The right ventricle is not well visualized.   3. A a transcatheter deployed (TAVR) is present in the aortic position. The peak transaortic velocity is 1.42 m/s, peak transaortic gradient is 8.0 mmHg and mean transaortic gradient is 4.1 mmHg with an LVOT/aortic valve VTI ratio of 0.62.   4. No pericardial effusion seen.   5. Technically difficult image quality.      CTAP 04/11 non contrast     IMPRESSION:    No evidence of acute abnormality in the abdomen and pelvis.    Stable dilatation of the CBD, which measures 1.8 cm. Findings may reflect   postcholecystectomy change.    --- End of Report ---

## 2025-04-15 NOTE — CHART NOTE - NSCHARTNOTEFT_GEN_A_CORE
MRCP showed Intrahepatic biliary duct dilatation in the left hepatic lobe with  surrounding T2 signal abnormality, suggesting cholangitis. Small hepatic microabscesses in the left hepatic lobe. Dilated CBD with intraluminal stone/debris measuring 5 mm. Dr. Vang recommended to consult GI.  GI fellow on call consulted overnight and recommended to send email for consult. Patient made NPO for possible ERCP. Discussed with Dr. Vang.     T(C): 36.9 (04-15-25 @ 20:00), Max: 36.9 (04-15-25 @ 20:00)  HR: 70 (04-15-25 @ 20:00) (70 - 83)  BP: 147/76 (04-15-25 @ 20:00) (129/79 - 156/92)  RR: 19 (04-15-25 @ 20:00) (18 - 19)  SpO2: 99% (04-15-25 @ 20:00) (97% - 99%)  Wt(kg): --

## 2025-04-15 NOTE — PROGRESS NOTE ADULT - ASSESSMENT
80 yo male with hx of HTN, HLD, T2DM, CAD, s/p TAVR, PAD comes back to the ED after he was seen yesterday for fever and general malaise. He was dc home yesterday and he has nirali taking tylenol every 4 hours but continues to be febrile.   Renal following for CKD Mx.    CKD 3B, stable  Creatinine Trend: 1.34 <--, 1.34 <--, 1.21 <--, 1.25 <--, 1.33 <--, 1.52 <--, 1.47 <--, 1.50 <--, 1.66 <--  serum creatinine at baseline  K, vol ok  bp stable  plan:  c/w furosemide    Tablet 40 milliGRAM(s) Oral daily  no acute renal intervention at this time  BMP QD    s/p Hypokalemia -K better  mag ok   DASH diet  prn supplementation    Fever  suspected viral illness  serology and imaging negative  per Infectious disease specialist and primary team    will closely follow up.   poc d/w pt  labs, chart reviewed  For any question, pl call:  Nephrology  Cell -[phone]  Office [phone]  Ans Serv [phone]

## 2025-04-15 NOTE — PROGRESS NOTE ADULT - SUBJECTIVE AND OBJECTIVE BOX
Division of Hospital Medicine  Radha Vang MD  Available via MS Teams    SUBJECTIVE / OVERNIGHT EVENTS: No active issues overnight; pt denies any new symptoms    MEDICATIONS  (STANDING):  amLODIPine   Tablet 10 milliGRAM(s) Oral daily  aspirin  chewable 81 milliGRAM(s) Oral daily  atorvastatin 40 milliGRAM(s) Oral at bedtime  cefepime   IVPB 2000 milliGRAM(s) IV Intermittent every 12 hours  cefepime   IVPB      dextrose 5%. 1000 milliLiter(s) (50 mL/Hr) IV Continuous <Continuous>  dextrose 5%. 1000 milliLiter(s) (100 mL/Hr) IV Continuous <Continuous>  dextrose 50% Injectable 25 Gram(s) IV Push once  dextrose 50% Injectable 12.5 Gram(s) IV Push once  dextrose 50% Injectable 25 Gram(s) IV Push once  fluticasone propionate/ salmeterol 250-50 MICROgram(s) Diskus 1 Dose(s) Inhalation two times a day  furosemide    Tablet 40 milliGRAM(s) Oral daily  gabapentin 100 milliGRAM(s) Oral three times a day  glucagon  Injectable 1 milliGRAM(s) IntraMuscular once  heparin   Injectable 5000 Unit(s) SubCutaneous every 8 hours  hydrALAZINE 50 milliGRAM(s) Oral three times a day  insulin glargine Injectable (LANTUS) 3 Unit(s) SubCutaneous at bedtime  insulin lispro (ADMELOG) corrective regimen sliding scale   SubCutaneous three times a day before meals  insulin lispro (ADMELOG) corrective regimen sliding scale   SubCutaneous at bedtime  insulin lispro Injectable (ADMELOG) 2 Unit(s) SubCutaneous three times a day before meals  meclizine 12.5 milliGRAM(s) Oral three times a day  metoprolol succinate  milliGRAM(s) Oral daily  polyethylene glycol 3350 17 Gram(s) Oral daily  senna 2 Tablet(s) Oral at bedtime    MEDICATIONS  (PRN):  acetaminophen     Tablet .. 650 milliGRAM(s) Oral every 6 hours PRN Temp greater or equal to 38C (100.4F), Mild Pain (1 - 3), Moderate Pain (4 - 6)  bisacodyl 5 milliGRAM(s) Oral every 12 hours PRN Constipation  dextrose Oral Gel 15 Gram(s) Oral once PRN Blood Glucose LESS THAN 70 milliGRAM(s)/deciliter      I&O's Summary    14 Apr 2025 07:01  -  15 Apr 2025 07:00  --------------------------------------------------------  IN: 0 mL / OUT: 700 mL / NET: -700 mL        PHYSICAL EXAM:  Vital Signs Last 24 Hrs  T(C): 36.7 (15 Apr 2025 05:02), Max: 37 (14 Apr 2025 12:48)  T(F): 98 (15 Apr 2025 05:02), Max: 98.6 (14 Apr 2025 12:48)  HR: 83 (15 Apr 2025 05:02) (71 - 83)  BP: 156/92 (15 Apr 2025 05:02) (119/63 - 156/92)  BP(mean): --  RR: 18 (15 Apr 2025 05:02) (17 - 18)  SpO2: 99% (15 Apr 2025 05:02) (99% - 100%)    Parameters below as of 15 Apr 2025 05:02  Patient On (Oxygen Delivery Method): room air      CONSTITUTIONAL: NAD  EYES: PERRLA; conjunctiva and sclera clear  ENMT: Moist oral mucosa, no pharyngeal injection or exudates; normal dentition  NECK: Supple, no palpable masses; no thyromegaly  RESPIRATORY: Normal respiratory effort; lungs are clear to auscultation bilaterally; no rales, rhonchi, or wheezing  CARDIOVASCULAR: Regular rate and rhythm, normal S1 and S2, no murmur/rub/gallop; Peripheral pulses are 2+ bilaterally  ABDOMEN: Nontender to palpation, normoactive bowel sounds, no rebound/guarding; No hepatosplenomegaly  MUSCULOSKELETAL: no clubbing or cyanosis of digits  NEUROLOGY: Awake and alert to person, place, and date; no focal neurological deficits   SKIN: No rashes; no palpable lesions    LABS:                        12.5   9.97  )-----------( 215      ( 15 Apr 2025 05:58 )             36.1     04-15    133[L]  |  98  |  35[H]  ----------------------------<  259[H]  3.7   |  19[L]  |  1.34[H]    Ca    9.0      15 Apr 2025 05:58  Phos  3.4     04-15  Mg     1.90     04-15    TPro  7.1  /  Alb  3.4  /  TBili  0.6  /  DBili  <0.2  /  AST  87[H]  /  ALT  46[H]  /  AlkPhos  147[H]  04-14          Urinalysis Basic - ( 15 Apr 2025 05:58 )    Color: x / Appearance: x / SG: x / pH: x  Gluc: 259 mg/dL / Ketone: x  / Bili: x / Urobili: x   Blood: x / Protein: x / Nitrite: x   Leuk Esterase: x / RBC: x / WBC x   Sq Epi: x / Non Sq Epi: x / Bacteria: x        Culture - Blood (collected 13 Apr 2025 10:15)  Source: Blood Blood-Peripheral  Preliminary Report (14 Apr 2025 13:02):    No growth at 24 hours    Culture - Blood (collected 13 Apr 2025 10:00)  Source: Blood Blood-Peripheral  Preliminary Report (14 Apr 2025 13:02):    No growth at 24 hours      SARS-CoV-2: NotDetec (10 Apr 2025 09:52)  SARS-CoV-2: NotDetec (08 Apr 2025 12:52)      Imaging and consultant notes reviewed.

## 2025-04-15 NOTE — PROGRESS NOTE ADULT - ASSESSMENT
78 yo M with PMH of hypertension, hyperlipidemia, type 2 diabetes mellitus, CAD, PAD, AS s/p TAVR  presented to the ER with fevers, chills, and malaise and generalized body aches for 3 days starting 04/07/25. Pt was seen in the ER 2 days ago with similar complaints, and was thought to have a viral illness and was discharged home. His symptoms didn't improve and felt worse, more fatigued, and c/o dry cough. He otherwise denies SOB, N/V, abdominal pain, chest pain, back pain, urinary complaints, joint pain, oral ulcers or other acute symptoms.     The patient is originally from [place]. Moved to the  30 years ago. Lives with his wife. Reports social alcohol use, no smoking, no drug use. No pets at home. Travels back to [place] often, most recently was in [place] for a month and [place]05[place]25. Stayed at home and denies exposure to freshwater water. Reports no exposure to cattle or other animals, Denies mosquito bites and exposure to other insects. 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 Reports no exposure to anyone with active TB, no personal history of latent TB. Reports recent dental procedure 4 weeks ago before traveling to [place].     In the ED, vital signs were significant for 101F fever and tachycardia 102 bpm. Labs reviewed showed no leucocytosis, bands 9%, CXR clear, UA negative, RVP negative, but were remarkable for elevated lactate 2.6, elevated , procal 1.70, ESR: 70, mildly elevated LFTs AST/ALT 82/41, ALP: normal, Bili: normal. A1c: 6.4, creat 1.54--, Acute hepatitis panel negative. CXR and CT Chest: Unremarkable with no evidence of consolidation, effusion, or pneumothorax. US kidneys 04/10-   Mildly echogenic kidneys. No hydronephrosis. TTE was Technically difficult image quality.  A transcatheter deployed (TAVR) is present in the aortic position. CTAP non contrast- with no acute findings.     Now Bcx from 04/10 with 1/4 GNR- Edwardsiella tarda bacteremia.       MICRO:   04/13 BCX- NGTD  04/12 HIV- negative   04/11, 04/12, 04/13 Blood parasite smear- negative   04/10 full RVP: negative   04/10 Ucx- negative   04/10 Bcx- 1/4 GNR- Edwardsiella tarda ( pan sensitive)     04/09 BCX- NGTD  04/08 Full RVP: negative   04/08 BCX- ngtd      # GNR bacteremia   # Fevers - now resolved   # mildly elevated LFTs - trending down   # S/P TAVR   # SAMARIA on CKD   # Elevated inflammatory markers     Recommendations:   - Would stop cefepime, start CTX 2 gm Q24H  - MRCP pending   - Monitor LFTs closely   - Leptospira serologies- negative   - When planned for discharge can give PO Levaquin 750 mg Q24H for total 10 days from 04/13 (  msec on 04/08)       In addition to reviewing history, imaging, documents, labs, microbiology, took into account antibiotic stewardship, local antibiogram and infection control strategies and potential transmission issues.    Discussed with the primary team.    Marleni Escobar MD  Attending Physician, Division of Infectious Diseases  Department of Medicine   [facility]    Contact on TEAMS messaging from 9am - 5pm  Office: [phone] (after 5 PM or weekend)       80 yo M with PMH of hypertension, hyperlipidemia, type 2 diabetes mellitus, CAD, PAD, AS s/p TAVR  presented to the ER with fevers, chills, and malaise and generalized body aches for 3 days starting 04/07/25. Pt was seen in the ER 2 days ago with similar complaints, and was thought to have a viral illness and was discharged home. His symptoms didn't improve and felt worse, more fatigued, and c/o dry cough. He otherwise denies SOB, N/V, abdominal pain, chest pain, back pain, urinary complaints, joint pain, oral ulcers or other acute symptoms.     The patient is originally from [facility]. Moved to the  30 years ago. Lives with his wife. Reports social alcohol use, no smoking, no drug use. No pets at home. Travels back to [facility] [place] often, most recently was in [facility] [place] for a month and [place] on [place]05[place]25. Stayed at home and denies exposure to freshwater water. Reports no exposure to cattle or other animals, Denies mosquito bites and exposure to other insects. 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 Reports no exposure to anyone with active TB, no personal history of latent TB. Reports recent dental procedure 4 weeks ago before traveling to [place] [facility] [place].     In the ED, vital signs were significant for 101F fever and tachycardia 102 bpm. Labs reviewed showed no leucocytosis, bands 9%, CXR clear, UA negative, RVP negative, but were remarkable for elevated lactate 2.6, elevated , procal 1.70, ESR: 70, mildly elevated LFTs AST/ALT 82/41, ALP: normal, Bili: normal. A1c: 6.4, creat 1.54--, Acute hepatitis panel negative. CXR and CT Chest: Unremarkable with no evidence of consolidation, effusion, or pneumothorax. US kidneys 04/10-   Mildly echogenic kidneys. No hydronephrosis. TTE was Technically difficult image quality.  A transcatheter deployed (TAVR) is present in the aortic position. CTAP non contrast- with no acute findings.     Now Bcx from 04/10 with 1/4 GNR- Edwardsiella tarda bacteremia.       MICRO:   04/13 BCX- NGTD  04/12 HIV- negative   04/11, 04/12, 04/13 Blood parasite smear- negative   04/10 full RVP: negative   04/10 Ucx- negative   04/10 Bcx- 1/4 GNR- Edwardsiella tarda ( pan sensitive)     04/09 BCX- NGTD  04/08 Full RVP: negative   04/08 BCX- ngtd      # GNR bacteremia   # Fevers - now resolved   # mildly elevated LFTs - trending down   # S/P TAVR   # SAMARIA on CKD   # Elevated inflammatory markers     Recommendations:   - Would stop cefepime, start CTX 2 gm Q24H  - MRCP pending   - Monitor LFTs closely   - Leptospira serologies- negative   - When planned for discharge can give PO Levaquin 750 mg Q24H for total 10 days from 04/13 if MRCP negative (  msec on 04/08)       In addition to reviewing history, imaging, documents, labs, microbiology, took into account antibiotic stewardship, local antibiogram and infection control strategies and potential transmission issues.    Discussed with the primary team.    Marleni Escobar MD  Attending Physician, Division of Infectious Diseases  Department of Medicine   [facility]    Contact on TEAMS messaging from 9am - 5pm  Office: [phone] (after 5 PM or weekend)

## 2025-04-16 DIAGNOSIS — K83.09 OTHER CHOLANGITIS: ICD-10-CM

## 2025-04-16 LAB
ALBUMIN SERPL ELPH-MCNC: 3.6 G/DL — SIGNIFICANT CHANGE UP (ref 3.3–5)
ALP SERPL-CCNC: 145 U/L — HIGH (ref 40–120)
ALT FLD-CCNC: 67 U/L — HIGH (ref 4–41)
ANION GAP SERPL CALC-SCNC: 12 MMOL/L — SIGNIFICANT CHANGE UP (ref 7–14)
AST SERPL-CCNC: 105 U/L — HIGH (ref 4–40)
BILIRUB SERPL-MCNC: 0.4 MG/DL — SIGNIFICANT CHANGE UP (ref 0.2–1.2)
BUN SERPL-MCNC: 33 MG/DL — HIGH (ref 7–23)
CALCIUM SERPL-MCNC: 9.3 MG/DL — SIGNIFICANT CHANGE UP (ref 8.4–10.5)
CHLORIDE SERPL-SCNC: 99 MMOL/L — SIGNIFICANT CHANGE UP (ref 98–107)
CK MB BLD-MCNC: 6.2 % — HIGH (ref 0–2.5)
CK MB CFR SERPL CALC: 2.8 NG/ML — SIGNIFICANT CHANGE UP
CK SERPL-CCNC: 45 U/L — SIGNIFICANT CHANGE UP (ref 30–200)
CO2 SERPL-SCNC: 21 MMOL/L — LOW (ref 22–31)
CREAT SERPL-MCNC: 1.31 MG/DL — HIGH (ref 0.5–1.3)
EGFR: 55 ML/MIN/1.73M2 — LOW
EGFR: 55 ML/MIN/1.73M2 — LOW
GLUCOSE BLDC GLUCOMTR-MCNC: 208 MG/DL — HIGH (ref 70–99)
GLUCOSE BLDC GLUCOMTR-MCNC: 209 MG/DL — HIGH (ref 70–99)
GLUCOSE BLDC GLUCOMTR-MCNC: 233 MG/DL — HIGH (ref 70–99)
GLUCOSE BLDC GLUCOMTR-MCNC: 303 MG/DL — HIGH (ref 70–99)
GLUCOSE BLDC GLUCOMTR-MCNC: 328 MG/DL — HIGH (ref 70–99)
GLUCOSE BLDC GLUCOMTR-MCNC: 362 MG/DL — HIGH (ref 70–99)
GLUCOSE SERPL-MCNC: 357 MG/DL — HIGH (ref 70–99)
HCT VFR BLD CALC: 37.1 % — LOW (ref 39–50)
HGB BLD-MCNC: 12.7 G/DL — LOW (ref 13–17)
MAGNESIUM SERPL-MCNC: 1.9 MG/DL — SIGNIFICANT CHANGE UP (ref 1.6–2.6)
MCHC RBC-ENTMCNC: 28.4 PG — SIGNIFICANT CHANGE UP (ref 27–34)
MCHC RBC-ENTMCNC: 34.2 G/DL — SIGNIFICANT CHANGE UP (ref 32–36)
MCV RBC AUTO: 83 FL — SIGNIFICANT CHANGE UP (ref 80–100)
NRBC # BLD AUTO: 0 K/UL — SIGNIFICANT CHANGE UP (ref 0–0)
NRBC # FLD: 0 K/UL — SIGNIFICANT CHANGE UP (ref 0–0)
NRBC BLD AUTO-RTO: 0 /100 WBCS — SIGNIFICANT CHANGE UP (ref 0–0)
PHOSPHATE SERPL-MCNC: 3.4 MG/DL — SIGNIFICANT CHANGE UP (ref 2.5–4.5)
PLATELET # BLD AUTO: 234 K/UL — SIGNIFICANT CHANGE UP (ref 150–400)
POTASSIUM SERPL-MCNC: 4 MMOL/L — SIGNIFICANT CHANGE UP (ref 3.5–5.3)
POTASSIUM SERPL-SCNC: 4 MMOL/L — SIGNIFICANT CHANGE UP (ref 3.5–5.3)
PROT SERPL-MCNC: 6.9 G/DL — SIGNIFICANT CHANGE UP (ref 6–8.3)
RBC # BLD: 4.47 M/UL — SIGNIFICANT CHANGE UP (ref 4.2–5.8)
RBC # FLD: 13.6 % — SIGNIFICANT CHANGE UP (ref 10.3–14.5)
SODIUM SERPL-SCNC: 132 MMOL/L — LOW (ref 135–145)
TROPONIN T, HIGH SENSITIVITY RESULT: 16 NG/L — SIGNIFICANT CHANGE UP
WBC # BLD: 9.8 K/UL — SIGNIFICANT CHANGE UP (ref 3.8–10.5)
WBC # FLD AUTO: 9.8 K/UL — SIGNIFICANT CHANGE UP (ref 3.8–10.5)

## 2025-04-16 PROCEDURE — 43274 ERCP DUCT STENT PLACEMENT: CPT

## 2025-04-16 PROCEDURE — 88342 IMHCHEM/IMCYTCHM 1ST ANTB: CPT | Mod: 26

## 2025-04-16 PROCEDURE — 99232 SBSQ HOSP IP/OBS MODERATE 35: CPT | Mod: GC,25

## 2025-04-16 PROCEDURE — 99222 1ST HOSP IP/OBS MODERATE 55: CPT | Mod: GC,25

## 2025-04-16 PROCEDURE — 99233 SBSQ HOSP IP/OBS HIGH 50: CPT

## 2025-04-16 PROCEDURE — 88305 TISSUE EXAM BY PATHOLOGIST: CPT | Mod: 26

## 2025-04-16 PROCEDURE — G0545: CPT

## 2025-04-16 PROCEDURE — 43264 ERCP REMOVE DUCT CALCULI: CPT

## 2025-04-16 PROCEDURE — 43239 EGD BIOPSY SINGLE/MULTIPLE: CPT

## 2025-04-16 DEVICE — IMPLANTABLE DEVICE: Type: IMPLANTABLE DEVICE | Status: FUNCTIONAL

## 2025-04-16 DEVICE — HYDRATOME 44: Type: IMPLANTABLE DEVICE | Status: FUNCTIONAL

## 2025-04-16 DEVICE — BLLN EXTRCTR PRO RX-S 9-12MM: Type: IMPLANTABLE DEVICE | Status: FUNCTIONAL

## 2025-04-16 RX ORDER — METRONIDAZOLE 250 MG
500 TABLET ORAL EVERY 8 HOURS
Refills: 0 | Status: DISCONTINUED | OUTPATIENT
Start: 2025-04-16 | End: 2025-04-18

## 2025-04-16 RX ORDER — METRONIDAZOLE 250 MG
500 TABLET ORAL EVERY 8 HOURS
Refills: 0 | Status: DISCONTINUED | OUTPATIENT
Start: 2025-04-16 | End: 2025-04-16

## 2025-04-16 RX ADMIN — CEFTRIAXONE 100 MILLIGRAM(S): 500 INJECTION, POWDER, FOR SOLUTION INTRAMUSCULAR; INTRAVENOUS at 18:20

## 2025-04-16 RX ADMIN — INSULIN LISPRO 2: 100 INJECTION, SOLUTION INTRAVENOUS; SUBCUTANEOUS at 12:27

## 2025-04-16 RX ADMIN — HEPARIN SODIUM 5000 UNIT(S): 1000 INJECTION INTRAVENOUS; SUBCUTANEOUS at 22:41

## 2025-04-16 RX ADMIN — INSULIN LISPRO 2: 100 INJECTION, SOLUTION INTRAVENOUS; SUBCUTANEOUS at 22:39

## 2025-04-16 RX ADMIN — INSULIN LISPRO 4: 100 INJECTION, SOLUTION INTRAVENOUS; SUBCUTANEOUS at 18:18

## 2025-04-16 RX ADMIN — HEPARIN SODIUM 5000 UNIT(S): 1000 INJECTION INTRAVENOUS; SUBCUTANEOUS at 12:28

## 2025-04-16 RX ADMIN — METOPROLOL SUCCINATE 100 MILLIGRAM(S): 50 TABLET, EXTENDED RELEASE ORAL at 05:09

## 2025-04-16 RX ADMIN — HEPARIN SODIUM 5000 UNIT(S): 1000 INJECTION INTRAVENOUS; SUBCUTANEOUS at 05:08

## 2025-04-16 RX ADMIN — Medication 12.5 MILLIGRAM(S): at 05:09

## 2025-04-16 RX ADMIN — Medication 1 DOSE(S): at 09:57

## 2025-04-16 RX ADMIN — FUROSEMIDE 40 MILLIGRAM(S): 10 INJECTION INTRAMUSCULAR; INTRAVENOUS at 05:09

## 2025-04-16 RX ADMIN — Medication 50 MILLIGRAM(S): at 05:09

## 2025-04-16 RX ADMIN — GABAPENTIN 100 MILLIGRAM(S): 400 CAPSULE ORAL at 05:09

## 2025-04-16 RX ADMIN — INSULIN LISPRO 5: 100 INJECTION, SOLUTION INTRAVENOUS; SUBCUTANEOUS at 07:41

## 2025-04-16 RX ADMIN — INSULIN GLARGINE-YFGN 3 UNIT(S): 100 INJECTION, SOLUTION SUBCUTANEOUS at 22:39

## 2025-04-16 RX ADMIN — Medication 100 MILLIGRAM(S): at 22:41

## 2025-04-16 RX ADMIN — AMLODIPINE BESYLATE 10 MILLIGRAM(S): 10 TABLET ORAL at 05:09

## 2025-04-16 RX ADMIN — Medication 100 MILLIGRAM(S): at 12:28

## 2025-04-16 NOTE — PROGRESS NOTE ADULT - SUBJECTIVE AND OBJECTIVE BOX
[facility] - MIRIAM Beltran / MIRIAM Clemons / MICHELLE Martínez/ MIRIAM Hess/ MIRIAM Ogden/ SHELIA Peck / BRIAN Ordonez / LAMAR Solis / VIPIN Duvall  ---------------------------------------------------------------------------------------------------------------  seen and examined today for CKD  Interval : NAD  VITALS:  T(F): 98 (04-16-25 @ 09:29), Max: 98.5 (04-15-25 @ 20:00)  HR: 70 (04-16-25 @ 09:29)  BP: 135/69 (04-16-25 @ 09:29)  RR: 18 (04-16-25 @ 09:29)  SpO2: 100% (04-16-25 @ 09:29)  Wt(kg): --    04-15 @ 07:01  -  04-16 @ 07:00  --------------------------------------------------------  IN: 180 mL / OUT: 1300 mL / NET: -1120 mL      Physical Exam :-  Constitutional: NAD  Neck: Supple.  Respiratory: Bilateral equal breath sounds,  Cardiovascular: S1, S2 normal,  Gastrointestinal: Bowel Sounds present, soft, non tender.  Extremities: No edema  Neurological: Alert and Oriented x 3, no focal deficits  Psychiatric: Normal mood, normal affect  Data:-  Allergies :   No Known Allergies    Hospital Medications:   MEDICATIONS  (STANDING):  amLODIPine   Tablet 10 milliGRAM(s) Oral daily  aspirin  chewable 81 milliGRAM(s) Oral daily  atorvastatin 40 milliGRAM(s) Oral at bedtime  cefTRIAXone   IVPB      cefTRIAXone   IVPB 2000 milliGRAM(s) IV Intermittent every 24 hours  dextrose 5%. 1000 milliLiter(s) (100 mL/Hr) IV Continuous <Continuous>  dextrose 5%. 1000 milliLiter(s) (50 mL/Hr) IV Continuous <Continuous>  dextrose 50% Injectable 25 Gram(s) IV Push once  dextrose 50% Injectable 12.5 Gram(s) IV Push once  dextrose 50% Injectable 25 Gram(s) IV Push once  fluticasone propionate/ salmeterol 250-50 MICROgram(s) Diskus 1 Dose(s) Inhalation two times a day  furosemide    Tablet 40 milliGRAM(s) Oral daily  gabapentin 100 milliGRAM(s) Oral three times a day  glucagon  Injectable 1 milliGRAM(s) IntraMuscular once  heparin   Injectable 5000 Unit(s) SubCutaneous every 8 hours  hydrALAZINE 50 milliGRAM(s) Oral three times a day  insulin glargine Injectable (LANTUS) 3 Unit(s) SubCutaneous at bedtime  insulin lispro (ADMELOG) corrective regimen sliding scale   SubCutaneous three times a day before meals  insulin lispro (ADMELOG) corrective regimen sliding scale   SubCutaneous at bedtime  insulin lispro Injectable (ADMELOG) 2 Unit(s) SubCutaneous three times a day before meals  meclizine 12.5 milliGRAM(s) Oral three times a day  metoprolol succinate  milliGRAM(s) Oral daily  metroNIDAZOLE  IVPB 500 milliGRAM(s) IV Intermittent every 8 hours  polyethylene glycol 3350 17 Gram(s) Oral daily  senna 2 Tablet(s) Oral at bedtime    04-16    132[L]  |  99  |  33[H]  ----------------------------<  357[H]  4.0   |  21[L]  |  1.31[H]    Ca    9.3      16 Apr 2025 05:13  Phos  3.4     04-16  Mg     1.90     04-16    TPro  6.9  /  Alb  3.6  /  TBili  0.4  /  DBili      /  AST  105[H]  /  ALT  67[H]  /  AlkPhos  145[H]  04-16    Creatinine Trend: 1.31 <--, 1.34 <--, 1.34 <--, 1.21 <--, 1.25 <--, 1.33 <--, 1.52 <--, 1.47 <--, 1.50 <--, 1.66 <--                        12.7   9.80  )-----------( 234      ( 16 Apr 2025 05:13 )             37.1

## 2025-04-16 NOTE — PROGRESS NOTE ADULT - SUBJECTIVE AND OBJECTIVE BOX
Division of Hospital Medicine  Radha Vang MD  Available via MS Teams    SUBJECTIVE / OVERNIGHT EVENTS: No active issues overnight; pt denies any new symptoms    MEDICATIONS  (STANDING):  amLODIPine   Tablet 10 milliGRAM(s) Oral daily  atorvastatin 40 milliGRAM(s) Oral at bedtime  cefTRIAXone   IVPB      cefTRIAXone   IVPB 2000 milliGRAM(s) IV Intermittent every 24 hours  chlorhexidine 2% Cloths 1 Application(s) Topical daily  dextrose 5%. 1000 milliLiter(s) (50 mL/Hr) IV Continuous <Continuous>  dextrose 5%. 1000 milliLiter(s) (100 mL/Hr) IV Continuous <Continuous>  dextrose 50% Injectable 25 Gram(s) IV Push once  dextrose 50% Injectable 12.5 Gram(s) IV Push once  dextrose 50% Injectable 25 Gram(s) IV Push once  fluticasone propionate/ salmeterol 250-50 MICROgram(s) Diskus 1 Dose(s) Inhalation two times a day  furosemide    Tablet 40 milliGRAM(s) Oral daily  gabapentin 100 milliGRAM(s) Oral three times a day  glucagon  Injectable 1 milliGRAM(s) IntraMuscular once  heparin   Injectable 5000 Unit(s) SubCutaneous every 8 hours  hydrALAZINE 50 milliGRAM(s) Oral three times a day  insulin glargine Injectable (LANTUS) 3 Unit(s) SubCutaneous at bedtime  insulin lispro (ADMELOG) corrective regimen sliding scale   SubCutaneous three times a day before meals  insulin lispro (ADMELOG) corrective regimen sliding scale   SubCutaneous at bedtime  insulin lispro Injectable (ADMELOG) 2 Unit(s) SubCutaneous three times a day before meals  meclizine 12.5 milliGRAM(s) Oral three times a day  metoprolol succinate  milliGRAM(s) Oral daily  metroNIDAZOLE  IVPB 500 milliGRAM(s) IV Intermittent every 8 hours  pantoprazole    Tablet 40 milliGRAM(s) Oral every 12 hours  polyethylene glycol 3350 17 Gram(s) Oral daily  senna 2 Tablet(s) Oral at bedtime    MEDICATIONS  (PRN):  acetaminophen     Tablet .. 650 milliGRAM(s) Oral every 6 hours PRN Temp greater or equal to 38C (100.4F), Mild Pain (1 - 3), Moderate Pain (4 - 6)  bisacodyl 5 milliGRAM(s) Oral every 12 hours PRN Constipation  dextrose Oral Gel 15 Gram(s) Oral once PRN Blood Glucose LESS THAN 70 milliGRAM(s)/deciliter      I&O's Summary    15 Apr 2025 07:01  -  16 Apr 2025 07:00  --------------------------------------------------------  IN: 180 mL / OUT: 1300 mL / NET: -1120 mL        PHYSICAL EXAM:  Vital Signs Last 24 Hrs  T(C): 36 (16 Apr 2025 14:42), Max: 36.9 (15 Apr 2025 20:00)  T(F): 96.8 (16 Apr 2025 14:42), Max: 98.5 (15 Apr 2025 20:00)  HR: 71 (16 Apr 2025 15:42) (70 - 83)  BP: 156/82 (16 Apr 2025 15:42) (122/80 - 174/75)  BP(mean): --  RR: 19 (16 Apr 2025 15:42) (18 - 22)  SpO2: 99% (16 Apr 2025 15:42) (95% - 100%)    Parameters below as of 16 Apr 2025 15:42  Patient On (Oxygen Delivery Method): room air      CONSTITUTIONAL: NAD  EYES: PERRLA; conjunctiva and sclera clear  ENMT: Moist oral mucosa, no pharyngeal injection or exudates; normal dentition  NECK: Supple, no palpable masses; no thyromegaly  RESPIRATORY: Normal respiratory effort; lungs are clear to auscultation bilaterally; no rales, rhonchi, or wheezing  CARDIOVASCULAR: Regular rate and rhythm, normal S1 and S2, no murmur/rub/gallop; Peripheral pulses are 2+ bilaterally  ABDOMEN: Nontender to palpation, normoactive bowel sounds, no rebound/guarding; No hepatosplenomegaly  MUSCULOSKELETAL: no clubbing or cyanosis of digits  NEUROLOGY: Awake and alert to person, place, and date; no focal neurological deficits   SKIN: No rashes; no palpable lesions    LABS:                        12.7   9.80  )-----------( 234      ( 16 Apr 2025 05:13 )             37.1     04-16    132[L]  |  99  |  33[H]  ----------------------------<  357[H]  4.0   |  21[L]  |  1.31[H]    Ca    9.3      16 Apr 2025 05:13  Phos  3.4     04-16  Mg     1.90     04-16    TPro  6.9  /  Alb  3.6  /  TBili  0.4  /  DBili  x   /  AST  105[H]  /  ALT  67[H]  /  AlkPhos  145[H]  04-16          Urinalysis Basic - ( 16 Apr 2025 05:13 )    Color: x / Appearance: x / SG: x / pH: x  Gluc: 357 mg/dL / Ketone: x  / Bili: x / Urobili: x   Blood: x / Protein: x / Nitrite: x   Leuk Esterase: x / RBC: x / WBC x   Sq Epi: x / Non Sq Epi: x / Bacteria: x        SARS-CoV-2: NotDetec (10 Apr 2025 09:52)  SARS-CoV-2: NotDetec (08 Apr 2025 12:52)      Imaging and consultant notes reviewed.

## 2025-04-16 NOTE — PROGRESS NOTE ADULT - ASSESSMENT
78 yo male with hx of HTN, HLD, T2DM, CAD, s/p TAVR, PAD comes back to the ED after he was seen yesterday for fever and general malaise. He was dc home yesterday and he has nirali taking tylenol every 4 hours but continues to be febrile.   Renal following for CKD Mx.    CKD 3a, stable  Creatinine Trend: 1.31 <--, 1.34 <--, 1.34 <--, 1.21 <--, 1.25 <--, 1.33 <--, 1.52 <--, 1.47 <--, 1.50 <--, 1.66 <--  serum creatinine at baseline  K, vol ok  bp stable  plan:  c/w furosemide Tablet 40 milliGRAM(s) Oral daily  no acute renal intervention at this time  BMP QD    s/p Hypokalemia -K better  mag ok   DASH diet  prn supplementation    Fever  possibly cholangitis and liver abscess epr MRCP  EGD today per GI  appreciate Infectious disease specialist recs      For any question, call:  Cell # [phone]  Pager # [phone]  Callback # [phone]

## 2025-04-16 NOTE — PROGRESS NOTE ADULT - ASSESSMENT
80 yo M with PMH of hypertension, hyperlipidemia, type 2 diabetes mellitus, CAD, PAD, AS s/p TAVR  presented to the ER with fevers, chills, and malaise and generalized body aches for 3 days starting 04/07/25. Pt was seen in the ER 2 days ago with similar complaints, and was thought to have a viral illness and was discharged home. His symptoms didn't improve and felt worse, more fatigued, and c/o dry cough. He otherwise denies SOB, N/V, abdominal pain, chest pain, back pain, urinary complaints, joint pain, oral ulcers or other acute symptoms.     The patient is originally from [facility]. Moved to the  30 years ago. Lives with his wife. Reports social alcohol use, no smoking, no drug use. No pets at home. Travels back to [facility] often, most recently was in [facility] for a month [place]. Stayed at home and denies exposure to freshwater water. Reports no exposure to cattle or other animals, Denies mosquito bites and exposure to other insects. 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 Reports no exposure to anyone with active TB, no personal history of latent TB. Reports recent dental procedure 4 weeks ago before traveling to [facility].     In the ED, vital signs were significant for 101F fever and tachycardia 102 bpm. Labs reviewed showed no leucocytosis, bands 9%, CXR clear, UA negative, RVP negative, but were remarkable for elevated lactate 2.6, elevated , procal 1.70, ESR: 70, mildly elevated LFTs AST/ALT 82/41, ALP: normal, Bili: normal. A1c: 6.4, creat 1.54--, Acute hepatitis panel negative. CXR and CT Chest: Unremarkable with no evidence of consolidation, effusion, or pneumothorax. US kidneys 04/10-   Mildly echogenic kidneys. No hydronephrosis. TTE was Technically difficult image quality.  A transcatheter deployed (TAVR) is present in the aortic position. CTAP non contrast- with no acute findings.     Now Bcx from 04/10 with 1/4 GNR- Edwardsiella tarda bacteremia.       MICRO:   04/13 BCX- NGTD  04/12 HIV- negative   04/11, 04/12, 04/13 Blood parasite smear- negative   04/10 full RVP: negative   04/10 Ucx- negative   04/10 Bcx- 1/4 GNR- Edwardsiella tarda ( pan sensitive)     04/09 BCX- NGTD  04/08 Full RVP: negative   04/08 BCX- ngtd      # GNR bacteremia   # Cholangitis  # Small hepatic microabscesses in the left hepatic lobe  # Fevers - now resolved   # mildly elevated LFTs - trending down   # S/P TAVR   # SAMARIA on CKD   # Elevated inflammatory markers     Recommendations:   - Continue CTX 2 gm Q24H  - Add PO Metronidazole 500 mg Q12H   - Pending GI follow up for MRCP findings.   - Monitor LFTs closely   - Leptospira serologies- negative   - Would likely need 4 weeks of treatment.       In addition to reviewing history, imaging, documents, labs, microbiology, took into account antibiotic stewardship, local antibiogram and infection control strategies and potential transmission issues.    Discussed with the primary team.    Marleni Escobar MD  Attending Physician, Division of Infectious Diseases  Department of Medicine   [facility]    Contact on TEAMS messaging from 9am - 5pm  Office: [phone] (after 5 PM or weekend)

## 2025-04-16 NOTE — PRE-OP CHECKLIST - SPO2 (%)
Current Eye Medications:  none     Subjective:  Complete eye exam. Vision is OK both eyes, but having little trouble seeing small print in distance for last year. Trouble seeing ticker on bottom of TV. No eye pain or discomfort in either eye.      Objective:  See Ophthalmology Exam.      Assessment:  Bossman Cherry is a 59 year old male who presents with:   Encounter Diagnoses   Name Primary?     Pseudophakia of left eye       Posterior capsular opacification left eye Discussed likely need for YAG laser in the future.      Hx of LASIK, right eye      Nuclear sclerotic cataract of right eye Not Visually significant      Plan:  Glasses prescription given     Donny Ramos MD  [phone]             
100

## 2025-04-16 NOTE — CONSULT NOTE ADULT - ASSESSMENT
78 yo M with PMH of hypertension, hyperlipidemia, type 2 diabetes mellitus, CAD, PAD, AS s/p TAVR presented to the ER with recurrent fevers, chills, and malaise for 3 days.    #bacteremia- GNR Edwardsiella tarda  #choledocholithiasis   -  MRCP for evaluation of fever given transaminases and bacteremia. MRCP with Intrahepatic biliary duct dilatation in the left hepatic lobe with surrounding T2 signal abnormality, suggesting cholangitis. Small hepatic microabscesses in the left hepatic lobe. Dilated CBD with intraluminal stone/debris measuring 5 mm.    Recommendations:  -trend clinical symptoms, exam findings, vital signs, CBC, CMP, INR  -antibiotics per primary team  -cardiology clearance for ERCP   -keep NPO for ERCP today   -rest of care per primary team     All recommendations are tentative until note is attested by attending.     Nevaeh Bull, PGY-6  Gastroenterology/Hepatology Fellow  Available on [employer] Teams  [phone] ([facility] Short Range Pager)  [phone] ([facility] Long Range Pager)    On Weekends/Holidays (All day) and Weekdays after 5 PM to 8AM:  For non-urgent consults, please email [email or web address]  For emergent consults, please contact on call GI team.  See Amion schedule ([facility]), [employer] paging system ([facility]), or call hospital  ([facility])

## 2025-04-16 NOTE — PROGRESS NOTE ADULT - SUBJECTIVE AND OBJECTIVE BOX
Patient is a 79y old  Male who presents with a chief complaint of Fever of unknown origin (15 Apr 2025 16:14)    HPI:  80 yo M with PMH of hypertension, hyperlipidemia, type 2 diabetes mellitus, CAD, PAD, AS s/p TAVR presented to the ER with recurrent fevers, chills, and malaise for 3 days. Pt was seen in the ER 2 days ago with similar complaints, and was thought to have a viral illness and was discharged home. He feels worse today, more fatigued, and c/o dry cough. Of note, pt states he recently traveled to [facility] 3-4 weeks ago but denies any insect or tick bites and denies sick contacts. He otherwise feels well and denies SOB, N/V, abdominal pain, chest pain, back pain, urinary complaints, or other acute symptoms.     In the ED, vital signs were significant for 101F fever and tachycardia 102 bpm.  Labs reviewed showed no elevated WBC, CXR clear, UA negative, RVP negative, but were remarkable for elevated lactate 2.6, elevated .  CXR and CT Chest: Unremarkable with no evidence of consolidation, effusion, or pneumothorax    Pt was given 500 ml NS bolus  and 1 dose of Vanc and Cefepime by ER physician.     Pt was then endorsed to Medicine for admission for sepsis of unclear etiology.  (10 Apr 2025 12:29)       prior hospital charts reviewed [  ]  primary team notes reviewed [  ]  other consultant notes reviewed [  ]    PAST MEDICAL & SURGICAL HISTORY:  HTN (hypertension)      Hyperlipidemia      BPH (benign prostatic hypertrophy)      GERD (gastroesophageal reflux disease)      Chronic mastoiditis of left side      Gall stones      HTN (hypertension)      DM (diabetes mellitus)      High cholesterol      Aortic valve replaced  2009 with bovine tissue valve      Hx of cholecystectomy      S/P AVR (aortic valve replacement)  2009 @ [facility] (bovine)      History of cholecystectomy      History of ear surgery  ? surgery on left ear          Allergies  No Known Allergies    ANTIMICROBIALS (past 90 days)  MEDICATIONS  (STANDING):  cefepime   IVPB   100 mL/Hr IV Intermittent (04-10-25 @ 13:58)    cefepime   IVPB   100 mL/Hr IV Intermittent (04-11-25 @ 06:09)   100 mL/Hr IV Intermittent (04-10-25 @ 22:26)    cefepime   IVPB   100 mL/Hr IV Intermittent (04-10-25 @ 07:05)    cefepime   IVPB   100 mL/Hr IV Intermittent (04-13-25 @ 09:31)    cefepime   IVPB   100 mL/Hr IV Intermittent (04-15-25 @ 05:23)   100 mL/Hr IV Intermittent (04-14-25 @ 18:47)   100 mL/Hr IV Intermittent (04-14-25 @ 05:12)   100 mL/Hr IV Intermittent (04-13-25 @ 18:52)    cefTRIAXone   IVPB   100 mL/Hr IV Intermittent (04-15-25 @ 16:37)    doxycycline IVPB   100 mL/Hr IV Intermittent (04-10-25 @ 16:06)    doxycycline IVPB   100 mL/Hr IV Intermittent (04-13-25 @ 06:15)   100 mL/Hr IV Intermittent (04-12-25 @ 19:07)   100 mL/Hr IV Intermittent (04-12-25 @ 08:49)   100 mL/Hr IV Intermittent (04-11-25 @ 17:32)   100 mL/Hr IV Intermittent (04-11-25 @ 06:10)    vancomycin  IVPB   166.67 mL/Hr IV Intermittent (04-10-25 @ 14:23)    vancomycin  IVPB.   250 mL/Hr IV Intermittent (04-10-25 @ 07:30)        cefTRIAXone   IVPB    cefTRIAXone   IVPB 2000 every 24 hours    MEDICATIONS  (STANDING):  acetaminophen     Tablet .. 650 every 6 hours PRN  amLODIPine   Tablet 10 daily  aspirin  chewable 81 daily  atorvastatin 40 at bedtime  bisacodyl 5 every 12 hours PRN  dextrose 50% Injectable 25 once  dextrose 50% Injectable 12.5 once  dextrose 50% Injectable 25 once  dextrose Oral Gel 15 once PRN  fluticasone propionate/ salmeterol 250-50 MICROgram(s) Diskus 1 two times a day  furosemide    Tablet 40 daily  gabapentin 100 three times a day  glucagon  Injectable 1 once  heparin   Injectable 5000 every 8 hours  hydrALAZINE 50 three times a day  insulin glargine Injectable (LANTUS) 3 at bedtime  insulin lispro (ADMELOG) corrective regimen sliding scale  three times a day before meals  insulin lispro (ADMELOG) corrective regimen sliding scale  at bedtime  insulin lispro Injectable (ADMELOG) 2 three times a day before meals  meclizine 12.5 three times a day  metoprolol succinate  daily  polyethylene glycol 3350 17 daily  senna 2 at bedtime    SOCIAL HISTORY:       FAMILY HISTORY:    REVIEW OF SYSTEMS  [  ] ROS unobtainable because:    [  ] All other systems negative except as noted below:	    Constitutional:  [ ] fever [ ] chills  [ ] weight loss  [ ] weakness  Skin:  [ ] rash [ ] phlebitis	  Eyes: [ ] icterus [ ] pain  [ ] discharge	  ENMT: [ ] sore throat  [ ] thrush [ ] ulcers [ ] exudates  Respiratory: [ ] dyspnea [ ] hemoptysis [ ] cough [ ] sputum	  Cardiovascular:  [ ] chest pain [ ] palpitations [ ] edema	  Gastrointestinal:  [ ] nausea [ ] vomiting [ ] diarrhea [ ] constipation [ ] pain	  Genitourinary:  [ ] dysuria [ ] frequency [ ] hematuria [ ] discharge [ ] flank pain  [ ] incontinence  Musculoskeletal:  [ ] myalgias [ ] arthralgias [ ] arthritis  [ ] back pain  Neurological:  [ ] headache [ ] seizures  [ ] confusion/altered mental status  Psychiatric:  [ ] anxiety [ ] depression	  Hematology/Lymphatics:  [ ] lymphadenopathy  Endocrine:  [ ] adrenal [ ] thyroid  Allergic/Immunologic:	 [ ] transplant [ ] seasonal    Vital Signs Last 24 Hrs  T(F): 97.8 (04-16-25 @ 05:04), Max: 101.2 (04-10-25 @ 13:35)  Vital Signs Last 24 Hrs  HR: 72 (04-16-25 @ 05:04) (70 - 75)  BP: 122/80 (04-16-25 @ 05:04) (122/80 - 147/76)  RR: 18 (04-16-25 @ 05:04)  SpO2: 98% (04-16-25 @ 05:04) (97% - 99%)  Wt(kg): --    PHYSICAL EXAM:  Constitutional: non-toxic, no distress  HEAD/EYES: anicteric, no conjunctival injection  ENT:  supple, no thrush  Cardiovascular:   normal S1, S2, no murmur, no edema  Respiratory:  clear BS bilaterally, no wheezes, no rales  GI:  soft, non-tender, normal bowel sounds  :  no velasco, no CVA tenderness  Musculoskeletal:  no synovitis, normal ROM  Neurologic: awake and alert, normal strength, no focal findings  Skin:  no rash, no erythema, no phlebitis  Heme/Onc: no lymphadenopathy   Psychiatric:  awake, alert, appropriate mood                            12.7   9.80  )-----------( 234      ( 16 Apr 2025 05:13 )             37.1   04-16    132[L]  |  99  |  33[H]  ----------------------------<  357[H]  4.0   |  21[L]  |  1.31[H]    Ca    9.3      16 Apr 2025 05:13  Phos  3.4     04-16  Mg     1.90     04-16    TPro  6.9  /  Alb  3.6  /  TBili  0.4  /  DBili  x   /  AST  105[H]  /  ALT  67[H]  /  AlkPhos  145[H]  04-16      MICROBIOLOGY:  Culture - Blood (collected 13 Apr 2025 10:15)  Source: Blood Blood-Peripheral  Preliminary Report (15 Apr 2025 13:01):    No growth at 48 Hours    Culture - Blood (collected 13 Apr 2025 10:00)  Source: Blood Blood-Peripheral  Preliminary Report (15 Apr 2025 13:01):    No growth at 48 Hours              Rapid RVP Result: NotDetec (04-10 @ 09:52)      RADIOLOGY:  imaging below personally reviewed and agree with findings     Patient is a 79y old  Male who presents with a chief complaint of Fever of unknown origin (16 Apr 2025 09:00)    Being followed by ID for    Interval history:  MRCP with Intrahepatic biliary duct dilatation in the left hepatic lobe with   surrounding T2 signal abnormality, suggesting cholangitis.  Small hepatic microabscesses in the left hepatic lobe.    Remains on CTX, pending GI consult     ROS  No N/V/D  No urinary complaints  No HA  No joint or limb pain  No other complaints    Antimicrobials:  cefTRIAXone   IVPB      cefTRIAXone   IVPB 2000 milliGRAM(s) IV Intermittent every 24 hours      Vital Signs Last 24 Hrs  T(C): 36.6 (04-16-25 @ 05:04), Max: 36.9 (04-15-25 @ 20:00)  T(F): 97.8 (04-16-25 @ 05:04), Max: 98.5 (04-15-25 @ 20:00)  HR: 72 (04-16-25 @ 05:04) (70 - 75)  BP: 122/80 (04-16-25 @ 05:04) (122/80 - 147/76)  BP(mean): --  RR: 18 (04-16-25 @ 05:04) (18 - 19)  SpO2: 98% (04-16-25 @ 05:04) (97% - 99%)    PHYSICAL EXAM:  Constitutional: non-toxic, no distress  HEAD/EYES: anicteric, no conjunctival injection  ENT:  supple, no thrush  Cardiovascular:   normal S1, S2, no murmur, no edema  Respiratory:  clear BS bilaterally, no wheezes, no rales  GI:  soft, non-tender, normal bowel sounds, distended   :  no velasco, no CVA tenderness  Musculoskeletal:  no synovitis, normal ROM  Neurologic: awake and alert, normal strength, no focal findings  Skin:  no rash, no erythema, no phlebitis  Psychiatric:  awake, alert, appropriate mood      Lab Data:                        12.7   9.80  )-----------( 234      ( 16 Apr 2025 05:13 )             37.1     04-16    132[L]  |  99  |  33[H]  ----------------------------<  357[H]  4.0   |  21[L]  |  1.31[H]    Ca    9.3      16 Apr 2025 05:13  Phos  3.4     04-16  Mg     1.90     04-16    TPro  6.9  /  Alb  3.6  /  TBili  0.4  /  DBili  x   /  AST  105[H]  /  ALT  67[H]  /  AlkPhos  145[H]  04-16      Blood Blood-Peripheral  04-13-25   No growth at 48 Hours  --  --      Blood Blood-Peripheral  04-13-25   No growth at 48 Hours  --  --      Blood Blood  04-13-25   No Blood Parasites observed by giemsa stain  One negative set of blood smears does not rule out  the possibility of a parasitic infection.  A minimum of 3  specimens should be collected, at least 12-24 hours apart,  over a 36 hour time period.  ************************************************************  NEGATIVE for Plasmodium antigens. Microscopy is performed for  confirmation.  The Malaria Rapid antigen test does not detect the  presence of Babesia species. If Babesiosis is suspected  please order test Babesia PCR: Babesia species PCR Bld  ************************************************************  --  --      Blood Blood  04-12-25   No Blood Parasites observed by giemsa stain  One negative set of blood smears does not rule out  the possibility of a parasitic infection.  A minimum of 3  specimens should be collected, at least 12-24 hours apart,  over a 36 hour time period.  ************************************************************  NEGATIVE for Plasmodium antigens. Microscopy is performed for  confirmation.  The Malaria Rapid antigen test does not detect the  presence of Babesia species. If Babesiosis is suspected  please order test Babesia PCR: Babesia species PCR Bld  ************************************************************  --  --      Blood Blood  04-11-25   No Blood Parasites observed by giemsa stain  One negative set of blood smears does not rule out  the possibility of a parasitic infection.  A minimum of 3  specimens should be collected, at least 12-24 hours apart,  over a 36 hour time period.  ************************************************************  NEGATIVE for Plasmodium antigens. Microscopy is performed for  confirmation.  The Malaria Rapid antigen test does not detect the  presence of Babesia species. If Babesiosis is suspected  please order test Babesia PCR: Babesia species PCR Bld  ************************************************************  --  --      Clean Catch Clean Catch (Midstream)  04-10-25   <10,000 CFU/mL Normal Urogenital Kimberlyn  --  --      Blood Blood-Peripheral  04-10-25   Growth in aerobic bottle: Edwardsiella tarda  Direct identification is available within approximately 3-5  hours either by Blood Panel Multiplexed PCR or Direct  MALDI-TOF. Details: [email or web address]  --  Blood Culture PCR  Edwardsiella tarda      Blood Blood-Peripheral  04-09-25   No growth at 5 days  --  --      Blood Blood-Peripheral  04-09-25   No growth at 5 days  --  --                  RADIOLOGY:  below reviewed      ACC: [id number] EXAM:  MR MRCP WAW IC   ORDERED BY: COLETTE HUNTER     PROCEDURE DATE:  04/15/2025          INTERPRETATION:  CLINICAL INFORMATION: Transaminitis. Bacteremia.   Evaluate for liver/gallbladder abscess.    COMPARISON: CT scan 4/11/2025. MRI 12/7/2023    CONTRAST/COMPLICATIONS:  IV Contrast: Gadavist  10 cc administered   0 cc discarded  Oral Contrast: NONE  .    PROCEDURE:  MRI of the abdomen was performed.  MRCP was performed.    FINDINGS:    Motion degraded exam.    LOWER CHEST: Within normal limits.    LIVER: T2 hyperintense lesion in the left hepatic lobe measuring 6 mm   with surrounding rim of smooth enhancement and T2 signal   abnormality/edema, likely hepatic microabscess. (3:29). Tiny liver cysts.   More diffuse T2 signal abnormality in left hepatic lobe.  BILE DUCTS: Intrahepatic biliary ductal dilatation in the left hepatic   lobe. Evaluation for underlying mass or mural enhancement is degraded due   to patient motion.    Stone versus debris is identified within the dilated CBD measuring 5 mm.   CBD measures 1.7 cm.    GALLBLADDER: Cholecystectomy.  SPLEEN: Within normal limits.  PANCREAS: Within normal limits.  ADRENALS: Within normal limits.  KIDNEYS/URETERS: Bilateral renal cysts. No hydronephrosis.    VISUALIZED PORTIONS:  BOWEL: . Duodenal diverticulum. Colonic diverticulosis. No bowel   obstruction.  PERITONEUM: No ascites.  VESSELS: Within normal limits.  RETROPERITONEUM/LYMPH NODES: No lymphadenopathy.  ABDOMINAL WALL: Within normal limits.  BONES: Degenerative changes in the spine.    IMPRESSION:  Intrahepatic biliary duct dilatation in the left hepatic lobe with   surrounding T2 signal abnormality, suggesting cholangitis.    Small hepatic microabscesses in the left hepatic lobe.    Dilated CBD with intraluminal stone/debris measuring 5 mm.

## 2025-04-16 NOTE — CHART NOTE - NSCHARTNOTEFT_GEN_A_CORE
Overnight Medicine ACP Coverage Overnight Medicine ACP Coverage    Notified by RN that patient endorsed having epigastric pain.   Patient seen at bedside and assessed. States he is having epigastric and "stomach" pain because he did not eat anything. States pain resolved shortly after. Denies radiation of pain to chest, jaw, arm or back. Denies fever, chills, generalized weakness, dizziness, chest pain, palpitations, shortness of breath, dyspnea on exertion, numbness/tingling, any other complaints.    T(C): 36.8 (04-16-25 @ 22:04), Max: 36.9 (04-16-25 @ 12:46)  HR: 77 (04-16-25 @ 22:04) (70 - 83)  BP: 145/88 (04-16-25 @ 22:04) (122/80 - 174/75)  RR: 18 (04-16-25 @ 22:04) (18 - 22)  SpO2: 96% (04-16-25 @ 22:04) (95% - 100%)    CONSTITUTIONAL: Well groomed, no apparent distress  RESP: No respiratory distress, no use of accessory muscles; CTA b/l   CV: RRR, +S1S2, no MRG; no JVD; no peripheral edema  GI: Soft, NT, ND, no rebound, no guarding  PSYCH: Appropriate insight/judgment; A+O x 4    Plan:  - ECG similar to prior with ?new T wave inversions in lead V5-V6  - VSS  - Pain self resolved  - Cardiac enzymes obtained; f/u results  - Will continue to monitor patient overnight for any worsening changes    RN informed of plan of care. Overnight Medicine ACP Coverage    Notified by RN that patient endorsed having epigastric pain.   Patient seen at bedside and assessed. States he is having epigastric and "stomach" pain because he did not eat anything. States pain resolved shortly after. Denies radiation of pain to chest, jaw, arm or back. Denies fever, chills, generalized weakness, dizziness, chest pain, palpitations, shortness of breath, dyspnea on exertion, numbness/tingling, any other complaints.    T(C): 36.8 (04-16-25 @ 22:04), Max: 36.9 (04-16-25 @ 12:46)  HR: 77 (04-16-25 @ 22:04) (70 - 83)  BP: 145/88 (04-16-25 @ 22:04) (122/80 - 174/75)  RR: 18 (04-16-25 @ 22:04) (18 - 22)  SpO2: 96% (04-16-25 @ 22:04) (95% - 100%)    CONSTITUTIONAL: Well groomed, no apparent distress  RESP: No respiratory distress, no use of accessory muscles; CTA b/l   CV: RRR, +S1S2, no MRG; no JVD; no peripheral edema  GI: Soft, NT, ND, no rebound, no guarding  PSYCH: Appropriate insight/judgment; A+O x 4    Plan:  - ECG similar to prior with slight inversions in lead V5-V6  - VSS  - Pain self resolved  - Cardiac enzymes obtained; f/u results  - Will continue to monitor patient overnight for any worsening changes    RN informed of plan of care.

## 2025-04-16 NOTE — PROGRESS NOTE ADULT - PROBLEM SELECTOR PLAN 1
Recent travel to [facility]. Met criteria for SIRS POA. Tickborne/mosquito borne, parasitic infections - serologies so far negative.   - Elevated lactate 2.6 and elevated  noted on admission. lactate normalized.  - Full RVP negative. CXR and CT Chest reviewed: No evidence of pneumonia, pulmonary edema, or pleural effusion  - Parasitic serologies neg, Dengue IgM neg, Strep pneumo/legionella neg. Hep panel neg. F/u leptospira Ab neg, Borrelia Ab neg, Quantiferon TB neg.  - Procal elevated. Inflammatory markers high.   - TTE otherwise unrevealing.  - CT A/P - reviewed. periportal LAD. Mild CBD dilation 2' post-cholecystectomy status.  - BCx positive for GNR (Edwardsiella tarda) -> s/p cefepime (04/13-4/14)  - F/u repeat blood Cx 4/13: NGTD after 24 hrs  - F/u MRCP to evaluate for hepatobiliary source with findings suggestive of cholangitis   - ID following, recs appreciated - D/c cefepime, c/w Ceftriaxone 2 gm Q24H (04/15 - ), started Metronidazole (04/16 - ); would likely need 4 weeks of treatment

## 2025-04-16 NOTE — PROGRESS NOTE ADULT - SUBJECTIVE AND OBJECTIVE BOX
DATE OF SERVICE: 04-16-25      pt resting in bed, no chest pain, SOB or Palps, ROS otherwise negative    acetaminophen     Tablet .. 650 milliGRAM(s) Oral every 6 hours PRN  amLODIPine   Tablet 10 milliGRAM(s) Oral daily  aspirin  chewable 81 milliGRAM(s) Oral daily  atorvastatin 40 milliGRAM(s) Oral at bedtime  bisacodyl 5 milliGRAM(s) Oral every 12 hours PRN  cefTRIAXone   IVPB 2000 milliGRAM(s) IV Intermittent every 24 hours  fluticasone propionate/ salmeterol 250-50 MICROgram(s) Diskus 1 Dose(s) Inhalation two times a day  furosemide    Tablet 40 milliGRAM(s) Oral daily  gabapentin 100 milliGRAM(s) Oral three times a day  glucagon  Injectable 1 milliGRAM(s) IntraMuscular once  heparin   Injectable 5000 Unit(s) SubCutaneous every 8 hours  hydrALAZINE 50 milliGRAM(s) Oral three times a day  insulin glargine Injectable (LANTUS) 3 Unit(s) SubCutaneous at bedtime  insulin lispro (ADMELOG) corrective regimen sliding scale   SubCutaneous three times a day before meals  insulin lispro (ADMELOG) corrective regimen sliding scale   SubCutaneous at bedtime  insulin lispro Injectable (ADMELOG) 2 Unit(s) SubCutaneous three times a day before meals  meclizine 12.5 milliGRAM(s) Oral three times a day  metoprolol succinate  milliGRAM(s) Oral daily  metroNIDAZOLE  IVPB 500 milliGRAM(s) IV Intermittent every 8 hours  polyethylene glycol 3350 17 Gram(s) Oral daily  senna 2 Tablet(s) Oral at bedtime                            12.7   9.80  )-----------( 234      ( 16 Apr 2025 05:13 )             37.1     132[L]  |  99  |  33[H]  ----------------------------<  357[H]  4.0   |  21[L]  |  1.31[H]    Ca    9.3      16 Apr 2025 05:13  Phos  3.4     04-16  Mg     1.90     04-16    TPro  6.9  /  Alb  3.6  /  TBili  0.4  /  DBili  x   /  AST  105[H]  /  ALT  67[H]  /  AlkPhos  145[H]  04-16    Creatinine Trend: 1.31<--, 1.34<--, 1.34<--, 1.21<--, 1.25<--, 1.33<--      T(C): 36.7 (04-16-25 @ 09:29), Max: 36.9 (04-15-25 @ 20:00)  HR: 70 (04-16-25 @ 09:29) (70 - 75)  BP: 135/69 (04-16-25 @ 09:29) (122/80 - 147/76)  RR: 18 (04-16-25 @ 09:29) (18 - 19)  SpO2: 100% (04-16-25 @ 09:29) (97% - 100%)  Wt(kg): --    I&O's Summary    15 Apr 2025 07:01  -  16 Apr 2025 07:00  --------------------------------------------------------  IN: 180 mL / OUT: 1300 mL / NET: -1120 mL      General:  Alert and Oriented * 3.   Head: Normocephalic and atraumatic.   Neck: No JVD. No bruits. Supple. Does not appear to be enlarged.   Cardiovascular: + S1,S2 ; RRR Soft systolic murmur at the left lower sternal border. No rubs noted.    Lungs: CTA b/l. No rhonchi, rales or wheezes.   Abdomen: + BS, soft. Non tender. Non distended. No rebound. No guarding.   Extremities: No clubbing/cyanosis/edema.   Neurologic: Moves all four extremities. Full range of motion.   Skin: Warm and moist. The patient's skin has normal elasticity and good skin turgor.   Psychiatric: Appropriate mood and affect.  Musculoskeletal: Normal range of motion, normal strength     TELEMETRY: NSR	      ECG:  	NSR    RADIOLOGY:    < from: CT Chest No Cont (04.10.25 @ 02:08) >  IMPRESSION:  No CT evidence of pneumonia, pulmonary edema, or pleural effusion.    The patient is status post TAVR.  Stable aneurysmal dilatation of the mid   ascending thoracic aorta, to approximately 4.2 cm in transverse caliber.    Approximately 1.2 cm thyroid nodule.  In the absence of a family history   or risk factors for thyroid cancer, the American College [employer]   does not recommend routine follow-up of incidental thyroid nodules   measuring less than 1.5 cm in this age group.    < end of copied text >      < from: TTE W or WO Ultrasound Enhancing Agent (04.10.25 @ 08:47)   CONCLUSIONS:   1. Left ventricular systolic function is normal.   2. The right ventricle is not well visualized.   3. A atranscatheter deployed (TAVR) is present in the aortic position. The peak transaortic velocity is 1.42 m/s, peak transaortic gradient is 8.0 mmHg and mean transaortic gradient is 4.1 mmHg with an LVOT/aortic valve VTI ratio of 0.62.   4. No pericardial effusion seen.   5. Technically difficult image quality.  < end of copied text >      < from: Cardiac Catheterization (06.22.23 @ 17:04) >  The coronary circulation is right dominant. Cardiac catheterizationwas performed electively.  LM   Left main artery: Angiography shows minor irregularities.    LAD   Proximal left anterior descending: The segment is moderately ectatic.Angiography shows moderate atherosclerosis. There is a40 % stenosis.    CX   Circumflex: Angiography shows minor irregularities.    RCA   Right coronary artery: Angiography shows minor irregularities.        ASSESSMENT/PLAN: Pt is an 79 year old male with  a past medical history of hypertension, hypercholesterolemia, diabetes, chronic renal insufficiency, peripheral vascular disease, bioprosthetic AVR in 2009 status post TAVR of his bioprosthetic AVR in July 2023, with known venous insufficiency status post Varithena closure of the left great saphenous vein presented to the ER with recurrent fevers, chills, and malaise for 3 days. Found with Edwardsiella Tarda bacteremia.    HTN/HLD/TAVR/CAD/TAA  - c/w ASA and statin, has h/o of moderate non obstructive CAD  - c/w BB for stable TAA  - c/w Amlodipine and hydralazine  - c/w ASA, discussed with Dr. Coffman will take off Plavix  - abx per ID, s/p MRCP   - Cont Lasix 40mg PO daily, s/p IV Lasix x 1 for volume overload, now Euvolemic  -pre-op ERPCP today, pt with recent echo as above with Normal LV systolic function, with well seated TAVR, has no unstable anginal symptoms or decompensated CHF on exam.  Optimized from CV perspective for procedure.    Richelle CHRISTENSEN  [phone]       DATE OF SERVICE: 04-16-25      pt resting in bed, no chest pain, SOB or Palps, ROS otherwise negative    acetaminophen     Tablet .. 650 milliGRAM(s) Oral every 6 hours PRN  amLODIPine   Tablet 10 milliGRAM(s) Oral daily  aspirin  chewable 81 milliGRAM(s) Oral daily  atorvastatin 40 milliGRAM(s) Oral at bedtime  bisacodyl 5 milliGRAM(s) Oral every 12 hours PRN  cefTRIAXone   IVPB 2000 milliGRAM(s) IV Intermittent every 24 hours  fluticasone propionate/ salmeterol 250-50 MICROgram(s) Diskus 1 Dose(s) Inhalation two times a day  furosemide    Tablet 40 milliGRAM(s) Oral daily  gabapentin 100 milliGRAM(s) Oral three times a day  glucagon  Injectable 1 milliGRAM(s) IntraMuscular once  heparin   Injectable 5000 Unit(s) SubCutaneous every 8 hours  hydrALAZINE 50 milliGRAM(s) Oral three times a day  insulin glargine Injectable (LANTUS) 3 Unit(s) SubCutaneous at bedtime  insulin lispro (ADMELOG) corrective regimen sliding scale   SubCutaneous three times a day before meals  insulin lispro (ADMELOG) corrective regimen sliding scale   SubCutaneous at bedtime  insulin lispro Injectable (ADMELOG) 2 Unit(s) SubCutaneous three times a day before meals  meclizine 12.5 milliGRAM(s) Oral three times a day  metoprolol succinate  milliGRAM(s) Oral daily  metroNIDAZOLE  IVPB 500 milliGRAM(s) IV Intermittent every 8 hours  polyethylene glycol 3350 17 Gram(s) Oral daily  senna 2 Tablet(s) Oral at bedtime                            12.7   9.80  )-----------( 234      ( 16 Apr 2025 05:13 )             37.1     132[L]  |  99  |  33[H]  ----------------------------<  357[H]  4.0   |  21[L]  |  1.31[H]    Ca    9.3      16 Apr 2025 05:13  Phos  3.4     04-16  Mg     1.90     04-16    TPro  6.9  /  Alb  3.6  /  TBili  0.4  /  DBili  x   /  AST  105[H]  /  ALT  67[H]  /  AlkPhos  145[H]  04-16    Creatinine Trend: 1.31<--, 1.34<--, 1.34<--, 1.21<--, 1.25<--, 1.33<--      T(C): 36.7 (04-16-25 @ 09:29), Max: 36.9 (04-15-25 @ 20:00)  HR: 70 (04-16-25 @ 09:29) (70 - 75)  BP: 135/69 (04-16-25 @ 09:29) (122/80 - 147/76)  RR: 18 (04-16-25 @ 09:29) (18 - 19)  SpO2: 100% (04-16-25 @ 09:29) (97% - 100%)  Wt(kg): --    I&O's Summary    15 Apr 2025 07:01  -  16 Apr 2025 07:00  --------------------------------------------------------  IN: 180 mL / OUT: 1300 mL / NET: -1120 mL      General:  Alert and Oriented * 3.   Head: Normocephalic and atraumatic.   Neck: No JVD. No bruits. Supple. Does not appear to be enlarged.   Cardiovascular: + S1,S2 ; RRR Soft systolic murmur at the left lower sternal border. No rubs noted.    Lungs: CTA b/l. No rhonchi, rales or wheezes.   Abdomen: + BS, soft. Non tender. Non distended. No rebound. No guarding.   Extremities: No clubbing/cyanosis/edema.   Neurologic: Moves all four extremities. Full range of motion.   Skin: Warm and moist. The patient's skin has normal elasticity and good skin turgor.   Psychiatric: Appropriate mood and affect.  Musculoskeletal: Normal range of motion, normal strength     TELEMETRY: NSR	      ECG:  	NSR    RADIOLOGY:    < from: CT Chest No Cont (04.10.25 @ 02:08) >  IMPRESSION:  No CT evidence of pneumonia, pulmonary edema, or pleural effusion.    The patient is status post TAVR.  Stable aneurysmal dilatation of the mid   ascending thoracic aorta, to approximately 4.2 cm in transverse caliber.    Approximately 1.2 cm thyroid nodule.  In the absence of a family history   or risk factors for thyroid cancer, the American College [employer]   does not recommend routine follow-up of incidental thyroid nodules   measuring less than 1.5 cm in this age group.    < end of copied text >      < from: TTE W or WO Ultrasound Enhancing Agent (04.10.25 @ 08:47)   CONCLUSIONS:   1. Left ventricular systolic function is normal.   2. The right ventricle is not well visualized.   3. A atranscatheter deployed (TAVR) is present in the aortic position. The peak transaortic velocity is 1.42 m/s, peak transaortic gradient is 8.0 mmHg and mean transaortic gradient is 4.1 mmHg with an LVOT/aortic valve VTI ratio of 0.62.   4. No pericardial effusion seen.   5. Technically difficult image quality.  < end of copied text >      < from: Cardiac Catheterization (06.22.23 @ 17:04) >  The coronary circulation is right dominant. Cardiac catheterizationwas performed electively.  LM   Left main artery: Angiography shows minor irregularities.    LAD   Proximal left anterior descending: The segment is moderately ectatic.Angiography shows moderate atherosclerosis. There is a40 % stenosis.    CX   Circumflex: Angiography shows minor irregularities.    RCA   Right coronary artery: Angiography shows minor irregularities.        ASSESSMENT/PLAN: Pt is an 79 year old male with  a past medical history of hypertension, hypercholesterolemia, diabetes, chronic renal insufficiency, peripheral vascular disease, bioprosthetic AVR in 2009 status post TAVR of his bioprosthetic AVR in July 2023, with known venous insufficiency status post Varithena closure of the left great saphenous vein presented to the ER with recurrent fevers, chills, and malaise for 3 days. Found with Edwardsiella Tarda bacteremia.    HTN/HLD/TAVR/CAD/TAA  - c/w ASA and statin, has h/o of moderate non obstructive CAD  - c/w BB for stable TAA  - c/w Amlodipine and hydralazine  - c/w ASA, discussed with Dr. Coffman will take off Plavix  - abx per ID, s/p MRCP   - Cont Lasix 40mg PO daily, s/p IV Lasix x 1 for volume overload, now Euvolemic  - pre-op ERPCP today, pt with recent echo as above with Normal LV systolic function, with well seated TAVR, has no unstable anginal symptoms or decompensated CHF on exam.  Optimized from CV perspective for procedure.    Richelle CHRISTENSEN  [phone]

## 2025-04-16 NOTE — CONSULT NOTE ADULT - SUBJECTIVE AND OBJECTIVE BOX
HPI: 80 yo M with PMH of hypertension, hyperlipidemia, type 2 diabetes mellitus, CAD, PAD, AS s/p TAVR presented to the ER with recurrent fevers, chills, and malaise for 3 days. Patient obtained MRCP for evaluation of fever given transaminases and bacteremia. MRCP with Intrahepatic biliary duct dilatation in the left hepatic lobe with surrounding T2 signal abnormality, suggesting cholangitis. Small hepatic microabscesses in the left hepatic lobe. Dilated CBD with intraluminal stone/debris measuring 5 mm.    Patient currently reports feeling well. No complaints at this time.       Allergies:  No Known Allergies      Home Medications:    Hospital Medications:  acetaminophen     Tablet .. 650 milliGRAM(s) Oral every 6 hours PRN  amLODIPine   Tablet 10 milliGRAM(s) Oral daily  aspirin  chewable 81 milliGRAM(s) Oral daily  atorvastatin 40 milliGRAM(s) Oral at bedtime  bisacodyl 5 milliGRAM(s) Oral every 12 hours PRN  cefTRIAXone   IVPB      cefTRIAXone   IVPB 2000 milliGRAM(s) IV Intermittent every 24 hours  dextrose 5%. 1000 milliLiter(s) IV Continuous <Continuous>  dextrose 5%. 1000 milliLiter(s) IV Continuous <Continuous>  dextrose 50% Injectable 25 Gram(s) IV Push once  dextrose 50% Injectable 12.5 Gram(s) IV Push once  dextrose 50% Injectable 25 Gram(s) IV Push once  dextrose Oral Gel 15 Gram(s) Oral once PRN  fluticasone propionate/ salmeterol 250-50 MICROgram(s) Diskus 1 Dose(s) Inhalation two times a day  furosemide    Tablet 40 milliGRAM(s) Oral daily  gabapentin 100 milliGRAM(s) Oral three times a day  glucagon  Injectable 1 milliGRAM(s) IntraMuscular once  heparin   Injectable 5000 Unit(s) SubCutaneous every 8 hours  hydrALAZINE 50 milliGRAM(s) Oral three times a day  insulin glargine Injectable (LANTUS) 3 Unit(s) SubCutaneous at bedtime  insulin lispro (ADMELOG) corrective regimen sliding scale   SubCutaneous three times a day before meals  insulin lispro (ADMELOG) corrective regimen sliding scale   SubCutaneous at bedtime  insulin lispro Injectable (ADMELOG) 2 Unit(s) SubCutaneous three times a day before meals  meclizine 12.5 milliGRAM(s) Oral three times a day  metoprolol succinate  milliGRAM(s) Oral daily  metroNIDAZOLE  IVPB 500 milliGRAM(s) IV Intermittent every 8 hours  polyethylene glycol 3350 17 Gram(s) Oral daily  senna 2 Tablet(s) Oral at bedtime      PMHX/PSHX:  HTN (hypertension)    Aortic valve disease    Diabetes mellitus    Hyperlipidemia    BPH (benign prostatic hypertrophy)    GERD (gastroesophageal reflux disease)    Chronic mastoiditis of left side    Gall stones    HTN (hypertension)    DM (diabetes mellitus)    High cholesterol    Aortic valve replaced    Hx of cholecystectomy    S/P AVR (aortic valve replacement)    History of cholecystectomy    History of ear surgery        Family history:  No pertinent family history in first degree relatives    No pertinent family history in first degree relatives        Denies family history of colon cancer/polyps, stomach cancer/polyps, pancreatic cancer/masses, liver cancer/disease, ovarian cancer and endometrial cancer.    Social History:   Tob: Denies  EtOH: Denies  Illicit Drugs: Denies    ROS:     General:  No wt loss, fevers, chills, night sweats, fatigue  Eyes:  Good vision, no reported pain  ENT:  No sore throat, pain, runny nose, dysphagia  CV:  No pain, palpitations, hypo/hypertension  Pulm:  No dyspnea, cough, tachypnea, wheezing  GI:  see HPI  :  No pain, bleeding, incontinence, nocturia  Muscle:  No pain, weakness  Neuro:  No weakness, tingling, memory problems  Psych:  No fatigue, insomnia, mood problems, depression  Endocrine:  No polyuria, polydipsia, cold/heat intolerance  Heme:  No petechiae, ecchymosis, easy bruisability  Skin:  No rash, tattoos, scars, edema    PHYSICAL EXAM:     GENERAL:  No acute distress  HEENT:  NCAT, no scleral icterus   CHEST:  no respiratory distress  HEART:  Regular rate and rhythm  ABDOMEN:  Soft, non-tender, non-distended, normoactive bowel sounds,  no masses  EXTREMITIES: No edema  SKIN:  No rash/erythema/ecchymoses/petechiae/wounds/abscess/warm/dry  NEURO:  Alert and oriented x 3, no asterixis    Vital Signs:  Vital Signs Last 24 Hrs  T(C): 36.7 (16 Apr 2025 09:29), Max: 36.9 (15 Apr 2025 20:00)  T(F): 98 (16 Apr 2025 09:29), Max: 98.5 (15 Apr 2025 20:00)  HR: 70 (16 Apr 2025 09:29) (70 - 75)  BP: 135/69 (16 Apr 2025 09:29) (122/80 - 147/76)  BP(mean): --  RR: 18 (16 Apr 2025 09:29) (18 - 19)  SpO2: 100% (16 Apr 2025 09:29) (97% - 100%)    Parameters below as of 16 Apr 2025 09:29  Patient On (Oxygen Delivery Method): room air      Daily     Daily     LABS:                        12.7   9.80  )-----------( 234      ( 16 Apr 2025 05:13 )             37.1     Mean Cell Volume: 83.0 fL (04-16-25 @ 05:13)    04-16    132[L]  |  99  |  33[H]  ----------------------------<  357[H]  4.0   |  21[L]  |  1.31[H]    Ca    9.3      16 Apr 2025 05:13  Phos  3.4     04-16  Mg     1.90     04-16    TPro  6.9  /  Alb  3.6  /  TBili  0.4  /  DBili  x   /  AST  105[H]  /  ALT  67[H]  /  AlkPhos  145[H]  04-16    LIVER FUNCTIONS - ( 16 Apr 2025 05:13 )  Alb: 3.6 g/dL / Pro: 6.9 g/dL / ALK PHOS: 145 U/L / ALT: 67 U/L / AST: 105 U/L / GGT: x             Urinalysis Basic - ( 16 Apr 2025 05:13 )    Color: x / Appearance: x / SG: x / pH: x  Gluc: 357 mg/dL / Ketone: x  / Bili: x / Urobili: x   Blood: x / Protein: x / Nitrite: x   Leuk Esterase: x / RBC: x / WBC x   Sq Epi: x / Non Sq Epi: x / Bacteria: x                              12.7   9.80  )-----------( 234      ( 16 Apr 2025 05:13 )             37.1                         12.5   9.97  )-----------( 215      ( 15 Apr 2025 05:58 )             36.1                         13.6   8.58  )-----------( 191      ( 14 Apr 2025 07:03 )             39.9       Imaging:

## 2025-04-17 LAB
ANION GAP SERPL CALC-SCNC: 16 MMOL/L — HIGH (ref 7–14)
BUN SERPL-MCNC: 35 MG/DL — HIGH (ref 7–23)
CALCIUM SERPL-MCNC: 9.6 MG/DL — SIGNIFICANT CHANGE UP (ref 8.4–10.5)
CHLORIDE SERPL-SCNC: 95 MMOL/L — LOW (ref 98–107)
CO2 SERPL-SCNC: 19 MMOL/L — LOW (ref 22–31)
CREAT SERPL-MCNC: 1.33 MG/DL — HIGH (ref 0.5–1.3)
EGFR: 54 ML/MIN/1.73M2 — LOW
EGFR: 54 ML/MIN/1.73M2 — LOW
GLUCOSE BLDC GLUCOMTR-MCNC: 254 MG/DL — HIGH (ref 70–99)
GLUCOSE BLDC GLUCOMTR-MCNC: 269 MG/DL — HIGH (ref 70–99)
GLUCOSE BLDC GLUCOMTR-MCNC: 324 MG/DL — HIGH (ref 70–99)
GLUCOSE BLDC GLUCOMTR-MCNC: 345 MG/DL — HIGH (ref 70–99)
GLUCOSE SERPL-MCNC: 277 MG/DL — HIGH (ref 70–99)
HCT VFR BLD CALC: 38 % — LOW (ref 39–50)
HGB BLD-MCNC: 13.1 G/DL — SIGNIFICANT CHANGE UP (ref 13–17)
MAGNESIUM SERPL-MCNC: 1.9 MG/DL — SIGNIFICANT CHANGE UP (ref 1.6–2.6)
MCHC RBC-ENTMCNC: 28.2 PG — SIGNIFICANT CHANGE UP (ref 27–34)
MCHC RBC-ENTMCNC: 34.5 G/DL — SIGNIFICANT CHANGE UP (ref 32–36)
MCV RBC AUTO: 81.7 FL — SIGNIFICANT CHANGE UP (ref 80–100)
NRBC # BLD AUTO: 0 K/UL — SIGNIFICANT CHANGE UP (ref 0–0)
NRBC # FLD: 0 K/UL — SIGNIFICANT CHANGE UP (ref 0–0)
NRBC BLD AUTO-RTO: 0 /100 WBCS — SIGNIFICANT CHANGE UP (ref 0–0)
PHOSPHATE SERPL-MCNC: 3.1 MG/DL — SIGNIFICANT CHANGE UP (ref 2.5–4.5)
PLATELET # BLD AUTO: 293 K/UL — SIGNIFICANT CHANGE UP (ref 150–400)
POTASSIUM SERPL-MCNC: 4.4 MMOL/L — SIGNIFICANT CHANGE UP (ref 3.5–5.3)
POTASSIUM SERPL-SCNC: 4.4 MMOL/L — SIGNIFICANT CHANGE UP (ref 3.5–5.3)
RBC # BLD: 4.65 M/UL — SIGNIFICANT CHANGE UP (ref 4.2–5.8)
RBC # FLD: 13.4 % — SIGNIFICANT CHANGE UP (ref 10.3–14.5)
SODIUM SERPL-SCNC: 130 MMOL/L — LOW (ref 135–145)
WBC # BLD: 13.31 K/UL — HIGH (ref 3.8–10.5)
WBC # FLD AUTO: 13.31 K/UL — HIGH (ref 3.8–10.5)

## 2025-04-17 PROCEDURE — 99233 SBSQ HOSP IP/OBS HIGH 50: CPT

## 2025-04-17 PROCEDURE — 99232 SBSQ HOSP IP/OBS MODERATE 35: CPT | Mod: GC

## 2025-04-17 PROCEDURE — G0545: CPT

## 2025-04-17 RX ADMIN — HEPARIN SODIUM 5000 UNIT(S): 1000 INJECTION INTRAVENOUS; SUBCUTANEOUS at 21:31

## 2025-04-17 RX ADMIN — INSULIN GLARGINE-YFGN 3 UNIT(S): 100 INJECTION, SOLUTION SUBCUTANEOUS at 21:31

## 2025-04-17 RX ADMIN — HEPARIN SODIUM 5000 UNIT(S): 1000 INJECTION INTRAVENOUS; SUBCUTANEOUS at 05:37

## 2025-04-17 RX ADMIN — ATORVASTATIN CALCIUM 40 MILLIGRAM(S): 80 TABLET, FILM COATED ORAL at 21:30

## 2025-04-17 RX ADMIN — INSULIN LISPRO 2 UNIT(S): 100 INJECTION, SOLUTION INTRAVENOUS; SUBCUTANEOUS at 18:10

## 2025-04-17 RX ADMIN — Medication 100 MILLIGRAM(S): at 05:36

## 2025-04-17 RX ADMIN — Medication 650 MILLIGRAM(S): at 14:42

## 2025-04-17 RX ADMIN — Medication 1 APPLICATION(S): at 13:29

## 2025-04-17 RX ADMIN — INSULIN LISPRO 2 UNIT(S): 100 INJECTION, SOLUTION INTRAVENOUS; SUBCUTANEOUS at 13:31

## 2025-04-17 RX ADMIN — Medication 50 MILLIGRAM(S): at 13:30

## 2025-04-17 RX ADMIN — Medication 650 MILLIGRAM(S): at 13:42

## 2025-04-17 RX ADMIN — INSULIN LISPRO 4: 100 INJECTION, SOLUTION INTRAVENOUS; SUBCUTANEOUS at 13:31

## 2025-04-17 RX ADMIN — Medication 2 TABLET(S): at 21:30

## 2025-04-17 RX ADMIN — GABAPENTIN 100 MILLIGRAM(S): 400 CAPSULE ORAL at 21:30

## 2025-04-17 RX ADMIN — Medication 40 MILLIGRAM(S): at 05:36

## 2025-04-17 RX ADMIN — INSULIN LISPRO 3: 100 INJECTION, SOLUTION INTRAVENOUS; SUBCUTANEOUS at 08:14

## 2025-04-17 RX ADMIN — Medication 100 MILLIGRAM(S): at 21:31

## 2025-04-17 RX ADMIN — Medication 50 MILLIGRAM(S): at 21:30

## 2025-04-17 RX ADMIN — POLYETHYLENE GLYCOL 3350 17 GRAM(S): 17 POWDER, FOR SOLUTION ORAL at 13:29

## 2025-04-17 RX ADMIN — Medication 1 GRAM(S): at 21:30

## 2025-04-17 RX ADMIN — INSULIN LISPRO 1: 100 INJECTION, SOLUTION INTRAVENOUS; SUBCUTANEOUS at 21:34

## 2025-04-17 RX ADMIN — INSULIN LISPRO 4: 100 INJECTION, SOLUTION INTRAVENOUS; SUBCUTANEOUS at 18:10

## 2025-04-17 RX ADMIN — CEFTRIAXONE 100 MILLIGRAM(S): 500 INJECTION, POWDER, FOR SOLUTION INTRAMUSCULAR; INTRAVENOUS at 18:10

## 2025-04-17 RX ADMIN — Medication 1 DOSE(S): at 21:30

## 2025-04-17 RX ADMIN — GABAPENTIN 100 MILLIGRAM(S): 400 CAPSULE ORAL at 13:29

## 2025-04-17 RX ADMIN — Medication 100 MILLIGRAM(S): at 13:28

## 2025-04-17 RX ADMIN — HEPARIN SODIUM 5000 UNIT(S): 1000 INJECTION INTRAVENOUS; SUBCUTANEOUS at 13:29

## 2025-04-17 RX ADMIN — Medication 1 DOSE(S): at 09:35

## 2025-04-17 RX ADMIN — Medication 40 MILLIGRAM(S): at 18:12

## 2025-04-17 RX ADMIN — Medication 1 GRAM(S): at 13:41

## 2025-04-17 NOTE — PROGRESS NOTE ADULT - SUBJECTIVE AND OBJECTIVE BOX
DATE OF SERVICE: 04-17-25      pt resting in bed, no chest pain, SOB or Palps, ROS otherwise negative    acetaminophen     Tablet .. 650 milliGRAM(s) Oral every 6 hours PRN  amLODIPine   Tablet 10 milliGRAM(s) Oral daily  atorvastatin 40 milliGRAM(s) Oral at bedtime  bisacodyl 5 milliGRAM(s) Oral every 12 hours PRN    cefTRIAXone   IVPB 2000 milliGRAM(s) IV Intermittent every 24 hours  chlorhexidine 2% Cloths 1 Application(s) Topical daily  dextrose 5%. 1000 milliLiter(s) IV Continuous <Continuous>  dextrose 5%. 1000 milliLiter(s) IV Continuous <Continuous>  dextrose 50% Injectable 25 Gram(s) IV Push once  dextrose 50% Injectable 12.5 Gram(s) IV Push once  dextrose 50% Injectable 25 Gram(s) IV Push once  dextrose Oral Gel 15 Gram(s) Oral once PRN  fluticasone propionate/ salmeterol 250-50 MICROgram(s) Diskus 1 Dose(s) Inhalation two times a day  furosemide    Tablet 40 milliGRAM(s) Oral daily  gabapentin 100 milliGRAM(s) Oral three times a day  glucagon  Injectable 1 milliGRAM(s) IntraMuscular once  heparin   Injectable 5000 Unit(s) SubCutaneous every 8 hours  hydrALAZINE 50 milliGRAM(s) Oral three times a day  insulin glargine Injectable (LANTUS) 3 Unit(s) SubCutaneous at bedtime  insulin lispro (ADMELOG) corrective regimen sliding scale   SubCutaneous three times a day before meals  insulin lispro (ADMELOG) corrective regimen sliding scale   SubCutaneous at bedtime  insulin lispro Injectable (ADMELOG) 2 Unit(s) SubCutaneous three times a day before meals  metoprolol succinate  milliGRAM(s) Oral daily  metroNIDAZOLE  IVPB 500 milliGRAM(s) IV Intermittent every 8 hours  pantoprazole    Tablet 40 milliGRAM(s) Oral every 12 hours  polyethylene glycol 3350 17 Gram(s) Oral daily  senna 2 Tablet(s) Oral at bedtime  sodium chloride 1 Gram(s) Oral three times a day                            13.1   13.31 )-----------( 293      ( 17 Apr 2025 05:35 )             38.0       130[L]  |  95[L]  |  35[H]  ----------------------------<  277[H]  4.4   |  19[L]  |  1.33[H]    Ca    9.6      17 Apr 2025 05:35  Phos  3.1     04-17  Mg     1.90     04-17    TPro  6.9  /  Alb  3.6  /  TBili  0.4  /  DBili  x   /  AST  105[H]  /  ALT  67[H]  /  AlkPhos  145[H]  04-16    Creatinine Trend: 1.33<--, 1.31<--, 1.34<--, 1.34<--, 1.21<--, 1.25<--    CARDIAC MARKERS ( 16 Apr 2025 20:12 )  x     / x     / x     / x     / 2.8 ng/mL        T(C): 36.4 (04-17-25 @ 12:20), Max: 36.9 (04-17-25 @ 05:30)  HR: 84 (04-17-25 @ 12:20) (71 - 84)  BP: 143/70 (04-17-25 @ 12:20) (143/70 - 174/75)  RR: 18 (04-17-25 @ 12:20) (18 - 22)  SpO2: 98% (04-17-25 @ 12:20) (95% - 100%)  Wt(kg): --    I&O's Summary    16 Apr 2025 07:01  -  17 Apr 2025 07:00  --------------------------------------------------------  IN: 0 mL / OUT: 700 mL / NET: -700 mL      General:  Alert and Oriented * 3.   Head: Normocephalic and atraumatic.   Neck: No JVD. No bruits. Supple. Does not appear to be enlarged.   Cardiovascular: + S1,S2 ; RRR Soft systolic murmur at the left lower sternal border. No rubs noted.    Lungs: CTA b/l. No rhonchi, rales or wheezes.   Abdomen: + BS, soft. Non tender. Non distended. No rebound. No guarding.   Extremities: No clubbing/cyanosis/edema.   Neurologic: Moves all four extremities. Full range of motion.   Skin: Warm and moist. The patient's skin has normal elasticity and good skin turgor.   Psychiatric: Appropriate mood and affect.  Musculoskeletal: Normal range of motion, normal strength     TELEMETRY: NSR	      ECG:  	NSR    RADIOLOGY:    < from: CT Chest No Cont (04.10.25 @ 02:08) >  IMPRESSION:  No CT evidence of pneumonia, pulmonary edema, or pleural effusion.    The patient is status post TAVR.  Stable aneurysmal dilatation of the mid   ascending thoracic aorta, to approximately 4.2 cm in transverse caliber.    Approximately 1.2 cm thyroid nodule.  In the absence of a family history   or risk factors for thyroid cancer, the American College [employer]   does not recommend routine follow-up of incidental thyroid nodules   measuring less than 1.5 cm in this age group.    < end of copied text >      < from: TTE W or WO Ultrasound Enhancing Agent (04.10.25 @ 08:47)   CONCLUSIONS:   1. Left ventricular systolic function is normal.   2. The right ventricle is not well visualized.   3. A atranscatheter deployed (TAVR) is present in the aortic position. The peak transaortic velocity is 1.42 m/s, peak transaortic gradient is 8.0 mmHg and mean transaortic gradient is 4.1 mmHg with an LVOT/aortic valve VTI ratio of 0.62.   4. No pericardial effusion seen.   5. Technically difficult image quality.  < end of copied text >      < from: Cardiac Catheterization (06.22.23 @ 17:04) >  The coronary circulation is right dominant. Cardiac catheterizationwas performed electively.  LM   Left main artery: Angiography shows minor irregularities.    LAD   Proximal left anterior descending: The segment is moderately ectatic.Angiography shows moderate atherosclerosis. There is a40 % stenosis.    CX   Circumflex: Angiography shows minor irregularities.    RCA   Right coronary artery: Angiography shows minor irregularities.        ASSESSMENT/PLAN: Pt is an 79 year old male with  a past medical history of hypertension, hypercholesterolemia, diabetes, chronic renal insufficiency, peripheral vascular disease, bioprosthetic AVR in 2009 status post TAVR of his bioprosthetic AVR in July 2023, with known venous insufficiency status post Varithena closure of the left great saphenous vein presented to the ER with recurrent fevers, chills, and malaise for 3 days. Found with Edwardsiella Tarda bacteremia.    HTN/HLD/TAVR/CAD/TAA  - c/w ASA and statin, has h/o of moderate non obstructive CAD  - c/w BB for stable TAA  - c/w Amlodipine and hydralazine  - c/w ASA, discussed with Dr. Coffman will take off Plavix  - abx per ID, s/p MRCP   - Cont Lasix 40mg PO daily, s/p IV Lasix x 1 for volume overload, now Euvolemic  - pre-op ERPCP today, pt with recent echo as above with Normal LV systolic function, with well seated TAVR, has no unstable anginal symptoms or decompensated CHF on exam.  Optimized from CV perspective for procedure.  -s/p ERCP  well tolerated from CV perspective    Richelle CHRISTENSEN  [phone]       DATE OF SERVICE: 04-17-25      pt resting in bed, no chest pain, SOB or Palps, ROS otherwise negative    acetaminophen     Tablet .. 650 milliGRAM(s) Oral every 6 hours PRN  amLODIPine   Tablet 10 milliGRAM(s) Oral daily  atorvastatin 40 milliGRAM(s) Oral at bedtime  bisacodyl 5 milliGRAM(s) Oral every 12 hours PRN    cefTRIAXone   IVPB 2000 milliGRAM(s) IV Intermittent every 24 hours  chlorhexidine 2% Cloths 1 Application(s) Topical daily  dextrose 5%. 1000 milliLiter(s) IV Continuous <Continuous>  dextrose 5%. 1000 milliLiter(s) IV Continuous <Continuous>  dextrose 50% Injectable 25 Gram(s) IV Push once  dextrose 50% Injectable 12.5 Gram(s) IV Push once  dextrose 50% Injectable 25 Gram(s) IV Push once  dextrose Oral Gel 15 Gram(s) Oral once PRN  fluticasone propionate/ salmeterol 250-50 MICROgram(s) Diskus 1 Dose(s) Inhalation two times a day  furosemide    Tablet 40 milliGRAM(s) Oral daily  gabapentin 100 milliGRAM(s) Oral three times a day  glucagon  Injectable 1 milliGRAM(s) IntraMuscular once  heparin   Injectable 5000 Unit(s) SubCutaneous every 8 hours  hydrALAZINE 50 milliGRAM(s) Oral three times a day  insulin glargine Injectable (LANTUS) 3 Unit(s) SubCutaneous at bedtime  insulin lispro (ADMELOG) corrective regimen sliding scale   SubCutaneous three times a day before meals  insulin lispro (ADMELOG) corrective regimen sliding scale   SubCutaneous at bedtime  insulin lispro Injectable (ADMELOG) 2 Unit(s) SubCutaneous three times a day before meals  metoprolol succinate  milliGRAM(s) Oral daily  metroNIDAZOLE  IVPB 500 milliGRAM(s) IV Intermittent every 8 hours  pantoprazole    Tablet 40 milliGRAM(s) Oral every 12 hours  polyethylene glycol 3350 17 Gram(s) Oral daily  senna 2 Tablet(s) Oral at bedtime  sodium chloride 1 Gram(s) Oral three times a day                            13.1   13.31 )-----------( 293      ( 17 Apr 2025 05:35 )             38.0       130[L]  |  95[L]  |  35[H]  ----------------------------<  277[H]  4.4   |  19[L]  |  1.33[H]    Ca    9.6      17 Apr 2025 05:35  Phos  3.1     04-17  Mg     1.90     04-17    TPro  6.9  /  Alb  3.6  /  TBili  0.4  /  DBili  x   /  AST  105[H]  /  ALT  67[H]  /  AlkPhos  145[H]  04-16    Creatinine Trend: 1.33<--, 1.31<--, 1.34<--, 1.34<--, 1.21<--, 1.25<--    CARDIAC MARKERS ( 16 Apr 2025 20:12 )  x     / x     / x     / x     / 2.8 ng/mL        T(C): 36.4 (04-17-25 @ 12:20), Max: 36.9 (04-17-25 @ 05:30)  HR: 84 (04-17-25 @ 12:20) (71 - 84)  BP: 143/70 (04-17-25 @ 12:20) (143/70 - 174/75)  RR: 18 (04-17-25 @ 12:20) (18 - 22)  SpO2: 98% (04-17-25 @ 12:20) (95% - 100%)  Wt(kg): --    I&O's Summary    16 Apr 2025 07:01  -  17 Apr 2025 07:00  --------------------------------------------------------  IN: 0 mL / OUT: 700 mL / NET: -700 mL      General:  Alert and Oriented * 3.   Head: Normocephalic and atraumatic.   Neck: No JVD. No bruits. Supple. Does not appear to be enlarged.   Cardiovascular: + S1,S2 ; RRR Soft systolic murmur at the left lower sternal border. No rubs noted.    Lungs: CTA b/l. No rhonchi, rales or wheezes.   Abdomen: + BS, soft. Non tender. Non distended. No rebound. No guarding.   Extremities: No clubbing/cyanosis/edema.   Neurologic: Moves all four extremities. Full range of motion.   Skin: Warm and moist. The patient's skin has normal elasticity and good skin turgor.   Psychiatric: Appropriate mood and affect.  Musculoskeletal: Normal range of motion, normal strength     TELEMETRY: NSR	      ECG:  	NSR    RADIOLOGY:    < from: CT Chest No Cont (04.10.25 @ 02:08) >  IMPRESSION:  No CT evidence of pneumonia, pulmonary edema, or pleural effusion.    The patient is status post TAVR.  Stable aneurysmal dilatation of the mid   ascending thoracic aorta, to approximately 4.2 cm in transverse caliber.    Approximately 1.2 cm thyroid nodule.  In the absence of a family history   or risk factors for thyroid cancer, the American College of [employer]   does not recommend routine follow-up of incidental thyroid nodules   measuring less than 1.5 cm in this age group.    < end of copied text >      < from: TTE W or WO Ultrasound Enhancing Agent (04.10.25 @ 08:47)   CONCLUSIONS:   1. Left ventricular systolic function is normal.   2. The right ventricle is not well visualized.   3. A atranscatheter deployed (TAVR) is present in the aortic position. The peak transaortic velocity is 1.42 m/s, peak transaortic gradient is 8.0 mmHg and mean transaortic gradient is 4.1 mmHg with an LVOT/aortic valve VTI ratio of 0.62.   4. No pericardial effusion seen.   5. Technically difficult image quality.  < end of copied text >      < from: Cardiac Catheterization (06.22.23 @ 17:04) >  The coronary circulation is right dominant. Cardiac catheterizationwas performed electively.  LM   Left main artery: Angiography shows minor irregularities.    LAD   Proximal left anterior descending: The segment is moderately ectatic.Angiography shows moderate atherosclerosis. There is a40 % stenosis.    CX   Circumflex: Angiography shows minor irregularities.    RCA   Right coronary artery: Angiography shows minor irregularities.        ASSESSMENT/PLAN: Pt is an 79 year old male with  a past medical history of hypertension, hypercholesterolemia, diabetes, chronic renal insufficiency, peripheral vascular disease, bioprosthetic AVR in 2009 status post TAVR of his bioprosthetic AVR in July 2023, with known venous insufficiency status post Varithena closure of the left great saphenous vein presented to the ER with recurrent fevers, chills, and malaise for 3 days. Found with Edwardsiella Tarda bacteremia.    HTN/HLD/TAVR/CAD/TAA  - c/w ASA and statin, has h/o of moderate non obstructive CAD  - c/w BB for stable TAA  - c/w Amlodipine and hydralazine  - c/w ASA, discussed with Dr. Coffman will take off Plavix  - abx per ID, s/p MRCP   - Cont Lasix 40mg PO daily, s/p IV Lasix x 1 for volume overload, now Euvolemic  - pre-op ERCP today, pt with recent echo as above with Normal LV systolic function, with well seated TAVR, has no unstable anginal symptoms or decompensated CHF on exam.  Optimized from CV perspective for procedure.  - s/p ERCP  well tolerated from CV perspective    Richelle CHRISTENSEN  [phone]       35

## 2025-04-17 NOTE — PROVIDER CONTACT NOTE (OTHER) - ACTION/TREATMENT ORDERED:
EKG and pending orders (night RN made aware)
EKG performed, STAT labs sent
pending orders
ACP notified, aspirin given and  Ice packs placed as instructed per ACP.

## 2025-04-17 NOTE — PROGRESS NOTE ADULT - PROBLEM SELECTOR PROBLEM 7
Hyperlipidemia
CAD (coronary artery disease)
Type 2 diabetes mellitus
CAD (coronary artery disease)
Type 2 diabetes mellitus

## 2025-04-17 NOTE — PROVIDER CONTACT NOTE (OTHER) - RECOMMENDATIONS
IVF or diet order
Alternate medication, Cooling blanket ?
JACOB Perkins text paged. Warm packs given for the meantime.

## 2025-04-17 NOTE — PROGRESS NOTE ADULT - SUBJECTIVE AND OBJECTIVE BOX
ANESTHESIA POSTOP NOTE  79y Male POSTOP DAY 1    Vital Signs Last 24 Hrs  T(C): 36.3 (17 Apr 2025 08:20), Max: 36.9 (16 Apr 2025 12:46)  T(F): 97.4 (17 Apr 2025 08:20), Max: 98.4 (16 Apr 2025 12:46)  HR: 80 (17 Apr 2025 08:20) (71 - 83)  BP: 147/78 (17 Apr 2025 08:20) (130/71 - 174/75)  BP(mean): --  RR: 18 (17 Apr 2025 08:20) (18 - 22)  SpO2: 97% (17 Apr 2025 08:20) (95% - 100%)    Parameters below as of 17 Apr 2025 08:20  Patient On (Oxygen Delivery Method): room air      I&O's Summary    16 Apr 2025 07:01  -  17 Apr 2025 07:00  --------------------------------------------------------  IN: 0 mL / OUT: 700 mL / NET: -700 mL        [ X ] NO APPARENT ANESTHESIA COMPLICATIONS

## 2025-04-17 NOTE — PROGRESS NOTE ADULT - PROBLEM SELECTOR PLAN 4
Stable.  - C/w home dose Metoprolol succinate 100 mg daily  - C/w home dose Amlodipine 10 mg daily  - C/w home dose Hydralazine 50 mg q8h  - Hold home Valsartan 160 mg daily
Stable.  - C/w home dose Metoprolol succinate 100 mg daily  - C/w home dose Amlodipine 10 mg daily  - C/w home dose Hydralazine 50 mg q8h  - Hold home Valsartan 160 mg daily    #Dizziness  Improved.  -Likely BPPV. Reviewed Surescript - pt has gotten meclizine recently.  -Cont meclizine 12.5mg TID ATC. Reassess daily.    #Constipation  -C/w Senna and Miralax
Stable.  - C/w home dose Metoprolol succinate 100 mg daily  - C/w home dose Amlodipine 10 mg daily  - C/w home dose Hydralazine 50 mg q8h  - Hold home Valsartan 160 mg daily    #Dizziness  Improved.  -Likely BPPV. Reviewed Surescript - pt has gotten meclizine recently.  -Cont meclizine 12.5mg TID ATC. Reassess daily.    #Constipation  -Start sennna. Change miralax to daily ATC.
- Mildly elevated LFTs (AST/ALT: 82/42, normal alkaline phosphatase 101) on admission  - Pt states he drinks alcohol only socially at parties  - Hep panel neg.  - R/o other infectious process as outlined above.  - CT AP no acute intraabdominal pathologies  - F/u MRCP with findings suggestive of cholangitis
- Mildly elevated LFTs (AST/ALT: 82/42, normal alkaline phosphatase 101) on admission  - Pt states he drinks alcohol only socially at parties  - Hep panel neg.  - R/o other infectious process as outlined above.  - CT AP no acute intraabdominal pathologies  - Obtain MRCP to further evaluate for hepatobiliary source of GNR bacteremia
Stable.  - C/w home dose Metoprolol succinate 100 mg daily  - C/w home dose Amlodipine 10 mg daily  - C/w home dose Hydralazine 50 mg q8h  - Hold home Valsartan 160 mg daily    #Dizziness  -Likely BPPV. Reviewed Surescript - pt has gotten meclizine recently.  -Start meclizine 12.5mg TID ATC. Reassess daily.    #Constipation  -Start sennna. Change miralax to daily ATC.
- Mildly elevated LFTs (AST/ALT: 82/42, normal alkaline phosphatase 101) on admission  - Pt states he drinks alcohol only socially at parties  - Hep panel neg.  - R/o other infectious process as outlined above.  - CT AP no acute intraabdominal pathologies  - F/u MRCP with findings suggestive of cholangitis as mentioned above

## 2025-04-17 NOTE — PROGRESS NOTE ADULT - PROBLEM SELECTOR PLAN 1
Recent travel to [facility]. Met criteria for SIRS POA. Tickborne/mosquito borne, parasitic infections - serologies so far negative.   - Elevated lactate 2.6 and elevated  noted on admission. lactate normalized.  - Full RVP negative. CXR and CT Chest reviewed: No evidence of pneumonia, pulmonary edema, or pleural effusion  - Parasitic serologies neg, Dengue IgM neg, Strep pneumo/legionella neg. Hep panel neg. F/u leptospira Ab neg, Borrelia Ab neg, Quantiferon TB neg.  - Procal elevated. Inflammatory markers high.   - TTE otherwise unrevealing.  - CT A/P - reviewed. periportal LAD. Mild CBD dilation 2' post-cholecystectomy status.  - BCx positive for GNR (Edwardsiella tarda) -> s/p cefepime (04/13-4/14)  - F/u repeat blood Cx 4/13: NGTD after 24 hrs  - F/u MRCP to evaluate for hepatobiliary source with findings suggestive of cholangitis   - ID recs appreciated - D/c cefepime, c/w Ceftriaxone 2 gm Q24H (04/15 - ), c/w Metronidazole (04/16 - ); will plan for a 10-day course of antibiotics from 04/16-04/25 and discharge pt on PO Levaquin 750 mg Q24H and PO Metronidazole 500 mg Q12H to complete the course

## 2025-04-17 NOTE — PROGRESS NOTE ADULT - PROBLEM SELECTOR PLAN 6
FLP reviewed.  - Continue Atorvastatin 40 mg daily at bedtime
FLP reviewed.  - Continue Atorvastatin 40 mg daily at bedtime
Stable.  - C/w home dose Metoprolol succinate 100 mg daily  - C/w home dose Amlodipine 10 mg daily  - C/w home dose Hydralazine 50 mg q8h  - Hold home Valsartan 160 mg daily    #Dizziness  Improved.  -Likely BPPV. Reviewed Surescript - pt has gotten meclizine recently.  -Cont meclizine 12.5mg TID ATC. Reassess daily.    #Constipation  -C/w Senna and Miralax
FLP reviewed.  - Continue Atorvastatin 40 mg daily at bedtime
Stable.  - C/w home dose Metoprolol succinate 100 mg daily  - C/w home dose Amlodipine 10 mg daily  - C/w home dose Hydralazine 50 mg q8h  - Hold home Valsartan 160 mg daily    #Dizziness  Improved.  -Likely BPPV. Reviewed Surescript - pt has gotten meclizine recently.  -Cont meclizine 12.5mg TID ATC. Reassess daily.    #Constipation  -C/w Senna and Miralax
A1c 6.4  Good glycemic control.  -Correctional insulin TID AC HS.  -Resume home meds on DC.  -CC diet.    #Thyroid nodule  -Incidental detection on CT.  -F/u as OP.
FLP reviewed.  - Continue Atorvastatin 40 mg daily at bedtime

## 2025-04-17 NOTE — PROVIDER CONTACT NOTE (OTHER) - ASSESSMENT
patient a&ox4, c/o severe stomach pain, patient NPO and complaining that he is hungry
pt has intermittent abd pain and chest pain most likely associated with prolonged NPO. no c/o of L arm weakness and L jaw pain.
Patient received A&Ox4. 1st degree block on telemetry. Patient denies chest pain, shortness of breath, or generalized pain. Pt satting well on room air.  Patient continues to become febrile.
pt c/o epigastric pain. no L arm numbness/tingling. no L jaw pain.   pt has been NPO since last night and c/o of being hungry.

## 2025-04-17 NOTE — PROGRESS NOTE ADULT - PROBLEM SELECTOR PROBLEM 2
Acute kidney injury superimposed on CKD
Acute kidney injury superimposed on CKD
Gram-negative bacteremia
Gram-negative bacteremia
Acute kidney injury superimposed on CKD
Acute kidney injury superimposed on CKD
Gram-negative bacteremia

## 2025-04-17 NOTE — PROGRESS NOTE ADULT - PROBLEM SELECTOR PROBLEM 5
Acute kidney injury superimposed on CKD
Type 2 diabetes mellitus
Acute kidney injury superimposed on CKD
Type 2 diabetes mellitus
HTN (hypertension)
Type 2 diabetes mellitus
Type 2 diabetes mellitus

## 2025-04-17 NOTE — PROGRESS NOTE ADULT - PROBLEM SELECTOR PROBLEM 6
Hyperlipidemia
Type 2 diabetes mellitus
HTN (hypertension)
HTN (hypertension)

## 2025-04-17 NOTE — PROVIDER CONTACT NOTE (OTHER) - REASON
Patient febrile
pt c/o epigastric pain
patient c/o stomach/hunger pain
pt c/o abd pain and chest pain

## 2025-04-17 NOTE — PROGRESS NOTE ADULT - PROBLEM SELECTOR PLAN 3
- Mildly elevated LFTs (AST/ALT: 82/42, normal alkaline phosphatase 101) on admission  - Pt states he drinks alcohol only socially at parties  - Hep panel neg.  - R/o other infectious process as outlined above.  - CT AP no acute intraabdominal pathologies.
- Mildly elevated LFTs (AST/ALT: 82/42, normal alkaline phosphatase 101) on admission  - Pt states he drinks alcohol only socially at parties  - Hep panel neg.  - R/o other infectious process as outlined above.  - CT AP no acute intraabdominal pathologies.
- MRCP reviewed: Intrahepatic biliary duct dilatation in the left hepatic lobe with surrounding T2 signal abnormality, suggesting cholangitis. Small hepatic microabscesses in the left hepatic lobe. Dilated CBD with intraluminal stone/debris measuring 5 mm.  - S/p ERCP 04/16 showing choledocholithiasis s/p removal and biliary stent placement; Scattered antral ulcers and gastropathy, f/u biopsy results    GI recs appreciated:   - F/u pathology results.  - Avoid aspirin and NSAIDs for 4 weeks.  - Use Pantoprazole 40 mg orally twice daily for 4 weeks, then once daily.  - Recommend repeat EGD and ERCP in 2-3 months for stent removal and confirm ulcer healing.
- Elevated BUN / Cr: 33/1.83 on admission i/s/o underlying sepsis, medication (Valsartan) use, and CKD stage 3A (Baseline Cr 1.5 in 01/2025)  - Monitor BMP daily, trend Cr  - Nephro recs appreciated.    #Orthopnea  -Initially with mild JVD. No peripheral edema.  -S/p Lasix 40 IVP x1 on 04/13. Started Lasix 40 mg PO daily on 04/14 as per Cardio recs.
- MRCP reviewed: Intrahepatic biliary duct dilatation in the left hepatic lobe with surrounding T2 signal abnormality, suggesting cholangitis. Small hepatic microabscesses in the left hepatic lobe. Dilated CBD with intraluminal stone/debris measuring 5 mm.  - S/p ERCP 04/16 showing choledocholithiasis s/p removal and biliary stent placement; Scattered antral ulcers and gastropathy, f/u biopsy results    GI recs appreciated:   - F/u pathology results.  - Avoid aspirin and NSAIDs for 4 weeks.  - Use Pantoprazole 40 mg orally twice daily for 4 weeks, then once daily.  - Recommend repeat EGD and ERCP in 2-3 months for stent removal and confirm ulcer healing.
- Mildly elevated LFTs (AST/ALT: 82/42, normal alkaline phosphatase 101) on admission  - Pt states he drinks alcohol only socially at parties  - Hep panel neg.  - R/o other infectious process as outlined above.  - CT AP pending.
- Mildly elevated LFTs (AST/ALT: 82/42, normal alkaline phosphatase 101) on admission  - Pt states he drinks alcohol only socially at parties  - Hep panel neg.  - R/o other infectious process as outlined above.  - CT AP no acute intraabdominal pathologies  - Obtain MRCP to further evaluate for hepatobiliary source of GNR bacteremia

## 2025-04-17 NOTE — PROGRESS NOTE ADULT - PROBLEM SELECTOR PROBLEM 4
HTN (hypertension)
Transaminitis
HTN (hypertension)
Transaminitis
HTN (hypertension)
Transaminitis
HTN (hypertension)

## 2025-04-17 NOTE — PROVIDER CONTACT NOTE (OTHER) - BACKGROUND
pt admit dx of fever. PMHx of HTN, DM, high cholesterol...
80 yo M with PMH of HTN, HLD, T2DM (6.4%), CAD, PAD, AS s/p TAVR presented to the ER with recurrent fevers, chills, and malaise for 3 days. Admitted to Medicine for sepsis of unclear etiology.
80 yo male hx of HTN, HLD, DM2, CAD, was discharged yesterday but continues to be febrile
pt admit dx of fever. PMHx of HTN, DM, high cholesterol...

## 2025-04-17 NOTE — PROGRESS NOTE ADULT - PROBLEM SELECTOR PLAN 8
- DVT prophylaxis: Heparin subcutaneous 5000 units q8h  - Diet: Carbohydrate-consistent   - Activity: as tolerated  - Disposition: Pending clinical course; PT rec WANG (pt agreeable)
FLP reviewed.  - Continue Atorvastatin 40 mg daily at bedtime
FLP reviewed.  - Continue Atorvastatin 40 mg daily at bedtime
- DVT prophylaxis: Heparin subcutaneous 5000 units q8h  - Diet: Carbohydrate-consistent   - Activity: as tolerated  - Disposition: Pending clinical course
- H/o CAD, PAD, and AS s/p TAVR  - Cont dose ASA,Toprol XL, and Atorvastatin  - DC Plavix as per cardio.

## 2025-04-17 NOTE — PROGRESS NOTE ADULT - NS ATTEND AMEND GEN_ALL_CORE FT
Patient seen and examined. Agree with plan as detailed in PA/NP Note.     Euvolemic on exam  Normal LV systolic function, with well seated TAVR, has no unstable anginal symptoms or decompensated CHF on exam.  Proceed to ERCP from CV perspective for procedure.    Albert Serna MD  Pager: [phone]  Office: [phone]
Patient seen and examined. Agree with plan as detailed in PA/NP Note.     Positiver JVD can give one dose of Lasix      Albert Serna MD  Pager: [phone]  Office: [phone]
Albert Serna MD  Pager: [phone]  Office: [phone]
Patient seen and examined. Agree with plan as detailed in PA/NP Note.     C/w oral lasix, lying flat in bed    Albert Serna MD  Pager: [phone]  Office: [phone]
Patient seen and examined. Agree with plan as detailed in PA/NP Note.     JVD improved can start maintenace oral lasix    Albert Serna MD  Pager: [phone]  Office: [phone]
Patient seen and examined. Agree with plan as detailed in PA/NP Note.    ERCP noted  C/w amlodipine and metoprolol and hydralazine    Albert Serna MD  Pager: [phone]  Office: [phone]

## 2025-04-17 NOTE — PROGRESS NOTE ADULT - SUBJECTIVE AND OBJECTIVE BOX
Patient is a 79y old  Male who presents with a chief complaint of Fever of unknown origin (17 Apr 2025 09:02)    Being followed by ID for bacteremia     Interval history:  ERCP on 04/16  Reports he is hungry   has been NPO since procedure   frustrated     ROS  No N/V/D  No urinary complaints  No HA  No joint or limb pain  No other complaints      Antimicrobials:  cefTRIAXone   IVPB      cefTRIAXone   IVPB 2000 milliGRAM(s) IV Intermittent every 24 hours  metroNIDAZOLE  IVPB 500 milliGRAM(s) IV Intermittent every 8 hours      Vital Signs Last 24 Hrs  T(C): 36.3 (04-17-25 @ 08:20), Max: 36.9 (04-16-25 @ 12:46)  T(F): 97.4 (04-17-25 @ 08:20), Max: 98.4 (04-16-25 @ 12:46)  HR: 80 (04-17-25 @ 08:20) (71 - 83)  BP: 147/78 (04-17-25 @ 08:20) (130/71 - 174/75)  BP(mean): --  RR: 18 (04-17-25 @ 08:20) (18 - 22)  SpO2: 97% (04-17-25 @ 08:20) (95% - 100%)    Physical Exam:  Constitutional:  well preserved, comfortable  Head/Eyes: no icterus, PERRL, EOMI  ENT:  supple; no thrush  LUNGS:  CTA  CVS:  normal S1, S2, no murmur  Abd:  soft, non-tender; non-distended  Ext:  no edema  Vascular:  IV site no erythema tenderness or discharge  MSK:  joints without swelling  Neuro: AAO X 3, non- focal    Lab Data:                        13.1   13.31 )-----------( 293      ( 17 Apr 2025 05:35 )             38.0     04-17    130[L]  |  95[L]  |  35[H]  ----------------------------<  277[H]  4.4   |  19[L]  |  1.33[H]    Ca    9.6      17 Apr 2025 05:35  Phos  3.1     04-17  Mg     1.90     04-17    TPro  6.9  /  Alb  3.6  /  TBili  0.4  /  DBili  x   /  AST  105[H]  /  ALT  67[H]  /  AlkPhos  145[H]  04-16      Blood Blood-Peripheral  04-13-25   No growth at 72 Hours  --  --      Blood Blood-Peripheral  04-13-25   No growth at 72 Hours  --  --      Blood Blood  04-13-25   No Blood Parasites observed by giemsa stain  One negative set of blood smears does not rule out  the possibility of a parasitic infection.  A minimum of 3  specimens should be collected, at least 12-24 hours apart,  over a 36 hour time period.  ************************************************************  NEGATIVE for Plasmodium antigens. Microscopy is performed for  confirmation.  The Malaria Rapid antigen test does not detect the  presence of Babesia species. If Babesiosis is suspected  please order test Babesia PCR: Babesia species PCR Bld  ************************************************************  --  --      Blood Blood  04-12-25   No Blood Parasites observed by giemsa stain  One negative set of blood smears does not rule out  the possibility of a parasitic infection.  A minimum of 3  specimens should be collected, at least 12-24 hours apart,  over a 36 hour time period.  ************************************************************  NEGATIVE for Plasmodium antigens. Microscopy is performed for  confirmation.  The Malaria Rapid antigen test does not detect the  presence of Babesia species. If Babesiosis is suspected  please order test Babesia PCR: Babesia species PCR Bld  ************************************************************  --  --      Blood Blood  04-11-25   No Blood Parasites observed by giemsa stain  One negative set of blood smears does not rule out  the possibility of a parasitic infection.  A minimum of 3  specimens should be collected, at least 12-24 hours apart,  over a 36 hour time period.  ************************************************************  NEGATIVE for Plasmodium antigens. Microscopy is performed for  confirmation.  The Malaria Rapid antigen test does not detect the  presence of Babesia species. If Babesiosis is suspected  please order test Babesia PCR: Babesia species PCR Bld  ************************************************************  --  --                  RADIOLOGY:  below reviewed                                                                                     Impression:          - Choledocholithiasis. Removal with biliary                        sphincterotomy and balloon extraction. Biliary stent                        placed.                       - Scattered antral ulcers and gastropathy on preceding                        EGD. Biopsied.  Recommendation:      - Return patient to [facility] for ongoing care.                       - Await pathology results.                       - Avoid aspirin and nonsteroidal anti-inflammatory                        medicines for 4 weeks.                       - Use Pantoprazole 40 mg orally twice daily for 4 weeks                        then once daily.                       - Recommend repeat EGD and ERCP in 2-3 months for stent                        removal and confirm ulcer healing.

## 2025-04-17 NOTE — PROGRESS NOTE ADULT - ASSESSMENT
80 yo male with hx of HTN, HLD, T2DM, CAD, s/p TAVR, PAD comes back to the ED after he was seen yesterday for fever and general malaise. He was dc home yesterday and he has nirali taking tylenol every 4 hours but continues to be febrile.   Renal following for CKD Mx.    CKD 3a, stable  Creatinine Trend: 1.33 <--, 1.31 <--, 1.34 <--, 1.34 <--, 1.21 <--, 1.25 <--, 1.33 <--, 1.52 <--, 1.47 <--, 1.50 <--, 1.66 <--  serum creatinine at baseline  K, vol ok  bp stable  plan:  c/w furosemide Tablet 40 milliGRAM(s) Oral daily  no acute renal intervention at this time  BMP QD    s/p Hypokalemia -K better  mag ok   DASH diet  prn supplementation    Fever  S/P ERCP  growing Edwardsiella Tarda in blood,   on Ceftriaxone and Metronidazole per ID      For any question, call:  Cell # [phone]  Pager # [phone]  Callback # [phone]

## 2025-04-17 NOTE — PROGRESS NOTE ADULT - PROBLEM SELECTOR PLAN 2
- Elevated BUN / Cr: 33/1.83 on admission i/s/o underlying sepsis, medication (Valsartan) use, and CKD stage 3A (Baseline Cr 1.5 in 01/2025)  - Improved. Seems to be back to baseline.  - Nephro recs appreciated.    #Hypokalemia  -Supplement. Trend labs.
- Elevated BUN / Cr: 33/1.83 on admission i/s/o underlying sepsis, medication (Valsartan) use, and CKD stage 3A (Baseline Cr 1.5 in 01/2025)  - Improved. Seems to be back to baseline.  - Nephro recs appreciated.    #Orthopnea  -Mild JVD. No peripheral edema.  -Lasix 40 IVP x1. Reassess in AM.    #Hypokalemia  -Improved Trend labs.
- Elevated BUN / Cr: 33/1.83 on admission i/s/o underlying sepsis, medication (Valsartan) use, and CKD stage 3A (Baseline Cr 1.5 in 01/2025)  - Improved. Seems to be back to baseline.  - Nephro recs appreciated.    #Hypokalemia  -Supplement. Trend labs.
BCx positive for GNR (Edwardsiella tarda)   - See sepsis plan above
- Elevated BUN / Cr: 33/1.83 on admission i/s/o underlying sepsis, medication (Valsartan) use, and CKD stage 3A (Baseline Cr 1.5 in 01/2025)  - Monitor BMP daily, trend Cr  - Nephro recs appreciated.    #Orthopnea  -Mild JVD. No peripheral edema.  -S/p Lasix 40 IVP x1 on 04/13. Started Lasix 40 mg PO daily on 04/14 as per Cardio recs.
BCx positive for GNR (Edwardsiella tarda)   - See sepsis plan above
BCx positive for GNR (Edwardsiella tarda)   - See sepsis plan above

## 2025-04-17 NOTE — PROGRESS NOTE ADULT - TIME BILLING
- Ordering, reviewing, and interpreting labs, testing, and imaging.  - Independently obtaining a review of systems and performing a physical exam  - Reviewing consultant documentation/recommendations in addition to discussing plan of care with consultants.  - Counselling and educating patient and family regarding interpretation of aforementioned items and plan of care.
- Ordering, reviewing, and interpreting labs, testing, and imaging.  - Independently obtaining a review of systems and performing a physical exam  - Reviewing prior hospitalization and where necessary, outpatient records.  - Reviewing consultant recommendations/communicating with consultants  - Counselling and educating patient and family regarding interpretation of aforementioned items and plan of care.

## 2025-04-17 NOTE — PROGRESS NOTE ADULT - PROBLEM SELECTOR PLAN 9
- DVT prophylaxis: Heparin subcutaneous 5000 units q8h  - Diet: Carbohydrate-consistent   - Activity: as tolerated  - Disposition: Pending clinical course (MRCP and ID clearance); PT rec WANG, however pt wishes to go home afterwards with home PT. Family is willing to take full responsibility for his care. CM/SW informed.
- H/o CAD, PAD, and AS s/p TAVR  - Cont home dose Toprol XL, and Atorvastatin  - DC Plavix as per cardio  - Hold aspirin and NSAIDs for 4 weeks (starting 04/17) as per GI recs i/s/o antral ulcers and gastropathy
- H/o CAD, PAD, and AS s/p TAVR  - Cont home dose Toprol XL, and Atorvastatin  - DC Plavix as per cardio  - Hold aspirin and NSAIDs for 4 weeks (starting 04/17) as per GI recs i/s/o antral ulcers and gastropathy

## 2025-04-17 NOTE — PROGRESS NOTE ADULT - SUBJECTIVE AND OBJECTIVE BOX
Division of Hospital Medicine  Radha Vang MD  Available via MS Teams    SUBJECTIVE / OVERNIGHT EVENTS: Pt c/o epigastric pain overnight which has since resolved; pt denies any other new symptoms    MEDICATIONS  (STANDING):  amLODIPine   Tablet 10 milliGRAM(s) Oral daily  atorvastatin 40 milliGRAM(s) Oral at bedtime  cefTRIAXone   IVPB 2000 milliGRAM(s) IV Intermittent every 24 hours  cefTRIAXone   IVPB      chlorhexidine 2% Cloths 1 Application(s) Topical daily  dextrose 5%. 1000 milliLiter(s) (50 mL/Hr) IV Continuous <Continuous>  dextrose 5%. 1000 milliLiter(s) (100 mL/Hr) IV Continuous <Continuous>  dextrose 50% Injectable 25 Gram(s) IV Push once  dextrose 50% Injectable 12.5 Gram(s) IV Push once  dextrose 50% Injectable 25 Gram(s) IV Push once  fluticasone propionate/ salmeterol 250-50 MICROgram(s) Diskus 1 Dose(s) Inhalation two times a day  furosemide    Tablet 40 milliGRAM(s) Oral daily  gabapentin 100 milliGRAM(s) Oral three times a day  glucagon  Injectable 1 milliGRAM(s) IntraMuscular once  heparin   Injectable 5000 Unit(s) SubCutaneous every 8 hours  hydrALAZINE 50 milliGRAM(s) Oral three times a day  insulin glargine Injectable (LANTUS) 3 Unit(s) SubCutaneous at bedtime  insulin lispro (ADMELOG) corrective regimen sliding scale   SubCutaneous three times a day before meals  insulin lispro (ADMELOG) corrective regimen sliding scale   SubCutaneous at bedtime  insulin lispro Injectable (ADMELOG) 2 Unit(s) SubCutaneous three times a day before meals  metoprolol succinate  milliGRAM(s) Oral daily  metroNIDAZOLE  IVPB 500 milliGRAM(s) IV Intermittent every 8 hours  pantoprazole    Tablet 40 milliGRAM(s) Oral every 12 hours  polyethylene glycol 3350 17 Gram(s) Oral daily  senna 2 Tablet(s) Oral at bedtime  sodium chloride 1 Gram(s) Oral three times a day    MEDICATIONS  (PRN):  acetaminophen     Tablet .. 650 milliGRAM(s) Oral every 6 hours PRN Temp greater or equal to 38C (100.4F), Mild Pain (1 - 3), Moderate Pain (4 - 6)  bisacodyl 5 milliGRAM(s) Oral every 12 hours PRN Constipation  dextrose Oral Gel 15 Gram(s) Oral once PRN Blood Glucose LESS THAN 70 milliGRAM(s)/deciliter      I&O's Summary    16 Apr 2025 07:01  -  17 Apr 2025 07:00  --------------------------------------------------------  IN: 0 mL / OUT: 700 mL / NET: -700 mL        PHYSICAL EXAM:  Vital Signs Last 24 Hrs  T(C): 36.4 (17 Apr 2025 12:20), Max: 36.9 (17 Apr 2025 05:30)  T(F): 97.6 (17 Apr 2025 12:20), Max: 98.4 (17 Apr 2025 05:30)  HR: 88 (17 Apr 2025 13:30) (71 - 88)  BP: 141/64 (17 Apr 2025 13:30) (141/64 - 148/78)  BP(mean): --  RR: 18 (17 Apr 2025 12:20) (18 - 18)  SpO2: 98% (17 Apr 2025 12:20) (96% - 100%)    Parameters below as of 17 Apr 2025 12:20  Patient On (Oxygen Delivery Method): room air      CONSTITUTIONAL: NAD  EYES: PERRLA; conjunctiva and sclera clear  ENMT: Moist oral mucosa, no pharyngeal injection or exudates; normal dentition  NECK: Supple, no palpable masses; no thyromegaly  RESPIRATORY: Normal respiratory effort; lungs are clear to auscultation bilaterally; no rales, rhonchi, or wheezing  CARDIOVASCULAR: Regular rate and rhythm, normal S1 and S2, no murmur/rub/gallop; Peripheral pulses are 2+ bilaterally  ABDOMEN: Nontender to palpation, normoactive bowel sounds, no rebound/guarding; No hepatosplenomegaly  MUSCULOSKELETAL: no clubbing or cyanosis of digits  NEUROLOGY: Awake and alert to person, place, and date; no focal neurological deficits   SKIN: No rashes; no palpable lesions    LABS:                        13.1   13.31 )-----------( 293      ( 17 Apr 2025 05:35 )             38.0     04-17    130[L]  |  95[L]  |  35[H]  ----------------------------<  277[H]  4.4   |  19[L]  |  1.33[H]    Ca    9.6      17 Apr 2025 05:35  Phos  3.1     04-17  Mg     1.90     04-17    TPro  6.9  /  Alb  3.6  /  TBili  0.4  /  DBili  x   /  AST  105[H]  /  ALT  67[H]  /  AlkPhos  145[H]  04-16      CARDIAC MARKERS ( 16 Apr 2025 20:12 )  x     / x     / x     / x     / 2.8 ng/mL      Urinalysis Basic - ( 17 Apr 2025 05:35 )    Color: x / Appearance: x / SG: x / pH: x  Gluc: 277 mg/dL / Ketone: x  / Bili: x / Urobili: x   Blood: x / Protein: x / Nitrite: x   Leuk Esterase: x / RBC: x / WBC x   Sq Epi: x / Non Sq Epi: x / Bacteria: x        SARS-CoV-2: NotDetec (10 Apr 2025 09:52)  SARS-CoV-2: NotDetec (08 Apr 2025 12:52)      Imaging and consultant notes reviewed.

## 2025-04-17 NOTE — PROGRESS NOTE ADULT - SUBJECTIVE AND OBJECTIVE BOX
[facility] - MIRIAM Beltran / MIRIAM Clemons / MICHELLE Martínez/ MIRIAM Hess/ MIRIAM Ogden/ SHELIA Peck / BRIAN Ordonez / LAMAR Solis / VIPIN Duvall  ---------------------------------------------------------------------------------------------------------------  seen and examined today for CKD  Interval : NAD  VITALS:  T(F): 97.4 (04-17-25 @ 08:20), Max: 98.4 (04-16-25 @ 12:46)  HR: 80 (04-17-25 @ 08:20)  BP: 147/78 (04-17-25 @ 08:20)  RR: 18 (04-17-25 @ 08:20)  SpO2: 97% (04-17-25 @ 08:20)  Wt(kg): --    04-16 @ 07:01  -  04-17 @ 07:00  --------------------------------------------------------  IN: 0 mL / OUT: 700 mL / NET: -700 mL      Physical Exam :-  Constitutional: NAD  Neck: Supple.  Respiratory: Bilateral equal breath sounds,  Cardiovascular: S1, S2 normal,  Gastrointestinal: Bowel Sounds present, soft, non tender.  Extremities: No edema  Neurological: Alert and Oriented x 3, no focal deficits  Psychiatric: Normal mood, normal affect  Data:-  Allergies :   No Known Allergies    Hospital Medications:   MEDICATIONS  (STANDING):  amLODIPine   Tablet 10 milliGRAM(s) Oral daily  atorvastatin 40 milliGRAM(s) Oral at bedtime  cefTRIAXone   IVPB      cefTRIAXone   IVPB 2000 milliGRAM(s) IV Intermittent every 24 hours  chlorhexidine 2% Cloths 1 Application(s) Topical daily  dextrose 5%. 1000 milliLiter(s) (50 mL/Hr) IV Continuous <Continuous>  dextrose 5%. 1000 milliLiter(s) (100 mL/Hr) IV Continuous <Continuous>  dextrose 50% Injectable 25 Gram(s) IV Push once  dextrose 50% Injectable 12.5 Gram(s) IV Push once  dextrose 50% Injectable 25 Gram(s) IV Push once  fluticasone propionate/ salmeterol 250-50 MICROgram(s) Diskus 1 Dose(s) Inhalation two times a day  furosemide    Tablet 40 milliGRAM(s) Oral daily  gabapentin 100 milliGRAM(s) Oral three times a day  glucagon  Injectable 1 milliGRAM(s) IntraMuscular once  heparin   Injectable 5000 Unit(s) SubCutaneous every 8 hours  hydrALAZINE 50 milliGRAM(s) Oral three times a day  insulin glargine Injectable (LANTUS) 3 Unit(s) SubCutaneous at bedtime  insulin lispro (ADMELOG) corrective regimen sliding scale   SubCutaneous three times a day before meals  insulin lispro (ADMELOG) corrective regimen sliding scale   SubCutaneous at bedtime  insulin lispro Injectable (ADMELOG) 2 Unit(s) SubCutaneous three times a day before meals  metoprolol succinate  milliGRAM(s) Oral daily  metroNIDAZOLE  IVPB 500 milliGRAM(s) IV Intermittent every 8 hours  pantoprazole    Tablet 40 milliGRAM(s) Oral every 12 hours  polyethylene glycol 3350 17 Gram(s) Oral daily  senna 2 Tablet(s) Oral at bedtime  sodium chloride 1 Gram(s) Oral three times a day    04-17    130[L]  |  95[L]  |  35[H]  ----------------------------<  277[H]  4.4   |  19[L]  |  1.33[H]    Ca    9.6      17 Apr 2025 05:35  Phos  3.1     04-17  Mg     1.90     04-17    TPro  6.9  /  Alb  3.6  /  TBili  0.4  /  DBili      /  AST  105[H]  /  ALT  67[H]  /  AlkPhos  145[H]  04-16    Creatinine Trend: 1.33 <--, 1.31 <--, 1.34 <--, 1.34 <--, 1.21 <--, 1.25 <--, 1.33 <--, 1.52 <--, 1.47 <--, 1.50 <--                        13.1   13.31 )-----------( 293      ( 17 Apr 2025 05:35 )             38.0

## 2025-04-17 NOTE — PROGRESS NOTE ADULT - ASSESSMENT
80 yo M with PMH of hypertension, hyperlipidemia, type 2 diabetes mellitus, CAD, PAD, AS s/p TAVR presented to the ER with recurrent fevers, chills, and malaise for 3 days.    #bacteremia- GNR Edwardsiella tarda  #choledocholithiasis   -  MRCP for evaluation of fever given transaminases and bacteremia. MRCP with Intrahepatic biliary duct dilatation in the left hepatic lobe with surrounding T2 signal abnormality, suggesting cholangitis. Small hepatic microabscesses in the left hepatic lobe. Dilated CBD with intraluminal stone/debris measuring 5 mm.    Recommendations:  -s/p ERCP   -advance diet   -antibiotics per primary team, recommend completion of 10d course (can be PO outpt)   -Await pathology results.  -Avoid aspirin and nonsteroidal anti-inflammatory medicines for 4 weeks.  -Use Pantoprazole 40 mg orally twice daily for 4 weeks then once daily.  -Recommend repeat EGD and ERCP in 2-3 months for stent removal and confirm ulcer healing.  -rest of care per primary team     All recommendations are tentative until note is attested by attending.     Nevaeh Bull, PGY-6  Gastroenterology/Hepatology Fellow  Available on [employer] Teams  [phone] ([facility] Short Range Pager)  [phone] ([facility] Long Range Pager)    On Weekends/Holidays (All day) and Weekdays after 5 PM to 8AM:  For non-urgent consults, please email [email or web address] or [email or web address]  For emergent consults, please contact on call GI team.  See Amion schedule ([facility]), [employer] paging system ([facility]), or call hospital  ([facility])        78 yo M with PMH of hypertension, hyperlipidemia, type 2 diabetes mellitus, CAD, PAD, AS s/p TAVR presented to the ER with recurrent fevers, chills, and malaise for 3 days.    #bacteremia- GNR Edwardsiella tarda  #choledocholithiasis   -  MRCP for evaluation of fever given transaminases and bacteremia. MRCP with Intrahepatic biliary duct dilatation in the left hepatic lobe with surrounding T2 signal abnormality, suggesting cholangitis. Small hepatic microabscesses in the left hepatic lobe. Dilated CBD with intraluminal stone/debris measuring 5 mm.    Recommendations:  -s/p ERCP   -advance diet   -antibiotics per primary team, recommend completion of 10d course (can be PO outpt)   -Await pathology results.  -Avoid aspirin and nonsteroidal anti-inflammatory medicines for 4 weeks.  -Use Pantoprazole 40 mg orally twice daily for 4 weeks then once daily.  -Recommend repeat EGD and ERCP in 2-3 months for stent removal and confirm ulcer healing.  -rest of care per primary team     No further recommendations from Gastroenterology at this point. Gastroenterology will sign off at this time. Please re-consult for any other further questions.     All recommendations are tentative until note is attested by attending.     Nevaeh Bull, PGY-6  Gastroenterology/Hepatology Fellow  Available on [employer] Teams  [phone] ([facility] Short Range Pager)  [phone] ([facility] Long Range Pager)    On Weekends/Holidays (All day) and Weekdays after 5 PM to 8AM:  For non-urgent consults, please email [email or web address] or [email or web address]  For emergent consults, please contact on call GI team.  See Amion schedule ([facility]), [employer] system ([facility]), or call hospital  ([facility])

## 2025-04-17 NOTE — PROVIDER CONTACT NOTE (OTHER) - SITUATION
patient c/o stomach/hunger pain
patient remains febrile.
pt c/o abd pain and chest pain
pt c/o epigastric pain

## 2025-04-17 NOTE — PROGRESS NOTE ADULT - PROBLEM SELECTOR PLAN 10
- DVT prophylaxis: Heparin subcutaneous 5000 units q8h  - Diet: Carbohydrate-consistent   - Activity: as tolerated  - Disposition: Pending clinical course; PT rec WANG, however pt wishes to go home afterwards with home PT. Family is willing to take full responsibility for his care. CM/SW informed.
- DVT prophylaxis: Heparin subcutaneous 5000 units q8h  - Diet: Carbohydrate-consistent   - Activity: as tolerated  - Disposition: Pending clinical course; PT rec WANG, however pt wishes to go home afterwards with home PT. Family is willing to take full responsibility for his care. CM/SW informed.

## 2025-04-17 NOTE — PROGRESS NOTE ADULT - PROBLEM SELECTOR PROBLEM 8
Preventive measure
Hyperlipidemia
Preventive measure
CAD (coronary artery disease)
Hyperlipidemia

## 2025-04-17 NOTE — PROGRESS NOTE ADULT - SUBJECTIVE AND OBJECTIVE BOX
Gastroenterology/Hepatology Progress Note    Interval Events:   - no acute ON events   - patient reports abd pain from yesterday, reports he is hungry    - Hgb stable     Allergies:  No Known Allergies      Hospital Medications:  acetaminophen     Tablet .. 650 milliGRAM(s) Oral every 6 hours PRN  amLODIPine   Tablet 10 milliGRAM(s) Oral daily  atorvastatin 40 milliGRAM(s) Oral at bedtime  bisacodyl 5 milliGRAM(s) Oral every 12 hours PRN  cefTRIAXone   IVPB      cefTRIAXone   IVPB 2000 milliGRAM(s) IV Intermittent every 24 hours  chlorhexidine 2% Cloths 1 Application(s) Topical daily  dextrose 5%. 1000 milliLiter(s) IV Continuous <Continuous>  dextrose 5%. 1000 milliLiter(s) IV Continuous <Continuous>  dextrose 50% Injectable 25 Gram(s) IV Push once  dextrose 50% Injectable 12.5 Gram(s) IV Push once  dextrose 50% Injectable 25 Gram(s) IV Push once  dextrose Oral Gel 15 Gram(s) Oral once PRN  fluticasone propionate/ salmeterol 250-50 MICROgram(s) Diskus 1 Dose(s) Inhalation two times a day  furosemide    Tablet 40 milliGRAM(s) Oral daily  gabapentin 100 milliGRAM(s) Oral three times a day  glucagon  Injectable 1 milliGRAM(s) IntraMuscular once  heparin   Injectable 5000 Unit(s) SubCutaneous every 8 hours  hydrALAZINE 50 milliGRAM(s) Oral three times a day  insulin glargine Injectable (LANTUS) 3 Unit(s) SubCutaneous at bedtime  insulin lispro (ADMELOG) corrective regimen sliding scale   SubCutaneous three times a day before meals  insulin lispro (ADMELOG) corrective regimen sliding scale   SubCutaneous at bedtime  insulin lispro Injectable (ADMELOG) 2 Unit(s) SubCutaneous three times a day before meals  meclizine 12.5 milliGRAM(s) Oral three times a day  metoprolol succinate  milliGRAM(s) Oral daily  metroNIDAZOLE  IVPB 500 milliGRAM(s) IV Intermittent every 8 hours  pantoprazole    Tablet 40 milliGRAM(s) Oral every 12 hours  polyethylene glycol 3350 17 Gram(s) Oral daily  senna 2 Tablet(s) Oral at bedtime      ROS: 14 point ROS negative unless otherwise state in subjective    PHYSICAL EXAM:   Vital Signs:  Vital Signs Last 24 Hrs  T(C): 36.9 (17 Apr 2025 05:30), Max: 36.9 (16 Apr 2025 12:46)  T(F): 98.4 (17 Apr 2025 05:30), Max: 98.4 (16 Apr 2025 12:46)  HR: 80 (17 Apr 2025 05:30) (70 - 83)  BP: 148/78 (17 Apr 2025 05:30) (130/71 - 174/75)  BP(mean): --  RR: 18 (17 Apr 2025 05:30) (18 - 22)  SpO2: 100% (17 Apr 2025 05:30) (95% - 100%)    Parameters below as of 17 Apr 2025 05:30  Patient On (Oxygen Delivery Method): room air      Daily     Daily     GENERAL:  No acute distress  HEENT:  NCAT, no scleral icterus   CHEST:  no respiratory distress  HEART:  Regular rate and rhythm  ABDOMEN:  Soft, non-tender, non-distended, normoactive bowel sounds,  no masses  EXTREMITIES: No edema  SKIN:  No rash/erythema/ecchymoses/petechiae/wounds/abscess/warm/dry  NEURO:  Alert and oriented x 3, no asterixis    LABS:                        13.1   13.31 )-----------( 293      ( 17 Apr 2025 05:35 )             38.0     Mean Cell Volume: 81.7 fL (04-17-25 @ 05:35)    04-17    130[L]  |  95[L]  |  35[H]  ----------------------------<  277[H]  4.4   |  19[L]  |  1.33[H]    Ca    9.6      17 Apr 2025 05:35  Phos  3.1     04-17  Mg     1.90     04-17    TPro  6.9  /  Alb  3.6  /  TBili  0.4  /  DBili  x   /  AST  105[H]  /  ALT  67[H]  /  AlkPhos  145[H]  04-16    LIVER FUNCTIONS - ( 16 Apr 2025 05:13 )  Alb: 3.6 g/dL / Pro: 6.9 g/dL / ALK PHOS: 145 U/L / ALT: 67 U/L / AST: 105 U/L / GGT: x             Urinalysis Basic - ( 17 Apr 2025 05:35 )    Color: x / Appearance: x / SG: x / pH: x  Gluc: 277 mg/dL / Ketone: x  / Bili: x / Urobili: x   Blood: x / Protein: x / Nitrite: x   Leuk Esterase: x / RBC: x / WBC x   Sq Epi: x / Non Sq Epi: x / Bacteria: x            Imaging:

## 2025-04-17 NOTE — PROGRESS NOTE ADULT - PROBLEM SELECTOR PLAN 5
A1c 6.4  Good glycemic control.  -Correctional insulin TID AC HS.  -Resume home meds on DC.  -CC diet.    #Thyroid nodule  -Incidental detection on CT.  -F/u as OP.
A1c 6.4  Good glycemic control.  -Correctional insulin TIDACHS.  -Resume home meds on DC.  -CC diet.
- Elevated BUN / Cr: 33/1.83 on admission i/s/o underlying sepsis, medication (Valsartan) use, and CKD stage 3A (Baseline Cr 1.5 in 01/2025)  - Monitor BMP daily, trend Cr  - Nephro recs appreciated.    #Orthopnea  -Initially with mild JVD. No peripheral edema.  -S/p Lasix 40 IVP x1 on 04/13. Started Lasix 40 mg PO daily on 04/14 as per Cardio recs.
- Elevated BUN / Cr: 33/1.83 on admission i/s/o underlying sepsis, medication (Valsartan) use, and CKD stage 3A (Baseline Cr 1.5 in 01/2025)  - Monitor BMP daily, trend Cr  - Nephro recs appreciated.    #Orthopnea  -Initially with mild JVD. No peripheral edema.  -S/p Lasix 40 IVP x1 on 04/13. Started Lasix 40 mg PO daily on 04/14 as per Cardio recs.
A1c 6.4  Good glycemic control.  -Correctional insulin TIDACHS.  -Resume home meds on DC.  -CC diet.    #Thyroid nodule  -Incidental detection on CT.  -F/u as OP.
A1c 6.4  Good glycemic control.  -Correctional insulin TIDACHS.  -Resume home meds on DC.  -CC diet.    #Thyroid nodule  -Incidental detection on CT.  -F/u as OP.
Stable.  - C/w home dose Metoprolol succinate 100 mg daily  - C/w home dose Amlodipine 10 mg daily  - C/w home dose Hydralazine 50 mg q8h  - Hold home Valsartan 160 mg daily    #Dizziness  Improved.  -Likely BPPV. Reviewed Surescript - pt has gotten meclizine recently.  -Cont meclizine 12.5mg TID ATC. Reassess daily.    #Constipation  -C/w Senna and Miralax

## 2025-04-17 NOTE — PROGRESS NOTE ADULT - ATTENDING COMMENTS
Doing well. PPI 40mg BID for 4 weeks. Follow up in 2 months for repeat ERCP stent removal.
79M with CAD, TAVR, intermittent travel to [facility] with fever / weakness, non-specific complaints.  Negative work up to date.  CT C/A/P (-).  Echo (-).  HIV (-).  Dengue IgG (+).  No hx TB exposure.  Forehead rash?  Molluscum?    Fever, non-localizing  - if fever continues would pursue tagged wbc  - consider BALJINDER  - f/u malaria repeat smear, lepto serologies  - on empiric doxycycline  - f/u all cultures  - fungal blood cultures    Please call the ID service [phone] with questions or concerns over the weekend

## 2025-04-17 NOTE — PROGRESS NOTE ADULT - ASSESSMENT
80 yo M with PMH of hypertension, hyperlipidemia, type 2 diabetes mellitus, CAD, PAD, AS s/p TAVR  presented to the ER with fevers, chills, and malaise and generalized body aches for 3 days starting 04/07/25. Pt was seen in the ER 2 days ago with similar complaints, and was thought to have a viral illness and was discharged home. His symptoms didn't improve and felt worse, more fatigued, and c/o dry cough. He otherwise denies SOB, N/V, abdominal pain, chest pain, back pain, urinary complaints, joint pain, oral ulcers or other acute symptoms.     The patient is originally from [place]. Moved to the  30 years ago. Lives with his wife. Reports social alcohol use, no smoking, no drug use. No pets at home. Travels back to [place] often, most recently was in [place] for a month and [place] the [place] on 04[place]05[place]25. Stayed at home and denies exposure to freshwater water. Reports no exposure to cattle or other animals, Denies mosquito bites and exposure to other insects. 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 Reports no exposure to anyone with active TB, no personal history of latent TB. Reports recent dental procedure 4 weeks ago before traveling to [place].     In the ED, vital signs were significant for 101F fever and tachycardia 102 bpm. Labs reviewed showed no leucocytosis, bands 9%, CXR clear, UA negative, RVP negative, but were remarkable for elevated lactate 2.6, elevated , procal 1.70, ESR: 70, mildly elevated LFTs AST/ALT 82/41, ALP: normal, Bili: normal. A1c: 6.4, creat 1.54--, Acute hepatitis panel negative. CXR and CT Chest: Unremarkable with no evidence of consolidation, effusion, or pneumothorax. US kidneys 04/10-   Mildly echogenic kidneys. No hydronephrosis. TTE was Technically difficult image quality.  A transcatheter deployed (TAVR) is present in the aortic position. CTAP non contrast- with no acute findings.     Now Bcx from 04/10 with 1/4 GNR- Edwardsiella tarda bacteremia.   MRCP with Intrahepatic biliary duct dilatation in the left hepatic lobe with   surrounding T2 signal abnormality, suggesting cholangitis.  Small hepatic microabscesses in the left hepatic lobe.    he is now S/P ERCP on 04/15 with biliary stent in place.     MICRO:   04/13 BCX- NGTD  04/12 HIV- negative   04/11, 04/12, 04/13 Blood parasite smear- negative   04/10 full RVP: negative   04/10 Ucx- negative   04/10 Bcx- 1/4 GNR- Edwardsiella tarda ( pan sensitive)     04/09 BCX- NGTD  04/08 Full RVP: negative   04/08 BCX- ngtd      # GNR bacteremia   # Choledocholithiasis s/p ERCP 04/16 with Biliary stent   # Small hepatic microabscesses in the left hepatic lobe  # Fevers - now resolved   # mildly elevated LFTs - trending down   # S/P TAVR   # SAMARIA on CKD   # Elevated inflammatory markers     Recommendations:   - Continue CTX 2 gm Q24H and PO Metronidazole 500 mg Q12H while inpatient   - Monitor LFTs closely   - Will plan for a 10 day course of antibiotics from 04/16, when planned for discharge can give PO Levaquin 750 mg Q24H and PO Metronidazole 500 mg Q12H to complete the course. QTc 04/09- 446 ms)  - F/U GI outpatient.     In addition to reviewing history, imaging, documents, labs, microbiology, took into account antibiotic stewardship, local antibiogram and infection control strategies and potential transmission issues.    Discussed with the primary team.   ID will sign off today. Thank you for the consultation. Please feel free to reach out with any questions or concerns.   Inpatient ID consult team will discontinue active follow-up for this patient.      Marleni Escobar MD  Attending Physician, Division of Infectious Diseases  Department of Medicine   [facility]    Contact on TEAMS messaging from 9am - 5pm  Office: [phone] (after 5 PM or weekend)

## 2025-04-17 NOTE — PROVIDER CONTACT NOTE (MEDICATION) - BACKGROUND
80 yo M with PMH of hypertension, hyperlipidemia, type 2 diabetes mellitus (6.4%), CAD, PAD, AS s/p TAVR presented to the ER with recurrent fevers, chills, and malaise for 3 days. Admitted to Medicine for sepsis of unclear etiology.

## 2025-04-17 NOTE — PROGRESS NOTE ADULT - PROBLEM SELECTOR PROBLEM 3
Transaminitis
Transaminitis
Acute cholangitis
Acute kidney injury superimposed on CKD
Transaminitis
Transaminitis
Acute cholangitis

## 2025-04-17 NOTE — PROGRESS NOTE ADULT - PROBLEM SELECTOR PLAN 7
- H/o CAD, PAD, and AS s/p TAVR  - Cont dose ASA,Toprol XL, and Atorvastatin  - DC Plavix as per cardio.
FLP reviewed.  - Continue Atorvastatin 40 mg daily at bedtime
- H/o CAD, PAD, and AS s/p TAVR  - Cont dose ASA,Toprol XL, and Atorvastatin  - DC Plavix as per cardio.
A1c 6.4  Good glycemic control.  -Correctional insulin TID AC HS.  -Resume home meds on DC.  -CC diet.    #Thyroid nodule  -Incidental detection on CT.  -F/u as OP.
A1c 6.4  Good glycemic control.  -Correctional insulin TID AC HS.  -Resume home meds on DC.  -CC diet.    #Thyroid nodule  -Incidental detection on CT.  -F/u as OP.

## 2025-04-18 ENCOUNTER — TRANSCRIPTION ENCOUNTER (OUTPATIENT)
Age: 80
End: 2025-04-18

## 2025-04-18 VITALS
SYSTOLIC BLOOD PRESSURE: 143 MMHG | TEMPERATURE: 98 F | HEART RATE: 69 BPM | DIASTOLIC BLOOD PRESSURE: 82 MMHG | RESPIRATION RATE: 18 BRPM | OXYGEN SATURATION: 98 %

## 2025-04-18 LAB
ANION GAP SERPL CALC-SCNC: 14 MMOL/L — SIGNIFICANT CHANGE UP (ref 7–14)
BUN SERPL-MCNC: 34 MG/DL — HIGH (ref 7–23)
CALCIUM SERPL-MCNC: 9.2 MG/DL — SIGNIFICANT CHANGE UP (ref 8.4–10.5)
CHLORIDE SERPL-SCNC: 98 MMOL/L — SIGNIFICANT CHANGE UP (ref 98–107)
CO2 SERPL-SCNC: 22 MMOL/L — SIGNIFICANT CHANGE UP (ref 22–31)
CREAT SERPL-MCNC: 1.23 MG/DL — SIGNIFICANT CHANGE UP (ref 0.5–1.3)
CULTURE RESULTS: SIGNIFICANT CHANGE UP
CULTURE RESULTS: SIGNIFICANT CHANGE UP
EGFR: 59 ML/MIN/1.73M2 — LOW
EGFR: 59 ML/MIN/1.73M2 — LOW
GLUCOSE BLDC GLUCOMTR-MCNC: 247 MG/DL — HIGH (ref 70–99)
GLUCOSE BLDC GLUCOMTR-MCNC: 271 MG/DL — HIGH (ref 70–99)
GLUCOSE SERPL-MCNC: 232 MG/DL — HIGH (ref 70–99)
HCT VFR BLD CALC: 38.2 % — LOW (ref 39–50)
HGB BLD-MCNC: 13.1 G/DL — SIGNIFICANT CHANGE UP (ref 13–17)
LEPTOSPIRA DNA, QUAL PCR RESULT: SIGNIFICANT CHANGE UP
MCHC RBC-ENTMCNC: 28.1 PG — SIGNIFICANT CHANGE UP (ref 27–34)
MCHC RBC-ENTMCNC: 34.3 G/DL — SIGNIFICANT CHANGE UP (ref 32–36)
MCV RBC AUTO: 81.8 FL — SIGNIFICANT CHANGE UP (ref 80–100)
NRBC # BLD AUTO: 0 K/UL — SIGNIFICANT CHANGE UP (ref 0–0)
NRBC # FLD: 0 K/UL — SIGNIFICANT CHANGE UP (ref 0–0)
NRBC BLD AUTO-RTO: 0 /100 WBCS — SIGNIFICANT CHANGE UP (ref 0–0)
PLATELET # BLD AUTO: 286 K/UL — SIGNIFICANT CHANGE UP (ref 150–400)
POTASSIUM SERPL-MCNC: 3.9 MMOL/L — SIGNIFICANT CHANGE UP (ref 3.5–5.3)
POTASSIUM SERPL-SCNC: 3.9 MMOL/L — SIGNIFICANT CHANGE UP (ref 3.5–5.3)
RBC # BLD: 4.67 M/UL — SIGNIFICANT CHANGE UP (ref 4.2–5.8)
RBC # FLD: 13.6 % — SIGNIFICANT CHANGE UP (ref 10.3–14.5)
SODIUM SERPL-SCNC: 134 MMOL/L — LOW (ref 135–145)
SPECIMEN SOURCE.: SIGNIFICANT CHANGE UP
SPECIMEN SOURCE: SIGNIFICANT CHANGE UP
SURGICAL PATHOLOGY STUDY: SIGNIFICANT CHANGE UP
WBC # BLD: 9.66 K/UL — SIGNIFICANT CHANGE UP (ref 3.8–10.5)
WBC # FLD AUTO: 9.66 K/UL — SIGNIFICANT CHANGE UP (ref 3.8–10.5)

## 2025-04-18 PROCEDURE — 99239 HOSP IP/OBS DSCHRG MGMT >30: CPT

## 2025-04-18 RX ORDER — ASPIRIN 325 MG
1 TABLET ORAL
Refills: 0 | DISCHARGE

## 2025-04-18 RX ORDER — POLYETHYLENE GLYCOL 3350 17 G/17G
17 POWDER, FOR SOLUTION ORAL
Qty: 510 | Refills: 0
Start: 2025-04-18 | End: 2025-05-17

## 2025-04-18 RX ORDER — FUROSEMIDE 10 MG/ML
1 INJECTION INTRAMUSCULAR; INTRAVENOUS
Qty: 30 | Refills: 0
Start: 2025-04-18 | End: 2025-05-17

## 2025-04-18 RX ORDER — METRONIDAZOLE 250 MG
1 TABLET ORAL
Qty: 16 | Refills: 0
Start: 2025-04-18 | End: 2025-04-25

## 2025-04-18 RX ORDER — LEVOFLOXACIN 25 MG/ML
1 SOLUTION ORAL
Qty: 8 | Refills: 0
Start: 2025-04-18 | End: 2025-04-25

## 2025-04-18 RX ORDER — SENNA 187 MG
2 TABLET ORAL
Qty: 60 | Refills: 0
Start: 2025-04-18 | End: 2025-05-17

## 2025-04-18 RX ORDER — MEMANTINE HYDROCHLORIDE 21 MG/1
1 CAPSULE, EXTENDED RELEASE ORAL
Refills: 0 | DISCHARGE

## 2025-04-18 RX ORDER — HYDROCHLOROTHIAZIDE 50 MG/1
1 TABLET ORAL
Refills: 0 | DISCHARGE

## 2025-04-18 RX ADMIN — POLYETHYLENE GLYCOL 3350 17 GRAM(S): 17 POWDER, FOR SOLUTION ORAL at 13:35

## 2025-04-18 RX ADMIN — CEFTRIAXONE 100 MILLIGRAM(S): 500 INJECTION, POWDER, FOR SOLUTION INTRAMUSCULAR; INTRAVENOUS at 13:48

## 2025-04-18 RX ADMIN — INSULIN LISPRO 2 UNIT(S): 100 INJECTION, SOLUTION INTRAVENOUS; SUBCUTANEOUS at 13:34

## 2025-04-18 RX ADMIN — Medication 100 MILLIGRAM(S): at 05:40

## 2025-04-18 RX ADMIN — Medication 50 MILLIGRAM(S): at 05:39

## 2025-04-18 RX ADMIN — Medication 50 MILLIGRAM(S): at 13:35

## 2025-04-18 RX ADMIN — Medication 1 DOSE(S): at 13:49

## 2025-04-18 RX ADMIN — GABAPENTIN 100 MILLIGRAM(S): 400 CAPSULE ORAL at 13:33

## 2025-04-18 RX ADMIN — INSULIN LISPRO 3: 100 INJECTION, SOLUTION INTRAVENOUS; SUBCUTANEOUS at 13:34

## 2025-04-18 RX ADMIN — INSULIN LISPRO 2 UNIT(S): 100 INJECTION, SOLUTION INTRAVENOUS; SUBCUTANEOUS at 09:15

## 2025-04-18 RX ADMIN — METOPROLOL SUCCINATE 100 MILLIGRAM(S): 50 TABLET, EXTENDED RELEASE ORAL at 05:39

## 2025-04-18 RX ADMIN — GABAPENTIN 100 MILLIGRAM(S): 400 CAPSULE ORAL at 05:39

## 2025-04-18 RX ADMIN — FUROSEMIDE 40 MILLIGRAM(S): 10 INJECTION INTRAMUSCULAR; INTRAVENOUS at 05:39

## 2025-04-18 RX ADMIN — HEPARIN SODIUM 5000 UNIT(S): 1000 INJECTION INTRAVENOUS; SUBCUTANEOUS at 13:34

## 2025-04-18 RX ADMIN — HEPARIN SODIUM 5000 UNIT(S): 1000 INJECTION INTRAVENOUS; SUBCUTANEOUS at 05:40

## 2025-04-18 RX ADMIN — Medication 1 GRAM(S): at 05:40

## 2025-04-18 RX ADMIN — Medication 1 APPLICATION(S): at 13:48

## 2025-04-18 RX ADMIN — AMLODIPINE BESYLATE 10 MILLIGRAM(S): 10 TABLET ORAL at 05:39

## 2025-04-18 RX ADMIN — Medication 1 GRAM(S): at 13:35

## 2025-04-18 RX ADMIN — INSULIN LISPRO 2: 100 INJECTION, SOLUTION INTRAVENOUS; SUBCUTANEOUS at 09:16

## 2025-04-18 RX ADMIN — Medication 40 MILLIGRAM(S): at 05:40

## 2025-04-18 RX ADMIN — Medication 100 MILLIGRAM(S): at 13:48

## 2025-04-18 NOTE — DISCHARGE NOTE NURSING/CASE MANAGEMENT/SOCIAL WORK - NSDCPEFALRISK_GEN_ALL_CORE
For information on Fall & Injury Prevention, visit: [email or web address] OR  [email or web address] OR  https://www.cdc.gov/steadi/patient.html

## 2025-04-18 NOTE — PROGRESS NOTE ADULT - SUBJECTIVE AND OBJECTIVE BOX
Patient was seen and examined today.     Please see discharge note for full discharge details.     Patient is clinically stable for discharge home today.

## 2025-04-18 NOTE — DISCHARGE NOTE NURSING/CASE MANAGEMENT/SOCIAL WORK - FINANCIAL ASSISTANCE
[facility] provides services at a reduced cost to those who are determined to be eligible through [facility]’s financial assistance program. Information regarding [facility]’s financial assistance program can be found by going to [email or web address] or by calling [phone].

## 2025-04-18 NOTE — PROGRESS NOTE ADULT - REASON FOR ADMISSION
Fever
Fever of unknown origin
Sepsis 2/2 GNR bacteremia; Cholangitis
Fever of unknown origin
GNR bacteremia

## 2025-04-18 NOTE — PROGRESS NOTE ADULT - ASSESSMENT
80 yo male with hx of HTN, HLD, T2DM, CAD, s/p TAVR, PAD comes back to the ED after he was seen yesterday for fever and general malaise. He was dc home yesterday and he has nirali taking tylenol every 4 hours but continues to be febrile.   Renal following for CKD Mx.    CKD 3a, stable  Creatinine Trend: 1.23 <--, 1.33 <--, 1.31 <--, 1.34 <--, 1.34 <--, 1.21 <--, 1.25 <--, 1.33 <--, 1.52 <--  serum creatinine at baseline  K, vol ok  bp stable  plan:  c/w furosemide Tablet 40 milliGRAM(s) Oral daily  no acute renal intervention at this time  BMP QD    s/p Hypokalemia -K better  mag ok   DASH diet  prn supplementation    sepsis  S/P ERCP  blood cx -Edwardsiella Tarda  on Ceftriaxone and Metronidazole per ID    stable for d/c renal stand point  poc d/w pt  labs, chart reviewed  For any question, pl call:  Nephrology  Cell -[phone]  Office [phone]  Ans Serv [phone]

## 2025-04-18 NOTE — PROGRESS NOTE ADULT - PROVIDER SPECIALTY LIST ADULT
Cardiology
Cardiology
Infectious Disease
Infectious Disease
Nephrology
Nephrology
Cardiology
Hospitalist
Hospitalist
Infectious Disease
Infectious Disease
Nephrology
Anesthesia
Cardiology
Cardiology
Infectious Disease
Nephrology
Gastroenterology
Nephrology
Hospitalist

## 2025-04-18 NOTE — DISCHARGE NOTE NURSING/CASE MANAGEMENT/SOCIAL WORK - PATIENT PORTAL LINK FT
You can access the FollowMyHealth Patient Portal offered by [facility] by registering at the following website: [email or web address]. By joining [employer]’s FollowMyHealth portal, you will also be able to view your health information using other applications (apps) compatible with our system.

## 2025-04-18 NOTE — PROGRESS NOTE ADULT - SUBJECTIVE AND OBJECTIVE BOX
DATE OF SERVICE: 04-18-25    Patient denies chest pain or shortness of breath.   Review of symptoms otherwise negative.    MEDICATIONS:  acetaminophen     Tablet .. 650 milliGRAM(s) Oral every 6 hours PRN  amLODIPine   Tablet 10 milliGRAM(s) Oral daily  atorvastatin 40 milliGRAM(s) Oral at bedtime  bisacodyl 5 milliGRAM(s) Oral every 12 hours PRN  cefTRIAXone   IVPB      cefTRIAXone   IVPB 2000 milliGRAM(s) IV Intermittent every 24 hours  chlorhexidine 2% Cloths 1 Application(s) Topical daily  dextrose 5%. 1000 milliLiter(s) IV Continuous <Continuous>  dextrose 5%. 1000 milliLiter(s) IV Continuous <Continuous>  dextrose 50% Injectable 25 Gram(s) IV Push once  dextrose 50% Injectable 12.5 Gram(s) IV Push once  dextrose 50% Injectable 25 Gram(s) IV Push once  dextrose Oral Gel 15 Gram(s) Oral once PRN  fluticasone propionate/ salmeterol 250-50 MICROgram(s) Diskus 1 Dose(s) Inhalation two times a day  furosemide    Tablet 40 milliGRAM(s) Oral daily  gabapentin 100 milliGRAM(s) Oral three times a day  glucagon  Injectable 1 milliGRAM(s) IntraMuscular once  heparin   Injectable 5000 Unit(s) SubCutaneous every 8 hours  hydrALAZINE 50 milliGRAM(s) Oral three times a day  insulin glargine Injectable (LANTUS) 3 Unit(s) SubCutaneous at bedtime  insulin lispro (ADMELOG) corrective regimen sliding scale   SubCutaneous three times a day before meals  insulin lispro (ADMELOG) corrective regimen sliding scale   SubCutaneous at bedtime  insulin lispro Injectable (ADMELOG) 2 Unit(s) SubCutaneous three times a day before meals  metoprolol succinate  milliGRAM(s) Oral daily  metroNIDAZOLE  IVPB 500 milliGRAM(s) IV Intermittent every 8 hours  pantoprazole    Tablet 40 milliGRAM(s) Oral every 12 hours  polyethylene glycol 3350 17 Gram(s) Oral daily  senna 2 Tablet(s) Oral at bedtime  sodium chloride 1 Gram(s) Oral three times a day      LABS:                        13.1   9.66  )-----------( 286      ( 18 Apr 2025 05:54 )             38.2       Hemoglobin: 13.1 g/dL (04-18 @ 05:54)  Hemoglobin: 13.1 g/dL (04-17 @ 05:35)  Hemoglobin: 12.7 g/dL (04-16 @ 05:13)  Hemoglobin: 12.5 g/dL (04-15 @ 05:58)  Hemoglobin: 13.6 g/dL (04-14 @ 07:03)      04-18    134[L]  |  98  |  34[H]  ----------------------------<  232[H]  3.9   |  22  |  1.23    Ca    9.2      18 Apr 2025 05:54  Phos  3.1     04-17  Mg     1.90     04-17      Creatinine Trend: 1.23<--, 1.33<--, 1.31<--, 1.34<--, 1.34<--, 1.21<--    COAGS:     CARDIAC MARKERS ( 16 Apr 2025 20:12 )  x     / x     / x     / x     / 2.8 ng/mL        PHYSICAL EXAM:  T(C): 36.7 (04-18-25 @ 12:37), Max: 36.8 (04-17-25 @ 17:43)  HR: 68 (04-18-25 @ 12:37) (68 - 83)  BP: 145/85 (04-18-25 @ 12:37) (122/60 - 145/85)  RR: 17 (04-18-25 @ 12:37) (17 - 18)  SpO2: 98% (04-18-25 @ 12:37) (98% - 100%)  Wt(kg): --    I&O's Summary    17 Apr 2025 07:01  -  18 Apr 2025 07:00  --------------------------------------------------------  IN: 0 mL / OUT: 800 mL / NET: -800 mL      General:  Alert and Oriented * 3.   Head: Normocephalic and atraumatic.   Neck: No JVD. No bruits. Supple. Does not appear to be enlarged.   Cardiovascular: + S1,S2 ; RRR Soft systolic murmur at the left lower sternal border. No rubs noted.    Lungs: CTA b/l. No rhonchi, rales or wheezes.   Abdomen: + BS, soft. Non tender. Non distended. No rebound. No guarding.   Extremities: No clubbing/cyanosis/edema.   Neurologic: Moves all four extremities. Full range of motion.   Skin: Warm and moist. The patient's skin has normal elasticity and good skin turgor.   Psychiatric: Appropriate mood and affect.  Musculoskeletal: Normal range of motion, normal strength     TELEMETRY: NSR	      ECG:  	NSR    RADIOLOGY:    < from: CT Chest No Cont (04.10.25 @ 02:08) >  IMPRESSION:  No CT evidence of pneumonia, pulmonary edema, or pleural effusion.    The patient is status post TAVR.  Stable aneurysmal dilatation of the mid   ascending thoracic aorta, to approximately 4.2 cm in transverse caliber.    Approximately 1.2 cm thyroid nodule.  In the absence of a family history   or risk factors for thyroid cancer, the American College of Radiology   does not recommend routine follow-up of incidental thyroid nodules   measuring less than 1.5 cm in this age group.    < end of copied text >      < from: TTE W or WO Ultrasound Enhancing Agent (04.10.25 @ 08:47)   CONCLUSIONS:   1. Left ventricular systolic function is normal.   2. The right ventricle is not well visualized.   3. A atranscatheter deployed (TAVR) is present in the aortic position. The peak transaortic velocity is 1.42 m/s, peak transaortic gradient is 8.0 mmHg and mean transaortic gradient is 4.1 mmHg with an LVOT/aortic valve VTI ratio of 0.62.   4. No pericardial effusion seen.   5. Technically difficult image quality.  < end of copied text >      < from: Cardiac Catheterization (06.22.23 @ 17:04) >  The coronary circulation is right dominant. Cardiac catheterizationwas performed electively.  LM   Left main artery: Angiography shows minor irregularities.    LAD   Proximal left anterior descending: The segment is moderately ectatic.Angiography shows moderate atherosclerosis. There is a40 % stenosis.    CX   Circumflex: Angiography shows minor irregularities.    RCA   Right coronary artery: Angiography shows minor irregularities.        ASSESSMENT/PLAN: Pt is an 79 year old male with  a past medical history of hypertension, hypercholesterolemia, diabetes, chronic renal insufficiency, peripheral vascular disease, bioprosthetic AVR in 2009 status post TAVR of his bioprosthetic AVR in July 2023, with known venous insufficiency status post Varithena closure of the left great saphenous vein presented to the ER with recurrent fevers, chills, and malaise for 3 days. Found with Edwardsiella Tarda bacteremia.    HTN/HLD/TAVR/CAD/TAA  - c/w ASA and statin, has h/o of moderate non obstructive CAD  - c/w BB for stable TAA  - c/w Amlodipine and hydralazine  - c/w ASA, discussed with Dr. Coffman will take off Plavix  - abx per ID, s/p MRCP   - Cont Lasix 40mg PO daily, s/p IV Lasix x 1 for volume overload, now Euvolemic  - pre-op ERCP today, pt with recent echo as above with Normal LV systolic function, with well seated TAVR, has no unstable anginal symptoms or decompensated CHF on exam.  Optimized from CV perspective for procedure.  - s/p ERCP  well tolerated from CV perspective      Albert eSrna MD  Pager: [phone]  Office: [phone]

## 2025-04-18 NOTE — PROGRESS NOTE ADULT - SUBJECTIVE AND OBJECTIVE BOX
[facility] - S Devin / Aric S /D Mauricio/ S Jimena/ KAREL Ogden/ Celestine Peck / GIDEON Metcalfu/ O Will  service -[phone], office [phone]  ---------------------------------------------------------------------------------------------------------------    Patient seen and examined bedside    Subjective and Objective: No overnight events, denied sob. No complaints today. feeling better    Allergies: No Known Allergies      Hospital Medications:   MEDICATIONS  (STANDING):  amLODIPine   Tablet 10 milliGRAM(s) Oral daily  atorvastatin 40 milliGRAM(s) Oral at bedtime  cefTRIAXone   IVPB      cefTRIAXone   IVPB 2000 milliGRAM(s) IV Intermittent every 24 hours  chlorhexidine 2% Cloths 1 Application(s) Topical daily  dextrose 5%. 1000 milliLiter(s) (50 mL/Hr) IV Continuous <Continuous>  dextrose 5%. 1000 milliLiter(s) (100 mL/Hr) IV Continuous <Continuous>  dextrose 50% Injectable 25 Gram(s) IV Push once  dextrose 50% Injectable 12.5 Gram(s) IV Push once  dextrose 50% Injectable 25 Gram(s) IV Push once  fluticasone propionate/ salmeterol 250-50 MICROgram(s) Diskus 1 Dose(s) Inhalation two times a day  furosemide    Tablet 40 milliGRAM(s) Oral daily  gabapentin 100 milliGRAM(s) Oral three times a day  glucagon  Injectable 1 milliGRAM(s) IntraMuscular once  heparin   Injectable 5000 Unit(s) SubCutaneous every 8 hours  hydrALAZINE 50 milliGRAM(s) Oral three times a day  insulin glargine Injectable (LANTUS) 3 Unit(s) SubCutaneous at bedtime  insulin lispro (ADMELOG) corrective regimen sliding scale   SubCutaneous three times a day before meals  insulin lispro (ADMELOG) corrective regimen sliding scale   SubCutaneous at bedtime  insulin lispro Injectable (ADMELOG) 2 Unit(s) SubCutaneous three times a day before meals  metoprolol succinate  milliGRAM(s) Oral daily  metroNIDAZOLE  IVPB 500 milliGRAM(s) IV Intermittent every 8 hours  pantoprazole    Tablet 40 milliGRAM(s) Oral every 12 hours  polyethylene glycol 3350 17 Gram(s) Oral daily  senna 2 Tablet(s) Oral at bedtime  sodium chloride 1 Gram(s) Oral three times a day    VITALS:  T(F): 98 (04-18-25 @ 12:37), Max: 98.2 (04-17-25 @ 17:43)  HR: 68 (04-18-25 @ 12:37)  BP: 145/85 (04-18-25 @ 12:37)  RR: 17 (04-18-25 @ 12:37)  SpO2: 98% (04-18-25 @ 12:37)  Wt(kg): --    04-17 @ 07:01  -  04-18 @ 07:00  --------------------------------------------------------  IN: 0 mL / OUT: 800 mL / NET: -800 mL    PHYSICAL EXAM:  Constitutional: NAD  HEENT: anicteric sclera  Neck: No JVD  Respiratory: CTAB, no wheezes, rales or rhonchi  Cardiovascular: S1, S2, RRR  Gastrointestinal: BS+, soft, NT  Extremities: No peripheral edema  Neurological: A/O x 3  Psychiatric: Normal mood, normal affect  : No velasco.     LABS:  04-18    134[L]  |  98  |  34[H]  ----------------------------<  232[H]  3.9   |  22  |  1.23    Ca    9.2      18 Apr 2025 05:54  Phos  3.1     04-17  Mg     1.90     04-17      Creatinine Trend: 1.23 <--, 1.33 <--, 1.31 <--, 1.34 <--, 1.34 <--, 1.21 <--, 1.25 <--, 1.33 <--, 1.52 <--                        13.1   9.66  )-----------( 286      ( 18 Apr 2025 05:54 )             38.2     Urine Studies:  Urinalysis Basic - ( 18 Apr 2025 05:54 )    Color:  / Appearance:  / SG:  / pH:   Gluc: 232 mg/dL / Ketone:   / Bili:  / Urobili:    Blood:  / Protein:  / Nitrite:    Leuk Esterase:  / RBC:  / WBC    Sq Epi:  / Non Sq Epi:  / Bacteria:       Sodium, Random Urine: <20 mmol/L (04-11 @ 17:22)  Potassium, Random Urine: 28.3 mmol/L (04-11 @ 17:22)  Chloride, Random Urine: <20 mmol/L (04-11 @ 17:22)      RADIOLOGY & ADDITIONAL STUDIES:

## 2025-04-19 ENCOUNTER — NON-APPOINTMENT (OUTPATIENT)
Age: 80
End: 2025-04-19

## 2025-04-19 DIAGNOSIS — K80.50 CALCULUS OF BILE DUCT W/OUT CHOLANGITIS OR CHOLECYSTITIS W/OUT OBSTRUCTION: ICD-10-CM

## 2025-04-19 DIAGNOSIS — R78.81 BACTEREMIA: ICD-10-CM

## 2025-04-20 PROBLEM — K80.50 CHOLEDOCHOLITHIASIS: Status: ACTIVE | Noted: 2025-04-20

## 2025-04-20 PROBLEM — R78.81 BACTEREMIA: Status: ACTIVE | Noted: 2025-04-20

## 2025-04-20 RX ORDER — METRONIDAZOLE 500 MG/1
500 TABLET ORAL TWICE DAILY
Qty: 20 | Refills: 0 | Status: ACTIVE | COMMUNITY
Start: 2025-04-20

## 2025-04-20 RX ORDER — FUROSEMIDE 40 MG/1
40 TABLET ORAL
Qty: 30 | Refills: 0 | Status: ACTIVE | COMMUNITY
Start: 2025-04-20

## 2025-04-20 RX ORDER — LEVOFLOXACIN 750 MG/1
750 TABLET, FILM COATED ORAL
Qty: 5 | Refills: 0 | Status: ACTIVE | COMMUNITY
Start: 2025-04-20 | End: 1900-01-01

## 2025-05-02 NOTE — ED ADULT TRIAGE NOTE - AS TEMP SITE
PRE-SEDATION ASSESSMENT    CONSENT  Risks, benefits, and alternatives have been discussed with the patient/patient representative, and patient/patient representative agrees to proceed: Yes    MEDICAL HISTORY  Significant medical/surgical history: Yes  Past Complications with Sedation/Anesthesia: No  Significant Family History: No  Smoking History: Yes  Alcohol/Drug abuse: No  Possible Pregnancy (LMP): Not Applicable  Cardiac History: Yes  Respiratory History: No    PHYSICAL EXAM  History and Physical Reviewed: Patient has valid H&P within 30 days. I have reviewed and there are no changes.  Airway Anatomy : Class II  Heart : Normal  Lungs : Abnormal  Lungs (Comment) : wearing nasal cannula  LOC/Mental Status : Normal    OTHER FINDINGS  Reviewed current medications and allergies: Yes  Pertinent lab/diagnostic test reviewed: Yes    SEDATION RISK ASSESSMENT  Risk Status ASA: Class II - Normal patient with mild systemic disease  Plan for Sedation: Moderate Sedation  EKG Monitoring: Yes    NARRATIVE FINDINGS      oral

## 2025-06-09 ENCOUNTER — NON-APPOINTMENT (OUTPATIENT)
Age: 80
End: 2025-06-09

## 2025-06-10 ENCOUNTER — NON-APPOINTMENT (OUTPATIENT)
Age: 80
End: 2025-06-10

## 2025-06-11 ENCOUNTER — APPOINTMENT (OUTPATIENT)
Dept: HOME HEALTH SERVICES | Facility: HOME HEALTH | Age: 80
End: 2025-06-11

## 2025-06-30 NOTE — ED PROVIDER NOTE - CLINICAL SUMMARY MEDICAL DECISION MAKING FREE TEXT BOX
Negative 80 y/o M past medical history of hypertension, hyperlipidemia, diabetes, CAD, TAVR in 7/2023, PAD, BPH presents with one week of intermittent left lower back/rib pain  pain, nonradiating, worsens with movement. Also endorsing proximal BLE muscle cramping and diffuse headache which has been chronic. Still able to ambulate. Denies fevers, chest pain, SOB, abd pain, N/V, weakness, numbness. Afebrile, not tachycardic, lungs CTAB, abd nontender, mild left paraspinal ttp with no midline ttp. Full ROM of BLE. Likely MSK etiology vs nephrolithiasis. No concern for cord compression. Labs, imaging, reasess.

## 2025-07-03 ENCOUNTER — APPOINTMENT (OUTPATIENT)
Dept: OTOLARYNGOLOGY | Facility: CLINIC | Age: 80
End: 2025-07-03
Payer: MEDICARE

## 2025-07-03 VITALS
DIASTOLIC BLOOD PRESSURE: 70 MMHG | HEIGHT: 65 IN | SYSTOLIC BLOOD PRESSURE: 121 MMHG | BODY MASS INDEX: 31.65 KG/M2 | HEART RATE: 71 BPM | WEIGHT: 190 LBS

## 2025-07-03 PROCEDURE — 99214 OFFICE O/P EST MOD 30 MIN: CPT | Mod: 25

## 2025-07-03 PROCEDURE — 92504 EAR MICROSCOPY EXAMINATION: CPT

## 2025-07-03 RX ORDER — FLUCONAZOLE 150 MG/1
150 TABLET ORAL DAILY
Qty: 7 | Refills: 0 | Status: ACTIVE | COMMUNITY
Start: 2025-07-03 | End: 1900-01-01

## 2025-07-17 NOTE — DISCHARGE NOTE ADULT - CARE PROVIDER_API CALL
Component  Ref Range & Units (hover)    Chlamydia trachomatis by Nucleic Acid Amplification Negative   Neisseria gonorrhoeae by Nucleic Acid Amplification Negative     Patient message sent   Mary,   Cardiologist at [place]  Phone: (   )    -  Fax: (   )    -    Jacque Hooks), Internal Medicine  [address] [place] [address]  Phone: [phone]  Fax: [phone] Jacque Hooks), Internal Medicine  [address] [address] [place] [address]  Phone: [phone]  Fax: [phone]    Mary,   Cardiologist at [place]  Phone: (   )    -  Fax: (   )    -    Daya Pérez), Internal Medicine; Nephrology  [address] [address]  Phone: [phone]  Fax: [phone] Jacque Hooks), Internal Medicine  [address] [address] [place] [address]  Phone: [phone]  Fax: [phone]    Daya Pérez), Internal Medicine; Nephrology  [address] [address]  Phone: [phone]  Fax: [phone]    Mary,   Cardiologist at [place]  Phone: (   )    -  Fax: (   )    -    Endocrinology at [facility],   Phone: [phone]  Fax: (   )    -

## 2025-07-20 ENCOUNTER — INPATIENT (INPATIENT)
Facility: HOSPITAL | Age: 80
LOS: 5 days | Discharge: HOME HEALTH | End: 2025-07-26
Attending: INTERNAL MEDICINE | Admitting: INTERNAL MEDICINE
Payer: MEDICARE

## 2025-07-20 VITALS
RESPIRATION RATE: 16 BRPM | TEMPERATURE: 98 F | HEART RATE: 83 BPM | OXYGEN SATURATION: 99 % | HEIGHT: 65 IN | DIASTOLIC BLOOD PRESSURE: 68 MMHG | SYSTOLIC BLOOD PRESSURE: 123 MMHG | WEIGHT: 190.04 LBS

## 2025-07-20 DIAGNOSIS — I10 ESSENTIAL (PRIMARY) HYPERTENSION: ICD-10-CM

## 2025-07-20 DIAGNOSIS — I77.819 AORTIC ECTASIA, UNSPECIFIED SITE: ICD-10-CM

## 2025-07-20 DIAGNOSIS — E78.5 HYPERLIPIDEMIA, UNSPECIFIED: ICD-10-CM

## 2025-07-20 DIAGNOSIS — J45.909 UNSPECIFIED ASTHMA, UNCOMPLICATED: ICD-10-CM

## 2025-07-20 DIAGNOSIS — N17.9 ACUTE KIDNEY FAILURE, UNSPECIFIED: ICD-10-CM

## 2025-07-20 DIAGNOSIS — E11.9 TYPE 2 DIABETES MELLITUS WITHOUT COMPLICATIONS: ICD-10-CM

## 2025-07-20 DIAGNOSIS — K62.5 HEMORRHAGE OF ANUS AND RECTUM: ICD-10-CM

## 2025-07-20 DIAGNOSIS — R06.02 SHORTNESS OF BREATH: ICD-10-CM

## 2025-07-20 DIAGNOSIS — C49.A0 GASTROINTESTINAL STROMAL TUMOR, UNSPECIFIED SITE: ICD-10-CM

## 2025-07-20 DIAGNOSIS — Z95.2 PRESENCE OF PROSTHETIC HEART VALVE: Chronic | ICD-10-CM

## 2025-07-20 DIAGNOSIS — Z29.9 ENCOUNTER FOR PROPHYLACTIC MEASURES, UNSPECIFIED: ICD-10-CM

## 2025-07-20 DIAGNOSIS — I26.99 OTHER PULMONARY EMBOLISM WITHOUT ACUTE COR PULMONALE: ICD-10-CM

## 2025-07-20 DIAGNOSIS — Z90.49 ACQUIRED ABSENCE OF OTHER SPECIFIED PARTS OF DIGESTIVE TRACT: Chronic | ICD-10-CM

## 2025-07-20 DIAGNOSIS — K21.9 GASTRO-ESOPHAGEAL REFLUX DISEASE WITHOUT ESOPHAGITIS: ICD-10-CM

## 2025-07-20 DIAGNOSIS — I25.10 ATHEROSCLEROTIC HEART DISEASE OF NATIVE CORONARY ARTERY WITHOUT ANGINA PECTORIS: ICD-10-CM

## 2025-07-20 DIAGNOSIS — D64.9 ANEMIA, UNSPECIFIED: ICD-10-CM

## 2025-07-20 DIAGNOSIS — Z98.890 OTHER SPECIFIED POSTPROCEDURAL STATES: Chronic | ICD-10-CM

## 2025-07-20 LAB
ALBUMIN SERPL ELPH-MCNC: 3.6 G/DL — SIGNIFICANT CHANGE UP (ref 3.3–5)
ALP SERPL-CCNC: 71 U/L — SIGNIFICANT CHANGE UP (ref 40–120)
ALT FLD-CCNC: 10 U/L — SIGNIFICANT CHANGE UP (ref 4–41)
ANION GAP SERPL CALC-SCNC: 12 MMOL/L — SIGNIFICANT CHANGE UP (ref 7–14)
APPEARANCE UR: CLEAR — SIGNIFICANT CHANGE UP
APTT BLD: 33.2 SEC — SIGNIFICANT CHANGE UP (ref 26.1–36.8)
AST SERPL-CCNC: 32 U/L — SIGNIFICANT CHANGE UP (ref 4–40)
BACTERIA # UR AUTO: NEGATIVE /HPF — SIGNIFICANT CHANGE UP
BASOPHILS # BLD AUTO: 0.04 K/UL — SIGNIFICANT CHANGE UP (ref 0–0.2)
BASOPHILS # BLD MANUAL: 0 K/UL — SIGNIFICANT CHANGE UP (ref 0–0.2)
BASOPHILS NFR BLD AUTO: 0.6 % — SIGNIFICANT CHANGE UP (ref 0–2)
BASOPHILS NFR BLD MANUAL: 0 % — SIGNIFICANT CHANGE UP (ref 0–2)
BILIRUB SERPL-MCNC: 0.2 MG/DL — SIGNIFICANT CHANGE UP (ref 0.2–1.2)
BILIRUB UR-MCNC: NEGATIVE — SIGNIFICANT CHANGE UP
BLD GP AB SCN SERPL QL: NEGATIVE — SIGNIFICANT CHANGE UP
BLOOD GAS VENOUS COMPREHENSIVE RESULT: SIGNIFICANT CHANGE UP
BUN SERPL-MCNC: 25 MG/DL — HIGH (ref 7–23)
CALCIUM SERPL-MCNC: 8.5 MG/DL — SIGNIFICANT CHANGE UP (ref 8.4–10.5)
CAST: 0 /LPF — SIGNIFICANT CHANGE UP (ref 0–4)
CHLORIDE SERPL-SCNC: 103 MMOL/L — SIGNIFICANT CHANGE UP (ref 98–107)
CO2 SERPL-SCNC: 20 MMOL/L — LOW (ref 22–31)
COLOR SPEC: YELLOW — SIGNIFICANT CHANGE UP
CREAT SERPL-MCNC: 1.64 MG/DL — HIGH (ref 0.5–1.3)
DACRYOCYTES BLD QL SMEAR: SLIGHT — SIGNIFICANT CHANGE UP
DIFF PNL FLD: NEGATIVE — SIGNIFICANT CHANGE UP
EGFR: 42 ML/MIN/1.73M2 — LOW
EGFR: 42 ML/MIN/1.73M2 — LOW
EOSINOPHIL # BLD AUTO: 0.14 K/UL — SIGNIFICANT CHANGE UP (ref 0–0.5)
EOSINOPHIL # BLD MANUAL: 0.13 K/UL — SIGNIFICANT CHANGE UP (ref 0–0.5)
EOSINOPHIL NFR BLD AUTO: 1.9 % — SIGNIFICANT CHANGE UP (ref 0–6)
EOSINOPHIL NFR BLD MANUAL: 1.8 % — SIGNIFICANT CHANGE UP (ref 0–6)
GIANT PLATELETS BLD QL SMEAR: PRESENT
GLUCOSE BLDC GLUCOMTR-MCNC: 138 MG/DL — HIGH (ref 70–99)
GLUCOSE BLDC GLUCOMTR-MCNC: 159 MG/DL — HIGH (ref 70–99)
GLUCOSE SERPL-MCNC: 114 MG/DL — HIGH (ref 70–99)
GLUCOSE UR QL: NEGATIVE MG/DL — SIGNIFICANT CHANGE UP
HCT VFR BLD CALC: 19.6 % — CRITICAL LOW (ref 39–50)
HCT VFR BLD CALC: 21.9 % — LOW (ref 39–50)
HCT VFR BLD CALC: 26 % — LOW (ref 39–50)
HGB BLD-MCNC: 6 G/DL — CRITICAL LOW (ref 13–17)
HGB BLD-MCNC: 7.2 G/DL — LOW (ref 13–17)
HGB BLD-MCNC: 8.6 G/DL — LOW (ref 13–17)
HYPOCHROMIA BLD QL: SLIGHT — SIGNIFICANT CHANGE UP
IMM GRANULOCYTES # BLD AUTO: 0.01 K/UL — SIGNIFICANT CHANGE UP (ref 0–0.07)
IMM GRANULOCYTES NFR BLD AUTO: 0.1 % — SIGNIFICANT CHANGE UP (ref 0–0.9)
INR BLD: 1.31 RATIO — HIGH (ref 0.85–1.16)
KETONES UR QL: NEGATIVE MG/DL — SIGNIFICANT CHANGE UP
LEUKOCYTE ESTERASE UR-ACNC: NEGATIVE — SIGNIFICANT CHANGE UP
LIDOCAIN IGE QN: 59 U/L — SIGNIFICANT CHANGE UP (ref 7–60)
LYMPHOCYTES # BLD AUTO: 1.61 K/UL — SIGNIFICANT CHANGE UP (ref 1–3.3)
LYMPHOCYTES # BLD MANUAL: 1.34 K/UL — SIGNIFICANT CHANGE UP (ref 1–3.3)
LYMPHOCYTES NFR BLD AUTO: 22.4 % — SIGNIFICANT CHANGE UP (ref 13–44)
LYMPHOCYTES NFR BLD MANUAL: 18.6 % — SIGNIFICANT CHANGE UP (ref 13–44)
MAGNESIUM SERPL-MCNC: 2.2 MG/DL — SIGNIFICANT CHANGE UP (ref 1.6–2.6)
MCHC RBC-ENTMCNC: 25 PG — LOW (ref 27–34)
MCHC RBC-ENTMCNC: 27 PG — SIGNIFICANT CHANGE UP (ref 27–34)
MCHC RBC-ENTMCNC: 27.4 PG — SIGNIFICANT CHANGE UP (ref 27–34)
MCHC RBC-ENTMCNC: 30.6 G/DL — LOW (ref 32–36)
MCHC RBC-ENTMCNC: 32.9 G/DL — SIGNIFICANT CHANGE UP (ref 32–36)
MCHC RBC-ENTMCNC: 33.1 G/DL — SIGNIFICANT CHANGE UP (ref 32–36)
MCV RBC AUTO: 81.7 FL — SIGNIFICANT CHANGE UP (ref 80–100)
MCV RBC AUTO: 82 FL — SIGNIFICANT CHANGE UP (ref 80–100)
MCV RBC AUTO: 82.8 FL — SIGNIFICANT CHANGE UP (ref 80–100)
MICROCYTES BLD QL: SLIGHT — SIGNIFICANT CHANGE UP
MONOCYTES # BLD AUTO: 0.78 K/UL — SIGNIFICANT CHANGE UP (ref 0–0.9)
MONOCYTES # BLD MANUAL: 0.32 K/UL — SIGNIFICANT CHANGE UP (ref 0–0.9)
MONOCYTES NFR BLD AUTO: 10.8 % — SIGNIFICANT CHANGE UP (ref 2–14)
MONOCYTES NFR BLD MANUAL: 4.4 % — SIGNIFICANT CHANGE UP (ref 2–14)
NEUTROPHILS # BLD AUTO: 4.62 K/UL — SIGNIFICANT CHANGE UP (ref 1.8–7.4)
NEUTROPHILS # BLD MANUAL: 5.41 K/UL — SIGNIFICANT CHANGE UP (ref 1.8–7.4)
NEUTROPHILS NFR BLD AUTO: 64.2 % — SIGNIFICANT CHANGE UP (ref 43–77)
NEUTROPHILS NFR BLD MANUAL: 75.2 % — SIGNIFICANT CHANGE UP (ref 43–77)
NITRITE UR-MCNC: NEGATIVE — SIGNIFICANT CHANGE UP
NRBC # BLD AUTO: 0 K/UL — SIGNIFICANT CHANGE UP (ref 0–0)
NRBC # FLD: 0 K/UL — SIGNIFICANT CHANGE UP (ref 0–0)
NRBC BLD AUTO-RTO: 0 /100 WBCS — SIGNIFICANT CHANGE UP (ref 0–0)
NT-PROBNP SERPL-SCNC: 277 PG/ML — SIGNIFICANT CHANGE UP
OB PNL STL: POSITIVE
PH UR: 7 — SIGNIFICANT CHANGE UP (ref 5–8)
PLAT MORPH BLD: NORMAL — SIGNIFICANT CHANGE UP
PLATELET # BLD AUTO: 196 K/UL — SIGNIFICANT CHANGE UP (ref 150–400)
PLATELET # BLD AUTO: 204 K/UL — SIGNIFICANT CHANGE UP (ref 150–400)
PLATELET # BLD AUTO: 220 K/UL — SIGNIFICANT CHANGE UP (ref 150–400)
PLATELET COUNT - ESTIMATE: NORMAL — SIGNIFICANT CHANGE UP
PMV BLD: 8.6 FL — SIGNIFICANT CHANGE UP (ref 7–13)
PMV BLD: 8.9 FL — SIGNIFICANT CHANGE UP (ref 7–13)
PMV BLD: 8.9 FL — SIGNIFICANT CHANGE UP (ref 7–13)
POLYCHROMASIA BLD QL SMEAR: ABNORMAL
POTASSIUM SERPL-MCNC: 4.6 MMOL/L — SIGNIFICANT CHANGE UP (ref 3.5–5.3)
POTASSIUM SERPL-SCNC: 4.6 MMOL/L — SIGNIFICANT CHANGE UP (ref 3.5–5.3)
PROT SERPL-MCNC: 6.3 G/DL — SIGNIFICANT CHANGE UP (ref 6–8.3)
PROT UR-MCNC: 30 MG/DL
PROTHROM AB SERPL-ACNC: 15.1 SEC — HIGH (ref 9.9–13.4)
RBC # BLD: 2.4 M/UL — LOW (ref 4.2–5.8)
RBC # BLD: 2.67 M/UL — LOW (ref 4.2–5.8)
RBC # BLD: 3.14 M/UL — LOW (ref 4.2–5.8)
RBC # FLD: 15.1 % — HIGH (ref 10.3–14.5)
RBC # FLD: 15.5 % — HIGH (ref 10.3–14.5)
RBC # FLD: 15.7 % — HIGH (ref 10.3–14.5)
RBC BLD AUTO: ABNORMAL
RBC CASTS # UR COMP ASSIST: 0 /HPF — SIGNIFICANT CHANGE UP (ref 0–4)
RH IG SCN BLD-IMP: POSITIVE — SIGNIFICANT CHANGE UP
RH IG SCN BLD-IMP: POSITIVE — SIGNIFICANT CHANGE UP
SMUDGE CELLS # BLD: PRESENT
SODIUM SERPL-SCNC: 135 MMOL/L — SIGNIFICANT CHANGE UP (ref 135–145)
SP GR SPEC: 1.01 — SIGNIFICANT CHANGE UP (ref 1–1.03)
SQUAMOUS # UR AUTO: 0 /HPF — SIGNIFICANT CHANGE UP (ref 0–5)
TROPONIN T, HIGH SENSITIVITY RESULT: 20 NG/L — SIGNIFICANT CHANGE UP
TROPONIN T, HIGH SENSITIVITY RESULT: 21 NG/L — SIGNIFICANT CHANGE UP
UROBILINOGEN FLD QL: 0.2 MG/DL — SIGNIFICANT CHANGE UP (ref 0.2–1)
WBC # BLD: 7.2 K/UL — SIGNIFICANT CHANGE UP (ref 3.8–10.5)
WBC # BLD: 7.41 K/UL — SIGNIFICANT CHANGE UP (ref 3.8–10.5)
WBC # BLD: 7.85 K/UL — SIGNIFICANT CHANGE UP (ref 3.8–10.5)
WBC # FLD AUTO: 7.2 K/UL — SIGNIFICANT CHANGE UP (ref 3.8–10.5)
WBC # FLD AUTO: 7.41 K/UL — SIGNIFICANT CHANGE UP (ref 3.8–10.5)
WBC # FLD AUTO: 7.85 K/UL — SIGNIFICANT CHANGE UP (ref 3.8–10.5)
WBC UR QL: 0 /HPF — SIGNIFICANT CHANGE UP (ref 0–5)

## 2025-07-20 PROCEDURE — 99223 1ST HOSP IP/OBS HIGH 75: CPT | Mod: GC

## 2025-07-20 PROCEDURE — 99291 CRITICAL CARE FIRST HOUR: CPT

## 2025-07-20 PROCEDURE — 71275 CT ANGIOGRAPHY CHEST: CPT | Mod: 26

## 2025-07-20 PROCEDURE — 74177 CT ABD & PELVIS W/CONTRAST: CPT | Mod: 26

## 2025-07-20 RX ORDER — ATORVASTATIN CALCIUM 80 MG/1
40 TABLET, FILM COATED ORAL AT BEDTIME
Refills: 0 | Status: DISCONTINUED | OUTPATIENT
Start: 2025-07-20 | End: 2025-07-26

## 2025-07-20 RX ORDER — GLUCAGON 3 MG/1
1 POWDER NASAL ONCE
Refills: 0 | Status: DISCONTINUED | OUTPATIENT
Start: 2025-07-20 | End: 2025-07-26

## 2025-07-20 RX ORDER — SODIUM CHLORIDE 9 G/1000ML
1000 INJECTION, SOLUTION INTRAVENOUS
Refills: 0 | Status: DISCONTINUED | OUTPATIENT
Start: 2025-07-20 | End: 2025-07-26

## 2025-07-20 RX ORDER — DEXTROSE 50 % IN WATER 50 %
15 SYRINGE (ML) INTRAVENOUS ONCE
Refills: 0 | Status: DISCONTINUED | OUTPATIENT
Start: 2025-07-20 | End: 2025-07-26

## 2025-07-20 RX ORDER — DEXTROSE 50 % IN WATER 50 %
12.5 SYRINGE (ML) INTRAVENOUS ONCE
Refills: 0 | Status: DISCONTINUED | OUTPATIENT
Start: 2025-07-20 | End: 2025-07-26

## 2025-07-20 RX ORDER — INSULIN LISPRO 100 U/ML
INJECTION, SOLUTION INTRAVENOUS; SUBCUTANEOUS
Refills: 0 | Status: DISCONTINUED | OUTPATIENT
Start: 2025-07-20 | End: 2025-07-26

## 2025-07-20 RX ORDER — INSULIN LISPRO 100 U/ML
INJECTION, SOLUTION INTRAVENOUS; SUBCUTANEOUS AT BEDTIME
Refills: 0 | Status: DISCONTINUED | OUTPATIENT
Start: 2025-07-20 | End: 2025-07-26

## 2025-07-20 RX ORDER — INSULIN GLARGINE-YFGN 100 [IU]/ML
12 INJECTION, SOLUTION SUBCUTANEOUS AT BEDTIME
Refills: 0 | Status: DISCONTINUED | OUTPATIENT
Start: 2025-07-20 | End: 2025-07-26

## 2025-07-20 RX ORDER — DEXTROSE 50 % IN WATER 50 %
25 SYRINGE (ML) INTRAVENOUS ONCE
Refills: 0 | Status: DISCONTINUED | OUTPATIENT
Start: 2025-07-20 | End: 2025-07-26

## 2025-07-20 RX ORDER — ASPIRIN 325 MG
81 TABLET ORAL DAILY
Refills: 0 | Status: DISCONTINUED | OUTPATIENT
Start: 2025-07-20 | End: 2025-07-26

## 2025-07-20 RX ADMIN — ATORVASTATIN CALCIUM 40 MILLIGRAM(S): 80 TABLET, FILM COATED ORAL at 22:52

## 2025-07-20 RX ADMIN — INSULIN GLARGINE-YFGN 12 UNIT(S): 100 INJECTION, SOLUTION SUBCUTANEOUS at 22:52

## 2025-07-21 DIAGNOSIS — I26.99 OTHER PULMONARY EMBOLISM WITHOUT ACUTE COR PULMONALE: ICD-10-CM

## 2025-07-21 DIAGNOSIS — K92.2 GASTROINTESTINAL HEMORRHAGE, UNSPECIFIED: ICD-10-CM

## 2025-07-21 DIAGNOSIS — R79.89 OTHER SPECIFIED ABNORMAL FINDINGS OF BLOOD CHEMISTRY: ICD-10-CM

## 2025-07-21 DIAGNOSIS — E11.9 TYPE 2 DIABETES MELLITUS WITHOUT COMPLICATIONS: ICD-10-CM

## 2025-07-21 LAB
A1C WITH ESTIMATED AVERAGE GLUCOSE RESULT: 5.5 % — SIGNIFICANT CHANGE UP (ref 4–5.6)
ANION GAP SERPL CALC-SCNC: 11 MMOL/L — SIGNIFICANT CHANGE UP (ref 7–14)
BUN SERPL-MCNC: 26 MG/DL — HIGH (ref 7–23)
CALCIUM SERPL-MCNC: 8.3 MG/DL — LOW (ref 8.4–10.5)
CHLORIDE SERPL-SCNC: 104 MMOL/L — SIGNIFICANT CHANGE UP (ref 98–107)
CO2 SERPL-SCNC: 22 MMOL/L — SIGNIFICANT CHANGE UP (ref 22–31)
CREAT ?TM UR-MCNC: 33 MG/DL — SIGNIFICANT CHANGE UP
CREAT SERPL-MCNC: 1.6 MG/DL — HIGH (ref 0.5–1.3)
EGFR: 43 ML/MIN/1.73M2 — LOW
EGFR: 43 ML/MIN/1.73M2 — LOW
ESTIMATED AVERAGE GLUCOSE: 111 — SIGNIFICANT CHANGE UP
GLUCOSE BLDC GLUCOMTR-MCNC: 108 MG/DL — HIGH (ref 70–99)
GLUCOSE BLDC GLUCOMTR-MCNC: 115 MG/DL — HIGH (ref 70–99)
GLUCOSE BLDC GLUCOMTR-MCNC: 119 MG/DL — HIGH (ref 70–99)
GLUCOSE BLDC GLUCOMTR-MCNC: 121 MG/DL — HIGH (ref 70–99)
GLUCOSE BLDC GLUCOMTR-MCNC: 123 MG/DL — HIGH (ref 70–99)
GLUCOSE SERPL-MCNC: 108 MG/DL — HIGH (ref 70–99)
HCT VFR BLD CALC: 23.2 % — LOW (ref 39–50)
HCT VFR BLD CALC: 26.9 % — LOW (ref 39–50)
HCT VFR BLD CALC: 27.9 % — LOW (ref 39–50)
HGB BLD-MCNC: 7.7 G/DL — LOW (ref 13–17)
HGB BLD-MCNC: 8.9 G/DL — LOW (ref 13–17)
HGB BLD-MCNC: 9.4 G/DL — LOW (ref 13–17)
MAGNESIUM SERPL-MCNC: 2.2 MG/DL — SIGNIFICANT CHANGE UP (ref 1.6–2.6)
MCHC RBC-ENTMCNC: 26.9 PG — LOW (ref 27–34)
MCHC RBC-ENTMCNC: 27.2 PG — SIGNIFICANT CHANGE UP (ref 27–34)
MCHC RBC-ENTMCNC: 27.4 PG — SIGNIFICANT CHANGE UP (ref 27–34)
MCHC RBC-ENTMCNC: 33.1 G/DL — SIGNIFICANT CHANGE UP (ref 32–36)
MCHC RBC-ENTMCNC: 33.2 G/DL — SIGNIFICANT CHANGE UP (ref 32–36)
MCHC RBC-ENTMCNC: 33.7 G/DL — SIGNIFICANT CHANGE UP (ref 32–36)
MCV RBC AUTO: 80.6 FL — SIGNIFICANT CHANGE UP (ref 80–100)
MCV RBC AUTO: 81.1 FL — SIGNIFICANT CHANGE UP (ref 80–100)
MCV RBC AUTO: 82.8 FL — SIGNIFICANT CHANGE UP (ref 80–100)
NRBC # BLD AUTO: 0 K/UL — SIGNIFICANT CHANGE UP (ref 0–0)
NRBC # FLD: 0 K/UL — SIGNIFICANT CHANGE UP (ref 0–0)
NRBC BLD AUTO-RTO: 0 /100 WBCS — SIGNIFICANT CHANGE UP (ref 0–0)
OSMOLALITY UR: 438 MOSM/KG — SIGNIFICANT CHANGE UP (ref 50–1200)
PHOSPHATE SERPL-MCNC: 3.8 MG/DL — SIGNIFICANT CHANGE UP (ref 2.5–4.5)
PLATELET # BLD AUTO: 181 K/UL — SIGNIFICANT CHANGE UP (ref 150–400)
PLATELET # BLD AUTO: 188 K/UL — SIGNIFICANT CHANGE UP (ref 150–400)
PLATELET # BLD AUTO: 190 K/UL — SIGNIFICANT CHANGE UP (ref 150–400)
PMV BLD: 8.7 FL — SIGNIFICANT CHANGE UP (ref 7–13)
PMV BLD: 8.8 FL — SIGNIFICANT CHANGE UP (ref 7–13)
PMV BLD: 8.8 FL — SIGNIFICANT CHANGE UP (ref 7–13)
POTASSIUM SERPL-MCNC: 4 MMOL/L — SIGNIFICANT CHANGE UP (ref 3.5–5.3)
POTASSIUM SERPL-SCNC: 4 MMOL/L — SIGNIFICANT CHANGE UP (ref 3.5–5.3)
POTASSIUM UR-SCNC: 49.7 MMOL/L — SIGNIFICANT CHANGE UP
PROT ?TM UR-MCNC: 19 MG/DL — SIGNIFICANT CHANGE UP
PROT/CREAT UR-RTO: 0.6 RATIO — HIGH (ref 0–0.2)
RBC # BLD: 2.86 M/UL — LOW (ref 4.2–5.8)
RBC # BLD: 3.25 M/UL — LOW (ref 4.2–5.8)
RBC # BLD: 3.46 M/UL — LOW (ref 4.2–5.8)
RBC # FLD: 14.7 % — HIGH (ref 10.3–14.5)
RBC # FLD: 15 % — HIGH (ref 10.3–14.5)
RBC # FLD: 15.1 % — HIGH (ref 10.3–14.5)
SODIUM SERPL-SCNC: 137 MMOL/L — SIGNIFICANT CHANGE UP (ref 135–145)
SODIUM UR-SCNC: 117 MMOL/L — SIGNIFICANT CHANGE UP
UUN UR-MCNC: 258.5 MG/DL — SIGNIFICANT CHANGE UP
WBC # BLD: 6.76 K/UL — SIGNIFICANT CHANGE UP (ref 3.8–10.5)
WBC # BLD: 6.78 K/UL — SIGNIFICANT CHANGE UP (ref 3.8–10.5)
WBC # BLD: 6.97 K/UL — SIGNIFICANT CHANGE UP (ref 3.8–10.5)
WBC # FLD AUTO: 6.76 K/UL — SIGNIFICANT CHANGE UP (ref 3.8–10.5)
WBC # FLD AUTO: 6.78 K/UL — SIGNIFICANT CHANGE UP (ref 3.8–10.5)
WBC # FLD AUTO: 6.97 K/UL — SIGNIFICANT CHANGE UP (ref 3.8–10.5)

## 2025-07-21 PROCEDURE — 99233 SBSQ HOSP IP/OBS HIGH 50: CPT | Mod: GC

## 2025-07-21 PROCEDURE — 99222 1ST HOSP IP/OBS MODERATE 55: CPT | Mod: GC

## 2025-07-21 RX ORDER — IMATINIB MESYLATE 100 MG/1
400 TABLET ORAL DAILY
Refills: 0 | Status: DISCONTINUED | OUTPATIENT
Start: 2025-07-21 | End: 2025-07-21

## 2025-07-21 RX ORDER — MECLIZINE HCL 12.5 MG
12.5 TABLET ORAL THREE TIMES A DAY
Refills: 0 | Status: DISCONTINUED | OUTPATIENT
Start: 2025-07-21 | End: 2025-07-26

## 2025-07-21 RX ORDER — LANOLIN/MINERAL OIL/PETROLATUM
1 OINTMENT (GRAM) OPHTHALMIC (EYE) EVERY 6 HOURS
Refills: 0 | Status: DISCONTINUED | OUTPATIENT
Start: 2025-07-21 | End: 2025-07-26

## 2025-07-21 RX ADMIN — ATORVASTATIN CALCIUM 40 MILLIGRAM(S): 80 TABLET, FILM COATED ORAL at 21:58

## 2025-07-21 RX ADMIN — Medication 12.5 MILLIGRAM(S): at 21:58

## 2025-07-21 RX ADMIN — Medication 12.5 MILLIGRAM(S): at 13:06

## 2025-07-21 RX ADMIN — INSULIN GLARGINE-YFGN 12 UNIT(S): 100 INJECTION, SOLUTION SUBCUTANEOUS at 21:58

## 2025-07-21 RX ADMIN — Medication 81 MILLIGRAM(S): at 11:34

## 2025-07-21 RX ADMIN — Medication 1 DOSE(S): at 21:58

## 2025-07-21 RX ADMIN — Medication 40 MILLIGRAM(S): at 17:17

## 2025-07-21 RX ADMIN — Medication 1 DOSE(S): at 08:04

## 2025-07-21 RX ADMIN — Medication 40 MILLIGRAM(S): at 05:47

## 2025-07-22 ENCOUNTER — RESULT REVIEW (OUTPATIENT)
Age: 80
End: 2025-07-22

## 2025-07-22 DIAGNOSIS — R42 DIZZINESS AND GIDDINESS: ICD-10-CM

## 2025-07-22 LAB
ANION GAP SERPL CALC-SCNC: 13 MMOL/L — SIGNIFICANT CHANGE UP (ref 7–14)
BUN SERPL-MCNC: 30 MG/DL — HIGH (ref 7–23)
CALCIUM SERPL-MCNC: 8.1 MG/DL — LOW (ref 8.4–10.5)
CHLORIDE SERPL-SCNC: 106 MMOL/L — SIGNIFICANT CHANGE UP (ref 98–107)
CO2 SERPL-SCNC: 19 MMOL/L — LOW (ref 22–31)
CREAT SERPL-MCNC: 1.51 MG/DL — HIGH (ref 0.5–1.3)
EGFR: 46 ML/MIN/1.73M2 — LOW
EGFR: 46 ML/MIN/1.73M2 — LOW
GLUCOSE BLDC GLUCOMTR-MCNC: 172 MG/DL — HIGH (ref 70–99)
GLUCOSE BLDC GLUCOMTR-MCNC: 172 MG/DL — HIGH (ref 70–99)
GLUCOSE BLDC GLUCOMTR-MCNC: 219 MG/DL — HIGH (ref 70–99)
GLUCOSE BLDC GLUCOMTR-MCNC: 230 MG/DL — HIGH (ref 70–99)
GLUCOSE SERPL-MCNC: 111 MG/DL — HIGH (ref 70–99)
HCT VFR BLD CALC: 26.6 % — LOW (ref 39–50)
HGB BLD-MCNC: 8.9 G/DL — LOW (ref 13–17)
MAGNESIUM SERPL-MCNC: 2.1 MG/DL — SIGNIFICANT CHANGE UP (ref 1.6–2.6)
MCHC RBC-ENTMCNC: 27.7 PG — SIGNIFICANT CHANGE UP (ref 27–34)
MCHC RBC-ENTMCNC: 33.5 G/DL — SIGNIFICANT CHANGE UP (ref 32–36)
MCV RBC AUTO: 82.9 FL — SIGNIFICANT CHANGE UP (ref 80–100)
MRSA PCR RESULT.: SIGNIFICANT CHANGE UP
NRBC # BLD AUTO: 0 K/UL — SIGNIFICANT CHANGE UP (ref 0–0)
NRBC # FLD: 0 K/UL — SIGNIFICANT CHANGE UP (ref 0–0)
NRBC BLD AUTO-RTO: 0 /100 WBCS — SIGNIFICANT CHANGE UP (ref 0–0)
PHOSPHATE SERPL-MCNC: 3.3 MG/DL — SIGNIFICANT CHANGE UP (ref 2.5–4.5)
PLATELET # BLD AUTO: 181 K/UL — SIGNIFICANT CHANGE UP (ref 150–400)
PMV BLD: 8.7 FL — SIGNIFICANT CHANGE UP (ref 7–13)
POTASSIUM SERPL-MCNC: 3.5 MMOL/L — SIGNIFICANT CHANGE UP (ref 3.5–5.3)
POTASSIUM SERPL-SCNC: 3.5 MMOL/L — SIGNIFICANT CHANGE UP (ref 3.5–5.3)
RBC # BLD: 3.21 M/UL — LOW (ref 4.2–5.8)
RBC # FLD: 15.3 % — HIGH (ref 10.3–14.5)
S AUREUS DNA NOSE QL NAA+PROBE: SIGNIFICANT CHANGE UP
SODIUM SERPL-SCNC: 138 MMOL/L — SIGNIFICANT CHANGE UP (ref 135–145)
WBC # BLD: 7.54 K/UL — SIGNIFICANT CHANGE UP (ref 3.8–10.5)
WBC # FLD AUTO: 7.54 K/UL — SIGNIFICANT CHANGE UP (ref 3.8–10.5)

## 2025-07-22 PROCEDURE — 99232 SBSQ HOSP IP/OBS MODERATE 35: CPT | Mod: GC

## 2025-07-22 PROCEDURE — 93306 TTE W/DOPPLER COMPLETE: CPT | Mod: 26

## 2025-07-22 RX ORDER — POLYETHYLENE GLYCOL 3350 17 G/17G
17 POWDER, FOR SOLUTION ORAL DAILY
Refills: 0 | Status: DISCONTINUED | OUTPATIENT
Start: 2025-07-22 | End: 2025-07-26

## 2025-07-22 RX ADMIN — Medication 40 MILLIGRAM(S): at 17:34

## 2025-07-22 RX ADMIN — Medication 1 APPLICATION(S): at 11:07

## 2025-07-22 RX ADMIN — INSULIN GLARGINE-YFGN 12 UNIT(S): 100 INJECTION, SOLUTION SUBCUTANEOUS at 21:47

## 2025-07-22 RX ADMIN — Medication 40 MILLIGRAM(S): at 05:14

## 2025-07-22 RX ADMIN — Medication 12.5 MILLIGRAM(S): at 05:15

## 2025-07-22 RX ADMIN — Medication 1 DROP(S): at 05:17

## 2025-07-22 RX ADMIN — Medication 1 DOSE(S): at 08:03

## 2025-07-22 RX ADMIN — INSULIN LISPRO 2: 100 INJECTION, SOLUTION INTRAVENOUS; SUBCUTANEOUS at 11:11

## 2025-07-22 RX ADMIN — Medication 1 DOSE(S): at 21:28

## 2025-07-22 RX ADMIN — Medication 12.5 MILLIGRAM(S): at 21:29

## 2025-07-22 RX ADMIN — INSULIN LISPRO 2: 100 INJECTION, SOLUTION INTRAVENOUS; SUBCUTANEOUS at 07:44

## 2025-07-22 RX ADMIN — INSULIN LISPRO 1: 100 INJECTION, SOLUTION INTRAVENOUS; SUBCUTANEOUS at 17:45

## 2025-07-22 RX ADMIN — ATORVASTATIN CALCIUM 40 MILLIGRAM(S): 80 TABLET, FILM COATED ORAL at 21:29

## 2025-07-22 RX ADMIN — Medication 81 MILLIGRAM(S): at 11:07

## 2025-07-22 RX ADMIN — Medication 12.5 MILLIGRAM(S): at 13:17

## 2025-07-23 LAB
ANION GAP SERPL CALC-SCNC: 14 MMOL/L — SIGNIFICANT CHANGE UP (ref 7–14)
BLD GP AB SCN SERPL QL: NEGATIVE — SIGNIFICANT CHANGE UP
BUN SERPL-MCNC: 25 MG/DL — HIGH (ref 7–23)
CALCIUM SERPL-MCNC: 8.1 MG/DL — LOW (ref 8.4–10.5)
CHLORIDE SERPL-SCNC: 106 MMOL/L — SIGNIFICANT CHANGE UP (ref 98–107)
CO2 SERPL-SCNC: 18 MMOL/L — LOW (ref 22–31)
CREAT SERPL-MCNC: 1.42 MG/DL — HIGH (ref 0.5–1.3)
EGFR: 50 ML/MIN/1.73M2 — LOW
EGFR: 50 ML/MIN/1.73M2 — LOW
GLUCOSE BLDC GLUCOMTR-MCNC: 145 MG/DL — HIGH (ref 70–99)
GLUCOSE BLDC GLUCOMTR-MCNC: 167 MG/DL — HIGH (ref 70–99)
GLUCOSE BLDC GLUCOMTR-MCNC: 188 MG/DL — HIGH (ref 70–99)
GLUCOSE BLDC GLUCOMTR-MCNC: 237 MG/DL — HIGH (ref 70–99)
GLUCOSE SERPL-MCNC: 111 MG/DL — HIGH (ref 70–99)
HCT VFR BLD CALC: 24.4 % — LOW (ref 39–50)
HCT VFR BLD CALC: 26.9 % — LOW (ref 39–50)
HGB BLD-MCNC: 8.2 G/DL — LOW (ref 13–17)
HGB BLD-MCNC: 8.6 G/DL — LOW (ref 13–17)
MAGNESIUM SERPL-MCNC: 1.9 MG/DL — SIGNIFICANT CHANGE UP (ref 1.6–2.6)
MCHC RBC-ENTMCNC: 26.9 PG — LOW (ref 27–34)
MCHC RBC-ENTMCNC: 27.5 PG — SIGNIFICANT CHANGE UP (ref 27–34)
MCHC RBC-ENTMCNC: 32 G/DL — SIGNIFICANT CHANGE UP (ref 32–36)
MCHC RBC-ENTMCNC: 33.6 G/DL — SIGNIFICANT CHANGE UP (ref 32–36)
MCV RBC AUTO: 81.9 FL — SIGNIFICANT CHANGE UP (ref 80–100)
MCV RBC AUTO: 84.1 FL — SIGNIFICANT CHANGE UP (ref 80–100)
NRBC # BLD AUTO: 0 K/UL — SIGNIFICANT CHANGE UP (ref 0–0)
NRBC # BLD AUTO: 0 K/UL — SIGNIFICANT CHANGE UP (ref 0–0)
NRBC # FLD: 0 K/UL — SIGNIFICANT CHANGE UP (ref 0–0)
NRBC # FLD: 0 K/UL — SIGNIFICANT CHANGE UP (ref 0–0)
NRBC BLD AUTO-RTO: 0 /100 WBCS — SIGNIFICANT CHANGE UP (ref 0–0)
NRBC BLD AUTO-RTO: 0 /100 WBCS — SIGNIFICANT CHANGE UP (ref 0–0)
PHOSPHATE SERPL-MCNC: 3.5 MG/DL — SIGNIFICANT CHANGE UP (ref 2.5–4.5)
PLATELET # BLD AUTO: 172 K/UL — SIGNIFICANT CHANGE UP (ref 150–400)
PLATELET # BLD AUTO: 182 K/UL — SIGNIFICANT CHANGE UP (ref 150–400)
PMV BLD: 8.9 FL — SIGNIFICANT CHANGE UP (ref 7–13)
PMV BLD: 8.9 FL — SIGNIFICANT CHANGE UP (ref 7–13)
POTASSIUM SERPL-MCNC: 3.3 MMOL/L — LOW (ref 3.5–5.3)
POTASSIUM SERPL-SCNC: 3.3 MMOL/L — LOW (ref 3.5–5.3)
RBC # BLD: 2.98 M/UL — LOW (ref 4.2–5.8)
RBC # BLD: 3.2 M/UL — LOW (ref 4.2–5.8)
RBC # FLD: 15.5 % — HIGH (ref 10.3–14.5)
RBC # FLD: 15.6 % — HIGH (ref 10.3–14.5)
RH IG SCN BLD-IMP: POSITIVE — SIGNIFICANT CHANGE UP
SODIUM SERPL-SCNC: 138 MMOL/L — SIGNIFICANT CHANGE UP (ref 135–145)
WBC # BLD: 6.99 K/UL — SIGNIFICANT CHANGE UP (ref 3.8–10.5)
WBC # BLD: 8.45 K/UL — SIGNIFICANT CHANGE UP (ref 3.8–10.5)
WBC # FLD AUTO: 6.99 K/UL — SIGNIFICANT CHANGE UP (ref 3.8–10.5)
WBC # FLD AUTO: 8.45 K/UL — SIGNIFICANT CHANGE UP (ref 3.8–10.5)

## 2025-07-23 PROCEDURE — 99232 SBSQ HOSP IP/OBS MODERATE 35: CPT | Mod: GC

## 2025-07-23 RX ORDER — IPRATROPIUM BROMIDE AND ALBUTEROL SULFATE .5; 2.5 MG/3ML; MG/3ML
3 SOLUTION RESPIRATORY (INHALATION) EVERY 6 HOURS
Refills: 0 | Status: DISCONTINUED | OUTPATIENT
Start: 2025-07-23 | End: 2025-07-23

## 2025-07-23 RX ORDER — MONTELUKAST SODIUM 10 MG/1
10 TABLET ORAL DAILY
Refills: 0 | Status: DISCONTINUED | OUTPATIENT
Start: 2025-07-23 | End: 2025-07-26

## 2025-07-23 RX ORDER — POLYETHYLENE GLYCOL-3350 AND ELECTROLYTES 236; 6.74; 5.86; 2.97; 22.74 G/274.31G; G/274.31G; G/274.31G; G/274.31G; G/274.31G
4000 POWDER, FOR SOLUTION ORAL ONCE
Refills: 0 | Status: COMPLETED | OUTPATIENT
Start: 2025-07-23 | End: 2025-07-23

## 2025-07-23 RX ORDER — SPIRONOLACTONE 25 MG
50 TABLET ORAL DAILY
Refills: 0 | Status: DISCONTINUED | OUTPATIENT
Start: 2025-07-23 | End: 2025-07-26

## 2025-07-23 RX ORDER — FLUTICASONE PROPIONATE 50 UG/1
1 SPRAY, METERED NASAL
Refills: 0 | Status: DISCONTINUED | OUTPATIENT
Start: 2025-07-23 | End: 2025-07-26

## 2025-07-23 RX ORDER — IPRATROPIUM BROMIDE AND ALBUTEROL SULFATE .5; 2.5 MG/3ML; MG/3ML
3 SOLUTION RESPIRATORY (INHALATION) EVERY 6 HOURS
Refills: 0 | Status: COMPLETED | OUTPATIENT
Start: 2025-07-23 | End: 2025-07-25

## 2025-07-23 RX ADMIN — Medication 12.5 MILLIGRAM(S): at 13:23

## 2025-07-23 RX ADMIN — ATORVASTATIN CALCIUM 40 MILLIGRAM(S): 80 TABLET, FILM COATED ORAL at 21:38

## 2025-07-23 RX ADMIN — INSULIN LISPRO 1: 100 INJECTION, SOLUTION INTRAVENOUS; SUBCUTANEOUS at 08:24

## 2025-07-23 RX ADMIN — INSULIN GLARGINE-YFGN 12 UNIT(S): 100 INJECTION, SOLUTION SUBCUTANEOUS at 21:38

## 2025-07-23 RX ADMIN — Medication 40 MILLIGRAM(S): at 05:19

## 2025-07-23 RX ADMIN — Medication 81 MILLIGRAM(S): at 11:33

## 2025-07-23 RX ADMIN — Medication 40 MILLIGRAM(S): at 17:41

## 2025-07-23 RX ADMIN — MONTELUKAST SODIUM 10 MILLIGRAM(S): 10 TABLET ORAL at 11:35

## 2025-07-23 RX ADMIN — POLYETHYLENE GLYCOL-3350 AND ELECTROLYTES 4000 MILLILITER(S): 236; 6.74; 5.86; 2.97; 22.74 POWDER, FOR SOLUTION ORAL at 17:43

## 2025-07-23 RX ADMIN — Medication 1 DOSE(S): at 21:38

## 2025-07-23 RX ADMIN — IPRATROPIUM BROMIDE AND ALBUTEROL SULFATE 3 MILLILITER(S): .5; 2.5 SOLUTION RESPIRATORY (INHALATION) at 16:22

## 2025-07-23 RX ADMIN — INSULIN LISPRO 2: 100 INJECTION, SOLUTION INTRAVENOUS; SUBCUTANEOUS at 11:32

## 2025-07-23 RX ADMIN — FLUTICASONE PROPIONATE 1 SPRAY(S): 50 SPRAY, METERED NASAL at 17:43

## 2025-07-23 RX ADMIN — IPRATROPIUM BROMIDE AND ALBUTEROL SULFATE 3 MILLILITER(S): .5; 2.5 SOLUTION RESPIRATORY (INHALATION) at 21:01

## 2025-07-23 RX ADMIN — Medication 12.5 MILLIGRAM(S): at 05:20

## 2025-07-23 RX ADMIN — Medication 12.5 MILLIGRAM(S): at 21:38

## 2025-07-23 RX ADMIN — Medication 1 APPLICATION(S): at 11:33

## 2025-07-23 RX ADMIN — Medication 20 MILLIEQUIVALENT(S): at 08:24

## 2025-07-23 RX ADMIN — Medication 1 DOSE(S): at 08:08

## 2025-07-23 RX ADMIN — INSULIN LISPRO 0: 100 INJECTION, SOLUTION INTRAVENOUS; SUBCUTANEOUS at 21:45

## 2025-07-24 LAB
ALBUMIN SERPL ELPH-MCNC: 3.6 G/DL — SIGNIFICANT CHANGE UP (ref 3.3–5)
ALP SERPL-CCNC: 82 U/L — SIGNIFICANT CHANGE UP (ref 40–120)
ALT FLD-CCNC: 12 U/L — SIGNIFICANT CHANGE UP (ref 4–41)
ANION GAP SERPL CALC-SCNC: 17 MMOL/L — HIGH (ref 7–14)
APTT BLD: 28.3 SEC — SIGNIFICANT CHANGE UP (ref 26.1–36.8)
AST SERPL-CCNC: 34 U/L — SIGNIFICANT CHANGE UP (ref 4–40)
BILIRUB SERPL-MCNC: 0.6 MG/DL — SIGNIFICANT CHANGE UP (ref 0.2–1.2)
BUN SERPL-MCNC: 18 MG/DL — SIGNIFICANT CHANGE UP (ref 7–23)
CALCIUM SERPL-MCNC: 8.4 MG/DL — SIGNIFICANT CHANGE UP (ref 8.4–10.5)
CHLORIDE SERPL-SCNC: 103 MMOL/L — SIGNIFICANT CHANGE UP (ref 98–107)
CO2 SERPL-SCNC: 21 MMOL/L — LOW (ref 22–31)
CREAT SERPL-MCNC: 1.29 MG/DL — SIGNIFICANT CHANGE UP (ref 0.5–1.3)
EGFR: 56 ML/MIN/1.73M2 — LOW
EGFR: 56 ML/MIN/1.73M2 — LOW
GLUCOSE BLDC GLUCOMTR-MCNC: 131 MG/DL — HIGH (ref 70–99)
GLUCOSE BLDC GLUCOMTR-MCNC: 136 MG/DL — HIGH (ref 70–99)
GLUCOSE BLDC GLUCOMTR-MCNC: 138 MG/DL — HIGH (ref 70–99)
GLUCOSE BLDC GLUCOMTR-MCNC: 155 MG/DL — HIGH (ref 70–99)
GLUCOSE BLDC GLUCOMTR-MCNC: 191 MG/DL — HIGH (ref 70–99)
GLUCOSE SERPL-MCNC: 134 MG/DL — HIGH (ref 70–99)
HCT VFR BLD CALC: 25.2 % — LOW (ref 39–50)
HGB BLD-MCNC: 8.5 G/DL — LOW (ref 13–17)
INR BLD: 1.03 RATIO — SIGNIFICANT CHANGE UP (ref 0.85–1.16)
MAGNESIUM SERPL-MCNC: 1.8 MG/DL — SIGNIFICANT CHANGE UP (ref 1.6–2.6)
MCHC RBC-ENTMCNC: 27.5 PG — SIGNIFICANT CHANGE UP (ref 27–34)
MCHC RBC-ENTMCNC: 33.7 G/DL — SIGNIFICANT CHANGE UP (ref 32–36)
MCV RBC AUTO: 81.6 FL — SIGNIFICANT CHANGE UP (ref 80–100)
NRBC # BLD AUTO: 0 K/UL — SIGNIFICANT CHANGE UP (ref 0–0)
NRBC # FLD: 0 K/UL — SIGNIFICANT CHANGE UP (ref 0–0)
NRBC BLD AUTO-RTO: 0 /100 WBCS — SIGNIFICANT CHANGE UP (ref 0–0)
PHOSPHATE SERPL-MCNC: 3 MG/DL — SIGNIFICANT CHANGE UP (ref 2.5–4.5)
PLATELET # BLD AUTO: 172 K/UL — SIGNIFICANT CHANGE UP (ref 150–400)
PMV BLD: 9 FL — SIGNIFICANT CHANGE UP (ref 7–13)
POTASSIUM SERPL-MCNC: 3.3 MMOL/L — LOW (ref 3.5–5.3)
POTASSIUM SERPL-SCNC: 3.3 MMOL/L — LOW (ref 3.5–5.3)
PROT SERPL-MCNC: 6.4 G/DL — SIGNIFICANT CHANGE UP (ref 6–8.3)
PROTHROM AB SERPL-ACNC: 12 SEC — SIGNIFICANT CHANGE UP (ref 9.9–13.4)
RBC # BLD: 3.09 M/UL — LOW (ref 4.2–5.8)
RBC # FLD: 15.8 % — HIGH (ref 10.3–14.5)
SODIUM SERPL-SCNC: 141 MMOL/L — SIGNIFICANT CHANGE UP (ref 135–145)
WBC # BLD: 6.67 K/UL — SIGNIFICANT CHANGE UP (ref 3.8–10.5)
WBC # FLD AUTO: 6.67 K/UL — SIGNIFICANT CHANGE UP (ref 3.8–10.5)

## 2025-07-24 PROCEDURE — 99232 SBSQ HOSP IP/OBS MODERATE 35: CPT | Mod: GC

## 2025-07-24 PROCEDURE — 45378 DIAGNOSTIC COLONOSCOPY: CPT | Mod: GC

## 2025-07-24 RX ORDER — SODIUM CHLORIDE 9 G/1000ML
500 INJECTION, SOLUTION INTRAVENOUS
Refills: 0 | Status: DISCONTINUED | OUTPATIENT
Start: 2025-07-24 | End: 2025-07-26

## 2025-07-24 RX ADMIN — Medication 100 MILLIEQUIVALENT(S): at 19:44

## 2025-07-24 RX ADMIN — IPRATROPIUM BROMIDE AND ALBUTEROL SULFATE 3 MILLILITER(S): .5; 2.5 SOLUTION RESPIRATORY (INHALATION) at 04:03

## 2025-07-24 RX ADMIN — Medication 12.5 MILLIGRAM(S): at 21:38

## 2025-07-24 RX ADMIN — Medication 81 MILLIGRAM(S): at 12:01

## 2025-07-24 RX ADMIN — Medication 1 DOSE(S): at 10:57

## 2025-07-24 RX ADMIN — Medication 40 MILLIGRAM(S): at 06:29

## 2025-07-24 RX ADMIN — MONTELUKAST SODIUM 10 MILLIGRAM(S): 10 TABLET ORAL at 12:01

## 2025-07-24 RX ADMIN — SODIUM CHLORIDE 75 MILLILITER(S): 9 INJECTION, SOLUTION INTRAVENOUS at 12:21

## 2025-07-24 RX ADMIN — ATORVASTATIN CALCIUM 40 MILLIGRAM(S): 80 TABLET, FILM COATED ORAL at 21:38

## 2025-07-24 RX ADMIN — INSULIN LISPRO 1: 100 INJECTION, SOLUTION INTRAVENOUS; SUBCUTANEOUS at 07:39

## 2025-07-24 RX ADMIN — Medication 100 MILLIEQUIVALENT(S): at 18:24

## 2025-07-24 RX ADMIN — IPRATROPIUM BROMIDE AND ALBUTEROL SULFATE 3 MILLILITER(S): .5; 2.5 SOLUTION RESPIRATORY (INHALATION) at 09:04

## 2025-07-24 RX ADMIN — FLUTICASONE PROPIONATE 1 SPRAY(S): 50 SPRAY, METERED NASAL at 21:39

## 2025-07-24 RX ADMIN — Medication 1 DOSE(S): at 21:38

## 2025-07-24 RX ADMIN — INSULIN GLARGINE-YFGN 12 UNIT(S): 100 INJECTION, SOLUTION SUBCUTANEOUS at 21:42

## 2025-07-24 RX ADMIN — Medication 12.5 MILLIGRAM(S): at 06:28

## 2025-07-24 RX ADMIN — Medication 50 MILLIGRAM(S): at 06:28

## 2025-07-24 RX ADMIN — Medication 100 MILLIEQUIVALENT(S): at 10:57

## 2025-07-24 RX ADMIN — Medication 100 MILLIEQUIVALENT(S): at 08:33

## 2025-07-24 RX ADMIN — Medication 1 APPLICATION(S): at 12:02

## 2025-07-24 RX ADMIN — IPRATROPIUM BROMIDE AND ALBUTEROL SULFATE 3 MILLILITER(S): .5; 2.5 SOLUTION RESPIRATORY (INHALATION) at 22:27

## 2025-07-24 RX ADMIN — Medication 40 MILLIGRAM(S): at 18:25

## 2025-07-24 RX ADMIN — FLUTICASONE PROPIONATE 1 SPRAY(S): 50 SPRAY, METERED NASAL at 06:29

## 2025-07-25 ENCOUNTER — TRANSCRIPTION ENCOUNTER (OUTPATIENT)
Age: 80
End: 2025-07-25

## 2025-07-25 LAB
ALBUMIN SERPL ELPH-MCNC: 3.7 G/DL — SIGNIFICANT CHANGE UP (ref 3.3–5)
ALP SERPL-CCNC: 81 U/L — SIGNIFICANT CHANGE UP (ref 40–120)
ALT FLD-CCNC: 13 U/L — SIGNIFICANT CHANGE UP (ref 4–41)
ANION GAP SERPL CALC-SCNC: 13 MMOL/L — SIGNIFICANT CHANGE UP (ref 7–14)
AST SERPL-CCNC: 32 U/L — SIGNIFICANT CHANGE UP (ref 4–40)
BILIRUB SERPL-MCNC: 0.5 MG/DL — SIGNIFICANT CHANGE UP (ref 0.2–1.2)
BUN SERPL-MCNC: 14 MG/DL — SIGNIFICANT CHANGE UP (ref 7–23)
CALCIUM SERPL-MCNC: 8.5 MG/DL — SIGNIFICANT CHANGE UP (ref 8.4–10.5)
CHLORIDE SERPL-SCNC: 103 MMOL/L — SIGNIFICANT CHANGE UP (ref 98–107)
CO2 SERPL-SCNC: 22 MMOL/L — SIGNIFICANT CHANGE UP (ref 22–31)
CREAT SERPL-MCNC: 1.37 MG/DL — HIGH (ref 0.5–1.3)
EGFR: 52 ML/MIN/1.73M2 — LOW
EGFR: 52 ML/MIN/1.73M2 — LOW
GLUCOSE BLDC GLUCOMTR-MCNC: 153 MG/DL — HIGH (ref 70–99)
GLUCOSE BLDC GLUCOMTR-MCNC: 162 MG/DL — HIGH (ref 70–99)
GLUCOSE BLDC GLUCOMTR-MCNC: 224 MG/DL — HIGH (ref 70–99)
GLUCOSE BLDC GLUCOMTR-MCNC: 225 MG/DL — HIGH (ref 70–99)
GLUCOSE SERPL-MCNC: 139 MG/DL — HIGH (ref 70–99)
HCT VFR BLD CALC: 24.6 % — LOW (ref 39–50)
HGB BLD-MCNC: 8.1 G/DL — LOW (ref 13–17)
MAGNESIUM SERPL-MCNC: 1.8 MG/DL — SIGNIFICANT CHANGE UP (ref 1.6–2.6)
MCHC RBC-ENTMCNC: 27.5 PG — SIGNIFICANT CHANGE UP (ref 27–34)
MCHC RBC-ENTMCNC: 32.9 G/DL — SIGNIFICANT CHANGE UP (ref 32–36)
MCV RBC AUTO: 83.4 FL — SIGNIFICANT CHANGE UP (ref 80–100)
NRBC # BLD AUTO: 0 K/UL — SIGNIFICANT CHANGE UP (ref 0–0)
NRBC # FLD: 0 K/UL — SIGNIFICANT CHANGE UP (ref 0–0)
NRBC BLD AUTO-RTO: 0 /100 WBCS — SIGNIFICANT CHANGE UP (ref 0–0)
PHOSPHATE SERPL-MCNC: 3 MG/DL — SIGNIFICANT CHANGE UP (ref 2.5–4.5)
PLATELET # BLD AUTO: 193 K/UL — SIGNIFICANT CHANGE UP (ref 150–400)
PMV BLD: 9.5 FL — SIGNIFICANT CHANGE UP (ref 7–13)
POTASSIUM SERPL-MCNC: 3.5 MMOL/L — SIGNIFICANT CHANGE UP (ref 3.5–5.3)
POTASSIUM SERPL-SCNC: 3.5 MMOL/L — SIGNIFICANT CHANGE UP (ref 3.5–5.3)
PROT SERPL-MCNC: 6.2 G/DL — SIGNIFICANT CHANGE UP (ref 6–8.3)
RBC # BLD: 2.95 M/UL — LOW (ref 4.2–5.8)
RBC # FLD: 16.2 % — HIGH (ref 10.3–14.5)
SODIUM SERPL-SCNC: 138 MMOL/L — SIGNIFICANT CHANGE UP (ref 135–145)
WBC # BLD: 6.67 K/UL — SIGNIFICANT CHANGE UP (ref 3.8–10.5)
WBC # FLD AUTO: 6.67 K/UL — SIGNIFICANT CHANGE UP (ref 3.8–10.5)

## 2025-07-25 PROCEDURE — 99232 SBSQ HOSP IP/OBS MODERATE 35: CPT | Mod: GC

## 2025-07-25 RX ORDER — MOMETASONE FUROATE 50 UG/1
2 SPRAY, METERED NASAL
Refills: 0 | DISCHARGE

## 2025-07-25 RX ORDER — APIXABAN 2.5 MG/1
5 TABLET, FILM COATED ORAL EVERY 12 HOURS
Refills: 0 | Status: DISCONTINUED | OUTPATIENT
Start: 2025-07-25 | End: 2025-07-26

## 2025-07-25 RX ORDER — IPRATROPIUM BROMIDE AND ALBUTEROL SULFATE .5; 2.5 MG/3ML; MG/3ML
3 SOLUTION RESPIRATORY (INHALATION) EVERY 6 HOURS
Refills: 0 | Status: DISCONTINUED | OUTPATIENT
Start: 2025-07-25 | End: 2025-07-26

## 2025-07-25 RX ADMIN — Medication 1 APPLICATION(S): at 11:36

## 2025-07-25 RX ADMIN — INSULIN GLARGINE-YFGN 12 UNIT(S): 100 INJECTION, SOLUTION SUBCUTANEOUS at 22:00

## 2025-07-25 RX ADMIN — FLUTICASONE PROPIONATE 1 SPRAY(S): 50 SPRAY, METERED NASAL at 18:42

## 2025-07-25 RX ADMIN — Medication 1 DOSE(S): at 08:22

## 2025-07-25 RX ADMIN — INSULIN LISPRO 1: 100 INJECTION, SOLUTION INTRAVENOUS; SUBCUTANEOUS at 17:32

## 2025-07-25 RX ADMIN — APIXABAN 5 MILLIGRAM(S): 2.5 TABLET, FILM COATED ORAL at 18:13

## 2025-07-25 RX ADMIN — INSULIN LISPRO 2: 100 INJECTION, SOLUTION INTRAVENOUS; SUBCUTANEOUS at 08:24

## 2025-07-25 RX ADMIN — FLUTICASONE PROPIONATE 1 SPRAY(S): 50 SPRAY, METERED NASAL at 05:46

## 2025-07-25 RX ADMIN — IPRATROPIUM BROMIDE AND ALBUTEROL SULFATE 3 MILLILITER(S): .5; 2.5 SOLUTION RESPIRATORY (INHALATION) at 08:58

## 2025-07-25 RX ADMIN — ATORVASTATIN CALCIUM 40 MILLIGRAM(S): 80 TABLET, FILM COATED ORAL at 21:57

## 2025-07-25 RX ADMIN — Medication 50 MILLIGRAM(S): at 05:46

## 2025-07-25 RX ADMIN — INSULIN LISPRO 2: 100 INJECTION, SOLUTION INTRAVENOUS; SUBCUTANEOUS at 11:34

## 2025-07-25 RX ADMIN — Medication 12.5 MILLIGRAM(S): at 21:57

## 2025-07-25 RX ADMIN — Medication 81 MILLIGRAM(S): at 11:33

## 2025-07-25 RX ADMIN — Medication 12.5 MILLIGRAM(S): at 05:45

## 2025-07-25 RX ADMIN — Medication 40 MILLIGRAM(S): at 05:45

## 2025-07-25 RX ADMIN — IPRATROPIUM BROMIDE AND ALBUTEROL SULFATE 3 MILLILITER(S): .5; 2.5 SOLUTION RESPIRATORY (INHALATION) at 21:13

## 2025-07-25 RX ADMIN — Medication 12.5 MILLIGRAM(S): at 14:22

## 2025-07-25 RX ADMIN — Medication 1 DOSE(S): at 21:57

## 2025-07-25 RX ADMIN — MONTELUKAST SODIUM 10 MILLIGRAM(S): 10 TABLET ORAL at 11:34

## 2025-07-25 RX ADMIN — Medication 40 MILLIGRAM(S): at 18:43

## 2025-07-26 ENCOUNTER — TRANSCRIPTION ENCOUNTER (OUTPATIENT)
Age: 80
End: 2025-07-26

## 2025-07-26 VITALS
OXYGEN SATURATION: 100 % | DIASTOLIC BLOOD PRESSURE: 85 MMHG | SYSTOLIC BLOOD PRESSURE: 158 MMHG | RESPIRATION RATE: 18 BRPM | HEART RATE: 87 BPM | TEMPERATURE: 98 F

## 2025-07-26 LAB
ALBUMIN SERPL ELPH-MCNC: 3.5 G/DL — SIGNIFICANT CHANGE UP (ref 3.3–5)
ALP SERPL-CCNC: 81 U/L — SIGNIFICANT CHANGE UP (ref 40–120)
ALT FLD-CCNC: 14 U/L — SIGNIFICANT CHANGE UP (ref 4–41)
ANION GAP SERPL CALC-SCNC: 14 MMOL/L — SIGNIFICANT CHANGE UP (ref 7–14)
AST SERPL-CCNC: 33 U/L — SIGNIFICANT CHANGE UP (ref 4–40)
BILIRUB SERPL-MCNC: 0.5 MG/DL — SIGNIFICANT CHANGE UP (ref 0.2–1.2)
BUN SERPL-MCNC: 14 MG/DL — SIGNIFICANT CHANGE UP (ref 7–23)
CALCIUM SERPL-MCNC: 8.7 MG/DL — SIGNIFICANT CHANGE UP (ref 8.4–10.5)
CHLORIDE SERPL-SCNC: 102 MMOL/L — SIGNIFICANT CHANGE UP (ref 98–107)
CO2 SERPL-SCNC: 21 MMOL/L — LOW (ref 22–31)
CREAT SERPL-MCNC: 1.48 MG/DL — HIGH (ref 0.5–1.3)
EGFR: 48 ML/MIN/1.73M2 — LOW
EGFR: 48 ML/MIN/1.73M2 — LOW
GLUCOSE BLDC GLUCOMTR-MCNC: 159 MG/DL — HIGH (ref 70–99)
GLUCOSE BLDC GLUCOMTR-MCNC: 164 MG/DL — HIGH (ref 70–99)
GLUCOSE SERPL-MCNC: 131 MG/DL — HIGH (ref 70–99)
HCT VFR BLD CALC: 24.9 % — LOW (ref 39–50)
HGB BLD-MCNC: 8.2 G/DL — LOW (ref 13–17)
MAGNESIUM SERPL-MCNC: 1.9 MG/DL — SIGNIFICANT CHANGE UP (ref 1.6–2.6)
MCHC RBC-ENTMCNC: 27.1 PG — SIGNIFICANT CHANGE UP (ref 27–34)
MCHC RBC-ENTMCNC: 32.9 G/DL — SIGNIFICANT CHANGE UP (ref 32–36)
MCV RBC AUTO: 82.2 FL — SIGNIFICANT CHANGE UP (ref 80–100)
NRBC # BLD AUTO: 0 K/UL — SIGNIFICANT CHANGE UP (ref 0–0)
NRBC # FLD: 0 K/UL — SIGNIFICANT CHANGE UP (ref 0–0)
NRBC BLD AUTO-RTO: 0 /100 WBCS — SIGNIFICANT CHANGE UP (ref 0–0)
PHOSPHATE SERPL-MCNC: 3.3 MG/DL — SIGNIFICANT CHANGE UP (ref 2.5–4.5)
PLATELET # BLD AUTO: 203 K/UL — SIGNIFICANT CHANGE UP (ref 150–400)
PMV BLD: 9.1 FL — SIGNIFICANT CHANGE UP (ref 7–13)
POTASSIUM SERPL-MCNC: 3.6 MMOL/L — SIGNIFICANT CHANGE UP (ref 3.5–5.3)
POTASSIUM SERPL-SCNC: 3.6 MMOL/L — SIGNIFICANT CHANGE UP (ref 3.5–5.3)
PROT SERPL-MCNC: 6.4 G/DL — SIGNIFICANT CHANGE UP (ref 6–8.3)
RBC # BLD: 3.03 M/UL — LOW (ref 4.2–5.8)
RBC # FLD: 16.1 % — HIGH (ref 10.3–14.5)
SODIUM SERPL-SCNC: 137 MMOL/L — SIGNIFICANT CHANGE UP (ref 135–145)
WBC # BLD: 6.88 K/UL — SIGNIFICANT CHANGE UP (ref 3.8–10.5)
WBC # FLD AUTO: 6.88 K/UL — SIGNIFICANT CHANGE UP (ref 3.8–10.5)

## 2025-07-26 PROCEDURE — 99239 HOSP IP/OBS DSCHRG MGMT >30: CPT | Mod: GC

## 2025-07-26 RX ORDER — ALBUTEROL SULFATE 2.5 MG/3ML
2 VIAL, NEBULIZER (ML) INHALATION
Refills: 0 | DISCHARGE

## 2025-07-26 RX ORDER — IMATINIB MESYLATE 100 MG/1
1 TABLET ORAL
Refills: 0 | DISCHARGE

## 2025-07-26 RX ORDER — IMATINIB MESYLATE 100 MG/1
1 TABLET ORAL
Qty: 30 | Refills: 0
Start: 2025-07-26 | End: 2025-08-24

## 2025-07-26 RX ORDER — ALBUTEROL SULFATE 2.5 MG/3ML
2 VIAL, NEBULIZER (ML) INHALATION
Qty: 1 | Refills: 0
Start: 2025-07-26

## 2025-07-26 RX ADMIN — APIXABAN 5 MILLIGRAM(S): 2.5 TABLET, FILM COATED ORAL at 05:03

## 2025-07-26 RX ADMIN — Medication 12.5 MILLIGRAM(S): at 05:03

## 2025-07-26 RX ADMIN — Medication 81 MILLIGRAM(S): at 12:08

## 2025-07-26 RX ADMIN — Medication 1 DOSE(S): at 08:31

## 2025-07-26 RX ADMIN — INSULIN LISPRO 1: 100 INJECTION, SOLUTION INTRAVENOUS; SUBCUTANEOUS at 08:32

## 2025-07-26 RX ADMIN — Medication 1 APPLICATION(S): at 12:07

## 2025-07-26 RX ADMIN — IPRATROPIUM BROMIDE AND ALBUTEROL SULFATE 3 MILLILITER(S): .5; 2.5 SOLUTION RESPIRATORY (INHALATION) at 03:04

## 2025-07-26 RX ADMIN — MONTELUKAST SODIUM 10 MILLIGRAM(S): 10 TABLET ORAL at 12:07

## 2025-07-26 RX ADMIN — Medication 40 MILLIGRAM(S): at 05:03

## 2025-07-26 RX ADMIN — Medication 40 MILLIEQUIVALENT(S): at 10:05

## 2025-07-26 RX ADMIN — IPRATROPIUM BROMIDE AND ALBUTEROL SULFATE 3 MILLILITER(S): .5; 2.5 SOLUTION RESPIRATORY (INHALATION) at 10:07

## 2025-07-26 RX ADMIN — INSULIN LISPRO 1: 100 INJECTION, SOLUTION INTRAVENOUS; SUBCUTANEOUS at 12:09

## 2025-07-26 RX ADMIN — Medication 50 MILLIGRAM(S): at 05:03

## 2025-07-26 RX ADMIN — FLUTICASONE PROPIONATE 1 SPRAY(S): 50 SPRAY, METERED NASAL at 05:03

## 2025-07-28 ENCOUNTER — NON-APPOINTMENT (OUTPATIENT)
Age: 80
End: 2025-07-28

## 2025-07-30 PROBLEM — C49.A0 GASTROINTESTINAL STROMAL TUMOR, UNSPECIFIED SITE: Chronic | Status: ACTIVE | Noted: 2025-07-20

## 2025-07-30 PROBLEM — Z95.2 PRESENCE OF PROSTHETIC HEART VALVE: Chronic | Status: ACTIVE | Noted: 2025-07-20

## 2025-08-06 ENCOUNTER — APPOINTMENT (OUTPATIENT)
Dept: HOME HEALTH SERVICES | Facility: HOME HEALTH | Age: 80
End: 2025-08-06
Payer: MEDICARE

## 2025-08-06 VITALS
RESPIRATION RATE: 14 BRPM | HEART RATE: 75 BPM | WEIGHT: 194 LBS | SYSTOLIC BLOOD PRESSURE: 108 MMHG | OXYGEN SATURATION: 98 % | DIASTOLIC BLOOD PRESSURE: 60 MMHG | BODY MASS INDEX: 32.28 KG/M2

## 2025-08-06 DIAGNOSIS — N39.0 URINARY TRACT INFECTION, SITE NOT SPECIFIED: ICD-10-CM

## 2025-08-06 DIAGNOSIS — M54.50 LOW BACK PAIN, UNSPECIFIED: ICD-10-CM

## 2025-08-06 DIAGNOSIS — H92.11 OTORRHEA, RIGHT EAR: ICD-10-CM

## 2025-08-06 DIAGNOSIS — G47.33 OBSTRUCTIVE SLEEP APNEA (ADULT) (PEDIATRIC): ICD-10-CM

## 2025-08-06 DIAGNOSIS — Z85.09 PERSONAL HISTORY OF MALIGNANT NEOPLASM OF OTHER DIGESTIVE ORGANS: ICD-10-CM

## 2025-08-06 DIAGNOSIS — J45.40 MODERATE PERSISTENT ASTHMA, UNCOMPLICATED: ICD-10-CM

## 2025-08-06 DIAGNOSIS — E78.5 HYPERLIPIDEMIA, UNSPECIFIED: ICD-10-CM

## 2025-08-06 DIAGNOSIS — K92.1 MELENA: ICD-10-CM

## 2025-08-06 DIAGNOSIS — Z87.898 PERSONAL HISTORY OF OTHER SPECIFIED CONDITIONS: ICD-10-CM

## 2025-08-06 DIAGNOSIS — H92.01 OTALGIA, RIGHT EAR: ICD-10-CM

## 2025-08-06 DIAGNOSIS — E11.9 TYPE 2 DIABETES MELLITUS W/OUT COMPLICATIONS: ICD-10-CM

## 2025-08-06 DIAGNOSIS — I26.99 OTHER PULMONARY EMBOLISM W/OUT ACUTE COR PULMONALE: ICD-10-CM

## 2025-08-06 DIAGNOSIS — H92.12 OTORRHEA, LEFT EAR: ICD-10-CM

## 2025-08-06 DIAGNOSIS — R42 DIZZINESS AND GIDDINESS: ICD-10-CM

## 2025-08-06 DIAGNOSIS — H61.23 IMPACTED CERUMEN, BILATERAL: ICD-10-CM

## 2025-08-06 DIAGNOSIS — R09.89 OTHER SPECIFIED SYMPTOMS AND SIGNS INVOLVING THE CIRCULATORY AND RESPIRATORY SYSTEMS: ICD-10-CM

## 2025-08-06 DIAGNOSIS — R80.9 PROTEINURIA, UNSPECIFIED: ICD-10-CM

## 2025-08-06 DIAGNOSIS — B36.9 SUPERFICIAL MYCOSIS, UNSPECIFIED: ICD-10-CM

## 2025-08-06 DIAGNOSIS — H62.40 SUPERFICIAL MYCOSIS, UNSPECIFIED: ICD-10-CM

## 2025-08-06 DIAGNOSIS — H92.22 OTORRHAGIA, LEFT EAR: ICD-10-CM

## 2025-08-06 DIAGNOSIS — I25.10 ATHEROSCLEROTIC HEART DISEASE OF NATIVE CORONARY ARTERY W/OUT ANGINA PECTORIS: ICD-10-CM

## 2025-08-06 DIAGNOSIS — K21.9 GASTRO-ESOPHAGEAL REFLUX DISEASE W/OUT ESOPHAGITIS: ICD-10-CM

## 2025-08-06 DIAGNOSIS — E11.43 TYPE 2 DIABETES MELLITUS WITH DIABETIC AUTONOMIC (POLY)NEUROPATHY: ICD-10-CM

## 2025-08-06 DIAGNOSIS — K59.09 OTHER CONSTIPATION: ICD-10-CM

## 2025-08-06 DIAGNOSIS — I10 ESSENTIAL (PRIMARY) HYPERTENSION: ICD-10-CM

## 2025-08-06 DIAGNOSIS — Z86.69 PERSONAL HISTORY OF OTHER DISEASES OF THE NERVOUS SYSTEM AND SENSE ORGANS: ICD-10-CM

## 2025-08-06 DIAGNOSIS — R06.09 OTHER FORMS OF DYSPNEA: ICD-10-CM

## 2025-08-06 DIAGNOSIS — Z71.89 OTHER SPECIFIED COUNSELING: ICD-10-CM

## 2025-08-06 DIAGNOSIS — Z01.818 ENCOUNTER FOR OTHER PREPROCEDURAL EXAMINATION: ICD-10-CM

## 2025-08-06 DIAGNOSIS — H04.129 DRY EYE SYNDROME OF UNSPECIFIED LACRIMAL GLAND: ICD-10-CM

## 2025-08-06 DIAGNOSIS — H60.392 OTHER INFECTIVE OTITIS EXTERNA, LEFT EAR: ICD-10-CM

## 2025-08-06 PROCEDURE — 99349 HOME/RES VST EST MOD MDM 40: CPT

## 2025-08-06 RX ORDER — METOPROLOL TARTRATE 100 MG/1
100 TABLET ORAL
Refills: 0 | Status: COMPLETED | COMMUNITY
Start: 2025-07-28 | End: 2025-08-06

## 2025-08-06 RX ORDER — IMATINIB MESYLATE 400 MG/1
400 TABLET, FILM COATED ORAL
Refills: 0 | Status: COMPLETED | COMMUNITY
Start: 2025-07-28 | End: 2025-08-06

## 2025-08-08 LAB
ALBUMIN SERPL ELPH-MCNC: 3.7 G/DL
ALP BLD-CCNC: 73 U/L
ALT SERPL-CCNC: 16 U/L
ANION GAP SERPL CALC-SCNC: 13 MMOL/L
AST SERPL-CCNC: 51 U/L
BILIRUB SERPL-MCNC: 0.4 MG/DL
BUN SERPL-MCNC: 26 MG/DL
CALCIUM SERPL-MCNC: 8.3 MG/DL
CHLORIDE SERPL-SCNC: 101 MMOL/L
CHOLEST SERPL-MCNC: 110 MG/DL
CO2 SERPL-SCNC: 22 MMOL/L
CREAT SERPL-MCNC: 1.99 MG/DL
EGFRCR SERPLBLD CKD-EPI 2021: 33 ML/MIN/1.73M2
ESTIMATED AVERAGE GLUCOSE: 117 MG/DL
GLUCOSE SERPL-MCNC: 100 MG/DL
HBA1C MFR BLD HPLC: 5.7 %
HCT VFR BLD CALC: 24.4 %
HDLC SERPL-MCNC: 33 MG/DL
HGB BLD-MCNC: 7.6 G/DL
LDLC SERPL-MCNC: 47 MG/DL
MCHC RBC-ENTMCNC: 25.9 PG
MCHC RBC-ENTMCNC: 31.1 G/DL
MCV RBC AUTO: 83 FL
NONHDLC SERPL-MCNC: 77 MG/DL
PLATELET # BLD AUTO: 204 K/UL
POTASSIUM SERPL-SCNC: 3.9 MMOL/L
PROT SERPL-MCNC: 6.2 G/DL
RBC # BLD: 2.94 M/UL
RBC # FLD: 17.1 %
SODIUM SERPL-SCNC: 136 MMOL/L
TRIGL SERPL-MCNC: 182 MG/DL
WBC # FLD AUTO: 6 K/UL

## 2025-08-10 ENCOUNTER — INPATIENT (INPATIENT)
Facility: HOSPITAL | Age: 80
LOS: 2 days | Discharge: HOME HEALTH | End: 2025-08-13
Attending: HOSPITALIST | Admitting: HOSPITALIST
Payer: MEDICARE

## 2025-08-10 VITALS
OXYGEN SATURATION: 97 % | DIASTOLIC BLOOD PRESSURE: 58 MMHG | WEIGHT: 194.01 LBS | RESPIRATION RATE: 16 BRPM | HEART RATE: 78 BPM | SYSTOLIC BLOOD PRESSURE: 100 MMHG | HEIGHT: 65 IN | TEMPERATURE: 98 F

## 2025-08-10 DIAGNOSIS — Z90.49 ACQUIRED ABSENCE OF OTHER SPECIFIED PARTS OF DIGESTIVE TRACT: Chronic | ICD-10-CM

## 2025-08-10 DIAGNOSIS — D64.9 ANEMIA, UNSPECIFIED: ICD-10-CM

## 2025-08-10 DIAGNOSIS — Z95.2 PRESENCE OF PROSTHETIC HEART VALVE: Chronic | ICD-10-CM

## 2025-08-10 DIAGNOSIS — C49.A0 GASTROINTESTINAL STROMAL TUMOR, UNSPECIFIED SITE: ICD-10-CM

## 2025-08-10 DIAGNOSIS — K92.2 GASTROINTESTINAL HEMORRHAGE, UNSPECIFIED: ICD-10-CM

## 2025-08-10 DIAGNOSIS — I25.10 ATHEROSCLEROTIC HEART DISEASE OF NATIVE CORONARY ARTERY WITHOUT ANGINA PECTORIS: ICD-10-CM

## 2025-08-10 DIAGNOSIS — I10 ESSENTIAL (PRIMARY) HYPERTENSION: ICD-10-CM

## 2025-08-10 DIAGNOSIS — Z29.9 ENCOUNTER FOR PROPHYLACTIC MEASURES, UNSPECIFIED: ICD-10-CM

## 2025-08-10 DIAGNOSIS — I26.99 OTHER PULMONARY EMBOLISM WITHOUT ACUTE COR PULMONALE: ICD-10-CM

## 2025-08-10 DIAGNOSIS — E11.9 TYPE 2 DIABETES MELLITUS WITHOUT COMPLICATIONS: ICD-10-CM

## 2025-08-10 DIAGNOSIS — N17.9 ACUTE KIDNEY FAILURE, UNSPECIFIED: ICD-10-CM

## 2025-08-10 DIAGNOSIS — E78.5 HYPERLIPIDEMIA, UNSPECIFIED: ICD-10-CM

## 2025-08-10 DIAGNOSIS — Z98.890 OTHER SPECIFIED POSTPROCEDURAL STATES: Chronic | ICD-10-CM

## 2025-08-10 LAB
ALBUMIN SERPL ELPH-MCNC: 3.5 G/DL — SIGNIFICANT CHANGE UP (ref 3.3–5)
ALP SERPL-CCNC: 70 U/L — SIGNIFICANT CHANGE UP (ref 40–120)
ALT FLD-CCNC: 14 U/L — SIGNIFICANT CHANGE UP (ref 4–41)
ANION GAP SERPL CALC-SCNC: 12 MMOL/L — SIGNIFICANT CHANGE UP (ref 7–14)
ANISOCYTOSIS BLD QL: SLIGHT — SIGNIFICANT CHANGE UP
APTT BLD: 31.6 SEC — SIGNIFICANT CHANGE UP (ref 26.1–36.8)
AST SERPL-CCNC: 41 U/L — HIGH (ref 4–40)
BASOPHILS # BLD AUTO: 0.03 K/UL — SIGNIFICANT CHANGE UP (ref 0–0.2)
BASOPHILS # BLD MANUAL: 0.09 K/UL — SIGNIFICANT CHANGE UP (ref 0–0.2)
BASOPHILS NFR BLD AUTO: 0.6 % — SIGNIFICANT CHANGE UP (ref 0–2)
BASOPHILS NFR BLD MANUAL: 1.7 % — SIGNIFICANT CHANGE UP (ref 0–2)
BILIRUB SERPL-MCNC: 0.4 MG/DL — SIGNIFICANT CHANGE UP (ref 0.2–1.2)
BLD GP AB SCN SERPL QL: NEGATIVE — SIGNIFICANT CHANGE UP
BUN SERPL-MCNC: 25 MG/DL — HIGH (ref 7–23)
BURR CELLS BLD QL SMEAR: SLIGHT — SIGNIFICANT CHANGE UP
CALCIUM SERPL-MCNC: 8.2 MG/DL — LOW (ref 8.4–10.5)
CHLORIDE SERPL-SCNC: 103 MMOL/L — SIGNIFICANT CHANGE UP (ref 98–107)
CO2 SERPL-SCNC: 22 MMOL/L — SIGNIFICANT CHANGE UP (ref 22–31)
CREAT SERPL-MCNC: 2.17 MG/DL — HIGH (ref 0.5–1.3)
EGFR: 30 ML/MIN/1.73M2 — LOW
EGFR: 30 ML/MIN/1.73M2 — LOW
ELLIPTOCYTES BLD QL SMEAR: SLIGHT — SIGNIFICANT CHANGE UP
EOSINOPHIL # BLD AUTO: 0.15 K/UL — SIGNIFICANT CHANGE UP (ref 0–0.5)
EOSINOPHIL # BLD MANUAL: 0.18 K/UL — SIGNIFICANT CHANGE UP (ref 0–0.5)
EOSINOPHIL NFR BLD AUTO: 2.9 % — SIGNIFICANT CHANGE UP (ref 0–6)
EOSINOPHIL NFR BLD MANUAL: 3.4 % — SIGNIFICANT CHANGE UP (ref 0–6)
GIANT PLATELETS BLD QL SMEAR: PRESENT
GLUCOSE SERPL-MCNC: 120 MG/DL — HIGH (ref 70–99)
HCT VFR BLD CALC: 23.4 % — LOW (ref 39–50)
HGB BLD-MCNC: 7.4 G/DL — LOW (ref 13–17)
HYPOCHROMIA BLD QL: SLIGHT — SIGNIFICANT CHANGE UP
IMM GRANULOCYTES # BLD AUTO: 0.02 K/UL — SIGNIFICANT CHANGE UP (ref 0–0.07)
IMM GRANULOCYTES NFR BLD AUTO: 0.4 % — SIGNIFICANT CHANGE UP (ref 0–0.9)
INR BLD: 1.4 RATIO — HIGH (ref 0.85–1.16)
LYMPHOCYTES # BLD AUTO: 1.55 K/UL — SIGNIFICANT CHANGE UP (ref 1–3.3)
LYMPHOCYTES # BLD MANUAL: 1.07 K/UL — SIGNIFICANT CHANGE UP (ref 1–3.3)
LYMPHOCYTES NFR BLD AUTO: 29.8 % — SIGNIFICANT CHANGE UP (ref 13–44)
LYMPHOCYTES NFR BLD MANUAL: 20.5 % — SIGNIFICANT CHANGE UP (ref 13–44)
MANUAL MYELOCYTE #: 0.05 K/UL — HIGH (ref 0–0)
MANUAL OTHER CELLS #: 0.05 K/UL — HIGH (ref 0–0)
MANUAL OTHER CELLS %: 0.9 % — HIGH (ref 0–0)
MCHC RBC-ENTMCNC: 26.1 PG — LOW (ref 27–34)
MCHC RBC-ENTMCNC: 31.6 G/DL — LOW (ref 32–36)
MCV RBC AUTO: 82.4 FL — SIGNIFICANT CHANGE UP (ref 80–100)
MICROCYTES BLD QL: SLIGHT — SIGNIFICANT CHANGE UP
MONOCYTES # BLD AUTO: 0.89 K/UL — SIGNIFICANT CHANGE UP (ref 0–0.9)
MONOCYTES # BLD MANUAL: 0.35 K/UL — SIGNIFICANT CHANGE UP (ref 0–0.9)
MONOCYTES NFR BLD AUTO: 17.1 % — HIGH (ref 2–14)
MONOCYTES NFR BLD MANUAL: 6.8 % — SIGNIFICANT CHANGE UP (ref 2–14)
MYELOCYTES NFR BLD: 0.9 % — HIGH (ref 0–0)
NEUTROPHILS # BLD AUTO: 2.57 K/UL — SIGNIFICANT CHANGE UP (ref 1.8–7.4)
NEUTROPHILS # BLD MANUAL: 3.43 K/UL — SIGNIFICANT CHANGE UP (ref 1.8–7.4)
NEUTROPHILS NFR BLD AUTO: 49.2 % — SIGNIFICANT CHANGE UP (ref 43–77)
NEUTROPHILS NFR BLD MANUAL: 65.8 % — SIGNIFICANT CHANGE UP (ref 43–77)
NRBC # BLD AUTO: 0 K/UL — SIGNIFICANT CHANGE UP (ref 0–0)
NRBC # FLD: 0 K/UL — SIGNIFICANT CHANGE UP (ref 0–0)
NRBC BLD AUTO-RTO: 0 /100 WBCS — SIGNIFICANT CHANGE UP (ref 0–0)
NT-PROBNP SERPL-SCNC: 491 PG/ML — HIGH
OB PNL STL: POSITIVE
OVALOCYTES BLD QL SMEAR: SLIGHT — SIGNIFICANT CHANGE UP
PLAT MORPH BLD: ABNORMAL
PLATELET # BLD AUTO: 180 K/UL — SIGNIFICANT CHANGE UP (ref 150–400)
PLATELET COUNT - ESTIMATE: NORMAL — SIGNIFICANT CHANGE UP
PMV BLD: 8.9 FL — SIGNIFICANT CHANGE UP (ref 7–13)
POIKILOCYTOSIS BLD QL AUTO: SLIGHT — SIGNIFICANT CHANGE UP
POLYCHROMASIA BLD QL SMEAR: SLIGHT — SIGNIFICANT CHANGE UP
POTASSIUM SERPL-MCNC: 3.9 MMOL/L — SIGNIFICANT CHANGE UP (ref 3.5–5.3)
POTASSIUM SERPL-SCNC: 3.9 MMOL/L — SIGNIFICANT CHANGE UP (ref 3.5–5.3)
PROT SERPL-MCNC: 6.1 G/DL — SIGNIFICANT CHANGE UP (ref 6–8.3)
PROTHROM AB SERPL-ACNC: 16.2 SEC — HIGH (ref 9.9–13.4)
RBC # BLD: 2.84 M/UL — LOW (ref 4.2–5.8)
RBC # FLD: 17.2 % — HIGH (ref 10.3–14.5)
RBC BLD AUTO: SIGNIFICANT CHANGE UP
RH IG SCN BLD-IMP: POSITIVE — SIGNIFICANT CHANGE UP
SCHISTOCYTES BLD QL AUTO: SLIGHT — SIGNIFICANT CHANGE UP
SMUDGE CELLS # BLD: PRESENT
SODIUM SERPL-SCNC: 137 MMOL/L — SIGNIFICANT CHANGE UP (ref 135–145)
TROPONIN T, HIGH SENSITIVITY RESULT: 31 NG/L — SIGNIFICANT CHANGE UP
TROPONIN T, HIGH SENSITIVITY RESULT: 34 NG/L — SIGNIFICANT CHANGE UP
WBC # BLD: 5.21 K/UL — SIGNIFICANT CHANGE UP (ref 3.8–10.5)
WBC # FLD AUTO: 5.21 K/UL — SIGNIFICANT CHANGE UP (ref 3.8–10.5)

## 2025-08-10 PROCEDURE — 71046 X-RAY EXAM CHEST 2 VIEWS: CPT | Mod: 26

## 2025-08-10 PROCEDURE — 99223 1ST HOSP IP/OBS HIGH 75: CPT

## 2025-08-10 PROCEDURE — 99285 EMERGENCY DEPT VISIT HI MDM: CPT

## 2025-08-10 PROCEDURE — G0316 PROLONG INPT EVAL ADD15 M: CPT

## 2025-08-10 RX ORDER — DEXTROSE 50 % IN WATER 50 %
12.5 SYRINGE (ML) INTRAVENOUS ONCE
Refills: 0 | Status: DISCONTINUED | OUTPATIENT
Start: 2025-08-10 | End: 2025-08-13

## 2025-08-10 RX ORDER — GLUCAGON 3 MG/1
1 POWDER NASAL ONCE
Refills: 0 | Status: DISCONTINUED | OUTPATIENT
Start: 2025-08-10 | End: 2025-08-13

## 2025-08-10 RX ORDER — DEXTROSE 50 % IN WATER 50 %
25 SYRINGE (ML) INTRAVENOUS ONCE
Refills: 0 | Status: DISCONTINUED | OUTPATIENT
Start: 2025-08-10 | End: 2025-08-13

## 2025-08-10 RX ORDER — ONDANSETRON HCL/PF 4 MG/2 ML
4 VIAL (ML) INJECTION EVERY 8 HOURS
Refills: 0 | Status: DISCONTINUED | OUTPATIENT
Start: 2025-08-10 | End: 2025-08-13

## 2025-08-10 RX ORDER — INSULIN LISPRO 100 U/ML
INJECTION, SOLUTION INTRAVENOUS; SUBCUTANEOUS
Refills: 0 | Status: DISCONTINUED | OUTPATIENT
Start: 2025-08-10 | End: 2025-08-13

## 2025-08-10 RX ORDER — SODIUM CHLORIDE 9 G/1000ML
1000 INJECTION, SOLUTION INTRAVENOUS
Refills: 0 | Status: DISCONTINUED | OUTPATIENT
Start: 2025-08-10 | End: 2025-08-13

## 2025-08-10 RX ORDER — DEXTROSE 50 % IN WATER 50 %
15 SYRINGE (ML) INTRAVENOUS ONCE
Refills: 0 | Status: DISCONTINUED | OUTPATIENT
Start: 2025-08-10 | End: 2025-08-13

## 2025-08-10 RX ORDER — INSULIN LISPRO 100 U/ML
INJECTION, SOLUTION INTRAVENOUS; SUBCUTANEOUS AT BEDTIME
Refills: 0 | Status: DISCONTINUED | OUTPATIENT
Start: 2025-08-10 | End: 2025-08-13

## 2025-08-10 RX ADMIN — Medication 80 MILLIGRAM(S): at 18:11

## 2025-08-11 ENCOUNTER — NON-APPOINTMENT (OUTPATIENT)
Age: 80
End: 2025-08-11

## 2025-08-11 DIAGNOSIS — R06.02 SHORTNESS OF BREATH: ICD-10-CM

## 2025-08-11 LAB
ANION GAP SERPL CALC-SCNC: 15 MMOL/L — HIGH (ref 7–14)
APPEARANCE UR: CLEAR — SIGNIFICANT CHANGE UP
BILIRUB UR-MCNC: NEGATIVE — SIGNIFICANT CHANGE UP
BUN SERPL-MCNC: 20 MG/DL — SIGNIFICANT CHANGE UP (ref 7–23)
CALCIUM SERPL-MCNC: 8.9 MG/DL — SIGNIFICANT CHANGE UP (ref 8.4–10.5)
CHLORIDE SERPL-SCNC: 103 MMOL/L — SIGNIFICANT CHANGE UP (ref 98–107)
CO2 SERPL-SCNC: 20 MMOL/L — LOW (ref 22–31)
COLOR SPEC: YELLOW — SIGNIFICANT CHANGE UP
CREAT ?TM UR-MCNC: 26 MG/DL — SIGNIFICANT CHANGE UP
CREAT SERPL-MCNC: 1.75 MG/DL — HIGH (ref 0.5–1.3)
DIFF PNL FLD: NEGATIVE — SIGNIFICANT CHANGE UP
EGFR: 39 ML/MIN/1.73M2 — LOW
EGFR: 39 ML/MIN/1.73M2 — LOW
GLUCOSE SERPL-MCNC: 129 MG/DL — HIGH (ref 70–99)
GLUCOSE UR QL: NEGATIVE MG/DL — SIGNIFICANT CHANGE UP
HCT VFR BLD CALC: 30.9 % — LOW (ref 39–50)
HGB BLD-MCNC: 9.9 G/DL — LOW (ref 13–17)
KETONES UR QL: NEGATIVE MG/DL — SIGNIFICANT CHANGE UP
LEUKOCYTE ESTERASE UR-ACNC: NEGATIVE — SIGNIFICANT CHANGE UP
MAGNESIUM SERPL-MCNC: 2.2 MG/DL — SIGNIFICANT CHANGE UP (ref 1.6–2.6)
MCHC RBC-ENTMCNC: 26.5 PG — LOW (ref 27–34)
MCHC RBC-ENTMCNC: 32 G/DL — SIGNIFICANT CHANGE UP (ref 32–36)
MCV RBC AUTO: 82.6 FL — SIGNIFICANT CHANGE UP (ref 80–100)
MRSA PCR RESULT.: SIGNIFICANT CHANGE UP
NITRITE UR-MCNC: NEGATIVE — SIGNIFICANT CHANGE UP
NRBC # BLD AUTO: 0 K/UL — SIGNIFICANT CHANGE UP (ref 0–0)
NRBC # FLD: 0 K/UL — SIGNIFICANT CHANGE UP (ref 0–0)
NRBC BLD AUTO-RTO: 0 /100 WBCS — SIGNIFICANT CHANGE UP (ref 0–0)
OSMOLALITY UR: 353 MOSM/KG — SIGNIFICANT CHANGE UP (ref 50–1200)
PH UR: 7 — SIGNIFICANT CHANGE UP (ref 5–8)
PHOSPHATE SERPL-MCNC: 3.2 MG/DL — SIGNIFICANT CHANGE UP (ref 2.5–4.5)
PLATELET # BLD AUTO: 197 K/UL — SIGNIFICANT CHANGE UP (ref 150–400)
PMV BLD: 8.7 FL — SIGNIFICANT CHANGE UP (ref 7–13)
POTASSIUM SERPL-MCNC: 3.6 MMOL/L — SIGNIFICANT CHANGE UP (ref 3.5–5.3)
POTASSIUM SERPL-SCNC: 3.6 MMOL/L — SIGNIFICANT CHANGE UP (ref 3.5–5.3)
POTASSIUM UR-SCNC: 30.1 MMOL/L — SIGNIFICANT CHANGE UP
PROT ?TM UR-MCNC: 17 MG/DL — SIGNIFICANT CHANGE UP
PROT UR-MCNC: SIGNIFICANT CHANGE UP MG/DL
PROT/CREAT UR-RTO: 0.6 RATIO — HIGH (ref 0–0.2)
RBC # BLD: 3.74 M/UL — LOW (ref 4.2–5.8)
RBC # FLD: 16.9 % — HIGH (ref 10.3–14.5)
S AUREUS DNA NOSE QL NAA+PROBE: SIGNIFICANT CHANGE UP
SODIUM SERPL-SCNC: 138 MMOL/L — SIGNIFICANT CHANGE UP (ref 135–145)
SODIUM UR-SCNC: 127 MMOL/L — SIGNIFICANT CHANGE UP
SP GR SPEC: 1.01 — SIGNIFICANT CHANGE UP (ref 1–1.03)
UROBILINOGEN FLD QL: 0.2 MG/DL — SIGNIFICANT CHANGE UP (ref 0.2–1)
UUN UR-MCNC: 209.1 MG/DL — SIGNIFICANT CHANGE UP
WBC # BLD: 6.54 K/UL — SIGNIFICANT CHANGE UP (ref 3.8–10.5)
WBC # FLD AUTO: 6.54 K/UL — SIGNIFICANT CHANGE UP (ref 3.8–10.5)

## 2025-08-11 PROCEDURE — 99222 1ST HOSP IP/OBS MODERATE 55: CPT | Mod: GC

## 2025-08-11 PROCEDURE — 99233 SBSQ HOSP IP/OBS HIGH 50: CPT

## 2025-08-11 RX ORDER — MONTELUKAST SODIUM 10 MG/1
10 TABLET ORAL DAILY
Refills: 0 | Status: DISCONTINUED | OUTPATIENT
Start: 2025-08-11 | End: 2025-08-13

## 2025-08-11 RX ORDER — TRIAMCINOLONE ACETONIDE 1 MG/G
1 CREAM TOPICAL
Refills: 0 | DISCHARGE

## 2025-08-11 RX ORDER — ALBUTEROL SULFATE 2.5 MG/3ML
2 VIAL, NEBULIZER (ML) INHALATION EVERY 6 HOURS
Refills: 0 | Status: DISCONTINUED | OUTPATIENT
Start: 2025-08-11 | End: 2025-08-13

## 2025-08-11 RX ORDER — APIXABAN 2.5 MG/1
1 TABLET, FILM COATED ORAL
Refills: 0 | DISCHARGE

## 2025-08-11 RX ORDER — FLUTICASONE PROPIONATE 50 UG/1
1 SPRAY, METERED NASAL
Refills: 0 | DISCHARGE

## 2025-08-11 RX ORDER — LANOLIN/MINERAL OIL/PETROLATUM
1 OINTMENT (GRAM) OPHTHALMIC (EYE) DAILY
Refills: 0 | Status: DISCONTINUED | OUTPATIENT
Start: 2025-08-11 | End: 2025-08-13

## 2025-08-11 RX ORDER — LORATADINE 5 MG/5ML
1 SOLUTION ORAL
Refills: 0 | DISCHARGE

## 2025-08-11 RX ORDER — MONTELUKAST SODIUM 10 MG/1
1 TABLET ORAL
Refills: 0 | DISCHARGE

## 2025-08-11 RX ORDER — DICLOFENAC SODIUM 10 MG/G
2.25 GEL TOPICAL
Refills: 0 | DISCHARGE

## 2025-08-11 RX ORDER — ASPIRIN 325 MG
1 TABLET ORAL
Refills: 0 | DISCHARGE

## 2025-08-11 RX ORDER — FLUTICASONE PROPIONATE 50 UG/1
1 SPRAY, METERED NASAL
Refills: 0 | Status: DISCONTINUED | OUTPATIENT
Start: 2025-08-11 | End: 2025-08-13

## 2025-08-11 RX ORDER — FUROSEMIDE 10 MG/ML
40 INJECTION INTRAMUSCULAR; INTRAVENOUS ONCE
Refills: 0 | Status: COMPLETED | OUTPATIENT
Start: 2025-08-11 | End: 2025-08-11

## 2025-08-11 RX ADMIN — Medication 1 DOSE(S): at 21:25

## 2025-08-11 RX ADMIN — FLUTICASONE PROPIONATE 1 SPRAY(S): 50 SPRAY, METERED NASAL at 11:26

## 2025-08-11 RX ADMIN — FUROSEMIDE 40 MILLIGRAM(S): 10 INJECTION INTRAMUSCULAR; INTRAVENOUS at 14:48

## 2025-08-11 RX ADMIN — Medication 1 DROP(S): at 17:10

## 2025-08-11 RX ADMIN — Medication 1 DOSE(S): at 11:26

## 2025-08-11 RX ADMIN — Medication 40 MILLIGRAM(S): at 06:18

## 2025-08-11 RX ADMIN — MONTELUKAST SODIUM 10 MILLIGRAM(S): 10 TABLET ORAL at 11:27

## 2025-08-11 RX ADMIN — Medication 1 APPLICATION(S): at 11:28

## 2025-08-11 RX ADMIN — Medication 40 MILLIGRAM(S): at 17:10

## 2025-08-12 ENCOUNTER — APPOINTMENT (OUTPATIENT)
Dept: OTOLARYNGOLOGY | Facility: CLINIC | Age: 80
End: 2025-08-12

## 2025-08-12 LAB
ANION GAP SERPL CALC-SCNC: 13 MMOL/L — SIGNIFICANT CHANGE UP (ref 7–14)
BUN SERPL-MCNC: 21 MG/DL — SIGNIFICANT CHANGE UP (ref 7–23)
CALCIUM SERPL-MCNC: 8.5 MG/DL — SIGNIFICANT CHANGE UP (ref 8.4–10.5)
CHLORIDE SERPL-SCNC: 99 MMOL/L — SIGNIFICANT CHANGE UP (ref 98–107)
CO2 SERPL-SCNC: 23 MMOL/L — SIGNIFICANT CHANGE UP (ref 22–31)
CREAT SERPL-MCNC: 1.81 MG/DL — HIGH (ref 0.5–1.3)
EGFR: 37 ML/MIN/1.73M2 — LOW
EGFR: 37 ML/MIN/1.73M2 — LOW
GLUCOSE SERPL-MCNC: 129 MG/DL — HIGH (ref 70–99)
HCT VFR BLD CALC: 28.9 % — LOW (ref 39–50)
HGB BLD-MCNC: 9.4 G/DL — LOW (ref 13–17)
MCHC RBC-ENTMCNC: 26.4 PG — LOW (ref 27–34)
MCHC RBC-ENTMCNC: 32.5 G/DL — SIGNIFICANT CHANGE UP (ref 32–36)
MCV RBC AUTO: 81.2 FL — SIGNIFICANT CHANGE UP (ref 80–100)
NRBC # BLD AUTO: 0 K/UL — SIGNIFICANT CHANGE UP (ref 0–0)
NRBC # FLD: 0 K/UL — SIGNIFICANT CHANGE UP (ref 0–0)
NRBC BLD AUTO-RTO: 0 /100 WBCS — SIGNIFICANT CHANGE UP (ref 0–0)
PLATELET # BLD AUTO: 162 K/UL — SIGNIFICANT CHANGE UP (ref 150–400)
PMV BLD: 8.6 FL — SIGNIFICANT CHANGE UP (ref 7–13)
POTASSIUM SERPL-MCNC: 3.3 MMOL/L — LOW (ref 3.5–5.3)
POTASSIUM SERPL-SCNC: 3.3 MMOL/L — LOW (ref 3.5–5.3)
RBC # BLD: 3.56 M/UL — LOW (ref 4.2–5.8)
RBC # FLD: 16.9 % — HIGH (ref 10.3–14.5)
SODIUM SERPL-SCNC: 135 MMOL/L — SIGNIFICANT CHANGE UP (ref 135–145)
WBC # BLD: 5.7 K/UL — SIGNIFICANT CHANGE UP (ref 3.8–10.5)
WBC # FLD AUTO: 5.7 K/UL — SIGNIFICANT CHANGE UP (ref 3.8–10.5)

## 2025-08-12 PROCEDURE — 99232 SBSQ HOSP IP/OBS MODERATE 35: CPT

## 2025-08-12 RX ORDER — ASPIRIN 325 MG
81 TABLET ORAL DAILY
Refills: 0 | Status: DISCONTINUED | OUTPATIENT
Start: 2025-08-12 | End: 2025-08-13

## 2025-08-12 RX ORDER — APIXABAN 5 MG/1
5 TABLET, FILM COATED ORAL EVERY 12 HOURS
Refills: 0 | Status: DISCONTINUED | OUTPATIENT
Start: 2025-08-12 | End: 2025-08-13

## 2025-08-12 RX ADMIN — Medication 100 MILLIEQUIVALENT(S): at 07:03

## 2025-08-12 RX ADMIN — Medication 1 APPLICATION(S): at 12:11

## 2025-08-12 RX ADMIN — Medication 40 MILLIGRAM(S): at 17:44

## 2025-08-12 RX ADMIN — FLUTICASONE PROPIONATE 1 SPRAY(S): 50 SPRAY, METERED NASAL at 10:02

## 2025-08-12 RX ADMIN — Medication 1 DOSE(S): at 08:55

## 2025-08-12 RX ADMIN — Medication 40 MILLIEQUIVALENT(S): at 08:55

## 2025-08-12 RX ADMIN — MONTELUKAST SODIUM 10 MILLIGRAM(S): 10 TABLET ORAL at 12:09

## 2025-08-12 RX ADMIN — Medication 81 MILLIGRAM(S): at 17:46

## 2025-08-12 RX ADMIN — Medication 1 DOSE(S): at 21:32

## 2025-08-12 RX ADMIN — Medication 40 MILLIGRAM(S): at 05:49

## 2025-08-12 RX ADMIN — APIXABAN 5 MILLIGRAM(S): 5 TABLET, FILM COATED ORAL at 17:43

## 2025-08-12 RX ADMIN — Medication 1 DROP(S): at 12:10

## 2025-08-13 ENCOUNTER — TRANSCRIPTION ENCOUNTER (OUTPATIENT)
Age: 80
End: 2025-08-13

## 2025-08-13 VITALS
DIASTOLIC BLOOD PRESSURE: 82 MMHG | TEMPERATURE: 98 F | OXYGEN SATURATION: 100 % | HEART RATE: 92 BPM | SYSTOLIC BLOOD PRESSURE: 152 MMHG | RESPIRATION RATE: 18 BRPM

## 2025-08-13 LAB
ANION GAP SERPL CALC-SCNC: 12 MMOL/L — SIGNIFICANT CHANGE UP (ref 7–14)
BUN SERPL-MCNC: 23 MG/DL — SIGNIFICANT CHANGE UP (ref 7–23)
CALCIUM SERPL-MCNC: 8.3 MG/DL — LOW (ref 8.4–10.5)
CHLORIDE SERPL-SCNC: 102 MMOL/L — SIGNIFICANT CHANGE UP (ref 98–107)
CO2 SERPL-SCNC: 22 MMOL/L — SIGNIFICANT CHANGE UP (ref 22–31)
CREAT SERPL-MCNC: 1.58 MG/DL — HIGH (ref 0.5–1.3)
EGFR: 44 ML/MIN/1.73M2 — LOW
EGFR: 44 ML/MIN/1.73M2 — LOW
GLUCOSE SERPL-MCNC: 142 MG/DL — HIGH (ref 70–99)
HCT VFR BLD CALC: 29.5 % — LOW (ref 39–50)
HGB BLD-MCNC: 9.5 G/DL — LOW (ref 13–17)
MCHC RBC-ENTMCNC: 26 PG — LOW (ref 27–34)
MCHC RBC-ENTMCNC: 32.2 G/DL — SIGNIFICANT CHANGE UP (ref 32–36)
MCV RBC AUTO: 80.8 FL — SIGNIFICANT CHANGE UP (ref 80–100)
NRBC # BLD AUTO: 0 K/UL — SIGNIFICANT CHANGE UP (ref 0–0)
NRBC # FLD: 0 K/UL — SIGNIFICANT CHANGE UP (ref 0–0)
NRBC BLD AUTO-RTO: 0 /100 WBCS — SIGNIFICANT CHANGE UP (ref 0–0)
PLATELET # BLD AUTO: 149 K/UL — LOW (ref 150–400)
PMV BLD: 8.3 FL — SIGNIFICANT CHANGE UP (ref 7–13)
POTASSIUM SERPL-MCNC: 3.5 MMOL/L — SIGNIFICANT CHANGE UP (ref 3.5–5.3)
POTASSIUM SERPL-SCNC: 3.5 MMOL/L — SIGNIFICANT CHANGE UP (ref 3.5–5.3)
RBC # BLD: 3.65 M/UL — LOW (ref 4.2–5.8)
RBC # FLD: 16.7 % — HIGH (ref 10.3–14.5)
SODIUM SERPL-SCNC: 136 MMOL/L — SIGNIFICANT CHANGE UP (ref 135–145)
WBC # BLD: 6.31 K/UL — SIGNIFICANT CHANGE UP (ref 3.8–10.5)
WBC # FLD AUTO: 6.31 K/UL — SIGNIFICANT CHANGE UP (ref 3.8–10.5)

## 2025-08-13 PROCEDURE — 99239 HOSP IP/OBS DSCHRG MGMT >30: CPT

## 2025-08-13 RX ORDER — FUROSEMIDE 10 MG/ML
1 INJECTION INTRAMUSCULAR; INTRAVENOUS
Refills: 0 | DISCHARGE

## 2025-08-13 RX ORDER — IMATINIB MESYLATE 100 MG/1
1 TABLET ORAL
Qty: 0 | Refills: 0 | DISCHARGE
Start: 2025-08-13

## 2025-08-13 RX ORDER — HYDROCHLOROTHIAZIDE 50 MG/1
1 TABLET ORAL
Refills: 0 | DISCHARGE

## 2025-08-13 RX ADMIN — MONTELUKAST SODIUM 10 MILLIGRAM(S): 10 TABLET ORAL at 11:44

## 2025-08-13 RX ADMIN — Medication 1 APPLICATION(S): at 11:45

## 2025-08-13 RX ADMIN — APIXABAN 5 MILLIGRAM(S): 5 TABLET, FILM COATED ORAL at 05:45

## 2025-08-13 RX ADMIN — FLUTICASONE PROPIONATE 1 SPRAY(S): 50 SPRAY, METERED NASAL at 11:44

## 2025-08-13 RX ADMIN — Medication 1 DROP(S): at 11:44

## 2025-08-13 RX ADMIN — Medication 1 DOSE(S): at 08:23

## 2025-08-13 RX ADMIN — INSULIN LISPRO 1: 100 INJECTION, SOLUTION INTRAVENOUS; SUBCUTANEOUS at 12:16

## 2025-08-13 RX ADMIN — Medication 40 MILLIGRAM(S): at 05:45

## 2025-08-13 RX ADMIN — APIXABAN 5 MILLIGRAM(S): 5 TABLET, FILM COATED ORAL at 17:25

## 2025-08-13 RX ADMIN — Medication 40 MILLIGRAM(S): at 17:25

## 2025-08-13 RX ADMIN — Medication 81 MILLIGRAM(S): at 11:44

## 2025-08-13 RX ADMIN — INSULIN LISPRO 1: 100 INJECTION, SOLUTION INTRAVENOUS; SUBCUTANEOUS at 08:22

## 2025-08-14 ENCOUNTER — APPOINTMENT (OUTPATIENT)
Age: 80
End: 2025-08-14

## 2025-08-14 ENCOUNTER — NON-APPOINTMENT (OUTPATIENT)
Age: 80
End: 2025-08-14

## 2025-08-14 RX ORDER — HYDROCHLOROTHIAZIDE 25 MG/1
25 TABLET ORAL
Refills: 0 | Status: COMPLETED | COMMUNITY
Start: 2025-08-06 | End: 2025-08-14

## 2025-08-14 RX ORDER — APIXABAN 5 MG/1
5 TABLET, FILM COATED ORAL
Qty: 180 | Refills: 3 | Status: ACTIVE | COMMUNITY
Start: 2025-08-06

## 2025-08-14 RX ORDER — HYDRALAZINE HYDROCHLORIDE 50 MG/1
50 TABLET ORAL
Qty: 270 | Refills: 3 | Status: COMPLETED | COMMUNITY
Start: 2025-07-28 | End: 2025-08-14

## 2025-08-14 RX ORDER — LORATADINE 10 MG/1
10 CAPSULE, LIQUID FILLED ORAL
Refills: 0 | Status: ACTIVE | COMMUNITY
Start: 2025-07-28

## 2025-08-14 RX ORDER — PANTOPRAZOLE SODIUM 40 MG/1
40 TABLET, DELAYED RELEASE ORAL DAILY
Refills: 0 | Status: ACTIVE | COMMUNITY
Start: 2025-07-28

## 2025-08-14 RX ORDER — CYCLOSPORINE 0.5 MG/ML
0.05 EMULSION OPHTHALMIC
Refills: 0 | Status: ACTIVE | COMMUNITY
Start: 2025-07-28

## 2025-08-14 RX ORDER — LINACLOTIDE 145 UG/1
145 CAPSULE, GELATIN COATED ORAL
Refills: 0 | Status: ACTIVE | COMMUNITY
Start: 2025-07-28

## 2025-08-14 RX ORDER — MECLIZINE HYDROCHLORIDE 12.5 MG/1
12.5 TABLET ORAL 3 TIMES DAILY
Refills: 0 | Status: COMPLETED | COMMUNITY
Start: 2025-08-06 | End: 2025-08-14

## 2025-08-14 RX ORDER — METOPROLOL SUCCINATE 100 MG/1
100 TABLET, EXTENDED RELEASE ORAL DAILY
Qty: 90 | Refills: 3 | Status: COMPLETED | COMMUNITY
Start: 2025-08-06 | End: 2025-08-14

## 2025-08-14 RX ORDER — ALBUTEROL SULFATE 90 UG/1
108 (90 BASE) INHALANT RESPIRATORY (INHALATION) EVERY 4 HOURS
Qty: 1 | Refills: 3 | Status: ACTIVE | COMMUNITY
Start: 2025-08-06

## 2025-08-14 RX ORDER — FLUTICASONE PROPIONATE AND SALMETEROL 50; 250 UG/1; UG/1
250-50 POWDER RESPIRATORY (INHALATION)
Refills: 0 | Status: ACTIVE | COMMUNITY
Start: 2025-07-28

## 2025-08-14 RX ORDER — IMATINIB MESYLATE 400 MG/1
400 TABLET, FILM COATED ORAL
Refills: 0 | Status: COMPLETED | COMMUNITY
Start: 2025-08-06 | End: 2025-08-14

## 2025-08-21 ENCOUNTER — APPOINTMENT (OUTPATIENT)
Dept: HOME HEALTH SERVICES | Facility: HOME HEALTH | Age: 80
End: 2025-08-21

## 2025-08-21 VITALS
OXYGEN SATURATION: 99 % | DIASTOLIC BLOOD PRESSURE: 72 MMHG | HEART RATE: 79 BPM | BODY MASS INDEX: 31.65 KG/M2 | HEIGHT: 65 IN | SYSTOLIC BLOOD PRESSURE: 140 MMHG | WEIGHT: 190 LBS | RESPIRATION RATE: 16 BRPM | TEMPERATURE: 98.1 F

## 2025-08-21 DIAGNOSIS — J30.9 ALLERGIC RHINITIS, UNSPECIFIED: ICD-10-CM

## 2025-08-21 DIAGNOSIS — I10 ESSENTIAL (PRIMARY) HYPERTENSION: ICD-10-CM

## 2025-08-21 DIAGNOSIS — D50.0 IRON DEFICIENCY ANEMIA SECONDARY TO BLOOD LOSS (CHRONIC): ICD-10-CM

## 2025-08-21 PROCEDURE — 99495 TRANSJ CARE MGMT MOD F2F 14D: CPT

## 2025-08-21 RX ORDER — LINACLOTIDE 145 UG/1
145 CAPSULE, GELATIN COATED ORAL
Refills: 0 | Status: ACTIVE | COMMUNITY
Start: 2025-08-21

## 2025-08-21 RX ORDER — AMLODIPINE BESYLATE 10 MG/1
10 TABLET ORAL DAILY
Refills: 0 | Status: ACTIVE | COMMUNITY
Start: 2025-08-21

## 2025-08-21 RX ORDER — MECLIZINE HYDROCHLORIDE 12.5 MG/1
12.5 TABLET ORAL 3 TIMES DAILY
Refills: 0 | Status: ACTIVE | COMMUNITY
Start: 2025-08-21

## 2025-08-21 RX ORDER — MONTELUKAST 10 MG/1
10 TABLET, FILM COATED ORAL DAILY
Qty: 90 | Refills: 2 | Status: ACTIVE | COMMUNITY
Start: 2025-08-21

## 2025-08-21 RX ORDER — ATORVASTATIN CALCIUM 40 MG/1
40 TABLET, FILM COATED ORAL
Refills: 0 | Status: ACTIVE | COMMUNITY
Start: 2025-08-21

## 2025-08-25 ENCOUNTER — LABORATORY RESULT (OUTPATIENT)
Age: 80
End: 2025-08-25

## 2025-08-26 ENCOUNTER — NON-APPOINTMENT (OUTPATIENT)
Age: 80
End: 2025-08-26

## 2025-08-28 ENCOUNTER — TRANSCRIPTION ENCOUNTER (OUTPATIENT)
Age: 80
End: 2025-08-28

## 2025-09-03 ENCOUNTER — LABORATORY RESULT (OUTPATIENT)
Age: 80
End: 2025-09-03

## 2025-09-04 ENCOUNTER — LABORATORY RESULT (OUTPATIENT)
Age: 80
End: 2025-09-04

## 2025-09-09 ENCOUNTER — RX RENEWAL (OUTPATIENT)
Age: 80
End: 2025-09-09

## 2025-09-09 RX ORDER — PANTOPRAZOLE 40 MG/1
40 TABLET, DELAYED RELEASE ORAL
Qty: 90 | Refills: 3 | Status: ACTIVE | COMMUNITY
Start: 2025-09-09 | End: 1900-01-01

## 2025-09-11 ENCOUNTER — APPOINTMENT (OUTPATIENT)
Dept: PULMONOLOGY | Facility: CLINIC | Age: 80
End: 2025-09-11
Payer: MEDICARE

## 2025-09-11 VITALS
HEIGHT: 65 IN | SYSTOLIC BLOOD PRESSURE: 138 MMHG | TEMPERATURE: 98.4 F | BODY MASS INDEX: 32.65 KG/M2 | RESPIRATION RATE: 17 BRPM | WEIGHT: 196 LBS | HEART RATE: 92 BPM | DIASTOLIC BLOOD PRESSURE: 84 MMHG | OXYGEN SATURATION: 99 %

## 2025-09-11 DIAGNOSIS — D50.0 IRON DEFICIENCY ANEMIA SECONDARY TO BLOOD LOSS (CHRONIC): ICD-10-CM

## 2025-09-11 DIAGNOSIS — I26.99 OTHER PULMONARY EMBOLISM W/OUT ACUTE COR PULMONALE: ICD-10-CM

## 2025-09-11 DIAGNOSIS — G47.33 OBSTRUCTIVE SLEEP APNEA (ADULT) (PEDIATRIC): ICD-10-CM

## 2025-09-11 DIAGNOSIS — R06.09 OTHER FORMS OF DYSPNEA: ICD-10-CM

## 2025-09-11 PROCEDURE — 99204 OFFICE O/P NEW MOD 45 MIN: CPT

## 2025-09-16 ENCOUNTER — LABORATORY RESULT (OUTPATIENT)
Age: 80
End: 2025-09-16

## 2025-09-17 ENCOUNTER — LABORATORY RESULT (OUTPATIENT)
Age: 80
End: 2025-09-17

## 2025-09-18 ENCOUNTER — LABORATORY RESULT (OUTPATIENT)
Age: 80
End: 2025-09-18

## 2025-09-24 PROBLEM — K62.5 BRIGHT RED RECTAL BLEEDING: Status: ACTIVE | Noted: 2025-09-24

## (undated) DEVICE — FOLEY TRAY 16FR 5CC LTX UMETER CLOSED

## (undated) DEVICE — SUT PROLENE 4-0 36" SH

## (undated) DEVICE — SPECIMEN CONTAINER 100ML

## (undated) DEVICE — DRSG 2X2

## (undated) DEVICE — CATH IV SAFE BC 22G X 1" (BLUE)

## (undated) DEVICE — FOLEY HOLDER STATLOCK 2 WAY ADULT

## (undated) DEVICE — SUT BIOSYN 4-0 18" P-12

## (undated) DEVICE — GOWN LG

## (undated) DEVICE — TUBING SUCTION NONCONDUCTIVE 6MM X 12FT

## (undated) DEVICE — BLADE SCALPEL SAFETYLOCK #10

## (undated) DEVICE — SUT SOFSILK 2 60" TIES

## (undated) DEVICE — GLV 6 PROTEXIS W HYDROGEL

## (undated) DEVICE — DRAPE IOBAN 23" X 23"

## (undated) DEVICE — DRAPE MAYO STAND 30"

## (undated) DEVICE — WARMING BLANKET FULL UNDERBODY

## (undated) DEVICE — GOWN TRIMAX XXL

## (undated) DEVICE — SYR LUER LOK 10CC

## (undated) DEVICE — DRSG TEGADERM 6"X8"

## (undated) DEVICE — STEALTH CLAMP INSERT FIBRA/FIBRA 90MM

## (undated) DEVICE — STAPLER SKIN VISI-STAT 35 WIDE

## (undated) DEVICE — SPONGE DISSECTOR PEANUT

## (undated) DEVICE — SUT PDS II 3-0 36" SH

## (undated) DEVICE — DRSG MASTISOL

## (undated) DEVICE — SOL IRR POUR NS 0.9% 500ML

## (undated) DEVICE — MEDICATION LABELS W MARKER

## (undated) DEVICE — DRAPE INSTRUMENT POUCH 6.75" X 11"

## (undated) DEVICE — BLADE SCALPEL SAFETYLOCK #15

## (undated) DEVICE — TUBING IV SET GRAVITY 3Y 100" MACRO

## (undated) DEVICE — SUT PLEDGET PRE PUNCH 4.8 X 9.5 X 1.5 MM

## (undated) DEVICE — SUT PROLENE 5-0 36" RB-1

## (undated) DEVICE — DRAPE LIGHT HANDLE COVER (BLUE)

## (undated) DEVICE — PACK UNIVERSAL CARDIAC

## (undated) DEVICE — BLADE SCALPEL SAFETYLOCK #11

## (undated) DEVICE — Device

## (undated) DEVICE — DRSG BANDAID 0.75X3"

## (undated) DEVICE — SOL INJ NS 0.9% 500ML 1-PORT

## (undated) DEVICE — NDL PERC BASEPLT 18GX7CM

## (undated) DEVICE — SALIVA EJECTOR (BLUE)

## (undated) DEVICE — TOURNIQUET SET 12FR (1 RED, 1 BLUE, 1 SNARE) 7"

## (undated) DEVICE — DRSG OPSITE 13.75 X 4"

## (undated) DEVICE — SUT SILK 0 30" TIES

## (undated) DEVICE — VISITEC 4X4

## (undated) DEVICE — STEALTH CLAMP INSERT FIBRA/FIBRA 60MM

## (undated) DEVICE — GLV 7.5 PROTEXIS (WHITE)

## (undated) DEVICE — ELCTR ECG CONDUCTIVE ADHESIVE

## (undated) DEVICE — DENTURE CUP PINK

## (undated) DEVICE — PACK IV START WITH CHG

## (undated) DEVICE — DRAIN CHANNEL 19FR ROUND FULL FLUTED

## (undated) DEVICE — PACING CABLE TEMP MEDTRONIC WITH PAC-LOC

## (undated) DEVICE — SUT PROLENE 5-0 30" RB-2

## (undated) DEVICE — DRSG CURITY GAUZE SPONGE 4 X 4" 12-PLY NON-STERILE

## (undated) DEVICE — SUT POLYSORB 2-0 30" GS-21 UNDYED

## (undated) DEVICE — PACK UNIVERSAL CARDIAC SUPPLEMNTAL B

## (undated) DEVICE — BIOPSY FORCEP RADIAL JAW 4 STANDARD WITH NEEDLE

## (undated) DEVICE — SYR ASEPTO

## (undated) DEVICE — SUT PROLENE 3-0 36" SH

## (undated) DEVICE — BIOPSY FORCEP COLD DISP

## (undated) DEVICE — ELCTR BOVIE PENCIL HANDPIECE

## (undated) DEVICE — SUT POLYSORB 2 30" GS-26

## (undated) DEVICE — CONTAINER FORMALIN 80ML YELLOW

## (undated) DEVICE — CONNECTOR STRAIGHT 3/8 X 1/2"

## (undated) DEVICE — OMNIPAQUE 300  30ML

## (undated) DEVICE — TUBING MEDI-VAC W MAXIGRIP CONNECTORS 1/4"X6'

## (undated) DEVICE — VENTING ADAPTER "Y" (RED/BLUE) 7.5"

## (undated) DEVICE — LUBRICATING JELLY HR ONE SHOT 3G

## (undated) DEVICE — UNDERPAD LINEN SAVER 17 X 24"

## (undated) DEVICE — BITE BLOCK ADULT 20 X 27MM (GREEN)

## (undated) DEVICE — TUBING PRESSURE 100"

## (undated) DEVICE — GLV 7 PROTEXIS (WHITE)

## (undated) DEVICE — NDL HYPO SAFE 22G X 1" (BLACK)

## (undated) DEVICE — BASIN EMESIS 10IN GRADUATED MAUVE

## (undated) DEVICE — ELCTR GROUNDING PAD ADULT COVIDIEN

## (undated) DEVICE — BULLDOG SPRING CLIP 6MM SOFT/SOFT

## (undated) DEVICE — DRSG STERISTRIPS 0.5 X 4"

## (undated) DEVICE — FOLEY TRAY 16FR 5CC LF LUBRISIL ADVANCE TEMP CLOSED

## (undated) DEVICE — ELCTR BOVIE PENCIL HANDPIECE ROCKER SWITCH 15FT

## (undated) DEVICE — NDL COUNTER FOAM AND MAGNET 40-70

## (undated) DEVICE — DRAPE TOWEL BLUE 17" X 24"

## (undated) DEVICE — LAP PAD 18 X 18"

## (undated) DEVICE — SHIELD CATH CONTAMINATION 7.5-8.5FR TWISTLOCK ADAPT

## (undated) DEVICE — PACK CARDIAC YELLOW

## (undated) DEVICE — DRAPE 1/2 SHEET 40X57"

## (undated) DEVICE — PREP CHLORAPREP HI-LITE ORANGE 26ML

## (undated) DEVICE — WARMING BLANKET DUO-THERM HYPER/HYPOTHERM ADULT

## (undated) DEVICE — VENODYNE/SCD SLEEVE CALF LARGE

## (undated) DEVICE — GLV 8 PROTEXIS (WHITE)

## (undated) DEVICE — DRAPE 3/4 SHEET W REINFORCEMENT 56X77"

## (undated) DEVICE — SUCTION TUBE CARDIAC SOFT TIP 6FR SHAFT 10FR TIP 6"

## (undated) DEVICE — CHEST DRAIN PLEUR-EVAC WET/WET ADULT-PEDS SINGLE (QUICK)

## (undated) DEVICE — DRSG OPSITE 2.5 X 2"

## (undated) DEVICE — SUMP INTRACARDIAC 20FR 1/4" ADULT

## (undated) DEVICE — SUT PDS II 0 27" OS-6

## (undated) DEVICE — GLV 8 RADIATION

## (undated) DEVICE — SUT PROLENE 4-0 36" BB

## (undated) DEVICE — SOL NORMOSOL-R PH7.4 1000ML

## (undated) DEVICE — PACING CABLE A/V TEMP SCREW DOWN 6FT

## (undated) DEVICE — VESSEL LOOP MAXI-RED  0.120" X 16"

## (undated) DEVICE — SUT BOOT STANDARD (ASSORTED) 5 PAIR

## (undated) DEVICE — GLV 6.5 PROTEXIS (WHITE)

## (undated) DEVICE — TUBING SUCTION 20FT

## (undated) DEVICE — SUT BLUNT SZ 5

## (undated) DEVICE — SUCTION YANKAUER NO CONTROL VENT

## (undated) DEVICE — INJ SYS RAP REFIL

## (undated) DEVICE — CONNECTOR STRAIGHT 3/8 X 3/8" W LUER LOCK

## (undated) DEVICE — SUT SURGICAL STEEL 6 30" BP-1

## (undated) DEVICE — DRAPE FEMORAL ANGIOGRAPHY W TROUGH

## (undated) DEVICE — SYR LUER LOK 3CC

## (undated) DEVICE — SENSOR MYOCARDIAL TEMP 15MM

## (undated) DEVICE — TUBING ATS SUCTION LINE

## (undated) DEVICE — ELCTR HEX BLADE